# Patient Record
Sex: MALE | Race: BLACK OR AFRICAN AMERICAN | Employment: OTHER | URBAN - METROPOLITAN AREA
[De-identification: names, ages, dates, MRNs, and addresses within clinical notes are randomized per-mention and may not be internally consistent; named-entity substitution may affect disease eponyms.]

---

## 2017-01-28 RX ORDER — ONDANSETRON HYDROCHLORIDE 8 MG/1
TABLET, FILM COATED ORAL
Qty: 30 TABLET | Refills: 0 | Status: SHIPPED | OUTPATIENT
Start: 2017-01-28 | End: 2017-03-02

## 2017-01-31 RX ORDER — AMLODIPINE BESYLATE AND BENAZEPRIL HYDROCHLORIDE 5; 20 MG/1; MG/1
CAPSULE ORAL
Qty: 90 CAPSULE | Refills: 0 | Status: SHIPPED | OUTPATIENT
Start: 2017-01-31 | End: 2017-04-25

## 2017-02-28 ENCOUNTER — HOSPITAL ENCOUNTER (OUTPATIENT)
Dept: GENERAL RADIOLOGY | Age: 67
Discharge: HOME | End: 2017-02-28
Attending: INTERNAL MEDICINE
Payer: MEDICARE

## 2017-02-28 ENCOUNTER — OFFICE VISIT (OUTPATIENT)
Dept: INTERNAL MEDICINE CLINIC | Facility: CLINIC | Age: 67
End: 2017-02-28

## 2017-02-28 VITALS
OXYGEN SATURATION: 93 % | HEIGHT: 71 IN | DIASTOLIC BLOOD PRESSURE: 92 MMHG | RESPIRATION RATE: 16 BRPM | TEMPERATURE: 99 F | HEART RATE: 87 BPM | BODY MASS INDEX: 21.56 KG/M2 | WEIGHT: 154 LBS | SYSTOLIC BLOOD PRESSURE: 130 MMHG

## 2017-02-28 DIAGNOSIS — J44.9 CHRONIC OBSTRUCTIVE PULMONARY DISEASE, UNSPECIFIED COPD TYPE (HCC): Chronic | ICD-10-CM

## 2017-02-28 DIAGNOSIS — Z13.220 SCREENING CHOLESTEROL LEVEL: ICD-10-CM

## 2017-02-28 DIAGNOSIS — E10.65 TYPE 1 DIABETES MELLITUS WITH HYPERGLYCEMIA (HCC): Chronic | ICD-10-CM

## 2017-02-28 DIAGNOSIS — Z12.5 SCREENING FOR PROSTATE CANCER: ICD-10-CM

## 2017-02-28 DIAGNOSIS — E55.9 VITAMIN D DEFICIENCY: ICD-10-CM

## 2017-02-28 DIAGNOSIS — Z00.00 ROUTINE GENERAL MEDICAL EXAMINATION AT A HEALTH CARE FACILITY: Primary | ICD-10-CM

## 2017-02-28 DIAGNOSIS — Z13.29 SCREENING FOR THYROID DISORDER: ICD-10-CM

## 2017-02-28 DIAGNOSIS — I10 ESSENTIAL HYPERTENSION, BENIGN: Chronic | ICD-10-CM

## 2017-02-28 PROCEDURE — 71020 XR CHEST PA + LAT CHEST (CPT=71020): CPT

## 2017-02-28 PROCEDURE — G0439 PPPS, SUBSEQ VISIT: HCPCS | Performed by: INTERNAL MEDICINE

## 2017-02-28 PROCEDURE — 93000 ELECTROCARDIOGRAM COMPLETE: CPT | Performed by: INTERNAL MEDICINE

## 2017-02-28 NOTE — PROGRESS NOTES
Robbin Diop [facility] 1950 is a 78year old male.     Patient presents with:  Physical      HPI:   See below    Current Outpatient Prescriptions:  HYDROmorphone HCl (DILAUDID) 4 MG Oral Tab Take 1 tablet (4 mg total) by mouth every 6 (six) hours as need uncontrolled    • Unspecified vitamin D deficiency    • Spondylolisthesis of lumbar region    • Renal stones    • Right leg DVT ([facility] [address] 6/2007   • Primary localized osteoarthrosis, lower leg, left [715.16] 9/2/2015      Social History:    Smoking Status: For Patient denies blood in urine, burning on urination,   , dysuria, urinary incontinence/no history of kidney disease or genital abnormalities.  Dysuria none.-Noted frequency of urination along with initiation difficulty and nocturia   Musculoskeletal:   Pa absent . Edema: none. Pulses: present. Tremors: no.   Varicose veins: absent . MUSCULOSKELETAL:   Cervical spines: normal.   L-S spines: normal.   Lower extremity joints: mild oa knees.    Upper extremity joints: normal.   NEUROLOGICAL:   Babinski: asked to Return in about 2 weeks (around 3/14/2017) for result discussion.   Heath Olmstead MD

## 2017-03-01 ENCOUNTER — LAB ENCOUNTER (OUTPATIENT)
Dept: LAB | Age: 67
End: 2017-03-01
Attending: INTERNAL MEDICINE
Payer: MEDICARE

## 2017-03-01 DIAGNOSIS — I10 ESSENTIAL HYPERTENSION, BENIGN: Chronic | ICD-10-CM

## 2017-03-01 DIAGNOSIS — Z13.220 SCREENING CHOLESTEROL LEVEL: ICD-10-CM

## 2017-03-01 DIAGNOSIS — Z13.29 SCREENING FOR THYROID DISORDER: ICD-10-CM

## 2017-03-01 DIAGNOSIS — E55.9 VITAMIN D DEFICIENCY: ICD-10-CM

## 2017-03-01 DIAGNOSIS — Z12.5 SCREENING FOR PROSTATE CANCER: ICD-10-CM

## 2017-03-01 DIAGNOSIS — E10.65 TYPE 1 DIABETES MELLITUS WITH HYPERGLYCEMIA (HCC): ICD-10-CM

## 2017-03-01 LAB
25-HYDROXYVITAMIN D (TOTAL): 28 NG/ML (ref 30–100)
BASOPHILS # BLD AUTO: 0.06 X10(3) UL (ref 0–0.1)
BASOPHILS NFR BLD AUTO: 0.5 %
BILIRUB UR QL STRIP.AUTO: NEGATIVE
COLOR UR AUTO: YELLOW
EOSINOPHIL # BLD AUTO: 0.79 X10(3) UL (ref 0–0.3)
EOSINOPHIL NFR BLD AUTO: 6.7 %
ERYTHROCYTE [DISTWIDTH] IN BLOOD BY AUTOMATED COUNT: 14.6 % (ref 11.5–16)
EST. AVERAGE GLUCOSE BLD GHB EST-MCNC: 160 MG/DL (ref 68–126)
GLUCOSE UR STRIP.AUTO-MCNC: NEGATIVE MG/DL
HBA1C MFR BLD HPLC: 7.2 % (ref ?–5.7)
HCT VFR BLD AUTO: 42.5 % (ref 37–53)
HGB BLD-MCNC: 14.5 G/DL (ref 13–17)
IMMATURE GRANULOCYTE COUNT: 0.03 X10(3) UL (ref 0–1)
IMMATURE GRANULOCYTE RATIO %: 0.3 %
KETONES UR STRIP.AUTO-MCNC: NEGATIVE MG/DL
LYMPHOCYTES # BLD AUTO: 3.72 X10(3) UL (ref 0.9–4)
LYMPHOCYTES NFR BLD AUTO: 31.6 %
MCH RBC QN AUTO: 31.3 PG (ref 27–33.2)
MCHC RBC AUTO-ENTMCNC: 34.1 G/DL (ref 31–37)
MCV RBC AUTO: 91.8 FL (ref 80–99)
MONOCYTES # BLD AUTO: 1.08 X10(3) UL (ref 0.1–0.6)
MONOCYTES NFR BLD AUTO: 9.2 %
NEUTROPHIL ABS PRELIM: 6.1 X10 (3) UL (ref 1.3–6.7)
NEUTROPHILS # BLD AUTO: 6.1 X10(3) UL (ref 1.3–6.7)
NEUTROPHILS NFR BLD AUTO: 51.7 %
NITRITE UR QL STRIP.AUTO: NEGATIVE
PH UR STRIP.AUTO: 5 [PH] (ref 4.5–8)
PLATELET # BLD AUTO: 292 10(3)UL (ref 150–450)
PROT UR STRIP.AUTO-MCNC: NEGATIVE MG/DL
RBC # BLD AUTO: 4.63 X10(6)UL (ref 3.8–5.8)
RBC #/AREA URNS AUTO: >10 /HPF
RBC UR QL AUTO: NEGATIVE
RED CELL DISTRIBUTION WIDTH-SD: 48.8 FL (ref 35.1–46.3)
SP GR UR STRIP.AUTO: 1.01 (ref 1–1.03)
UROBILINOGEN UR STRIP.AUTO-MCNC: <2 MG/DL
WBC # BLD AUTO: 11.8 X10(3) UL (ref 4–13)

## 2017-03-01 PROCEDURE — 84153 ASSAY OF PSA TOTAL: CPT

## 2017-03-01 PROCEDURE — 85025 COMPLETE CBC W/AUTO DIFF WBC: CPT

## 2017-03-01 PROCEDURE — 80053 COMPREHEN METABOLIC PANEL: CPT

## 2017-03-01 PROCEDURE — 83036 HEMOGLOBIN GLYCOSYLATED A1C: CPT

## 2017-03-01 PROCEDURE — 82570 ASSAY OF URINE CREATININE: CPT

## 2017-03-01 PROCEDURE — 36415 COLL VENOUS BLD VENIPUNCTURE: CPT

## 2017-03-01 PROCEDURE — 84443 ASSAY THYROID STIM HORMONE: CPT

## 2017-03-01 PROCEDURE — 80061 LIPID PANEL: CPT

## 2017-03-01 PROCEDURE — 82043 UR ALBUMIN QUANTITATIVE: CPT

## 2017-03-01 PROCEDURE — 81001 URINALYSIS AUTO W/SCOPE: CPT

## 2017-03-01 PROCEDURE — 82306 VITAMIN D 25 HYDROXY: CPT

## 2017-03-01 PROCEDURE — 84436 ASSAY OF TOTAL THYROXINE: CPT

## 2017-03-02 LAB
ALBUMIN SERPL-MCNC: 3.8 G/DL (ref 3.5–4.8)
ALP LIVER SERPL-CCNC: 92 U/L (ref 45–117)
ALT SERPL-CCNC: 30 U/L (ref 17–63)
AST SERPL-CCNC: 26 U/L (ref 15–41)
BILIRUB SERPL-MCNC: 0.5 MG/DL (ref 0.1–2)
BUN BLD-MCNC: 7 MG/DL (ref 8–20)
CALCIUM BLD-MCNC: 9.4 MG/DL (ref 8.3–10.3)
CHLORIDE: 104 MMOL/L (ref 101–111)
CHOLEST SMN-MCNC: 133 MG/DL (ref ?–200)
CO2: 27 MMOL/L (ref 22–32)
CREAT BLD-MCNC: 1.05 MG/DL (ref 0.7–1.3)
CREAT UR-SCNC: 143 MG/DL
GLUCOSE BLD-MCNC: 89 MG/DL (ref 70–99)
HDLC SERPL-MCNC: 43 MG/DL (ref 45–?)
HDLC SERPL: 3.09 {RATIO} (ref ?–4.97)
LDLC SERPL CALC-MCNC: 79 MG/DL (ref ?–130)
M PROTEIN MFR SERPL ELPH: 7.7 G/DL (ref 6.1–8.3)
MICROALBUMIN UR-MCNC: 1.86 MG/DL
MICROALBUMIN/CREAT 24H UR-RTO: 13 UG/MG (ref ?–30)
NONHDLC SERPL-MCNC: 90 MG/DL (ref ?–130)
POTASSIUM SERPL-SCNC: 4.6 MMOL/L (ref 3.6–5.1)
PSA SERPL-MCNC: 2.6 NG/ML (ref 0.01–4)
SODIUM SERPL-SCNC: 140 MMOL/L (ref 136–144)
THYROXINE (T4): 7.8 UG/DL (ref 4.5–10.9)
TRIGLYCERIDES: 53 MG/DL (ref ?–150)
TSI SER-ACNC: 1.7 MIU/ML (ref 0.35–5.5)
VLDL: 11 MG/DL (ref 5–40)

## 2017-03-02 RX ORDER — ONDANSETRON HYDROCHLORIDE 8 MG/1
TABLET, FILM COATED ORAL
Qty: 30 TABLET | Refills: 3 | Status: SHIPPED | OUTPATIENT
Start: 2017-03-02 | End: 2017-07-25

## 2017-03-02 RX ORDER — ETODOLAC 400 MG/1
TABLET, FILM COATED ORAL
Qty: 30 TABLET | Refills: 3 | Status: SHIPPED | OUTPATIENT
Start: 2017-03-02 | End: 2017-08-29

## 2017-03-02 NOTE — TELEPHONE ENCOUNTER
Medication doesn't fall under protocol. Approve if needed.       Last OV 2/28/17    Last refill 1/28/17

## 2017-03-02 NOTE — TELEPHONE ENCOUNTER
Medication doesn't fall under protocol. Approve if needed.       Last OV 2/28/17    Last refill 9/26/16 , 30 tablets with 3 refills

## 2017-03-03 ENCOUNTER — OFFICE VISIT (OUTPATIENT)
Dept: INTERNAL MEDICINE CLINIC | Facility: CLINIC | Age: 67
End: 2017-03-03

## 2017-03-03 VITALS
BODY MASS INDEX: 21.52 KG/M2 | OXYGEN SATURATION: 97 % | HEART RATE: 82 BPM | HEIGHT: 71 IN | WEIGHT: 153.75 LBS | DIASTOLIC BLOOD PRESSURE: 72 MMHG | TEMPERATURE: 99 F | RESPIRATION RATE: 16 BRPM | SYSTOLIC BLOOD PRESSURE: 128 MMHG

## 2017-03-03 DIAGNOSIS — R93.89 ABNORMAL CHEST X-RAY: Primary | ICD-10-CM

## 2017-03-03 DIAGNOSIS — E10.65 TYPE 1 DIABETES MELLITUS WITH HYPERGLYCEMIA (HCC): Chronic | ICD-10-CM

## 2017-03-03 DIAGNOSIS — R82.90 ABNORMAL URINE: ICD-10-CM

## 2017-03-03 LAB
BILIRUB UR QL STRIP.AUTO: NEGATIVE
CLARITY UR REFRACT.AUTO: CLEAR
COLOR UR AUTO: YELLOW
GLUCOSE UR STRIP.AUTO-MCNC: NEGATIVE MG/DL
KETONES UR STRIP.AUTO-MCNC: NEGATIVE MG/DL
NITRITE UR QL STRIP.AUTO: NEGATIVE
PH UR STRIP.AUTO: 5 [PH] (ref 4.5–8)
PROT UR STRIP.AUTO-MCNC: NEGATIVE MG/DL
RBC UR QL AUTO: NEGATIVE
SP GR UR STRIP.AUTO: 1.01 (ref 1–1.03)
UROBILINOGEN UR STRIP.AUTO-MCNC: <2 MG/DL

## 2017-03-03 PROCEDURE — 81003 URINALYSIS AUTO W/O SCOPE: CPT | Performed by: INTERNAL MEDICINE

## 2017-03-03 PROCEDURE — 81001 URINALYSIS AUTO W/SCOPE: CPT | Performed by: INTERNAL MEDICINE

## 2017-03-03 PROCEDURE — 99213 OFFICE O/P EST LOW 20 MIN: CPT | Performed by: INTERNAL MEDICINE

## 2017-03-03 PROCEDURE — 87086 URINE CULTURE/COLONY COUNT: CPT | Performed by: INTERNAL MEDICINE

## 2017-03-03 NOTE — PROGRESS NOTES
Hannah Jorge  1950 is a 78year old male. Patient presents with: Follow - Up: labs      HPI:   DIABETES MELLITUS:   The diet that the patient has been following is the American Diabetes Association diet.    The frequency of the monitoring sc pancreatitis [facility])      for pancreatitis flare ups   • Chronic abdominal pain 1/7/2014   • Chronic pain 1/7/2014   • Knee pain 10/2/2013   • Long-term current use of opiate analgesic 6/11/2010   • Primary localized osteoarthrosis, lower leg 10/2/2013   • D DIABETIC FOOT EXAM BILATERAL  INSPECTION normal  CIRCULATION normal  MONOFILAMENT normal           ASSESSMENT AND PLAN:   Ba Taveras was seen today for follow - up.     Diagnoses and all orders for this visit:    Abnormal chest x-ray  -     CT CHEST (CPT=7

## 2017-03-07 RX ORDER — PEN NEEDLE, DIABETIC 29 G X1/2"
NEEDLE, DISPOSABLE MISCELLANEOUS
Qty: 360 EACH | Refills: 1 | Status: SHIPPED | OUTPATIENT
Start: 2017-03-07 | End: 2017-03-23 | Stop reason: CLARIF

## 2017-03-07 RX ORDER — INSULIN DETEMIR 100 [IU]/ML
INJECTION, SOLUTION SUBCUTANEOUS
Qty: 30 ML | Refills: 1 | Status: SHIPPED | OUTPATIENT
Start: 2017-03-07 | End: 2017-08-06

## 2017-03-07 NOTE — TELEPHONE ENCOUNTER
Medication doesn't fall under protocol. Approve if needed. Last OV 3/3/17    Last refill 10/10/15, not able to find other refills on this.

## 2017-03-15 ENCOUNTER — HOSPITAL ENCOUNTER (OUTPATIENT)
Dept: CT IMAGING | Facility: HOSPITAL | Age: 67
Discharge: HOME | End: 2017-03-15
Attending: INTERNAL MEDICINE
Payer: MEDICARE

## 2017-03-15 ENCOUNTER — NURSE ONLY (OUTPATIENT)
Dept: RESPIRATORY THERAPY | Facility: HOSPITAL | Age: 67
End: 2017-03-15
Attending: INTERNAL MEDICINE
Payer: MEDICARE

## 2017-03-15 DIAGNOSIS — J44.9 CHRONIC OBSTRUCTIVE PULMONARY DISEASE, UNSPECIFIED COPD TYPE (HCC): Chronic | ICD-10-CM

## 2017-03-15 DIAGNOSIS — R93.89 ABNORMAL CHEST X-RAY: ICD-10-CM

## 2017-03-15 PROCEDURE — 94060 EVALUATION OF WHEEZING: CPT

## 2017-03-15 PROCEDURE — 71250 CT THORAX DX C-: CPT

## 2017-03-15 PROCEDURE — 94729 DIFFUSING CAPACITY: CPT

## 2017-03-15 PROCEDURE — 94726 PLETHYSMOGRAPHY LUNG VOLUMES: CPT

## 2017-03-20 NOTE — PROCEDURES
Spirometry and  flow volume loop are consistent with  severe  airway obstruction. Good respond to bronchodilators   ( The FEV1 improved by 300 ml after albuterol was given (a 27% improvement).    There is an  increase in the RV and TLC  suggesting  air t

## 2017-03-23 ENCOUNTER — TELEPHONE (OUTPATIENT)
Dept: INTERNAL MEDICINE CLINIC | Facility: CLINIC | Age: 67
End: 2017-03-23

## 2017-03-23 ENCOUNTER — OFFICE VISIT (OUTPATIENT)
Dept: INTERNAL MEDICINE CLINIC | Facility: CLINIC | Age: 67
End: 2017-03-23

## 2017-03-23 VITALS
RESPIRATION RATE: 16 BRPM | BODY MASS INDEX: 21.42 KG/M2 | DIASTOLIC BLOOD PRESSURE: 78 MMHG | HEIGHT: 71 IN | OXYGEN SATURATION: 94 % | WEIGHT: 153 LBS | SYSTOLIC BLOOD PRESSURE: 136 MMHG | HEART RATE: 94 BPM | TEMPERATURE: 98 F

## 2017-03-23 DIAGNOSIS — R91.1 PULMONARY NODULE: ICD-10-CM

## 2017-03-23 DIAGNOSIS — J44.9 CHRONIC OBSTRUCTIVE PULMONARY DISEASE, UNSPECIFIED COPD TYPE (HCC): Primary | Chronic | ICD-10-CM

## 2017-03-23 PROCEDURE — 99213 OFFICE O/P EST LOW 20 MIN: CPT | Performed by: INTERNAL MEDICINE

## 2017-03-23 RX ORDER — BUDESONIDE AND FORMOTEROL FUMARATE DIHYDRATE 160; 4.5 UG/1; UG/1
2 AEROSOL RESPIRATORY (INHALATION) 2 TIMES DAILY
Qty: 10.2 INHALER | Refills: 3 | Status: SHIPPED | OUTPATIENT
Start: 2017-03-23 | End: 2017-03-27

## 2017-03-23 RX ORDER — ALBUTEROL SULFATE 90 UG/1
AEROSOL, METERED RESPIRATORY (INHALATION)
Qty: 54 G | Refills: 1 | Status: SHIPPED | OUTPATIENT
Start: 2017-03-23 | End: 2017-11-06

## 2017-03-23 NOTE — PROGRESS NOTES
Vandana Pierre [facility] 1950 is a 78year old male. Patient presents with:  Breathing Problem  Test Results      HPI:   Respiratory:   Coughing up blood no. Shortness of breath when lying flat no. fever no. Blood-tinged sputum no.  Chest congestion wit MetFORMIN HCl 500 MG Oral Tab Take 1 tablet (500 mg total) by mouth 2 (two) times daily with meals. Disp: 180 tablet Rfl: 1   Multiple Vitamin (MULTI VITAMIN DAILY OR) Take  by mouth daily.  Disp:  Rfl:       Past Medical History   Diagnosis Date   • Chroni Chronic obstructive pulmonary disease, unspecified COPD type (HCC)  -     Budesonide-Formoterol Fumarate (SYMBICORT) 160-4.5 MCG/ACT Inhalation Aerosol; Inhale 2 puffs into the lungs 2 (two) times daily.   -     Tiotropium Bromide Monohydrate ([place] Most people get COPD from smoking. Cigarette smoke damages lungs, which can develop into COPD over many years. How COPD affects you  COPD makes you work harder to breathe.  Air may get trapped in the lungs, which prevents your lungs from filling completely

## 2017-03-23 NOTE — PATIENT INSTRUCTIONS
What is COPD? COPD stands for chronic obstructive pulmonary disease. It means the airways in your lungs are blocked (obstructed). Because of this, it is hard to breathe. You may have trouble with daily activities because of shortness of breath.  Over reinaldo COPD makes you work harder to breathe. Air may get trapped in the lungs, which prevents your lungs from filling completely with fresh oxygen-filled air when you inhale (breathe in).  It's harder to take deep breaths especially when you are active and start

## 2017-03-23 NOTE — TELEPHONE ENCOUNTER
Per VM, pt needs to be provided with information of how to schedule pulmonary rehab. Pt contacted and provided with pulmonary rehab's phone number. Pt verbalized understanding.

## 2017-03-27 ENCOUNTER — OFFICE VISIT (OUTPATIENT)
Dept: INTERNAL MEDICINE CLINIC | Facility: CLINIC | Age: 67
End: 2017-03-27

## 2017-03-27 ENCOUNTER — TELEPHONE (OUTPATIENT)
Dept: INTERNAL MEDICINE CLINIC | Facility: CLINIC | Age: 67
End: 2017-03-27

## 2017-03-27 VITALS
WEIGHT: 153 LBS | DIASTOLIC BLOOD PRESSURE: 68 MMHG | BODY MASS INDEX: 21.42 KG/M2 | HEIGHT: 71 IN | SYSTOLIC BLOOD PRESSURE: 120 MMHG | HEART RATE: 91 BPM | OXYGEN SATURATION: 94 % | RESPIRATION RATE: 16 BRPM

## 2017-03-27 DIAGNOSIS — J44.9 CHRONIC OBSTRUCTIVE PULMONARY DISEASE, UNSPECIFIED COPD TYPE (HCC): Primary | Chronic | ICD-10-CM

## 2017-03-27 DIAGNOSIS — E10.65 TYPE 1 DIABETES MELLITUS WITH HYPERGLYCEMIA (HCC): Primary | Chronic | ICD-10-CM

## 2017-03-27 PROCEDURE — 99213 OFFICE O/P EST LOW 20 MIN: CPT | Performed by: INTERNAL MEDICINE

## 2017-03-27 RX ORDER — BUDESONIDE AND FORMOTEROL FUMARATE DIHYDRATE 160; 4.5 UG/1; UG/1
2 AEROSOL RESPIRATORY (INHALATION) 2 TIMES DAILY
Qty: 10.2 INHALER | Refills: 3 | Status: SHIPPED | OUTPATIENT
Start: 2017-03-27 | End: 2018-03-27

## 2017-03-27 NOTE — PROGRESS NOTES
Robbin Diop  1950 is a 78year old male. Patient presents with: Follow - Up      HPI:   DIABETES MELLITUS:   The diet that the patient has been following is the American Diabetes Association diet.    The frequency of the monitoring schedule HCL 5-20 MG Oral Cap TAKE 1 CAPSULE DAILY Disp: 90 capsule Rfl: 0   Insulin Lispro, Human, (HUMALOG KWIKPEN) 100 UNIT/ML Subcutaneous Solution Pen-injector PER SLIDING SCALE:120-180 inject 4 units,181-240 inject 8 units,241-300 inject 10 units,301-350 inje 71\"  Wt 153 lb  BMI 21.35 kg/m2  SpO2 94%  General Examination:   GENERAL APPEARANCE: alert and oriented. HEENT: unremarkable. NECK/THYROID: no carotid bruit, no elevation in jugular venous distention (JVD.    RESPIRATORY: clear to auscultation bilater

## 2017-03-28 ENCOUNTER — TELEPHONE (OUTPATIENT)
Dept: INTERNAL MEDICINE CLINIC | Facility: CLINIC | Age: 67
End: 2017-03-28

## 2017-04-25 RX ORDER — AMLODIPINE BESYLATE AND BENAZEPRIL HYDROCHLORIDE 5; 20 MG/1; MG/1
CAPSULE ORAL
Qty: 90 CAPSULE | Refills: 0 | Status: SHIPPED | OUTPATIENT
Start: 2017-04-25 | End: 2017-07-20

## 2017-07-21 RX ORDER — AMLODIPINE BESYLATE AND BENAZEPRIL HYDROCHLORIDE 5; 20 MG/1; MG/1
CAPSULE ORAL
Qty: 90 CAPSULE | Refills: 0 | Status: SHIPPED | OUTPATIENT
Start: 2017-07-21 | End: 2017-11-02

## 2017-07-25 RX ORDER — ONDANSETRON HYDROCHLORIDE 8 MG/1
TABLET, FILM COATED ORAL
Qty: 90 TABLET | Refills: 0 | Status: SHIPPED | OUTPATIENT
Start: 2017-07-25 | End: 2017-11-27

## 2017-07-25 NOTE — TELEPHONE ENCOUNTER
Medication doesn't fall under protocol. Approve if needed.       Last OV 3/27/17    Last refill 3/2/17, 30 tablets 2 refills

## 2017-08-07 RX ORDER — INSULIN DETEMIR 100 [IU]/ML
INJECTION, SOLUTION SUBCUTANEOUS
Qty: 30 ML | Refills: 0 | Status: SHIPPED | OUTPATIENT
Start: 2017-08-07 | End: 2017-12-04

## 2017-08-07 NOTE — TELEPHONE ENCOUNTER
Pt does not meet protocol  due to    Asthma Action Score greater than or equal to 20    AAP/ACT given in last 12 months       Medication pending, ok to refill?

## 2017-08-29 RX ORDER — ETODOLAC 400 MG/1
TABLET, FILM COATED ORAL
Qty: 30 TABLET | Refills: 2 | Status: SHIPPED | OUTPATIENT
Start: 2017-08-29 | End: 2018-01-19

## 2017-08-29 NOTE — TELEPHONE ENCOUNTER
Medication doesn't fall under protocol. Approve if needed.       Last OV 3/27/17    Last refill 30 tablets, 3 refills 3/2/17

## 2017-10-02 NOTE — TELEPHONE ENCOUNTER
Patient was called and his wife answered Okay to speak to per hippa. Wife notified an appointment is needed prior to refill.  She says she will have him call us

## 2017-10-05 ENCOUNTER — OFFICE VISIT (OUTPATIENT)
Dept: INTERNAL MEDICINE CLINIC | Facility: CLINIC | Age: 67
End: 2017-10-05

## 2017-10-05 VITALS
SYSTOLIC BLOOD PRESSURE: 118 MMHG | BODY MASS INDEX: 21.24 KG/M2 | TEMPERATURE: 98 F | HEIGHT: 71 IN | WEIGHT: 151.75 LBS | DIASTOLIC BLOOD PRESSURE: 60 MMHG | HEART RATE: 91 BPM | OXYGEN SATURATION: 95 % | RESPIRATION RATE: 16 BRPM

## 2017-10-05 DIAGNOSIS — E10.65 TYPE 1 DIABETES MELLITUS WITH HYPERGLYCEMIA (HCC): Primary | Chronic | ICD-10-CM

## 2017-10-05 DIAGNOSIS — I10 ESSENTIAL HYPERTENSION, BENIGN: Chronic | ICD-10-CM

## 2017-10-05 PROCEDURE — 90670 PCV13 VACCINE IM: CPT | Performed by: INTERNAL MEDICINE

## 2017-10-05 PROCEDURE — G0009 ADMIN PNEUMOCOCCAL VACCINE: HCPCS | Performed by: INTERNAL MEDICINE

## 2017-10-05 PROCEDURE — 99213 OFFICE O/P EST LOW 20 MIN: CPT | Performed by: INTERNAL MEDICINE

## 2017-10-05 NOTE — PROGRESS NOTES
Kike Ace  1950 is a 75year old male. Patient presents with:  Medication Follow-Up    Here  for follow-up  HPI:   DIABETES MELLITUS:   The diet that the patient has been following is the American Diabetes Association diet.    The frequenc Disp: 180 tablet Rfl: 1   Tiotropium Bromide Monohydrate (SPIRIVA HANDIHALER) 18 MCG Inhalation Cap Inhale 1 capsule (18 mcg total) into the lungs daily.  Disp: 90 capsule Rfl: 3   Albuterol Sulfate HFA (VENTOLIN HFA) 108 (90 Base) MCG/ACT Inhalation Aero S none.           EXAM:   /60 (BP Location: Right arm, Patient Position: Sitting, Cuff Size: adult)   Pulse 91   Temp 98.4 °F (36.9 °C) (Oral)   Resp 16   Ht 71\"   Wt 151 lb 12 oz   SpO2 95%   BMI 21.16 kg/m²   General Examination:   GENERAL APPEARANC

## 2017-10-18 ENCOUNTER — LAB ENCOUNTER (OUTPATIENT)
Dept: LAB | Age: 67
End: 2017-10-18
Attending: INTERNAL MEDICINE
Payer: MEDICARE

## 2017-10-18 DIAGNOSIS — E10.65 TYPE 1 DIABETES MELLITUS WITH HYPERGLYCEMIA (HCC): ICD-10-CM

## 2017-10-18 DIAGNOSIS — I10 ESSENTIAL HYPERTENSION, BENIGN: Chronic | ICD-10-CM

## 2017-10-18 PROCEDURE — 80061 LIPID PANEL: CPT

## 2017-10-18 PROCEDURE — 83036 HEMOGLOBIN GLYCOSYLATED A1C: CPT

## 2017-10-18 PROCEDURE — 80053 COMPREHEN METABOLIC PANEL: CPT

## 2017-10-18 PROCEDURE — 36415 COLL VENOUS BLD VENIPUNCTURE: CPT

## 2017-11-03 RX ORDER — AMLODIPINE BESYLATE AND BENAZEPRIL HYDROCHLORIDE 5; 20 MG/1; MG/1
CAPSULE ORAL
Qty: 90 CAPSULE | Refills: 0 | Status: SHIPPED | OUTPATIENT
Start: 2017-11-03 | End: 2018-02-06

## 2017-11-09 NOTE — TELEPHONE ENCOUNTER
Requesting ventolin  LOV: 10/5/17  RTC: feb for cpx (see result notes)  Last Relevant Labs: n/a  Filled: 3/23/17 # 54g with 1 refills    No future appointments. Pt has COPD not asthma, therefore does not need an ACT or AAP. Refill authorized.

## 2017-11-16 ENCOUNTER — TELEPHONE (OUTPATIENT)
Dept: INTERNAL MEDICINE CLINIC | Facility: CLINIC | Age: 67
End: 2017-11-16

## 2017-11-16 RX ORDER — AMOXICILLIN AND CLAVULANATE POTASSIUM 500; 125 MG/1; MG/1
1 TABLET, FILM COATED ORAL 2 TIMES DAILY
Qty: 20 TABLET | Refills: 0 | Status: SHIPPED | OUTPATIENT
Start: 2017-11-16 | End: 2017-11-26

## 2017-11-16 NOTE — TELEPHONE ENCOUNTER
[facility] called in stating that pt is c/o ST, nasal congestion with green drainage, productive cough with green sputum since this AM.     Denies SOB/wheezing/fever/N/V.     KNDA. Using Alysha Sommer.

## 2017-11-28 RX ORDER — ONDANSETRON HYDROCHLORIDE 8 MG/1
TABLET, FILM COATED ORAL
Qty: 90 TABLET | Refills: 0 | Status: SHIPPED | OUTPATIENT
Start: 2017-11-28 | End: 2018-02-14

## 2017-11-28 NOTE — TELEPHONE ENCOUNTER
Medication(s) to Refill:   Pending Prescriptions Disp Refills    Ondansetron HCl (ZOFRAN) 8 MG tablet [Pharmacy Med Name: Ondansetron HCl Oral Tablet 8 MG] 90 tablet 0     Sig: TAKE ONE HALF TABLET BY MOUTH four times a day as needed           Last Time Me

## 2017-12-05 RX ORDER — INSULIN DETEMIR 100 [IU]/ML
INJECTION, SOLUTION SUBCUTANEOUS
Qty: 35 ML | Refills: 0 | Status: SHIPPED | OUTPATIENT
Start: 2017-12-05 | End: 2018-03-08

## 2018-01-08 ENCOUNTER — TELEPHONE (OUTPATIENT)
Dept: INTERNAL MEDICINE CLINIC | Facility: CLINIC | Age: 68
End: 2018-01-08

## 2018-01-08 NOTE — TELEPHONE ENCOUNTER
Paperwork for [facility] health care faxed of detailed written order by Dr. Patricia Devine. Copy in blue folder original went to scan.

## 2018-01-20 RX ORDER — ETODOLAC 400 MG/1
TABLET, FILM COATED ORAL
Qty: 30 TABLET | Refills: 1 | Status: SHIPPED | OUTPATIENT
Start: 2018-01-20 | End: 2018-03-26

## 2018-02-05 ENCOUNTER — TELEPHONE (OUTPATIENT)
Dept: INTERNAL MEDICINE CLINIC | Facility: CLINIC | Age: 68
End: 2018-02-05

## 2018-02-05 DIAGNOSIS — Z00.00 BLOOD TESTS FOR ROUTINE GENERAL PHYSICAL EXAMINATION: Primary | ICD-10-CM

## 2018-02-05 DIAGNOSIS — E10.65 TYPE 1 DIABETES MELLITUS WITH HYPERGLYCEMIA (HCC): Chronic | ICD-10-CM

## 2018-02-05 DIAGNOSIS — E55.9 VITAMIN D DEFICIENCY: ICD-10-CM

## 2018-02-05 DIAGNOSIS — Z12.5 ENCOUNTER FOR SCREENING FOR MALIGNANT NEOPLASM OF PROSTATE: ICD-10-CM

## 2018-02-05 NOTE — TELEPHONE ENCOUNTER
Patients wife called stating her  came to lab today and there was no order for blood work.   He is having a physical on Thursday and was told an order would be entered so he could have results before physical.  Can you put in lab order so he could ha

## 2018-02-07 RX ORDER — AMLODIPINE BESYLATE AND BENAZEPRIL HYDROCHLORIDE 5; 20 MG/1; MG/1
CAPSULE ORAL
Qty: 90 CAPSULE | Refills: 0 | Status: SHIPPED | OUTPATIENT
Start: 2018-02-07 | End: 2018-05-07

## 2018-02-14 RX ORDER — ONDANSETRON HYDROCHLORIDE 8 MG/1
TABLET, FILM COATED ORAL
Qty: 90 TABLET | Refills: 0 | Status: SHIPPED | OUTPATIENT
Start: 2018-02-14 | End: 2018-06-21

## 2018-02-25 DIAGNOSIS — J44.9 CHRONIC OBSTRUCTIVE PULMONARY DISEASE, UNSPECIFIED COPD TYPE (HCC): Chronic | ICD-10-CM

## 2018-02-26 RX ORDER — TIOTROPIUM BROMIDE 18 UG/1
CAPSULE ORAL; RESPIRATORY (INHALATION)
Qty: 30 CAPSULE | Refills: 2 | Status: SHIPPED | OUTPATIENT
Start: 2018-02-26 | End: 2018-06-21

## 2018-03-05 ENCOUNTER — LAB ENCOUNTER (OUTPATIENT)
Dept: LAB | Age: 68
End: 2018-03-05
Attending: INTERNAL MEDICINE
Payer: MEDICARE

## 2018-03-05 DIAGNOSIS — E55.9 VITAMIN D DEFICIENCY: ICD-10-CM

## 2018-03-05 DIAGNOSIS — E10.65 TYPE 1 DIABETES MELLITUS WITH HYPERGLYCEMIA (HCC): Chronic | ICD-10-CM

## 2018-03-05 DIAGNOSIS — Z12.5 ENCOUNTER FOR SCREENING FOR MALIGNANT NEOPLASM OF PROSTATE: ICD-10-CM

## 2018-03-05 DIAGNOSIS — Z00.00 BLOOD TESTS FOR ROUTINE GENERAL PHYSICAL EXAMINATION: ICD-10-CM

## 2018-03-05 LAB
25-HYDROXYVITAMIN D (TOTAL): 26.7 NG/ML (ref 30–100)
ALBUMIN SERPL-MCNC: 3.3 G/DL (ref 3.5–4.8)
ALP LIVER SERPL-CCNC: 95 U/L (ref 45–117)
ALT SERPL-CCNC: 42 U/L (ref 17–63)
AST SERPL-CCNC: 32 U/L (ref 15–41)
BASOPHILS # BLD AUTO: 0.05 X10(3) UL (ref 0–0.1)
BASOPHILS NFR BLD AUTO: 0.4 %
BILIRUB SERPL-MCNC: 0.4 MG/DL (ref 0.1–2)
BILIRUB UR QL STRIP.AUTO: NEGATIVE
BUN BLD-MCNC: 13 MG/DL (ref 8–20)
CALCIUM BLD-MCNC: 9.2 MG/DL (ref 8.3–10.3)
CHLORIDE: 104 MMOL/L (ref 101–111)
CHOLEST SMN-MCNC: 110 MG/DL (ref ?–200)
CO2: 27 MMOL/L (ref 22–32)
COLOR UR AUTO: YELLOW
COMPLEXED PSA SERPL-MCNC: 4.71 NG/ML (ref 0.01–4)
CREAT BLD-MCNC: 1.1 MG/DL (ref 0.7–1.3)
CREAT UR-SCNC: 95 MG/DL
EOSINOPHIL # BLD AUTO: 0.7 X10(3) UL (ref 0–0.3)
EOSINOPHIL NFR BLD AUTO: 6.2 %
ERYTHROCYTE [DISTWIDTH] IN BLOOD BY AUTOMATED COUNT: 14.8 % (ref 11.5–16)
EST. AVERAGE GLUCOSE BLD GHB EST-MCNC: 143 MG/DL (ref 68–126)
GLUCOSE BLD-MCNC: 197 MG/DL (ref 70–99)
GLUCOSE UR STRIP.AUTO-MCNC: NEGATIVE MG/DL
HBA1C MFR BLD HPLC: 6.6 % (ref ?–5.7)
HCT VFR BLD AUTO: 38.9 % (ref 37–53)
HDLC SERPL-MCNC: 29 MG/DL (ref 45–?)
HDLC SERPL: 3.79 {RATIO} (ref ?–4.97)
HGB BLD-MCNC: 13.1 G/DL (ref 13–17)
IMMATURE GRANULOCYTE COUNT: 0.04 X10(3) UL (ref 0–1)
IMMATURE GRANULOCYTE RATIO %: 0.4 %
KETONES UR STRIP.AUTO-MCNC: NEGATIVE MG/DL
LDLC SERPL CALC-MCNC: 63 MG/DL (ref ?–130)
LYMPHOCYTES # BLD AUTO: 2.21 X10(3) UL (ref 0.9–4)
LYMPHOCYTES NFR BLD AUTO: 19.6 %
M PROTEIN MFR SERPL ELPH: 7 G/DL (ref 6.1–8.3)
MCH RBC QN AUTO: 30.5 PG (ref 27–33.2)
MCHC RBC AUTO-ENTMCNC: 33.7 G/DL (ref 31–37)
MCV RBC AUTO: 90.7 FL (ref 80–99)
MICROALBUMIN UR-MCNC: 3.17 MG/DL
MICROALBUMIN/CREAT 24H UR-RTO: 33.4 UG/MG (ref ?–30)
MONOCYTES # BLD AUTO: 1.33 X10(3) UL (ref 0.1–1)
MONOCYTES NFR BLD AUTO: 11.8 %
NEUTROPHIL ABS PRELIM: 6.96 X10 (3) UL (ref 1.3–6.7)
NEUTROPHILS # BLD AUTO: 6.96 X10(3) UL (ref 1.3–6.7)
NEUTROPHILS NFR BLD AUTO: 61.6 %
NITRITE UR QL STRIP.AUTO: NEGATIVE
NONHDLC SERPL-MCNC: 81 MG/DL (ref ?–130)
PH UR STRIP.AUTO: 5 [PH] (ref 4.5–8)
PLATELET # BLD AUTO: 327 10(3)UL (ref 150–450)
POTASSIUM SERPL-SCNC: 4.9 MMOL/L (ref 3.6–5.1)
PROT UR STRIP.AUTO-MCNC: NEGATIVE MG/DL
RBC # BLD AUTO: 4.29 X10(6)UL (ref 3.8–5.8)
RBC #/AREA URNS AUTO: >10 /HPF
RBC UR QL AUTO: NEGATIVE
RED CELL DISTRIBUTION WIDTH-SD: 49.1 FL (ref 35.1–46.3)
SODIUM SERPL-SCNC: 138 MMOL/L (ref 136–144)
SP GR UR STRIP.AUTO: 1.01 (ref 1–1.03)
THYROXINE (T4): 7.7 UG/DL (ref 4.5–10.9)
TRIGL SERPL-MCNC: 88 MG/DL (ref ?–150)
UROBILINOGEN UR STRIP.AUTO-MCNC: 2 MG/DL
VLDLC SERPL CALC-MCNC: 18 MG/DL (ref 5–40)
WBC # BLD AUTO: 11.3 X10(3) UL (ref 4–13)

## 2018-03-05 PROCEDURE — 83036 HEMOGLOBIN GLYCOSYLATED A1C: CPT | Performed by: INTERNAL MEDICINE

## 2018-03-05 PROCEDURE — 85025 COMPLETE CBC W/AUTO DIFF WBC: CPT | Performed by: INTERNAL MEDICINE

## 2018-03-05 PROCEDURE — G0103 PSA SCREENING: HCPCS | Performed by: INTERNAL MEDICINE

## 2018-03-05 PROCEDURE — 87086 URINE CULTURE/COLONY COUNT: CPT | Performed by: INTERNAL MEDICINE

## 2018-03-05 PROCEDURE — 81001 URINALYSIS AUTO W/SCOPE: CPT | Performed by: INTERNAL MEDICINE

## 2018-03-05 PROCEDURE — 82306 VITAMIN D 25 HYDROXY: CPT | Performed by: INTERNAL MEDICINE

## 2018-03-05 PROCEDURE — 82570 ASSAY OF URINE CREATININE: CPT | Performed by: INTERNAL MEDICINE

## 2018-03-05 PROCEDURE — 82043 UR ALBUMIN QUANTITATIVE: CPT | Performed by: INTERNAL MEDICINE

## 2018-03-05 PROCEDURE — 80053 COMPREHEN METABOLIC PANEL: CPT | Performed by: INTERNAL MEDICINE

## 2018-03-05 PROCEDURE — 36415 COLL VENOUS BLD VENIPUNCTURE: CPT | Performed by: INTERNAL MEDICINE

## 2018-03-05 PROCEDURE — 84436 ASSAY OF TOTAL THYROXINE: CPT | Performed by: INTERNAL MEDICINE

## 2018-03-05 PROCEDURE — 80061 LIPID PANEL: CPT | Performed by: INTERNAL MEDICINE

## 2018-03-08 ENCOUNTER — OFFICE VISIT (OUTPATIENT)
Dept: INTERNAL MEDICINE CLINIC | Facility: CLINIC | Age: 68
End: 2018-03-08

## 2018-03-08 VITALS
TEMPERATURE: 99 F | OXYGEN SATURATION: 94 % | SYSTOLIC BLOOD PRESSURE: 130 MMHG | RESPIRATION RATE: 22 BRPM | HEIGHT: 71 IN | BODY MASS INDEX: 20.65 KG/M2 | DIASTOLIC BLOOD PRESSURE: 70 MMHG | WEIGHT: 147.5 LBS | HEART RATE: 106 BPM

## 2018-03-08 DIAGNOSIS — Z00.00 ROUTINE GENERAL MEDICAL EXAMINATION AT A HEALTH CARE FACILITY: Primary | ICD-10-CM

## 2018-03-08 DIAGNOSIS — R63.4 WEIGHT LOSS: ICD-10-CM

## 2018-03-08 DIAGNOSIS — R31.9 HEMATURIA, UNSPECIFIED TYPE: ICD-10-CM

## 2018-03-08 DIAGNOSIS — E55.9 VITAMIN D DEFICIENCY: ICD-10-CM

## 2018-03-08 DIAGNOSIS — R97.20 ELEVATED PSA, LESS THAN 10 NG/ML: ICD-10-CM

## 2018-03-08 DIAGNOSIS — R91.1 PULMONARY NODULE: ICD-10-CM

## 2018-03-08 DIAGNOSIS — E10.65 TYPE 1 DIABETES MELLITUS WITH HYPERGLYCEMIA (HCC): Chronic | ICD-10-CM

## 2018-03-08 PROCEDURE — G0439 PPPS, SUBSEQ VISIT: HCPCS | Performed by: INTERNAL MEDICINE

## 2018-03-08 PROCEDURE — 96160 PT-FOCUSED HLTH RISK ASSMT: CPT | Performed by: INTERNAL MEDICINE

## 2018-03-08 PROCEDURE — 93000 ELECTROCARDIOGRAM COMPLETE: CPT | Performed by: INTERNAL MEDICINE

## 2018-03-08 RX ORDER — CHOLECALCIFEROL (VITAMIN D3) 50 MCG
1 TABLET ORAL DAILY
Qty: 90 TABLET | Refills: 3 | Status: SHIPPED | OUTPATIENT
Start: 2018-03-08 | End: 2018-06-06

## 2018-03-08 NOTE — PROGRESS NOTES
Saurabh Briscoe  1950 is a 75year old male.     Patient presents with:  Physical: Est Pt. medicare wellness visit      HPI:   See below    Current Outpatient Prescriptions:  Cholecalciferol (VITAMIN D) 2000 units Oral Tab Take 1 tablet by mouth da pancreatitis flare ups   • COPD (chronic obstructive pulmonary disease) ([facility])    • Diabetes ([facility] [address]    • Essential hypertension, benign    • Knee pain 10/2/2013   • Long-term current use of opiate analgesic 6/11/2010   • Primary localized osteoarthrosis, low pain, acid reflux, blood in stool, vomiting,     +worsening nausea,  No heartburnno change in appetite noted no change in bowel movement noted. Dysphagia none.    Wt loss 7 pounds in the last few months  Hematology:   Patient denies abnormal bleeding, easy Shape: copd changes   Percussion: normal.   Rales: no .   Respiratory effort: normal .   Rhonchi: no.   Wheezes: no. ABDOMEN:   Bowel sounds: normal.   General: normal.   Hernia: absent. Liver, Spleen: no hepatosplenomegaly (HSM).    Tenderness: absent Instructions   See me after consults     The patient indicates understanding of these issues and agrees to the plan. The patient is asked to Return in about 4 weeks (around 4/5/2018) for result discussion.   Kamilla Stone MD

## 2018-03-08 NOTE — TELEPHONE ENCOUNTER
Was seen today and forgot to ask for refill of levemir. Last refill 12/5/17 #35ml with no refills. 3/5/18  8:35 AM     HgbA1C <5.7 % 6.6      Medication pending.

## 2018-03-15 ENCOUNTER — HOSPITAL ENCOUNTER (OUTPATIENT)
Dept: CT IMAGING | Age: 68
Discharge: HOME | End: 2018-03-15
Attending: INTERNAL MEDICINE
Payer: MEDICARE

## 2018-03-15 DIAGNOSIS — R91.1 PULMONARY NODULE: ICD-10-CM

## 2018-03-15 DIAGNOSIS — R63.4 WEIGHT LOSS: ICD-10-CM

## 2018-03-15 PROCEDURE — 71250 CT THORAX DX C-: CPT | Performed by: INTERNAL MEDICINE

## 2018-03-19 RX ORDER — INSULIN DETEMIR 100 [IU]/ML
INJECTION, SOLUTION SUBCUTANEOUS
Qty: 30 ML | Refills: 0 | Status: SHIPPED | OUTPATIENT
Start: 2018-03-19 | End: 2018-03-20

## 2018-03-19 RX ORDER — PEN NEEDLE, DIABETIC 29 G X1/2"
NEEDLE, DISPOSABLE MISCELLANEOUS
Qty: 360 EACH | Refills: 1 | Status: SHIPPED | OUTPATIENT
Start: 2018-03-19 | End: 2018-09-18

## 2018-03-19 RX ORDER — INSULIN LISPRO 100 [IU]/ML
INJECTION, SOLUTION INTRAVENOUS; SUBCUTANEOUS
Qty: 30 ML | Refills: 0 | Status: SHIPPED | OUTPATIENT
Start: 2018-03-19 | End: 2018-06-22

## 2018-03-20 ENCOUNTER — OFFICE VISIT (OUTPATIENT)
Dept: INTERNAL MEDICINE CLINIC | Facility: CLINIC | Age: 68
End: 2018-03-20

## 2018-03-20 VITALS
TEMPERATURE: 98 F | OXYGEN SATURATION: 97 % | WEIGHT: 148 LBS | RESPIRATION RATE: 16 BRPM | DIASTOLIC BLOOD PRESSURE: 80 MMHG | SYSTOLIC BLOOD PRESSURE: 132 MMHG | BODY MASS INDEX: 21 KG/M2 | HEART RATE: 94 BPM

## 2018-03-20 DIAGNOSIS — I10 ESSENTIAL HYPERTENSION, BENIGN: Chronic | ICD-10-CM

## 2018-03-20 DIAGNOSIS — J44.9 CHRONIC OBSTRUCTIVE PULMONARY DISEASE, UNSPECIFIED COPD TYPE (HCC): Primary | Chronic | ICD-10-CM

## 2018-03-20 DIAGNOSIS — R63.4 WEIGHT LOSS: ICD-10-CM

## 2018-03-20 PROBLEM — R91.1 PULMONARY NODULE: Chronic | Status: ACTIVE | Noted: 2017-03-23

## 2018-03-20 PROBLEM — R31.9 HEMATURIA: Chronic | Status: ACTIVE | Noted: 2018-03-08

## 2018-03-20 PROCEDURE — 99213 OFFICE O/P EST LOW 20 MIN: CPT | Performed by: INTERNAL MEDICINE

## 2018-03-20 NOTE — PROGRESS NOTES
Trung Falk  1950 is a 75year old male. No chief complaint on file. HPI:   CT chest results.   Also for follow-up of inability to thrive    Current Outpatient Prescriptions:  HUMALOG KWIKPEN 100 UNIT/ML Subcutaneous Solution Pen-inject abdominal pain 1/7/2014   • Chronic pain 1/7/2014   • Chronic pancreatitis (HCC)     for pancreatitis flare ups   • COPD (chronic obstructive pulmonary disease) ([facility] [address]    • [facility]    • Essential hypertension, benign    • Knee pain 10/2/2013   • Long

## 2018-03-27 ENCOUNTER — HOSPITAL ENCOUNTER (OUTPATIENT)
Dept: CT IMAGING | Age: 68
Discharge: HOME | End: 2018-03-27
Attending: INTERNAL MEDICINE
Payer: MEDICARE

## 2018-03-27 DIAGNOSIS — R63.4 WEIGHT LOSS: ICD-10-CM

## 2018-03-27 PROCEDURE — 74177 CT ABD & PELVIS W/CONTRAST: CPT | Performed by: INTERNAL MEDICINE

## 2018-03-27 RX ORDER — ETODOLAC 400 MG/1
TABLET, FILM COATED ORAL
Qty: 60 TABLET | Refills: 0 | Status: SHIPPED | OUTPATIENT
Start: 2018-03-27 | End: 2018-05-31

## 2018-03-27 NOTE — TELEPHONE ENCOUNTER
Refill request: Etodolac 400 mg    Last appt 3/20/18 RTC in 4 weeks  Next appt Not scheduled  Last refill 1/20/18 #30 1R    No protocol - please advise if ok to refill ?

## 2018-03-29 ENCOUNTER — OFFICE VISIT (OUTPATIENT)
Dept: INTERNAL MEDICINE CLINIC | Facility: CLINIC | Age: 68
End: 2018-03-29

## 2018-03-29 VITALS
HEART RATE: 106 BPM | OXYGEN SATURATION: 99 % | SYSTOLIC BLOOD PRESSURE: 118 MMHG | BODY MASS INDEX: 21 KG/M2 | RESPIRATION RATE: 16 BRPM | DIASTOLIC BLOOD PRESSURE: 60 MMHG | WEIGHT: 147 LBS

## 2018-03-29 DIAGNOSIS — J44.9 CHRONIC OBSTRUCTIVE PULMONARY DISEASE, UNSPECIFIED COPD TYPE (HCC): Chronic | ICD-10-CM

## 2018-03-29 DIAGNOSIS — K86.1 CHRONIC PANCREATITIS, UNSPECIFIED PANCREATITIS TYPE (HCC): Primary | Chronic | ICD-10-CM

## 2018-03-29 PROCEDURE — 99213 OFFICE O/P EST LOW 20 MIN: CPT | Performed by: INTERNAL MEDICINE

## 2018-03-29 NOTE — PROGRESS NOTES
Joann Gilford  1950 is a 75year old male. Patient presents with:   Follow - Up: Results      HPI:   CT abdomen results    Current Outpatient Prescriptions:  ETODOLAC 400 MG Oral Tab TAKE 1 TABLET BY MOUTH TWICE DAILY  Disp: 60 tablet Rfl: 0 Essential hypertension, benign    • Knee pain 10/2/2013   • Long-term current use of opiate analgesic 6/11/2010   • Primary localized osteoarthrosis, lower leg 10/2/2013   • Primary localized osteoarthrosis, lower leg, left [715.16] 9/2/2015   • Renal ston

## 2018-04-06 RX ORDER — BUDESONIDE AND FORMOTEROL FUMARATE DIHYDRATE 160; 4.5 UG/1; UG/1
AEROSOL RESPIRATORY (INHALATION)
Qty: 1 INHALER | Refills: 3 | Status: SHIPPED | OUTPATIENT
Start: 2018-04-06 | End: 2018-10-05

## 2018-04-18 ENCOUNTER — APPOINTMENT (OUTPATIENT)
Dept: LAB | Age: 68
End: 2018-04-18
Attending: INTERNAL MEDICINE
Payer: MEDICARE

## 2018-04-18 DIAGNOSIS — R31.9 HEMATURIA, UNSPECIFIED TYPE: ICD-10-CM

## 2018-04-18 PROCEDURE — 81001 URINALYSIS AUTO W/SCOPE: CPT | Performed by: INTERNAL MEDICINE

## 2018-04-18 PROCEDURE — 87086 URINE CULTURE/COLONY COUNT: CPT | Performed by: INTERNAL MEDICINE

## 2018-04-30 NOTE — TELEPHONE ENCOUNTER
VENTOLIN  (90 Base) MCG/ACT Inhalation Aero Soln 54 g 0 2/8/2018    Sig :  USE 2 INHALATIONS EVERY 6 HOURS AS NEEDED FOR WHEEZING

## 2018-05-01 ENCOUNTER — OFFICE VISIT (OUTPATIENT)
Dept: INTERNAL MEDICINE CLINIC | Facility: CLINIC | Age: 68
End: 2018-05-01

## 2018-05-01 ENCOUNTER — LAB ENCOUNTER (OUTPATIENT)
Dept: LAB | Age: 68
End: 2018-05-01
Attending: UROLOGY
Payer: MEDICARE

## 2018-05-01 VITALS
BODY MASS INDEX: 20.48 KG/M2 | HEART RATE: 97 BPM | DIASTOLIC BLOOD PRESSURE: 70 MMHG | SYSTOLIC BLOOD PRESSURE: 110 MMHG | RESPIRATION RATE: 18 BRPM | TEMPERATURE: 98 F | HEIGHT: 71 IN | WEIGHT: 146.25 LBS

## 2018-05-01 DIAGNOSIS — Z01.818 PREOP EXAM FOR INTERNAL MEDICINE: Primary | ICD-10-CM

## 2018-05-01 DIAGNOSIS — I10 ESSENTIAL HYPERTENSION, BENIGN: Chronic | ICD-10-CM

## 2018-05-01 DIAGNOSIS — J44.9 CHRONIC OBSTRUCTIVE PULMONARY DISEASE, UNSPECIFIED COPD TYPE (HCC): Chronic | ICD-10-CM

## 2018-05-01 DIAGNOSIS — N20.0 RENAL CALCULI: ICD-10-CM

## 2018-05-01 DIAGNOSIS — R97.20 ELEVATED PSA, LESS THAN 10 NG/ML: ICD-10-CM

## 2018-05-01 DIAGNOSIS — E10.65 TYPE 1 DIABETES MELLITUS WITH HYPERGLYCEMIA (HCC): Chronic | ICD-10-CM

## 2018-05-01 DIAGNOSIS — N20.0 KIDNEY STONE: ICD-10-CM

## 2018-05-01 PROCEDURE — 99214 OFFICE O/P EST MOD 30 MIN: CPT | Performed by: INTERNAL MEDICINE

## 2018-05-01 PROCEDURE — 87086 URINE CULTURE/COLONY COUNT: CPT

## 2018-05-01 PROCEDURE — 85025 COMPLETE CBC W/AUTO DIFF WBC: CPT

## 2018-05-01 PROCEDURE — 36415 COLL VENOUS BLD VENIPUNCTURE: CPT

## 2018-05-01 PROCEDURE — 80053 COMPREHEN METABOLIC PANEL: CPT

## 2018-05-01 NOTE — PROGRESS NOTES
Rock BAKER 1950 is a 75year old male.     Patient presents with:  Pre-Op Exam      HPI:   He  has had previous anesthesia:  yes  Previous complications:  none    Current Outpatient Prescriptions:  VENTOLIN  (90 Base) MCG/ACT Inhalatio • Chronic pain 1/7/2014   • Chronic pancreatitis (HCC)     for pancreatitis flare ups   • COPD (chronic obstructive pulmonary disease) (HCC)    • [facility]    • Essential hypertension, benign    • Knee pain 10/2/2013   • Long-term current use of opi weight changes noted   Hematology:   Patient denies abnormal bleeding, easy bleeding, easy bruising. Enlarged lymph nodes none. Neurologic:   Patient denies dizziness, fainting, loss of consciousness, weakness. Psychiatric:   Anxiety no.            EXAM clearance     The patient indicates understanding of these issues and agrees to the plan. The patient is asked to Return if symptoms worsen or fail to improve.   Elisa Buerger, MD

## 2018-05-07 ENCOUNTER — TELEPHONE (OUTPATIENT)
Dept: INTERNAL MEDICINE CLINIC | Facility: CLINIC | Age: 68
End: 2018-05-07

## 2018-05-08 RX ORDER — AMLODIPINE BESYLATE AND BENAZEPRIL HYDROCHLORIDE 5; 20 MG/1; MG/1
CAPSULE ORAL
Qty: 90 CAPSULE | Refills: 0 | Status: SHIPPED | OUTPATIENT
Start: 2018-05-08 | End: 2018-08-09

## 2018-05-22 ENCOUNTER — HOSPITAL ENCOUNTER (OUTPATIENT)
Dept: GENERAL RADIOLOGY | Age: 68
Discharge: HOME | End: 2018-05-22
Attending: UROLOGY
Payer: MEDICARE

## 2018-05-22 DIAGNOSIS — N20.0 RENAL CALCULUS, RIGHT: ICD-10-CM

## 2018-05-22 PROCEDURE — 74018 RADEX ABDOMEN 1 VIEW: CPT | Performed by: UROLOGY

## 2018-05-22 RX ORDER — ALBUTEROL SULFATE 90 UG/1
2 AEROSOL, METERED RESPIRATORY (INHALATION) EVERY 6 HOURS PRN
Qty: 54 G | Refills: 0 | Status: SHIPPED | OUTPATIENT
Start: 2018-05-22 | End: 2018-08-09

## 2018-05-22 NOTE — TELEPHONE ENCOUNTER
Refill requested: Ventolin Inhaler    Failed protocol - Due for AAP      Last refill: 4/30/18 - [place]   Relevant Labs: NA   Last AAP: None   Last OV / RTC advised: 5/1/18 - No RTC   Appt Scheduled: No  Your appointments     Date & Time Appointment Joann Norton

## 2018-05-31 RX ORDER — ETODOLAC 400 MG/1
TABLET, FILM COATED ORAL
Qty: 60 TABLET | Refills: 0 | Status: SHIPPED | OUTPATIENT
Start: 2018-05-31 | End: 2018-08-01

## 2018-06-21 DIAGNOSIS — J44.9 CHRONIC OBSTRUCTIVE PULMONARY DISEASE, UNSPECIFIED COPD TYPE (HCC): Chronic | ICD-10-CM

## 2018-06-21 RX ORDER — ONDANSETRON HYDROCHLORIDE 8 MG/1
TABLET, FILM COATED ORAL
Qty: 90 TABLET | Refills: 0 | Status: SHIPPED | OUTPATIENT
Start: 2018-06-21 | End: 2018-09-20

## 2018-06-21 RX ORDER — TIOTROPIUM BROMIDE 18 UG/1
CAPSULE ORAL; RESPIRATORY (INHALATION)
Qty: 30 CAPSULE | Refills: 1 | Status: SHIPPED | OUTPATIENT
Start: 2018-06-21 | End: 2018-09-06

## 2018-06-21 NOTE — TELEPHONE ENCOUNTER
Spirva filled 2-26-18 30 cap with 2 refills     Zofran 8 mg 1/2 tab q4 prn filled 2-14-18 90 with 0 refills     LOV 5-1-18     No hx of asthma pt has COPD     Labs 5-1-18

## 2018-06-25 RX ORDER — INSULIN DETEMIR 100 [IU]/ML
INJECTION, SOLUTION SUBCUTANEOUS
Qty: 30 ML | Refills: 0 | Status: SHIPPED | OUTPATIENT
Start: 2018-06-25 | End: 2018-09-18

## 2018-06-25 RX ORDER — INSULIN LISPRO 100 [IU]/ML
INJECTION, SOLUTION INTRAVENOUS; SUBCUTANEOUS
Qty: 30 ML | Refills: 0 | Status: SHIPPED | OUTPATIENT
Start: 2018-06-25 | End: 2019-05-25 | Stop reason: CLARIF

## 2018-06-25 NOTE — TELEPHONE ENCOUNTER
HUMALOG KWIKPEN 100 UNIT/ML Subcutaneous Solution Pen-injector 30 mL 0 3/19/2018    Sig :  TAKE AS INSTRUCTED BY YOUR PRESCRIBER       insulin detemir (LEVEMIR FLEXTOUCH) 100 UNIT/ML Subcutaneous Solution Pen-injector 35 mL 0 3/8/2018    Sig :  INJECT 35 U

## 2018-07-02 ENCOUNTER — TELEPHONE (OUTPATIENT)
Dept: INTERNAL MEDICINE CLINIC | Facility: CLINIC | Age: 68
End: 2018-07-02

## 2018-07-02 DIAGNOSIS — I10 ESSENTIAL HYPERTENSION, BENIGN: Primary | Chronic | ICD-10-CM

## 2018-07-02 DIAGNOSIS — E55.9 VITAMIN D DEFICIENCY: Chronic | ICD-10-CM

## 2018-07-02 DIAGNOSIS — E11.9 DIABETES MELLITUS WITHOUT COMPLICATION (HCC): ICD-10-CM

## 2018-07-02 DIAGNOSIS — Z13.220 SCREENING CHOLESTEROL LEVEL: ICD-10-CM

## 2018-07-02 NOTE — TELEPHONE ENCOUNTER
Patient's wife informed of lab orders and fasting restrictions, patient's wife verbalized understanding

## 2018-07-02 NOTE — TELEPHONE ENCOUNTER
Patient scheduled for 6 month F/U. Requesting to have any necessary blood work to be entered prior to appointment.      Future Appointments  Date Time Provider Basilio Rashmi   9/7/2018 12:30 PM Kellie Mendoza MD [facility] [place]

## 2018-07-09 RX ORDER — ALBUTEROL SULFATE 90 UG/1
2 AEROSOL, METERED RESPIRATORY (INHALATION) EVERY 6 HOURS PRN
Qty: 18 G | Refills: 1 | Status: SHIPPED | OUTPATIENT
Start: 2018-07-09 | End: 2019-03-11

## 2018-07-09 NOTE — TELEPHONE ENCOUNTER
Refill requested: Ventolin Inh    Failed protocol - Due to AAP         Last refill: 5/22/2018 54g NR     Relevant Labs: Last AAP - No  Last OV / RTC advised: 05/01/2018 Dr Mary Chaparro RTC  Appt Scheduled: yes    Your appointments     Date & Time Appointment Delvin Leiva

## 2018-08-02 RX ORDER — ETODOLAC 400 MG/1
TABLET, FILM COATED ORAL
Qty: 60 TABLET | Refills: 0 | Status: SHIPPED | OUTPATIENT
Start: 2018-08-02 | End: 2018-10-05

## 2018-08-02 NOTE — TELEPHONE ENCOUNTER
Requesting ETODOLAC 400 MG Oral Tab  LOV: 05/01/2018  Filled: 05/31/2018 #60 with 0 refills    Future Appointments  Date Time Provider Basilio Caraballo   9/7/2018 12:30 PM Shaheen Walsh MD [facility] [place]   4/1/2019 9:00 AM MD TERRELL Diaz [facility] DM

## 2018-08-10 RX ORDER — ALBUTEROL SULFATE 90 UG/1
2 AEROSOL, METERED RESPIRATORY (INHALATION) EVERY 6 HOURS PRN
Qty: 54 G | Refills: 1 | Status: SHIPPED | OUTPATIENT
Start: 2018-08-10 | End: 2019-02-02

## 2018-08-10 RX ORDER — AMLODIPINE BESYLATE AND BENAZEPRIL HYDROCHLORIDE 5; 20 MG/1; MG/1
1 CAPSULE ORAL DAILY
Qty: 90 CAPSULE | Refills: 1 | Status: SHIPPED | OUTPATIENT
Start: 2018-08-10 | End: 2019-02-12

## 2018-08-10 NOTE — TELEPHONE ENCOUNTER
Refill requested: Amlodipine Benazepril + Metformin + Ventolin       Failed protocol - Ventolin       Last refill: 5/8/18 Amlodipine Benazepril #90 NR + 7/9/18 Ventolin 18G 1R + 10/7/17 Metformin #180 1R      Relevant Labs: [facility] 5/1/18   BP Readings from [facility]

## 2018-08-17 ENCOUNTER — TELEPHONE (OUTPATIENT)
Dept: INTERNAL MEDICINE CLINIC | Facility: CLINIC | Age: 68
End: 2018-08-17

## 2018-08-17 NOTE — TELEPHONE ENCOUNTER
Received fax from [email or web address] for colon cancer screening kits to be sent straight to the patient.   signed off on the form and it was faxed back to [address] @ [phone]

## 2018-08-31 ENCOUNTER — APPOINTMENT (OUTPATIENT)
Dept: LAB | Age: 68
End: 2018-08-31
Attending: INTERNAL MEDICINE
Payer: MEDICARE

## 2018-08-31 DIAGNOSIS — R97.20 ELEVATED PSA, LESS THAN 10 NG/ML: ICD-10-CM

## 2018-08-31 DIAGNOSIS — Z13.220 SCREENING CHOLESTEROL LEVEL: ICD-10-CM

## 2018-08-31 DIAGNOSIS — I10 ESSENTIAL HYPERTENSION, BENIGN: Chronic | ICD-10-CM

## 2018-08-31 DIAGNOSIS — E55.9 VITAMIN D DEFICIENCY: Chronic | ICD-10-CM

## 2018-08-31 DIAGNOSIS — E11.9 DIABETES MELLITUS WITHOUT COMPLICATION (HCC): ICD-10-CM

## 2018-08-31 LAB
ALBUMIN SERPL-MCNC: 3.2 G/DL (ref 3.5–4.8)
ALBUMIN/GLOB SERPL: 0.8 {RATIO} (ref 1–2)
ALP LIVER SERPL-CCNC: 95 U/L (ref 45–117)
ALT SERPL-CCNC: 35 U/L (ref 17–63)
ANION GAP SERPL CALC-SCNC: 7 MMOL/L (ref 0–18)
AST SERPL-CCNC: 28 U/L (ref 15–41)
BILIRUB SERPL-MCNC: 0.2 MG/DL (ref 0.1–2)
BUN BLD-MCNC: 8 MG/DL (ref 8–20)
BUN/CREAT SERPL: 8.2 (ref 10–20)
CALCIUM BLD-MCNC: 8.9 MG/DL (ref 8.3–10.3)
CHLORIDE SERPL-SCNC: 106 MMOL/L (ref 101–111)
CHOLEST SMN-MCNC: 128 MG/DL (ref ?–200)
CO2 SERPL-SCNC: 29 MMOL/L (ref 22–32)
CREAT BLD-MCNC: 0.98 MG/DL (ref 0.7–1.3)
CREAT UR-SCNC: 93.6 MG/DL
EST. AVERAGE GLUCOSE BLD GHB EST-MCNC: 154 MG/DL (ref 68–126)
GLOBULIN PLAS-MCNC: 4 G/DL (ref 2.5–4)
GLUCOSE BLD-MCNC: 152 MG/DL (ref 70–99)
HBA1C MFR BLD HPLC: 7 % (ref ?–5.7)
HDLC SERPL-MCNC: 34 MG/DL (ref 40–59)
LDLC SERPL CALC-MCNC: 79 MG/DL (ref ?–100)
M PROTEIN MFR SERPL ELPH: 7.2 G/DL (ref 6.1–8.3)
MICROALBUMIN UR-MCNC: 1.99 MG/DL
MICROALBUMIN/CREAT 24H UR-RTO: 21.3 UG/MG (ref ?–30)
NONHDLC SERPL-MCNC: 94 MG/DL (ref ?–130)
OSMOLALITY SERPL CALC.SUM OF ELEC: 295 MOSM/KG (ref 275–295)
POTASSIUM SERPL-SCNC: 4.2 MMOL/L (ref 3.6–5.1)
PSA SERPL-MCNC: 4.54 NG/ML (ref 0.01–4)
SODIUM SERPL-SCNC: 142 MMOL/L (ref 136–144)
TRIGL SERPL-MCNC: 74 MG/DL (ref 30–149)
VIT D+METAB SERPL-MCNC: 23.7 NG/ML (ref 30–100)
VLDLC SERPL CALC-MCNC: 15 MG/DL (ref 0–30)

## 2018-08-31 PROCEDURE — 82043 UR ALBUMIN QUANTITATIVE: CPT

## 2018-08-31 PROCEDURE — 36415 COLL VENOUS BLD VENIPUNCTURE: CPT

## 2018-08-31 PROCEDURE — 82306 VITAMIN D 25 HYDROXY: CPT

## 2018-08-31 PROCEDURE — 82570 ASSAY OF URINE CREATININE: CPT

## 2018-08-31 PROCEDURE — 80061 LIPID PANEL: CPT

## 2018-08-31 PROCEDURE — 84153 ASSAY OF PSA TOTAL: CPT

## 2018-08-31 PROCEDURE — 83036 HEMOGLOBIN GLYCOSYLATED A1C: CPT

## 2018-08-31 PROCEDURE — 80053 COMPREHEN METABOLIC PANEL: CPT

## 2018-09-06 DIAGNOSIS — J44.9 CHRONIC OBSTRUCTIVE PULMONARY DISEASE, UNSPECIFIED COPD TYPE (HCC): Chronic | ICD-10-CM

## 2018-09-06 RX ORDER — TIOTROPIUM BROMIDE 18 UG/1
CAPSULE ORAL; RESPIRATORY (INHALATION)
Qty: 30 CAPSULE | Refills: 0 | Status: SHIPPED | OUTPATIENT
Start: 2018-09-06 | End: 2018-10-11

## 2018-09-06 NOTE — TELEPHONE ENCOUNTER
Medication(s) to Refill:   Pending Prescriptions Disp Refills    SPIRIVA HANDIHALER 18 MCG Inhalation Cap [Pharmacy Med Name: Spiriva HandiHaler Inhalation Capsule 18 MCG] 30 capsule 0     Sig: INHALE 1 CAPSULE INTO THE LUNGS DAILY         Last Time [facility]ica

## 2018-09-07 ENCOUNTER — OFFICE VISIT (OUTPATIENT)
Dept: INTERNAL MEDICINE CLINIC | Facility: CLINIC | Age: 68
End: 2018-09-07
Payer: MEDICARE

## 2018-09-07 VITALS
DIASTOLIC BLOOD PRESSURE: 76 MMHG | BODY MASS INDEX: 21 KG/M2 | HEART RATE: 101 BPM | TEMPERATURE: 98 F | RESPIRATION RATE: 12 BRPM | SYSTOLIC BLOOD PRESSURE: 126 MMHG | WEIGHT: 148.75 LBS | OXYGEN SATURATION: 98 %

## 2018-09-07 DIAGNOSIS — E10.65 TYPE 1 DIABETES MELLITUS WITH HYPERGLYCEMIA (HCC): Primary | Chronic | ICD-10-CM

## 2018-09-07 PROCEDURE — 99213 OFFICE O/P EST LOW 20 MIN: CPT | Performed by: INTERNAL MEDICINE

## 2018-09-07 RX ORDER — CHLORHEXIDINE GLUCONATE 0.12 MG/ML
RINSE ORAL
COMMUNITY
Start: 2018-07-19 | End: 2019-05-21

## 2018-09-07 NOTE — PROGRESS NOTES
Ervin Lindsay  1950 is a 75year old male. Patient presents with: Follow - Up    Here  for follow-up  HPI:   DIABETES MELLITUS:   The diet that the patient has been following is the American Diabetes Association diet.    The frequency of the UNIT/ML Subcutaneous Solution Pen-injector INJECT 35 UNITS UNDER THE SKIN NIGHTLY Disp: 30 mL Rfl: 0   Ondansetron HCl (ZOFRAN) 8 MG tablet TAKE 1/2 TABLET BY MOUTH 4 TIMES DAILY AS NEEDED Disp: 90 tablet Rfl: 0   SYMBICORT 160-4.5 MCG/ACT Inhalation Aeros of breath no. Musculoskeletal:   Foot symptoms no. Neurologic:   Burning pain in feet none. Burning pain in hands none. Loss of sensation in specific body area none.            EXAM:   /76   Pulse 101   Temp 98.2 °F (36.8 °C) (Oral)   Resp 12   Wt

## 2018-09-19 RX ORDER — INSULIN DETEMIR 100 [IU]/ML
INJECTION, SOLUTION SUBCUTANEOUS
Qty: 30 ML | Refills: 0 | Status: SHIPPED | OUTPATIENT
Start: 2018-09-19 | End: 2018-12-25

## 2018-09-19 RX ORDER — PEN NEEDLE, DIABETIC 29 G X1/2"
NEEDLE, DISPOSABLE MISCELLANEOUS
Qty: 360 EACH | Refills: 1 | Status: SHIPPED | OUTPATIENT
Start: 2018-09-19 | End: 2019-05-25 | Stop reason: CLARIF

## 2018-09-19 NOTE — TELEPHONE ENCOUNTER
Medication(s) to Refill:   Requested Prescriptions     Pending Prescriptions Disp Refills   • BD PEN NEEDLE ULTRAFINE 29G X 12.7MM Does not apply Misc [Pharmacy Med Name: BD PEN EFRAIN UF ORIG 90'S 29G 1/2] 360 each 1     Sig: USE AS DIRECTED FOUR TIMES A DAY

## 2018-09-21 RX ORDER — ONDANSETRON HYDROCHLORIDE 8 MG/1
TABLET, FILM COATED ORAL
Qty: 90 TABLET | Refills: 0 | Status: SHIPPED | OUTPATIENT
Start: 2018-09-21 | End: 2019-01-03

## 2018-09-21 NOTE — TELEPHONE ENCOUNTER
Requesting Ondansetron HCl (ZOFRAN) 8 MG tablet  LOV: 9/7/18  RTC: NA  Filled: 6/21/18 #90 with 0 refills

## 2018-09-24 ENCOUNTER — LAB ENCOUNTER (OUTPATIENT)
Dept: LAB | Age: 68
End: 2018-09-24
Attending: INTERNAL MEDICINE
Payer: MEDICARE

## 2018-09-24 DIAGNOSIS — R63.4 LOSS OF WEIGHT: Primary | ICD-10-CM

## 2018-09-24 DIAGNOSIS — E10.9 DIABETES MELLITUS TYPE I (HCC): ICD-10-CM

## 2018-09-24 LAB
GLUCOSE BLD-MCNC: 268 MG/DL (ref 70–99)
INR BLD: 1.14 (ref 0.9–1.1)
PSA SERPL DL<=0.01 NG/ML-MCNC: 15.1 SECONDS (ref 12.4–14.7)
TSI SER-ACNC: 1.38 MIU/ML (ref 0.35–5.5)
VIT D+METAB SERPL-MCNC: 24 NG/ML (ref 30–100)

## 2018-09-24 PROCEDURE — 84681 ASSAY OF C-PEPTIDE: CPT

## 2018-09-24 PROCEDURE — 82947 ASSAY GLUCOSE BLOOD QUANT: CPT

## 2018-09-24 PROCEDURE — 82306 VITAMIN D 25 HYDROXY: CPT

## 2018-09-24 PROCEDURE — 85610 PROTHROMBIN TIME: CPT

## 2018-09-24 PROCEDURE — 84443 ASSAY THYROID STIM HORMONE: CPT

## 2018-09-24 PROCEDURE — 36415 COLL VENOUS BLD VENIPUNCTURE: CPT

## 2018-09-24 PROCEDURE — 84590 ASSAY OF VITAMIN A: CPT

## 2018-09-26 LAB — C-PEPTIDE, SERUM OR PLASMA: 2.2 NG/ML

## 2018-09-27 LAB
INTERPRETATION VIT A, SER/PLA: NORMAL
RETINYL PALMITATE: <0.02 MG/L
VITAMIN A (RETINOL): 0.36 MG/L

## 2018-10-05 RX ORDER — BUDESONIDE AND FORMOTEROL FUMARATE DIHYDRATE 160; 4.5 UG/1; UG/1
2 AEROSOL RESPIRATORY (INHALATION) 2 TIMES DAILY
Qty: 1 INHALER | Refills: 2 | Status: SHIPPED | OUTPATIENT
Start: 2018-10-05 | End: 2019-01-14

## 2018-10-05 NOTE — TELEPHONE ENCOUNTER
Refill requested:   Requested Prescriptions     Pending Prescriptions Disp Refills   • Budesonide-Formoterol Fumarate (SYMBICORT) 160-4.5 MCG/ACT Inhalation Aerosol [place] Med Name: Symbicort Inhalation Aerosol 160-4.5 MCG/ACT] 1 Inhaler 2     Sig: [facility]

## 2018-10-08 RX ORDER — ETODOLAC 400 MG/1
TABLET, FILM COATED ORAL
Qty: 60 TABLET | Refills: 0 | Status: SHIPPED | OUTPATIENT
Start: 2018-10-08 | End: 2018-12-07

## 2018-10-08 NOTE — TELEPHONE ENCOUNTER
Requesting ETODOLAC 400 MG Oral Tab  LOV: 9/7/18  RTC: NA  Filled: 8/2/18 #60 with 0 refills    Future Appointments   Date Time Provider Basilio Caraballo   4/1/2019  9:00 AM Ugo Lundberg MD [facility]

## 2018-10-10 ENCOUNTER — TELEPHONE (OUTPATIENT)
Dept: INTERNAL MEDICINE CLINIC | Facility: CLINIC | Age: 68
End: 2018-10-10

## 2018-10-11 DIAGNOSIS — J44.9 CHRONIC OBSTRUCTIVE PULMONARY DISEASE, UNSPECIFIED COPD TYPE (HCC): Chronic | ICD-10-CM

## 2018-10-15 RX ORDER — TIOTROPIUM BROMIDE 18 UG/1
CAPSULE ORAL; RESPIRATORY (INHALATION)
Qty: 30 CAPSULE | Refills: 0 | Status: SHIPPED | OUTPATIENT
Start: 2018-10-15 | End: 2018-11-19

## 2018-10-15 NOTE — TELEPHONE ENCOUNTER
Protocol failed     Medication(s) to Refill:   Requested Prescriptions     Pending Prescriptions Disp Refills   • Hennessy Guille 18 MCG Inhalation Cap [Pharmacy Med Name: Spiriva HandiHaler Inhalation Capsule 18 MCG] 30 capsule 0     Sig: INHALE 1 CAPS

## 2018-10-18 NOTE — TELEPHONE ENCOUNTER
Received fax from [facility] requesting Dx notes from pateints chart to help with determination.  OV notes, as well as CT notes were faxed to [facility] @ [phone]

## 2018-11-19 DIAGNOSIS — J44.9 CHRONIC OBSTRUCTIVE PULMONARY DISEASE, UNSPECIFIED COPD TYPE (HCC): Chronic | ICD-10-CM

## 2018-11-19 NOTE — TELEPHONE ENCOUNTER
Refill requested:   Requested Prescriptions     Pending Prescriptions Disp Refills   • Colan Joel 18 MCG Inhalation Cap [Pharmacy Med Name: Spiriva HandiHaler Inhalation Capsule 18 MCG] 30 capsule 0     Sig: INHALE 1 CAPSULE INTO THE LUNGS DAILY

## 2018-11-20 RX ORDER — TIOTROPIUM BROMIDE 18 UG/1
CAPSULE ORAL; RESPIRATORY (INHALATION)
Qty: 30 CAPSULE | Refills: 0 | Status: SHIPPED | OUTPATIENT
Start: 2018-11-20 | End: 2018-12-24

## 2018-12-10 RX ORDER — ETODOLAC 400 MG/1
TABLET, FILM COATED ORAL
Qty: 60 TABLET | Refills: 0 | Status: SHIPPED | OUTPATIENT
Start: 2018-12-10 | End: 2019-02-11

## 2018-12-24 DIAGNOSIS — J44.9 CHRONIC OBSTRUCTIVE PULMONARY DISEASE, UNSPECIFIED COPD TYPE (HCC): Chronic | ICD-10-CM

## 2018-12-24 NOTE — TELEPHONE ENCOUNTER
Refill requested:   Requested Prescriptions     Pending Prescriptions Disp Refills   • Tiotropium Bromide Monohydrate (SPIRIVA HANDIHALER) 18 MCG Inhalation Cap 30 capsule 0     Sig: INHALE 1 CAPSULE INTO THE LUNGS DAILY       Failed protocol    Asthma & C

## 2018-12-26 RX ORDER — INSULIN DETEMIR 100 [IU]/ML
INJECTION, SOLUTION SUBCUTANEOUS
Qty: 30 ML | Refills: 0 | Status: SHIPPED | OUTPATIENT
Start: 2018-12-26 | End: 2019-03-13

## 2018-12-26 NOTE — TELEPHONE ENCOUNTER
Last office visit 9/7/18 with Dr. Sotero Mckeon. Last refill 9/19/18 (#30mL, 0 refills). Last labs 9/24/18. Last A1C (7.0%) 8/31/18.

## 2019-01-02 ENCOUNTER — TELEPHONE (OUTPATIENT)
Dept: INTERNAL MEDICINE CLINIC | Facility: CLINIC | Age: 69
End: 2019-01-02

## 2019-01-02 DIAGNOSIS — Z00.00 LABORATORY EXAMINATION ORDERED AS PART OF A ROUTINE GENERAL MEDICAL EXAMINATION: ICD-10-CM

## 2019-01-02 DIAGNOSIS — E55.9 VITAMIN D DEFICIENCY: Chronic | ICD-10-CM

## 2019-01-02 DIAGNOSIS — E10.65 TYPE 1 DIABETES MELLITUS WITH HYPERGLYCEMIA (HCC): Primary | Chronic | ICD-10-CM

## 2019-01-02 NOTE — TELEPHONE ENCOUNTER
Pt spouse notified lab orders placed. - ok per HIPAA Advised her pt would need to fast 10-12 hours and to drink plenty of water. She verbalized understanding.

## 2019-01-03 RX ORDER — ONDANSETRON HYDROCHLORIDE 8 MG/1
TABLET, FILM COATED ORAL
Qty: 90 TABLET | Refills: 0 | Status: SHIPPED | OUTPATIENT
Start: 2019-01-03 | End: 2019-03-19

## 2019-01-03 NOTE — TELEPHONE ENCOUNTER
Last office visit 9/7/18 with Dr. Raz Ren. Last refill 9/21/18 (#90, 0 refills). Appointment 3/9/19 with Dr. Raz Ren.

## 2019-01-10 ENCOUNTER — TELEPHONE (OUTPATIENT)
Dept: INTERNAL MEDICINE CLINIC | Facility: CLINIC | Age: 69
End: 2019-01-10

## 2019-01-10 NOTE — TELEPHONE ENCOUNTER
Signed written order for diabetic supplies was faxed back to [facility] [employer] @ [phone]

## 2019-01-16 RX ORDER — BUDESONIDE AND FORMOTEROL FUMARATE DIHYDRATE 160; 4.5 UG/1; UG/1
AEROSOL RESPIRATORY (INHALATION)
Qty: 1 INHALER | Refills: 1 | Status: SHIPPED | OUTPATIENT
Start: 2019-01-16 | End: 2019-03-19

## 2019-01-16 NOTE — TELEPHONE ENCOUNTER
Protocol failed - Asthma Action Score greater than or equal to 20 - AAP/ACT given in last 12 months    Medication(s) to Refill:   Requested Prescriptions     Pending Prescriptions Disp Refills   • SYMBICORT 160-4.5 MCG/ACT Inhalation Aerosol [place] [facility]

## 2019-01-29 DIAGNOSIS — J44.9 CHRONIC OBSTRUCTIVE PULMONARY DISEASE, UNSPECIFIED COPD TYPE (HCC): Chronic | ICD-10-CM

## 2019-02-02 NOTE — TELEPHONE ENCOUNTER
Refill requested:   Requested Prescriptions     Pending Prescriptions Disp Refills   • VENTOLIN  (90 Base) MCG/ACT Inhalation Aero Soln [Pharmacy Med Name: VENTOLIN HFA INH 18GM W/COUNT 90MCG] 54 g 1     Sig: USE 2 INHALATIONS EVERY 6 HOURS AS NEEDE

## 2019-02-12 RX ORDER — ETODOLAC 400 MG/1
TABLET, FILM COATED ORAL
Qty: 60 TABLET | Refills: 0 | Status: SHIPPED | OUTPATIENT
Start: 2019-02-12 | End: 2019-04-16

## 2019-02-12 NOTE — TELEPHONE ENCOUNTER
Refill requested:   Requested Prescriptions     Pending Prescriptions Disp Refills   • ETODOLAC 400 MG Oral Tab [Pharmacy Med Name: Etodolac Oral Tablet 400 MG] 60 tablet 0     Sig: TAKE 1 TABLET BY MOUTH TWICE DAILY       Non protocol medication      Last

## 2019-02-13 RX ORDER — AMLODIPINE BESYLATE AND BENAZEPRIL HYDROCHLORIDE 5; 20 MG/1; MG/1
CAPSULE ORAL
Qty: 90 CAPSULE | Refills: 1 | Status: ON HOLD | OUTPATIENT
Start: 2019-02-13 | End: 2019-04-21

## 2019-02-13 NOTE — TELEPHONE ENCOUNTER
Refill requested:   Requested Prescriptions     Pending Prescriptions Disp Refills   • AMLODIPINE BESY-BENAZEPRIL HCL 5-20 MG Oral Cap [Pharmacy Med Name: AMLODIPINE/BENAZEPRIL CAPS 5/20MG] 90 capsule 1     Sig: TAKE 1 CAPSULE DAILY     Passed Protocol Med

## 2019-02-25 DIAGNOSIS — J44.9 CHRONIC OBSTRUCTIVE PULMONARY DISEASE, UNSPECIFIED COPD TYPE (HCC): Chronic | ICD-10-CM

## 2019-02-26 NOTE — TELEPHONE ENCOUNTER
Refill requested:   Requested Prescriptions     Pending Prescriptions Disp Refills   • Tiotropium Bromide Monohydrate (Celi Adrienne) 18 MCG Inhalation Cap [Pharmacy Med Name: Spiriva HandiHaler Inhalation Capsule 18 MCG] 30 capsule 0     Sig: INHALE

## 2019-03-06 ENCOUNTER — LAB ENCOUNTER (OUTPATIENT)
Dept: LAB | Age: 69
End: 2019-03-06
Attending: INTERNAL MEDICINE
Payer: MEDICARE

## 2019-03-06 DIAGNOSIS — Z00.00 LABORATORY EXAMINATION ORDERED AS PART OF A ROUTINE GENERAL MEDICAL EXAMINATION: ICD-10-CM

## 2019-03-06 DIAGNOSIS — E10.65 TYPE 1 DIABETES MELLITUS WITH HYPERGLYCEMIA (HCC): ICD-10-CM

## 2019-03-06 DIAGNOSIS — E55.9 VITAMIN D DEFICIENCY: Chronic | ICD-10-CM

## 2019-03-06 DIAGNOSIS — Z12.5 SCREENING FOR MALIGNANT NEOPLASM OF PROSTATE: ICD-10-CM

## 2019-03-06 LAB
ALBUMIN SERPL-MCNC: 3.3 G/DL (ref 3.4–5)
ALBUMIN/GLOB SERPL: 0.8 {RATIO} (ref 1–2)
ALP LIVER SERPL-CCNC: 98 U/L (ref 45–117)
ALT SERPL-CCNC: 32 U/L (ref 16–61)
ANION GAP SERPL CALC-SCNC: 8 MMOL/L (ref 0–18)
AST SERPL-CCNC: 26 U/L (ref 15–37)
BASOPHILS # BLD AUTO: 0.05 X10(3) UL (ref 0–0.2)
BASOPHILS NFR BLD AUTO: 0.6 %
BILIRUB SERPL-MCNC: 0.5 MG/DL (ref 0.1–2)
BILIRUB UR QL STRIP.AUTO: NEGATIVE
BUN BLD-MCNC: 14 MG/DL (ref 7–18)
BUN/CREAT SERPL: 11.6 (ref 10–20)
CALCIUM BLD-MCNC: 9.2 MG/DL (ref 8.5–10.1)
CHLORIDE SERPL-SCNC: 105 MMOL/L (ref 98–107)
CHOLEST SMN-MCNC: 111 MG/DL (ref ?–200)
CLARITY UR REFRACT.AUTO: CLEAR
CO2 SERPL-SCNC: 28 MMOL/L (ref 21–32)
COLOR UR AUTO: YELLOW
CREAT BLD-MCNC: 1.21 MG/DL (ref 0.7–1.3)
DEPRECATED RDW RBC AUTO: 53.3 FL (ref 35.1–46.3)
EOSINOPHIL # BLD AUTO: 0.48 X10(3) UL (ref 0–0.7)
EOSINOPHIL NFR BLD AUTO: 5.6 %
ERYTHROCYTE [DISTWIDTH] IN BLOOD BY AUTOMATED COUNT: 15.7 % (ref 11–15)
EST. AVERAGE GLUCOSE BLD GHB EST-MCNC: 131 MG/DL (ref 68–126)
GLOBULIN PLAS-MCNC: 4.1 G/DL (ref 2.8–4.4)
GLUCOSE BLD-MCNC: 72 MG/DL (ref 70–99)
GLUCOSE UR STRIP.AUTO-MCNC: NEGATIVE MG/DL
HBA1C MFR BLD HPLC: 6.2 % (ref ?–5.7)
HCT VFR BLD AUTO: 39.5 % (ref 39–53)
HDLC SERPL-MCNC: 37 MG/DL (ref 40–59)
HGB BLD-MCNC: 13 G/DL (ref 13–17.5)
IMM GRANULOCYTES # BLD AUTO: 0.02 X10(3) UL (ref 0–1)
IMM GRANULOCYTES NFR BLD: 0.2 %
KETONES UR STRIP.AUTO-MCNC: NEGATIVE MG/DL
LDLC SERPL CALC-MCNC: 65 MG/DL (ref ?–100)
LEUKOCYTE ESTERASE UR QL STRIP.AUTO: NEGATIVE
LYMPHOCYTES # BLD AUTO: 1.68 X10(3) UL (ref 1–4)
LYMPHOCYTES NFR BLD AUTO: 19.4 %
M PROTEIN MFR SERPL ELPH: 7.4 G/DL (ref 6.4–8.2)
MCH RBC QN AUTO: 30.6 PG (ref 26–34)
MCHC RBC AUTO-ENTMCNC: 32.9 G/DL (ref 31–37)
MCV RBC AUTO: 92.9 FL (ref 80–100)
MONOCYTES # BLD AUTO: 1.14 X10(3) UL (ref 0.1–1)
MONOCYTES NFR BLD AUTO: 13.2 %
NEUTROPHILS # BLD AUTO: 5.27 X10 (3) UL (ref 1.5–7.7)
NEUTROPHILS # BLD AUTO: 5.27 X10(3) UL (ref 1.5–7.7)
NEUTROPHILS NFR BLD AUTO: 61 %
NITRITE UR QL STRIP.AUTO: NEGATIVE
NONHDLC SERPL-MCNC: 74 MG/DL (ref ?–130)
OSMOLALITY SERPL CALC.SUM OF ELEC: 291 MOSM/KG (ref 275–295)
PH UR STRIP.AUTO: 6 [PH] (ref 4.5–8)
PLATELET # BLD AUTO: 344 10(3)UL (ref 150–450)
POTASSIUM SERPL-SCNC: 5 MMOL/L (ref 3.5–5.1)
PROT UR STRIP.AUTO-MCNC: NEGATIVE MG/DL
RBC # BLD AUTO: 4.25 X10(6)UL (ref 3.8–5.8)
RBC UR QL AUTO: NEGATIVE
SODIUM SERPL-SCNC: 141 MMOL/L (ref 136–145)
SP GR UR STRIP.AUTO: 1.01 (ref 1–1.03)
T4 SERPL-MCNC: 7.4 UG/DL (ref 4.5–12.1)
TRIGL SERPL-MCNC: 45 MG/DL (ref 30–149)
UROBILINOGEN UR STRIP.AUTO-MCNC: <2 MG/DL
VIT D+METAB SERPL-MCNC: 37.3 NG/ML (ref 30–100)
VLDLC SERPL CALC-MCNC: 9 MG/DL (ref 0–30)
WBC # BLD AUTO: 8.6 X10(3) UL (ref 4–11)

## 2019-03-06 PROCEDURE — 83036 HEMOGLOBIN GLYCOSYLATED A1C: CPT

## 2019-03-06 PROCEDURE — 81003 URINALYSIS AUTO W/O SCOPE: CPT

## 2019-03-06 PROCEDURE — 80061 LIPID PANEL: CPT

## 2019-03-06 PROCEDURE — 36415 COLL VENOUS BLD VENIPUNCTURE: CPT

## 2019-03-06 PROCEDURE — 85025 COMPLETE CBC W/AUTO DIFF WBC: CPT

## 2019-03-06 PROCEDURE — 82306 VITAMIN D 25 HYDROXY: CPT

## 2019-03-06 PROCEDURE — 80053 COMPREHEN METABOLIC PANEL: CPT

## 2019-03-06 PROCEDURE — 84436 ASSAY OF TOTAL THYROXINE: CPT

## 2019-03-07 ENCOUNTER — TELEPHONE (OUTPATIENT)
Dept: INTERNAL MEDICINE CLINIC | Facility: CLINIC | Age: 69
End: 2019-03-07

## 2019-03-07 DIAGNOSIS — Z12.5 SCREENING FOR MALIGNANT NEOPLASM OF PROSTATE: Primary | ICD-10-CM

## 2019-03-07 LAB — COMPLEXED PSA SERPL-MCNC: 6.4 NG/ML (ref ?–4)

## 2019-03-07 NOTE — TELEPHONE ENCOUNTER
Notes recorded by Bernie Cole MD on 3/6/2019 at 9:53 PM CST  Results in see me for cpx   Order PSA    Pt has upcoming Cpx.   Future Appointments  3/11/2019  2:00 PM    Bernie Cole MD         [facility]          [facility] [place]  PSA added on to existing s

## 2019-03-11 ENCOUNTER — OFFICE VISIT (OUTPATIENT)
Dept: INTERNAL MEDICINE CLINIC | Facility: CLINIC | Age: 69
End: 2019-03-11
Payer: MEDICARE

## 2019-03-11 VITALS
HEART RATE: 120 BPM | RESPIRATION RATE: 16 BRPM | DIASTOLIC BLOOD PRESSURE: 80 MMHG | WEIGHT: 137.25 LBS | SYSTOLIC BLOOD PRESSURE: 112 MMHG | TEMPERATURE: 98 F | HEIGHT: 70 IN | BODY MASS INDEX: 19.65 KG/M2 | OXYGEN SATURATION: 95 %

## 2019-03-11 DIAGNOSIS — K86.1 CHRONIC PANCREATITIS, UNSPECIFIED PANCREATITIS TYPE (HCC): ICD-10-CM

## 2019-03-11 DIAGNOSIS — E55.9 VITAMIN D DEFICIENCY: ICD-10-CM

## 2019-03-11 DIAGNOSIS — I10 ESSENTIAL HYPERTENSION, BENIGN: ICD-10-CM

## 2019-03-11 DIAGNOSIS — J44.9 CHRONIC OBSTRUCTIVE PULMONARY DISEASE, UNSPECIFIED COPD TYPE (HCC): ICD-10-CM

## 2019-03-11 DIAGNOSIS — R97.20 ELEVATED PSA, LESS THAN 10 NG/ML: ICD-10-CM

## 2019-03-11 DIAGNOSIS — Z00.00 ROUTINE GENERAL MEDICAL EXAMINATION AT A HEALTH CARE FACILITY: Primary | ICD-10-CM

## 2019-03-11 DIAGNOSIS — E10.65 TYPE 1 DIABETES MELLITUS WITH HYPERGLYCEMIA (HCC): ICD-10-CM

## 2019-03-11 PROBLEM — R31.9 HEMATURIA: Chronic | Status: RESOLVED | Noted: 2018-03-08 | Resolved: 2019-03-11

## 2019-03-11 PROBLEM — R91.1 PULMONARY NODULE: Chronic | Status: RESOLVED | Noted: 2017-03-23 | Resolved: 2019-03-11

## 2019-03-11 PROCEDURE — G0439 PPPS, SUBSEQ VISIT: HCPCS | Performed by: INTERNAL MEDICINE

## 2019-03-11 PROCEDURE — 93000 ELECTROCARDIOGRAM COMPLETE: CPT | Performed by: INTERNAL MEDICINE

## 2019-03-11 PROCEDURE — 96160 PT-FOCUSED HLTH RISK ASSMT: CPT | Performed by: INTERNAL MEDICINE

## 2019-03-11 NOTE — PROGRESS NOTES
REASON FOR VISIT:    Satinder Ingram is a 70year old male who presents for a Medicare Annual Wellness visit.     Male      Patient Care Team: Patient Care Team:  Agustina Michaud MD as PCP - General (Internal Medicine)  MD Robert Willoughby % Mouth/Throat Solution  Disp:  Rfl:    HUMALOG KWIKPEN 100 UNIT/ML Subcutaneous Solution Pen-injector TAKE AS INSTRUCTED BY YOUR PRESCRIBER Disp: 30 mL Rfl: 0   Multiple Vitamin (MULTI VITAMIN DAILY OR) Take  by mouth daily.  Disp:  Rfl:        Glucose and INDICATIONS AND SCHEDULE Internal Lab or Procedure   Diabetes Screening     HbgA1C   Annually HGBA1C (%)   Date Value   02/21/2011 7.0 (H)     HgbA1C (%)   Date Value   03/06/2019 6.2 (H)       Fasting Blood Sugar (FSB)Annually Glucose (mg/dL)   Date [place] Diabetes ([facility] [address]    • Elevated PSA, less than 10 ng/ml 03/08/2018   • Essential hypertension, benign    • Knee pain 10/2/2013   • Long term current use of opiate analgesic 2/17/2015   • Long-term current use of opiate analgesic 6/11/2010   • Primary localize fever, no nosebleeds, no sinus trouble. Mouth and Pharynx no sore throats, no hoarseness. Neck no lumps, no goiter, no neck stiffness or pain. Endocrine:   Diabetes yes. Thyroid disorder none.    Respiratory:   Patient denies chest pain, cough, CRUZ (dyspn Readings from Last 3 Encounters:  03/11/19 : 112/80  03/01/19 : 110/78  02/01/19 : 122/70    GENERAL:   Build: average . HEENT:   Ear canals: normal.   EOM: within normal limits. Pupils BEERTL. Sclera and Conjunctiva normal.     Head: normocephalic.    Nasa Jair Venegas is a 70year old male who presents for a Medicare Assessment.      PLAN SUMMARY:   Diagnoses and all orders for this visit:    Routine general medical examination at a health care facility  -     ELECTROCARDIOGRAM, COMPLETE    Chronic pan

## 2019-03-13 ENCOUNTER — TELEPHONE (OUTPATIENT)
Dept: INTERNAL MEDICINE CLINIC | Facility: CLINIC | Age: 69
End: 2019-03-13

## 2019-03-13 NOTE — TELEPHONE ENCOUNTER
Refill requested:   Requested Prescriptions     Pending Prescriptions Disp Refills   • LEVEMIR FLEXTOUCH 100 UNIT/ML Subcutaneous Solution Pen-injector [Pharmacy Med Name: LEVEMIR FLEXTOUCH PEN 3ML 5'S 100U/ML] 30 mL 0     Sig: INJECT 35 UNITS UNDER THE SK

## 2019-03-14 RX ORDER — INSULIN DETEMIR 100 [IU]/ML
INJECTION, SOLUTION SUBCUTANEOUS
Qty: 30 ML | Refills: 0 | Status: SHIPPED | OUTPATIENT
Start: 2019-03-14 | End: 2019-05-25 | Stop reason: CLARIF

## 2019-03-18 ENCOUNTER — OFFICE VISIT (OUTPATIENT)
Dept: INTERNAL MEDICINE CLINIC | Facility: CLINIC | Age: 69
End: 2019-03-18
Payer: MEDICARE

## 2019-03-18 ENCOUNTER — TELEPHONE (OUTPATIENT)
Dept: INTERNAL MEDICINE CLINIC | Facility: CLINIC | Age: 69
End: 2019-03-18

## 2019-03-18 VITALS
WEIGHT: 137.5 LBS | DIASTOLIC BLOOD PRESSURE: 64 MMHG | TEMPERATURE: 98 F | HEIGHT: 70 IN | OXYGEN SATURATION: 97 % | SYSTOLIC BLOOD PRESSURE: 114 MMHG | HEART RATE: 110 BPM | RESPIRATION RATE: 12 BRPM | BODY MASS INDEX: 19.69 KG/M2

## 2019-03-18 DIAGNOSIS — K86.1 CHRONIC PANCREATITIS, UNSPECIFIED PANCREATITIS TYPE (HCC): Primary | ICD-10-CM

## 2019-03-18 DIAGNOSIS — R06.02 SHORTNESS OF BREATH ON EXERTION: ICD-10-CM

## 2019-03-18 DIAGNOSIS — E10.65 TYPE 1 DIABETES MELLITUS WITH HYPERGLYCEMIA (HCC): ICD-10-CM

## 2019-03-18 DIAGNOSIS — Z91.89 MULTIPLE RISK FACTORS FOR CORONARY ARTERY DISEASE: ICD-10-CM

## 2019-03-18 DIAGNOSIS — I10 ESSENTIAL HYPERTENSION, BENIGN: ICD-10-CM

## 2019-03-18 DIAGNOSIS — J44.9 CHRONIC OBSTRUCTIVE PULMONARY DISEASE, UNSPECIFIED COPD TYPE (HCC): ICD-10-CM

## 2019-03-18 DIAGNOSIS — R63.4 WEIGHT LOSS: ICD-10-CM

## 2019-03-18 DIAGNOSIS — R97.20 ELEVATED PSA, LESS THAN 10 NG/ML: ICD-10-CM

## 2019-03-18 PROCEDURE — 99213 OFFICE O/P EST LOW 20 MIN: CPT | Performed by: INTERNAL MEDICINE

## 2019-03-18 NOTE — PROGRESS NOTES
Shannon BAKER 1950 is a 70year old male. Patient presents with:  Weight Loss      HPI:   Follow-up labs as well as workup of his potential shortness of breath.   Noted on exertion  No new complaints C CPX    Current Outpatient Medications: Multiple Vitamin (MULTI VITAMIN DAILY OR) Take  by mouth daily.  Disp:  Rfl:       Past Medical History:   Diagnosis Date   • Back pain    • Chronic abdominal pain 1/7/2014   • Chronic pain 1/7/2014   • Chronic pancreatitis ([facility]     for pancreatitis compa NUC STRESS TEST LEXISCAN (CPT=93015/15727/); Future    Essential hypertension, benign  -     CARD NUC STRESS TEST LEXISCAN (CPT=93015/26135/);  Future    Chronic obstructive pulmonary disease, unspecified COPD type ([facility]  -     CARD NUC STRESS SCOTT

## 2019-03-18 NOTE — TELEPHONE ENCOUNTER
Spoke with patients wife Lexie Levy per HIPAA - informed her of the medication that was sent into the pharmacy as well as orders for cardio and gastro.     Patients wife expressed understanding and stated that she will be calling to schedule for the patient

## 2019-03-18 NOTE — PATIENT INSTRUCTIONS
Detailed discussion with the patient he has had an  lower endoscopy CT scan of the abdomen has had extensive blood work including CT scan of the chest.  At this time I would recommend monitoring his weight every 4 weeks and if  the weight continues to go d

## 2019-03-19 ENCOUNTER — TELEPHONE (OUTPATIENT)
Dept: INTERNAL MEDICINE CLINIC | Facility: CLINIC | Age: 69
End: 2019-03-19

## 2019-03-20 RX ORDER — ONDANSETRON 4 MG/1
TABLET, FILM COATED ORAL
COMMUNITY
Start: 2019-03-20 | End: 2019-05-25 | Stop reason: CLARIF

## 2019-03-20 RX ORDER — BUDESONIDE AND FORMOTEROL FUMARATE DIHYDRATE 160; 4.5 UG/1; UG/1
AEROSOL RESPIRATORY (INHALATION)
Qty: 3 INHALER | Refills: 0 | Status: SHIPPED | OUTPATIENT
Start: 2019-03-20 | End: 2019-05-16

## 2019-03-20 RX ORDER — ONDANSETRON HYDROCHLORIDE 8 MG/1
TABLET, FILM COATED ORAL
Qty: 90 TABLET | Refills: 0 | Status: SHIPPED | OUTPATIENT
Start: 2019-03-20 | End: 2019-03-20

## 2019-03-20 NOTE — TELEPHONE ENCOUNTER
Nils Vega is calling to get clarification on the dose for the prescription for Ondansetron HCl (ZOFRAN) 8 MG tablet. Please call Nils Vega back at [phone].

## 2019-03-20 NOTE — TELEPHONE ENCOUNTER
Spoke with [facility] and they are asking if ok to dispense a 4 mg tablet instead of 8mg tablet so pt does not have to cut in half. Advised that was ok.  Medication changed on list.

## 2019-03-21 NOTE — TELEPHONE ENCOUNTER
Received refill request from [facility]- refill needs to be printed and faxed can not be sent via [facility]

## 2019-03-22 ENCOUNTER — HOSPITAL ENCOUNTER (OUTPATIENT)
Dept: CV DIAGNOSTICS | Facility: HOSPITAL | Age: 69
Discharge: HOME | End: 2019-03-22
Attending: INTERNAL MEDICINE
Payer: MEDICARE

## 2019-03-22 DIAGNOSIS — J44.9 CHRONIC OBSTRUCTIVE PULMONARY DISEASE, UNSPECIFIED COPD TYPE (HCC): ICD-10-CM

## 2019-03-22 DIAGNOSIS — I10 ESSENTIAL HYPERTENSION, BENIGN: ICD-10-CM

## 2019-03-22 DIAGNOSIS — E10.65 TYPE 1 DIABETES MELLITUS WITH HYPERGLYCEMIA (HCC): ICD-10-CM

## 2019-03-22 PROCEDURE — 93017 CV STRESS TEST TRACING ONLY: CPT | Performed by: INTERNAL MEDICINE

## 2019-03-22 PROCEDURE — 78452 HT MUSCLE IMAGE SPECT MULT: CPT | Performed by: INTERNAL MEDICINE

## 2019-03-22 PROCEDURE — 93018 CV STRESS TEST I&R ONLY: CPT | Performed by: INTERNAL MEDICINE

## 2019-03-22 NOTE — TELEPHONE ENCOUNTER
No Protocol    Requesting Cholecalciferol (VITAMIN D) 2000 units Oral Tab  LOV: 3/18/19  RTC: 4 weeks  Last Relevant Labs: 3/6/19  Filled: 03/08/2018 #90 with 3 refills    Future Appointments   Date Time Provider Basilio Caraballo   4/1/2019  9:00 AM Jaylan

## 2019-03-23 RX ORDER — ACETAMINOPHEN 160 MG
2000 TABLET,DISINTEGRATING ORAL DAILY
Qty: 90 CAPSULE | Refills: 1 | Status: SHIPPED | OUTPATIENT
Start: 2019-03-23 | End: 2019-07-12

## 2019-03-23 NOTE — TELEPHONE ENCOUNTER
Rx for Vitamin d faxed to [address] [place] [address] Drug @ [phone] - Fax confirmation received.

## 2019-03-25 DIAGNOSIS — J44.9 CHRONIC OBSTRUCTIVE PULMONARY DISEASE, UNSPECIFIED COPD TYPE (HCC): Chronic | ICD-10-CM

## 2019-03-25 NOTE — PROGRESS NOTES
Normal-  IMPRESSION:  1. Normal SPECT myocardial perfusion stress/rest imaging study. 2. Normal electrocardiographic response regadenoson infusion. 3. Normal LV cavity size. 4. Mild generalized hypokinesis. The calculated ejection fraction is 45%.

## 2019-03-25 NOTE — TELEPHONE ENCOUNTER
Protocol failed - No AAP/ACT     Medication(s) to Refill:   Requested Prescriptions     Pending Prescriptions Disp Refills   • Tiotropium Bromide Monohydrate (Salazar Loll) 18 MCG Inhalation Cap [Pharmacy Med Name: Spiriva Handihaler 18 Mcg Cap [facility]]

## 2019-03-27 ENCOUNTER — TELEPHONE (OUTPATIENT)
Dept: INTERNAL MEDICINE CLINIC | Facility: CLINIC | Age: 69
End: 2019-03-27

## 2019-03-28 ENCOUNTER — TELEPHONE (OUTPATIENT)
Dept: INTERNAL MEDICINE CLINIC | Facility: CLINIC | Age: 69
End: 2019-03-28

## 2019-03-28 PROBLEM — Z91.89 MULTIPLE RISK FACTORS FOR CORONARY ARTERY DISEASE: Chronic | Status: ACTIVE | Noted: 2019-03-28

## 2019-03-28 PROBLEM — Z91.89 MULTIPLE RISK FACTORS FOR CORONARY ARTERY DISEASE: Status: ACTIVE | Noted: 2019-03-28

## 2019-03-28 NOTE — TELEPHONE ENCOUNTER
Received paper from Dr. Arabella Robbins regarding fax for new order stating NM test failed medical necessity. Per referral order is authorized. Spoke with Isaac Araujo and she stated to fax OV note and referral authorization note to number provided.  Faxed to [phone]

## 2019-04-17 RX ORDER — ETODOLAC 400 MG/1
TABLET, FILM COATED ORAL
Qty: 60 TABLET | Refills: 0 | Status: SHIPPED | OUTPATIENT
Start: 2019-04-17 | End: 2019-05-14

## 2019-04-17 NOTE — TELEPHONE ENCOUNTER
Passed protocol    Medication(s) to Refill:   Requested Prescriptions     Pending Prescriptions Disp Refills   • METFORMIN HCL 1000 MG Oral Tab [Pharmacy Med Name: Metformin Hydrochloride 1,000 Mg Tab Auro] 180 tablet 0     Sig: TAKE 1 TABLET BY MOUTH TWIC

## 2019-04-17 NOTE — TELEPHONE ENCOUNTER
Failed protocol     Last refill:  2/12/2019 #60 NR    LOV   3/18/2019 Dr Dariusz Nation RTC 4 weeks     No future OV scheduled

## 2019-04-21 ENCOUNTER — HOSPITAL ENCOUNTER (OUTPATIENT)
Facility: HOSPITAL | Age: 69
Setting detail: OBSERVATION
Discharge: HOME | End: 2019-04-23
Attending: EMERGENCY MEDICINE | Admitting: HOSPITALIST
Payer: MEDICARE

## 2019-04-21 ENCOUNTER — APPOINTMENT (OUTPATIENT)
Dept: GENERAL RADIOLOGY | Facility: HOSPITAL | Age: 69
End: 2019-04-21
Attending: EMERGENCY MEDICINE
Payer: MEDICARE

## 2019-04-21 DIAGNOSIS — T78.3XXA ANGIOEDEMA, INITIAL ENCOUNTER: Primary | ICD-10-CM

## 2019-04-21 DIAGNOSIS — K59.00 CONSTIPATION, UNSPECIFIED CONSTIPATION TYPE: ICD-10-CM

## 2019-04-21 PROCEDURE — 74018 RADEX ABDOMEN 1 VIEW: CPT | Performed by: EMERGENCY MEDICINE

## 2019-04-21 PROCEDURE — 99220 INITIAL OBSERVATION CARE,LEVL III: CPT | Performed by: HOSPITALIST

## 2019-04-21 RX ORDER — METHYLPREDNISOLONE SODIUM SUCCINATE 125 MG/2ML
125 INJECTION, POWDER, LYOPHILIZED, FOR SOLUTION INTRAMUSCULAR; INTRAVENOUS ONCE
Status: COMPLETED | OUTPATIENT
Start: 2019-04-21 | End: 2019-04-21

## 2019-04-21 RX ORDER — HYDROMORPHONE HYDROCHLORIDE 1 MG/ML
2 INJECTION, SOLUTION INTRAMUSCULAR; INTRAVENOUS; SUBCUTANEOUS
Status: COMPLETED | OUTPATIENT
Start: 2019-04-21 | End: 2019-04-21

## 2019-04-21 RX ORDER — HYDROMORPHONE HYDROCHLORIDE 1 MG/ML
0.5 INJECTION, SOLUTION INTRAMUSCULAR; INTRAVENOUS; SUBCUTANEOUS ONCE
Status: COMPLETED | OUTPATIENT
Start: 2019-04-21 | End: 2019-04-21

## 2019-04-21 RX ORDER — FAMOTIDINE 10 MG/ML
20 INJECTION, SOLUTION INTRAVENOUS ONCE
Status: COMPLETED | OUTPATIENT
Start: 2019-04-21 | End: 2019-04-21

## 2019-04-21 RX ORDER — DIPHENHYDRAMINE HYDROCHLORIDE 50 MG/ML
25 INJECTION INTRAMUSCULAR; INTRAVENOUS ONCE
Status: COMPLETED | OUTPATIENT
Start: 2019-04-21 | End: 2019-04-21

## 2019-04-21 RX ORDER — ONDANSETRON 2 MG/ML
4 INJECTION INTRAMUSCULAR; INTRAVENOUS EVERY 6 HOURS PRN
Status: DISCONTINUED | OUTPATIENT
Start: 2019-04-21 | End: 2019-04-23

## 2019-04-21 RX ORDER — DIPHENHYDRAMINE HCL 25 MG
25 CAPSULE ORAL EVERY 6 HOURS PRN
Status: DISCONTINUED | OUTPATIENT
Start: 2019-04-21 | End: 2019-04-23

## 2019-04-21 RX ORDER — POLYETHYLENE GLYCOL 3350 17 G/17G
17 POWDER, FOR SOLUTION ORAL DAILY
Status: DISCONTINUED | OUTPATIENT
Start: 2019-04-21 | End: 2019-04-22

## 2019-04-21 RX ORDER — SODIUM PHOSPHATE, DIBASIC AND SODIUM PHOSPHATE, MONOBASIC 7; 19 G/133ML; G/133ML
1 ENEMA RECTAL ONCE AS NEEDED
Status: COMPLETED | OUTPATIENT
Start: 2019-04-21 | End: 2019-04-22

## 2019-04-21 RX ORDER — ALBUTEROL SULFATE 90 UG/1
2 AEROSOL, METERED RESPIRATORY (INHALATION) EVERY 4 HOURS PRN
Status: DISCONTINUED | OUTPATIENT
Start: 2019-04-21 | End: 2019-04-23

## 2019-04-21 RX ORDER — DEXTROSE MONOHYDRATE 25 G/50ML
50 INJECTION, SOLUTION INTRAVENOUS
Status: DISCONTINUED | OUTPATIENT
Start: 2019-04-21 | End: 2019-04-23

## 2019-04-21 RX ORDER — BISACODYL 10 MG
10 SUPPOSITORY, RECTAL RECTAL
Status: DISCONTINUED | OUTPATIENT
Start: 2019-04-21 | End: 2019-04-23

## 2019-04-21 RX ORDER — ACETAMINOPHEN 325 MG/1
650 TABLET ORAL EVERY 6 HOURS PRN
Status: DISCONTINUED | OUTPATIENT
Start: 2019-04-21 | End: 2019-04-23

## 2019-04-21 RX ORDER — ENOXAPARIN SODIUM 100 MG/ML
40 INJECTION SUBCUTANEOUS DAILY
Status: DISCONTINUED | OUTPATIENT
Start: 2019-04-21 | End: 2019-04-22

## 2019-04-21 RX ORDER — METHYLPREDNISOLONE SODIUM SUCCINATE 125 MG/2ML
60 INJECTION, POWDER, LYOPHILIZED, FOR SOLUTION INTRAMUSCULAR; INTRAVENOUS EVERY 8 HOURS
Status: DISCONTINUED | OUTPATIENT
Start: 2019-04-21 | End: 2019-04-22

## 2019-04-21 RX ORDER — METOCLOPRAMIDE HYDROCHLORIDE 5 MG/ML
10 INJECTION INTRAMUSCULAR; INTRAVENOUS EVERY 8 HOURS PRN
Status: DISCONTINUED | OUTPATIENT
Start: 2019-04-21 | End: 2019-04-23

## 2019-04-21 RX ORDER — MAGNESIUM CARB/ALUMINUM HYDROX 105-160MG
296 TABLET,CHEWABLE ORAL ONCE
Status: COMPLETED | OUTPATIENT
Start: 2019-04-21 | End: 2019-04-21

## 2019-04-21 RX ORDER — SODIUM CHLORIDE, SODIUM LACTATE, POTASSIUM CHLORIDE, CALCIUM CHLORIDE 600; 310; 30; 20 MG/100ML; MG/100ML; MG/100ML; MG/100ML
INJECTION, SOLUTION INTRAVENOUS CONTINUOUS
Status: DISCONTINUED | OUTPATIENT
Start: 2019-04-21 | End: 2019-04-23

## 2019-04-21 RX ORDER — FENTANYL 100 UG/H
1 PATCH TRANSDERMAL
Status: DISCONTINUED | OUTPATIENT
Start: 2019-04-21 | End: 2019-04-22

## 2019-04-21 RX ORDER — HYDROMORPHONE HYDROCHLORIDE 4 MG/1
4 TABLET ORAL EVERY 6 HOURS PRN
Status: DISCONTINUED | OUTPATIENT
Start: 2019-04-21 | End: 2019-04-23

## 2019-04-21 NOTE — H&P
[facility]  History and Physical     Rock Chaparro Patient Status:  Observation    1950 MRN [id number]   [facility] 5NW-A Attending Zainab Duarte MD   Hosp Day # 0 PCP Mykel Vasquez MD     Chief Complaint: Swelling pancreatic jejunostomy   • OTHER SURGICAL HISTORY      left knee scope 1990       Social History:  reports that he quit smoking about 19 years ago. He has never used smokeless tobacco. He reports that he does not drink alcohol or use drugs.     Family Hi Gluconate 0.12 % Mouth/Throat Solution  Disp:  Rfl:    HUMALOG KWIKPEN 100 UNIT/ML Subcutaneous Solution Pen-injector TAKE AS INSTRUCTED BY YOUR PRESCRIBER Disp: 30 mL Rfl: 0   Multiple Vitamin (MULTI VITAMIN DAILY OR) Take  by mouth daily.  Disp:  Rfl: Imaging data reviewed in Epic. ASSESSMENT / PLAN:     1. Angioedema. Probably related to ACEI. Currently only lip face involvement. No soft palate, throat tongue   1. Type and screen sent   2. Cont steroids, antihistamines   3. Stop benzapril  4.  Hold

## 2019-04-21 NOTE — PROGRESS NOTES
NURSING ADMISSION NOTE      Patient admitted via Cart. Oriented to room. Safety precautions initiated. Bed in low position. Call light in reach. Patient alert and oriented. C/o 8/10 upper abdominal pain- prn iv dilaudid given.  C/o constipation-

## 2019-04-21 NOTE — ED PROVIDER NOTES
Patient Seen in: [facility] Emergency Department    History   Patient presents with: Allergic Rxn Allergies (immune)  Constipation    Stated Complaint: allergic reaction    HPI    69-year-old male who has multiple complaints.     He is primarily con knee scope            Social History    Tobacco Use      Smoking status: Former Smoker        Quit date: 2000        Years since quittin.3      Smokeless tobacco: Never Used      Tobacco comment: smoked 20 years     Alcohol use: No    Drug use All other components within normal limits   CBC W/ DIFFERENTIAL - Abnormal; Notable for the following components:    WBC 13.7 (*)     Neutrophil Absolute Prelim 8.53 (*)     Neutrophil Absolute 8.53 (*)     Monocyte Absolute 1.61 (*)     Eosinophil Absolut disposition: 4/21/2019  9:50 AM                 Disposition and Plan     Clinical Impression:  Angioedema, initial encounter  (primary encounter diagnosis)  Constipation, unspecified constipation type    Disposition:  Admit  4/21/2019  9:50 am    Follow-up

## 2019-04-22 PROCEDURE — 99225 SUBSEQUENT OBSERVATION CARE: CPT | Performed by: HOSPITALIST

## 2019-04-22 RX ORDER — ONDANSETRON 2 MG/ML
4 INJECTION INTRAMUSCULAR; INTRAVENOUS EVERY 6 HOURS PRN
Status: DISCONTINUED | OUTPATIENT
Start: 2019-04-22 | End: 2019-04-23

## 2019-04-22 RX ORDER — NALOXONE HYDROCHLORIDE 0.4 MG/ML
0.08 INJECTION, SOLUTION INTRAMUSCULAR; INTRAVENOUS; SUBCUTANEOUS
Status: DISCONTINUED | OUTPATIENT
Start: 2019-04-22 | End: 2019-04-23

## 2019-04-22 RX ORDER — HYDROMORPHONE HYDROCHLORIDE 1 MG/ML
0.5 INJECTION, SOLUTION INTRAMUSCULAR; INTRAVENOUS; SUBCUTANEOUS EVERY 4 HOURS PRN
Status: DISCONTINUED | OUTPATIENT
Start: 2019-04-22 | End: 2019-04-23

## 2019-04-22 RX ORDER — METHYLPREDNISOLONE SODIUM SUCCINATE 40 MG/ML
40 INJECTION, POWDER, LYOPHILIZED, FOR SOLUTION INTRAMUSCULAR; INTRAVENOUS EVERY 12 HOURS
Status: DISCONTINUED | OUTPATIENT
Start: 2019-04-22 | End: 2019-04-23

## 2019-04-22 RX ORDER — HYDROMORPHONE HYDROCHLORIDE 1 MG/ML
2 INJECTION, SOLUTION INTRAMUSCULAR; INTRAVENOUS; SUBCUTANEOUS
Status: COMPLETED | OUTPATIENT
Start: 2019-04-22 | End: 2019-04-22

## 2019-04-22 RX ORDER — POLYETHYLENE GLYCOL 3350 17 G/17G
17 POWDER, FOR SOLUTION ORAL 2 TIMES DAILY
Status: DISCONTINUED | OUTPATIENT
Start: 2019-04-22 | End: 2019-04-23

## 2019-04-22 RX ORDER — DIPHENHYDRAMINE HYDROCHLORIDE 50 MG/ML
12.5 INJECTION INTRAMUSCULAR; INTRAVENOUS EVERY 4 HOURS PRN
Status: DISCONTINUED | OUTPATIENT
Start: 2019-04-22 | End: 2019-04-23

## 2019-04-22 RX ORDER — NALBUPHINE HCL 10 MG/ML
2.5 AMPUL (ML) INJECTION EVERY 4 HOURS PRN
Status: DISCONTINUED | OUTPATIENT
Start: 2019-04-22 | End: 2019-04-23

## 2019-04-22 RX ORDER — MAGNESIUM CARB/ALUMINUM HYDROX 105-160MG
296 TABLET,CHEWABLE ORAL ONCE
Status: COMPLETED | OUTPATIENT
Start: 2019-04-22 | End: 2019-04-22

## 2019-04-22 RX ORDER — FENTANYL 100 UG/H
1 PATCH TRANSDERMAL
Status: DISCONTINUED | OUTPATIENT
Start: 2019-04-24 | End: 2019-04-23

## 2019-04-22 NOTE — CM/SW NOTE
04/22/19 1400   CM/SW Screening   Referral Source    Information Source Chart review;Nursing rounds   Patient's Mental Status Alert;Oriented   Patient's [address]   Patient lives with Spouse   Patient Status Prior to Admission

## 2019-04-22 NOTE — PLAN OF CARE
Problem: Diabetes/Glucose Control  Goal: Glucose maintained within prescribed range  Description  INTERVENTIONS:  - Monitor Blood Glucose as ordered  - Assess for signs and symptoms of hyperglycemia and hypoglycemia  - Administer ordered medications to m deficiency     Long term current use of opiate analgesic     Elevated PSA, less than 10 ng/ml     Multiple risk factors for coronary artery disease     Angio-edema     Angioedema, initial encounter     Constipation, unspecified constipation type     Chroni

## 2019-04-22 NOTE — CONSULTS
[facility]  Report of Consultation    Cindy Barber Patient Status:  Observation    1950 MRN [id number]   [facility] 5NW-A Attending Lopez Balderas MD   Hosp Day # 0 PCP Elvina Bence, MD     Date of Admission:   Onset   • Cancer Mother         pancreatic? • Hypertension Mother       reports that he quit smoking about 19 years ago. He has never used smokeless tobacco. He reports that he does not drink alcohol or use drugs.     Allergies:    Benazepril Intravenous, Q8H PRN  •  Insulin Aspart Pen (NOVOLOG) 100 UNIT/ML flexpen 1-10 Units, 1-10 Units, Subcutaneous, TID AC and HS  •  Insulin Aspart Pen (NOVOLOG) 100 UNIT/ML flexpen 1-68 Units, 1-68 Units, Subcutaneous, TID CC  •  insulin detemir (LEVEMIR) 10

## 2019-04-22 NOTE — PROGRESS NOTES
[facility]  Progress Note     Nomi Old Patient Status:  Observation    1950 MRN [id number]   [facility] 5NW-A Attending Reyna Marquez MD   Hosp Day # 0 PCP Benito Florian MD     Chief Complaint: lip swelling    S: Lois rPice INR in the last 168 hours. No results for input(s): TROP, CK in the last 168 hours. Imaging: Imaging data reviewed in Epic.     Medications:   • Hydrocortisone Acetate  25 mg Rectal BID   • fentaNYL  1 patch Transdermal Q72H   • Fluticasone Furoa

## 2019-04-22 NOTE — CONSULTS
Gastroenterology Initial Consultation  I have personally seen and examined the patient.     Patient Name: Rock Krysta  Referring physician: Dr. Karli Herman  Reason for consultation: Chronic pancreatitis, constipation  CC: Abdominal pain, constipation  HPI: Spondylolisthesis of lumbar region    • Spondylolisthesis of lumbar region    • Type II or unspecified type diabetes mellitus without mention of complication, uncontrolled    • Unspecified vitamin D deficiency      PSHx:   Past Surgical History:   Procedur mg 650 mg Oral Q6H PRN   PEG 3350 (MIRALAX) powder packet 17 g 17 g Oral Daily   magnesium hydroxide (MILK OF MAGNESIA) 400 MG/5ML suspension 30 mL 30 mL Oral Daily PRN   bisacodyl (DULCOLAX) rectal suppository 10 mg 10 mg Rectal Daily PRN   [COMPLETED] FL history of known chronic anemia            Dermatologic: The patient reports no recent rashes or chronic skin disorders            Rheumatologic: As above            Genitourinary:  The patient reports no history of recurrent urinary tract infections, hema 380.0     XR ABDOMEN (1 VIEW) (CPT=74018) Once [[id number]] Collected: 04/21/19 0838   Order Status: Completed Updated: 04/21/19 0839   Narrative:     PROCEDURE:  XR ABDOMEN (1 VIEW) (CPT=74018)     INDICATIONS:  constipation     COMPARISON: Elizabeth Barney standpoint, we will sign-off. I do not think that he requires ongoing inpatient management, and could be discharged home once the angioedema issues are resolved.

## 2019-04-23 VITALS
HEIGHT: 71 IN | BODY MASS INDEX: 19.32 KG/M2 | DIASTOLIC BLOOD PRESSURE: 61 MMHG | TEMPERATURE: 99 F | OXYGEN SATURATION: 92 % | WEIGHT: 138 LBS | HEART RATE: 77 BPM | SYSTOLIC BLOOD PRESSURE: 130 MMHG | RESPIRATION RATE: 18 BRPM

## 2019-04-23 PROCEDURE — 99217 OBSERVATION CARE DISCHARGE: CPT | Performed by: HOSPITALIST

## 2019-04-23 RX ORDER — DIPHENHYDRAMINE HCL 25 MG
25 CAPSULE ORAL EVERY 6 HOURS PRN
Refills: 0 | Status: SHIPPED | COMMUNITY
Start: 2019-04-23 | End: 2019-05-21

## 2019-04-23 RX ORDER — POLYETHYLENE GLYCOL 3350 17 G/17G
17 POWDER, FOR SOLUTION ORAL DAILY
Refills: 0 | Status: ON HOLD | COMMUNITY
Start: 2019-04-23 | End: 2021-08-16

## 2019-04-23 RX ORDER — PREDNISONE 10 MG/1
TABLET ORAL
Qty: 10 TABLET | Refills: 0 | Status: SHIPPED | OUTPATIENT
Start: 2019-04-23 | End: 2019-05-14 | Stop reason: ALTCHOICE

## 2019-04-23 NOTE — DISCHARGE SUMMARY
[facility]  DISCHARGE SUMMARY     Bere Sheridan Patient Status:  Observation    1950 MRN [id number]   [facility] 5NW-A Attending No att. providers found   Hosp Day # 0 PCP Mel Jimenez MD     Date of Admission: 2019 hospitalization:   • none    Incidental or significant findings and recommendations (brief descriptions):  • none    Lab/Test results pending at Discharge:   · none    Consultants:  • Gastroenterology and pain service    Discharge Medication List:     Disc Tabs  Commonly known as:  GLUCOPHAGE      TAKE 1 TABLET BY MOUTH TWICE DAILY WITH MEALS   Quantity:  180 tablet  Refills:  0     MULTI VITAMIN DAILY OR      Take  by mouth daily.    Refills:  0     Ondansetron HCl 4 mg tablet  Commonly known as:  Shayna Cardenas Susanna [id number]               5/21/2019  1:40 PM  FOLLOW-UP OV [[id number]] 20 min. [facility] Gastroenter[facility],  Charmayne Gordon, MD    Patient Instructions:      Please arrive 15 minutes prior to your scheduled appointment time.

## 2019-04-23 NOTE — PROGRESS NOTES
NURSING DISCHARGE NOTE    Discharged Home via Wheelchair. Accompanied by Family member  Belongings Taken by patient/family. Pt discharged home. PCA discontinued. PIV removed. Discharge instructions reviewed with pt. Prescription given to pt.  All qu

## 2019-04-23 NOTE — PROGRESS NOTES
Patient seen and examined    S- no complaints, feels great    General- NAD  Chest- CTAB  [facility]- RRR  Abdo- soft, Nt, BS+    Plan    Ok to [employer] home    Susanne Kumar M.D.   [facility]

## 2019-04-23 NOTE — PLAN OF CARE
PROBLEM: Angioedema     ASSESSMENT: Pt is A&Ox4. VSS, afebrile. Maintaining O2 sats >94% on RA. . Tele, NSR. Tolerating diet no c/o n/v/d this shift. No c/o n/v/d this shift. PCA Up ad black, pt encouraged to use call light prior to getting up d/t PCA.  Pt influences on pain and pain management  - Manage/alleviate anxiety  - Utilize distraction and/or relaxation techniques  - Monitor for opioid side effects  - Notify MD/LIP if interventions unsuccessful or patient reports new pain  - Anticipate increased oscar

## 2019-04-23 NOTE — PROGRESS NOTES
[facility]  Progress Note    Saurabh Briscoe Patient Status:  Observation    1950 MRN [id number]   [facility] 5NW-A Attending Lula Mendez MD   Hosp Day # 0 PCP Dusty Lovell MD     Subjective:  Saurabh Briscoe is a(n) 70 ye

## 2019-04-24 ENCOUNTER — PATIENT OUTREACH (OUTPATIENT)
Dept: CASE MANAGEMENT | Age: 69
End: 2019-04-24

## 2019-04-24 DIAGNOSIS — Z02.9 ENCOUNTERS FOR UNSPECIFIED ADMINISTRATIVE PURPOSE: ICD-10-CM

## 2019-04-30 ENCOUNTER — HOSPITAL ENCOUNTER (OUTPATIENT)
Dept: GENERAL RADIOLOGY | Age: 69
Discharge: HOME | End: 2019-04-30
Attending: INTERNAL MEDICINE
Payer: MEDICARE

## 2019-04-30 ENCOUNTER — OFFICE VISIT (OUTPATIENT)
Dept: INTERNAL MEDICINE CLINIC | Facility: CLINIC | Age: 69
End: 2019-04-30
Payer: MEDICARE

## 2019-04-30 VITALS
RESPIRATION RATE: 16 BRPM | HEIGHT: 70 IN | SYSTOLIC BLOOD PRESSURE: 120 MMHG | DIASTOLIC BLOOD PRESSURE: 68 MMHG | HEART RATE: 70 BPM | BODY MASS INDEX: 19.26 KG/M2 | WEIGHT: 134.5 LBS | TEMPERATURE: 99 F

## 2019-04-30 DIAGNOSIS — T78.3XXD ANGIOEDEMA, SUBSEQUENT ENCOUNTER: Primary | ICD-10-CM

## 2019-04-30 DIAGNOSIS — R05.9 COUGH: ICD-10-CM

## 2019-04-30 DIAGNOSIS — I10 ESSENTIAL HYPERTENSION, BENIGN: Chronic | ICD-10-CM

## 2019-04-30 PROBLEM — K59.00 CONSTIPATION, UNSPECIFIED CONSTIPATION TYPE: Status: RESOLVED | Noted: 2019-04-21 | Resolved: 2019-04-30

## 2019-04-30 PROBLEM — T78.3XXA ANGIOEDEMA, INITIAL ENCOUNTER: Status: RESOLVED | Noted: 2019-04-21 | Resolved: 2019-04-30

## 2019-04-30 PROCEDURE — 1111F DSCHRG MED/CURRENT MED MERGE: CPT | Performed by: INTERNAL MEDICINE

## 2019-04-30 PROCEDURE — 99495 TRANSJ CARE MGMT MOD F2F 14D: CPT | Performed by: INTERNAL MEDICINE

## 2019-04-30 PROCEDURE — 71046 X-RAY EXAM CHEST 2 VIEWS: CPT | Performed by: INTERNAL MEDICINE

## 2019-04-30 RX ORDER — AMOXICILLIN 875 MG/1
875 TABLET, COATED ORAL 2 TIMES DAILY
COMMUNITY
End: 2019-05-14

## 2019-04-30 NOTE — PATIENT INSTRUCTIONS
Angioedema  Angioedema (HQ-qya-zc-eh-CED-muh) is a sudden appearance of swollen patches (edema) on the skin or mucous membranes. It most often involves the face, lips, mouth, tongue, back of throat, or vocal cords.  It may also occur in other places, such The most common cause of angioedema is a reaction to a class of medicines called ACE inhibitors. These are used to treat high blood pressure. ACE inhibitors include captopril, enalapril, and lisinopril.  Angiodema can happen even after you have been taking · Make sure you do not scratch areas of the body that had a reaction. This will help prevent infection.   · Stay away from air pollution, tobacco, and wood smoke. Also stay away from cold temperatures. These things can make allergy symptoms worse.   · Try t © 5568-4305 [employer] [address]. [address] [facility] [place] [place]. All rights reserved. This information is not intended as a substitute for professional medical care. Always follow your healthcare professional's instructions.

## 2019-04-30 NOTE — PROGRESS NOTES
Zach Monet  1950 is a 70year old male who presents for upper respiratory symptoms    Patient presents with:  TCM (Transition Of Care Management): Zeus-swelling in [address] [place] [address] F/U: Nadege-roxane- -        HPI:   Was admit Ondansetron HCl (ZOFRAN) 4 mg tablet TAKE 1 TABLET (4 mg total) BY MOUTH 4 TIMES DAILY AS NEEDED Disp:  Rfl:    LEVEMIR FLEXTOUCH 100 UNIT/ML Subcutaneous Solution Pen-injector INJECT 35 UNITS UNDER THE SKIN NIGHTLY Disp: 30 mL Rfl: 0   VENTOLIN  (9 REVIEW OF SYSTEMS:   GENERAL: feels well otherwise. Denies fever  SKIN: no rashes  EYES:denies eye pain,discharge   HEENT:NEG  LUNGS: denies shortness of breath,cough,expectoration,chest pain,wheezing  CARDIOVASCULAR: denies chest pain on exertion  GI · Trouble swallowing, or feeling like your throat is closing  · Trouble breathing or wheezing  · Hoarse voice or trouble speaking  · Nausea, vomiting, diarrhea, or stomach cramps  · Feeling faint or lightheaded, rapid heart rate, or low blood pressure  Ang · Oral diphenhydramine is an antihistamine available without a prescription. Unless a prescription antihistamine was given, diphenhydramine may be used to reduce widespread itching.  It may make you sleepy, so be careful using it when going to school, worki Follow up with your healthcare provider, or as advised. You may need to see an allergist. An allergist can help find the cause of an allergic reaction and give recommendations on how to prevent future reactions.   Call 911  Call 911 right away if any of the

## 2019-05-14 RX ORDER — ETODOLAC 400 MG/1
TABLET, FILM COATED ORAL
Qty: 60 TABLET | Refills: 0 | Status: SHIPPED | OUTPATIENT
Start: 2019-05-14 | End: 2019-05-25 | Stop reason: CLARIF

## 2019-05-14 NOTE — TELEPHONE ENCOUNTER
Failed protocol     Last refill:  4/17/2019 #60 NR    LOV:   4/30/2019 Dr Crandall Plants RTC ?     No FOV scheduled

## 2019-05-16 DIAGNOSIS — J44.9 CHRONIC OBSTRUCTIVE PULMONARY DISEASE, UNSPECIFIED COPD TYPE (HCC): Chronic | ICD-10-CM

## 2019-05-17 NOTE — TELEPHONE ENCOUNTER
Failed protocol - No AAP/ACT    Medication(s) to Refill:   Requested Prescriptions     Pending Prescriptions Disp Refills   • SYMBICORT 160-4.5 MCG/ACT Inhalation Aerosol [Pharmacy Med Name: Symbicort 160-4.5 Mcg/Act Aer Astr] 3 Inhaler 0     Sig: INHALE T

## 2019-05-19 RX ORDER — BUDESONIDE AND FORMOTEROL FUMARATE DIHYDRATE 160; 4.5 UG/1; UG/1
AEROSOL RESPIRATORY (INHALATION)
Qty: 3 INHALER | Refills: 0 | Status: SHIPPED | OUTPATIENT
Start: 2019-05-19 | End: 2019-05-25 | Stop reason: CLARIF

## 2019-05-23 PROBLEM — K90.9 STEATORRHEA: Status: ACTIVE | Noted: 2019-05-23

## 2019-05-23 PROBLEM — R63.4 WEIGHT LOSS: Status: ACTIVE | Noted: 2019-05-23

## 2019-05-25 ENCOUNTER — HOSPITAL ENCOUNTER (INPATIENT)
Facility: HOSPITAL | Age: 69
LOS: 1 days | Discharge: HOME | DRG: 191 | End: 2019-05-26
Attending: EMERGENCY MEDICINE | Admitting: INTERNAL MEDICINE
Payer: MEDICARE

## 2019-05-25 ENCOUNTER — APPOINTMENT (OUTPATIENT)
Dept: GENERAL RADIOLOGY | Facility: HOSPITAL | Age: 69
DRG: 191 | End: 2019-05-25
Attending: EMERGENCY MEDICINE
Payer: MEDICARE

## 2019-05-25 DIAGNOSIS — R09.02 HYPOXEMIA: ICD-10-CM

## 2019-05-25 DIAGNOSIS — J44.1 COPD EXACERBATION (HCC): Primary | ICD-10-CM

## 2019-05-25 DIAGNOSIS — R41.82 ALTERED MENTAL STATUS, UNSPECIFIED ALTERED MENTAL STATUS TYPE: ICD-10-CM

## 2019-05-25 PROBLEM — N17.9 AKI (ACUTE KIDNEY INJURY) (HCC): Status: ACTIVE | Noted: 2019-05-25

## 2019-05-25 PROBLEM — E87.5 HYPERKALEMIA: Status: ACTIVE | Noted: 2019-05-25

## 2019-05-25 PROCEDURE — 99223 1ST HOSP IP/OBS HIGH 75: CPT | Performed by: INTERNAL MEDICINE

## 2019-05-25 PROCEDURE — 71046 X-RAY EXAM CHEST 2 VIEWS: CPT | Performed by: EMERGENCY MEDICINE

## 2019-05-25 RX ORDER — ALBUTEROL SULFATE 90 UG/1
2 AEROSOL, METERED RESPIRATORY (INHALATION) EVERY 6 HOURS PRN
COMMUNITY
End: 2019-08-04

## 2019-05-25 RX ORDER — ETODOLAC 400 MG/1
400 TABLET, FILM COATED ORAL 2 TIMES DAILY
COMMUNITY
End: 2019-10-07

## 2019-05-25 RX ORDER — INSULIN LISPRO 100 [IU]/ML
INJECTION, SOLUTION INTRAVENOUS; SUBCUTANEOUS
COMMUNITY
End: 2020-04-20

## 2019-05-25 RX ORDER — IPRATROPIUM BROMIDE AND ALBUTEROL SULFATE 2.5; .5 MG/3ML; MG/3ML
3 SOLUTION RESPIRATORY (INHALATION) EVERY 6 HOURS
Status: DISCONTINUED | OUTPATIENT
Start: 2019-05-25 | End: 2019-05-26

## 2019-05-25 RX ORDER — DEXTROSE MONOHYDRATE 25 G/50ML
50 INJECTION, SOLUTION INTRAVENOUS
Status: DISCONTINUED | OUTPATIENT
Start: 2019-05-25 | End: 2019-05-26

## 2019-05-25 RX ORDER — IPRATROPIUM BROMIDE AND ALBUTEROL SULFATE 2.5; .5 MG/3ML; MG/3ML
3 SOLUTION RESPIRATORY (INHALATION) ONCE
Status: COMPLETED | OUTPATIENT
Start: 2019-05-25 | End: 2019-05-25

## 2019-05-25 RX ORDER — METHYLPREDNISOLONE SODIUM SUCCINATE 125 MG/2ML
60 INJECTION, POWDER, LYOPHILIZED, FOR SOLUTION INTRAMUSCULAR; INTRAVENOUS EVERY 8 HOURS
Status: DISCONTINUED | OUTPATIENT
Start: 2019-05-26 | End: 2019-05-26

## 2019-05-25 RX ORDER — HYDROMORPHONE HYDROCHLORIDE 4 MG/1
4 TABLET ORAL EVERY 6 HOURS PRN
Status: DISCONTINUED | OUTPATIENT
Start: 2019-05-25 | End: 2019-05-26

## 2019-05-25 RX ORDER — ENOXAPARIN SODIUM 100 MG/ML
40 INJECTION SUBCUTANEOUS DAILY
Status: DISCONTINUED | OUTPATIENT
Start: 2019-05-25 | End: 2019-05-26

## 2019-05-25 RX ORDER — BUDESONIDE AND FORMOTEROL FUMARATE DIHYDRATE 160; 4.5 UG/1; UG/1
2 AEROSOL RESPIRATORY (INHALATION) 2 TIMES DAILY
Status: ON HOLD | COMMUNITY
End: 2019-05-26

## 2019-05-25 RX ORDER — ONDANSETRON 4 MG/1
4 TABLET, FILM COATED ORAL EVERY 8 HOURS PRN
COMMUNITY
End: 2019-10-07

## 2019-05-25 RX ORDER — SODIUM CHLORIDE 9 MG/ML
INJECTION, SOLUTION INTRAVENOUS CONTINUOUS
Status: DISCONTINUED | OUTPATIENT
Start: 2019-05-25 | End: 2019-05-26

## 2019-05-25 RX ORDER — ONDANSETRON 2 MG/ML
INJECTION INTRAMUSCULAR; INTRAVENOUS
Status: COMPLETED
Start: 2019-05-25 | End: 2019-05-25

## 2019-05-25 RX ORDER — ALBUTEROL SULFATE 90 UG/1
2 AEROSOL, METERED RESPIRATORY (INHALATION) EVERY 6 HOURS PRN
Status: DISCONTINUED | OUTPATIENT
Start: 2019-05-25 | End: 2019-05-26

## 2019-05-25 RX ORDER — FENTANYL 100 UG/H
1 PATCH TRANSDERMAL
Status: DISCONTINUED | OUTPATIENT
Start: 2019-05-26 | End: 2019-05-26

## 2019-05-25 RX ORDER — ALPRAZOLAM 0.25 MG/1
0.25 TABLET ORAL 2 TIMES DAILY PRN
Status: DISCONTINUED | OUTPATIENT
Start: 2019-05-25 | End: 2019-05-26

## 2019-05-25 RX ORDER — SERTRALINE HYDROCHLORIDE 25 MG/1
25 TABLET, FILM COATED ORAL DAILY
Status: DISCONTINUED | OUTPATIENT
Start: 2019-05-25 | End: 2019-05-26

## 2019-05-25 RX ORDER — METHYLPREDNISOLONE SODIUM SUCCINATE 125 MG/2ML
125 INJECTION, POWDER, LYOPHILIZED, FOR SOLUTION INTRAMUSCULAR; INTRAVENOUS ONCE
Status: COMPLETED | OUTPATIENT
Start: 2019-05-25 | End: 2019-05-25

## 2019-05-25 RX ORDER — IPRATROPIUM BROMIDE AND ALBUTEROL SULFATE 2.5; .5 MG/3ML; MG/3ML
3 SOLUTION RESPIRATORY (INHALATION) EVERY 4 HOURS PRN
Status: DISCONTINUED | OUTPATIENT
Start: 2019-05-25 | End: 2019-05-26

## 2019-05-25 NOTE — ED INITIAL ASSESSMENT (HPI)
59 yo M hx of COPD, DM 2, brought by family for complaints of weakness, SOB and confusion. Pt had pneumonia 1 month ago, admitted in [facility] 144. states never recovered after pneumonia. Has been feeling weak since being discharged.  symptoms got wo

## 2019-05-26 VITALS
RESPIRATION RATE: 18 BRPM | SYSTOLIC BLOOD PRESSURE: 141 MMHG | OXYGEN SATURATION: 90 % | HEART RATE: 98 BPM | HEIGHT: 71 IN | TEMPERATURE: 99 F | WEIGHT: 141.38 LBS | BODY MASS INDEX: 19.79 KG/M2 | DIASTOLIC BLOOD PRESSURE: 78 MMHG

## 2019-05-26 PROCEDURE — 99238 HOSP IP/OBS DSCHRG MGMT 30/<: CPT | Performed by: HOSPITALIST

## 2019-05-26 RX ORDER — PREDNISONE 20 MG/1
TABLET ORAL
Qty: 30 TABLET | Refills: 0 | Status: SHIPPED | OUTPATIENT
Start: 2019-05-26 | End: 2019-08-06

## 2019-05-26 RX ORDER — SERTRALINE HYDROCHLORIDE 25 MG/1
25 TABLET, FILM COATED ORAL DAILY
Qty: 30 TABLET | Refills: 0 | Status: SHIPPED | OUTPATIENT
Start: 2019-05-27 | End: 2019-06-26

## 2019-05-26 RX ORDER — IPRATROPIUM BROMIDE AND ALBUTEROL SULFATE 2.5; .5 MG/3ML; MG/3ML
3 SOLUTION RESPIRATORY (INHALATION) EVERY 4 HOURS PRN
Qty: 120 VIAL | Refills: 11 | Status: SHIPPED | OUTPATIENT
Start: 2019-05-26

## 2019-05-26 RX ORDER — PREDNISONE 20 MG/1
40 TABLET ORAL DAILY
Qty: 4 TABLET | Refills: 0 | Status: SHIPPED | OUTPATIENT
Start: 2019-05-26 | End: 2019-05-26

## 2019-05-26 RX ORDER — BUDESONIDE AND FORMOTEROL FUMARATE DIHYDRATE 160; 4.5 UG/1; UG/1
2 AEROSOL RESPIRATORY (INHALATION) 2 TIMES DAILY
Qty: 1 INHALER | Refills: 11 | Status: SHIPPED | OUTPATIENT
Start: 2019-05-26 | End: 2021-11-30

## 2019-05-26 NOTE — CM/SW NOTE
Spoke with pt's RN who stated plan for DC to home today. Pt will need nebulizer. Discussed need for MD NPI on order. RN to update MD.  Referral sent to [facility] via [address]. Await their response regarding possible delivery to pt's home later today.   Social wor

## 2019-05-26 NOTE — PROGRESS NOTES
05/26/19 1241   Clinical Encounter Type   Visited With Patient and family together  (Patient's wife at bedside)   Routine Visit   (Responded to request for consult - Advance Directives)   Patient said that his wife was [facility].   Patient said that

## 2019-05-26 NOTE — CONSULTS
[facility]  Report of Consultation    Dahlia Prajapati Patient Status:  Inpatient    1950 MRN [id number]   [facility] 5NW-A Attending Carmita Medina MD   Hosp Day # 1 PCP Binta iLndquist MD     Reason for Consultation: Mook Wright liver    Past Surgical History:   Procedure Laterality Date   • APPENDECTOMY     • CHOLECYSTECTOMY     • OTHER SURGICAL HISTORY      splenectomy   • OTHER SURGICAL HISTORY      pancreatic jejunostomy   • OTHER SURGICAL HISTORY      left knee scope 1990   • (six) hours as needed for Pain. Disp: 120 tablet Rfl: 0 5/24/2019 at Unknown time   PEG 3350 Oral Powd Pack Take 17 g by mouth daily. Disp:  Rfl: 0 5/25/2019 at 0900   Vitamin D3 2000 units Oral Cap Take 1 capsule (2,000 Units total) by mouth daily.  Disp WBC 11.2*   .0*     Recent Labs   Lab 05/25/19  1817 05/26/19  0753   * 249*   BUN 16 11   CREATSERUM 1.35* 0.97   GFRAA 61 92   GFRNAA 53* 79   CA 9.6 9.2    139   K 5.2* 5.1    107   CO2 26.0 27.0     Recent Labs   Lab 05/25/1

## 2019-05-26 NOTE — RESPIRATORY THERAPY NOTE
Flu swab done. PPE worn. Sample labeled and sent to lab. Nebs started Q4, with breo and incruse to be started at 0900. Patient states he already took his inhalers prior to arrival. Clear/diminished breath sounds with crackles at the bases.

## 2019-05-26 NOTE — PROGRESS NOTES
NURSING DISCHARGE NOTE    Discharged Home via Wheelchair. Accompanied by Family member and Support staff  Belongings Taken by patient/family. IV discontinued. VSS. Discharge instructions and prescriptions given to patient and his spouse.  All questi

## 2019-05-26 NOTE — PROGRESS NOTES
Lungs clear; patient feeling back to baseline. No complaints. Vitals stable. Ok to D/C.  OP pulm referal.

## 2019-05-26 NOTE — ED PROVIDER NOTES
Patient Seen in: [facility] Emergency Department    History   Patient presents with:  Altered Mental Status (neurologic)    Stated Complaint: altered mental status    HPI    71-year-old male brought in by family for altered mental status.   They repo Date   • APPENDECTOMY     • CHOLECYSTECTOMY     • OTHER SURGICAL HISTORY      splenectomy   • OTHER SURGICAL HISTORY      pancreatic jejunostomy   • OTHER SURGICAL HISTORY      left knee scope 1990   • [address]             [facility] 4.5 (*)     A/G Ratio 0.7 (*)     All other components within normal limits   LIPASE - Abnormal; Notable for the following components:    Lipase 37 (*)     All other components within normal limits   ABG PANEL W ELECT AND LACTATE - Abnormal; Notable for th persistent clinical concern then recommend CT.     Dictated by: Lucina Ennis MD on 5/25/2019 at 18:42     Approved by: Lucina Ennis MD            Xr Chest Pa + Lat Chest (cpt=71046)    Result Date: 4/30/2019  PROCEDURE:  XR CHEST PA + LAT CHEST (CPT=71046)  IND specified.       Medications Prescribed:  Current Discharge Medication List        Present on Admission  Date Reviewed: 5/23/2019          ICD-10-CM Noted POA    COPD exacerbation ([facility] [address] J44.1 5/25/2019 Unknown            Present on Admission  Date Reviewed:

## 2019-05-26 NOTE — H&P
[facility]  History and Physical     Saulo De Oliveira Patient Status:  Emergency    1950 MRN [id number]   Location [address] [facility] Attending Jose Roman MD   Hosp Day # 0 PCP Berry Hall MD     Chief Complaint: • OTHER SURGICAL HISTORY      splenectomy   • OTHER SURGICAL HISTORY      pancreatic jejunostomy   • OTHER SURGICAL HISTORY      left knee scope 1990   • SPINE SURGERY PROCEDURE UNLISTED         Social History:  reports that he quit smoking about 19 year capsule Rfl: 1   Multiple Vitamin (MULTI VITAMIN DAILY OR) Take 1 tablet by mouth daily. Disp:  Rfl:    fentaNYL (DURAGESIC-100) 100 MCG/HR Transdermal Patch 72 Hr Place 1 patch onto the skin every third day.  Disp: 10 patch Rfl: 0       Review of Systems consult   2. Mild JONO  1. IVF  3. Hyperkalemia- BMP in am   4. DM 2 with hyperglycemia   1. Levemir, ISS  2. HgA1c  5. chronic pancreatitis- f.u with GI as outpt   6.  Depression , place on zoloft; NO SI/HI ; psych liaison consult     Quality:  · DVT Prophy

## 2019-05-27 NOTE — DISCHARGE SUMMARY
[facility]  DISCHARGE SUMMARY     Jair Venegas Patient Status:  Inpatient    1950 MRN [id number]   [facility] 5NW-A Attending No att. providers found   [address] Day # 1 PCP Arik Chanel MD     Date of Admission: 2019  Da mL by nebulization every 4 (four) hours as needed.    Quantity:  120 vial  Refills:  11     predniSONE 20 MG Tabs  Commonly known as:  DELTASONE      3 pills a day for 3 days, then 2 pills a day for 7 days, then 1 pill a day for 7 days and stop   Quantity: daily with meals. Refills:  0     MULTI VITAMIN DAILY OR      Take 1 tablet by mouth daily. Refills:  0     Ondansetron HCl 4 mg tablet  Commonly known as:  ZOFRAN      Take 4 mg by mouth every 8 (eight) hours as needed for Nausea.    Refills:  0     PE Instructions:      Please arrive 30 minutes prior to your scheduled procedure time.                        Vital signs:  Temp:  [98.5 °F (36.9 °C)] 98.5 °F (36.9 °C)  Pulse:  [91-98] 98  Resp:  [16-18] 18  BP: (138-141)/(66-78) 141/78    Physical Exam:    G

## 2019-05-28 ENCOUNTER — PATIENT OUTREACH (OUTPATIENT)
Dept: CASE MANAGEMENT | Age: 69
End: 2019-05-28

## 2019-05-28 NOTE — PROCEDURES
This test includes spirometry and flow volume loop done at the bedside during an inpatient hospitalization. Spirometry and  flow volume loop show evidence of severe obstruction. Spirometry  suggests  restriction.  Complete PFT with lung volume dorian

## 2019-05-30 NOTE — PAYOR COMM NOTE
--------------  ADMISSION REVIEW     Payor: Deannamehdi Benignogabriella Butler 673 #:  [id number]  Authorization Number: [id number]    Admit date: 5/25/19  Admit time: [address]       Admitting Physician: Cris Sanchez MD  Attending Physician:  No att. providers found • Long term current use of opiate analgesic 2/17/2015   • Long-term current use of opiate analgesic 6/11/2010   • Pain in joints    • Primary localized osteoarthrosis, lower leg 10/2/2013   • Primary localized osteoarthrosis, lower leg, left [715.16] 9/2/2 Extremities: no edema, normal peripheral pulses   Neuro: Alert oriented and nonfocal   Skin: no rashes or nodules    ED Course     Labs Reviewed   COMP METABOLIC PANEL (14) - Abnormal; Notable for the following components:       Result Value    Glucose 314 CONCLUSION:  Essentially stable chest.  Hyperexpanded lungs suggestive of COPD. No acute process is seen. If there is persistent clinical concern then recommend CT.           Xr Chest Pa + Lat Chest (cpt=71046)    Result Date: 4/30/2019  PROCEDURE:  XR CH [facility] J44.1 5/25/2019 Laurie Lewis MD on 5/25/2019  8:36 PM         H&P signed by Jj Jung MD at 5/26/2019  2:26 AM     Author:  Jj Jung MD Service:  Hospitalist Author Type:  Physician    Filed:  5/26/2019  2:2 Albuterol Sulfate  (90 Base) MCG/ACT Inhalation Aero Soln Inhale 2 puffs into the lungs every 6 (six) hours as needed for Wheezing.  Disp:  Rfl:    Insulin Lispro 100 UNIT/ML Subcutaneous Solution Inject into the skin 3 (three) times daily before del Diagnostic Data:      Labs:  Recent Labs   Lab 05/25/19 1817   WBC 11.2*   HGB 13.4   MCV 89.1   .0*       Recent Labs   Lab 05/25/19 1817   *   BUN 16   CREATSERUM 1.35*   GFRAA 61   GFRNAA 53*   CA 9.6   ALB 3.2*      K 5.2*   CL 10 MEDICATIONS ADMINISTERED IN LAST 1 DAY:   05/25/19 05/26/19   Enoxaparin Sodium (LOVENOX) 40 MG/0.4ML injection 40 mg   Dose: 40 mg  Freq: Daily Route: SC  Start: 05/25/19 2300 End: 05/26/19 1742 2327-Given              0910-Given   1742-D/C'd       1742-D/C'd          ondansetron HCl (ZOFRAN) 4 MG/2ML injection   Start: 05/25/19 2205 End: 05/25/19 2207 2207-Given                 pancrelipase (Lip-Prot-Amyl) (ZENPEP) DR particles cap 40,000 Units   Dose: 40,000 Units  Freq: 3 times daily with meal

## 2019-05-31 NOTE — PAYOR COMM NOTE
--------------  CONTINUED STAY REVIEW    Payor: Karon Fraser  Subscriber #:  [id number]  Authorization Number: [id number]    Admit date: 5/25/19  Admit time: [address]    Admitting Physician: Jorge Aguilar MD  Attending Physician:  No att. providers hira Spoke with pt's RN who stated plan for DC to home today. Pt will need nebulizer. Discussed need for MD NPI on order. RN to update MD.  Referral sent to [facility] via [facility]. Await their response regarding possible delivery to pt's home later today.   Social wor Freq: Every 6 hours Route: Nebulization  Start: 05/25/19 2300 End: 05/26/19 1742    Order specific questions:    Which hospital [facility]  Method of delivery: Intermittent nebulizer     0038-Given   0829-Given   1339-Given   1742-D/C'd        ipratropium-albut Freq: Every 6 hours PRN Route: IN  PRN Reason: Wheezing  Start: 05/25/19 2258 End: 05/26/19 1742     1742-D/C'd              ALPRAZolam (XANAX) tab 0.25 mg   Dose: 0.25 mg  Freq: 2 times daily PRN Route: OR  PRN Reason: Anxiety  Start: 05/25/19 2258 End: 0

## 2019-06-03 ENCOUNTER — LAB ENCOUNTER (OUTPATIENT)
Dept: LAB | Age: 69
End: 2019-06-03
Attending: INTERNAL MEDICINE
Payer: MEDICARE

## 2019-06-03 DIAGNOSIS — K90.9 STEATORRHEA: ICD-10-CM

## 2019-06-03 PROCEDURE — 82656 EL-1 FECAL QUAL/SEMIQ: CPT

## 2019-06-06 NOTE — PROGRESS NOTES
Date: 2019    To: Addis Crowe  : 1950    I hope this letter finds you doing well. I am writing to inform you of the following:        The results of your recent lab tests showed Severe exocrine pancreatic insufficiency          Please mayra

## 2019-06-10 ENCOUNTER — OFFICE VISIT (OUTPATIENT)
Dept: INTERNAL MEDICINE CLINIC | Facility: CLINIC | Age: 69
End: 2019-06-10
Payer: MEDICARE

## 2019-06-10 VITALS
HEART RATE: 109 BPM | RESPIRATION RATE: 16 BRPM | TEMPERATURE: 99 F | OXYGEN SATURATION: 97 % | SYSTOLIC BLOOD PRESSURE: 144 MMHG | WEIGHT: 139 LBS | BODY MASS INDEX: 19.46 KG/M2 | DIASTOLIC BLOOD PRESSURE: 82 MMHG | HEIGHT: 71 IN

## 2019-06-10 DIAGNOSIS — J44.9 CHRONIC OBSTRUCTIVE PULMONARY DISEASE, UNSPECIFIED COPD TYPE (HCC): Primary | ICD-10-CM

## 2019-06-10 DIAGNOSIS — R63.4 WEIGHT LOSS: ICD-10-CM

## 2019-06-10 PROBLEM — R09.02 HYPOXEMIA: Status: RESOLVED | Noted: 2019-05-25 | Resolved: 2019-06-10

## 2019-06-10 PROBLEM — N17.9 AKI (ACUTE KIDNEY INJURY) (HCC): Status: RESOLVED | Noted: 2019-05-25 | Resolved: 2019-06-10

## 2019-06-10 PROBLEM — E87.5 HYPERKALEMIA: Status: RESOLVED | Noted: 2019-05-25 | Resolved: 2019-06-10

## 2019-06-10 PROBLEM — K90.9 STEATORRHEA: Chronic | Status: ACTIVE | Noted: 2019-05-23

## 2019-06-10 PROBLEM — T78.3XXA ANGIO-EDEMA: Status: RESOLVED | Noted: 2019-04-21 | Resolved: 2019-06-10

## 2019-06-10 PROBLEM — J44.1 COPD EXACERBATION (HCC): Status: RESOLVED | Noted: 2019-05-25 | Resolved: 2019-06-10

## 2019-06-10 PROBLEM — R41.82 ALTERED MENTAL STATUS, UNSPECIFIED ALTERED MENTAL STATUS TYPE: Status: RESOLVED | Noted: 2019-05-25 | Resolved: 2019-06-10

## 2019-06-10 PROCEDURE — 99213 OFFICE O/P EST LOW 20 MIN: CPT | Performed by: INTERNAL MEDICINE

## 2019-06-10 NOTE — PROGRESS NOTES
Mukund Wynn  1950 is a 70year old male. Patient presents with:  Urgent Care F/u      HPI:   Respiratory:   Coughing up blood no. Shortness of breath when lying flat no. fever no. Blood-tinged sputum no.  Chest congestion currently none mert Inhale 2 puffs into the lungs every 6 (six) hours as needed for Wheezing. Disp:  Rfl:    Insulin Lispro 100 UNIT/ML Subcutaneous Solution Inject into the skin 3 (three) times daily before meals.  Per SS   Disp:  Rfl:    Ondansetron HCl (ZOFRAN) 4 mg tablet Unspecified vitamin D deficiency    • Visual impairment     GLASSES   • Weight loss       Social History:  Social History    Tobacco Use      Smoking status: Former Smoker        Packs/day: 1.00        Years: 35.00        Pack years: 28        Quit date: 1

## 2019-06-10 NOTE — TELEPHONE ENCOUNTER
Refill requested:   Requested Prescriptions     Pending Prescriptions Disp Refills   • ETODOLAC 400 MG Oral Tab [Pharmacy Med Name: Etodolac 400 Mg Tab Sand] 60 tablet 0     Sig: TAKE 1 TABLET BY MOUTH TWICE DAILY       Last refill:  2/12/2019 # 60 tabs wi

## 2019-06-14 RX ORDER — ETODOLAC 400 MG/1
TABLET, FILM COATED ORAL
Qty: 60 TABLET | Refills: 0 | OUTPATIENT
Start: 2019-06-14

## 2019-06-15 DIAGNOSIS — J44.9 CHRONIC OBSTRUCTIVE PULMONARY DISEASE, UNSPECIFIED COPD TYPE (HCC): Chronic | ICD-10-CM

## 2019-06-17 NOTE — TELEPHONE ENCOUNTER
Protocol failed     Medication(s) to Refill:   Requested Prescriptions     Pending Prescriptions Disp Refills   • Tiotropium Bromide Monohydrate (SPIRIVA HANDIHALER) 18 MCG Inhalation Cap [Pharmacy Med Name: Spiriva Handihaler 18 Mcg Cap [facility]] 30 capsule 0

## 2019-06-18 ENCOUNTER — HOSPITAL ENCOUNTER (OUTPATIENT)
Facility: HOSPITAL | Age: 69
Setting detail: HOSPITAL OUTPATIENT SURGERY
Discharge: HOME | End: 2019-06-18
Attending: INTERNAL MEDICINE | Admitting: INTERNAL MEDICINE
Payer: MEDICARE

## 2019-06-18 ENCOUNTER — ANESTHESIA (OUTPATIENT)
Dept: ENDOSCOPY | Facility: HOSPITAL | Age: 69
End: 2019-06-18
Payer: MEDICARE

## 2019-06-18 ENCOUNTER — ANESTHESIA EVENT (OUTPATIENT)
Dept: ENDOSCOPY | Facility: HOSPITAL | Age: 69
End: 2019-06-18
Payer: MEDICARE

## 2019-06-18 VITALS
DIASTOLIC BLOOD PRESSURE: 78 MMHG | RESPIRATION RATE: 16 BRPM | HEART RATE: 82 BPM | TEMPERATURE: 98 F | OXYGEN SATURATION: 96 % | WEIGHT: 135 LBS | BODY MASS INDEX: 18.9 KG/M2 | SYSTOLIC BLOOD PRESSURE: 109 MMHG | HEIGHT: 71 IN

## 2019-06-18 DIAGNOSIS — R63.4 WEIGHT LOSS: ICD-10-CM

## 2019-06-18 DIAGNOSIS — K90.9 STEATORRHEA: ICD-10-CM

## 2019-06-18 PROCEDURE — 88305 TISSUE EXAM BY PATHOLOGIST: CPT | Performed by: INTERNAL MEDICINE

## 2019-06-18 PROCEDURE — 0DB98ZX EXCISION OF DUODENUM, VIA NATURAL OR ARTIFICIAL OPENING ENDOSCOPIC, DIAGNOSTIC: ICD-10-PCS | Performed by: INTERNAL MEDICINE

## 2019-06-18 PROCEDURE — 82962 GLUCOSE BLOOD TEST: CPT

## 2019-06-18 PROCEDURE — 0DB78ZX EXCISION OF STOMACH, PYLORUS, VIA NATURAL OR ARTIFICIAL OPENING ENDOSCOPIC, DIAGNOSTIC: ICD-10-PCS | Performed by: INTERNAL MEDICINE

## 2019-06-18 PROCEDURE — BD47ZZZ ULTRASONOGRAPHY OF GASTROINTESTINAL TRACT: ICD-10-PCS | Performed by: INTERNAL MEDICINE

## 2019-06-18 PROCEDURE — 0DJ08ZZ INSPECTION OF UPPER INTESTINAL TRACT, VIA NATURAL OR ARTIFICIAL OPENING ENDOSCOPIC: ICD-10-PCS | Performed by: INTERNAL MEDICINE

## 2019-06-18 PROCEDURE — 0DB58ZX EXCISION OF ESOPHAGUS, VIA NATURAL OR ARTIFICIAL OPENING ENDOSCOPIC, DIAGNOSTIC: ICD-10-PCS | Performed by: INTERNAL MEDICINE

## 2019-06-18 RX ORDER — SODIUM CHLORIDE, SODIUM LACTATE, POTASSIUM CHLORIDE, CALCIUM CHLORIDE 600; 310; 30; 20 MG/100ML; MG/100ML; MG/100ML; MG/100ML
INJECTION, SOLUTION INTRAVENOUS CONTINUOUS
Status: DISCONTINUED | OUTPATIENT
Start: 2019-06-18 | End: 2019-06-18

## 2019-06-18 RX ORDER — DEXTROSE MONOHYDRATE 25 G/50ML
50 INJECTION, SOLUTION INTRAVENOUS
Status: DISCONTINUED | OUTPATIENT
Start: 2019-06-18 | End: 2019-06-18

## 2019-06-18 NOTE — OPERATIVE REPORT
Sophia Barron Patient Status:  Hospital Outpatient Surgery    1950 MRN [id number]   [facility] ENDOSCOPY Attending Zoë Chou MD   Date 2019 PCP Binta Lindquist MD     PREOPERATIVE DIAGNOSIS/INDICATION: Chronic calcif celiac axis and the the left adrenal gland appear wnl, the visualized portions of the left hepatic lobe appear wnl, the visualized pancreatic parenchyma showed atrophy, post surgical changes c/w lateral pancreatico-jejunostomy, calcification, intraductal e

## 2019-06-18 NOTE — H&P
89 Rina Madrigal Patient Status:  Hospital Outpatient Surgery    1950 MRN [id number]   [facility] ENDOSCOPY Attending Edgard García MD   Date 2019 PCP Pamela Wilcox MD     CC: Chronic calc uncontrolled    • Unspecified vitamin D deficiency    • Visual impairment     GLASSES   • Weight loss      Past Surgical History:   Procedure Laterality Date   • APPENDECTOMY     • CHOLECYSTECTOMY     • OTHER SURGICAL HISTORY      pancreatic jejunostomy deficiency     Long term current use of opiate analgesic     Elevated PSA, less than 10 ng/ml     Multiple risk factors for coronary artery disease     Steatorrhea     Weight loss    Chronic calcific pancreatitis prior surgical drainage procedure  Chronic

## 2019-06-18 NOTE — ANESTHESIA POSTPROCEDURE EVALUATION
[address] [facility] Patient Status:  Hospital Outpatient Surgery   Age/Gender 70year old male MRN [id number]   Location [facility]. Attending Christine Chacon MD   Hosp Day # 0 PCP Anuel Blevins MD       Anesthesia Post-op

## 2019-06-18 NOTE — ANESTHESIA PREPROCEDURE EVALUATION
PRE-OP EVALUATION    Patient Name: Milagros Winn    Pre-op Diagnosis: Weight loss [R63.4]  Steatorrhea [K90.9]    Procedure(s):  ESOPHAGOGASTRODUODENOSCOPY, Endoscopic Ultrasound      Surgeon(s) and Role:     Naeem Huff MD - Primary    Pre-op Subcutaneous Solution Inject 35 Units into the skin nightly. Disp:  Rfl:    Tiotropium Bromide Monohydrate 18 MCG Inhalation Cap Inhale 18 mcg into the lungs daily.  Disp:  Rfl:    Insulin Lispro 100 UNIT/ML Subcutaneous Solution Inject into the skin 3 (thr 05/25/2019    RDW 14.7 05/25/2019    .0 (H) 05/25/2019     Lab Results   Component Value Date     05/26/2019    K 5.1 05/26/2019     05/26/2019    CO2 27.0 05/26/2019    BUN 11 05/26/2019    CREATSERUM 0.97 05/26/2019     (H) 05/2

## 2019-06-25 NOTE — PROGRESS NOTES
Date: 2019    To: Michael Carcamo  : 1950    I hope this letter finds you doing well. I am writing to inform you of the following:      The biopsies obtained at the time of your recent endoscopic procedure were benign and showed no evidence

## 2019-06-26 NOTE — TELEPHONE ENCOUNTER
No protocol     Last refill:  5/27/2019 #30 NR - Dr Roy Mis     LOV:   6/10/2019 Dr Arabella Robbins RTC 1 month     No FOV scheduled

## 2019-06-27 RX ORDER — INSULIN DETEMIR 100 [IU]/ML
INJECTION, SOLUTION SUBCUTANEOUS
Qty: 30 ML | Refills: 0 | Status: SHIPPED | OUTPATIENT
Start: 2019-06-27 | End: 2019-10-08

## 2019-06-27 RX ORDER — SERTRALINE HYDROCHLORIDE 25 MG/1
25 TABLET, FILM COATED ORAL DAILY
Qty: 90 TABLET | Refills: 0 | Status: SHIPPED | OUTPATIENT
Start: 2019-06-27 | End: 2019-09-25

## 2019-06-27 NOTE — TELEPHONE ENCOUNTER
LEVEMIR FLEXTOUCH 100 UNIT/ML     Last OV relevant to medication: 6-    Last refill date: 3-8-2018 # 35 ml with 0 refills     When pt was asked to return for OV: 1 month for result consult    Upcoming appt/reason: none  Labs:   6/18/19  7:21 AM

## 2019-07-09 DIAGNOSIS — E55.9 VITAMIN D DEFICIENCY: Primary | Chronic | ICD-10-CM

## 2019-07-11 NOTE — TELEPHONE ENCOUNTER
No protocol    Requesting Vitamin D3 2000 units Oral Cap  LOV: 6/10/19  RTC: 1 month  Last Relevant Labs: 3/6/19  Filled: 3/23/19 #90 with 1 refills    Future Appointments   Date Time Provider Basilio Caraballo   7/30/2019  9:45 AM [facility]

## 2019-07-12 DIAGNOSIS — R63.4 WEIGHT LOSS: ICD-10-CM

## 2019-07-12 DIAGNOSIS — K86.1 CHRONIC PANCREATITIS, UNSPECIFIED PANCREATITIS TYPE (HCC): ICD-10-CM

## 2019-07-12 RX ORDER — PANCRELIPASE 36000; 180000; 114000 [USP'U]/1; [USP'U]/1; [USP'U]/1
CAPSULE, DELAYED RELEASE PELLETS ORAL
Qty: 180 CAPSULE | Refills: 2 | Status: SHIPPED | OUTPATIENT
Start: 2019-07-12 | End: 2019-10-16

## 2019-07-12 RX ORDER — ACETAMINOPHEN 160 MG
2000 TABLET,DISINTEGRATING ORAL DAILY
Qty: 90 CAPSULE | Refills: 1 | Status: ON HOLD | OUTPATIENT
Start: 2019-07-12 | End: 2021-08-16

## 2019-07-12 NOTE — TELEPHONE ENCOUNTER
Patient reported medication     Medication(s) to Refill:   Requested Prescriptions     Pending Prescriptions Disp Refills   • CREON 76704 units Oral Cap DR Particles [Pharmacy Med Name: Creon Dr 36,000 Unit Cap Abbv] 180 capsule 2     Sig: TAKE TWO CAPSULE

## 2019-07-13 NOTE — TELEPHONE ENCOUNTER
Last OV: 6/10/19 with Dr. Everett Handley  When pt was asked to return for OV: 1 month  Upcoming appt/reason: no upcoming appt  Last labs May/June 2019    Looks like we never refilled this medication for patient.   Historically documented in patient's medication li

## 2019-07-24 ENCOUNTER — LAB ENCOUNTER (OUTPATIENT)
Dept: LAB | Age: 69
End: 2019-07-24
Attending: UROLOGY
Payer: MEDICARE

## 2019-07-24 DIAGNOSIS — R97.20 ELEVATED PSA: ICD-10-CM

## 2019-07-24 PROCEDURE — 87086 URINE CULTURE/COLONY COUNT: CPT

## 2019-07-24 RX ORDER — ONDANSETRON HYDROCHLORIDE 8 MG/1
TABLET, FILM COATED ORAL
Qty: 90 TABLET | Refills: 0 | Status: SHIPPED | OUTPATIENT
Start: 2019-07-24 | End: 2019-11-23

## 2019-07-24 NOTE — TELEPHONE ENCOUNTER
Ondansetron HCl (ZOFRAN) 8 MG       Last OV relevant to medication:  6-     Last refill date: n/a    When pt was asked to return for OV: 1 month    Upcoming appt/reason: none     Labs: 5-

## 2019-07-29 NOTE — PROGRESS NOTES
Urine culture negative prior to prostate biopsy 7/30/19.       Future Appointments  7/30/2019  9:45 AM    Kathie Sahikh MD           [facility]    Jennifer Rkrt  11/15/2019 2:00 PM    Kathie Shaikh MD           [facility]  4/13/2020  9:15 AM

## 2019-07-30 PROCEDURE — 88305 TISSUE EXAM BY PATHOLOGIST: CPT | Performed by: UROLOGY

## 2019-07-31 DIAGNOSIS — J44.9 CHRONIC OBSTRUCTIVE PULMONARY DISEASE, UNSPECIFIED COPD TYPE (HCC): Chronic | ICD-10-CM

## 2019-08-01 NOTE — TELEPHONE ENCOUNTER
Failed protocol     Last refill:  6/17/2019 Spiriva #30 NR    LOV:   6/10/2019 Dr Chan Lezama RTC 1 month     No FOV scheduled

## 2019-08-06 PROBLEM — G89.29 OTHER CHRONIC PAIN: Status: ACTIVE | Noted: 2019-08-06

## 2019-08-27 RX ORDER — ETODOLAC 400 MG/1
TABLET, FILM COATED ORAL
Qty: 60 TABLET | Refills: 0 | Status: SHIPPED | OUTPATIENT
Start: 2019-08-27 | End: 2019-09-20

## 2019-08-27 NOTE — TELEPHONE ENCOUNTER
No protocol    Requesting ETODOLAC 400 MG Oral Tab  LOV: 6/10/19  RTC: 1 month  Last Relevant Labs: 3/6/19  Filled: 5/14/19 #60 with 0 refills    Future Appointments: None

## 2019-08-28 DIAGNOSIS — J44.9 CHRONIC OBSTRUCTIVE PULMONARY DISEASE, UNSPECIFIED COPD TYPE (HCC): Chronic | ICD-10-CM

## 2019-08-28 NOTE — TELEPHONE ENCOUNTER
Protocol failed - no AAP/ACT    Requesting Tiotropium Bromide Monohydrate (SPIRIVA HANDIHALER) 18 MCG Inhalation Cap  LOV: 6/10/19  RTC: 1 month  Last Relevant Labs: 5/25/19  Filled: 8/1/19 #30 with 0 refills    Future Appointments   Date Time Provider Dep

## 2019-09-19 ENCOUNTER — TELEPHONE (OUTPATIENT)
Dept: INTERNAL MEDICINE CLINIC | Facility: CLINIC | Age: 69
End: 2019-09-19

## 2019-09-19 DIAGNOSIS — I10 ESSENTIAL HYPERTENSION, BENIGN: Primary | Chronic | ICD-10-CM

## 2019-09-19 DIAGNOSIS — E10.65 TYPE 1 DIABETES MELLITUS WITH HYPERGLYCEMIA (HCC): Chronic | ICD-10-CM

## 2019-09-19 NOTE — TELEPHONE ENCOUNTER
Patient's Spouse calling in, requesting blood work orders to be placed prior to his visit, which is on 10/07/19.

## 2019-09-20 RX ORDER — ETODOLAC 400 MG/1
TABLET, FILM COATED ORAL
Qty: 60 TABLET | Refills: 0 | Status: SHIPPED | OUTPATIENT
Start: 2019-09-20 | End: 2019-10-16

## 2019-09-20 NOTE — TELEPHONE ENCOUNTER
No protocol    Requesting ETODOLAC 400 MG Oral Tab  LOV: 6/10/19  RTC: 1 month  Last Relevant Labs: 5/26/19  Filled: 8/27/19 #60 with 0 refills    Future Appointments   Date Time Provider Basilio Caraballo   10/7/2019  1:30 PM Radhika Sanon MD [facility]

## 2019-09-24 NOTE — TELEPHONE ENCOUNTER
Bmp and a1c pending for approval. Please review and advise any additional lab orders you would like pt to complete.  TY

## 2019-09-26 RX ORDER — SERTRALINE HYDROCHLORIDE 25 MG/1
TABLET, FILM COATED ORAL
Qty: 90 TABLET | Refills: 0 | Status: SHIPPED | OUTPATIENT
Start: 2019-09-26 | End: 2019-12-26

## 2019-09-26 NOTE — TELEPHONE ENCOUNTER
No protocol    Requesting Sertraline HCl 25 MG Oral Tab  LOV: 6/10/19  RTC: 1 month  Last Relevant Labs: 5/26/19  Filled: 6/27/19 #90 with 0 refills    Future Appointments   Date Time Provider Basilio Caraballo   10/7/2019  1:30 PM Emily Rush MD [facility] 8

## 2019-09-30 DIAGNOSIS — J44.9 CHRONIC OBSTRUCTIVE PULMONARY DISEASE, UNSPECIFIED COPD TYPE (HCC): Chronic | ICD-10-CM

## 2019-10-01 NOTE — TELEPHONE ENCOUNTER
Protocol failed     Requesting Tiotropium Bromide Monohydrate (Celi Adrienne) 18 MCG Inhalation Cap  LOV: 6/10/19  RTC: 1 month  Last Relevant Labs: 5/26/19  Filled: 8/29/19 #30 with 0 refills    Future Appointments   Date Time Provider Department Karla

## 2019-10-02 ENCOUNTER — LAB ENCOUNTER (OUTPATIENT)
Dept: LAB | Age: 69
End: 2019-10-02
Attending: INTERNAL MEDICINE
Payer: MEDICARE

## 2019-10-02 DIAGNOSIS — E10.65 TYPE 1 DIABETES MELLITUS WITH HYPERGLYCEMIA (HCC): ICD-10-CM

## 2019-10-02 DIAGNOSIS — I10 ESSENTIAL HYPERTENSION, BENIGN: Chronic | ICD-10-CM

## 2019-10-02 PROCEDURE — 80048 BASIC METABOLIC PNL TOTAL CA: CPT

## 2019-10-02 PROCEDURE — 36415 COLL VENOUS BLD VENIPUNCTURE: CPT

## 2019-10-02 PROCEDURE — 83036 HEMOGLOBIN GLYCOSYLATED A1C: CPT

## 2019-10-07 ENCOUNTER — OFFICE VISIT (OUTPATIENT)
Dept: INTERNAL MEDICINE CLINIC | Facility: CLINIC | Age: 69
End: 2019-10-07
Payer: MEDICARE

## 2019-10-07 VITALS
DIASTOLIC BLOOD PRESSURE: 62 MMHG | HEART RATE: 95 BPM | WEIGHT: 137.5 LBS | SYSTOLIC BLOOD PRESSURE: 102 MMHG | TEMPERATURE: 98 F | BODY MASS INDEX: 19 KG/M2 | OXYGEN SATURATION: 97 %

## 2019-10-07 DIAGNOSIS — E10.65 TYPE 1 DIABETES MELLITUS WITH HYPERGLYCEMIA (HCC): Primary | Chronic | ICD-10-CM

## 2019-10-07 PROBLEM — C61 PROSTATE CANCER (HCC): Status: ACTIVE | Noted: 2019-10-07

## 2019-10-07 PROCEDURE — 99213 OFFICE O/P EST LOW 20 MIN: CPT | Performed by: INTERNAL MEDICINE

## 2019-10-07 PROCEDURE — 90662 IIV NO PRSV INCREASED AG IM: CPT | Performed by: INTERNAL MEDICINE

## 2019-10-07 PROCEDURE — G0008 ADMIN INFLUENZA VIRUS VAC: HCPCS | Performed by: INTERNAL MEDICINE

## 2019-10-07 PROCEDURE — 90732 PPSV23 VACC 2 YRS+ SUBQ/IM: CPT | Performed by: INTERNAL MEDICINE

## 2019-10-07 PROCEDURE — G0009 ADMIN PNEUMOCOCCAL VACCINE: HCPCS | Performed by: INTERNAL MEDICINE

## 2019-10-07 RX ORDER — CHLORHEXIDINE GLUCONATE 0.12 MG/ML
15 RINSE ORAL 2 TIMES DAILY
Status: ON HOLD | COMMUNITY
Start: 2019-08-26 | End: 2020-05-09

## 2019-10-07 NOTE — PROGRESS NOTES
Addis Crowe OLIVIA 1950 is a 78year old male. Patient presents with: Follow - Up    Here  for follow-up  HPI:   DIABETES MELLITUS:   The diet that the patient has been following is the American Diabetes Association diet.    The frequency of the Subcutaneous Solution Pen-injector INJECT 35 UNITS UNDER THE SKIN NIGHTLY Disp: 30 mL Rfl: 0   Budesonide-Formoterol Fumarate 160-4.5 MCG/ACT Inhalation Aerosol Inhale 2 puffs into the lungs 2 (two) times daily.  Rinse and spit after using Disp: 1 Inhaler R Spondylolisthesis of lumbar region    • Spondylolisthesis of lumbar region    • Type II or unspecified type diabetes mellitus without mention of complication, uncontrolled    • Unspecified vitamin D deficiency    • Visual impairment     GLASSES   • Weight patient    Patient Instructions   Ck sugar # pre and post meals ,compliance with diet/exercise enforce.  Weight counseling discussed   Spacing of meals -varying exercises discussed with patient   Reasoning of checking sugars pre and 2 hours  PP discussed wi

## 2019-10-09 NOTE — TELEPHONE ENCOUNTER
No protocol - LVM for pt to contact our office regarding his Humalog (Insulin Lispro) Medication is pt reported and has no directions for administration before meals. Need to know how many units pt is taking before medication can be sent to pharmacy.

## 2019-10-09 NOTE — TELEPHONE ENCOUNTER
Protocol failed - Microalbumin procedure in past 12 months or taking ACE/ARB    Requesting METFORMIN HCL 1000 MG Oral Tab  LOV: 10/7/19  RTC: 3 months  Last Relevant Labs: 10/2/19  Filled: 7/13/19 #180 with 0 refills    Future Appointments   Date Time Prov

## 2019-10-15 RX ORDER — INSULIN DETEMIR 100 [IU]/ML
INJECTION, SOLUTION SUBCUTANEOUS
Qty: 30 ML | Refills: 4 | Status: SHIPPED | OUTPATIENT
Start: 2019-10-15 | End: 2020-05-09 | Stop reason: CLARIF

## 2019-10-15 NOTE — TELEPHONE ENCOUNTER
Patient called back for an update on Levemir insulin RX. Please call back patient to advise [phone].

## 2019-10-16 DIAGNOSIS — K86.1 CHRONIC PANCREATITIS, UNSPECIFIED PANCREATITIS TYPE (HCC): ICD-10-CM

## 2019-10-16 DIAGNOSIS — R63.4 WEIGHT LOSS: ICD-10-CM

## 2019-10-16 RX ORDER — ETODOLAC 400 MG/1
TABLET, FILM COATED ORAL
Qty: 60 TABLET | Refills: 0 | Status: SHIPPED | OUTPATIENT
Start: 2019-10-16 | End: 2019-11-11

## 2019-10-16 RX ORDER — PANCRELIPASE 36000; 180000; 114000 [USP'U]/1; [USP'U]/1; [USP'U]/1
CAPSULE, DELAYED RELEASE PELLETS ORAL
Qty: 180 CAPSULE | Refills: 1 | Status: SHIPPED | OUTPATIENT
Start: 2019-10-16 | End: 2020-05-09 | Stop reason: CLARIF

## 2019-10-16 NOTE — TELEPHONE ENCOUNTER
Failed protocol     Last refill:  9/20/2019 Etodolac 400 mg #60 Nr    LOV:   10/7/2019 Dr Chandana Roblero RTC 3 months    No FOV scheduled

## 2019-10-31 DIAGNOSIS — J44.9 CHRONIC OBSTRUCTIVE PULMONARY DISEASE, UNSPECIFIED COPD TYPE (HCC): Chronic | ICD-10-CM

## 2019-10-31 NOTE — TELEPHONE ENCOUNTER
Last OV: 10/7/19 with Dr. Noe Coffman  Last refill date: 10/1/19     #/refills: #30, 0 refills  When pt was asked to return for OV: 3 months  Upcoming appt/reason: no upcoming appt  Last labs 10/2/19

## 2019-11-12 RX ORDER — ETODOLAC 400 MG/1
TABLET, FILM COATED ORAL
Qty: 60 TABLET | Refills: 0 | Status: SHIPPED | OUTPATIENT
Start: 2019-11-12 | End: 2019-12-14

## 2019-11-12 NOTE — TELEPHONE ENCOUNTER
No protocol    Requesting ETODOLAC 400 MG Oral Tab  LOV: 10/7/19  RTC: 3 months  Last Relevant Labs: 10/2/19  Filled: 10/16/19 #60 with 0 refills    Future Appointments   Date Time Provider Basilio Caraballo   11/15/2019  1:15 PM Radha Zamora MD [facility]

## 2019-11-25 NOTE — TELEPHONE ENCOUNTER
Ondansetron HCl (ZOFRAN) 8 MG tablet    Last OV relevant to medication: 10/07/2019  Last refill date: 7/24/2019     #/refills: #90 w/ 0 refills   When pt was asked to return for OV: 3 months  Upcoming appt/reason: No future appointments

## 2019-11-28 RX ORDER — ONDANSETRON HYDROCHLORIDE 8 MG/1
TABLET, FILM COATED ORAL
Qty: 90 TABLET | Refills: 0 | Status: SHIPPED | OUTPATIENT
Start: 2019-11-28 | End: 2020-03-18

## 2019-12-14 RX ORDER — ETODOLAC 400 MG/1
TABLET, FILM COATED ORAL
Qty: 60 TABLET | Refills: 0 | Status: SHIPPED | OUTPATIENT
Start: 2019-12-14 | End: 2020-01-15

## 2019-12-14 NOTE — TELEPHONE ENCOUNTER
No protocol    Requesting ETODOLAC 400 MG Oral Tab  LOV: 10/7/19  RTC: 3 months  Last Relevant Labs: 10/2/19  Filled: 11/12/19 #60 with 0 refills    Future Appointments   Date Time Provider [facility]   1/6/2020  1:00 PM MD CHRIS Lee DM

## 2019-12-26 RX ORDER — SERTRALINE HYDROCHLORIDE 25 MG/1
TABLET, FILM COATED ORAL
Qty: 90 TABLET | Refills: 0 | Status: SHIPPED | OUTPATIENT
Start: 2019-12-26 | End: 2020-03-23

## 2019-12-26 NOTE — TELEPHONE ENCOUNTER
Sertraline HCL 25 Mg  Last OV relevant to medication: 10-7-19  Last refill date: 9-26-19 #/refills: 0  When pt was asked to return for OV: 3 mo.   Upcoming appt/reason: none  Recent labs: none

## 2020-01-03 ENCOUNTER — TELEPHONE (OUTPATIENT)
Dept: INTERNAL MEDICINE CLINIC | Facility: CLINIC | Age: 70
End: 2020-01-03

## 2020-01-03 DIAGNOSIS — E10.65 TYPE 1 DIABETES MELLITUS WITH HYPERGLYCEMIA (HCC): Chronic | ICD-10-CM

## 2020-01-03 DIAGNOSIS — I10 ESSENTIAL HYPERTENSION, BENIGN: Chronic | ICD-10-CM

## 2020-01-03 DIAGNOSIS — Z00.00 LABORATORY EXAMINATION ORDERED AS PART OF A ROUTINE GENERAL MEDICAL EXAMINATION: Primary | ICD-10-CM

## 2020-01-07 NOTE — TELEPHONE ENCOUNTER
[employer] message sent to pt notifying lab orders placed. Advised for pt to fast 10-12 hours and drink plenty of water as well as to contact the office with questions/concerns.

## 2020-01-10 NOTE — TELEPHONE ENCOUNTER
Failed protocol - Microalbumin procedure in past 12 months or taking ACE/ARB    Requesting METFORMIN HCL 1000 MG Oral Tab  LOV: 10/7/19  RTC: 3 months  Last Relevant Labs: 8/31/18  Filled: 10/9/19 #180 with 0 refills    Future Appointments   Date Time Prov

## 2020-01-14 NOTE — TELEPHONE ENCOUNTER
No protocol    Requesting ETODOLAC 400 MG Oral Tab  LOV: 10/7/19  RTC: 3 months  Last Relevant Labs: 10/2/19  Filled: 12/14/19 #60 with 0 refills    Future Appointments   Date Time Provider Basilio Madseni   2/11/2020  1:30 PM Jana Aguilar MD [facility]

## 2020-01-15 RX ORDER — ETODOLAC 400 MG/1
TABLET, FILM COATED ORAL
Qty: 60 TABLET | Refills: 0 | Status: SHIPPED | OUTPATIENT
Start: 2020-01-15 | End: 2020-05-09 | Stop reason: CLARIF

## 2020-01-28 DIAGNOSIS — J44.9 CHRONIC OBSTRUCTIVE PULMONARY DISEASE, UNSPECIFIED COPD TYPE (HCC): Chronic | ICD-10-CM

## 2020-01-29 NOTE — TELEPHONE ENCOUNTER
Failed protocol - COPD pt    Requesting Tiotropium Bromide Monohydrate (SPIRIVA HANDIHALER) 18 MCG Inhalation Cap  LOV: 10/7/19  RTC: 3 months  Last Relevant Labs: 10/2/19  Filled: 10/31/19 #30 with 2 refills    Future Appointments   Date Time Provider Dep

## 2020-02-26 ENCOUNTER — HOSPITAL ENCOUNTER (OUTPATIENT)
Age: 70
Discharge: ANOTHER HOSPITAL | End: 2020-02-26
Attending: FAMILY MEDICINE
Payer: MEDICARE

## 2020-02-26 ENCOUNTER — APPOINTMENT (OUTPATIENT)
Dept: GENERAL RADIOLOGY | Facility: HOSPITAL | Age: 70
End: 2020-02-26
Payer: MEDICARE

## 2020-02-26 ENCOUNTER — HOSPITAL ENCOUNTER (OUTPATIENT)
Facility: HOSPITAL | Age: 70
Setting detail: OBSERVATION
Discharge: HOME | End: 2020-02-28
Attending: EMERGENCY MEDICINE | Admitting: HOSPITALIST
Payer: MEDICARE

## 2020-02-26 ENCOUNTER — APPOINTMENT (OUTPATIENT)
Dept: CT IMAGING | Facility: HOSPITAL | Age: 70
End: 2020-02-26
Attending: EMERGENCY MEDICINE
Payer: MEDICARE

## 2020-02-26 VITALS
OXYGEN SATURATION: 94 % | SYSTOLIC BLOOD PRESSURE: 136 MMHG | HEART RATE: 87 BPM | DIASTOLIC BLOOD PRESSURE: 74 MMHG | RESPIRATION RATE: 20 BRPM

## 2020-02-26 DIAGNOSIS — R63.4 WEIGHT LOSS: Chronic | ICD-10-CM

## 2020-02-26 DIAGNOSIS — R41.82 ALTERED MENTAL STATUS, UNSPECIFIED ALTERED MENTAL STATUS TYPE: Primary | ICD-10-CM

## 2020-02-26 LAB
ALBUMIN SERPL-MCNC: 3.2 G/DL (ref 3.4–5)
ALBUMIN/GLOB SERPL: 0.7 {RATIO} (ref 1–2)
ALP LIVER SERPL-CCNC: 83 U/L (ref 45–117)
ALT SERPL-CCNC: 33 U/L (ref 16–61)
ANION GAP SERPL CALC-SCNC: 3 MMOL/L (ref 0–18)
AST SERPL-CCNC: 29 U/L (ref 15–37)
BASOPHILS # BLD AUTO: 0.05 X10(3) UL (ref 0–0.2)
BASOPHILS NFR BLD AUTO: 0.5 %
BILIRUB SERPL-MCNC: 0.5 MG/DL (ref 0.1–2)
BILIRUB UR QL STRIP.AUTO: NEGATIVE
BUN BLD-MCNC: 25 MG/DL (ref 7–18)
BUN/CREAT SERPL: 16 (ref 10–20)
CALCIUM BLD-MCNC: 9.6 MG/DL (ref 8.5–10.1)
CHLORIDE SERPL-SCNC: 109 MMOL/L (ref 98–112)
CO2 SERPL-SCNC: 29 MMOL/L (ref 21–32)
COLOR UR AUTO: YELLOW
CREAT BLD-MCNC: 1.56 MG/DL (ref 0.7–1.3)
DEPRECATED RDW RBC AUTO: 46.3 FL (ref 35.1–46.3)
EOSINOPHIL # BLD AUTO: 0.74 X10(3) UL (ref 0–0.7)
EOSINOPHIL NFR BLD AUTO: 7.4 %
ERYTHROCYTE [DISTWIDTH] IN BLOOD BY AUTOMATED COUNT: 13.8 % (ref 11–15)
GLOBULIN PLAS-MCNC: 4.5 G/DL (ref 2.8–4.4)
GLUCOSE BLD-MCNC: 106 MG/DL (ref 70–99)
GLUCOSE BLD-MCNC: 94 MG/DL (ref 70–99)
GLUCOSE UR STRIP.AUTO-MCNC: NEGATIVE MG/DL
HCT VFR BLD AUTO: 38.1 % (ref 39–53)
HGB BLD-MCNC: 13 G/DL (ref 13–17.5)
HYALINE CASTS #/AREA URNS AUTO: PRESENT /LPF
IMM GRANULOCYTES # BLD AUTO: 0.02 X10(3) UL (ref 0–1)
IMM GRANULOCYTES NFR BLD: 0.2 %
KETONES UR STRIP.AUTO-MCNC: NEGATIVE MG/DL
LIPASE SERPL-CCNC: 23 U/L (ref 73–393)
LYMPHOCYTES # BLD AUTO: 3.23 X10(3) UL (ref 1–4)
LYMPHOCYTES NFR BLD AUTO: 32.4 %
M PROTEIN MFR SERPL ELPH: 7.7 G/DL (ref 6.4–8.2)
MCH RBC QN AUTO: 31.4 PG (ref 26–34)
MCHC RBC AUTO-ENTMCNC: 34.1 G/DL (ref 31–37)
MCV RBC AUTO: 92 FL (ref 80–100)
MONOCYTES # BLD AUTO: 1.18 X10(3) UL (ref 0.1–1)
MONOCYTES NFR BLD AUTO: 11.8 %
NEUTROPHILS # BLD AUTO: 4.75 X10 (3) UL (ref 1.5–7.7)
NEUTROPHILS # BLD AUTO: 4.75 X10(3) UL (ref 1.5–7.7)
NEUTROPHILS NFR BLD AUTO: 47.7 %
NITRITE UR QL STRIP.AUTO: NEGATIVE
OSMOLALITY SERPL CALC.SUM OF ELEC: 296 MOSM/KG (ref 275–295)
PH UR STRIP.AUTO: 5 [PH] (ref 4.5–8)
PLATELET # BLD AUTO: 361 10(3)UL (ref 150–450)
POTASSIUM SERPL-SCNC: 4.7 MMOL/L (ref 3.5–5.1)
PROT UR STRIP.AUTO-MCNC: NEGATIVE MG/DL
RBC # BLD AUTO: 4.14 X10(6)UL (ref 3.8–5.8)
RBC UR QL AUTO: NEGATIVE
SODIUM SERPL-SCNC: 141 MMOL/L (ref 136–145)
SP GR UR STRIP.AUTO: 1.01 (ref 1–1.03)
UROBILINOGEN UR STRIP.AUTO-MCNC: 2 MG/DL
WBC # BLD AUTO: 10 X10(3) UL (ref 4–11)

## 2020-02-26 PROCEDURE — 82962 GLUCOSE BLOOD TEST: CPT

## 2020-02-26 PROCEDURE — 99220 INITIAL OBSERVATION CARE,LEVL III: CPT | Performed by: HOSPITALIST

## 2020-02-26 PROCEDURE — 93010 ELECTROCARDIOGRAM REPORT: CPT

## 2020-02-26 PROCEDURE — 99204 OFFICE O/P NEW MOD 45 MIN: CPT

## 2020-02-26 PROCEDURE — 70450 CT HEAD/BRAIN W/O DYE: CPT | Performed by: EMERGENCY MEDICINE

## 2020-02-26 PROCEDURE — 93005 ELECTROCARDIOGRAM TRACING: CPT

## 2020-02-26 PROCEDURE — 99214 OFFICE O/P EST MOD 30 MIN: CPT

## 2020-02-26 PROCEDURE — 71045 X-RAY EXAM CHEST 1 VIEW: CPT | Performed by: EMERGENCY MEDICINE

## 2020-02-27 PROBLEM — R41.82 ALTERED MENTAL STATUS, UNSPECIFIED ALTERED MENTAL STATUS TYPE: Status: ACTIVE | Noted: 2020-02-27

## 2020-02-27 LAB
AMMONIA PLAS-MCNC: 33 UMOL/L (ref 11–32)
ANION GAP SERPL CALC-SCNC: 5 MMOL/L (ref 0–18)
ATRIAL RATE: 82 BPM
BUN BLD-MCNC: 24 MG/DL (ref 7–18)
BUN/CREAT SERPL: 18.2 (ref 10–20)
CALCIUM BLD-MCNC: 9.3 MG/DL (ref 8.5–10.1)
CHLORIDE SERPL-SCNC: 110 MMOL/L (ref 98–112)
CO2 SERPL-SCNC: 27 MMOL/L (ref 21–32)
CREAT BLD-MCNC: 1.32 MG/DL (ref 0.7–1.3)
EST. AVERAGE GLUCOSE BLD GHB EST-MCNC: 171 MG/DL (ref 68–126)
GLUCOSE BLD-MCNC: 112 MG/DL (ref 70–99)
GLUCOSE BLD-MCNC: 156 MG/DL (ref 70–99)
GLUCOSE BLD-MCNC: 165 MG/DL (ref 70–99)
GLUCOSE BLD-MCNC: 170 MG/DL (ref 70–99)
GLUCOSE BLD-MCNC: 337 MG/DL (ref 70–99)
HBA1C MFR BLD HPLC: 7.6 % (ref ?–5.7)
OSMOLALITY SERPL CALC.SUM OF ELEC: 301 MOSM/KG (ref 275–295)
P AXIS: 54 DEGREES
P-R INTERVAL: 150 MS
POTASSIUM SERPL-SCNC: 4.2 MMOL/L (ref 3.5–5.1)
Q-T INTERVAL: 356 MS
QRS DURATION: 84 MS
QTC CALCULATION (BEZET): 415 MS
R AXIS: 57 DEGREES
SODIUM SERPL-SCNC: 142 MMOL/L (ref 136–145)
T AXIS: 58 DEGREES
T4 FREE SERPL-MCNC: 0.9 NG/DL (ref 0.8–1.7)
TSI SER-ACNC: 4.24 MIU/ML (ref 0.36–3.74)
VENTRICULAR RATE: 82 BPM

## 2020-02-27 PROCEDURE — 99225 SUBSEQUENT OBSERVATION CARE: CPT | Performed by: INTERNAL MEDICINE

## 2020-02-27 PROCEDURE — 99214 OFFICE O/P EST MOD 30 MIN: CPT | Performed by: OTHER

## 2020-02-27 RX ORDER — DEXTROSE MONOHYDRATE 25 G/50ML
50 INJECTION, SOLUTION INTRAVENOUS
Status: DISCONTINUED | OUTPATIENT
Start: 2020-02-27 | End: 2020-02-28

## 2020-02-27 RX ORDER — SODIUM CHLORIDE 9 MG/ML
INJECTION, SOLUTION INTRAVENOUS CONTINUOUS
Status: DISCONTINUED | OUTPATIENT
Start: 2020-02-27 | End: 2020-02-27

## 2020-02-27 RX ORDER — POLYETHYLENE GLYCOL 3350 17 G/17G
17 POWDER, FOR SOLUTION ORAL DAILY
Status: DISCONTINUED | OUTPATIENT
Start: 2020-02-27 | End: 2020-02-28

## 2020-02-27 RX ORDER — FENTANYL 100 UG/H
1 PATCH TRANSDERMAL
Status: DISCONTINUED | OUTPATIENT
Start: 2020-02-27 | End: 2020-02-28

## 2020-02-27 RX ORDER — ONDANSETRON 2 MG/ML
4 INJECTION INTRAMUSCULAR; INTRAVENOUS EVERY 6 HOURS PRN
Status: DISCONTINUED | OUTPATIENT
Start: 2020-02-27 | End: 2020-02-28

## 2020-02-27 RX ORDER — TAMSULOSIN HYDROCHLORIDE 0.4 MG/1
0.4 CAPSULE ORAL DAILY
Status: DISCONTINUED | OUTPATIENT
Start: 2020-02-27 | End: 2020-02-28

## 2020-02-27 RX ORDER — HYDROMORPHONE HYDROCHLORIDE 2 MG/1
4 TABLET ORAL EVERY 6 HOURS PRN
Status: DISCONTINUED | OUTPATIENT
Start: 2020-02-27 | End: 2020-02-28

## 2020-02-27 RX ORDER — ACETAMINOPHEN 325 MG/1
650 TABLET ORAL EVERY 6 HOURS PRN
Status: DISCONTINUED | OUTPATIENT
Start: 2020-02-27 | End: 2020-02-28

## 2020-02-27 RX ORDER — HEPARIN SODIUM 5000 [USP'U]/ML
5000 INJECTION, SOLUTION INTRAVENOUS; SUBCUTANEOUS EVERY 12 HOURS SCHEDULED
Status: DISCONTINUED | OUTPATIENT
Start: 2020-02-27 | End: 2020-02-28

## 2020-02-27 RX ORDER — SERTRALINE HYDROCHLORIDE 25 MG/1
25 TABLET, FILM COATED ORAL
Status: DISCONTINUED | OUTPATIENT
Start: 2020-02-27 | End: 2020-02-28

## 2020-02-27 NOTE — ED INITIAL ASSESSMENT (HPI)
Per wife, patient has been having unsteady gait, slurred speech, slow to respond and confused for the last four days.

## 2020-02-27 NOTE — PLAN OF CARE
Patient A&O x4, lungs clear, c/o chronic pain. 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 Patient uses fentanyl patch and dilaudid chronically to control pain.   Patient states medication issues may have

## 2020-02-27 NOTE — ED PROVIDER NOTES
Patient Seen in: [facility] Emergency Department      History   Patient presents with:  Altered Mental Status    Stated Complaint: ams    HPI    Patient is a 39-year-old male who presents from a local immediate care for further evaluation of altered stones    • Right leg DVT ([facility] [address] 6/2007   • Shortness of breath    • Spondylolisthesis of lumbar region    • Spondylolisthesis of lumbar region    • Type II or unspecified type diabetes mellitus without mention of complication, uncontrolled    • Unspecified Normal range of motion and neck supple. Cardiovascular:      Rate and Rhythm: Normal rate and regular rhythm. Heart sounds: Normal heart sounds. Pulmonary:      Effort: Pulmonary effort is normal.      Breath sounds: Normal breath sounds.    Abdomi Status                     ---------                               -----------         ------                     CBC W/ DIFFERENTIAL[[id number]]          Abnormal            Final result                 Please view results for these tests on the individual 2/26/2020 10:47 PM           Update at 10:40 PM.  Work-up is unremarkable. Creatinine is just a slight elevation. Some white cells in the urine but overall not suspicious for infection.   With his intermittent altered mental status will admit for observat

## 2020-02-27 NOTE — ED NOTES
Wife states similar s/s last year and was found to be pneumonia.  [facility]

## 2020-02-27 NOTE — ED INITIAL ASSESSMENT (HPI)
Pt reports being sluggish and weak starting Sunday. Family states he has been acting \"off\" as well. Hx of pancreatitis. Denies fevers.  Mild productive cough

## 2020-02-27 NOTE — H&P
[facility] HOSPITALIST  History and Physical     Daisy Able Patient Status:  Emergency    1950 MRN [id number]   Location [address] [address] Attending Dereck Caro MD   Hosp Day # 0 PCP Alhaji Chang MD     Chief Complaint nodule 3/23/2017   • Renal stones    • Right leg DVT ([facility]) 6/2007   • Shortness of breath    • Spondylolisthesis of lumbar region    • Spondylolisthesis of lumbar region    • Type II or unspecified type diabetes mellitus without mention of complication, un Tab, TAKE 1 TABLET BY MOUTH TWICE DAILY , Disp: 60 tablet, Rfl: 0  METFORMIN HCL 1000 MG Oral Tab, TAKE ONE TABLET BY MOUTH TWICE A DAY WITH MEALS, Disp: 180 tablet, Rfl: 0  SERTRALINE HCL 25 MG Oral Tab, TAKE ONE TABLET BY MOUTH DAILY, Disp: 90 tablet, Rf Systems:   A comprehensive 14 point review of systems was completed. Pertinent positives and negatives noted in the HPI. Physical Exam:    /70   Pulse 78   Temp 98.9 °F (37.2 °C) (Temporal)   Resp 13   SpO2 94%   General: No acute distress.  Maria Victoria Heparin  · CODE status: Full  · Kellogg: No    Plan of care discussed with patient and family.     Momo Abebe DO  2/26/2020

## 2020-02-27 NOTE — CONSULTS
[address] [facility] with [address] [facility]  2/27/2020    1:51 PM    REASON FOR CONSULTATION:  Unsteady gait/Slurred speech/Confusion    HISTORY OF PRESENT ILLNESS:  Anette Pompa is a(n) 78year old male, with a P Osteoarthritis    • Pain in joints    • Pneumonia due to organism    • Primary localized osteoarthrosis, lower leg 10/2/2013   • Primary localized osteoarthrosis, lower leg, left [715.16] 9/2/2015   • Pulmonary nodule 3/23/2017   • Renal stones    • Right Unknown time  Tiotropium Bromide Monohydrate (SPIRIVA HANDIHALER) 18 MCG Inhalation Cap, INHALE CONTENTS OF 1 CAPSULE BY MOUTH DAILY, Disp: 30 capsule, Rfl: 2, 2/26/2020 at Unknown time  ETODOLAC 400 MG Oral Tab, TAKE 1 TABLET BY MOUTH TWICE DAILY , Disp: time  Insulin Lispro 100 UNIT/ML Subcutaneous Solution, Inject into the skin 3 (three) times daily before meals. Per SS  , Disp: , Rfl: , 2/26/2020 at Unknown time  PEG 3350 Oral Powd Pack, Take 17 g by mouth daily.   , Disp: , Rfl: 0, 2/26/2020 at Unknown (ZENPEP) DR particles cap 40,000 Units, 40,000 Units, Oral, TID CC        REVIEW OF SYSTEMS:  A 10-point system was reviewed. Pertinent positives and negatives are noted in HPI.       PHYSICAL EXAMINATION:  VITAL SIGNS: /66 (BP Location: Right arm) Kirby Ramos MD  Vascular & General Neurology  Director, Multiple Sclerosis Program  [facility]  2/27/2020, Time completed 1:55 PM

## 2020-02-27 NOTE — PROGRESS NOTES
[facility]PITAL[facility]  Progress Note     Michael Carcamo Patient Status:  Observation    1950 MRN [id number]   [facility] 3NE-A Attending Ethel Sheridan, DO   Hosp Day # 0 PCP Joy Perez MD     Chief Complaint: confusion    S: Glorious Silva Epic.    Medications:   • Fluticasone Furoate-Vilanterol  1 puff Inhalation Daily   • fentaNYL  1 patch Transdermal Q72H   • insulin detemir  35 Units Subcutaneous Nightly   • PEG 3350  17 g Oral Daily   • Sertraline HCl  25 mg Oral Daily   • Umeclidinium

## 2020-02-28 VITALS
HEART RATE: 75 BPM | SYSTOLIC BLOOD PRESSURE: 158 MMHG | WEIGHT: 139.31 LBS | DIASTOLIC BLOOD PRESSURE: 78 MMHG | TEMPERATURE: 98 F | BODY MASS INDEX: 19 KG/M2 | RESPIRATION RATE: 17 BRPM | OXYGEN SATURATION: 95 %

## 2020-02-28 LAB
GLUCOSE BLD-MCNC: 225 MG/DL (ref 70–99)
GLUCOSE BLD-MCNC: 63 MG/DL (ref 70–99)

## 2020-02-28 PROCEDURE — 99217 OBSERVATION CARE DISCHARGE: CPT | Performed by: INTERNAL MEDICINE

## 2020-02-28 PROCEDURE — 99214 OFFICE O/P EST MOD 30 MIN: CPT | Performed by: OTHER

## 2020-02-28 PROCEDURE — 95816 EEG AWAKE AND DROWSY: CPT | Performed by: OTHER

## 2020-02-28 RX ORDER — PROCHLORPERAZINE EDISYLATE 5 MG/ML
10 INJECTION INTRAMUSCULAR; INTRAVENOUS EVERY 6 HOURS PRN
Status: DISCONTINUED | OUTPATIENT
Start: 2020-02-28 | End: 2020-02-28

## 2020-02-28 NOTE — PROGRESS NOTES
[address] [facility] Neurology Progress Note    Skye Molina Patient Status:  Observation    1950 MRN [id number]   [facility] 3NE-A Attending Brendanangel Mendoza DO   Hosp Day # 0 PCP Vignesh Rodriguez MD         Subjective:  Martina Marcos Insulin Aspart Pen  1-10 Units Subcutaneous TID AC and HS   • Heparin Sodium (Porcine)  5,000 Units Subcutaneous 2 times per day   • pancrelipase (Lip-Prot-Amyl)  40,000 Units Oral TID CC       Patient Active Problem List:     Chronic pancreatitis ([facility] [address] use narcotics  Ok to dc  outpt follow up in 1 month  I discussed with patient/family at length regarding all studies' results, assessment, treatment options and care plan.     Sharad Dallas MD   Neurology  [facility]  2/28/202

## 2020-02-28 NOTE — PROCEDURES
Date of Procedure: 2/28/2020    Procedure: EEG (ELECTROENCEPHALOGRAM)     DX:AMS  HX: A 68YO MALE WHO PRESENTED TO ED AFTER HAVING INTERMITTENT EPSODES FOR THE PAST 4 DAYS OF CONFUSION, STARING OF AND SLURRED SPEECH.   PT HAS A HX OF NARCOTIC USE SUCH AS FE

## 2020-02-28 NOTE — DISCHARGE SUMMARY
[facility] HOSPITALIST  DISCHARGE SUMMARY     Keyanna Hernandez Patient Status:  Observation    1950 MRN [id number]   [facility] 3NE-A Attending Reta Roberts, DO   Hosp Day # 0 PCP Eliseo Huntley MD     Date of Admission: 2020  Date hypothyroidism, repeat TSH in 4 to 6 weeks, order placed    Lab/Test results pending at Discharge:   · n/a    Consultants:  • Neurology    Discharge Medication List:     Discharge Medications      CHANGE how you take these medications      Instructions Pre nebulization every 4 (four) hours as needed.    Quantity:  120 vial  Refills:  11     metFORMIN HCl 1000 MG Tabs  Commonly known as:  GLUCOPHAGE      TAKE ONE TABLET BY MOUTH TWICE A DAY WITH MEALS   Quantity:  180 tablet  Refills:  0     MULTI VITAMIN GIDEON S2. Regular rate and rhythm. No murmurs, rubs or gallops. Abdomen: Soft, nontender, nondistended. Positive bowel sounds. No rebound or guarding. Neurologic: No focal neurological deficits. Musculoskeletal: Moves all extremities.   Extremities: No sonja

## 2020-02-28 NOTE — ED PROVIDER NOTES
Patient Seen in: [facility]      History   Patient presents with:  Altered Mental Status    Stated Complaint: unsteady gait, doesn't feel right    HPI    16-year-old male with history of asthma, congestive heart failure, COPD, C GLASSES   • Weight loss               Past Surgical History:   Procedure Laterality Date   • APPENDECTOMY     • APPENDECTOMY     • CHOLECYSTECTOMY     • ENDOSCOPIC ULTRASOUND (EUS) N/A 6/18/2019    Performed by Alcira Beavers MD at [facility] ENDOSCOPY   • ESO Neck:      Musculoskeletal: Neck supple. Trachea: Trachea and phonation normal.   Cardiovascular:      Rate and Rhythm: Normal rate and regular rhythm. Pulses: Normal pulses.       Heart sounds: S1 normal and S2 normal.   Pulmonary:      Effort:

## 2020-02-28 NOTE — PLAN OF CARE
Sudhir Platt  9/25/1950  Temp: (P) 98.2 °F (36.8 °C)  Pulse: 75  Resp: 17  BP: 158/78  Pt alert and oriented x 3. Vss. sr on tele. Denies pain currently. Nauseous. Per pt this is consistent with his chronic pancreatitis.  OK for dc per neuro and hospit

## 2020-02-28 NOTE — PLAN OF CARE
Pt is aox 4, appropriate behavior. Up ad black to bathroom. IV saline locked. Medicate for pain with oral dilaudid. Medicated for nausea with zofran. Medicate per orders. Insulin coverage per dr protocol.      Pt states that he is having a pancreatitis fl assistance with activity based on assessment  - Modify environment to reduce risk of injury  - Provide assistive devices as appropriate  - Consider OT/PT consult to assist with strengthening/mobility  - Encourage toileting schedule  Outcome: Progressing

## 2020-02-28 NOTE — PLAN OF CARE
Assumed care at 0730. Pt a/ox4, VSS, on RA. No SOB, dizziness/lightheadedness. Pain to abdomen, PRN medication per MAR. Nausea in the evening, zofran given with relief noted. IVF d/c'd. Accuchecks QID. Neuro following.  Possible d/c in AM? Pt updated on POC

## 2020-02-29 LAB — GLUCOSE BLD-MCNC: 67 MG/DL (ref 70–99)

## 2020-03-02 ENCOUNTER — LAB ENCOUNTER (OUTPATIENT)
Dept: LAB | Age: 70
End: 2020-03-02
Attending: UROLOGY
Payer: MEDICARE

## 2020-03-02 ENCOUNTER — PATIENT OUTREACH (OUTPATIENT)
Dept: CASE MANAGEMENT | Age: 70
End: 2020-03-02

## 2020-03-02 ENCOUNTER — TELEPHONE (OUTPATIENT)
Dept: INTERNAL MEDICINE CLINIC | Facility: CLINIC | Age: 70
End: 2020-03-02

## 2020-03-02 DIAGNOSIS — C61 PROSTATE CANCER (HCC): ICD-10-CM

## 2020-03-02 DIAGNOSIS — R35.1 NOCTURIA: ICD-10-CM

## 2020-03-02 DIAGNOSIS — I10 ESSENTIAL HYPERTENSION, BENIGN: Chronic | ICD-10-CM

## 2020-03-02 DIAGNOSIS — Z02.9 ENCOUNTERS FOR UNSPECIFIED ADMINISTRATIVE PURPOSE: ICD-10-CM

## 2020-03-02 DIAGNOSIS — R41.82 ALTERED MENTAL STATUS, UNSPECIFIED ALTERED MENTAL STATUS TYPE: ICD-10-CM

## 2020-03-02 DIAGNOSIS — Z00.00 LABORATORY EXAMINATION ORDERED AS PART OF A ROUTINE GENERAL MEDICAL EXAMINATION: ICD-10-CM

## 2020-03-02 DIAGNOSIS — R97.20 ELEVATED PSA: ICD-10-CM

## 2020-03-02 DIAGNOSIS — E10.65 TYPE 1 DIABETES MELLITUS WITH HYPERGLYCEMIA (HCC): Chronic | ICD-10-CM

## 2020-03-02 DIAGNOSIS — R97.20 ELEVATED PSA, LESS THAN 10 NG/ML: ICD-10-CM

## 2020-03-02 LAB
ALBUMIN SERPL-MCNC: 3.2 G/DL (ref 3.4–5)
ALBUMIN/GLOB SERPL: 0.8 {RATIO} (ref 1–2)
ALP LIVER SERPL-CCNC: 80 U/L (ref 45–117)
ALT SERPL-CCNC: 40 U/L (ref 16–61)
ANION GAP SERPL CALC-SCNC: 5 MMOL/L (ref 0–18)
AST SERPL-CCNC: 32 U/L (ref 15–37)
BASOPHILS # BLD AUTO: 0.06 X10(3) UL (ref 0–0.2)
BASOPHILS NFR BLD AUTO: 0.6 %
BILIRUB SERPL-MCNC: 0.5 MG/DL (ref 0.1–2)
BILIRUB UR QL STRIP.AUTO: NEGATIVE
BUN BLD-MCNC: 24 MG/DL (ref 7–18)
BUN/CREAT SERPL: 16.2 (ref 10–20)
CALCIUM BLD-MCNC: 9.4 MG/DL (ref 8.5–10.1)
CHLORIDE SERPL-SCNC: 105 MMOL/L (ref 98–112)
CHOLEST SMN-MCNC: 113 MG/DL (ref ?–200)
CO2 SERPL-SCNC: 28 MMOL/L (ref 21–32)
COLOR UR AUTO: YELLOW
CREAT BLD-MCNC: 1.48 MG/DL (ref 0.7–1.3)
CREAT UR-SCNC: 102 MG/DL
DEPRECATED RDW RBC AUTO: 50.2 FL (ref 35.1–46.3)
EOSINOPHIL # BLD AUTO: 0.72 X10(3) UL (ref 0–0.7)
EOSINOPHIL NFR BLD AUTO: 7 %
ERYTHROCYTE [DISTWIDTH] IN BLOOD BY AUTOMATED COUNT: 14.5 % (ref 11–15)
EST. AVERAGE GLUCOSE BLD GHB EST-MCNC: 180 MG/DL (ref 68–126)
GLOBULIN PLAS-MCNC: 4 G/DL (ref 2.8–4.4)
GLUCOSE BLD-MCNC: 112 MG/DL (ref 70–99)
GLUCOSE UR STRIP.AUTO-MCNC: NEGATIVE MG/DL
HBA1C MFR BLD HPLC: 7.9 % (ref ?–5.7)
HCT VFR BLD AUTO: 37.4 % (ref 39–53)
HDLC SERPL-MCNC: 37 MG/DL (ref 40–59)
HGB BLD-MCNC: 12.4 G/DL (ref 13–17.5)
HYALINE CASTS #/AREA URNS AUTO: PRESENT /LPF
IMM GRANULOCYTES # BLD AUTO: 0.04 X10(3) UL (ref 0–1)
IMM GRANULOCYTES NFR BLD: 0.4 %
KETONES UR STRIP.AUTO-MCNC: NEGATIVE MG/DL
LDLC SERPL CALC-MCNC: 67 MG/DL (ref ?–100)
LYMPHOCYTES # BLD AUTO: 3.2 X10(3) UL (ref 1–4)
LYMPHOCYTES NFR BLD AUTO: 31.3 %
M PROTEIN MFR SERPL ELPH: 7.2 G/DL (ref 6.4–8.2)
MCH RBC QN AUTO: 31.2 PG (ref 26–34)
MCHC RBC AUTO-ENTMCNC: 33.2 G/DL (ref 31–37)
MCV RBC AUTO: 94.2 FL (ref 80–100)
MICROALBUMIN UR-MCNC: 1.09 MG/DL
MICROALBUMIN/CREAT 24H UR-RTO: 10.7 UG/MG (ref ?–30)
MONOCYTES # BLD AUTO: 1.42 X10(3) UL (ref 0.1–1)
MONOCYTES NFR BLD AUTO: 13.9 %
NEUTROPHILS # BLD AUTO: 4.8 X10 (3) UL (ref 1.5–7.7)
NEUTROPHILS # BLD AUTO: 4.8 X10(3) UL (ref 1.5–7.7)
NEUTROPHILS NFR BLD AUTO: 46.8 %
NITRITE UR QL STRIP.AUTO: NEGATIVE
NONHDLC SERPL-MCNC: 76 MG/DL (ref ?–130)
OSMOLALITY SERPL CALC.SUM OF ELEC: 291 MOSM/KG (ref 275–295)
PATIENT FASTING Y/N/NP: YES
PATIENT FASTING Y/N/NP: YES
PH UR STRIP.AUTO: 5 [PH] (ref 4.5–8)
PLATELET # BLD AUTO: 345 10(3)UL (ref 150–450)
POTASSIUM SERPL-SCNC: 4.7 MMOL/L (ref 3.5–5.1)
PROT UR STRIP.AUTO-MCNC: NEGATIVE MG/DL
PSA SERPL-MCNC: 8.73 NG/ML (ref ?–4)
RBC # BLD AUTO: 3.97 X10(6)UL (ref 3.8–5.8)
RBC UR QL AUTO: NEGATIVE
SODIUM SERPL-SCNC: 138 MMOL/L (ref 136–145)
SP GR UR STRIP.AUTO: 1.01 (ref 1–1.03)
T4 SERPL-MCNC: 8.5 UG/DL (ref 4.5–12.1)
TRIGL SERPL-MCNC: 45 MG/DL (ref 30–149)
TSI SER-ACNC: 2.15 MIU/ML (ref 0.36–3.74)
UROBILINOGEN UR STRIP.AUTO-MCNC: 2 MG/DL
VLDLC SERPL CALC-MCNC: 9 MG/DL (ref 0–30)
WBC # BLD AUTO: 10.2 X10(3) UL (ref 4–11)

## 2020-03-02 PROCEDURE — 87086 URINE CULTURE/COLONY COUNT: CPT

## 2020-03-02 PROCEDURE — 80053 COMPREHEN METABOLIC PANEL: CPT

## 2020-03-02 PROCEDURE — 1111F DSCHRG MED/CURRENT MED MERGE: CPT

## 2020-03-02 PROCEDURE — 85025 COMPLETE CBC W/AUTO DIFF WBC: CPT

## 2020-03-02 PROCEDURE — 82043 UR ALBUMIN QUANTITATIVE: CPT

## 2020-03-02 PROCEDURE — 36415 COLL VENOUS BLD VENIPUNCTURE: CPT

## 2020-03-02 PROCEDURE — 84153 ASSAY OF PSA TOTAL: CPT

## 2020-03-02 PROCEDURE — 83036 HEMOGLOBIN GLYCOSYLATED A1C: CPT

## 2020-03-02 PROCEDURE — 80061 LIPID PANEL: CPT

## 2020-03-02 PROCEDURE — 81001 URINALYSIS AUTO W/SCOPE: CPT

## 2020-03-02 PROCEDURE — 84436 ASSAY OF TOTAL THYROXINE: CPT

## 2020-03-02 PROCEDURE — 82570 ASSAY OF URINE CREATININE: CPT

## 2020-03-02 PROCEDURE — 84443 ASSAY THYROID STIM HORMONE: CPT

## 2020-03-02 NOTE — PROGRESS NOTES
Initial Post Discharge Follow Up   Discharge Date: 2/28/20  Contact Date: 3/2/2020    Consent Verification:  Assessment Completed With: Patient  HIPAA Verified?   Yes    Discharge Dx:     Transient confusion episode  JONO on CKD  Chronic narcotic dependen etc)? yes; the patient is currently out shopping with spouse.    • (NCM) Was patient given a different diet per AVS? no      Medications:   Current Outpatient Medications   Medication Sig Dispense Refill   • fentaNYL (DURAGESIC-100) 100 MCG/HR Transdermal P Insulin Lispro 100 UNIT/ML Subcutaneous Solution Inject into the skin 3 (three) times daily before meals. Per SS       • PEG 3350 Oral Powd Pack Take 17 g by mouth daily. 0   • Multiple Vitamin (MULTI VITAMIN DAILY OR) Take 1 tablet by mouth daily. [facility])    You will need to fast for 4 hours prior to your test. You may drink small sips of water and take medications as usual.     No smoking, gum or candy are permitted.        Apr 14, 2020  3:00 PM CDT ULTRASOUND PV SCREEN registration. Payment may be made by cash, personal check, or major credit card. This health screening test does not require a physician's order.           [facility] [place] [facility]  [facility] [address] were reviewed and educated on the importance of taking the medications as directed. Patient symptoms were assessed, education was completed for signs/symptoms to monitor, and reviewed when to contact providers vs go to the ED/call 911.  Appointments were r

## 2020-03-02 NOTE — PROGRESS NOTES
Attempted to reach the patient to complete [facility]-Hospital FU call. Left a message for the pt to call NCM back at, [phone]. A TE was sent to the PCP's office for appointment/VT change request.

## 2020-03-02 NOTE — TELEPHONE ENCOUNTER
NCM did attempt to reach the patient to complete a TCM-HFU call today. Pt does not have HFU appt scheduled at this time but is scheduled to be seen by Dr. Marbella Castellanos tomorrow 3/3/2020 at 11:00 for a MA supervisit.   TCM/HFU appt recommended by 3/6/2020 as pt

## 2020-03-03 ENCOUNTER — OFFICE VISIT (OUTPATIENT)
Dept: INTERNAL MEDICINE CLINIC | Facility: CLINIC | Age: 70
End: 2020-03-03
Payer: MEDICARE

## 2020-03-03 VITALS
OXYGEN SATURATION: 96 % | HEART RATE: 94 BPM | TEMPERATURE: 98 F | BODY MASS INDEX: 20 KG/M2 | DIASTOLIC BLOOD PRESSURE: 64 MMHG | RESPIRATION RATE: 12 BRPM | WEIGHT: 141.25 LBS | SYSTOLIC BLOOD PRESSURE: 90 MMHG

## 2020-03-03 DIAGNOSIS — R41.82 ALTERED MENTAL STATUS, UNSPECIFIED ALTERED MENTAL STATUS TYPE: Primary | ICD-10-CM

## 2020-03-03 DIAGNOSIS — Z79.891 LONG TERM CURRENT USE OF OPIATE ANALGESIC: ICD-10-CM

## 2020-03-03 PROBLEM — C61 PROSTATE CANCER (HCC): Chronic | Status: ACTIVE | Noted: 2019-10-07

## 2020-03-03 PROCEDURE — 99495 TRANSJ CARE MGMT MOD F2F 14D: CPT | Performed by: INTERNAL MEDICINE

## 2020-03-03 PROCEDURE — 1111F DSCHRG MED/CURRENT MED MERGE: CPT | Performed by: INTERNAL MEDICINE

## 2020-03-03 NOTE — PROGRESS NOTES
Merle Ashton  1950 is a 78year old male. Patient presents with:  TCM (Transition Of Care Management)      HPI:     Hospital follow-up. Patient was admitted for altered mental status. Seen by consults.   Feels lot better now though a littl Solution Take 3 mL by nebulization every 4 (four) hours as needed.  (Patient taking differently: Take 3 mL by nebulization every 4 (four) hours as needed.  ) 120 vial 11   • Insulin Lispro 100 UNIT/ML Subcutaneous Solution Inject into the skin 3 (three) reinaldo 20.1      Smokeless tobacco: Never Used    Alcohol use: No    Drug use: No       REVIEW OF SYSTEMS:     General/Constitutional:   Fatigue no. no Fever, no. Loss of appetite no. Night sweats no. Weakness no. Weight change no. Weight gain no. Weight loss no. was seen today for tcm (transition of care management).     Diagnoses and all orders for this visit:    Altered mental status, unspecified altered mental status type    Long term current use of opiate analgesic        Patient Instructions   Patient and wife

## 2020-03-03 NOTE — PATIENT INSTRUCTIONS
Patient and wife instructed that he should get in touch with his pain service physician and possibly consider decreasing his Duragesic patch  Patient to see me on March 10 for complete physical

## 2020-03-08 NOTE — ED PROVIDER NOTES
Patient Seen in: [facility] Emergency Department      History   Patient presents with:  Poisoning/Overdose    Stated Complaint: overdosing himself on pain medications, denies self harm intent, states pain me*    HPI    23-year-old with a history of obstructive pulmonary disease) (HCC)    • Coronary atherosclerosis    • Diabetes ([facility]    • [facility] mellitus [facility]    • Elevated PSA, less than 10 ng/ml 03/08/2018   • Essential hypertension, benign    • Frequent use of laxatives    • Hemorrhoids    • Kne noted in HPI. Constitutional and vital signs reviewed. All other systems reviewed and negative except as noted above.     Physical Exam     ED Triage Vitals [03/08/20 1013]   /79   Pulse 79   Resp 16   Temp 98.5 °F (36.9 °C)   Temp src Temporal the following components:    Salicylate <5.3 (*)     All other components within normal limits   CBC W/ DIFFERENTIAL - Abnormal; Notable for the following components:    HGB 12.6 (*)     HCT 37.3 (*)     RDW-SD 47.2 (*)     Monocyte Absolute 1.25 (*)     E   [address]  Hussain Barthel [phone]    In 1 day          Medications Prescribed:  Current Discharge Medication List

## 2020-03-08 NOTE — ED NOTES
Met with patient and his wife and sister-in-law and they are all requesting detox for opiate use disorder. Patient and his family were given referrals for in [facility] hospitals for detox.

## 2020-03-08 NOTE — ED INITIAL ASSESSMENT (HPI)
Pt to #ED with spouse. #Spouse sts she beileves he is taking too much of his pain meds. Pt has been lethargic.  Spouse took pt to [facility] on #Friday for assessment, ELLA recommended outpatient program. Pt admits to being addicted to opiates but does not want hel

## 2020-03-10 ENCOUNTER — OFFICE VISIT (OUTPATIENT)
Dept: INTERNAL MEDICINE CLINIC | Facility: CLINIC | Age: 70
End: 2020-03-10
Payer: MEDICARE

## 2020-03-10 VITALS
DIASTOLIC BLOOD PRESSURE: 52 MMHG | SYSTOLIC BLOOD PRESSURE: 90 MMHG | BODY MASS INDEX: 20.1 KG/M2 | OXYGEN SATURATION: 96 % | WEIGHT: 142 LBS | HEIGHT: 70.5 IN | TEMPERATURE: 98 F | HEART RATE: 99 BPM | RESPIRATION RATE: 12 BRPM

## 2020-03-10 DIAGNOSIS — G89.29 OTHER CHRONIC PAIN: Chronic | ICD-10-CM

## 2020-03-10 DIAGNOSIS — I10 ESSENTIAL HYPERTENSION, BENIGN: Chronic | ICD-10-CM

## 2020-03-10 DIAGNOSIS — E55.9 VITAMIN D DEFICIENCY: Chronic | ICD-10-CM

## 2020-03-10 DIAGNOSIS — R63.4 WEIGHT LOSS: Chronic | ICD-10-CM

## 2020-03-10 DIAGNOSIS — K90.9 STEATORRHEA: Chronic | ICD-10-CM

## 2020-03-10 DIAGNOSIS — J44.9 CHRONIC OBSTRUCTIVE PULMONARY DISEASE, UNSPECIFIED COPD TYPE (HCC): Chronic | ICD-10-CM

## 2020-03-10 DIAGNOSIS — E10.65 TYPE 1 DIABETES MELLITUS WITH HYPERGLYCEMIA (HCC): Chronic | ICD-10-CM

## 2020-03-10 DIAGNOSIS — Z79.891 LONG TERM CURRENT USE OF OPIATE ANALGESIC: Chronic | ICD-10-CM

## 2020-03-10 DIAGNOSIS — C61 PROSTATE CANCER (HCC): Chronic | ICD-10-CM

## 2020-03-10 DIAGNOSIS — Z00.00 ROUTINE GENERAL MEDICAL EXAMINATION AT A HEALTH CARE FACILITY: Primary | ICD-10-CM

## 2020-03-10 DIAGNOSIS — K86.1 CHRONIC PANCREATITIS, UNSPECIFIED PANCREATITIS TYPE (HCC): Chronic | ICD-10-CM

## 2020-03-10 PROBLEM — R41.82 ALTERED MENTAL STATUS: Status: RESOLVED | Noted: 2020-02-26 | Resolved: 2020-03-10

## 2020-03-10 PROBLEM — N17.9 AKI (ACUTE KIDNEY INJURY) (HCC): Status: RESOLVED | Noted: 2019-05-25 | Resolved: 2020-03-10

## 2020-03-10 PROBLEM — Z91.89 MULTIPLE RISK FACTORS FOR CORONARY ARTERY DISEASE: Chronic | Status: RESOLVED | Noted: 2019-03-28 | Resolved: 2020-03-10

## 2020-03-10 PROCEDURE — 96160 PT-FOCUSED HLTH RISK ASSMT: CPT | Performed by: INTERNAL MEDICINE

## 2020-03-10 PROCEDURE — 99397 PER PM REEVAL EST PAT 65+ YR: CPT | Performed by: INTERNAL MEDICINE

## 2020-03-10 PROCEDURE — G0439 PPPS, SUBSEQ VISIT: HCPCS | Performed by: INTERNAL MEDICINE

## 2020-03-10 NOTE — PROGRESS NOTES
Stable and managed by Mee Fraga FOR VISIT:    Jocelyn Bearden is a 78year old male who presents for a Medicare Annual Wellness visit.     Male      Patient Care Team: Patient Care Team:  Melyssa Walker MD as PCP - General (Internal Medicine)  Mo 30 mL 4   • Chlorhexidine Gluconate 0.12 % Mouth/Throat Solution      • VENTOLIN  (90 Base) MCG/ACT Inhalation Aero Soln USE 2 INHALATIONS EVERY 6 HOURS AS NEEDED FOR WHEEZING 54 g 1   • Vitamin D3 2000 units Oral Cap Take 1 capsule (2,000 Units tot might be hidden     TSH and Free T4 Latest Ref Rng & Units 3/2/2020 2/26/2020 9/24/2018 3/1/2017 2/21/2011   TSH 0.358 - 3.740 mIU/mL 2.150 4.240(H) 1.380 1.700 2.430   Free T4 0.8 - 1.7 ng/dL - 0.9 - - -     [facility] LAB REVIEW FLOWSHEET PSA Latest Re have a healthcare power of ?: Yes  Do you have a living will?: Yes     Cognitive Assessment     What day of the week is this?: Correct  What month is it?: Correct  What year is it?: Correct  Recall \"Ball\": Correct  Recall \"Flag\": Correct  Recal ALLERGIES:   No Known Allergies  MEDICAL INFORMATION:   Past Medical History:   Diagnosis Date   • Abdominal pain    • Angio-edema 4/21/2019   • Arthritis    • Asthma     OUTGREW AT 12Y/O   • Back pain    • Calculus of kidney    • Chronic abdomina SPLENECTOMY  1992      Family History   Problem Relation Age of Onset   • Cancer Mother         pancreatic?    • Hypertension Mother      Immunization History      Immunization History  Administered            Date(s) Administered    FLU VACC High Dose 65 Y noted. Dysphagia none.   Wt loss  in the last few months stable Dr Nico Mathis  Hematology:   Patient denies abnormal bleeding, easy bruising. Enlarged lymph nodes none.    Men Only:   Patient denies difficulty with erection, penile discharge, testicular pain normal.   Murmurs: none. Rhythm: regular. LUNGS:   Auscultation: clear . Chest Shape: copd changes   Percussion: normal.   Rales: no .   Respiratory effort: normal .   Rhonchi: no.   Wheezes: no.    ABDOMEN:   Bowel sounds: normal.   General: normal. analgesic-managed by pain service    Steatorrhea stable and managed by GI    Weight loss same as above    Prostate cancer [facility]) managed by urology       The patient indicates understanding of these issues and agrees to the plan.   The patient is asked to re

## 2020-03-11 ENCOUNTER — TELEPHONE (OUTPATIENT)
Dept: INTERNAL MEDICINE CLINIC | Facility: CLINIC | Age: 70
End: 2020-03-11

## 2020-03-11 NOTE — TELEPHONE ENCOUNTER
Future Appointments   Date Time Provider Basilio Rashmi          3/17/2020 11:45 AM Radhika Sanon MD [facility] [place]

## 2020-03-18 RX ORDER — ONDANSETRON HYDROCHLORIDE 8 MG/1
TABLET, FILM COATED ORAL
Qty: 90 TABLET | Refills: 0 | Status: SHIPPED | OUTPATIENT
Start: 2020-03-18 | End: 2020-05-09 | Stop reason: CLARIF

## 2020-03-18 NOTE — TELEPHONE ENCOUNTER
Zofran 8 mg  Last OV relevant to medication: 3-10-20  Last refill date: 11-28-19 #/refills: 0  When pt was asked to return for OV: 1 wk for f/u  Upcoming appt/reason: none  Recent labs: none

## 2020-03-23 RX ORDER — SERTRALINE HYDROCHLORIDE 25 MG/1
TABLET, FILM COATED ORAL
Qty: 90 TABLET | Refills: 0 | Status: SHIPPED | OUTPATIENT
Start: 2020-03-23 | End: 2020-05-09 | Stop reason: CLARIF

## 2020-03-23 NOTE — TELEPHONE ENCOUNTER
Last OV: 3/10/2020 with Dr. Tacho Davis  Last refill date: 12/26/19     #/refills: #90, 0 refills  When pt was asked to return for OV: no follow-up on file  Upcoming appt: no upcoming appt  Last labs 3/8/2020

## 2020-03-24 NOTE — TELEPHONE ENCOUNTER
Last OV: 3/10/2020 with Dr. Ted Franz  Last refill date: 8/6/19     #/refills: #54 g, 1 refill  When pt was asked to return for OV: 1 week  Upcoming appt: no upcoming appt  Last labs March 2020

## 2020-04-06 ENCOUNTER — LAB ENCOUNTER (OUTPATIENT)
Dept: LAB | Age: 70
End: 2020-04-06
Attending: INTERNAL MEDICINE
Payer: MEDICARE

## 2020-04-06 ENCOUNTER — TELEPHONE (OUTPATIENT)
Dept: INTERNAL MEDICINE CLINIC | Facility: CLINIC | Age: 70
End: 2020-04-06

## 2020-04-06 DIAGNOSIS — R63.4 WEIGHT LOSS: Chronic | ICD-10-CM

## 2020-04-06 DIAGNOSIS — R10.2 PELVIC PAIN IN MALE: Primary | ICD-10-CM

## 2020-04-06 DIAGNOSIS — R10.2 PELVIC PAIN IN MALE: ICD-10-CM

## 2020-04-06 PROCEDURE — 80053 COMPREHEN METABOLIC PANEL: CPT

## 2020-04-06 PROCEDURE — 87086 URINE CULTURE/COLONY COUNT: CPT

## 2020-04-06 PROCEDURE — 84443 ASSAY THYROID STIM HORMONE: CPT

## 2020-04-06 PROCEDURE — 83690 ASSAY OF LIPASE: CPT

## 2020-04-06 PROCEDURE — 85025 COMPLETE CBC W/AUTO DIFF WBC: CPT

## 2020-04-06 PROCEDURE — 36415 COLL VENOUS BLD VENIPUNCTURE: CPT

## 2020-04-06 PROCEDURE — 99441 PHONE E/M BY PHYS 5-10 MIN: CPT | Performed by: INTERNAL MEDICINE

## 2020-04-06 PROCEDURE — 81001 URINALYSIS AUTO W/SCOPE: CPT

## 2020-04-06 NOTE — TELEPHONE ENCOUNTER
Patient's spouse called stating that patient has been experiencing lower back pan extending down to grown. Patient then started feeling burning sensation upon urinating and noticed granules coming out of urine. Also experiencing nausea that comes and goes.

## 2020-04-06 NOTE — TELEPHONE ENCOUNTER
Virtual/Telephone Check-In    Peterson Armendariz verbally consents to a Virtual/Telephone Check-In service on 04/06/20. Patient understands and accepts financial responsibility for any deductible, co-insurance and/or co-pays associated with this service.

## 2020-04-07 ENCOUNTER — TELEPHONE (OUTPATIENT)
Dept: INTERNAL MEDICINE CLINIC | Facility: CLINIC | Age: 70
End: 2020-04-07

## 2020-04-07 DIAGNOSIS — N30.00 ACUTE CYSTITIS WITHOUT HEMATURIA: Primary | ICD-10-CM

## 2020-04-07 PROCEDURE — 99441 PHONE E/M BY PHYS 5-10 MIN: CPT | Performed by: INTERNAL MEDICINE

## 2020-04-07 RX ORDER — CIPROFLOXACIN 500 MG/1
500 TABLET, FILM COATED ORAL 2 TIMES DAILY
Qty: 20 TABLET | Refills: 0 | Status: SHIPPED | OUTPATIENT
Start: 2020-04-07 | End: 2020-04-17

## 2020-04-07 NOTE — TELEPHONE ENCOUNTER
Virtual/Telephone Check-In    Emanuel Martin verbally consents to a Virtual/Telephone Check-In service on 04/07/20. Patient understands and accepts financial responsibility for any deductible, co-insurance and/or co-pays associated with this service.

## 2020-04-20 NOTE — TELEPHONE ENCOUNTER
Wife called and stated that her  needs a refill on his prescription for Humalog insulin. Patient would like the prescription to go to his local pharmacy. [facility] [address], [address] [phone], [phone].  Please advis

## 2020-04-21 RX ORDER — INSULIN LISPRO 100 [IU]/ML
INJECTION, SOLUTION INTRAVENOUS; SUBCUTANEOUS
Qty: 30 ML | Refills: 7 | Status: SHIPPED | OUTPATIENT
Start: 2020-04-21 | End: 2020-05-09 | Stop reason: CLARIF

## 2020-04-24 RX ORDER — INSULIN LISPRO 100 [IU]/ML
INJECTION, SOLUTION INTRAVENOUS; SUBCUTANEOUS
Qty: 30 ML | Refills: 7 | Status: ON HOLD | OUTPATIENT
Start: 2020-04-24 | End: 2021-08-16

## 2020-04-24 NOTE — TELEPHONE ENCOUNTER
Spouse is calling to make sure the that medication has gets approved for today and corrected, pt is asking to have a call back from nurse

## 2020-05-01 ENCOUNTER — APPOINTMENT (OUTPATIENT)
Dept: GENERAL RADIOLOGY | Facility: HOSPITAL | Age: 70
End: 2020-05-01
Attending: UROLOGY
Payer: MEDICARE

## 2020-05-01 ENCOUNTER — HOSPITAL ENCOUNTER (OUTPATIENT)
Age: 70
Discharge: ANOTHER HOSPITAL | End: 2020-05-01
Attending: FAMILY MEDICINE
Payer: MEDICARE

## 2020-05-01 ENCOUNTER — ANESTHESIA (OUTPATIENT)
Dept: SURGERY | Facility: HOSPITAL | Age: 70
End: 2020-05-01
Payer: MEDICARE

## 2020-05-01 ENCOUNTER — ANESTHESIA EVENT (OUTPATIENT)
Dept: SURGERY | Facility: HOSPITAL | Age: 70
End: 2020-05-01
Payer: MEDICARE

## 2020-05-01 ENCOUNTER — HOSPITAL ENCOUNTER (EMERGENCY)
Facility: HOSPITAL | Age: 70
Discharge: HOME | End: 2020-05-01
Attending: UROLOGY
Payer: MEDICARE

## 2020-05-01 ENCOUNTER — APPOINTMENT (OUTPATIENT)
Dept: CT IMAGING | Age: 70
End: 2020-05-01
Attending: FAMILY MEDICINE
Payer: MEDICARE

## 2020-05-01 ENCOUNTER — VIRTUAL PHONE E/M (OUTPATIENT)
Dept: INTERNAL MEDICINE CLINIC | Facility: CLINIC | Age: 70
End: 2020-05-01
Payer: MEDICARE

## 2020-05-01 VITALS
OXYGEN SATURATION: 92 % | HEART RATE: 98 BPM | SYSTOLIC BLOOD PRESSURE: 143 MMHG | TEMPERATURE: 99 F | RESPIRATION RATE: 18 BRPM | BODY MASS INDEX: 21 KG/M2 | WEIGHT: 148 LBS | DIASTOLIC BLOOD PRESSURE: 72 MMHG

## 2020-05-01 VITALS
HEART RATE: 81 BPM | SYSTOLIC BLOOD PRESSURE: 150 MMHG | OXYGEN SATURATION: 93 % | RESPIRATION RATE: 18 BRPM | TEMPERATURE: 98 F | DIASTOLIC BLOOD PRESSURE: 80 MMHG

## 2020-05-01 DIAGNOSIS — N23 RENAL COLIC: ICD-10-CM

## 2020-05-01 DIAGNOSIS — N20.0 RENAL CALCULI: ICD-10-CM

## 2020-05-01 DIAGNOSIS — N20.1 URETEROLITHIASIS: Primary | ICD-10-CM

## 2020-05-01 DIAGNOSIS — E87.5 HYPERKALEMIA: ICD-10-CM

## 2020-05-01 DIAGNOSIS — N28.9 RENAL INSUFFICIENCY: ICD-10-CM

## 2020-05-01 DIAGNOSIS — N23 RENAL COLIC: Primary | ICD-10-CM

## 2020-05-01 DIAGNOSIS — R31.9 HEMATURIA, UNSPECIFIED TYPE: ICD-10-CM

## 2020-05-01 DIAGNOSIS — K86.1 CHRONIC PANCREATITIS, UNSPECIFIED PANCREATITIS TYPE (HCC): Primary | ICD-10-CM

## 2020-05-01 PROCEDURE — 99223 1ST HOSP IP/OBS HIGH 75: CPT | Performed by: HOSPITALIST

## 2020-05-01 PROCEDURE — 87086 URINE CULTURE/COLONY COUNT: CPT | Performed by: FAMILY MEDICINE

## 2020-05-01 PROCEDURE — 99214 OFFICE O/P EST MOD 30 MIN: CPT

## 2020-05-01 PROCEDURE — 74176 CT ABD & PELVIS W/O CONTRAST: CPT | Performed by: FAMILY MEDICINE

## 2020-05-01 PROCEDURE — 85025 COMPLETE CBC W/AUTO DIFF WBC: CPT | Performed by: FAMILY MEDICINE

## 2020-05-01 PROCEDURE — 0T768DZ DILATION OF RIGHT URETER WITH INTRALUMINAL DEVICE, VIA NATURAL OR ARTIFICIAL OPENING ENDOSCOPIC: ICD-10-PCS | Performed by: UROLOGY

## 2020-05-01 PROCEDURE — 99442 PHONE E/M BY PHYS 11-20 MIN: CPT | Performed by: INTERNAL MEDICINE

## 2020-05-01 PROCEDURE — 36415 COLL VENOUS BLD VENIPUNCTURE: CPT

## 2020-05-01 PROCEDURE — 81002 URINALYSIS NONAUTO W/O SCOPE: CPT | Performed by: FAMILY MEDICINE

## 2020-05-01 PROCEDURE — 80047 BASIC METABLC PNL IONIZED CA: CPT

## 2020-05-01 PROCEDURE — 0TF78ZZ FRAGMENTATION IN LEFT URETER, VIA NATURAL OR ARTIFICIAL OPENING ENDOSCOPIC: ICD-10-PCS | Performed by: UROLOGY

## 2020-05-01 DEVICE — URETERAL STENT
Type: IMPLANTABLE DEVICE | Site: URETER | Status: FUNCTIONAL
Brand: ASCERTA™

## 2020-05-01 RX ORDER — SODIUM CHLORIDE, SODIUM LACTATE, POTASSIUM CHLORIDE, CALCIUM CHLORIDE 600; 310; 30; 20 MG/100ML; MG/100ML; MG/100ML; MG/100ML
INJECTION, SOLUTION INTRAVENOUS CONTINUOUS
Status: DISCONTINUED | OUTPATIENT
Start: 2020-05-01 | End: 2020-05-02

## 2020-05-01 RX ORDER — MEPERIDINE HYDROCHLORIDE 25 MG/ML
12.5 INJECTION INTRAMUSCULAR; INTRAVENOUS; SUBCUTANEOUS AS NEEDED
Status: DISCONTINUED | OUTPATIENT
Start: 2020-05-01 | End: 2020-05-02

## 2020-05-01 RX ORDER — NALOXONE HYDROCHLORIDE 0.4 MG/ML
80 INJECTION, SOLUTION INTRAMUSCULAR; INTRAVENOUS; SUBCUTANEOUS AS NEEDED
Status: DISCONTINUED | OUTPATIENT
Start: 2020-05-01 | End: 2020-05-02

## 2020-05-01 RX ORDER — INSULIN ASPART 100 [IU]/ML
INJECTION, SOLUTION INTRAVENOUS; SUBCUTANEOUS ONCE
Status: DISCONTINUED | OUTPATIENT
Start: 2020-05-01 | End: 2020-05-02

## 2020-05-01 RX ORDER — LABETALOL HYDROCHLORIDE 5 MG/ML
5 INJECTION, SOLUTION INTRAVENOUS EVERY 5 MIN PRN
Status: DISCONTINUED | OUTPATIENT
Start: 2020-05-01 | End: 2020-05-02

## 2020-05-01 RX ORDER — ONDANSETRON 2 MG/ML
4 INJECTION INTRAMUSCULAR; INTRAVENOUS EVERY 6 HOURS PRN
Status: CANCELLED | OUTPATIENT
Start: 2020-05-01

## 2020-05-01 RX ORDER — METHENAMINE, SODIUM PHOSPHATE, MONOBASIC, MONOHYDRATE, PHENYL SALICYLATE, METHYLENE BLUE, AND HYOSCYAMINE SULFATE 120; 40.8; 36; 10; .12 MG/1; MG/1; MG/1; MG/1; MG/1
1 CAPSULE ORAL 3 TIMES DAILY
Qty: 15 CAPSULE | Refills: 3 | Status: ON HOLD | OUTPATIENT
Start: 2020-05-01 | End: 2020-05-09

## 2020-05-01 RX ORDER — DEXTROSE MONOHYDRATE 25 G/50ML
50 INJECTION, SOLUTION INTRAVENOUS
Status: CANCELLED | OUTPATIENT
Start: 2020-05-01

## 2020-05-01 RX ORDER — METOCLOPRAMIDE HYDROCHLORIDE 5 MG/ML
10 INJECTION INTRAMUSCULAR; INTRAVENOUS EVERY 8 HOURS PRN
Status: CANCELLED | OUTPATIENT
Start: 2020-05-01

## 2020-05-01 RX ORDER — HYDROMORPHONE HYDROCHLORIDE 2 MG/1
4 TABLET ORAL
Status: CANCELLED | OUTPATIENT
Start: 2020-05-01

## 2020-05-01 RX ORDER — ACETAMINOPHEN 500 MG
1000 TABLET ORAL ONCE AS NEEDED
Status: DISCONTINUED | OUTPATIENT
Start: 2020-05-01 | End: 2020-05-01

## 2020-05-01 RX ORDER — MIDAZOLAM HYDROCHLORIDE 1 MG/ML
1 INJECTION INTRAMUSCULAR; INTRAVENOUS EVERY 5 MIN PRN
Status: DISCONTINUED | OUTPATIENT
Start: 2020-05-01 | End: 2020-05-02

## 2020-05-01 RX ORDER — FENTANYL 100 UG/H
1 PATCH TRANSDERMAL
Status: CANCELLED | OUTPATIENT
Start: 2020-05-01

## 2020-05-01 RX ORDER — LIDOCAINE HYDROCHLORIDE 10 MG/ML
INJECTION, SOLUTION EPIDURAL; INFILTRATION; INTRACAUDAL; PERINEURAL AS NEEDED
Status: DISCONTINUED | OUTPATIENT
Start: 2020-05-01 | End: 2020-05-01 | Stop reason: SURG

## 2020-05-01 RX ORDER — ONDANSETRON 2 MG/ML
4 INJECTION INTRAMUSCULAR; INTRAVENOUS AS NEEDED
Status: DISCONTINUED | OUTPATIENT
Start: 2020-05-01 | End: 2020-05-02

## 2020-05-01 RX ORDER — POLYETHYLENE GLYCOL 3350 17 G/17G
17 POWDER, FOR SOLUTION ORAL DAILY PRN
Status: CANCELLED | OUTPATIENT
Start: 2020-05-01

## 2020-05-01 RX ORDER — DEXTROSE MONOHYDRATE 25 G/50ML
50 INJECTION, SOLUTION INTRAVENOUS
Status: DISCONTINUED | OUTPATIENT
Start: 2020-05-01 | End: 2020-05-02

## 2020-05-01 RX ORDER — METOCLOPRAMIDE HYDROCHLORIDE 5 MG/ML
INJECTION INTRAMUSCULAR; INTRAVENOUS AS NEEDED
Status: DISCONTINUED | OUTPATIENT
Start: 2020-05-01 | End: 2020-05-01 | Stop reason: SURG

## 2020-05-01 RX ORDER — MIDAZOLAM HYDROCHLORIDE 1 MG/ML
INJECTION INTRAMUSCULAR; INTRAVENOUS
Status: COMPLETED
Start: 2020-05-01 | End: 2020-05-01

## 2020-05-01 RX ORDER — HYDROMORPHONE HYDROCHLORIDE 1 MG/ML
INJECTION, SOLUTION INTRAMUSCULAR; INTRAVENOUS; SUBCUTANEOUS
Status: COMPLETED
Start: 2020-05-01 | End: 2020-05-01

## 2020-05-01 RX ORDER — DIPHENHYDRAMINE HYDROCHLORIDE 50 MG/ML
12.5 INJECTION INTRAMUSCULAR; INTRAVENOUS AS NEEDED
Status: DISCONTINUED | OUTPATIENT
Start: 2020-05-01 | End: 2020-05-02

## 2020-05-01 RX ORDER — SODIUM CHLORIDE 9 MG/ML
INJECTION, SOLUTION INTRAVENOUS CONTINUOUS
Status: DISCONTINUED | OUTPATIENT
Start: 2020-05-01 | End: 2020-05-02

## 2020-05-01 RX ORDER — TAMSULOSIN HYDROCHLORIDE 0.4 MG/1
0.4 CAPSULE ORAL DAILY
Status: CANCELLED | OUTPATIENT
Start: 2020-05-01

## 2020-05-01 RX ORDER — HYDROMORPHONE HYDROCHLORIDE 1 MG/ML
0.5 INJECTION, SOLUTION INTRAMUSCULAR; INTRAVENOUS; SUBCUTANEOUS ONCE
Status: COMPLETED | OUTPATIENT
Start: 2020-05-01 | End: 2020-05-01

## 2020-05-01 RX ORDER — DIAZEPAM 5 MG/1
5 TABLET ORAL EVERY 8 HOURS PRN
Qty: 20 TABLET | Refills: 0 | Status: SHIPPED | OUTPATIENT
Start: 2020-05-01 | End: 2020-05-06

## 2020-05-01 RX ORDER — DOCUSATE SODIUM 100 MG/1
100 CAPSULE, LIQUID FILLED ORAL 2 TIMES DAILY
Status: CANCELLED | OUTPATIENT
Start: 2020-05-01

## 2020-05-01 RX ORDER — HYDROCODONE BITARTRATE AND ACETAMINOPHEN 5; 325 MG/1; MG/1
1 TABLET ORAL AS NEEDED
Status: DISCONTINUED | OUTPATIENT
Start: 2020-05-01 | End: 2020-05-02

## 2020-05-01 RX ORDER — BISACODYL 10 MG
10 SUPPOSITORY, RECTAL RECTAL
Status: CANCELLED | OUTPATIENT
Start: 2020-05-01

## 2020-05-01 RX ORDER — METOCLOPRAMIDE HYDROCHLORIDE 5 MG/ML
10 INJECTION INTRAMUSCULAR; INTRAVENOUS AS NEEDED
Status: DISCONTINUED | OUTPATIENT
Start: 2020-05-01 | End: 2020-05-02

## 2020-05-01 RX ORDER — CEFDINIR 300 MG/1
CAPSULE ORAL
Qty: 14 CAPSULE | Refills: 0 | Status: SHIPPED | OUTPATIENT
Start: 2020-05-01 | End: 2020-05-09 | Stop reason: CLARIF

## 2020-05-01 RX ORDER — ONDANSETRON 2 MG/ML
INJECTION INTRAMUSCULAR; INTRAVENOUS AS NEEDED
Status: DISCONTINUED | OUTPATIENT
Start: 2020-05-01 | End: 2020-05-01 | Stop reason: SURG

## 2020-05-01 RX ORDER — HYDROMORPHONE HYDROCHLORIDE 1 MG/ML
0.2 INJECTION, SOLUTION INTRAMUSCULAR; INTRAVENOUS; SUBCUTANEOUS EVERY 4 HOURS PRN
Status: CANCELLED | OUTPATIENT
Start: 2020-05-01

## 2020-05-01 RX ORDER — HYDROMORPHONE HYDROCHLORIDE 1 MG/ML
0.4 INJECTION, SOLUTION INTRAMUSCULAR; INTRAVENOUS; SUBCUTANEOUS EVERY 5 MIN PRN
Status: DISCONTINUED | OUTPATIENT
Start: 2020-05-01 | End: 2020-05-02

## 2020-05-01 RX ORDER — HYDROMORPHONE HYDROCHLORIDE 1 MG/ML
1 INJECTION, SOLUTION INTRAMUSCULAR; INTRAVENOUS; SUBCUTANEOUS EVERY 30 MIN PRN
Status: DISCONTINUED | OUTPATIENT
Start: 2020-05-01 | End: 2020-05-02

## 2020-05-01 RX ORDER — HYDROCODONE BITARTRATE AND ACETAMINOPHEN 5; 325 MG/1; MG/1
2 TABLET ORAL AS NEEDED
Status: DISCONTINUED | OUTPATIENT
Start: 2020-05-01 | End: 2020-05-02

## 2020-05-01 RX ORDER — SODIUM CHLORIDE 9 MG/ML
INJECTION, SOLUTION INTRAVENOUS CONTINUOUS
Status: CANCELLED | OUTPATIENT
Start: 2020-05-01

## 2020-05-01 RX ORDER — CEFAZOLIN SODIUM/WATER 2 G/20 ML
SYRINGE (ML) INTRAVENOUS AS NEEDED
Status: DISCONTINUED | OUTPATIENT
Start: 2020-05-01 | End: 2020-05-01 | Stop reason: SURG

## 2020-05-01 RX ORDER — ACETAMINOPHEN 500 MG
1000 TABLET ORAL EVERY 6 HOURS PRN
Status: CANCELLED | OUTPATIENT
Start: 2020-05-01

## 2020-05-01 RX ORDER — HYDROMORPHONE HYDROCHLORIDE 1 MG/ML
0.8 INJECTION, SOLUTION INTRAMUSCULAR; INTRAVENOUS; SUBCUTANEOUS EVERY 4 HOURS PRN
Status: CANCELLED | OUTPATIENT
Start: 2020-05-01

## 2020-05-01 RX ORDER — HYDROMORPHONE HYDROCHLORIDE 1 MG/ML
0.4 INJECTION, SOLUTION INTRAMUSCULAR; INTRAVENOUS; SUBCUTANEOUS EVERY 4 HOURS PRN
Status: CANCELLED | OUTPATIENT
Start: 2020-05-01

## 2020-05-01 RX ORDER — SERTRALINE HYDROCHLORIDE 25 MG/1
25 TABLET, FILM COATED ORAL DAILY
Status: CANCELLED | OUTPATIENT
Start: 2020-05-01

## 2020-05-01 RX ORDER — SODIUM CHLORIDE 9 MG/ML
INJECTION, SOLUTION INTRAVENOUS CONTINUOUS PRN
Status: DISCONTINUED | OUTPATIENT
Start: 2020-05-01 | End: 2020-05-01 | Stop reason: SURG

## 2020-05-01 RX ADMIN — LIDOCAINE HYDROCHLORIDE 50 MG: 10 INJECTION, SOLUTION EPIDURAL; INFILTRATION; INTRACAUDAL; PERINEURAL at 19:00:00

## 2020-05-01 RX ADMIN — SODIUM CHLORIDE: 9 INJECTION, SOLUTION INTRAVENOUS at 18:57:00

## 2020-05-01 RX ADMIN — ONDANSETRON 4 MG: 2 INJECTION INTRAMUSCULAR; INTRAVENOUS at 19:59:00

## 2020-05-01 RX ADMIN — METOCLOPRAMIDE HYDROCHLORIDE 5 MG: 5 INJECTION INTRAMUSCULAR; INTRAVENOUS at 19:59:00

## 2020-05-01 RX ADMIN — CEFAZOLIN SODIUM/WATER 2 G: 2 G/20 ML SYRINGE (ML) INTRAVENOUS at 19:05:00

## 2020-05-01 NOTE — ED INITIAL ASSESSMENT (HPI)
Left sided flank pain x2.5 days. Deny fever. Seen at [facility], diagnosed with kidney stone, hyperkalemia. Pt had similar pain 2 weeks ago on right side. PMD started him on abx and pain resolved. History of kidney stones.

## 2020-05-01 NOTE — ED PROVIDER NOTES
Patient Seen in: [facility] Emergency Department      History   Patient presents with:  Abnormal Result  Urinary Symptoms    Stated Complaint: hyperkalemia, kidney stone, from [facility]    HPI    CHIEF COMPLAINT: Left flank pain    HISTORY OF PRESENT ILL • Coronary atherosclerosis    • Diabetes ([facility]    • Diabetes mellitus ([facility]    • Elevated PSA, less than 10 ng/ml 03/08/2018   • Essential hypertension, benign    • Frequent use of laxatives    • Hemorrhoids    • Knee pain 10/2/2013   • Long term current None (Room air)       Current:/76   Pulse 80   Resp 16   Wt 67.1 kg   SpO2 93%   BMI 20.94 kg/m²         Physical Exam    Nursing notes and vital signs reviewed     General Appearance: alert and oriented x 4,  patient appears uncomfortable  Eyes:  pu having left sided flank pain for the past 2 days. About 3 weeks ago he had right sided flank pain which he took antibiotics for. FINDINGS:  KIDNEYS:  Normal anatomic positions. 0.8 cm mid left ureteral calculus with mild to moderate hydronephrosis.   Nu informed patient's history, physical, CT and laboratory results. He states he would like to take the patient to surgery today as the patient has been n.p.o. throughout the course of the day today. Patient is comfortable with this plan.   Patient is still

## 2020-05-01 NOTE — H&P
[facility]  History and Physical     Sudhir Platt Patient Status:  Emergency    1950 MRN [id number]   Location [address] [facility] Attending No att. providers found   Hosp Day # 0 PCP Anuel Blevins MD     Chief Compl Primary localized osteoarthrosis, lower leg 10/2/2013   • Primary localized osteoarthrosis, lower leg, left [715.16] 9/2/2015   • Pulmonary nodule 3/23/2017   • Renal stones    • Right leg DVT ([facility] 6/2007   • Shortness of breath    • Spondylolisthesis of PER SLIDING SCALE:120-180 inject 4 units,181-240 inject 8 units,241-300 inject 10 units,301-350 inject 12 units,over 351 inject 15 units. , Disp: 30 mL, Rfl: 7  fentaNYL (DURAGESIC-100) 100 MCG/HR Transdermal Patch 72 Hr, Place 1 patch onto the skin every t 17 g by mouth daily. , Disp: , Rfl: 0  Multiple Vitamin (MULTI VITAMIN DAILY OR), Take 1 tablet by mouth daily.   , Disp: , Rfl:       Physical Exam:    /76   Pulse 80   Resp 16   Wt 148 lb (67.1 kg)   SpO2 93%   BMI 20.94 kg/m²   General: No acute d SCDs and ambulate  · CODE status: Full  · Kellogg: No  Plan of care discussed with patient    Delia Santos MD

## 2020-05-01 NOTE — ED NOTES
Patient is resting comfortably in the room and was provided another glass of water. He is aware Dr. Anju Grimm will be in shortly with results. He verbalizes understanding and denies any questions.

## 2020-05-01 NOTE — ED INITIAL ASSESSMENT (HPI)
Pt states that he has left flank/back pain for the last 2 days. Pt states that he has a hx of kidney stones. Pt states that about 3 wks ago he had right flank pain and he was in contact with his PCP and was antibiotics for 10 days.  Pt denies visible blood

## 2020-05-01 NOTE — ANESTHESIA PREPROCEDURE EVALUATION
PRE-OP EVALUATION    Patient Name: Rajendra Velázquez    Pre-op Diagnosis: Kidney stone on left side [N20.0]    Procedure(s):  CYSTOSCOPY , URETEROSCOPY, RETROGRADE, PYELOGRAM, STONE MANIPULATION WITH HOLIUM LASER AND INSERTION LEFT URETERAL Kaiden Aiken • APPENDECTOMY     • APPENDECTOMY     • CHOLECYSTECTOMY     • ENDOSCOPIC ULTRASOUND (EUS) N/A 6/18/2019    Performed by Daisy Mckenna MD at [facility] ENDOSCOPY   • ESOPHAGOGASTRODUODENOSCOPY (EGD) N/A 6/18/2019    Performed by Daisy Mckenna MD at [facility] E

## 2020-05-01 NOTE — PROGRESS NOTES
Virtual Telephone Check-In    Joe Mccormack verbally consents to a Virtual/Telephone Check-In visit on 05/01/20. Patient understands and accepts financial responsibility for any deductible, co-insurance and/or co-pays associated with this service.

## 2020-05-01 NOTE — CONSULTS
[facility]  Report of Consultation    Amandeepmessi Murcia Patient Status:  Emergency    1950 MRN [id number]   Location [address] [facility] Attending Juhi Hunag MD   Hosp Day # 0 PCP Mel Jimenez MD     Reason for Consultation: Weight loss      Past Surgical History:   Procedure Laterality Date   • APPENDECTOMY     • APPENDECTOMY     • CHOLECYSTECTOMY     • ENDOSCOPIC ULTRASOUND (EUS) N/A 6/18/2019    Performed by Brina Castellano MD at [facility] ENDOSCOPY   • [address] 137 05/01/2020    K 4.7 05/01/2020     05/01/2020    CO2 25.0 05/01/2020    GLU 96 05/01/2020    CA 9.0 05/01/2020    ALB 3.3 05/01/2020    ALKPHO 88 05/01/2020    BILT 0.7 05/01/2020    TP 7.1 05/01/2020    AST 23 05/01/2020    ALT 28 05/01/2020 measured at 0.6 cm. CONCLUSION:    1. 0.8 cm mid left ureteral calculus with mild to moderate obstruction. 2. Numerous additional bilateral nonobstructing renal calculi. 3. Details as above.   Continued clinical correlation recommend          Dictate

## 2020-05-01 NOTE — ED PROVIDER NOTES
Patient Seen in: [facility]      History   Patient presents with:  Back Pain    Stated Complaint: L side flank pain    HPI  This is a 54-year-old male coming in with complaints of left flank and back pain that he has had for the Normal          ED Course     Labs Reviewed   POCT URINALYSIS DIPSTICK - Abnormal; Notable for the following components:       Result Value    Bilirubin, Urine Moderate (*)     Blood, Urine Trace-Intact (*)     All other components within normal limits   P pain  TECHNIQUE:  Unenhanced multislice CT scanning from above the kidneys to below the urinary bladder. 2D rendering are generated on the CT scanner workstation to localize potential stones in the cranio-caudal plane.   Dose reduction techniques were used measured at 0.6 cm. CONCLUSION:  1. 0.8 cm mid left ureteral calculus with mild to moderate obstruction. 2. Numerous additional bilateral nonobstructing renal calculi. 3. Details as above.   Continued clinical correlation recommend    Dictated by: Alisha Reyes

## 2020-05-02 NOTE — OPERATIVE REPORT
[facility]    PATIENT'S NAME: Pilo Gatesmatthewangella   ATTENDING PHYSICIAN: Marko Sahu M.D. OPERATING PHYSICIAN: Marko Sahu M.D.    PATIENT ACCOUNT#:   [de-identified]    LOCATION:  PREOPASCC  PRE ASCC 1 EDWP 10  MEDICAL RECORD #:   [id number]       DATE intrarenal collecting system seen under fluoroscopic guidance. The cystoscope and open-ended catheter were withdrawn. The ureter was dilated using a [place] dilator successfully up to the mid ureter.   The dilator was then withdrawn and the digital fle

## 2020-05-02 NOTE — ANESTHESIA POSTPROCEDURE EVALUATION
[address] [facility] Patient Status:  Emergency   Age/Gender 78year old male MRN [id number]   [facility] SURGERY Attending Jordan Edwards MD   Hosp Day # 0 PCP Laron Salgado MD       Anesthesia Post-op Note    Procedure(s):

## 2020-05-02 NOTE — ANESTHESIA PROCEDURE NOTES
Airway  Date/Time: 5/1/2020 7:01 PM  Urgency: elective      General Information and Staff    Patient location during procedure: OR  Anesthesiologist: Marielena Mace MD  Performed: anesthesiologist     Indications and Patient Condition  Indications for airwa

## 2020-05-02 NOTE — BRIEF OP NOTE
Pre-Operative Diagnosis: Kidney stone on left side [N20.0], left proximal ureteral stone with obstruction, left flank pain, [place]     Post-Operative Diagnosis:Kidney stone on left side [N20.0], left proximal ureteral stone with obstruction, left flank pain,

## 2020-05-07 PROBLEM — N20.0 RECURRENT KIDNEY STONES: Status: ACTIVE | Noted: 2020-05-07

## 2020-05-07 PROBLEM — N20.0 BILATERAL KIDNEY STONES: Status: ACTIVE | Noted: 2020-05-07

## 2020-05-08 ENCOUNTER — HOSPITAL ENCOUNTER (OUTPATIENT)
Dept: CT IMAGING | Facility: HOSPITAL | Age: 70
Discharge: HOME | DRG: 917 | End: 2020-05-08
Attending: INTERNAL MEDICINE
Payer: MEDICARE

## 2020-05-08 DIAGNOSIS — R91.8 LUNG NODULES: ICD-10-CM

## 2020-05-08 PROCEDURE — 71250 CT THORAX DX C-: CPT | Performed by: INTERNAL MEDICINE

## 2020-05-09 ENCOUNTER — HOSPITAL ENCOUNTER (INPATIENT)
Facility: HOSPITAL | Age: 70
LOS: 2 days | Discharge: HOME | DRG: 917 | End: 2020-05-11
Attending: EMERGENCY MEDICINE | Admitting: HOSPITALIST
Payer: MEDICARE

## 2020-05-09 ENCOUNTER — APPOINTMENT (OUTPATIENT)
Dept: GENERAL RADIOLOGY | Facility: HOSPITAL | Age: 70
DRG: 917 | End: 2020-05-09
Attending: PHYSICIAN ASSISTANT
Payer: MEDICARE

## 2020-05-09 ENCOUNTER — APPOINTMENT (OUTPATIENT)
Dept: ULTRASOUND IMAGING | Facility: HOSPITAL | Age: 70
DRG: 917 | End: 2020-05-09
Attending: INTERNAL MEDICINE
Payer: MEDICARE

## 2020-05-09 DIAGNOSIS — N17.9 ACUTE RENAL FAILURE, UNSPECIFIED ACUTE RENAL FAILURE TYPE (HCC): ICD-10-CM

## 2020-05-09 DIAGNOSIS — E87.5 HYPERKALEMIA: Primary | ICD-10-CM

## 2020-05-09 DIAGNOSIS — T40.601A OPIATE OVERDOSE, ACCIDENTAL OR UNINTENTIONAL, INITIAL ENCOUNTER (HCC): ICD-10-CM

## 2020-05-09 PROBLEM — D72.829 LEUKOCYTOSIS: Status: ACTIVE | Noted: 2020-05-09

## 2020-05-09 PROCEDURE — 71045 X-RAY EXAM CHEST 1 VIEW: CPT | Performed by: PHYSICIAN ASSISTANT

## 2020-05-09 PROCEDURE — 99223 1ST HOSP IP/OBS HIGH 75: CPT | Performed by: INTERNAL MEDICINE

## 2020-05-09 PROCEDURE — 99223 1ST HOSP IP/OBS HIGH 75: CPT | Performed by: HOSPITALIST

## 2020-05-09 PROCEDURE — 76770 US EXAM ABDO BACK WALL COMP: CPT | Performed by: INTERNAL MEDICINE

## 2020-05-09 RX ORDER — DOCUSATE SODIUM 100 MG/1
100 CAPSULE, LIQUID FILLED ORAL 2 TIMES DAILY
Status: DISCONTINUED | OUTPATIENT
Start: 2020-05-09 | End: 2020-05-11

## 2020-05-09 RX ORDER — SODIUM CHLORIDE 9 MG/ML
INJECTION, SOLUTION INTRAVENOUS CONTINUOUS
Status: DISCONTINUED | OUTPATIENT
Start: 2020-05-09 | End: 2020-05-10

## 2020-05-09 RX ORDER — SERTRALINE HYDROCHLORIDE 25 MG/1
25 TABLET, FILM COATED ORAL DAILY
COMMUNITY
End: 2020-09-09

## 2020-05-09 RX ORDER — HEPARIN SODIUM 5000 [USP'U]/ML
5000 INJECTION, SOLUTION INTRAVENOUS; SUBCUTANEOUS EVERY 8 HOURS SCHEDULED
Status: DISCONTINUED | OUTPATIENT
Start: 2020-05-09 | End: 2020-05-11

## 2020-05-09 RX ORDER — BISACODYL 10 MG
10 SUPPOSITORY, RECTAL RECTAL
Status: DISCONTINUED | OUTPATIENT
Start: 2020-05-09 | End: 2020-05-11

## 2020-05-09 RX ORDER — ALBUTEROL SULFATE 2.5 MG/3ML
2.5 SOLUTION RESPIRATORY (INHALATION) EVERY 6 HOURS PRN
Status: DISCONTINUED | OUTPATIENT
Start: 2020-05-09 | End: 2020-05-11

## 2020-05-09 RX ORDER — SODIUM CHLORIDE 9 MG/ML
INJECTION, SOLUTION INTRAVENOUS CONTINUOUS
Status: DISCONTINUED | OUTPATIENT
Start: 2020-05-09 | End: 2020-05-11

## 2020-05-09 RX ORDER — ONDANSETRON HYDROCHLORIDE 8 MG/1
8 TABLET, FILM COATED ORAL EVERY 8 HOURS PRN
COMMUNITY
End: 2020-05-26

## 2020-05-09 RX ORDER — ALBUTEROL SULFATE 90 UG/1
2 AEROSOL, METERED RESPIRATORY (INHALATION) EVERY 6 HOURS PRN
COMMUNITY
End: 2021-01-08

## 2020-05-09 RX ORDER — ETODOLAC 400 MG/1
400 TABLET, FILM COATED ORAL 2 TIMES DAILY
Status: ON HOLD | COMMUNITY
End: 2020-05-11

## 2020-05-09 RX ORDER — POLYETHYLENE GLYCOL 3350 17 G/17G
17 POWDER, FOR SOLUTION ORAL DAILY PRN
Status: DISCONTINUED | OUTPATIENT
Start: 2020-05-09 | End: 2020-05-11

## 2020-05-09 RX ORDER — DEXTROSE MONOHYDRATE 25 G/50ML
50 INJECTION, SOLUTION INTRAVENOUS ONCE
Status: COMPLETED | OUTPATIENT
Start: 2020-05-09 | End: 2020-05-09

## 2020-05-09 RX ORDER — DIAZEPAM 5 MG/1
5 TABLET ORAL EVERY 8 HOURS PRN
COMMUNITY
End: 2020-05-15

## 2020-05-09 RX ORDER — CEFDINIR 300 MG/1
300 CAPSULE ORAL 2 TIMES DAILY
Status: ON HOLD | COMMUNITY
Start: 2020-05-01 | End: 2020-05-09

## 2020-05-09 RX ORDER — DEXTROSE MONOHYDRATE 25 G/50ML
50 INJECTION, SOLUTION INTRAVENOUS
Status: DISCONTINUED | OUTPATIENT
Start: 2020-05-09 | End: 2020-05-11

## 2020-05-09 RX ORDER — SODIUM CHLORIDE 9 MG/ML
INJECTION, SOLUTION INTRAVENOUS CONTINUOUS
Status: ACTIVE | OUTPATIENT
Start: 2020-05-09 | End: 2020-05-09

## 2020-05-09 RX ORDER — ONDANSETRON 2 MG/ML
4 INJECTION INTRAMUSCULAR; INTRAVENOUS EVERY 6 HOURS PRN
Status: DISCONTINUED | OUTPATIENT
Start: 2020-05-09 | End: 2020-05-11

## 2020-05-09 RX ORDER — ACETAMINOPHEN 325 MG/1
650 TABLET ORAL EVERY 6 HOURS PRN
Status: DISCONTINUED | OUTPATIENT
Start: 2020-05-09 | End: 2020-05-11

## 2020-05-09 RX ORDER — METOCLOPRAMIDE HYDROCHLORIDE 5 MG/ML
5 INJECTION INTRAMUSCULAR; INTRAVENOUS EVERY 8 HOURS PRN
Status: DISCONTINUED | OUTPATIENT
Start: 2020-05-09 | End: 2020-05-11

## 2020-05-09 RX ORDER — HYDROMORPHONE HYDROCHLORIDE 2 MG/1
4 TABLET ORAL EVERY 4 HOURS PRN
Status: DISCONTINUED | OUTPATIENT
Start: 2020-05-09 | End: 2020-05-11

## 2020-05-09 NOTE — ED PROVIDER NOTES
Patient Seen in: [facility] Emergency Department      History   Patient presents with:  Altered Mental Status    Stated Complaint: slow to respond, increased fentaly patch    HPI    65-year-old male here accompanied by his wife with complaints of al 6/2007   • Shortness of breath    • Spondylolisthesis of lumbar region    • Spondylolisthesis of lumbar region    • Type II or unspecified type diabetes mellitus without mention of complication, uncontrolled    • Unspecified vitamin D deficiency    • Visua BP 05/09/20 1403 116/67   Pulse 05/09/20 1403 96   Resp 05/09/20 1403 12   Temp 05/09/20 1407 98.4 °F (36.9 °C)   Temp src 05/09/20 1407 Temporal   SpO2 05/09/20 1403 (!) 87 %   O2 Device 05/09/20 1403 None (Room air)       Current:/84   Pulse 82 7.29 (*)     ABG pCO2 53 (*)     ABG Base Excess -2.2 (*)     Total Hemoglobin 11.4 (*)     Potassium Blood Gas 5.7 (*)     All other components within normal limits   POCT GLUCOSE - Abnormal; Notable for the following components:    POC Glucose 131 (*) and Plan     Clinical Impression:  Hyperkalemia  (primary encounter diagnosis)  Acute renal failure, unspecified acute renal failure type ([facility]  Opiate overdose, accidental or unintentional, initial encounter [facility])    Disposition:  Admit  5/9/2020  3:54 pm

## 2020-05-09 NOTE — CONSULTS
[facility]  Report of Consultation    Michael Carcamo Patient Status:  Emergency    1950 MRN [id number]   Location [address] [facility] Attending Jade Henao MD   Hosp Day # 0 PCP Joy Perez MD     Reason for Consultat left [715.16] 9/2/2015   • Pulmonary nodule 3/23/2017   • Renal stones    • Right leg DVT ([facility] 6/2007   • Shortness of breath    • Spondylolisthesis of lumbar region    • Spondylolisthesis of lumbar region    • Type II or unspecified type diabetes mellitu Subcutaneous Solution, Inject 35 Units into the skin nightly. metFORMIN HCl 1000 MG Oral Tab, Take 1,000 mg by mouth 2 (two) times daily with meals. Tiotropium Bromide Monohydrate 18 MCG Inhalation Cap, Inhale 18 mcg into the lungs daily.   Sertraline HCl as needed.  )  PEG 3350 Oral Powd Pack, Take 17 g by mouth daily. Multiple Vitamin (MULTI VITAMIN DAILY OR), Take 1 tablet by mouth daily. Review of Systems:  See HPI; A total of 12 systems reviewed and otherwise unremarkable.     Physical Exam: related to possible urinary obstruction. He does not look overtly volume depleted. No ongoing use of arb/acei. Noted to be on etodolac. - IV fluids  - check renal US ; will consider urology evaluation  - emperic abx  - no nsaids    2.  Hyperkalemia - relat

## 2020-05-09 NOTE — ED INITIAL ASSESSMENT (HPI)
Pt was hanging ceiling tile with wife. Wife noticed pt having delayed response to questions and more \"sleepy\" than normal.  Wife noted pt had 2nd 100mcg fentanyl patch on arm, pt normally only wears one.

## 2020-05-10 PROCEDURE — 99232 SBSQ HOSP IP/OBS MODERATE 35: CPT | Performed by: INTERNAL MEDICINE

## 2020-05-10 PROCEDURE — 99232 SBSQ HOSP IP/OBS MODERATE 35: CPT | Performed by: HOSPITALIST

## 2020-05-10 RX ORDER — DIAZEPAM 5 MG/1
5 TABLET ORAL EVERY 8 HOURS PRN
Status: DISCONTINUED | OUTPATIENT
Start: 2020-05-10 | End: 2020-05-11

## 2020-05-10 RX ORDER — IPRATROPIUM BROMIDE AND ALBUTEROL SULFATE 2.5; .5 MG/3ML; MG/3ML
3 SOLUTION RESPIRATORY (INHALATION) EVERY 4 HOURS PRN
Status: DISCONTINUED | OUTPATIENT
Start: 2020-05-10 | End: 2020-05-11

## 2020-05-10 RX ORDER — SERTRALINE HYDROCHLORIDE 25 MG/1
25 TABLET, FILM COATED ORAL DAILY
Status: DISCONTINUED | OUTPATIENT
Start: 2020-05-10 | End: 2020-05-11

## 2020-05-10 RX ORDER — TAMSULOSIN HYDROCHLORIDE 0.4 MG/1
0.4 CAPSULE ORAL
Status: DISCONTINUED | OUTPATIENT
Start: 2020-05-10 | End: 2020-05-11

## 2020-05-10 RX ORDER — FENTANYL 100 UG/H
1 PATCH TRANSDERMAL
Status: DISCONTINUED | OUTPATIENT
Start: 2020-05-10 | End: 2020-05-11

## 2020-05-10 NOTE — PROGRESS NOTES
Spoke with Dr. Faraz Hicks in person and informed him that patient's urine was blue. Chart reviewed. No new orders at this time.  Will continue with current care and monitor patient until MDs round in AM.     0400: Went into patient's room as he finished using u

## 2020-05-10 NOTE — PROGRESS NOTES
NURSING ADMISSION NOTE      Patient admitted via Cart  Oriented to room. Safety precautions initiated. Bed in low position. Call light in reach. Assumed patient care at shift change.  1930  Prior to completing admission navigator patient was aaron

## 2020-05-10 NOTE — PROGRESS NOTES
[facility]  Progress Note    Sudhir Platt Patient Status:  Emergency    1950 MRN [id number]   Location [address] [facility] Attending Sherice Fermin MD   Hosp Day # 1 PCP Anuel Blevins MD     No acute events overnight  Mo Or  dextrose 50 % injection 50 mL, 50 mL, Intravenous, Q15 Min PRN    Or  glucose (DEX4) oral liquid 30 g, 30 g, Oral, Q15 Min PRN    Or  Glucose-Vitamin C (DEX-4) chewable tab 8 tablet, 8 tablet, Oral, Q15 Min PRN  Insulin Aspart Pen (NOVOLOG) 100 UNIT/ML Noted to be on etodolac. Improving  - IV fluids  - emperic abx  - no nsaids    2. Hyperkalemia - related to # 1  improved    3. Hx of kidney stones- s/p recent lithotripsy - had uretral stent which was removed 2 days ago  - as above    4.  DM    5 HTN- con

## 2020-05-10 NOTE — PROGRESS NOTES
[facility]  Progress Note     Con Valderrama Patient Status:  Inpatient    1950 MRN [id number]   [facility] 3NE-A Attending Jayson Freeman [address] Day # 1 PCP Disha Gutierrez MD     Chief Complaint: Lethargy    S: P results for input(s): PTP, INR in the last 168 hours. No results for input(s): TROP, CK in the last 168 hours. Imaging: Imaging data reviewed in Epic.     Medications:   • insulin detemir  30 Units Subcutaneous Nightly   • pancrelipase (Lip-Prot- showed an EF of 45%. Patient not on diuretics or beta blockers.          Plan of care: Continue IV fluids today per nephrology. Awaiting urine culture.     Quality:  · DVT Prophylaxis: Heparin  · CODE status: Full  · Kellogg: N/A  · Central line: N/A    Liam

## 2020-05-10 NOTE — H&P
[facility] HOSPITALIST  History and Physical     Cristina Mitchell Patient Status:  Emergency    1950 MRN [id number]   Location [facility] Attending Michelle Cotton MD   Hosp Day # 0 PCP Moisés Gates MD     Chief Complaint: Renal stones    • Right leg DVT ([facility]) 6/2007   • Shortness of breath    • Spondylolisthesis of lumbar region    • Spondylolisthesis of lumbar region    • Type II or unspecified type diabetes mellitus without mention of complication, uncontrolled    • Unspe insulin detemir 100 UNIT/ML Subcutaneous Solution, Inject 35 Units into the skin nightly., Disp: , Rfl:   metFORMIN HCl 1000 MG Oral Tab, Take 1,000 mg by mouth 2 (two) times daily with meals. , Disp: , Rfl:   Tiotropium Bromide Monohydrate 18 MCG Inhalat 1  Budesonide-Formoterol Fumarate 160-4.5 MCG/ACT Inhalation Aerosol, Inhale 2 puffs into the lungs 2 (two) times daily.  Rinse and spit after using, Disp: 1 Inhaler, Rfl: 11  ipratropium-albuterol 0.5-2.5 (3) MG/3ML Inhalation Solution, Take 3 mL by nebuli Estimated Creatinine Clearance: 25.7 mL/min (A) (based on SCr of 2.55 mg/dL (H)). No results for input(s): PTP, INR in the last 168 hours. No results for input(s): TROP, CK in the last 168 hours. Imaging: Imaging data reviewed in Epic.

## 2020-05-10 NOTE — PLAN OF CARE
Blood sugar 64 after D50 & partial breakfast at 2 hr check. Glucose drink given. Will monitor per protocol & treat.

## 2020-05-10 NOTE — CONSULTS
[facility] Dosing Service  Antibiotic Dosing    Donta Sheth is a 78year old male for whom pharmacy is dosing Zosyn for treatment of  UTI. Allergies: has No Known Allergies.     Vitals: /69 (BP Location: Right arm)   Pulse 87   Temp 97.4 °F

## 2020-05-11 ENCOUNTER — APPOINTMENT (OUTPATIENT)
Dept: GENERAL RADIOLOGY | Facility: HOSPITAL | Age: 70
DRG: 917 | End: 2020-05-11
Attending: UROLOGY
Payer: MEDICARE

## 2020-05-11 ENCOUNTER — TELEPHONE (OUTPATIENT)
Dept: SURGERY | Facility: HOSPITAL | Age: 70
End: 2020-05-11

## 2020-05-11 VITALS
WEIGHT: 148.38 LBS | HEART RATE: 68 BPM | BODY MASS INDEX: 20.77 KG/M2 | HEIGHT: 71 IN | TEMPERATURE: 98 F | DIASTOLIC BLOOD PRESSURE: 79 MMHG | RESPIRATION RATE: 20 BRPM | SYSTOLIC BLOOD PRESSURE: 137 MMHG | OXYGEN SATURATION: 93 %

## 2020-05-11 PROCEDURE — 99239 HOSP IP/OBS DSCHRG MGMT >30: CPT | Performed by: HOSPITALIST

## 2020-05-11 PROCEDURE — 99233 SBSQ HOSP IP/OBS HIGH 50: CPT | Performed by: INTERNAL MEDICINE

## 2020-05-11 PROCEDURE — 74018 RADEX ABDOMEN 1 VIEW: CPT | Performed by: UROLOGY

## 2020-05-11 NOTE — PROGRESS NOTES
NURSING DISCHARGE NOTE    Discharged Home via Wheelchair. Accompanied by Support staff  Belongings Taken by patient/family. Patient discharged home. Discharge paperwork given to patient.   Patient understands the importance of follow-up, taking med

## 2020-05-11 NOTE — PLAN OF CARE
Pt c/o pain to bilateral back, Asks for dilaudid when needed. Fentanyl patch applied to pts right upper arm. Potassium improved. WBC elevated today.    Problem: GENITOURINARY - ADULT  Goal: Absence of urinary retention  Description  INTERVENTIONS:  - Assess available)  - Encourage oral intake as appropriate  - Instruct patient on fluid and nutrition restrictions as appropriate  Outcome: Progressing

## 2020-05-11 NOTE — PAYOR COMM NOTE
--------------  ADMISSION REVIEW     Payor: Kleber Butler 673 #:  Angella Lawson Number: [id number]    Admit date: 5/9/20  Admit time: 1931       Admitting Physician: Janell Randall DO  Attending Physician:  Belgica Zeng, • Coronary atherosclerosis    • [facility]    • Elevated PSA, less than 10 ng/ml 03/08/2018   • Essential hypertension, benign    • Frequent use of laxatives    • Hemorrhoids    • Knee pain 10/2/2013   • Long term current use of opiate analges All other systems reviewed and negative except as noted above.     Physical Exam     ED Triage Vitals   BP 05/09/20 1403 116/67   Pulse 05/09/20 1403 96   Resp 05/09/20 1403 12   Temp 05/09/20 1407 98.4 °F (36.9 °C)   Temp src 05/09/20 1407 Temporal   SpO2 ABG PANEL W ELECT AND LACTATE - Abnormal; Notable for the following components:    ABG pH 7.29 (*)     ABG pCO2 53 (*)     ABG Base Excess -2.2 (*)     Total Hemoglobin 11.4 (*)     Potassium Blood Gas 5.7 (*)     All other components within normal limits Agree with above note. Patient seen and examined. 59-year-old male brought in for altered mental status. Patient's wife reports they were hanging ceiling tiles in the basement when he started becoming very sleepy.   She checked his O2 saturation and it w History of Present Illness: Bishnu Bloom is a 78year old male with 3 of COPD, congestive heart failure, kidney stones, asthma with recent hospitalization for kidney stones had a urethral stent removal who uses fentanyl patches for chronic pain and pu diazepam 5 MG Oral Tab, Take 5 mg by mouth every 8 (eight) hours as needed (ureteral spasm). , Disp: , Rfl:   Meth-Hyo-M Bl-Na Phos-Ph Sal (URIBEL) 118 MG Oral Cap, Take 1 capsule by mouth 3 (three) times daily for 15 days. , Disp: 15 capsule, Rfl: 3  bisaco Pertinent positives and negatives noted in the HPI. Physical Exam:    /80   Pulse 81   Temp 98.4 °F (36.9 °C) (Temporal)   Resp 18   Ht 5' 11\" (1.803 m)   Wt 148 lb 6.4 oz (67.3 kg)   SpO2 95%   BMI 20.70 kg/m²    General: No acute distress.  Avtar 4. Acute on chronic renal failure-possibly due to obstructing kidney stone since patient just had 1 lithotripsied on 1 May 2020. Nephrology on consult ultrasound of the kidney ordered if showing obstruction will consult urology.   We will continue IV fluid 1. JONO - suspect related to possible urinary obstruction. He does not look overtly volume depleted. No ongoing use of arb/acei. Noted to be on etodolac. - IV fluids  - check renal US ; will consider urology evaluation  - emperic abx  - no nsaids  2.  Hyper Freq: Once Route: IV  Start: 05/09/20 1620 End: 05/09/20 1641    1641-Given              docusate sodium (COLACE) cap 100 mg   Dose: 100 mg  Freq: 2 times daily Route: OR  Start: 05/09/20 2100 2137-Given          0923-Given   2106-Given        0831-Give Freq: 3 times daily with meals Route: OR  Start: 05/10/20 0930     0930-Given   1400-Given   1656-Given      0831-Given   1200   1700        Piperacillin Sod-Tazobactam So (ZOSYN) 3.375 g in dextrose 5 % 100 mL ADD-vantage   Dose: 3.375 g  Freq: Every 8 ho glucose (DEX4) oral liquid 15 g   Dose: 15 g  Freq: Every 15 min PRN Route: OR  PRN Reason: Low blood glucose  PRN Comment: less than or equal to 70 mg/dL, OR blood glucose  mg/dL with symptoms of hypoglycemia  Start: 05/09/20 2206 9106-Given

## 2020-05-11 NOTE — PROGRESS NOTES
[facility] HOSPITALIST  Progress Note     Sophia Barron Patient Status:  Inpatient    1950 MRN [id number]   [facility] 3NE-A Attending Jemima Alvarado [address] Day # 2 PCP Binta Lindquist MD     Chief Complaint: Lethargy    S: P --    TP 8.3*  --   --   --        Estimated Creatinine Clearance: 38.5 mL/min (A) (based on SCr of 1.7 mg/dL (H)). No results for input(s): PTP, INR in the last 168 hours. No results for input(s): TROP, CK in the last 168 hours.          Imaging: Daisha treatments  9. Depression-we will continue on Zoloft. 10. Chronic systolic heart failure-patient had a nuclear stress test last March that showed an EF of 45%. Patient not on diuretics or beta blockers.          Plan of care:  If no intervention by [facility]

## 2020-05-11 NOTE — PLAN OF CARE
Patient is Aox4  On room air  NSR on tele  Uses urinal  Last BM this morning  Takes pain meds for chronic pancreatitis, has Dilaudid Q4  Up with standby assist  09NS @100ml and IV Zosyn  Heparin SubQ  Plan: Poss.  Surgery for lithotripsy, will determine if serum sodium as indicated or ordered  - Monitor response to interventions for patient's volume status, including labs, urine output, blood pressure (other measures as available)  - Encourage oral intake as appropriate  - Instruct patient on fluid and nutri

## 2020-05-11 NOTE — CONSULTS
[facility]  Report of Consultation    Emanuel Martin Patient Status:  Inpatient    1950 MRN [id number]   [facility] 3NE-A Attending Sol Jung [facility] Day # 2 PCP Fer Fry MD     Impression and Plan:  1) A HCl 25 MG Oral Tab, Take 25 mg by mouth daily. , Disp: , Rfl:   diazepam 5 MG Oral Tab, Take 5 mg by mouth every 8 (eight) hours as needed (ureteral spasm). , Disp: , Rfl:   HYDROmorphone HCl (DILAUDID) 4 MG Oral Tab, Take 1 tablet (4 mg total) by mouth ever [facility] & long-term  acetaminophen (TYLENOL) tab 650 mg, 650 mg, Oral, Q6H PRN  ondansetron HCl (ZOFRAN) injection 4 mg, 4 mg, Intravenous, Q6H PRN  Metoclopramide HCl (REGLAN) injection 5 mg, 5 mg, Intravenous, Q8H PRN  docusate sodium (COLACE) cap 100 mg, 100 mg, Oral, Osteoarthritis    • Pain in joints    • Pneumonia due to organism    • Primary localized osteoarthrosis, lower leg 10/2/2013   • Primary localized osteoarthrosis, lower leg, left [715.16] 9/2/2015   • Pulmonary nodule 3/23/2017   • Renal stones    • Right file        Non-medical: Not on file    Tobacco Use      Smoking status: Former Smoker        Packs/day: 1.00        Years: 35.00        Pack years: 28        Quit date: 2000        Years since quittin.3      Smokeless tobacco: Never Used    ONEOK strength and sensory examination is grossly normal.  No focal neurologic deficit.     Laboratory Data:  Lab Results   Component Value Date    WBC 9.7 05/11/2020    HGB 11.3 05/11/2020    .0 05/11/2020    CREATSERUM 1.70 05/11/2020    K 4.2 05/11/2020

## 2020-05-11 NOTE — PLAN OF CARE
A&Ox4. Room air. NSR on tele. VSS. Complains of pain in back, PRN dilaudid Q4 with relief. IV zosyn. QID accu checks. Blood sugar 93 at 2100. MD notified of hypoglycemia during the day. Levemir dose adjusted. Pt given juice to have at the bedside.  Reports symptoms of volume excess or deficit  - Monitor intake, output and patient weight  - Monitor urine specific gravity, serum osmolarity and serum sodium as indicated or ordered  - Monitor response to interventions for patient's volume status, including labs,

## 2020-05-11 NOTE — PROGRESS NOTES
[facility]  Nephrology Progress Note    Mukund Wynn Attending:  Jhoan Malave*       Assessment and Plan:    1) JONO- due to dehydration / NSAIDs (etodolac)- improving; no other acute insults noted.  PLAN- continue IVF; avoid NSAIDS 05/11/2020    PGLU 151 05/11/2020       Imaging: All imaging studies reviewed.     Meds:   pancrelipase (Lip-Prot-Amyl) (ZENPEP) DR particles cap 40,000 Units, 40,000 Units, Oral, TID CC  diazepam (VALIUM) tab 5 mg, 5 mg, Oral, Q8H PRN  fentaNYL (DURAGESIC 100 UNIT/ML flexpen 1-10 Units, 1-10 Units, Subcutaneous, TID AC and HS  HYDROmorphone HCl (DILAUDID) tab 4 mg, 4 mg, Oral, Q4H PRN          Questions/concerns were discussed with patient and/or family by bedside.           Evangelista Fisher  5/11/2020  9:07 AM

## 2020-05-12 ENCOUNTER — PATIENT OUTREACH (OUTPATIENT)
Dept: CASE MANAGEMENT | Age: 70
End: 2020-05-12

## 2020-05-12 DIAGNOSIS — N20.0 RECURRENT KIDNEY STONES: ICD-10-CM

## 2020-05-12 DIAGNOSIS — Z79.891 LONG TERM CURRENT USE OF OPIATE ANALGESIC: Chronic | ICD-10-CM

## 2020-05-12 DIAGNOSIS — N17.9 AKI (ACUTE KIDNEY INJURY) (HCC): ICD-10-CM

## 2020-05-12 DIAGNOSIS — Z02.9 ENCOUNTERS FOR UNSPECIFIED ADMINISTRATIVE PURPOSE: ICD-10-CM

## 2020-05-12 DIAGNOSIS — N20.0 BILATERAL KIDNEY STONES: ICD-10-CM

## 2020-05-12 DIAGNOSIS — K86.1 OTHER CHRONIC PANCREATITIS (HCC): Chronic | ICD-10-CM

## 2020-05-12 DIAGNOSIS — N17.9 ACUTE RENAL FAILURE, UNSPECIFIED ACUTE RENAL FAILURE TYPE (HCC): ICD-10-CM

## 2020-05-12 DIAGNOSIS — T40.601A OPIATE OVERDOSE, ACCIDENTAL OR UNINTENTIONAL, INITIAL ENCOUNTER (HCC): ICD-10-CM

## 2020-05-12 PROCEDURE — 1111F DSCHRG MED/CURRENT MED MERGE: CPT

## 2020-05-13 ENCOUNTER — TELEPHONE (OUTPATIENT)
Dept: INTERNAL MEDICINE CLINIC | Facility: CLINIC | Age: 70
End: 2020-05-13

## 2020-05-13 NOTE — PROGRESS NOTES
Initial Post Discharge Follow Up   Discharge Date: 5/11/20  Contact Date: 5/12/2020    Consent Verification:  Assessment Completed With: Patient  HIPAA Verified?   Yes    Discharge Dx:    Hyperkalemia, ARF unspecified ARF type, Recurrent kidney stones, N well  • Do you have any questions about your discharge instructions? No  • Before leaving the hospital was your diagnoses explained to you? Yes  • Do you have any questions about your diagnoses?  No  • Are you able to perform normal daily activities of sj Solution Pen-injector PER SLIDING SCALE:120-180 inject 4 units,181-240 inject 8 units,241-300 inject 10 units,301-350 inject 12 units,over 351 inject 15 units. 30 mL 7   • tamsulosin (FLOMAX) cap Take 1 capsule (0.4 mg total) by mouth daily.  1/2 hr after d DEPARTMENT ([address])        Jul 14, [place]  1:30 PM CDT ULTRASOUND PV SCREENING with [facility] CARD PV ROOM 2 [facility] Peripheral Vascular [facility])    You will need to fast for 4 hours prior to yo which can speed up the heart rate. If the heart rate is too fast, the Heart Scan may not be completed. Payment for this screening exam is due upon arrival at registration. Payment may be made by cash, personal check, or major credit card.      This healt understanding of these. NCM instructed patient to call PCP with any questions or needs, he states he will.     CCM referral placed:  Yes    BOOK BY DATE: 5/25/2020    [x]  Discharge Summary, Discharge medications reviewed/discussed/and reconciled against ou

## 2020-05-13 NOTE — TELEPHONE ENCOUNTER
Future Appointments   Date Time Provider [facility] Rashmi   5/15/2020 10:00 AM Rashid Smith MD EMG 8 EMG [place]   6/9/2020  3:45 PM Adria WYNNE MD NPV RCK URO DMG NPV RCK   7/13/2020  2:45 PM Felicia Bernard MD BFOPHTHO DMG [place]   7/14/2020  1:30

## 2020-05-13 NOTE — DISCHARGE SUMMARY
[facility] HOSPITALIST  DISCHARGE SUMMARY     Keyanna Hernandez Patient Status:  Inpatient    1950 MRN [id number]   [facility] 3NE-A Attending No att. providers found   Hosp Day # 2 PCP Eliseo Huntley MD     Date of Admission: 2020  Da status at baseline kidney function improved electrolytes replace per protocol patient was cleared for discharge home. Lace+ Score: 82  59-90 High Risk  29-58 Medium Risk  0-28   Low Risk         TCM Follow-Up Recommendation:  LACE 29-58:  Moderate Risk o ipratropium-albuterol 0.5-2.5 (3) MG/3ML Soln  Commonly known as:  DUONEB      Take 3 mL by nebulization every 4 (four) hours as needed.    Quantity:  120 vial  Refills:  11     metFORMIN HCl 1000 MG Tabs  Commonly known as:  GLUCOPHAGE      Take 1,000 mg Instructions:                     6/9/2020  3:45 PM  FOLLOW UP [16] 15 min.  [address] [facility] Celena Winn MD    Patient Instructions:          Location Instructions:      [place] [id number]

## 2020-05-13 NOTE — TELEPHONE ENCOUNTER
Patient was discharged home from [facility] on 5/11/2020 and is at High risk for readmission and recommended to have a TCM HFU by 5/18/2020 preferred or no later than 5/25/2020 or sooner.  MELINA attempted to schedule a TCM HFU patient states he will call

## 2020-05-15 ENCOUNTER — OFFICE VISIT (OUTPATIENT)
Dept: INTERNAL MEDICINE CLINIC | Facility: CLINIC | Age: 70
End: 2020-05-15
Payer: MEDICARE

## 2020-05-15 VITALS
HEIGHT: 71 IN | DIASTOLIC BLOOD PRESSURE: 76 MMHG | SYSTOLIC BLOOD PRESSURE: 138 MMHG | OXYGEN SATURATION: 97 % | TEMPERATURE: 98 F | BODY MASS INDEX: 20.97 KG/M2 | HEART RATE: 80 BPM | RESPIRATION RATE: 16 BRPM | WEIGHT: 149.75 LBS

## 2020-05-15 DIAGNOSIS — N20.0 BILATERAL KIDNEY STONES: ICD-10-CM

## 2020-05-15 DIAGNOSIS — I10 ESSENTIAL HYPERTENSION, BENIGN: ICD-10-CM

## 2020-05-15 DIAGNOSIS — E10.65 TYPE 1 DIABETES MELLITUS WITH HYPERGLYCEMIA (HCC): ICD-10-CM

## 2020-05-15 DIAGNOSIS — N28.9 RENAL INSUFFICIENCY: Primary | ICD-10-CM

## 2020-05-15 PROBLEM — N23 RENAL COLIC: Status: RESOLVED | Noted: 2020-05-01 | Resolved: 2020-05-15

## 2020-05-15 PROBLEM — E87.5 HYPERKALEMIA: Status: RESOLVED | Noted: 2020-05-01 | Resolved: 2020-05-15

## 2020-05-15 PROBLEM — D72.829 LEUKOCYTOSIS: Status: RESOLVED | Noted: 2020-05-09 | Resolved: 2020-05-15

## 2020-05-15 PROBLEM — T40.601A OPIATE OVERDOSE, ACCIDENTAL OR UNINTENTIONAL, INITIAL ENCOUNTER (HCC): Status: RESOLVED | Noted: 2020-05-09 | Resolved: 2020-05-15

## 2020-05-15 PROCEDURE — 99495 TRANSJ CARE MGMT MOD F2F 14D: CPT | Performed by: INTERNAL MEDICINE

## 2020-05-15 NOTE — PATIENT INSTRUCTIONS
Ck sugar # pre and post meals ,compliance with diet/exercise enforce.  Weight counseling discussed   Spacing of meals -varying exercises discussed with patient   Reasoning of checking sugars pre and 2 hours  PP discussed with pt   Await BMP prior to further

## 2020-05-15 NOTE — PROGRESS NOTES
Sophia Barron  1950 is a 78year old male. Patient presents with:  TCM (Transition Of Care Management): -        HPI:   Post hospital follow-up.   Patient scheduled for extra shortwave lithotripsy in the next few weeks date to be deter every 4 (four) hours as needed.  ) 120 vial 11   • PEG 3350 Oral Powd Pack Take 17 g by mouth daily. 0   • Multiple Vitamin (MULTI VITAMIN DAILY OR) Take 1 tablet by mouth daily.        • Ondansetron HCl (ZOFRAN) 8 MG tablet TAKE one half TABLET BY MOUTH use: No       REVIEW OF SYSTEMS:   Cardiovascular:   Syncope none. Rapid heart beat at rest no. Change in exercise tolerance no. Chest pain no. Chest pain while awake none. Cold extremities no. Dizziness no. Dyspnea on exertion none. Fainting none.  Fatigue MD

## 2020-05-26 RX ORDER — ONDANSETRON HYDROCHLORIDE 8 MG/1
TABLET, FILM COATED ORAL
Qty: 90 TABLET | Refills: 0 | Status: SHIPPED | OUTPATIENT
Start: 2020-05-26 | End: 2020-09-17

## 2020-05-26 NOTE — TELEPHONE ENCOUNTER
Refill requested:   Requested Prescriptions     Pending Prescriptions Disp Refills   • Ondansetron HCl (ZOFRAN) 8 MG tablet [Pharmacy Med Name: Ondansetron Hydrochloride 8 Mg Tab Auro] 90 tablet 0     Sig: TAKE one half TABLET BY MOUTH four times DAILY AS 2:45 PM CDT Established Patient Office Visit with Kathleen Ledbetter MD [facility]  [place] ([address] [place])        Jul 14, [place]  1:30 PM CDT ULTRASOUND PV SCREENING with [facility] CARD PV ROOM 2 [facility] Please do not have any coffee or caffeine for 4 hours before the procedure. Caffeine is a stimulant which can speed up the heart rate. If the heart rate is too fast, the Heart Scan may not be completed.     Payment for this screening exam is due upon ar

## 2020-05-27 ENCOUNTER — APPOINTMENT (OUTPATIENT)
Dept: LAB | Age: 70
End: 2020-05-27
Attending: INTERNAL MEDICINE
Payer: MEDICARE

## 2020-05-27 DIAGNOSIS — N28.9 RENAL INSUFFICIENCY: ICD-10-CM

## 2020-05-27 PROCEDURE — 80048 BASIC METABOLIC PNL TOTAL CA: CPT

## 2020-05-27 PROCEDURE — 36415 COLL VENOUS BLD VENIPUNCTURE: CPT

## 2020-05-28 RX ORDER — PANCRELIPASE 36000; 180000; 114000 [USP'U]/1; [USP'U]/1; [USP'U]/1
CAPSULE, DELAYED RELEASE PELLETS ORAL
Qty: 180 CAPSULE | Refills: 0 | Status: SHIPPED | OUTPATIENT
Start: 2020-05-28 | End: 2020-07-27

## 2020-05-28 NOTE — TELEPHONE ENCOUNTER
Refill requested:   Requested Prescriptions     Pending Prescriptions Disp Refills   • CREON 66970 units Oral Cap DR Particles [Pharmacy Med Name: Creon Dr 36,000 Unit Cap Abbv] 180 capsule 0     Sig: TAKE TWO CAPSULES BY MOUTH THREE TIMES DAILY   Passed p caffeine for 4 hours before the procedure. Caffeine is a stimulant which can speed up the heart rate. If the heart rate is too fast, the Heart Scan may not be completed. Payment for this screening exam is due upon arrival at registration.  Payment may be

## 2020-06-02 ENCOUNTER — TELEPHONE (OUTPATIENT)
Dept: INTERNAL MEDICINE CLINIC | Facility: CLINIC | Age: 70
End: 2020-06-02

## 2020-06-02 DIAGNOSIS — R79.89 ELEVATED SERUM CREATININE: Primary | ICD-10-CM

## 2020-06-02 NOTE — TELEPHONE ENCOUNTER
Notes recorded by Camille Robbins MD on 5/28/2020 at 9:25 AM CDT  Reviewed results   Creatinine better would like to repeat BMP in 2 weeks if creatinine is still high will have to adjust his metformin    Pt notified of results and provider instructions.   P

## 2020-06-03 ENCOUNTER — APPOINTMENT (OUTPATIENT)
Dept: CT IMAGING | Facility: HOSPITAL | Age: 70
DRG: 660 | End: 2020-06-03
Attending: NURSE PRACTITIONER
Payer: MEDICARE

## 2020-06-03 ENCOUNTER — HOSPITAL ENCOUNTER (INPATIENT)
Facility: HOSPITAL | Age: 70
LOS: 1 days | Discharge: HOME | DRG: 660 | End: 2020-06-04
Attending: EMERGENCY MEDICINE | Admitting: INTERNAL MEDICINE
Payer: MEDICARE

## 2020-06-03 DIAGNOSIS — N20.0 KIDNEY STONE: Primary | ICD-10-CM

## 2020-06-03 DIAGNOSIS — N20.1 URETERAL CALCULI: ICD-10-CM

## 2020-06-03 PROBLEM — N23 RENAL COLIC: Status: ACTIVE | Noted: 2020-06-03

## 2020-06-03 PROCEDURE — 74176 CT ABD & PELVIS W/O CONTRAST: CPT | Performed by: NURSE PRACTITIONER

## 2020-06-03 PROCEDURE — 99223 1ST HOSP IP/OBS HIGH 75: CPT | Performed by: INTERNAL MEDICINE

## 2020-06-03 RX ORDER — TAMSULOSIN HYDROCHLORIDE 0.4 MG/1
0.4 CAPSULE ORAL DAILY
Status: DISCONTINUED | OUTPATIENT
Start: 2020-06-04 | End: 2020-06-04

## 2020-06-03 RX ORDER — SODIUM CHLORIDE 9 MG/ML
INJECTION, SOLUTION INTRAVENOUS CONTINUOUS
Status: ACTIVE | OUTPATIENT
Start: 2020-06-03 | End: 2020-06-03

## 2020-06-03 RX ORDER — HYDROMORPHONE HYDROCHLORIDE 1 MG/ML
0.8 INJECTION, SOLUTION INTRAMUSCULAR; INTRAVENOUS; SUBCUTANEOUS EVERY 2 HOUR PRN
Status: DISCONTINUED | OUTPATIENT
Start: 2020-06-03 | End: 2020-06-04

## 2020-06-03 RX ORDER — HYDROMORPHONE HYDROCHLORIDE 1 MG/ML
1 INJECTION, SOLUTION INTRAMUSCULAR; INTRAVENOUS; SUBCUTANEOUS ONCE
Status: COMPLETED | OUTPATIENT
Start: 2020-06-03 | End: 2020-06-03

## 2020-06-03 RX ORDER — HYDROMORPHONE HYDROCHLORIDE 4 MG/1
4 TABLET ORAL
Status: DISCONTINUED | OUTPATIENT
Start: 2020-06-03 | End: 2020-06-04

## 2020-06-03 RX ORDER — HYDROMORPHONE HYDROCHLORIDE 1 MG/ML
0.5 INJECTION, SOLUTION INTRAMUSCULAR; INTRAVENOUS; SUBCUTANEOUS EVERY 2 HOUR PRN
Status: DISCONTINUED | OUTPATIENT
Start: 2020-06-03 | End: 2020-06-04

## 2020-06-03 RX ORDER — DEXTROSE MONOHYDRATE 25 G/50ML
50 INJECTION, SOLUTION INTRAVENOUS
Status: DISCONTINUED | OUTPATIENT
Start: 2020-06-03 | End: 2020-06-04

## 2020-06-03 RX ORDER — SERTRALINE HYDROCHLORIDE 25 MG/1
25 TABLET, FILM COATED ORAL DAILY
Status: DISCONTINUED | OUTPATIENT
Start: 2020-06-04 | End: 2020-06-04

## 2020-06-03 RX ORDER — ENOXAPARIN SODIUM 100 MG/ML
40 INJECTION SUBCUTANEOUS DAILY
Status: DISCONTINUED | OUTPATIENT
Start: 2020-06-03 | End: 2020-06-04

## 2020-06-03 RX ORDER — POLYETHYLENE GLYCOL 3350 17 G/17G
17 POWDER, FOR SOLUTION ORAL DAILY
Status: DISCONTINUED | OUTPATIENT
Start: 2020-06-04 | End: 2020-06-04

## 2020-06-03 RX ORDER — IPRATROPIUM BROMIDE AND ALBUTEROL SULFATE 2.5; .5 MG/3ML; MG/3ML
3 SOLUTION RESPIRATORY (INHALATION) EVERY 4 HOURS PRN
Status: DISCONTINUED | OUTPATIENT
Start: 2020-06-03 | End: 2020-06-04

## 2020-06-03 RX ORDER — HYDROMORPHONE HYDROCHLORIDE 1 MG/ML
0.2 INJECTION, SOLUTION INTRAMUSCULAR; INTRAVENOUS; SUBCUTANEOUS EVERY 2 HOUR PRN
Status: DISCONTINUED | OUTPATIENT
Start: 2020-06-03 | End: 2020-06-04

## 2020-06-03 RX ORDER — HYDROMORPHONE HYDROCHLORIDE 1 MG/ML
0.5 INJECTION, SOLUTION INTRAMUSCULAR; INTRAVENOUS; SUBCUTANEOUS EVERY 30 MIN PRN
Status: ACTIVE | OUTPATIENT
Start: 2020-06-03 | End: 2020-06-03

## 2020-06-03 RX ORDER — HYDROMORPHONE HYDROCHLORIDE 2 MG/1
4 TABLET ORAL
COMMUNITY
End: 2020-09-10

## 2020-06-03 RX ORDER — ACETAMINOPHEN 160 MG
2000 TABLET,DISINTEGRATING ORAL DAILY
Status: DISCONTINUED | OUTPATIENT
Start: 2020-06-04 | End: 2020-06-04

## 2020-06-03 RX ORDER — IPRATROPIUM BROMIDE AND ALBUTEROL SULFATE 2.5; .5 MG/3ML; MG/3ML
3 SOLUTION RESPIRATORY (INHALATION) EVERY 4 HOURS PRN
Qty: 120 VIAL | Refills: 11 | Status: CANCELLED | OUTPATIENT
Start: 2020-06-03

## 2020-06-03 RX ORDER — ALBUTEROL SULFATE 90 UG/1
2 AEROSOL, METERED RESPIRATORY (INHALATION) EVERY 6 HOURS PRN
Status: DISCONTINUED | OUTPATIENT
Start: 2020-06-03 | End: 2020-06-04

## 2020-06-03 RX ORDER — FENTANYL 100 UG/H
1 PATCH TRANSDERMAL
Status: DISCONTINUED | OUTPATIENT
Start: 2020-06-03 | End: 2020-06-04

## 2020-06-03 RX ORDER — ONDANSETRON 2 MG/ML
4 INJECTION INTRAMUSCULAR; INTRAVENOUS ONCE
Status: COMPLETED | OUTPATIENT
Start: 2020-06-03 | End: 2020-06-03

## 2020-06-03 RX ORDER — ONDANSETRON 2 MG/ML
4 INJECTION INTRAMUSCULAR; INTRAVENOUS EVERY 6 HOURS PRN
Status: DISCONTINUED | OUTPATIENT
Start: 2020-06-04 | End: 2020-06-04

## 2020-06-03 RX ORDER — ONDANSETRON 2 MG/ML
4 INJECTION INTRAMUSCULAR; INTRAVENOUS EVERY 4 HOURS PRN
Status: DISCONTINUED | OUTPATIENT
Start: 2020-06-03 | End: 2020-06-03

## 2020-06-03 RX ORDER — SODIUM CHLORIDE 9 MG/ML
INJECTION, SOLUTION INTRAVENOUS CONTINUOUS
Status: DISCONTINUED | OUTPATIENT
Start: 2020-06-03 | End: 2020-06-04

## 2020-06-03 NOTE — ED INITIAL ASSESSMENT (HPI)
Pt has hx of emergency laser for 8mm stone. Pt  States since noon severe left sided flank pain. Pt states no blood in urine noted.

## 2020-06-03 NOTE — ED PROVIDER NOTES
Patient Seen in: [facility] Emergency Department      History   Patient presents with:  Abdomen/Flank Pain    Stated Complaint: hx of kidney stone wtih left flank pain    HPI  77 yo male with history of chronic pancreatitis, COPD, CHF, DM, htn, and loss               Past Surgical History:   Procedure Laterality Date   • APPENDECTOMY     • APPENDECTOMY     • CHOLECYSTECTOMY     • CYSTOSCOPY URETEROSCOPY Left 5/1/2020    Performed by Mary Wilkins MD at [facility] MAIN OR   • ENDOSCOPIC ULTRASOUND (EUS) N/A toxic-appearing. HENT:      Head: Normocephalic and atraumatic.       Right Ear: External ear normal.      Left Ear: External ear normal.      Nose: Nose normal.   Eyes:      Conjunctiva/sclera: Conjunctivae normal.   Neck:      Musculoskeletal: Normal ra Abnormality         Status                     ---------                               -----------         ------                     CBC W/ DIFFERENTIAL[[id number]]          Abnormal            Final result                 Please view results for these kevin kidney ranging in size between 2 and 4 mm. There is mild stranding in the perinephric fat on the left which  may reflect post obstructive change. Pyelonephritis is not excluded. There is no right-sided hydronephrosis or right hydroureter.   There are num again identified. There is some atelectasis in both lower lobes which is slightly worse compared to the previous exam.  Calcified granuloma noted within the left lingula. There is stable cardiomegaly. OTHER:  Negative. CONCLUSION:  1.  There is moderate

## 2020-06-04 ENCOUNTER — ANESTHESIA (OUTPATIENT)
Dept: SURGERY | Facility: HOSPITAL | Age: 70
DRG: 660 | End: 2020-06-04
Payer: MEDICARE

## 2020-06-04 ENCOUNTER — ANESTHESIA EVENT (OUTPATIENT)
Dept: SURGERY | Facility: HOSPITAL | Age: 70
DRG: 660 | End: 2020-06-04
Payer: MEDICARE

## 2020-06-04 VITALS
TEMPERATURE: 98 F | HEART RATE: 74 BPM | OXYGEN SATURATION: 95 % | DIASTOLIC BLOOD PRESSURE: 60 MMHG | HEIGHT: 71 IN | BODY MASS INDEX: 20.86 KG/M2 | RESPIRATION RATE: 17 BRPM | WEIGHT: 149 LBS | SYSTOLIC BLOOD PRESSURE: 124 MMHG

## 2020-06-04 PROCEDURE — BT1FYZZ FLUOROSCOPY OF LEFT KIDNEY, URETER AND BLADDER USING OTHER CONTRAST: ICD-10-PCS | Performed by: UROLOGY

## 2020-06-04 PROCEDURE — 0T778DZ DILATION OF LEFT URETER WITH INTRALUMINAL DEVICE, VIA NATURAL OR ARTIFICIAL OPENING ENDOSCOPIC: ICD-10-PCS | Performed by: UROLOGY

## 2020-06-04 PROCEDURE — 0TC78ZZ EXTIRPATION OF MATTER FROM LEFT URETER, VIA NATURAL OR ARTIFICIAL OPENING ENDOSCOPIC: ICD-10-PCS | Performed by: UROLOGY

## 2020-06-04 DEVICE — URETERAL STENT
Type: IMPLANTABLE DEVICE | Site: URETER | Status: FUNCTIONAL
Brand: ASCERTA™

## 2020-06-04 RX ORDER — HYDROMORPHONE HYDROCHLORIDE 1 MG/ML
0.4 INJECTION, SOLUTION INTRAMUSCULAR; INTRAVENOUS; SUBCUTANEOUS EVERY 5 MIN PRN
Status: DISCONTINUED | OUTPATIENT
Start: 2020-06-04 | End: 2020-06-04 | Stop reason: HOSPADM

## 2020-06-04 RX ORDER — DEXAMETHASONE SODIUM PHOSPHATE 4 MG/ML
VIAL (ML) INJECTION AS NEEDED
Status: DISCONTINUED | OUTPATIENT
Start: 2020-06-04 | End: 2020-06-04 | Stop reason: SURG

## 2020-06-04 RX ORDER — CEFAZOLIN SODIUM/WATER 2 G/20 ML
SYRINGE (ML) INTRAVENOUS AS NEEDED
Status: DISCONTINUED | OUTPATIENT
Start: 2020-06-04 | End: 2020-06-04 | Stop reason: SURG

## 2020-06-04 RX ORDER — NALOXONE HYDROCHLORIDE 0.4 MG/ML
80 INJECTION, SOLUTION INTRAMUSCULAR; INTRAVENOUS; SUBCUTANEOUS AS NEEDED
Status: DISCONTINUED | OUTPATIENT
Start: 2020-06-04 | End: 2020-06-04 | Stop reason: HOSPADM

## 2020-06-04 RX ORDER — HYDROCODONE BITARTRATE AND ACETAMINOPHEN 5; 325 MG/1; MG/1
2 TABLET ORAL AS NEEDED
Status: DISCONTINUED | OUTPATIENT
Start: 2020-06-04 | End: 2020-06-04 | Stop reason: HOSPADM

## 2020-06-04 RX ORDER — HYDROCODONE BITARTRATE AND ACETAMINOPHEN 5; 325 MG/1; MG/1
1 TABLET ORAL AS NEEDED
Status: DISCONTINUED | OUTPATIENT
Start: 2020-06-04 | End: 2020-06-04 | Stop reason: HOSPADM

## 2020-06-04 RX ORDER — ONDANSETRON 2 MG/ML
4 INJECTION INTRAMUSCULAR; INTRAVENOUS AS NEEDED
Status: DISCONTINUED | OUTPATIENT
Start: 2020-06-04 | End: 2020-06-04 | Stop reason: HOSPADM

## 2020-06-04 RX ORDER — LIDOCAINE HYDROCHLORIDE 10 MG/ML
INJECTION, SOLUTION EPIDURAL; INFILTRATION; INTRACAUDAL; PERINEURAL AS NEEDED
Status: DISCONTINUED | OUTPATIENT
Start: 2020-06-04 | End: 2020-06-04 | Stop reason: SURG

## 2020-06-04 RX ORDER — ONDANSETRON 2 MG/ML
INJECTION INTRAMUSCULAR; INTRAVENOUS AS NEEDED
Status: DISCONTINUED | OUTPATIENT
Start: 2020-06-04 | End: 2020-06-04 | Stop reason: SURG

## 2020-06-04 RX ORDER — MEPERIDINE HYDROCHLORIDE 25 MG/ML
12.5 INJECTION INTRAMUSCULAR; INTRAVENOUS; SUBCUTANEOUS AS NEEDED
Status: DISCONTINUED | OUTPATIENT
Start: 2020-06-04 | End: 2020-06-04 | Stop reason: HOSPADM

## 2020-06-04 RX ORDER — SODIUM CHLORIDE 9 MG/ML
INJECTION, SOLUTION INTRAVENOUS CONTINUOUS
Status: DISCONTINUED | OUTPATIENT
Start: 2020-06-04 | End: 2020-06-04 | Stop reason: HOSPADM

## 2020-06-04 RX ORDER — HYDROCODONE BITARTRATE AND ACETAMINOPHEN 5; 325 MG/1; MG/1
1-2 TABLET ORAL EVERY 4 HOURS PRN
Qty: 20 TABLET | Refills: 0 | Status: SHIPPED | OUTPATIENT
Start: 2020-06-04 | End: 2020-06-08 | Stop reason: CLARIF

## 2020-06-04 RX ORDER — DEXTROSE MONOHYDRATE 25 G/50ML
50 INJECTION, SOLUTION INTRAVENOUS
Status: DISCONTINUED | OUTPATIENT
Start: 2020-06-04 | End: 2020-06-04 | Stop reason: HOSPADM

## 2020-06-04 RX ORDER — HYDROMORPHONE HYDROCHLORIDE 1 MG/ML
1 INJECTION, SOLUTION INTRAMUSCULAR; INTRAVENOUS; SUBCUTANEOUS EVERY 2 HOUR PRN
Status: DISCONTINUED | OUTPATIENT
Start: 2020-06-04 | End: 2020-06-04

## 2020-06-04 RX ORDER — MIDAZOLAM HYDROCHLORIDE 1 MG/ML
1 INJECTION INTRAMUSCULAR; INTRAVENOUS EVERY 5 MIN PRN
Status: DISCONTINUED | OUTPATIENT
Start: 2020-06-04 | End: 2020-06-04 | Stop reason: HOSPADM

## 2020-06-04 RX ORDER — METOCLOPRAMIDE HYDROCHLORIDE 5 MG/ML
10 INJECTION INTRAMUSCULAR; INTRAVENOUS AS NEEDED
Status: DISCONTINUED | OUTPATIENT
Start: 2020-06-04 | End: 2020-06-04 | Stop reason: HOSPADM

## 2020-06-04 RX ORDER — DIPHENHYDRAMINE HYDROCHLORIDE 50 MG/ML
12.5 INJECTION INTRAMUSCULAR; INTRAVENOUS AS NEEDED
Status: DISCONTINUED | OUTPATIENT
Start: 2020-06-04 | End: 2020-06-04 | Stop reason: HOSPADM

## 2020-06-04 RX ADMIN — DEXAMETHASONE SODIUM PHOSPHATE 8 MG: 4 MG/ML VIAL (ML) INJECTION at 12:03:00

## 2020-06-04 RX ADMIN — LIDOCAINE HYDROCHLORIDE 50 MG: 10 INJECTION, SOLUTION EPIDURAL; INFILTRATION; INTRACAUDAL; PERINEURAL at 11:54:00

## 2020-06-04 RX ADMIN — SODIUM CHLORIDE: 9 INJECTION, SOLUTION INTRAVENOUS at 12:25:00

## 2020-06-04 RX ADMIN — CEFAZOLIN SODIUM/WATER 2 G: 2 G/20 ML SYRINGE (ML) INTRAVENOUS at 12:03:00

## 2020-06-04 RX ADMIN — ONDANSETRON 4 MG: 2 INJECTION INTRAMUSCULAR; INTRAVENOUS at 12:25:00

## 2020-06-04 NOTE — CONSULTS
[facility]    Report of Consultation    Rajendra Velázquez Patient Status:  Inpatient    1950 MRN [id number]   [facility] 0SW-A Attending [facility] Day # 1 PCP Sophie Velez MD     Date of Admission:  6/3/20 localized osteoarthrosis, lower leg, left [715.16] 9/2/2015   • Pulmonary nodule 3/23/2017   • Renal stones    • Right leg DVT ([facility] 6/2007   • Shortness of breath    • Spondylolisthesis of lumbar region    • Spondylolisthesis of lumbar region    • Type II Daily  ipratropium-albuterol (DUONEB) nebulizer solution 3 mL, 3 mL, Nebulization, Q4H PRN  Vitamin D3 cap 2,000 Units, 2,000 Units, Oral, Daily  tamsulosin HCl (FLOMAX) 0.4 MG cap 0.4 mg, 0.4 mg, Oral, Daily  Umeclidinium Bromide (INCRUSE ELLIPTA) 62.5 MC Solution, Inject 35 Units into the skin nightly. metFORMIN HCl 1000 MG Oral Tab, Take 1,000 mg by mouth 2 (two) times daily with meals. Tiotropium Bromide Monohydrate 18 MCG Inhalation Cap, Inhale 18 mcg into the lungs daily.   Sertraline HCl 25 MG Oral T 06/04/2020    HGB 11.9 (L) 06/04/2020    HCT 34.6 (L) 06/04/2020    .0 06/04/2020    CREATSERUM 1.85 (H) 06/04/2020    BUN 19 (H) 06/04/2020     06/04/2020    K 4.2 06/04/2020     06/04/2020    CO2 26.0 06/04/2020    GLU 98 06/04/2020

## 2020-06-04 NOTE — H&P
[facility] HOSPITALIST                                                               History & Physical         Amandeep Murcia Patient Status:  Inpatient    1950 MRN [id number]   [facility] 0SW-A Attending Ravi Watkins MD   [address] Day # Primary localized osteoarthrosis, lower leg 10/2/2013   • Primary localized osteoarthrosis, lower leg, left [715.16] 9/2/2015   • Pulmonary nodule 3/23/2017   • Renal stones    • [facility]   • Shortness of breath    • Spondylolisthesis of Taking  Ondansetron HCl (ZOFRAN) 8 MG tablet, TAKE one half TABLET BY MOUTH four times DAILY AS NEEDED , Disp: 90 tablet, Rfl: 0, Taking  fentaNYL (DURAGESIC-100) 100 MCG/HR Transdermal Patch 72 Hr, Place 1 patch onto the skin every third day., Disp: 10 pa mouth daily. , Disp: , Rfl: , Taking        Review of Systems:  A comprehensive 14 point review of systems was completed. Pertinent positives and negatives noted in the the HPI.     Physical Exam:     Vital signs: Blood pressure 159/77, pulse 68, temperat flank pain     TECHNIQUE:  Unenhanced multislice CT scanning from above the kidneys to below the urinary bladder. 2D rendering are generated on the CT scanner workstation to localize potential stones in the cranio-caudal plane.   Dose reduction techniques and femoral vessels. No evidence for aortic aneurysm. 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 This has been seen on multiple previous exams dating back to 2011.  Tarun Portillo Finalized by: Juarez Jung MD on 6/03/2020 at 7:53 PM              ASSESSMENT / PLAN:     1.  Acute left renal colic due to 9 mm obstructing stone in the left ureterovesical junction with moderate left hydronephrosis and hydroureter  1. N.p.o. aft

## 2020-06-04 NOTE — PAYOR COMM NOTE
--------------  ADMISSION REVIEW     Payor: Jolie Morgan  Subscriber #:  Dorothey Fillers Number: [id number]    [facility] date: 6/3/20  Admit time: 2148       Admitting Physician: Yanira Canchola MD  Attending Physician:  Martín Melendrez • Long term current use of opiate analgesic 2/17/2015   • Long-term current use of opiate analgesic 6/11/2010   • Multiple risk factors for coronary artery disease 3/28/2019   • Osteoarthritis    • Pain in joints    • Pneumonia due to organism    • [facility] complaint: hx of kidney stone wtih left flank pain  Other systems are as noted in HPI. Constitutional and vital signs reviewed. All other systems reviewed and negative except as noted above.     Physical Exam     ED Triage Vitals   BP 06/03/20 1745 15 GFR, -American 46 (*)     Total Protein 8.6 (*)     Globulin  4.5 (*)     A/G Ratio 0.9 (*)     All other components within normal limits   LIPASE - Abnormal; Notable for the following components:    Lipase 27 (*)     All other components within nor plane.  Dose reduction techniques were used. Dose information is transmitted to the ACR [facility]) NRDR ([address]) which includes the Dose Index Registry.   PATIENT STATED HISTORY: (As transcribed by Technologist 2011. The stability favors a benign lesion such as benign lymph node or chronic scarring from previous pancreatitis. No adenopathy identified. BOWEL/MESENTERY:  There is a large amount of stool seen throughout the colon.   There is no evidence for small elver disposition: 6/3/2020  8:12 PM                   Disposition and Plan     Clinical Impression:  Kidney stone  (primary encounter diagnosis)    Disposition:  Admit  6/3/2020  8:12 pm    Follow-up:  No follow-up provider specified.         Medications Prescri 12:03 AM Date of Service:  6/3/2020 10:29 PM Status:  Signed    :  Zainab Katz MD (Physician)         [facility]                                                               History & Physical         Jeniffer Muro Patient Status:   In coronary artery disease 3/28/2019   • Osteoarthritis    • Pain in joints    • Pneumonia due to organism    • Primary localized osteoarthrosis, lower leg 10/2/2013   • Primary localized osteoarthrosis, lower leg, left [715.16] 9/2/2015   • Pulmonary nodule units Oral Cap DR Particles, TAKE TWO CAPSULES BY MOUTH THREE TIMES DAILY, Disp: 180 capsule, Rfl: 0, Taking  Ondansetron HCl (ZOFRAN) 8 MG tablet, TAKE one half TABLET BY MOUTH four times DAILY AS NEEDED , Disp: 90 tablet, Rfl: 0, Taking  fentaNYL (Roderick Sb mouth daily. , Disp: , Rfl: 0, Taking  Multiple Vitamin (MULTI VITAMIN DAILY OR), Take 1 tablet by mouth daily. , Disp: , Rfl: , Taking        Review of Systems:  A comprehensive 14 point review of systems was completed.   Pertinent positives and negative RNDR(NO IV,NO ORAL)(CPT=74176), 5/01/2020, 1:27 PM.     INDICATIONS:  hx of kidney stone wtih left flank pain     TECHNIQUE:  Unenhanced multislice CT scanning from above the kidneys to below the urinary bladder.   2D rendering are generated on the CT scann Status post splenectomy. AORTA/VASCULAR:  Atheromatous calcified plaque seen within the aorta, iliac and femoral vessels. No evidence for aortic aneurysm.   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 Dictated by: Drake Degroot MD on 6/03/2020 at 7:39 PM       Finalized by: Drake Degroot MD on 6/03/2020 at 7:53 PM              ASSESSMENT / PLAN:     1.  Acute left renal colic due to 9 mm obstruct tomorrow    King Echeverria MD  6/3/2020  10:29 PM      Electronically signed by Rasta Felipe MD on 6/4/2020 12:03 AM         MEDICATIONS ADMINISTERED IN LAST 1 DAY:  Enoxaparin Sodium (LOVENOX) 40 MG/0.4ML injection 40 mg     Date Action Dose Route User Sertraline HCl (ZOLOFT) tab 25 mg     Date Action Dose Route User    6/4/2020 0945 Given 25 mg Oral Earline Madison RN      0.9% NaCl infusion     Date Action Dose Route User    6/3/2020 2300 New Bag (none) Intravenous Romel Winchester, RN      sodium c

## 2020-06-04 NOTE — BRIEF OP NOTE
Pre-Operative Diagnosis: Ureteral calculi [N20.1]     Post-Operative Diagnosis: Ureteral calculi [N20.1]      Procedure Performed:   Procedure(s):  CYSTOSCOPY, LEFT URETEROSCOPY, LEFT STENT PLACEMENT; LASER LITHOTRIPSY    Surgeon(s) and Role:     Benitez Richardson,

## 2020-06-04 NOTE — TELEPHONE ENCOUNTER
Pt does not have asthma, he has COPD    Tiotropium bromide failed protocol due to  Asthma & COPD Medication Protocol Failed6/3 12:48 PM   Asthma Action Score greater than or equal to 20    AAP/ACT given in last 12 months   Last OV relevant to medication: 3

## 2020-06-04 NOTE — PROGRESS NOTES
77 yo patient of Dr. Angeli Watson, post cystoscopy, left retrograde pyelography, ureteral dilation, left URS with laser lithotripsy and stone manipulation, nephroscopy on 1 May 2020, presenting to [facility] ER tonight with non-infected 9 mm left UVJ stone.   Admis ureterocele. 2. There is infiltration of the left perinephric fat which likely reflects post obstructive change. Pyelonephritis is not excluded. 3. 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   4. There are numerous bilate

## 2020-06-04 NOTE — PROGRESS NOTES
[facility] HOSPITALIST  Progress Note     Daja Leon Patient Status:  Inpatient    1950 MRN [id number]   [facility] 0SW-A Attending Hunter Negron [facility] Day # 1 PCP Jacquelin Oliveros MD     Chief Complaint: Left flank pain in Frederic.     Medications:   • PEG 3350  17 g Oral Daily   • Fluticasone Furoate-Vilanterol  1 puff Inhalation Daily   • Vitamin D3  2,000 Units Oral Daily   • tamsulosin HCl  0.4 mg Oral Daily   • Umeclidinium Bromide  1 puff Inhalation Daily   • Sertraline TBD  Discharge is dependent on: clinical improvment  At this point Mr. Judy Hall is expected to be discharge to: Home    Plan of care discussed with pt at bedside    Candie Ndiaye DO

## 2020-06-04 NOTE — PLAN OF CARE
Pt discharged via wheelchair accompanied by spouse. Prescriptiosn and discharge instructions given with pt verbalizing understanding.

## 2020-06-04 NOTE — OPERATIVE REPORT
URETEROSCOPY OPERATIVE NOTE    PATIENT NAME: Anders Diggs  YOB: 1950  DATE OF SERVICE: 6/4/2020    SURGEON:  Martina Groves MD    ASSISTANT:  None    PREOPERATIVE DIAGNOSIS:  Left distal ureteral calculus, left renal calculi    POSTOPERAT After reviewing the indications for the procedure, informed consent was reviewed and signed by the patient. The patient was brought to the operating room and placed in the supine position on the OR table.   SCD's were applied and all pressure points were I then advanced a flexible ureteroscope over the wire and into the renal pelvis under fluoroscopic guidance. I performed a thorough nephroscopy which revealed several small stones and stone fragments throughout the calyces.   I used a 200 micron laser fibe

## 2020-06-04 NOTE — PLAN OF CARE
Received patient due to severe left sided flank pain. CT A/P- non-infected 9 mm left UVJ stone. He is alert and appropriate. Interactive. He denies chest pain and SOB.  He has been following up with a pain clinic due to chronic pain syndrome from chronic panc and pain management  - Manage/alleviate anxiety  - Utilize distraction and/or relaxation techniques  - Monitor for opioid side effects  - Notify MD/LIP if interventions unsuccessful or patient reports new pain  - Anticipate increased pain with activity and results as appropriate  - Fluid restriction as ordered  - Instruct patient on fluid and nutrition restrictions as appropriate  Outcome: Progressing

## 2020-06-04 NOTE — ANESTHESIA POSTPROCEDURE EVALUATION
[facility] Patient Status:  Inpatient   Age/Gender 78year old male MRN [id number]   [facility] SURGERY Attending ODILIA Valdez 21 Day # 1 PCP Elias Ren MD       Anesthesia Post-op Note    Procedu

## 2020-06-04 NOTE — ANESTHESIA PREPROCEDURE EVALUATION
PRE-OP EVALUATION    Patient Name: Mukund Wynn    Pre-op Diagnosis: Ureteral calculi [N20.1]    Procedure(s):  CYSTOSCOPY, LEFT URETEROSCOPY, LEFT STENT PLACEMENT    Surgeon(s) and Role:     Selina Augustin MD - Primary    Pre-op vitals reviewed.   Te CC  HYDROmorphone HCl (DILAUDID) tab 4 mg, 4 mg, Oral, Q3H PRN  glucose (DEX4) oral liquid 15 g, 15 g, Oral, Q15 Min PRN    Or  Glucose-Vitamin C (DEX-4) chewable tab 4 tablet, 4 tablet, Oral, Q15 Min PRN    Or  dextrose 50 % injection 50 mL, 50 mL, Nenita Love Wheezing., Disp: , Rfl:   Insulin Lispro, 1 Unit Dial, (HUMALOG KWIKPEN) 100 UNIT/ML Subcutaneous Solution Pen-injector, PER SLIDING SCALE:120-180 inject 4 units,181-240 inject 8 units,241-300 inject 10 units,301-350 inject 12 units,over 351 inject 15 unit insulin                  (+) arthritis       Pulmonary        (+) COPD and mild  COPD not requiring home oxygen.                 Neuro/Psych      (+) depression                        Chronic pancreatitis  Chronic pain requiring long term opioid therapy full Cardiovascular      Rhythm: regular  Rate: normal     Dental      Dental appliance(s): partials       Pulmonary      Breath sounds clear to auscultation bilaterally.                Other findings            ASA: 3   Plan: general  NPO status verified a

## 2020-06-04 NOTE — ANESTHESIA PROCEDURE NOTES
Airway  Urgency: elective      General Information and Staff    Patient location during procedure: OR  Anesthesiologist: Nj Garcia MD  Performed: anesthesiologist     Indications and Patient Condition  Indications for airway management: anesthesia  Yarelis

## 2020-06-05 ENCOUNTER — PATIENT OUTREACH (OUTPATIENT)
Dept: CASE MANAGEMENT | Age: 70
End: 2020-06-05

## 2020-06-05 DIAGNOSIS — Z02.9 ENCOUNTERS FOR UNSPECIFIED ADMINISTRATIVE PURPOSE: ICD-10-CM

## 2020-06-05 PROCEDURE — 1111F DSCHRG MED/CURRENT MED MERGE: CPT

## 2020-06-05 NOTE — PROGRESS NOTES
Initial Post Discharge Follow Up   Discharge Date: 6/4/20  Contact Date: 6/5/2020    Consent Verification:  Assessment Completed With: Patient  HIPAA Verified?   Yes    Discharge Dx:    Acute left renal colic due to 9 mm obstructing stone in the left ure for Pain. 20 tablet 0   • HYDROmorphone HCl 2 MG Oral Tab Take 4 mg by mouth every 3 (three) hours as needed for Pain.      • CREON 06830 units Oral Cap DR Particles TAKE TWO CAPSULES BY MOUTH THREE TIMES DAILY 180 capsule 0   • Ondansetron HCl (ZOFRAN) 8 M discussed with you prior to leaving the hospital? yes  • (NCM) If a new medication was prescribed:    o Was the new medication’s purpose & side effects reviewed? yes  o Do you have any questions about your new medication?  No  • Did you  your dischar smoking, gum or candy are permitted.        Jul 14, 2020  2:15 PM CDT ULTRASOUND PV SCREENING with Eliseo [facility])    You will need to fast for 4 hours prior to your test. [address] [facility] [place]  [address] [facility] [place]   [address] [facility] [address] [phone], [place]  [address] [place] [address] no, scheduled 6/8/2020. [x]  Advised patient to bring all medications and blood glucose meter/supplies if applicable.

## 2020-06-08 ENCOUNTER — VIRTUAL PHONE E/M (OUTPATIENT)
Dept: INTERNAL MEDICINE CLINIC | Facility: CLINIC | Age: 70
End: 2020-06-08
Payer: MEDICARE

## 2020-06-08 DIAGNOSIS — F11.20 CHRONIC NARCOTIC DEPENDENCE (HCC): ICD-10-CM

## 2020-06-08 DIAGNOSIS — G89.29 OTHER CHRONIC PAIN: Chronic | ICD-10-CM

## 2020-06-08 DIAGNOSIS — E11.22 TYPE 2 DIABETES MELLITUS WITH CHRONIC KIDNEY DISEASE, WITH LONG-TERM CURRENT USE OF INSULIN, UNSPECIFIED CKD STAGE (HCC): ICD-10-CM

## 2020-06-08 DIAGNOSIS — J44.9 CHRONIC OBSTRUCTIVE PULMONARY DISEASE, UNSPECIFIED COPD TYPE (HCC): Chronic | ICD-10-CM

## 2020-06-08 DIAGNOSIS — Z79.4 TYPE 2 DIABETES MELLITUS WITH CHRONIC KIDNEY DISEASE, WITH LONG-TERM CURRENT USE OF INSULIN, UNSPECIFIED CKD STAGE (HCC): ICD-10-CM

## 2020-06-08 DIAGNOSIS — K86.1 CHRONIC PANCREATITIS, UNSPECIFIED PANCREATITIS TYPE (HCC): Chronic | ICD-10-CM

## 2020-06-08 DIAGNOSIS — N20.0 KIDNEY STONE: ICD-10-CM

## 2020-06-08 DIAGNOSIS — N18.30 CKD (CHRONIC KIDNEY DISEASE) STAGE 3, GFR 30-59 ML/MIN (HCC): Chronic | ICD-10-CM

## 2020-06-08 DIAGNOSIS — N23 RENAL COLIC: Primary | ICD-10-CM

## 2020-06-08 PROBLEM — E11.9 TYPE 2 DIABETES MELLITUS, WITH LONG-TERM CURRENT USE OF INSULIN (HCC): Status: ACTIVE | Noted: 2020-06-08

## 2020-06-08 PROCEDURE — 99442 PHONE E/M BY PHYS 11-20 MIN: CPT | Performed by: CLINICAL NURSE SPECIALIST

## 2020-06-08 NOTE — PROGRESS NOTES
TRANSITIONAL CARE CLINIC PHARMACIST MEDICATION RECONCILIATION        Amandeep Murcia MRN [id number]    1950 PCP Mel Jimenez MD       Comments: Medication history completed by the [address] [place] [address] Pharmacist with the patient on the phon daily. Rinse and spit after using   • ipratropium-albuterol 0.5-2.5 (3) MG/3ML Inhalation Solution Take 3 mL by nebulization every 4 (four) hours as needed. • PEG 3350 Oral Powd Pack Take 17 g by mouth daily.      • Multiple Vitamin (MULTI VITAMIN DAILY O

## 2020-06-08 NOTE — PROGRESS NOTES
Virtual/Telephone Check-In  AUDIO ONLY    Miri Coyne verbally consents to a Virtual/Telephone Check-In service on 06/08/20.   Patient understands and accepts financial responsibility for any deductible, co-insurance and/or co-pays associated with [facility] 13, 2020  2:45 PM CDT Established Patient Office Visit with Delores Rojo MD [facility]  [facility] [place] ([address], [facility] [address])        Jul 14, [facility]  1:30 PM CDT ULTRASOUND PV SCREENING with [address] CARD PV ROOM 2 [place] Dials fasting. Please do not have any coffee or caffeine for 4 hours before the procedure. Caffeine is a stimulant which can speed up the heart rate. If the heart rate is too fast, the Heart Scan may not be completed.     Payment for this screening exam is du

## 2020-06-10 NOTE — PROGRESS NOTES
Results released to patient via [employer]. Mr. Yash Ariza -     Your kidney stone was the most common type - calcium oxalate. Please make a follow up appointment with myself or Dr. Domenico Cruz to talk about stone prevention tips.   I hope everything is going well

## 2020-06-17 ENCOUNTER — PATIENT OUTREACH (OUTPATIENT)
Dept: CASE MANAGEMENT | Age: 70
End: 2020-06-17

## 2020-06-17 NOTE — PROGRESS NOTES
Attempted contacting pt to intro CCM services with no answer. Will continue contacting to discuss. Left message for patient to CB at his convenience.  [phone]

## 2020-07-07 RX ORDER — TIOTROPIUM BROMIDE 18 UG/1
CAPSULE ORAL; RESPIRATORY (INHALATION)
Qty: 30 CAPSULE | Refills: 0 | Status: SHIPPED | OUTPATIENT
Start: 2020-07-07 | End: 2020-07-27

## 2020-07-07 NOTE — TELEPHONE ENCOUNTER
Spiriva 18 mcg 1 cap daily filled 10-31-19 30 cap with 2 refills     LOV 3-10-20   return in 1 week for f/u   No upcoming apt on file   Labs 6-3-20

## 2020-07-10 ENCOUNTER — APPOINTMENT (OUTPATIENT)
Dept: LAB | Age: 70
End: 2020-07-10
Attending: INTERNAL MEDICINE
Payer: MEDICARE

## 2020-07-10 ENCOUNTER — OFFICE VISIT (OUTPATIENT)
Dept: INTERNAL MEDICINE CLINIC | Facility: CLINIC | Age: 70
End: 2020-07-10
Payer: MEDICARE

## 2020-07-10 VITALS
OXYGEN SATURATION: 97 % | TEMPERATURE: 98 F | SYSTOLIC BLOOD PRESSURE: 130 MMHG | RESPIRATION RATE: 16 BRPM | HEIGHT: 71 IN | BODY MASS INDEX: 19.88 KG/M2 | DIASTOLIC BLOOD PRESSURE: 64 MMHG | HEART RATE: 73 BPM | WEIGHT: 142 LBS

## 2020-07-10 DIAGNOSIS — R79.89 ELEVATED SERUM CREATININE: ICD-10-CM

## 2020-07-10 DIAGNOSIS — N28.9 RENAL INSUFFICIENCY: ICD-10-CM

## 2020-07-10 DIAGNOSIS — I10 ESSENTIAL HYPERTENSION, BENIGN: Primary | ICD-10-CM

## 2020-07-10 PROBLEM — N17.9 AKI (ACUTE KIDNEY INJURY) (HCC): Status: RESOLVED | Noted: 2019-05-25 | Resolved: 2020-07-10

## 2020-07-10 PROBLEM — N20.0 BILATERAL KIDNEY STONES: Chronic | Status: ACTIVE | Noted: 2020-05-07

## 2020-07-10 LAB
ANION GAP SERPL CALC-SCNC: 4 MMOL/L (ref 0–18)
BUN BLD-MCNC: 22 MG/DL (ref 7–18)
BUN/CREAT SERPL: 16.1 (ref 10–20)
CALCIUM BLD-MCNC: 9.5 MG/DL (ref 8.5–10.1)
CHLORIDE SERPL-SCNC: 110 MMOL/L (ref 98–112)
CO2 SERPL-SCNC: 26 MMOL/L (ref 21–32)
CREAT BLD-MCNC: 1.37 MG/DL (ref 0.7–1.3)
GLUCOSE BLD-MCNC: 126 MG/DL (ref 70–99)
OSMOLALITY SERPL CALC.SUM OF ELEC: 295 MOSM/KG (ref 275–295)
PATIENT FASTING Y/N/NP: NO
POTASSIUM SERPL-SCNC: 4.2 MMOL/L (ref 3.5–5.1)
SODIUM SERPL-SCNC: 140 MMOL/L (ref 136–145)

## 2020-07-10 PROCEDURE — 36415 COLL VENOUS BLD VENIPUNCTURE: CPT

## 2020-07-10 PROCEDURE — 99213 OFFICE O/P EST LOW 20 MIN: CPT | Performed by: INTERNAL MEDICINE

## 2020-07-10 PROCEDURE — 80048 BASIC METABOLIC PNL TOTAL CA: CPT

## 2020-07-10 NOTE — PROGRESS NOTES
Keyanna BAKER 1950 is a 78year old male. Patient presents with:   Follow - Up       HPI:     Current Outpatient Medications   Medication Sig Dispense Refill   • [START ON 2020] HYDROmorphone HCl (DILAUDID) 4 MG Oral Tab Take 1 tablet ( 0.5-2.5 (3) MG/3ML Inhalation Solution Take 3 mL by nebulization every 4 (four) hours as needed. 120 vial 11   • PEG 3350 Oral Powd Pack Take 17 g by mouth daily. 0   • Multiple Vitamin (MULTI VITAMIN DAILY OR) Take 1 tablet by mouth daily.           Pas Syncope none. Rapid heart beat at rest no. Change in exercise tolerance no. Chest pain no. Chest pain while awake none. Cold extremities no. Dizziness no. Dyspnea on exertion none. Fainting none. Fatigue no. High blood pressure on medication(s).  Bonilla Lyon

## 2020-07-14 ENCOUNTER — HOSPITAL ENCOUNTER (OUTPATIENT)
Dept: CT IMAGING | Facility: HOSPITAL | Age: 70
Discharge: HOME | End: 2020-07-14
Attending: INTERNAL MEDICINE

## 2020-07-14 ENCOUNTER — HOSPITAL ENCOUNTER (OUTPATIENT)
Dept: CARDIOLOGY CLINIC | Facility: HOSPITAL | Age: 70
Discharge: HOME | End: 2020-07-14
Attending: INTERNAL MEDICINE

## 2020-07-14 DIAGNOSIS — Z13.9 ENCOUNTER FOR SCREENING: ICD-10-CM

## 2020-07-14 DIAGNOSIS — Z13.6 SCREENING FOR CARDIOVASCULAR CONDITION: ICD-10-CM

## 2020-07-20 ENCOUNTER — OFFICE VISIT (OUTPATIENT)
Dept: INTERNAL MEDICINE CLINIC | Facility: CLINIC | Age: 70
End: 2020-07-20
Payer: MEDICARE

## 2020-07-20 ENCOUNTER — TELEPHONE (OUTPATIENT)
Dept: INTERNAL MEDICINE CLINIC | Facility: CLINIC | Age: 70
End: 2020-07-20

## 2020-07-20 VITALS
WEIGHT: 141.5 LBS | RESPIRATION RATE: 16 BRPM | DIASTOLIC BLOOD PRESSURE: 60 MMHG | BODY MASS INDEX: 19.81 KG/M2 | HEART RATE: 82 BPM | OXYGEN SATURATION: 95 % | HEIGHT: 71 IN | TEMPERATURE: 98 F | SYSTOLIC BLOOD PRESSURE: 110 MMHG

## 2020-07-20 DIAGNOSIS — R79.89 ELEVATED SERUM CREATININE: Primary | ICD-10-CM

## 2020-07-20 DIAGNOSIS — I73.9 PERIPHERAL VASCULAR DISEASE (HCC): Primary | ICD-10-CM

## 2020-07-20 DIAGNOSIS — R68.89 ABNORMAL FINDING ON SCREENING PROCEDURE: ICD-10-CM

## 2020-07-20 PROCEDURE — 3074F SYST BP LT 130 MM HG: CPT | Performed by: INTERNAL MEDICINE

## 2020-07-20 PROCEDURE — 3008F BODY MASS INDEX DOCD: CPT | Performed by: INTERNAL MEDICINE

## 2020-07-20 PROCEDURE — 99213 OFFICE O/P EST LOW 20 MIN: CPT | Performed by: INTERNAL MEDICINE

## 2020-07-20 PROCEDURE — 3078F DIAST BP <80 MM HG: CPT | Performed by: INTERNAL MEDICINE

## 2020-07-20 NOTE — PROGRESS NOTES
Jhony Pod  1950 is a 78year old male.     Patient presents with:  Test Results      HPI:   Patient here after peripheral vascular screen and CT calcium of the heart  Current Outpatient Medications   Medication Sig Dispense Refill   • HYDROm • ipratropium-albuterol 0.5-2.5 (3) MG/3ML Inhalation Solution Take 3 mL by nebulization every 4 (four) hours as needed. 120 vial 11   • PEG 3350 Oral Powd Pack Take 17 g by mouth daily.     0   • Multiple Vitamin (MULTI VITAMIN DAILY OR) Take 1 tablet by SYSTEMS:   na        EXAM:   /60   Pulse 82   Temp 98.2 °F (36.8 °C) (Oral)   Resp 16   Ht 71\"   Wt 141 lb 8 oz (64.2 kg)   SpO2 95%   BMI 19.74 kg/m²     na      ASSESSMENT AND PLAN:   Cornelia Cruz was seen today for test results.     Diagnoses and all o

## 2020-07-20 NOTE — TELEPHONE ENCOUNTER
Leticia Munoz MD  7/11/2020 10:10 AM      Reviewed results   Creatinine is much better.  Repeat BMP in 6 weeks      Verified pt views MyChart. Message sent via [address] [place] [address] [address] with results. Repeat lab ordered.      Leticia Munoz MD  7/16/2020  9:38 AM      Re

## 2020-07-27 NOTE — TELEPHONE ENCOUNTER
Protocol failed for Spiriva    Medication(s) to Refill:   Requested Prescriptions     Pending Prescriptions Disp Refills   • Pancrelipase, Lip-Prot-Amyl, (CREON) 78739 units Oral Cap DR Particles 180 capsule 0     Sig: Take 2 capsules by mouth 3 (three) ti

## 2020-07-31 ENCOUNTER — TELEPHONE (OUTPATIENT)
Dept: OTHER | Facility: HOSPITAL | Age: 70
End: 2020-07-31

## 2020-07-31 ENCOUNTER — TELEPHONE (OUTPATIENT)
Dept: INTERNAL MEDICINE CLINIC | Facility: CLINIC | Age: 70
End: 2020-07-31

## 2020-07-31 DIAGNOSIS — Q25.40 ABNORMALITY OF THORACIC AORTA: Primary | ICD-10-CM

## 2020-07-31 NOTE — PROGRESS NOTES
Telephone call made to Dr. Theresa Gonzalez office. Spoke with AUNDREA Resendez regarding a Cardiac Incidental Finding of a Dilated Thoracic Aorta (4.2cm) with Cardiac recommendation of a Gated CT Angiogram of the thoracic aorta if indicated for further evaluation.   Erinn Stark

## 2020-07-31 NOTE — TELEPHONE ENCOUNTER
Pt had incidental finding dilated thoracic aorta at 4.2 cm. Cardiologist recommends a gated ct angiogram of thoracic aorta for further evaluation if indicated. Kamala w/ Edw calling to verify provider was aware. Please advise.  TY none

## 2020-08-02 ENCOUNTER — HOSPITAL ENCOUNTER (OUTPATIENT)
Dept: ULTRASOUND IMAGING | Age: 70
Discharge: HOME | End: 2020-08-02
Attending: INTERNAL MEDICINE
Payer: MEDICARE

## 2020-08-02 DIAGNOSIS — I73.9 PERIPHERAL VASCULAR DISEASE (HCC): ICD-10-CM

## 2020-08-02 PROCEDURE — 93880 EXTRACRANIAL BILAT STUDY: CPT | Performed by: INTERNAL MEDICINE

## 2020-08-02 PROCEDURE — 76770 US EXAM ABDO BACK WALL COMP: CPT | Performed by: INTERNAL MEDICINE

## 2020-08-04 ENCOUNTER — OFFICE VISIT (OUTPATIENT)
Dept: INTERNAL MEDICINE CLINIC | Facility: CLINIC | Age: 70
End: 2020-08-04
Payer: MEDICARE

## 2020-08-04 VITALS
HEART RATE: 89 BPM | RESPIRATION RATE: 12 BRPM | BODY MASS INDEX: 20.58 KG/M2 | SYSTOLIC BLOOD PRESSURE: 124 MMHG | DIASTOLIC BLOOD PRESSURE: 56 MMHG | OXYGEN SATURATION: 95 % | WEIGHT: 147 LBS | HEIGHT: 71 IN | TEMPERATURE: 98 F

## 2020-08-04 DIAGNOSIS — Q25.40 ABNORMALITY OF THORACIC AORTA: Primary | ICD-10-CM

## 2020-08-04 PROCEDURE — 99213 OFFICE O/P EST LOW 20 MIN: CPT | Performed by: INTERNAL MEDICINE

## 2020-08-04 PROCEDURE — 3008F BODY MASS INDEX DOCD: CPT | Performed by: INTERNAL MEDICINE

## 2020-08-04 PROCEDURE — 3078F DIAST BP <80 MM HG: CPT | Performed by: INTERNAL MEDICINE

## 2020-08-04 PROCEDURE — 3074F SYST BP LT 130 MM HG: CPT | Performed by: INTERNAL MEDICINE

## 2020-08-04 NOTE — PROGRESS NOTES
Jocelyn Bearden  1950 is a 78year old male.     Patient presents with:  Test Results      HPI:   Here for test resultsn  Current Outpatient Medications   Medication Sig Dispense Refill   • HYDROmorphone HCl (DILAUDID) 4 MG Oral Tab Take 1 tablet using 1 Inhaler 11   • ipratropium-albuterol 0.5-2.5 (3) MG/3ML Inhalation Solution Take 3 mL by nebulization every 4 (four) hours as needed. 120 vial 11   • PEG 3350 Oral Powd Pack Take 17 g by mouth daily.     0   • Multiple Vitamin (MULTI VITAMIN DAILY O No       na        EXAM:   /56   Pulse 89   Temp 98.4 °F (36.9 °C) (Oral)   Resp 12   Ht 71\"   Wt 147 lb (66.7 kg)   SpO2 95%   BMI 20.50 kg/m²     na      ASSESSMENT AND PLAN:   Stephanie Brennan was seen today for test results.     Diagnoses and all orders f

## 2020-08-12 ENCOUNTER — TELEPHONE (OUTPATIENT)
Dept: INTERNAL MEDICINE CLINIC | Facility: CLINIC | Age: 70
End: 2020-08-12

## 2020-08-12 RX ORDER — PANCRELIPASE 36000; 180000; 114000 [USP'U]/1; [USP'U]/1; [USP'U]/1
CAPSULE, DELAYED RELEASE PELLETS ORAL
Qty: 180 CAPSULE | Refills: 0 | Status: SHIPPED | OUTPATIENT
Start: 2020-08-12 | End: 2020-09-18

## 2020-08-12 NOTE — TELEPHONE ENCOUNTER
Pancrelipase, Lip-Prot-Amyl, (CREON) 53232 units   [facility]/PHARMACY #[id number] - [place] - [address] [facility], [phone], [phone]

## 2020-08-12 NOTE — TELEPHONE ENCOUNTER
Requesting Pancrelipase, Lip-Prot-Amyl, (CREON) 08891 units Oral Cap DR Particles  LOV: 8/4/20  RTC: 4 weeks  Last Relevant Labs: 7/10/20  Filled: 7/27/20 #180 with 0 refills    Future Appointments   Date Time Provider Basilio Caraballo   8/24/2020  2:

## 2020-08-22 NOTE — TELEPHONE ENCOUNTER
Failed protocol    Requesting Tiotropium Bromide Monohydrate (SPIRIVA HANDIHALER) 18 MCG Inhalation Cap  LOV: 8/4/20  RTC: 4 weeks  Last Relevant Labs: 7/10/20  Filled: 7/27/20 #30 with 0 refills    Future Appointments   Date Time Provider Department Brenda

## 2020-08-24 ENCOUNTER — HOSPITAL ENCOUNTER (OUTPATIENT)
Dept: CT IMAGING | Facility: HOSPITAL | Age: 70
Discharge: HOME | End: 2020-08-24
Attending: INTERNAL MEDICINE
Payer: MEDICARE

## 2020-08-24 VITALS — HEART RATE: 72 BPM | DIASTOLIC BLOOD PRESSURE: 68 MMHG | SYSTOLIC BLOOD PRESSURE: 117 MMHG

## 2020-08-24 DIAGNOSIS — Q25.40 ABNORMALITY OF THORACIC AORTA: ICD-10-CM

## 2020-08-24 LAB — CREAT BLD-MCNC: 1.3 MG/DL (ref 0.7–1.3)

## 2020-08-24 PROCEDURE — 71275 CT ANGIOGRAPHY CHEST: CPT | Performed by: INTERNAL MEDICINE

## 2020-08-24 PROCEDURE — 82565 ASSAY OF CREATININE: CPT

## 2020-09-01 ENCOUNTER — LAB ENCOUNTER (OUTPATIENT)
Dept: LAB | Age: 70
End: 2020-09-01
Attending: INTERNAL MEDICINE
Payer: MEDICARE

## 2020-09-01 DIAGNOSIS — R79.89 ELEVATED SERUM CREATININE: ICD-10-CM

## 2020-09-01 LAB
ANION GAP SERPL CALC-SCNC: 3 MMOL/L (ref 0–18)
BUN BLD-MCNC: 17 MG/DL (ref 7–18)
BUN/CREAT SERPL: 11.7 (ref 10–20)
CALCIUM BLD-MCNC: 9.3 MG/DL (ref 8.5–10.1)
CHLORIDE SERPL-SCNC: 105 MMOL/L (ref 98–112)
CO2 SERPL-SCNC: 29 MMOL/L (ref 21–32)
CREAT BLD-MCNC: 1.45 MG/DL (ref 0.7–1.3)
GLUCOSE BLD-MCNC: 168 MG/DL (ref 70–99)
OSMOLALITY SERPL CALC.SUM OF ELEC: 289 MOSM/KG (ref 275–295)
PATIENT FASTING Y/N/NP: NO
POTASSIUM SERPL-SCNC: 4.2 MMOL/L (ref 3.5–5.1)
SODIUM SERPL-SCNC: 137 MMOL/L (ref 136–145)

## 2020-09-01 PROCEDURE — 36415 COLL VENOUS BLD VENIPUNCTURE: CPT

## 2020-09-01 PROCEDURE — 80048 BASIC METABOLIC PNL TOTAL CA: CPT

## 2020-09-09 RX ORDER — SERTRALINE HYDROCHLORIDE 25 MG/1
TABLET, FILM COATED ORAL
Qty: 90 TABLET | Refills: 0 | Status: SHIPPED | OUTPATIENT
Start: 2020-09-09 | End: 2020-12-07

## 2020-09-09 NOTE — TELEPHONE ENCOUNTER
Failed protocol     Last refill:   3/23/2020 Sertraline 25 mg #90 NR   Entered historically -     LOV:   8.4.2020 Dr Sotero Mckeon RTC 4 weeks  FOV scheduled 9/10/2020

## 2020-09-10 ENCOUNTER — OFFICE VISIT (OUTPATIENT)
Dept: INTERNAL MEDICINE CLINIC | Facility: CLINIC | Age: 70
End: 2020-09-10
Payer: MEDICARE

## 2020-09-10 ENCOUNTER — TELEPHONE (OUTPATIENT)
Dept: ENDOCRINOLOGY CLINIC | Facility: CLINIC | Age: 70
End: 2020-09-10

## 2020-09-10 VITALS
DIASTOLIC BLOOD PRESSURE: 70 MMHG | BODY MASS INDEX: 20.06 KG/M2 | HEART RATE: 68 BPM | HEIGHT: 71 IN | RESPIRATION RATE: 16 BRPM | TEMPERATURE: 98 F | WEIGHT: 143.31 LBS | SYSTOLIC BLOOD PRESSURE: 120 MMHG

## 2020-09-10 DIAGNOSIS — Z79.4 TYPE 2 DIABETES MELLITUS WITHOUT COMPLICATION, WITH LONG-TERM CURRENT USE OF INSULIN (HCC): ICD-10-CM

## 2020-09-10 DIAGNOSIS — Q25.40 ABNORMALITY OF THORACIC AORTA: Primary | ICD-10-CM

## 2020-09-10 DIAGNOSIS — E11.9 TYPE 2 DIABETES MELLITUS WITHOUT COMPLICATION, WITH LONG-TERM CURRENT USE OF INSULIN (HCC): ICD-10-CM

## 2020-09-10 PROCEDURE — 99213 OFFICE O/P EST LOW 20 MIN: CPT | Performed by: INTERNAL MEDICINE

## 2020-09-10 PROCEDURE — 3078F DIAST BP <80 MM HG: CPT | Performed by: INTERNAL MEDICINE

## 2020-09-10 PROCEDURE — 3008F BODY MASS INDEX DOCD: CPT | Performed by: INTERNAL MEDICINE

## 2020-09-10 PROCEDURE — 3074F SYST BP LT 130 MM HG: CPT | Performed by: INTERNAL MEDICINE

## 2020-09-10 NOTE — PATIENT INSTRUCTIONS
Patient requesting continuous glucose monitoring.   CT discussed with patient will get an opinion from vascular surgery

## 2020-09-10 NOTE — TELEPHONE ENCOUNTER
Spoke with pt, Medicare's guidelines are that for CGM coverage, the pt must be injecting insulin at least TID (he says some days he doesn't use Humalog at all) and also testing BG QID (he's almost there--is testing TID).      Informed pt he could pay cash ~

## 2020-09-10 NOTE — PROGRESS NOTES
Billy Sanders  1950 is a 78year old male. Patient presents with:   Follow - Up      HPI:   Here for CT gated results as well as complaints of left ear being clogged  Current Outpatient Medications   Medication Sig Dispense Refill   • SERTRALI Inhalation Solution Take 3 mL by nebulization every 4 (four) hours as needed. 120 vial 11   • PEG 3350 Oral Powd Pack Take 17 g by mouth daily. 0   • Multiple Vitamin (MULTI VITAMIN DAILY OR) Take 1 tablet by mouth daily.           Past Medical History: na        EXAM:   /70   Pulse 68   Temp 98.2 °F (36.8 °C) (Oral)   Resp 16   Ht 71\"   Wt 143 lb 4.8 oz (65 kg)   BMI 19.99 kg/m²     Left ear patient has impacted cerumen      ASSESSMENT AND PLAN:   Kae Diaz was seen today for follow - up.     [place] [id number]

## 2020-09-11 ENCOUNTER — TELEPHONE (OUTPATIENT)
Dept: INTERNAL MEDICINE CLINIC | Facility: CLINIC | Age: 70
End: 2020-09-11

## 2020-09-11 ENCOUNTER — APPOINTMENT (OUTPATIENT)
Dept: LAB | Age: 70
End: 2020-09-11
Attending: INTERNAL MEDICINE
Payer: MEDICARE

## 2020-09-11 DIAGNOSIS — Z01.818 OTHER SPECIFIED PRE-OPERATIVE EXAMINATION: ICD-10-CM

## 2020-09-11 DIAGNOSIS — Z11.59 ENCOUNTER FOR SCREENING FOR OTHER VIRAL DISEASES: ICD-10-CM

## 2020-09-12 LAB — SARS-COV-2 RNA RESP QL NAA+PROBE: NOT DETECTED

## 2020-09-14 ENCOUNTER — HOSPITAL ENCOUNTER (OUTPATIENT)
Dept: CV DIAGNOSTICS | Facility: HOSPITAL | Age: 70
Discharge: HOME | End: 2020-09-14
Attending: INTERNAL MEDICINE
Payer: MEDICARE

## 2020-09-14 ENCOUNTER — RT VISIT (OUTPATIENT)
Dept: RESPIRATORY THERAPY | Facility: HOSPITAL | Age: 70
End: 2020-09-14
Attending: INTERNAL MEDICINE
Payer: MEDICARE

## 2020-09-14 DIAGNOSIS — J44.9 COPD (CHRONIC OBSTRUCTIVE PULMONARY DISEASE) (HCC): ICD-10-CM

## 2020-09-14 PROCEDURE — 94729 DIFFUSING CAPACITY: CPT

## 2020-09-14 PROCEDURE — 94060 EVALUATION OF WHEEZING: CPT

## 2020-09-14 PROCEDURE — 93306 TTE W/DOPPLER COMPLETE: CPT | Performed by: INTERNAL MEDICINE

## 2020-09-14 PROCEDURE — 94726 PLETHYSMOGRAPHY LUNG VOLUMES: CPT

## 2020-09-14 NOTE — TELEPHONE ENCOUNTER
Ondansetron Hydrochloride 8 Mg Tab Auro  No Protocol     LOV: 9/10/2020   RTC: 6 months   Upcoming OV: none scheduled   Filled: 5/26/2020 #90 0 refills

## 2020-09-15 RX ORDER — ONDANSETRON HYDROCHLORIDE 8 MG/1
TABLET, FILM COATED ORAL
Qty: 90 TABLET | Refills: 0 | OUTPATIENT
Start: 2020-09-15

## 2020-09-16 NOTE — PROCEDURES
Findings:  Postbronchodilator FEV1 is 1.37L, 49% predicted. Postbronchodilator FVC is 3.11L, 84% predicted. FEV1/ FVC ratio is 0.44. There is no significant bronchodilator response after   administration of albuterol.    The flow-volume loop demonstrates

## 2020-09-17 RX ORDER — ONDANSETRON HYDROCHLORIDE 8 MG/1
4 TABLET, FILM COATED ORAL 4 TIMES DAILY PRN
Qty: 90 TABLET | Refills: 0 | Status: SHIPPED | OUTPATIENT
Start: 2020-09-17 | End: 2020-12-01

## 2020-09-18 RX ORDER — PANCRELIPASE 36000; 180000; 114000 [USP'U]/1; [USP'U]/1; [USP'U]/1
CAPSULE, DELAYED RELEASE PELLETS ORAL
Qty: 180 CAPSULE | Refills: 0 | Status: SHIPPED | OUTPATIENT
Start: 2020-09-18 | End: 2020-10-20

## 2020-09-18 NOTE — TELEPHONE ENCOUNTER
Requesting CREON 67333 units Oral Cap DR Particles  LOV: 9/10/20  RTC: 6 months  Last Relevant Labs: 9/1/20  Filled: 8/12/20 #180 with 0 refills    Future Appointments   Date Time Provider Basilio Caraballo   10/5/2020  2:00 PM [facility] 08 NURSE EMG 8 [facility] B

## 2020-10-05 ENCOUNTER — IMMUNIZATION (OUTPATIENT)
Dept: INTERNAL MEDICINE CLINIC | Facility: CLINIC | Age: 70
End: 2020-10-05
Payer: MEDICARE

## 2020-10-05 DIAGNOSIS — Z23 NEED FOR VACCINATION: ICD-10-CM

## 2020-10-05 PROCEDURE — 90662 IIV NO PRSV INCREASED AG IM: CPT | Performed by: INTERNAL MEDICINE

## 2020-10-05 PROCEDURE — G0008 ADMIN INFLUENZA VIRUS VAC: HCPCS | Performed by: INTERNAL MEDICINE

## 2020-10-14 ENCOUNTER — CARDPULM VISIT (OUTPATIENT)
Dept: CARDIAC REHAB | Facility: HOSPITAL | Age: 70
End: 2020-10-14
Attending: INTERNAL MEDICINE
Payer: MEDICARE

## 2020-10-14 PROCEDURE — 93798 PHYS/QHP OP CAR RHAB W/ECG: CPT

## 2020-10-20 ENCOUNTER — CARDPULM VISIT (OUTPATIENT)
Dept: CARDIAC REHAB | Facility: HOSPITAL | Age: 70
End: 2020-10-20
Attending: INTERNAL MEDICINE
Payer: MEDICARE

## 2020-10-20 RX ORDER — PANCRELIPASE 36000; 180000; 114000 [USP'U]/1; [USP'U]/1; [USP'U]/1
CAPSULE, DELAYED RELEASE PELLETS ORAL
Qty: 180 CAPSULE | Refills: 0 | Status: SHIPPED | OUTPATIENT
Start: 2020-10-20 | End: 2020-12-22

## 2020-10-20 NOTE — TELEPHONE ENCOUNTER
Medication(s) to Refill:   Requested Prescriptions     Pending Prescriptions Disp Refills   • CREON 65746 units Oral Cap DR Particles [Pharmacy Med Name: CREON DR 36,000 UNITS CAPSULE] 180 capsule 0     Sig: TAKE 2 CAPSULES 3 TIMES A DAY       LOV:  9-10-2 Enter through the revolving doors located on the ground floor. Joyce Bedolla left past the Information Desk and proceed through the lobby past the staircase to check in at the Cardiopulmonary Rehab Registration desk.                10/29/2020  1:30 PM  CARDIAC MAKE through the revolving doors located on the ground floor. Katerina Modi left past the Information Desk and proceed through the lobby past the staircase to check in at the Cardiopulmonary Rehab Registration desk.                11/10/2020  1:30 PM  CARDIAC MAKE UP [43 CARDIAC MAKE UP [2482] 60 min [facility] Cardiopulmonary Rehabilitation CARDIAC MAKE UP RESOURCE    Patient Instructions:         Location Instructions: Your appointment is scheduled at [facility]. The address is&nbsp; [address] [place] [address].  [facility]

## 2020-10-22 ENCOUNTER — TELEPHONE (OUTPATIENT)
Dept: INTERNAL MEDICINE CLINIC | Facility: CLINIC | Age: 70
End: 2020-10-22

## 2020-10-22 ENCOUNTER — CARDPULM VISIT (OUTPATIENT)
Dept: CARDIAC REHAB | Facility: HOSPITAL | Age: 70
End: 2020-10-22
Attending: INTERNAL MEDICINE
Payer: MEDICARE

## 2020-10-22 NOTE — TELEPHONE ENCOUNTER
Attempted to call pt/wife back but the phone was picked up and no one answered. Will need call back.

## 2020-10-22 NOTE — TELEPHONE ENCOUNTER
Patient's wife calling b/c they were told by doctor they should follow with diabetic center/and someone would contact them.   Patient would like to request  Dexcon Glucose Meter and supplies; wife requests callback to discuss further and get the information

## 2020-10-27 ENCOUNTER — CARDPULM VISIT (OUTPATIENT)
Dept: CARDIAC REHAB | Facility: HOSPITAL | Age: 70
End: 2020-10-27
Attending: INTERNAL MEDICINE
Payer: MEDICARE

## 2020-10-27 NOTE — TELEPHONE ENCOUNTER
Passed protocol     Last refill:  Metformin 1000 1 tab bid     LOV:   9/10/2020 Dr Brigida Cazares RTC 6 months  No FOV scheduled   Last A1C - 6/3/2020

## 2020-10-29 ENCOUNTER — CARDPULM VISIT (OUTPATIENT)
Dept: CARDIAC REHAB | Facility: HOSPITAL | Age: 70
End: 2020-10-29
Attending: INTERNAL MEDICINE
Payer: MEDICARE

## 2020-11-03 ENCOUNTER — CARDPULM VISIT (OUTPATIENT)
Dept: CARDIAC REHAB | Facility: HOSPITAL | Age: 70
End: 2020-11-03
Attending: INTERNAL MEDICINE
Payer: MEDICARE

## 2020-11-05 ENCOUNTER — APPOINTMENT (OUTPATIENT)
Dept: CARDIAC REHAB | Facility: HOSPITAL | Age: 70
End: 2020-11-05
Attending: INTERNAL MEDICINE
Payer: MEDICARE

## 2020-11-10 ENCOUNTER — CARDPULM VISIT (OUTPATIENT)
Dept: CARDIAC REHAB | Facility: HOSPITAL | Age: 70
End: 2020-11-10
Attending: INTERNAL MEDICINE
Payer: MEDICARE

## 2020-11-12 ENCOUNTER — CARDPULM VISIT (OUTPATIENT)
Dept: CARDIAC REHAB | Facility: HOSPITAL | Age: 70
End: 2020-11-12
Attending: INTERNAL MEDICINE
Payer: MEDICARE

## 2020-11-17 ENCOUNTER — APPOINTMENT (OUTPATIENT)
Dept: CARDIAC REHAB | Facility: HOSPITAL | Age: 70
End: 2020-11-17
Attending: INTERNAL MEDICINE
Payer: MEDICARE

## 2020-11-19 ENCOUNTER — APPOINTMENT (OUTPATIENT)
Dept: CARDIAC REHAB | Facility: HOSPITAL | Age: 70
End: 2020-11-19
Attending: INTERNAL MEDICINE
Payer: MEDICARE

## 2020-11-24 ENCOUNTER — APPOINTMENT (OUTPATIENT)
Dept: CARDIAC REHAB | Facility: HOSPITAL | Age: 70
End: 2020-11-24
Attending: INTERNAL MEDICINE
Payer: MEDICARE

## 2020-12-01 ENCOUNTER — APPOINTMENT (OUTPATIENT)
Dept: CARDIAC REHAB | Facility: HOSPITAL | Age: 70
End: 2020-12-01
Attending: INTERNAL MEDICINE
Payer: MEDICARE

## 2020-12-01 ENCOUNTER — TELEPHONE (OUTPATIENT)
Dept: INTERNAL MEDICINE CLINIC | Facility: CLINIC | Age: 70
End: 2020-12-01

## 2020-12-01 RX ORDER — ONDANSETRON HYDROCHLORIDE 8 MG/1
TABLET, FILM COATED ORAL
Qty: 90 TABLET | Refills: 0 | Status: SHIPPED | OUTPATIENT
Start: 2020-12-01 | End: 2021-02-19

## 2020-12-01 NOTE — TELEPHONE ENCOUNTER
Pt informed and verbalized understanding. Also recommended pt to proceed to ER if new onset of symptoms &/or worsening. Pt verbalized understanding.

## 2020-12-01 NOTE — TELEPHONE ENCOUNTER
This requires a visit in-house with the patient is feeling okay he can see me on 12 3 but if he is having worsening of symptoms he should go to the immediate care

## 2020-12-01 NOTE — TELEPHONE ENCOUNTER
Pt has c/o w intermittent pounding heartbeat for the last 4-5 days. Feels heartbeat radiating from arm down to wrist, as well as ear. Feels pounding sound more at night time. Wakes him up.   Daytime not as bad as nighttime     bp readings 175/90, la

## 2020-12-01 NOTE — TELEPHONE ENCOUNTER
Requesting Ondansetron HCl (ZOFRAN) 8 MG tablet  LOV: 9/10/20  RTC: 6 months  Last Relevant Labs: 9/1/20  Filled: 9/17/20 #90 with 0 refills    Future Appointments   Date Time Provider Basilio Caraballo   12/1/2020  9:15 AM GORDO Johnson A

## 2020-12-03 ENCOUNTER — APPOINTMENT (OUTPATIENT)
Dept: CARDIAC REHAB | Facility: HOSPITAL | Age: 70
End: 2020-12-03
Attending: INTERNAL MEDICINE
Payer: MEDICARE

## 2020-12-03 ENCOUNTER — OFFICE VISIT (OUTPATIENT)
Dept: INTERNAL MEDICINE CLINIC | Facility: CLINIC | Age: 70
End: 2020-12-03
Payer: MEDICARE

## 2020-12-03 VITALS
OXYGEN SATURATION: 98 % | RESPIRATION RATE: 20 BRPM | WEIGHT: 145 LBS | DIASTOLIC BLOOD PRESSURE: 96 MMHG | TEMPERATURE: 98 F | BODY MASS INDEX: 20 KG/M2 | SYSTOLIC BLOOD PRESSURE: 190 MMHG | HEART RATE: 82 BPM

## 2020-12-03 DIAGNOSIS — N18.30 STAGE 3 CHRONIC KIDNEY DISEASE, UNSPECIFIED WHETHER STAGE 3A OR 3B CKD (HCC): ICD-10-CM

## 2020-12-03 DIAGNOSIS — I10 ESSENTIAL HYPERTENSION, BENIGN: Primary | ICD-10-CM

## 2020-12-03 PROCEDURE — 99213 OFFICE O/P EST LOW 20 MIN: CPT | Performed by: INTERNAL MEDICINE

## 2020-12-03 PROCEDURE — 3077F SYST BP >= 140 MM HG: CPT | Performed by: INTERNAL MEDICINE

## 2020-12-03 PROCEDURE — 3080F DIAST BP >= 90 MM HG: CPT | Performed by: INTERNAL MEDICINE

## 2020-12-03 RX ORDER — VERAPAMIL HYDROCHLORIDE 240 MG/1
240 TABLET, FILM COATED, EXTENDED RELEASE ORAL DAILY
Qty: 30 TABLET | Refills: 3 | Status: SHIPPED | OUTPATIENT
Start: 2020-12-03 | End: 2020-12-10

## 2020-12-03 NOTE — PROGRESS NOTES
Geovanny Rob  1950 is a 69year old male.     Patient presents with:  Blood Pressure       HPI:   Patient states that he feels a blood pressure high occasionally does feel a pounding in his ears  Patient was on blood pressure medicines which was total) by mouth daily. 90 capsule 1   • Budesonide-Formoterol Fumarate 160-4.5 MCG/ACT Inhalation Aerosol Inhale 2 puffs into the lungs 2 (two) times daily.  Rinse and spit after using 1 Inhaler 11   • ipratropium-albuterol 0.5-2.5 (3) MG/3ML Inhalation Sol Packs/day: 1.00        Years: 35.00        Pack years: 28        Types: Cigarettes        Quit date: 2000        Years since quittin.9      Smokeless tobacco: Never Used    Alcohol use: No    Drug use: No       REVIEW OF SYSTEMS:   Cardiovascular:

## 2020-12-07 RX ORDER — SERTRALINE HYDROCHLORIDE 25 MG/1
TABLET, FILM COATED ORAL
Qty: 90 TABLET | Refills: 0 | Status: SHIPPED | OUTPATIENT
Start: 2020-12-07 | End: 2021-03-22 | Stop reason: CLARIF

## 2020-12-07 NOTE — TELEPHONE ENCOUNTER
Requesting SERTRALINE HCL 25 MG Oral Tab  LOV: 12/3/20  RTC: 1 week  Last Relevant Labs: 7/10/20  Filled: 9/9/20 #90 with 0 refills    Future Appointments   Date Time Provider Basilio Caraballo   12/10/2020  4:30 PM Melyssa Walker MD EMG 8 [facility] [place]

## 2020-12-08 ENCOUNTER — APPOINTMENT (OUTPATIENT)
Dept: CARDIAC REHAB | Facility: HOSPITAL | Age: 70
End: 2020-12-08
Attending: INTERNAL MEDICINE
Payer: MEDICARE

## 2020-12-09 ENCOUNTER — LAB ENCOUNTER (OUTPATIENT)
Dept: LAB | Age: 70
End: 2020-12-09
Attending: INTERNAL MEDICINE
Payer: MEDICARE

## 2020-12-09 DIAGNOSIS — I10 ESSENTIAL HYPERTENSION, BENIGN: ICD-10-CM

## 2020-12-09 DIAGNOSIS — N18.30 STAGE 3 CHRONIC KIDNEY DISEASE, UNSPECIFIED WHETHER STAGE 3A OR 3B CKD (HCC): ICD-10-CM

## 2020-12-09 PROCEDURE — 84443 ASSAY THYROID STIM HORMONE: CPT

## 2020-12-09 PROCEDURE — 84436 ASSAY OF TOTAL THYROXINE: CPT

## 2020-12-09 PROCEDURE — 80048 BASIC METABOLIC PNL TOTAL CA: CPT

## 2020-12-09 PROCEDURE — 36415 COLL VENOUS BLD VENIPUNCTURE: CPT

## 2020-12-09 PROCEDURE — 83735 ASSAY OF MAGNESIUM: CPT

## 2020-12-10 ENCOUNTER — OFFICE VISIT (OUTPATIENT)
Dept: INTERNAL MEDICINE CLINIC | Facility: CLINIC | Age: 70
End: 2020-12-10
Payer: MEDICARE

## 2020-12-10 ENCOUNTER — APPOINTMENT (OUTPATIENT)
Dept: CARDIAC REHAB | Facility: HOSPITAL | Age: 70
End: 2020-12-10
Attending: INTERNAL MEDICINE
Payer: MEDICARE

## 2020-12-10 VITALS
BODY MASS INDEX: 20.39 KG/M2 | OXYGEN SATURATION: 95 % | SYSTOLIC BLOOD PRESSURE: 160 MMHG | HEART RATE: 99 BPM | WEIGHT: 145.63 LBS | DIASTOLIC BLOOD PRESSURE: 78 MMHG | RESPIRATION RATE: 16 BRPM | HEIGHT: 71 IN | TEMPERATURE: 98 F

## 2020-12-10 DIAGNOSIS — I10 ESSENTIAL HYPERTENSION, BENIGN: ICD-10-CM

## 2020-12-10 PROCEDURE — 3078F DIAST BP <80 MM HG: CPT | Performed by: INTERNAL MEDICINE

## 2020-12-10 PROCEDURE — 3077F SYST BP >= 140 MM HG: CPT | Performed by: INTERNAL MEDICINE

## 2020-12-10 PROCEDURE — 3008F BODY MASS INDEX DOCD: CPT | Performed by: INTERNAL MEDICINE

## 2020-12-10 PROCEDURE — 99213 OFFICE O/P EST LOW 20 MIN: CPT | Performed by: INTERNAL MEDICINE

## 2020-12-10 RX ORDER — TIOTROPIUM BROMIDE 18 UG/1
CAPSULE ORAL; RESPIRATORY (INHALATION)
Qty: 30 CAPSULE | Refills: 2 | Status: SHIPPED | OUTPATIENT
Start: 2020-12-10 | End: 2021-03-06

## 2020-12-10 NOTE — TELEPHONE ENCOUNTER
Tiotropium Bromide Monohydrate (SPIRIVA HANDIHALER) 18 MCG Inhalation Cap          Sig: INHALE 1 CAPSULE INTO THE LUNGS ONCE DAILY    Disp:  30 capsule    Refills:  2    Start: 12/10/2020    Class: Normal    Non-formulary    Last ordered: 3 months ago by SANDEEP

## 2020-12-10 NOTE — PROGRESS NOTES
Ramos Mims  1950 is a 69year old male. Patient presents with:   Follow - Up       HPI:   Patient states that he feels a blood pressure high occasionally does feel a pounding in his ears  Patient was on blood pressure medicines which was [employer] mouth daily. 90 capsule 1   • Budesonide-Formoterol Fumarate 160-4.5 MCG/ACT Inhalation Aerosol Inhale 2 puffs into the lungs 2 (two) times daily.  Rinse and spit after using 1 Inhaler 11   • ipratropium-albuterol 0.5-2.5 (3) MG/3ML Inhalation Solution Take Years: 35.00        Pack years: 28        Types: Cigarettes        Quit date: 2000        Years since quittin.9      Smokeless tobacco: Never Used    Alcohol use: No    Drug use: No       REVIEW OF SYSTEMS:   Cardiovascular:   Syncope none.  Rap

## 2020-12-11 ENCOUNTER — TELEPHONE (OUTPATIENT)
Dept: INTERNAL MEDICINE CLINIC | Facility: CLINIC | Age: 70
End: 2020-12-11

## 2020-12-11 DIAGNOSIS — I10 ESSENTIAL HYPERTENSION, BENIGN: ICD-10-CM

## 2020-12-11 NOTE — TELEPHONE ENCOUNTER
[facility] calling for clarification on verapamil. RX was sent for verapamil 360 mg BID. I spoke with  for clarification and per VO from , pt is to be rx verapamil 180 mg BID. VORB.      Pharmacist Lorna Cuello with [facility] provided with VO to dispense verapamil 180

## 2020-12-15 ENCOUNTER — APPOINTMENT (OUTPATIENT)
Dept: CARDIAC REHAB | Facility: HOSPITAL | Age: 70
End: 2020-12-15
Attending: INTERNAL MEDICINE
Payer: MEDICARE

## 2020-12-17 ENCOUNTER — OFFICE VISIT (OUTPATIENT)
Dept: INTERNAL MEDICINE CLINIC | Facility: CLINIC | Age: 70
End: 2020-12-17
Payer: MEDICARE

## 2020-12-17 ENCOUNTER — APPOINTMENT (OUTPATIENT)
Dept: CARDIAC REHAB | Facility: HOSPITAL | Age: 70
End: 2020-12-17
Attending: INTERNAL MEDICINE
Payer: MEDICARE

## 2020-12-17 VITALS
RESPIRATION RATE: 16 BRPM | DIASTOLIC BLOOD PRESSURE: 60 MMHG | WEIGHT: 146.38 LBS | OXYGEN SATURATION: 97 % | HEART RATE: 74 BPM | HEIGHT: 71 IN | BODY MASS INDEX: 20.49 KG/M2 | SYSTOLIC BLOOD PRESSURE: 128 MMHG | TEMPERATURE: 97 F

## 2020-12-17 DIAGNOSIS — I10 ESSENTIAL HYPERTENSION, BENIGN: Primary | ICD-10-CM

## 2020-12-17 PROCEDURE — 3078F DIAST BP <80 MM HG: CPT | Performed by: INTERNAL MEDICINE

## 2020-12-17 PROCEDURE — 3074F SYST BP LT 130 MM HG: CPT | Performed by: INTERNAL MEDICINE

## 2020-12-17 PROCEDURE — 3008F BODY MASS INDEX DOCD: CPT | Performed by: INTERNAL MEDICINE

## 2020-12-17 PROCEDURE — 99213 OFFICE O/P EST LOW 20 MIN: CPT | Performed by: INTERNAL MEDICINE

## 2020-12-17 NOTE — PROGRESS NOTES
Gia Osullivan  1950 is a 69year old male. Patient presents with:  Hypertension       HPI:   Feels better.   BP well controlled at home  Current Outpatient Medications   Medication Sig Dispense Refill   • verapamil HCl  MG Oral Tab CR Ta Inhaler 11   • ipratropium-albuterol 0.5-2.5 (3) MG/3ML Inhalation Solution Take 3 mL by nebulization every 4 (four) hours as needed. 120 vial 11   • PEG 3350 Oral Powd Pack Take 17 g by mouth daily.     0   • Multiple Vitamin (MULTI VITAMIN DAILY OR) Take No    Drug use: No       REVIEW OF SYSTEMS:   Cardiovascular:   Syncope none. Rapid heart beat at rest no. Change in exercise tolerance no. Chest pain no. Chest pain while awake none. Cold extremities no. Dizziness no. Dyspnea on exertion none.  Fainting no

## 2020-12-22 ENCOUNTER — APPOINTMENT (OUTPATIENT)
Dept: CARDIAC REHAB | Facility: HOSPITAL | Age: 70
End: 2020-12-22
Attending: INTERNAL MEDICINE
Payer: MEDICARE

## 2020-12-22 DIAGNOSIS — K86.1 CHRONIC PANCREATITIS, UNSPECIFIED PANCREATITIS TYPE (HCC): Primary | Chronic | ICD-10-CM

## 2020-12-22 RX ORDER — PANCRELIPASE 36000; 180000; 114000 [USP'U]/1; [USP'U]/1; [USP'U]/1
CAPSULE, DELAYED RELEASE PELLETS ORAL
Qty: 180 CAPSULE | Refills: 0 | Status: SHIPPED | OUTPATIENT
Start: 2020-12-22 | End: 2021-01-07

## 2020-12-22 NOTE — TELEPHONE ENCOUNTER
Passed Protocol     Requesting CREON 37978 units Oral Cap DR Particles  LOV: 12/17/2020  RTC: 3 months   Last Relevant Labs: 12/9/2020  Filled: 10/20/2020  #180 with 0  refills    No future appointments.

## 2020-12-23 RX ORDER — ETODOLAC 400 MG/1
TABLET, FILM COATED ORAL
Qty: 60 TABLET | Refills: 0 | Status: SHIPPED | OUTPATIENT
Start: 2020-12-23 | End: 2021-01-28

## 2020-12-23 NOTE — TELEPHONE ENCOUNTER
Medication(s) to Refill:   Requested Prescriptions     Pending Prescriptions Disp Refills   • ETODOLAC 400 MG Oral Tab [Pharmacy Med Name: Etodolac 400 Mg Tab Taro] 60 tablet 0     Sig: TAKE 1 TABLET BY MOUTH TWICE DAILY       LOV:  12-    RTC:  3 m

## 2020-12-24 ENCOUNTER — APPOINTMENT (OUTPATIENT)
Dept: CARDIAC REHAB | Facility: HOSPITAL | Age: 70
End: 2020-12-24
Attending: INTERNAL MEDICINE
Payer: MEDICARE

## 2020-12-29 ENCOUNTER — OFFICE VISIT (OUTPATIENT)
Dept: INTERNAL MEDICINE CLINIC | Facility: CLINIC | Age: 70
End: 2020-12-29
Payer: MEDICARE

## 2020-12-29 ENCOUNTER — APPOINTMENT (OUTPATIENT)
Dept: CARDIAC REHAB | Facility: HOSPITAL | Age: 70
End: 2020-12-29
Attending: INTERNAL MEDICINE
Payer: MEDICARE

## 2020-12-29 VITALS
TEMPERATURE: 98 F | SYSTOLIC BLOOD PRESSURE: 140 MMHG | WEIGHT: 145.81 LBS | HEIGHT: 71 IN | OXYGEN SATURATION: 94 % | BODY MASS INDEX: 20.41 KG/M2 | DIASTOLIC BLOOD PRESSURE: 60 MMHG | HEART RATE: 115 BPM | RESPIRATION RATE: 18 BRPM

## 2020-12-29 DIAGNOSIS — I10 ESSENTIAL HYPERTENSION, BENIGN: Primary | ICD-10-CM

## 2020-12-29 PROCEDURE — 3008F BODY MASS INDEX DOCD: CPT | Performed by: INTERNAL MEDICINE

## 2020-12-29 PROCEDURE — 99213 OFFICE O/P EST LOW 20 MIN: CPT | Performed by: INTERNAL MEDICINE

## 2020-12-29 PROCEDURE — 3078F DIAST BP <80 MM HG: CPT | Performed by: INTERNAL MEDICINE

## 2020-12-29 PROCEDURE — 3077F SYST BP >= 140 MM HG: CPT | Performed by: INTERNAL MEDICINE

## 2020-12-29 NOTE — PATIENT INSTRUCTIONS
Patient just took his bronchodilators prior to this office visit which explains his tachycardia generally his pulse is pretty good  Reassured patient

## 2020-12-29 NOTE — PROGRESS NOTES
Zeeshan Peacock  1950 is a 69year old male. Patient presents with:  Fatigue       HPI:   Feels better.   BP well controlled at home  Pounding in the ear is 50% decreased  Current Outpatient Medications   Medication Sig Dispense Refill   • ETODO 160-4.5 MCG/ACT Inhalation Aerosol Inhale 2 puffs into the lungs 2 (two) times daily. Rinse and spit after using 1 Inhaler 11   • ipratropium-albuterol 0.5-2.5 (3) MG/3ML Inhalation Solution Take 3 mL by nebulization every 4 (four) hours as needed.  120 via Cigarettes        Quit date: 2000        Years since quittin.0      Smokeless tobacco: Never Used    Alcohol use: No    Drug use: No       REVIEW OF SYSTEMS:   Cardiovascular:   Syncope none. Rapid heart beat at rest no.  Change in exercise toleran

## 2020-12-31 ENCOUNTER — APPOINTMENT (OUTPATIENT)
Dept: CARDIAC REHAB | Facility: HOSPITAL | Age: 70
End: 2020-12-31
Attending: INTERNAL MEDICINE
Payer: MEDICARE

## 2021-01-02 DIAGNOSIS — I10 ESSENTIAL HYPERTENSION, BENIGN: ICD-10-CM

## 2021-01-04 NOTE — TELEPHONE ENCOUNTER
Passed protocol    Requesting verapamil HCl  MG Oral Tab CR  LOV: 12/29/20  RTC: 3 months  Last Relevant Labs: 12/9/20  Filled: 12/11/20 #60 with 0 refills    No future appointments.

## 2021-01-05 ENCOUNTER — APPOINTMENT (OUTPATIENT)
Dept: CARDIAC REHAB | Facility: HOSPITAL | Age: 71
End: 2021-01-05
Attending: INTERNAL MEDICINE
Payer: MEDICARE

## 2021-01-07 ENCOUNTER — APPOINTMENT (OUTPATIENT)
Dept: CARDIAC REHAB | Facility: HOSPITAL | Age: 71
End: 2021-01-07
Attending: INTERNAL MEDICINE
Payer: MEDICARE

## 2021-01-07 DIAGNOSIS — K86.1 CHRONIC PANCREATITIS, UNSPECIFIED PANCREATITIS TYPE (HCC): Chronic | ICD-10-CM

## 2021-01-07 RX ORDER — PANCRELIPASE 36000; 180000; 114000 [USP'U]/1; [USP'U]/1; [USP'U]/1
CAPSULE, DELAYED RELEASE PELLETS ORAL
Qty: 180 CAPSULE | Refills: 0 | Status: SHIPPED | OUTPATIENT
Start: 2021-01-07 | End: 2021-02-15

## 2021-01-08 NOTE — TELEPHONE ENCOUNTER
Medication(s) to Refill:   Requested Prescriptions     Pending Prescriptions Disp Refills   • VENTOLIN  (90 Base) MCG/ACT Inhalation Aero Soln [Pharmacy Med Name: VENTOLIN HFA INH 18GM W/COUNT 90MCG] 54 g 2     Sig: USE 2 INHALATIONS EVERY 6 HOURS A

## 2021-01-12 ENCOUNTER — APPOINTMENT (OUTPATIENT)
Dept: CARDIAC REHAB | Facility: HOSPITAL | Age: 71
End: 2021-01-12
Attending: INTERNAL MEDICINE
Payer: MEDICARE

## 2021-01-14 ENCOUNTER — APPOINTMENT (OUTPATIENT)
Dept: CARDIAC REHAB | Facility: HOSPITAL | Age: 71
End: 2021-01-14
Attending: INTERNAL MEDICINE
Payer: MEDICARE

## 2021-01-19 ENCOUNTER — APPOINTMENT (OUTPATIENT)
Dept: CARDIAC REHAB | Facility: HOSPITAL | Age: 71
End: 2021-01-19
Attending: INTERNAL MEDICINE
Payer: MEDICARE

## 2021-01-19 ENCOUNTER — TELEPHONE (OUTPATIENT)
Dept: INTERNAL MEDICINE CLINIC | Facility: CLINIC | Age: 71
End: 2021-01-19

## 2021-01-19 NOTE — TELEPHONE ENCOUNTER
Pt has COPD and spouse would like a call back from nurse did not want to leave a lot of information just that she needed for  nurse to return call

## 2021-01-20 NOTE — TELEPHONE ENCOUNTER
Pts wife states that pt has a hx of copd and has always occasionally has coughed with green sputum. The last week pt has been coughing more with green sputum and clearing throat. Denies ST, sinus congestion/draiange, SOB, wheezing, temp.      Future Edgar

## 2021-01-21 ENCOUNTER — APPOINTMENT (OUTPATIENT)
Dept: CARDIAC REHAB | Facility: HOSPITAL | Age: 71
End: 2021-01-21
Attending: INTERNAL MEDICINE
Payer: MEDICARE

## 2021-01-25 NOTE — TELEPHONE ENCOUNTER
Failed protocol - labs needed    Requesting METFORMIN HCL 1000 MG Oral Tab  LOV: 12/29/20  RTC: 3 months  Last Relevant Labs: 6/3/20  Filled: 10/27/20 #180 with 0 refills    No future appointments.

## 2021-01-26 ENCOUNTER — APPOINTMENT (OUTPATIENT)
Dept: CARDIAC REHAB | Facility: HOSPITAL | Age: 71
End: 2021-01-26
Attending: INTERNAL MEDICINE
Payer: MEDICARE

## 2021-01-26 DIAGNOSIS — Z23 NEED FOR VACCINATION: ICD-10-CM

## 2021-01-28 ENCOUNTER — APPOINTMENT (OUTPATIENT)
Dept: CARDIAC REHAB | Facility: HOSPITAL | Age: 71
End: 2021-01-28
Attending: INTERNAL MEDICINE
Payer: MEDICARE

## 2021-01-28 RX ORDER — ETODOLAC 400 MG/1
TABLET, FILM COATED ORAL
Qty: 60 TABLET | Refills: 0 | Status: SHIPPED | OUTPATIENT
Start: 2021-01-28 | End: 2021-03-08

## 2021-01-28 NOTE — TELEPHONE ENCOUNTER
Patient is calling to check on the status of his refill request for Etodolac 400 mg. Patient is out of his medication and would like it called into the pharmacy today.

## 2021-01-28 NOTE — TELEPHONE ENCOUNTER
Requesting ETODOLAC 400 MG Oral Tab  LOV: 12/29/20  RTC: 3 months  Last Relevant Labs: 12/9/20  Filled: 12/23/20 #60 with 0 refills

## 2021-01-29 ENCOUNTER — IMMUNIZATION (OUTPATIENT)
Dept: LAB | Age: 71
End: 2021-01-29
Attending: HOSPITALIST
Payer: MEDICARE

## 2021-01-29 DIAGNOSIS — Z23 NEED FOR VACCINATION: Primary | ICD-10-CM

## 2021-01-29 PROCEDURE — 0001A SARSCOV2 VAC 30MCG/0.3ML IM: CPT

## 2021-02-02 ENCOUNTER — CARDPULM VISIT (OUTPATIENT)
Dept: CARDIAC REHAB | Facility: HOSPITAL | Age: 71
End: 2021-02-02
Attending: INTERNAL MEDICINE
Payer: MEDICARE

## 2021-02-04 ENCOUNTER — CARDPULM VISIT (OUTPATIENT)
Dept: CARDIAC REHAB | Facility: HOSPITAL | Age: 71
End: 2021-02-04
Attending: INTERNAL MEDICINE
Payer: MEDICARE

## 2021-02-08 NOTE — TELEPHONE ENCOUNTER
LVM for pt to contact office - please verify if pt is taking Levemir insulin at 35 units nightly.      Requesting insulin detemir (LEVEMIR) 100 UNIT/ML flextouch   LOV: 12/29/20  RTC: 3 months  Last Relevant Labs: 6/3/20    Future Appointments None

## 2021-02-09 ENCOUNTER — CARDPULM VISIT (OUTPATIENT)
Dept: CARDIAC REHAB | Facility: HOSPITAL | Age: 71
End: 2021-02-09
Attending: INTERNAL MEDICINE
Payer: MEDICARE

## 2021-02-09 RX ORDER — INSULIN DETEMIR 100 [IU]/ML
INJECTION, SOLUTION SUBCUTANEOUS
Qty: 30 ML | Refills: 3 | Status: SHIPPED | OUTPATIENT
Start: 2021-02-09 | End: 2021-03-22 | Stop reason: CLARIF

## 2021-02-11 ENCOUNTER — APPOINTMENT (OUTPATIENT)
Dept: CARDIAC REHAB | Facility: HOSPITAL | Age: 71
End: 2021-02-11
Attending: INTERNAL MEDICINE
Payer: MEDICARE

## 2021-02-14 DIAGNOSIS — K86.1 CHRONIC PANCREATITIS, UNSPECIFIED PANCREATITIS TYPE (HCC): Chronic | ICD-10-CM

## 2021-02-15 RX ORDER — PANCRELIPASE 36000; 180000; 114000 [USP'U]/1; [USP'U]/1; [USP'U]/1
CAPSULE, DELAYED RELEASE PELLETS ORAL
Qty: 180 CAPSULE | Refills: 0 | Status: SHIPPED | OUTPATIENT
Start: 2021-02-15 | End: 2021-03-16

## 2021-02-15 NOTE — TELEPHONE ENCOUNTER
Passed protocol    Requesting CREON 88989 units Oral Cap DR Particles  LOV: 12/29/20  RTC: 3 months  Last Relevant Labs: 12/9/20  Filled: 1/7/21 #180 with 0 refills    Future Appointments None

## 2021-02-16 ENCOUNTER — APPOINTMENT (OUTPATIENT)
Dept: CARDIAC REHAB | Facility: HOSPITAL | Age: 71
End: 2021-02-16
Payer: MEDICARE

## 2021-02-16 ENCOUNTER — APPOINTMENT (OUTPATIENT)
Dept: CARDIAC REHAB | Facility: HOSPITAL | Age: 71
End: 2021-02-16
Attending: INTERNAL MEDICINE
Payer: MEDICARE

## 2021-02-18 ENCOUNTER — APPOINTMENT (OUTPATIENT)
Dept: CARDIAC REHAB | Facility: HOSPITAL | Age: 71
End: 2021-02-18
Payer: MEDICARE

## 2021-02-18 ENCOUNTER — APPOINTMENT (OUTPATIENT)
Dept: CARDIAC REHAB | Facility: HOSPITAL | Age: 71
End: 2021-02-18
Attending: INTERNAL MEDICINE
Payer: MEDICARE

## 2021-02-19 ENCOUNTER — IMMUNIZATION (OUTPATIENT)
Dept: LAB | Age: 71
End: 2021-02-19
Attending: HOSPITALIST
Payer: MEDICARE

## 2021-02-19 DIAGNOSIS — Z23 NEED FOR VACCINATION: Primary | ICD-10-CM

## 2021-02-19 PROCEDURE — 0002A SARSCOV2 VAC 30MCG/0.3ML IM: CPT

## 2021-02-19 RX ORDER — ONDANSETRON HYDROCHLORIDE 8 MG/1
TABLET, FILM COATED ORAL
Qty: 90 TABLET | Refills: 0 | Status: SHIPPED | OUTPATIENT
Start: 2021-02-19 | End: 2021-03-22 | Stop reason: CLARIF

## 2021-02-19 NOTE — TELEPHONE ENCOUNTER
Medication(s) to Refill:   Requested Prescriptions     Pending Prescriptions Disp Refills   • Ondansetron HCl (ZOFRAN) 8 MG tablet [Pharmacy Med Name: Ondansetron Hydrochloride 8 Mg Tab Auro] 90 tablet 0     Sig: TAKE HALF TABLET BY MOUTH FOUR TIMES DAILY Form  [email or web address] pdf     COVID-19 Vaccine Consent Form – Turkish version  [email or web address] on the ground floor. Gil Woody left past the Information Desk and proceed through the lobby past the staircase to check in at the Cardiopulmonary Rehab Registration desk.                3/2/2021 11:30 AM  CARDIAC MAKE UP [2482] 60 min [facility] AT THE [facility] Cardiopulmonary Rehabilitation CARDIAC MAKE UP RESOURCE    Patient Instructions:         Location Instructions: Your appointment is scheduled at [facility]. The address is&nbsp; [address]. [address].  To reach Registration,

## 2021-02-22 ENCOUNTER — TELEPHONE (OUTPATIENT)
Dept: INTERNAL MEDICINE CLINIC | Facility: CLINIC | Age: 71
End: 2021-02-22

## 2021-02-22 DIAGNOSIS — Z00.00 ROUTINE GENERAL MEDICAL EXAMINATION AT A HEALTH CARE FACILITY: Primary | ICD-10-CM

## 2021-02-22 NOTE — TELEPHONE ENCOUNTER
Dr. Megha Casiano,  Patient has Supervisit scheduled with you on 3/19. He is asking for lab orders. Last labs done 12/9/20 - Magnesium,BMP,T4,TSH. Please advise on labs you would like ordered.

## 2021-02-22 NOTE — TELEPHONE ENCOUNTER
Pt is requesting his lab orders prior to his super visit appt coming up. Call pt as soonest orders are ready.

## 2021-02-23 ENCOUNTER — APPOINTMENT (OUTPATIENT)
Dept: CARDIAC REHAB | Facility: HOSPITAL | Age: 71
End: 2021-02-23
Attending: INTERNAL MEDICINE
Payer: MEDICARE

## 2021-02-24 ENCOUNTER — TELEPHONE (OUTPATIENT)
Dept: INTERNAL MEDICINE CLINIC | Facility: CLINIC | Age: 71
End: 2021-02-24

## 2021-02-24 NOTE — TELEPHONE ENCOUNTER
Wife  is calling to get a referral for  - Psychiatrist that specializes in chronic pain therapy. Please call to discuss. Can call wife on cell phone- [phone]

## 2021-02-25 ENCOUNTER — CARDPULM VISIT (OUTPATIENT)
Dept: CARDIAC REHAB | Facility: HOSPITAL | Age: 71
End: 2021-02-25
Attending: INTERNAL MEDICINE
Payer: MEDICARE

## 2021-02-25 NOTE — TELEPHONE ENCOUNTER
Wife states that pt would like to see psychiatrist and psychologist for anxiety/depression.      Jones Pratt, can you assist?

## 2021-03-01 ENCOUNTER — VIRTUAL PHONE E/M (OUTPATIENT)
Dept: INTERNAL MEDICINE CLINIC | Facility: CLINIC | Age: 71
End: 2021-03-01
Payer: MEDICARE

## 2021-03-01 DIAGNOSIS — F32.A DEPRESSION, UNSPECIFIED DEPRESSION TYPE: Primary | ICD-10-CM

## 2021-03-01 PROCEDURE — 99442 PHONE E/M BY PHYS 11-20 MIN: CPT | Performed by: INTERNAL MEDICINE

## 2021-03-01 NOTE — PROGRESS NOTES
Virtual Telephone Check-In    Tanika Smith verbally consents to a Virtual/Telephone Check-In visit on 03/01/21. Patient has been referred to the [facility] website at [email or web address] to review the yearly Consent to Treat document.     Patient unders

## 2021-03-02 ENCOUNTER — CARDPULM VISIT (OUTPATIENT)
Dept: CARDIAC REHAB | Facility: HOSPITAL | Age: 71
End: 2021-03-02
Attending: INTERNAL MEDICINE
Payer: MEDICARE

## 2021-03-03 DIAGNOSIS — I10 ESSENTIAL HYPERTENSION, BENIGN: ICD-10-CM

## 2021-03-03 RX ORDER — VERAPAMIL HYDROCHLORIDE 180 MG/1
CAPSULE, EXTENDED RELEASE ORAL
Qty: 120 CAPSULE | Refills: 0 | Status: SHIPPED | OUTPATIENT
Start: 2021-03-03 | End: 2021-03-22 | Stop reason: CLARIF

## 2021-03-04 ENCOUNTER — CARDPULM VISIT (OUTPATIENT)
Dept: CARDIAC REHAB | Facility: HOSPITAL | Age: 71
End: 2021-03-04
Attending: INTERNAL MEDICINE
Payer: MEDICARE

## 2021-03-06 RX ORDER — TIOTROPIUM BROMIDE 18 UG/1
CAPSULE ORAL; RESPIRATORY (INHALATION)
Qty: 90 CAPSULE | Refills: 2 | Status: SHIPPED | OUTPATIENT
Start: 2021-03-06 | End: 2021-03-22 | Stop reason: CLARIF

## 2021-03-08 RX ORDER — ETODOLAC 400 MG/1
TABLET, FILM COATED ORAL
Qty: 60 TABLET | Refills: 0 | Status: SHIPPED | OUTPATIENT
Start: 2021-03-08 | End: 2021-03-22 | Stop reason: CLARIF

## 2021-03-08 NOTE — TELEPHONE ENCOUNTER
Medication(s) to Refill:   Requested Prescriptions     Pending Prescriptions Disp Refills   • ETODOLAC 400 MG Oral Tab [Pharmacy Med Name: Etodolac 400 Mg Tab Amne] 60 tablet 0     Sig: TAKE 1 TABLET BY MOUTH TWICE DAILY       LOV: 3-1-2021    RTC:  None n doors located on the ground floor. Kin Imus left past the Information Desk and proceed through the lobby past the staircase to check in at the Cardiopulmonary Rehab Registration desk.                3/18/2021 11:30 AM  CARDIAC MAKE UP [9182] 60 min [facility] through the lobby past the staircase to check in at the Cardiopulmonary Rehab Registration desk.                3/30/2021 11:30 AM  CARDIAC MAKE UP [6882] 60 min [facility] Cardiopulmonary Rehabilitation CARDIAC MAKE UP RESOURCE    Patient Instructions [employer]. Enter through the revolving doors located on the ground floor. Azael Juanis left past the Information Desk and proceed through the lobby past the staircase to check in at the Cardiopulmonary Rehab Registration desk.

## 2021-03-09 ENCOUNTER — CARDPULM VISIT (OUTPATIENT)
Dept: CARDIAC REHAB | Facility: HOSPITAL | Age: 71
End: 2021-03-09
Attending: INTERNAL MEDICINE
Payer: MEDICARE

## 2021-03-11 ENCOUNTER — CARDPULM VISIT (OUTPATIENT)
Dept: CARDIAC REHAB | Facility: HOSPITAL | Age: 71
End: 2021-03-11
Attending: INTERNAL MEDICINE
Payer: MEDICARE

## 2021-03-12 ENCOUNTER — LAB ENCOUNTER (OUTPATIENT)
Dept: LAB | Age: 71
End: 2021-03-12
Attending: INTERNAL MEDICINE
Payer: MEDICARE

## 2021-03-12 DIAGNOSIS — Z00.00 ROUTINE GENERAL MEDICAL EXAMINATION AT A HEALTH CARE FACILITY: ICD-10-CM

## 2021-03-12 LAB
ALBUMIN SERPL-MCNC: 3.2 G/DL (ref 3.4–5)
ALBUMIN/GLOB SERPL: 0.8 {RATIO} (ref 1–2)
ALP LIVER SERPL-CCNC: 82 U/L
ALT SERPL-CCNC: 30 U/L
ANION GAP SERPL CALC-SCNC: 5 MMOL/L (ref 0–18)
AST SERPL-CCNC: 26 U/L (ref 15–37)
BASOPHILS # BLD AUTO: 0.03 X10(3) UL (ref 0–0.2)
BASOPHILS NFR BLD AUTO: 0.4 %
BILIRUB SERPL-MCNC: 0.3 MG/DL (ref 0.1–2)
BILIRUB UR QL STRIP.AUTO: NEGATIVE
BUN BLD-MCNC: 23 MG/DL (ref 7–18)
BUN/CREAT SERPL: 17.7 (ref 10–20)
CALCIUM BLD-MCNC: 9.7 MG/DL (ref 8.5–10.1)
CHLORIDE SERPL-SCNC: 109 MMOL/L (ref 98–112)
CHOLEST SMN-MCNC: 119 MG/DL (ref ?–200)
CO2 SERPL-SCNC: 26 MMOL/L (ref 21–32)
COLOR UR AUTO: YELLOW
COMPLEXED PSA SERPL-MCNC: 12.8 NG/ML (ref ?–4)
CREAT BLD-MCNC: 1.3 MG/DL
CREAT UR-SCNC: 77.8 MG/DL
DEPRECATED RDW RBC AUTO: 49.2 FL (ref 35.1–46.3)
EOSINOPHIL # BLD AUTO: 0.65 X10(3) UL (ref 0–0.7)
EOSINOPHIL NFR BLD AUTO: 7.9 %
ERYTHROCYTE [DISTWIDTH] IN BLOOD BY AUTOMATED COUNT: 14.5 % (ref 11–15)
EST. AVERAGE GLUCOSE BLD GHB EST-MCNC: 143 MG/DL (ref 68–126)
GLOBULIN PLAS-MCNC: 3.8 G/DL (ref 2.8–4.4)
GLUCOSE BLD-MCNC: 96 MG/DL (ref 70–99)
GLUCOSE UR STRIP.AUTO-MCNC: NEGATIVE MG/DL
HBA1C MFR BLD HPLC: 6.6 % (ref ?–5.7)
HCT VFR BLD AUTO: 36.1 %
HDLC SERPL-MCNC: 46 MG/DL (ref 40–59)
HGB BLD-MCNC: 12.3 G/DL
IMM GRANULOCYTES # BLD AUTO: 0.02 X10(3) UL (ref 0–1)
IMM GRANULOCYTES NFR BLD: 0.2 %
KETONES UR STRIP.AUTO-MCNC: NEGATIVE MG/DL
LDLC SERPL CALC-MCNC: 64 MG/DL (ref ?–100)
LYMPHOCYTES # BLD AUTO: 1.42 X10(3) UL (ref 1–4)
LYMPHOCYTES NFR BLD AUTO: 17.2 %
M PROTEIN MFR SERPL ELPH: 7 G/DL (ref 6.4–8.2)
MCH RBC QN AUTO: 31.9 PG (ref 26–34)
MCHC RBC AUTO-ENTMCNC: 34.1 G/DL (ref 31–37)
MCV RBC AUTO: 93.5 FL
MICROALBUMIN UR-MCNC: 0.7 MG/DL
MICROALBUMIN/CREAT 24H UR-RTO: 9 UG/MG (ref ?–30)
MONOCYTES # BLD AUTO: 0.88 X10(3) UL (ref 0.1–1)
MONOCYTES NFR BLD AUTO: 10.7 %
NEUTROPHILS # BLD AUTO: 5.26 X10 (3) UL (ref 1.5–7.7)
NEUTROPHILS # BLD AUTO: 5.26 X10(3) UL (ref 1.5–7.7)
NEUTROPHILS NFR BLD AUTO: 63.6 %
NITRITE UR QL STRIP.AUTO: NEGATIVE
NONHDLC SERPL-MCNC: 73 MG/DL (ref ?–130)
OSMOLALITY SERPL CALC.SUM OF ELEC: 294 MOSM/KG (ref 275–295)
PATIENT FASTING Y/N/NP: YES
PATIENT FASTING Y/N/NP: YES
PH UR STRIP.AUTO: 5 [PH] (ref 5–8)
PLATELET # BLD AUTO: 371 10(3)UL (ref 150–450)
POTASSIUM SERPL-SCNC: 4.7 MMOL/L (ref 3.5–5.1)
PROT UR STRIP.AUTO-MCNC: NEGATIVE MG/DL
RBC # BLD AUTO: 3.86 X10(6)UL
RBC UR QL AUTO: NEGATIVE
SODIUM SERPL-SCNC: 140 MMOL/L (ref 136–145)
SP GR UR STRIP.AUTO: 1.01 (ref 1–1.03)
T4 SERPL-MCNC: 6.8 UG/DL
TRIGL SERPL-MCNC: 46 MG/DL (ref 30–149)
TSI SER-ACNC: 1.86 MIU/ML (ref 0.36–3.74)
UROBILINOGEN UR STRIP.AUTO-MCNC: 2 MG/DL
VLDLC SERPL CALC-MCNC: 9 MG/DL (ref 0–30)
WBC # BLD AUTO: 8.3 X10(3) UL (ref 4–11)

## 2021-03-12 PROCEDURE — 83036 HEMOGLOBIN GLYCOSYLATED A1C: CPT

## 2021-03-12 PROCEDURE — 3061F NEG MICROALBUMINURIA REV: CPT | Performed by: INTERNAL MEDICINE

## 2021-03-12 PROCEDURE — 80053 COMPREHEN METABOLIC PANEL: CPT

## 2021-03-12 PROCEDURE — 84436 ASSAY OF TOTAL THYROXINE: CPT

## 2021-03-12 PROCEDURE — 85025 COMPLETE CBC W/AUTO DIFF WBC: CPT

## 2021-03-12 PROCEDURE — 80061 LIPID PANEL: CPT

## 2021-03-12 PROCEDURE — 84443 ASSAY THYROID STIM HORMONE: CPT

## 2021-03-12 PROCEDURE — 36415 COLL VENOUS BLD VENIPUNCTURE: CPT

## 2021-03-12 PROCEDURE — 81001 URINALYSIS AUTO W/SCOPE: CPT

## 2021-03-12 PROCEDURE — 82043 UR ALBUMIN QUANTITATIVE: CPT

## 2021-03-12 PROCEDURE — 82570 ASSAY OF URINE CREATININE: CPT

## 2021-03-15 ENCOUNTER — TELEPHONE (OUTPATIENT)
Dept: INTERNAL MEDICINE CLINIC | Facility: CLINIC | Age: 71
End: 2021-03-15

## 2021-03-15 DIAGNOSIS — K86.1 CHRONIC PANCREATITIS, UNSPECIFIED PANCREATITIS TYPE (HCC): Chronic | ICD-10-CM

## 2021-03-15 DIAGNOSIS — R82.90 ABNORMAL URINE: Primary | ICD-10-CM

## 2021-03-15 NOTE — TELEPHONE ENCOUNTER
----- Message from Sammy Rhoades MD sent at 3/15/2021  2:03 PM CDT -----  Reviewed results   Please order a urine culture. Patient to see me for complete physical and abnormal labs.

## 2021-03-16 ENCOUNTER — MA CHART PREP (OUTPATIENT)
Dept: FAMILY MEDICINE CLINIC | Facility: CLINIC | Age: 71
End: 2021-03-16

## 2021-03-16 ENCOUNTER — CARDPULM VISIT (OUTPATIENT)
Dept: CARDIAC REHAB | Facility: HOSPITAL | Age: 71
End: 2021-03-16
Attending: INTERNAL MEDICINE
Payer: MEDICARE

## 2021-03-16 ENCOUNTER — LAB ENCOUNTER (OUTPATIENT)
Dept: LAB | Age: 71
End: 2021-03-16
Attending: INTERNAL MEDICINE
Payer: MEDICARE

## 2021-03-16 DIAGNOSIS — R82.90 ABNORMAL URINE: ICD-10-CM

## 2021-03-16 PROBLEM — M47.816 ARTHRITIS OF FACET JOINT OF LUMBAR SPINE: Status: ACTIVE | Noted: 2021-03-16

## 2021-03-16 PROBLEM — I70.0 AORTIC ATHEROSCLEROSIS (HCC): Status: ACTIVE | Noted: 2021-03-16

## 2021-03-16 PROCEDURE — 87086 URINE CULTURE/COLONY COUNT: CPT

## 2021-03-16 RX ORDER — PANCRELIPASE 36000; 180000; 114000 [USP'U]/1; [USP'U]/1; [USP'U]/1
CAPSULE, DELAYED RELEASE PELLETS ORAL
Qty: 180 CAPSULE | Refills: 0 | Status: SHIPPED | OUTPATIENT
Start: 2021-03-16 | End: 2021-03-22 | Stop reason: CLARIF

## 2021-03-18 ENCOUNTER — CARDPULM VISIT (OUTPATIENT)
Dept: CARDIAC REHAB | Facility: HOSPITAL | Age: 71
End: 2021-03-18
Attending: INTERNAL MEDICINE
Payer: MEDICARE

## 2021-03-19 ENCOUNTER — OFFICE VISIT (OUTPATIENT)
Dept: INTERNAL MEDICINE CLINIC | Facility: CLINIC | Age: 71
End: 2021-03-19
Payer: MEDICARE

## 2021-03-19 VITALS
WEIGHT: 142.19 LBS | HEART RATE: 94 BPM | DIASTOLIC BLOOD PRESSURE: 50 MMHG | RESPIRATION RATE: 16 BRPM | TEMPERATURE: 98 F | BODY MASS INDEX: 20.36 KG/M2 | OXYGEN SATURATION: 98 % | HEIGHT: 70 IN | SYSTOLIC BLOOD PRESSURE: 160 MMHG

## 2021-03-19 DIAGNOSIS — Z79.4 TYPE 2 DIABETES MELLITUS WITHOUT COMPLICATION, WITH LONG-TERM CURRENT USE OF INSULIN (HCC): Chronic | ICD-10-CM

## 2021-03-19 DIAGNOSIS — G89.29 OTHER CHRONIC PAIN: Chronic | ICD-10-CM

## 2021-03-19 DIAGNOSIS — J44.9 CHRONIC OBSTRUCTIVE PULMONARY DISEASE, UNSPECIFIED COPD TYPE (HCC): Chronic | ICD-10-CM

## 2021-03-19 DIAGNOSIS — Q25.40 ABNORMALITY OF THORACIC AORTA: ICD-10-CM

## 2021-03-19 DIAGNOSIS — Z00.00 ROUTINE GENERAL MEDICAL EXAMINATION AT A HEALTH CARE FACILITY: Primary | ICD-10-CM

## 2021-03-19 DIAGNOSIS — K90.9 STEATORRHEA: Chronic | ICD-10-CM

## 2021-03-19 DIAGNOSIS — C61 PROSTATE CANCER (HCC): Chronic | ICD-10-CM

## 2021-03-19 DIAGNOSIS — F11.20 CHRONIC NARCOTIC DEPENDENCE (HCC): Chronic | ICD-10-CM

## 2021-03-19 DIAGNOSIS — E55.9 VITAMIN D DEFICIENCY: Chronic | ICD-10-CM

## 2021-03-19 DIAGNOSIS — I10 ESSENTIAL HYPERTENSION, BENIGN: Chronic | ICD-10-CM

## 2021-03-19 DIAGNOSIS — N20.0 BILATERAL KIDNEY STONES: Chronic | ICD-10-CM

## 2021-03-19 DIAGNOSIS — E11.9 TYPE 2 DIABETES MELLITUS WITHOUT COMPLICATION, WITH LONG-TERM CURRENT USE OF INSULIN (HCC): Chronic | ICD-10-CM

## 2021-03-19 DIAGNOSIS — K86.1 CHRONIC PANCREATITIS, UNSPECIFIED PANCREATITIS TYPE (HCC): Chronic | ICD-10-CM

## 2021-03-19 DIAGNOSIS — N18.30 STAGE 3 CHRONIC KIDNEY DISEASE, UNSPECIFIED WHETHER STAGE 3A OR 3B CKD (HCC): Chronic | ICD-10-CM

## 2021-03-19 PROBLEM — R63.4 WEIGHT LOSS: Chronic | Status: RESOLVED | Noted: 2019-05-23 | Resolved: 2021-03-19

## 2021-03-19 PROCEDURE — 99397 PER PM REEVAL EST PAT 65+ YR: CPT | Performed by: INTERNAL MEDICINE

## 2021-03-19 PROCEDURE — 96160 PT-FOCUSED HLTH RISK ASSMT: CPT | Performed by: INTERNAL MEDICINE

## 2021-03-19 PROCEDURE — G0439 PPPS, SUBSEQ VISIT: HCPCS | Performed by: INTERNAL MEDICINE

## 2021-03-19 PROCEDURE — 3078F DIAST BP <80 MM HG: CPT | Performed by: INTERNAL MEDICINE

## 2021-03-19 PROCEDURE — 3077F SYST BP >= 140 MM HG: CPT | Performed by: INTERNAL MEDICINE

## 2021-03-19 PROCEDURE — 3008F BODY MASS INDEX DOCD: CPT | Performed by: INTERNAL MEDICINE

## 2021-03-19 RX ORDER — HYDRALAZINE HYDROCHLORIDE 25 MG/1
25 TABLET, FILM COATED ORAL 3 TIMES DAILY
Qty: 90 TABLET | Refills: 2 | Status: SHIPPED | OUTPATIENT
Start: 2021-03-19 | End: 2021-06-17

## 2021-03-19 NOTE — PROGRESS NOTES
REASON FOR VISIT:    Debra Miranda is a 69year old male who presents for a MA Supervisit.     Male      Patient Care Team: Patient Care Team:  Kellie Mendoza MD as PCP - General (Internal Medicine)  Kellie Mendoza MD (Internal Medicine)  Rayray Melissa, TAKE 1 TABLET BY MOUTH TWICE DAILY WITH MEALS 180 tablet 0   • VENTOLIN  (90 Base) MCG/ACT Inhalation Aero Soln USE 2 INHALATIONS EVERY 6 HOURS AS NEEDED FOR WHEEZING 54 g 2   • SERTRALINE HCL 25 MG Oral Tab TAKE ONE TABLET BY MOUTH DAILY 90 tablet AST and ALT Latest Ref Rng & Units 3/12/2021 6/3/2020 5/9/2020 5/1/2020 4/6/2020 3/8/2020 3/2/2020   AST 15 - 37 U/L 26 28 31 23 28 21 32   ALT 16 - 61 U/L 30 30 23 28 32 31 40   Some recent data might be hidden     TSH and Free T4 Latest Ref Rng & Uni 0-No  Do you have difficulty seeing?: 0-No  Do you have any difficulty walking or getting up?: 0-No  Do you have any tripping hazards?: 0-No  Are you on multiple medications?: 1-Yes  Does pain affect your day to day activities?: 0-No  Have you had any brooke SPECIFIC DISEASE MONITORING Internal Lab or Procedure     Diabetes     HgbA1C  Annually HGBA1C (%)   Date Value   02/21/2011 7.0 (H)     HgbA1C (%)   Date Value   03/12/2021 6.6 (H)       Creat/alb ratio  Annually CREATININE (mg/dL)   Date Value   03/2 Spondylolisthesis of lumbar region    • Spondylolisthesis of lumbar region    • Type II or unspecified type diabetes mellitus without mention of complication, uncontrolled    • Unspecified vitamin D deficiency    • Visual impairment     GLASSES   • Weight Smokeless tobacco: Never Used    Vaping Use      Vaping Use: Never used    Alcohol use: No    Drug use: No       REVIEW OF SYSTEMS:     General/Constitutional:   General able to do usual activities,  good general state of health, no fatigue, no fever, no w back pain, muscle weakness . Joint pain none. Joint stiffness none. Peripheral Vascular:   General no varicosities, no claudication. Dermatologic:   Rash none.    Neurologic:   Patient denies dizziness, fainting, headache, loss of consciousness, memory Edema: none. Pulses: present. Tremors: no.   Varicose veins: absent . MUSCULOSKELETAL:   Cervical spines: normal.   L-S spines: normal.   Lower extremity joints: mild oa knees. Upper extremity joints: normal.   NEUROLOGICAL:   Babinski: negative. for medication check  Diet counseling perfomed    SUGGESTED VACCINATIONS - Influenza, Pneumococcal, Zoster, Tetanus   Influenza: There are no preventive care reminders to display for this patient.   Pneumonia: There are no preventive care reminders to displ

## 2021-03-22 ENCOUNTER — HOSPITAL ENCOUNTER (INPATIENT)
Facility: HOSPITAL | Age: 71
LOS: 2 days | Discharge: HOME | DRG: 871 | End: 2021-03-24
Attending: EMERGENCY MEDICINE | Admitting: INTERNAL MEDICINE
Payer: MEDICARE

## 2021-03-22 ENCOUNTER — APPOINTMENT (OUTPATIENT)
Dept: GENERAL RADIOLOGY | Facility: HOSPITAL | Age: 71
DRG: 871 | End: 2021-03-22
Attending: PHYSICIAN ASSISTANT
Payer: MEDICARE

## 2021-03-22 ENCOUNTER — APPOINTMENT (OUTPATIENT)
Dept: CT IMAGING | Facility: HOSPITAL | Age: 71
DRG: 871 | End: 2021-03-22
Attending: EMERGENCY MEDICINE
Payer: MEDICARE

## 2021-03-22 DIAGNOSIS — J18.9 COMMUNITY ACQUIRED PNEUMONIA OF RIGHT MIDDLE LOBE OF LUNG: Primary | ICD-10-CM

## 2021-03-22 LAB
ALBUMIN SERPL-MCNC: 3.6 G/DL (ref 3.4–5)
ALBUMIN/GLOB SERPL: 0.8 {RATIO} (ref 1–2)
ALP LIVER SERPL-CCNC: 110 U/L
ALT SERPL-CCNC: 32 U/L
ANION GAP SERPL CALC-SCNC: 7 MMOL/L (ref 0–18)
AST SERPL-CCNC: 29 U/L (ref 15–37)
BASOPHILS # BLD AUTO: 0.05 X10(3) UL (ref 0–0.2)
BASOPHILS NFR BLD AUTO: 0.3 %
BILIRUB SERPL-MCNC: 0.6 MG/DL (ref 0.1–2)
BILIRUB UR QL STRIP.AUTO: NEGATIVE
BUN BLD-MCNC: 21 MG/DL (ref 7–18)
BUN/CREAT SERPL: 13.5 (ref 10–20)
CALCIUM BLD-MCNC: 9.5 MG/DL (ref 8.5–10.1)
CHLORIDE SERPL-SCNC: 102 MMOL/L (ref 98–112)
CLARITY UR REFRACT.AUTO: CLEAR
CO2 SERPL-SCNC: 26 MMOL/L (ref 21–32)
COLOR UR AUTO: YELLOW
CREAT BLD-MCNC: 1.56 MG/DL
DEPRECATED RDW RBC AUTO: 46.6 FL (ref 35.1–46.3)
EOSINOPHIL # BLD AUTO: 0.23 X10(3) UL (ref 0–0.7)
EOSINOPHIL NFR BLD AUTO: 1.4 %
ERYTHROCYTE [DISTWIDTH] IN BLOOD BY AUTOMATED COUNT: 13.9 % (ref 11–15)
GLOBULIN PLAS-MCNC: 4.6 G/DL (ref 2.8–4.4)
GLUCOSE BLD-MCNC: 124 MG/DL (ref 70–99)
GLUCOSE BLD-MCNC: 172 MG/DL (ref 70–99)
GLUCOSE UR STRIP.AUTO-MCNC: NEGATIVE MG/DL
HCT VFR BLD AUTO: 38.6 %
HGB BLD-MCNC: 13.4 G/DL
IMM GRANULOCYTES # BLD AUTO: 0.07 X10(3) UL (ref 0–1)
IMM GRANULOCYTES NFR BLD: 0.4 %
KETONES UR STRIP.AUTO-MCNC: NEGATIVE MG/DL
LACTATE SERPL-SCNC: 0.8 MMOL/L (ref 0.4–2)
LEUKOCYTE ESTERASE UR QL STRIP.AUTO: NEGATIVE
LIPASE SERPL-CCNC: 23 U/L (ref 73–393)
LYMPHOCYTES # BLD AUTO: 2.13 X10(3) UL (ref 1–4)
LYMPHOCYTES NFR BLD AUTO: 13.1 %
M PROTEIN MFR SERPL ELPH: 8.2 G/DL (ref 6.4–8.2)
MCH RBC QN AUTO: 31.6 PG (ref 26–34)
MCHC RBC AUTO-ENTMCNC: 34.7 G/DL (ref 31–37)
MCV RBC AUTO: 91 FL
MONOCYTES # BLD AUTO: 2.05 X10(3) UL (ref 0.1–1)
MONOCYTES NFR BLD AUTO: 12.6 %
NEUTROPHILS # BLD AUTO: 11.68 X10 (3) UL (ref 1.5–7.7)
NEUTROPHILS # BLD AUTO: 11.68 X10(3) UL (ref 1.5–7.7)
NEUTROPHILS NFR BLD AUTO: 72.2 %
NITRITE UR QL STRIP.AUTO: NEGATIVE
OSMOLALITY SERPL CALC.SUM OF ELEC: 287 MOSM/KG (ref 275–295)
PH UR STRIP.AUTO: 7 [PH] (ref 5–8)
PLATELET # BLD AUTO: 453 10(3)UL (ref 150–450)
POTASSIUM SERPL-SCNC: 4.7 MMOL/L (ref 3.5–5.1)
PROT UR STRIP.AUTO-MCNC: NEGATIVE MG/DL
RBC # BLD AUTO: 4.24 X10(6)UL
RBC UR QL AUTO: NEGATIVE
SARS-COV-2 RNA RESP QL NAA+PROBE: NOT DETECTED
SODIUM SERPL-SCNC: 135 MMOL/L (ref 136–145)
SP GR UR STRIP.AUTO: 1.01 (ref 1–1.03)
TROPONIN I SERPL-MCNC: <0.045 NG/ML (ref ?–0.04)
UROBILINOGEN UR STRIP.AUTO-MCNC: 4 MG/DL
WBC # BLD AUTO: 16.2 X10(3) UL (ref 4–11)

## 2021-03-22 PROCEDURE — 99223 1ST HOSP IP/OBS HIGH 75: CPT | Performed by: INTERNAL MEDICINE

## 2021-03-22 PROCEDURE — 71101 X-RAY EXAM UNILAT RIBS/CHEST: CPT | Performed by: PHYSICIAN ASSISTANT

## 2021-03-22 PROCEDURE — 74177 CT ABD & PELVIS W/CONTRAST: CPT | Performed by: EMERGENCY MEDICINE

## 2021-03-22 PROCEDURE — 71275 CT ANGIOGRAPHY CHEST: CPT | Performed by: EMERGENCY MEDICINE

## 2021-03-22 RX ORDER — ONDANSETRON 2 MG/ML
4 INJECTION INTRAMUSCULAR; INTRAVENOUS EVERY 6 HOURS PRN
Status: DISCONTINUED | OUTPATIENT
Start: 2021-03-22 | End: 2021-03-23

## 2021-03-22 RX ORDER — HYDRALAZINE HYDROCHLORIDE 25 MG/1
25 TABLET, FILM COATED ORAL 3 TIMES DAILY
Status: DISCONTINUED | OUTPATIENT
Start: 2021-03-22 | End: 2021-03-24

## 2021-03-22 RX ORDER — HYDROMORPHONE HYDROCHLORIDE 1 MG/ML
0.5 INJECTION, SOLUTION INTRAMUSCULAR; INTRAVENOUS; SUBCUTANEOUS EVERY 2 HOUR PRN
Status: ACTIVE | OUTPATIENT
Start: 2021-03-22 | End: 2021-03-22

## 2021-03-22 RX ORDER — SODIUM CHLORIDE 9 MG/ML
INJECTION, SOLUTION INTRAVENOUS CONTINUOUS
Status: DISCONTINUED | OUTPATIENT
Start: 2021-03-22 | End: 2021-03-23

## 2021-03-22 RX ORDER — ALBUTEROL SULFATE 90 UG/1
2 AEROSOL, METERED RESPIRATORY (INHALATION) EVERY 6 HOURS PRN
Status: DISCONTINUED | OUTPATIENT
Start: 2021-03-22 | End: 2021-03-24

## 2021-03-22 RX ORDER — MELATONIN
3 NIGHTLY PRN
Status: DISCONTINUED | OUTPATIENT
Start: 2021-03-22 | End: 2021-03-24

## 2021-03-22 RX ORDER — METOCLOPRAMIDE HYDROCHLORIDE 5 MG/ML
5 INJECTION INTRAMUSCULAR; INTRAVENOUS EVERY 8 HOURS PRN
Status: DISCONTINUED | OUTPATIENT
Start: 2021-03-22 | End: 2021-03-24

## 2021-03-22 RX ORDER — SERTRALINE HYDROCHLORIDE 25 MG/1
25 TABLET, FILM COATED ORAL NIGHTLY
COMMUNITY
End: 2021-06-16

## 2021-03-22 RX ORDER — HYDROMORPHONE HYDROCHLORIDE 1 MG/ML
1 INJECTION, SOLUTION INTRAMUSCULAR; INTRAVENOUS; SUBCUTANEOUS EVERY 30 MIN PRN
Status: DISCONTINUED | OUTPATIENT
Start: 2021-03-22 | End: 2021-03-23

## 2021-03-22 RX ORDER — ACETAMINOPHEN 160 MG
2000 TABLET,DISINTEGRATING ORAL DAILY
Status: DISCONTINUED | OUTPATIENT
Start: 2021-03-23 | End: 2021-03-24

## 2021-03-22 RX ORDER — INSULIN DETEMIR 100 [IU]/ML
35 INJECTION, SOLUTION SUBCUTANEOUS NIGHTLY
Status: ON HOLD | COMMUNITY
End: 2021-04-03

## 2021-03-22 RX ORDER — HYDROMORPHONE HYDROCHLORIDE 1 MG/ML
1 INJECTION, SOLUTION INTRAMUSCULAR; INTRAVENOUS; SUBCUTANEOUS EVERY 30 MIN PRN
Status: COMPLETED | OUTPATIENT
Start: 2021-03-22 | End: 2021-03-23

## 2021-03-22 RX ORDER — HYDROMORPHONE HYDROCHLORIDE 2 MG/1
4 TABLET ORAL EVERY 6 HOURS PRN
Status: DISCONTINUED | OUTPATIENT
Start: 2021-03-22 | End: 2021-03-23

## 2021-03-22 RX ORDER — DEXTROSE MONOHYDRATE 25 G/50ML
50 INJECTION, SOLUTION INTRAVENOUS
Status: DISCONTINUED | OUTPATIENT
Start: 2021-03-22 | End: 2021-03-24

## 2021-03-22 RX ORDER — ALBUTEROL SULFATE 90 UG/1
2 AEROSOL, METERED RESPIRATORY (INHALATION) EVERY 6 HOURS PRN
COMMUNITY
End: 2021-10-11

## 2021-03-22 RX ORDER — VERAPAMIL HYDROCHLORIDE 180 MG/1
180 CAPSULE, EXTENDED RELEASE ORAL 2 TIMES DAILY
COMMUNITY
End: 2021-04-27

## 2021-03-22 RX ORDER — HYDROMORPHONE HYDROCHLORIDE 1 MG/ML
1 INJECTION, SOLUTION INTRAMUSCULAR; INTRAVENOUS; SUBCUTANEOUS EVERY 2 HOUR PRN
Status: DISPENSED | OUTPATIENT
Start: 2021-03-22 | End: 2021-03-22

## 2021-03-22 RX ORDER — HYDROMORPHONE HYDROCHLORIDE 1 MG/ML
1 INJECTION, SOLUTION INTRAMUSCULAR; INTRAVENOUS; SUBCUTANEOUS ONCE
Status: COMPLETED | OUTPATIENT
Start: 2021-03-22 | End: 2021-03-22

## 2021-03-22 RX ORDER — PANCRELIPASE 36000; 180000; 114000 [USP'U]/1; [USP'U]/1; [USP'U]/1
2 CAPSULE, DELAYED RELEASE PELLETS ORAL 3 TIMES DAILY
COMMUNITY
End: 2021-04-14

## 2021-03-22 RX ORDER — ETODOLAC 400 MG/1
400 TABLET, FILM COATED ORAL 2 TIMES DAILY
Status: ON HOLD | COMMUNITY
End: 2021-04-03

## 2021-03-22 RX ORDER — ENOXAPARIN SODIUM 100 MG/ML
40 INJECTION SUBCUTANEOUS DAILY
Status: DISCONTINUED | OUTPATIENT
Start: 2021-03-23 | End: 2021-03-24

## 2021-03-22 RX ORDER — ACETAMINOPHEN 500 MG
1000 TABLET ORAL ONCE
Status: COMPLETED | OUTPATIENT
Start: 2021-03-22 | End: 2021-03-22

## 2021-03-22 RX ORDER — ONDANSETRON 2 MG/ML
4 INJECTION INTRAMUSCULAR; INTRAVENOUS ONCE
Status: COMPLETED | OUTPATIENT
Start: 2021-03-22 | End: 2021-03-22

## 2021-03-22 RX ORDER — ONDANSETRON HYDROCHLORIDE 8 MG/1
4 TABLET, FILM COATED ORAL EVERY 4 HOURS PRN
COMMUNITY
End: 2021-06-03

## 2021-03-22 NOTE — ED PROVIDER NOTES
Patient Seen in: [facility] Emergency Department      History   Patient presents with:  Abdomen/Flank Pain    Stated Complaint: cp    HPI/Subjective:   HPI    77-year-old male with a complicated medical history including chronic pancreatitis, essent ng/ml 03/08/2018   • Essential hypertension, benign    • Frequent use of laxatives    • Hemorrhoids    • Knee pain 10/2/2013   • Long term current use of opiate analgesic 2/17/2015   • Long-term current use of opiate analgesic 6/11/2010   • Multiple risk f 35        Types: Cigarettes        Quit date: 2000        Years since quittin.2      Smokeless tobacco: Never Used    Vaping Use      Vaping Use: Never used    Alcohol use: No    Drug use:  No             Review of Systems    Positive for stated co Abdomen is soft. Tenderness: There is no abdominal tenderness. There is no right CVA tenderness, left CVA tenderness, guarding or rebound. Musculoskeletal:         General: No tenderness. Normal range of motion.       Cervical back: Normal range of m Abnormality         Status                     ---------                               -----------         ------                     CBC W/ DIFFERENTIAL[[id number]]          Abnormal            Final result                 Please view results for these kevin ABD (W) + CT PEL (W) (CPT=71275/46843)    Result Date: 3/22/2021  PROCEDURE:  CTA CHEST + CT ABD (W) + CT PEL (W) (CPT=71275/45801)  COMPARISON:  [facility] , CT, CT CALCIUM SCORING, 7/14/2020, 2:22 PM.  [facility] , CT, CT CALCIUM SCORING OVER READ, 7/14/2020 Left renal cyst measuring 2.5 x 2.7 cm. BOWEL/MESENTERY:  No evidence of obstruction. Moderate amount of fecal material noted within the colon. Postsurgical changes from probable pancreaticojejunostomy.   Stable hypodense lesion adjacent to the celiac axi abdomen and pelvis was ordered which again shows masslike possible pneumonia consolidation in the right lung no evidence of PE or acute pancreatitis.   Blood cultures lactic acid taken along with procalcitonin patient will be admitted for further evaluation

## 2021-03-22 NOTE — ED INITIAL ASSESSMENT (HPI)
Pt c/o right rib pain that radiates to the back since lastnight. Pt states he has been passing little kidney stones over the past 3 weeks. Pt denies any blood in urine.

## 2021-03-23 ENCOUNTER — APPOINTMENT (OUTPATIENT)
Dept: CARDIAC REHAB | Facility: HOSPITAL | Age: 71
End: 2021-03-23
Payer: MEDICARE

## 2021-03-23 LAB
ANION GAP SERPL CALC-SCNC: 9 MMOL/L (ref 0–18)
APTT PPP: 36.8 SECONDS (ref 25.4–36.1)
ATRIAL RATE: 102 BPM
BASOPHILS # BLD AUTO: 0.04 X10(3) UL (ref 0–0.2)
BASOPHILS NFR BLD AUTO: 0.2 %
BUN BLD-MCNC: 19 MG/DL (ref 7–18)
BUN/CREAT SERPL: 13.6 (ref 10–20)
CALCIUM BLD-MCNC: 9.3 MG/DL (ref 8.5–10.1)
CHLORIDE SERPL-SCNC: 106 MMOL/L (ref 98–112)
CO2 SERPL-SCNC: 21 MMOL/L (ref 21–32)
CREAT BLD-MCNC: 1.4 MG/DL
DEPRECATED RDW RBC AUTO: 47.6 FL (ref 35.1–46.3)
EOSINOPHIL # BLD AUTO: 0.19 X10(3) UL (ref 0–0.7)
EOSINOPHIL NFR BLD AUTO: 1 %
ERYTHROCYTE [DISTWIDTH] IN BLOOD BY AUTOMATED COUNT: 14.1 % (ref 11–15)
GLUCOSE BLD-MCNC: 125 MG/DL (ref 70–99)
GLUCOSE BLD-MCNC: 145 MG/DL (ref 70–99)
GLUCOSE BLD-MCNC: 145 MG/DL (ref 70–99)
GLUCOSE BLD-MCNC: 164 MG/DL (ref 70–99)
GLUCOSE BLD-MCNC: 205 MG/DL (ref 70–99)
HCT VFR BLD AUTO: 38.8 %
HGB BLD-MCNC: 13.3 G/DL
IMM GRANULOCYTES # BLD AUTO: 0.09 X10(3) UL (ref 0–1)
IMM GRANULOCYTES NFR BLD: 0.5 %
INR BLD: 1.18 (ref 0.89–1.11)
L PNEUMO AG UR QL: NEGATIVE
LACTATE SERPL-SCNC: 1 MMOL/L (ref 0.4–2)
LYMPHOCYTES # BLD AUTO: 2.38 X10(3) UL (ref 1–4)
LYMPHOCYTES NFR BLD AUTO: 13.1 %
MCH RBC QN AUTO: 31.5 PG (ref 26–34)
MCHC RBC AUTO-ENTMCNC: 34.3 G/DL (ref 31–37)
MCV RBC AUTO: 91.9 FL
MONOCYTES # BLD AUTO: 2.6 X10(3) UL (ref 0.1–1)
MONOCYTES NFR BLD AUTO: 14.3 %
NEUTROPHILS # BLD AUTO: 12.83 X10 (3) UL (ref 1.5–7.7)
NEUTROPHILS # BLD AUTO: 12.83 X10(3) UL (ref 1.5–7.7)
NEUTROPHILS NFR BLD AUTO: 70.9 %
OSMOLALITY SERPL CALC.SUM OF ELEC: 286 MOSM/KG (ref 275–295)
P AXIS: 67 DEGREES
P-R INTERVAL: 144 MS
PLATELET # BLD AUTO: 366 10(3)UL (ref 150–450)
POTASSIUM SERPL-SCNC: 4.7 MMOL/L (ref 3.5–5.1)
PROCALCITONIN SERPL-MCNC: 0.22 NG/ML (ref ?–0.16)
PSA SERPL DL<=0.01 NG/ML-MCNC: 15.4 SECONDS (ref 12.4–14.6)
Q-T INTERVAL: 324 MS
QRS DURATION: 80 MS
QTC CALCULATION (BEZET): 422 MS
R AXIS: 73 DEGREES
RBC # BLD AUTO: 4.22 X10(6)UL
SODIUM SERPL-SCNC: 136 MMOL/L (ref 136–145)
STREP PNEUMO ANTIGEN, URINE: NEGATIVE
T AXIS: 68 DEGREES
VENTRICULAR RATE: 102 BPM
WBC # BLD AUTO: 18.1 X10(3) UL (ref 4–11)

## 2021-03-23 PROCEDURE — 99233 SBSQ HOSP IP/OBS HIGH 50: CPT | Performed by: HOSPITALIST

## 2021-03-23 RX ORDER — SERTRALINE HYDROCHLORIDE 25 MG/1
25 TABLET, FILM COATED ORAL DAILY
Status: DISCONTINUED | OUTPATIENT
Start: 2021-03-23 | End: 2021-03-24

## 2021-03-23 RX ORDER — BACLOFEN 5 MG/1
5 TABLET ORAL ONCE
Status: DISCONTINUED | OUTPATIENT
Start: 2021-03-23 | End: 2021-03-23

## 2021-03-23 RX ORDER — DIPHENHYDRAMINE HYDROCHLORIDE 50 MG/ML
12.5 INJECTION INTRAMUSCULAR; INTRAVENOUS EVERY 4 HOURS PRN
Status: DISCONTINUED | OUTPATIENT
Start: 2021-03-23 | End: 2021-03-24

## 2021-03-23 RX ORDER — NALOXONE HYDROCHLORIDE 0.4 MG/ML
0.08 INJECTION, SOLUTION INTRAMUSCULAR; INTRAVENOUS; SUBCUTANEOUS
Status: DISCONTINUED | OUTPATIENT
Start: 2021-03-23 | End: 2021-03-24

## 2021-03-23 RX ORDER — HYDROMORPHONE HYDROCHLORIDE 1 MG/ML
0.5 INJECTION, SOLUTION INTRAMUSCULAR; INTRAVENOUS; SUBCUTANEOUS ONCE
Status: COMPLETED | OUTPATIENT
Start: 2021-03-23 | End: 2021-03-23

## 2021-03-23 RX ORDER — ONDANSETRON 2 MG/ML
4 INJECTION INTRAMUSCULAR; INTRAVENOUS EVERY 6 HOURS PRN
Status: DISCONTINUED | OUTPATIENT
Start: 2021-03-23 | End: 2021-03-24

## 2021-03-23 RX ORDER — HYDROMORPHONE HYDROCHLORIDE 1 MG/ML
INJECTION, SOLUTION INTRAMUSCULAR; INTRAVENOUS; SUBCUTANEOUS EVERY 4 HOURS PRN
Status: DISCONTINUED | OUTPATIENT
Start: 2021-03-23 | End: 2021-03-23

## 2021-03-23 RX ORDER — FENTANYL 100 UG/H
1 PATCH TRANSDERMAL
Status: DISCONTINUED | OUTPATIENT
Start: 2021-03-23 | End: 2021-03-24

## 2021-03-23 RX ORDER — ACETAMINOPHEN 500 MG
1000 TABLET ORAL EVERY 6 HOURS
Status: DISCONTINUED | OUTPATIENT
Start: 2021-03-23 | End: 2021-03-24

## 2021-03-23 RX ORDER — TAMSULOSIN HYDROCHLORIDE 0.4 MG/1
0.4 CAPSULE ORAL DAILY
Status: DISCONTINUED | OUTPATIENT
Start: 2021-03-23 | End: 2021-03-24

## 2021-03-23 RX ORDER — POLYETHYLENE GLYCOL 3350 17 G/17G
17 POWDER, FOR SOLUTION ORAL DAILY
Status: DISCONTINUED | OUTPATIENT
Start: 2021-03-23 | End: 2021-03-24

## 2021-03-23 RX ORDER — SODIUM CHLORIDE, SODIUM LACTATE, POTASSIUM CHLORIDE, CALCIUM CHLORIDE 600; 310; 30; 20 MG/100ML; MG/100ML; MG/100ML; MG/100ML
INJECTION, SOLUTION INTRAVENOUS CONTINUOUS
Status: DISCONTINUED | OUTPATIENT
Start: 2021-03-23 | End: 2021-03-24

## 2021-03-23 NOTE — CONSULTS
[facility] Note  [facility]  Report of Consultation    Reva Lavonne Patient Status:  Inpatient    1950 MRN [id number]   [facility] 5NW-A Attending Rashawn Steiner MD   [facility] Day # hypertension, benign    • Frequent use of laxatives    • Hemorrhoids    • Knee pain 10/2/2013   • Long term current use of opiate analgesic 2/17/2015   • Long-term current use of opiate analgesic 6/11/2010   • Multiple risk factors for coronary artery dise use included cigarettes. He has a 35.00 pack-year smoking history. He has never used smokeless tobacco. He reports that he does not drink alcohol and does not use drugs. Allergies:     Ace Inhibitors          ANGIOEDEMA    Medications:  • azithromycin  5 abdominal pain. Integument/breast: Negative for rash, skin lesions, and pruritus. Hematologic/lymphatic: Negative for easy bruising, bleeding, and lymphadenopathy. Musculoskeletal: Negative for myalgias, arthralgias, muscle weakness.   Neurological: Nega lymphadenopathy in neck, axillae, groin  MSK: Normal strength and sensation in all extremities  EXT: No overt deformities, No C/C/E, 2+ DP/PT pulses b/l   SKIN: No rashes, ulcers, nodules    Lab Data Review:  Recent Labs   Lab 03/22/21  4526 03/23/21  6861 JOSSELYN (W) SH(CPT=71275/13885)    Result Date: 3/22/2021  CONCLUSION:  1.  Masslike consolidation within the right middle lobe likely representing pneumonia with given clinical history but follow-up imaging after appropriate treatment is recommended to documen

## 2021-03-23 NOTE — H&P
[facility]  History and Physical     Deneen Aldrich Patient Status:  Emergency    1950 MRN [id number]   Location [address] [facility] Attending Will, Paola Thakur MD   Hosp Day # 0 PCP Moisés Gates MD     Chief Complaint: DVT ([facility].) 6/2007   • Shortness of breath    • Spondylolisthesis of lumbar region    • Spondylolisthesis of lumbar region    • Type II or unspecified type diabetes mellitus without mention of complication, uncontrolled    • Unspecified vitamin D deficiency Tab, Take 400 mg by mouth 2 (two) times daily. , Disp: , Rfl:   Verapamil HCl  MG Oral Capsule SR 24 Hr, Take 180 mg by mouth 2 (two) times a day., Disp: , Rfl:   insulin detemir (LEVEMIR FLEXTOUCH) 100 UNIT/ML Subcutaneous Solution Pen-injector, Inje daily. Rinse and spit after using, Disp: 1 Inhaler, Rfl: 11  ipratropium-albuterol 0.5-2.5 (3) MG/3ML Inhalation Solution, Take 3 mL by nebulization every 4 (four) hours as needed. , Disp: 120 vial, Rfl: 11  PEG 3350 Oral Powd Pack, Take 17 g by mouth daily 1. Right middle lobe PNA  1. Cont abx   2. Sputum cx, strep/legionella ag   3. Blood cx   4. Pulmonary consulted in ER  2. Acute kidney injury   1. IVF, BMP in am   3. Leukocytosis   1. Due to #1, monitor   4. Chronic pancreatitis   1.  Pain control, co

## 2021-03-23 NOTE — BH RN ADMISSION NOTE
NURSING ADMISSION NOTE      Patient admitted via Cart  Oriented to room 501. Safety precautions initiated. Bed in low position. Call light in reach. Transferred from ER. Patient admitted for Community acquired PNA. Rapid covid test negative.  Ros

## 2021-03-23 NOTE — ED NOTES
This PCT called the floor to verify status of inpatient room. Per [facility] charge, states that housekeeping is still in progress. Room is being wiped down and then will need Xenex. No official ETA at this time. RN notified.

## 2021-03-23 NOTE — PROGRESS NOTES
Pharmacy Note: Renal dose adjustment for Metoclopramide (Reglan)  Reva Banerjee has been prescribed Metoclopramide (Reglan) 10 mg every 8 hours as needed. Estimated Creatinine Clearance: 38.5 mL/min (A) (based on SCr of 1.56 mg/dL (H)).     His calcul

## 2021-03-23 NOTE — PLAN OF CARE
Problem: community acquired PNA. Data: Ax04. Telemetry sinus rhythm.  on room air. Dry cough, nonproductive. Diminshed breathe sounds. Afebrile. Verbalized consistent 9/10 pain on his right side, right rib cage. IV dilaudid 1mg given, no relief.  Refus limits  Description: INTERVENTIONS:  - Monitor labs and rhythm and assess patient for signs and symptoms of electrolyte imbalances  - Administer electrolyte replacement as ordered  - Monitor response to electrolyte replacements, including rhythm and repeat

## 2021-03-23 NOTE — PROGRESS NOTES
[facility]  Progress Note     Ludivina Crest Patient Status:  Inpatient    1950 MRN [id number]   [facility] 5NW-A Attending Yessica Alatorre MD   Hosp Day # 1 PCP Micheline Staples MD     Chief Complaint: right flank pain    S: P Imaging data reviewed in Epic.     Medications:   • azithromycin  500 mg Intravenous Q24H   • lidocaine-menthol  1 patch Transdermal Daily   • baclofen  5 mg Oral Once   • Umeclidinium Bromide  1 puff Inhalation Daily   • piperacillin-tazobactam  3.375 g In home    Plan of care discussed with patient or family at bedside.     Linell Mcburney, MD          **Certification      PHYSICIAN Certification of Need for Inpatient Hospitalization - Initial Certification    Patient will require inpatient services that will

## 2021-03-23 NOTE — PROGRESS NOTES
[facility]  Progress Note     [facility] Patient Status:  Inpatient    1950 MRN [id number]   [facility] 5NW-A Attending Esteban Lamb MD   Hosp Day # 2 PCP Binta Lindquist MD     Chief Complaint: Pneumonia     S: Patie 03/22/21  1639   TROP <0.045     COVID-19 Lab Results    COVID-19  Lab Results   Component Value Date    COVID19 Not Detected 03/22/2021    COVID19 Not Detected 09/11/2020    COVID19 Not Detected 06/03/2020       Pro-Calcitonin  Recent Labs   Lab 03/22/21 use  1. Fentanyl patch   2. DC PCA  12. Depression  1. Sertraline   13. BPH  1. Flomax   14. Prostate cancer  15. VTE    Quality:  · DVT Prophylaxis: Lovenox  · CODE status:  Ful  · [place]: No  · Central line: No  · If COVID testing is negative, may disconti

## 2021-03-23 NOTE — PROGRESS NOTES
Pt is Aox4. VSS, on RA. Tele, NSR. Right sided Pain 9/10 this AM, given IV dilauded with no relief. Notified MD, pt now on dilauded PCA. Pt states that his pain feels much better now. Fentanyl patch in place. Scheduled tylenol. IV abx. IVF.  Poor appetite d

## 2021-03-24 VITALS
HEART RATE: 86 BPM | SYSTOLIC BLOOD PRESSURE: 136 MMHG | WEIGHT: 132.19 LBS | RESPIRATION RATE: 18 BRPM | OXYGEN SATURATION: 93 % | TEMPERATURE: 99 F | HEIGHT: 71 IN | BODY MASS INDEX: 18.51 KG/M2 | DIASTOLIC BLOOD PRESSURE: 66 MMHG

## 2021-03-24 LAB
ALBUMIN SERPL-MCNC: 2.6 G/DL (ref 3.4–5)
ALP LIVER SERPL-CCNC: 87 U/L
ALT SERPL-CCNC: 20 U/L
ANION GAP SERPL CALC-SCNC: 7 MMOL/L (ref 0–18)
AST SERPL-CCNC: 13 U/L (ref 15–37)
BASOPHILS # BLD AUTO: 0.04 X10(3) UL (ref 0–0.2)
BASOPHILS NFR BLD AUTO: 0.2 %
BILIRUB DIRECT SERPL-MCNC: 0.2 MG/DL (ref 0–0.2)
BILIRUB SERPL-MCNC: 0.5 MG/DL (ref 0.1–2)
BUN BLD-MCNC: 17 MG/DL (ref 7–18)
BUN/CREAT SERPL: 12.5 (ref 10–20)
CALCIUM BLD-MCNC: 9 MG/DL (ref 8.5–10.1)
CHLORIDE SERPL-SCNC: 109 MMOL/L (ref 98–112)
CO2 SERPL-SCNC: 23 MMOL/L (ref 21–32)
CREAT BLD-MCNC: 1.36 MG/DL
DEPRECATED RDW RBC AUTO: 45 FL (ref 35.1–46.3)
EOSINOPHIL # BLD AUTO: 0.34 X10(3) UL (ref 0–0.7)
EOSINOPHIL NFR BLD AUTO: 2 %
ERYTHROCYTE [DISTWIDTH] IN BLOOD BY AUTOMATED COUNT: 13.8 % (ref 11–15)
GLUCOSE BLD-MCNC: 130 MG/DL (ref 70–99)
GLUCOSE BLD-MCNC: 139 MG/DL (ref 70–99)
GLUCOSE BLD-MCNC: 150 MG/DL (ref 70–99)
HCT VFR BLD AUTO: 33.3 %
HGB BLD-MCNC: 11.9 G/DL
IMM GRANULOCYTES # BLD AUTO: 0.09 X10(3) UL (ref 0–1)
IMM GRANULOCYTES NFR BLD: 0.5 %
L PNEUMO AG UR QL: NEGATIVE
LYMPHOCYTES # BLD AUTO: 1.68 X10(3) UL (ref 1–4)
LYMPHOCYTES NFR BLD AUTO: 10 %
M PROTEIN MFR SERPL ELPH: 6.8 G/DL (ref 6.4–8.2)
MCH RBC QN AUTO: 31.6 PG (ref 26–34)
MCHC RBC AUTO-ENTMCNC: 35.7 G/DL (ref 31–37)
MCV RBC AUTO: 88.6 FL
MONOCYTES # BLD AUTO: 2.26 X10(3) UL (ref 0.1–1)
MONOCYTES NFR BLD AUTO: 13.5 %
NEUTROPHILS # BLD AUTO: 12.32 X10 (3) UL (ref 1.5–7.7)
NEUTROPHILS # BLD AUTO: 12.32 X10(3) UL (ref 1.5–7.7)
NEUTROPHILS NFR BLD AUTO: 73.8 %
OSMOLALITY SERPL CALC.SUM OF ELEC: 292 MOSM/KG (ref 275–295)
PLATELET # BLD AUTO: 375 10(3)UL (ref 150–450)
POTASSIUM SERPL-SCNC: 3.8 MMOL/L (ref 3.5–5.1)
RBC # BLD AUTO: 3.76 X10(6)UL
SODIUM SERPL-SCNC: 139 MMOL/L (ref 136–145)
STREP PNEUMO ANTIGEN, URINE: NEGATIVE
WBC # BLD AUTO: 16.7 X10(3) UL (ref 4–11)

## 2021-03-24 PROCEDURE — 99239 HOSP IP/OBS DSCHRG MGMT >30: CPT | Performed by: HOSPITALIST

## 2021-03-24 RX ORDER — LEVOFLOXACIN 750 MG/1
750 TABLET ORAL DAILY
Qty: 5 TABLET | Refills: 0 | Status: SHIPPED | OUTPATIENT
Start: 2021-03-24 | End: 2021-03-24

## 2021-03-24 RX ORDER — LEVOFLOXACIN 750 MG/1
750 TABLET ORAL
Qty: 3 TABLET | Refills: 0 | Status: SHIPPED | OUTPATIENT
Start: 2021-03-24 | End: 2021-03-24

## 2021-03-24 RX ORDER — LEVOFLOXACIN 750 MG/1
750 TABLET ORAL
Qty: 3 TABLET | Refills: 0 | Status: ON HOLD | OUTPATIENT
Start: 2021-03-24 | End: 2021-04-03

## 2021-03-24 NOTE — PAYOR COMM NOTE
--------------  ADMISSION REVIEW     Payor: Kleber Butler 673 #:  Everton Kodakia  Authorization Number: [id number]    Admit date: 3/22/21  Admit time: 11:27 PM     Admitting Physician: Aury Hastings MD  Attending Physician:  Luciana Epley, MD Leatrice Conine prior to arrival and states that the pain is not improving at all. He does report he has lost approximately 15 pounds over the past year.     Objective:   Past Medical History:   Diagnosis Date   • Abdominal pain    • Angio-edema 4/21/2019   • Arthritis pancreatic jejunostomy   • OTHER SURGICAL HISTORY      left knee scope 1990   • OTHER SURGICAL HISTORY  07/30/2019    PNBx 07/30/19   • OTHER SURGICAL HISTORY  05/01/2020    cysto, Lt URS, Lt RPG, stone manipulation with laser litho, insertion of Lt stent present. No mass, deformity or crepitus. Abdominal:      General: Abdomen is flat. Bowel sounds are normal. There is no distension. Palpations: Abdomen is soft. Tenderness: There is no abdominal tenderness.  There is no right CVA tenderness, lef SCAN SLIDE   PROCALCITONIN   BLOOD CULTURE   BLOOD CULTURE     EKG    Rate, intervals and axes as noted on EKG Report.   Rate: 102  Rhythm: Sinus Rhythm  Reading: Sinus tachycardia    XR RIBS WITH CHEST (3 VIEWS), RIGHT  (CPT=71101)  Result Date: 3/22/2021 antibiotics started with Rocephin and azithromycin here    Dr. Gloria Wing spoke to [facility] hospitalist, Dr. Lonny Robbins from pulmonology they are made aware of the patient and will follow the patient on admission    Disposition and Plan     Clinical Impression:  Comm (Physician)     [facility]  History and Physical     Deneen Aldrich Patient Status:  Emergency    1950 MRN [id number]   Location [facility] Attending Will, Paola Thakur MD   Hosp Day # 0 PCP Moisés Gates MD     Ch 1639   WBC 16.2*   HGB 13.4   MCV 91.0   .0*     *   BUN 21*   CREATSERUM 1.56*   GFRAA 51*   GFRNAA 44*   CA 9.5   ALB 3.6   *   K 4.7      CO2 26.0   ALKPHO 110   AST 29   ALT 32   BILT 0.6   TP 8.2   Estimated Creatinine Cleara levemir 35 at home but has not been taking it; will hold  2. Cont. ICF and IC   7. COPD  1. Inhalers  2. PRN alb  8. HTN  1. Cont. verapmil and hydralazine  9. Chronic pancreatitis  10. Nephrolithiasis  1. Has been passing stones recently  2.  CT w/o obstru likely strep. Consider deescaltion to ceftriaxone and azithromycin. Check urine strep and legionella. Appears staph pna per CT.  If legionella neg (likely negative given appearance) then can stop azithromycin  · Will need CT chest in 6 weeks to confirm reso administered simultaneously with calcium containing IV solutions. Includes Y-site as well. In patients other than neonates ceftriaxone and calcium containing products may administered sequentially, provided the line is flushed in between administrations. (0800)-Not Given   1200   1700     2100            Insulin Aspart Pen (NOVOLOG) 100 UNIT/ML flexpen 1-68 Units   Dose: 1-68 Units  Freq: 3 times daily with meals Route: SC  Start: 03/23/21 0800    Admin Instructions:   1 unit of Novolog (aspart) insulin Bag        0147-New Bag   0923-New Bag   1830        tamsulosin HCl (FLOMAX) cap 0.4 mg   Dose: 0.4 mg  Freq: Daily Route: OR  Start: 03/23/21 0945     1052-Given          0930            Umeclidinium Bromide (INCRUSE ELLIPTA) 62.5 MCG/INH inhaler 1 puff puff   Dose: 2 puff  Freq: Every 6 hours PRN Route: IN  PRN Reason: Wheezing  Start: 03/22/21 0900    Admin Instructions:   RT To Administer First Dose.          diphenhydrAMINE HCl (BENADRYL) IV PUSH injection 12.5 mg   Dose: 12.5 mg  Freq: Every 4 hours P

## 2021-03-24 NOTE — DISCHARGE PLANNING
NURSING DISCHARGE NOTE    Discharged Home via Wheelchair. Accompanied by Support staff  Belongings Taken by patient/family. Discharge navigator complete. Discharge instructions reviewed with patient at bedside.    All questions & comments addressed at

## 2021-03-24 NOTE — PLAN OF CARE
Pt is A&Ox4, wears glasses. VSS, afebrile. Maintaining O2 sats >92% on RA. Dry cough. Encouraged IS. Tele, NSR. Lovenox. SHABANA. EP. Last BM 3/22. Tolerating carb controlled diet, QID accu, poor ashkan. Voids. Up at black. C/o rt sided chest pain, on PCA dilaudid. patient on fluid and nutrition restrictions as appropriate  Outcome: Progressing     Problem: PAIN - ADULT  Goal: Verbalizes/displays adequate comfort level or patient's stated pain goal  Description: INTERVENTIONS:  - Encourage pt to monitor pain and requ

## 2021-03-24 NOTE — PROGRESS NOTES
[facility] Pulmonary/Critical Care Progress Note     SUBJECTIVE/Interval history: All events, procedures, notes reviewed. Pt feels well. Right chest pain has resolved. No cough. No sob.      Review of Systems:   PJ loaiza 50* 52*   CA 9.5 9.3 9.0   * 136 139   K 4.7 4.7 3.8    106 109   CO2 26.0 21.0 23.0     Recent Labs   Lab 03/22/21  1639 03/23/21  0540 03/24/21  0425   RBC 4.24 4.22 3.76*   HGB 13.4 13.3 11.9*   HCT 38.6* 38.8* 33.3*   MCV 91.0 91.9 88.6   M 1. Masslike consolidation within the right middle lobe likely representing pneumonia with given clinical history but follow-up imaging after appropriate treatment is recommended to document resolution. 2. Nonobstructing bilateral renal calculi.  3. Emphysem Dilaudid 4 mg p.o. 3 times daily  8. Essential hypertension  9. Distant history of DVT  10. Chronic laxative use  11. History of splenectomy  12. Bilateral renal stones  13.  Fatty liver  14. ckd    PLAN    · Urine strep neg  · May send home on 6 more days

## 2021-03-24 NOTE — CM/SW NOTE
Discussed during rounds. Pt is up as black, eager to discharge home today. No needs identified at this time. RN to contact SW/CM if needs discharge needs arise.     LADONNA Bonner RN, CTL

## 2021-03-24 NOTE — DISCHARGE SUMMARY
[facility] HOSPITALIST  DISCHARGE SUMMARY     Yana Boucher Patient Status:  Inpatient    1950 MRN [id number]   [facility] 5NW-A Attending Lizbeth Kirk MD   Hosp Day # 2 PCP Travis Ty MD     Date of Admission: 3/22/2021  Date of imaging as outpatient in 6 weeks to r/o underlying mass. Lace+ Score: 76  59-90 High Risk  29-58 Medium Risk  0-28   Low Risk  Patient was referred to the [facility]. TCM Follow-Up Recommendation:  LACE > 58:  High Risk of read (1 Unit Dial) 100 UNIT/ML Sopn  Commonly known as: HumaLOG KwikPen      PER SLIDING SCALE:120-180 inject 4 units,181-240 inject 8 units,241-300 inject 10 units,301-350 inject 12 units,over 351 inject 15 units.    Quantity: 30 mL  Refills: 7     ipratropium- Where to Get Your Medications      These medications were sent to [address], [place] - [address] [phone], [phone]  [address], Kleber Butler [address]    Phone: [phone]   · lidocaine-menthol 4-1 % Ptc     [facility] CARDIAC MAKE UP RESOURCE    Patient Instructions:         Location Instructions: Your appointment is scheduled at [facility]. The address is  [address], [address]. To reach Registration, park in the [address] [place] [address] [place].  Enter thro desk.               4/13/2021 11:30 AM  CARDIAC MAKE UP [2482] 61 min [facility] Cardiopulmonary Rehabilitation CARDIAC MAKE UP RESOURCE    Patient Instructions:         Location Instructions:      Your appointment is scheduled at [facility]. The Cardiopulmonary Rehabilitation CARDIAC MAKE UP RESOURCE    Patient Instructions:         Location Instructions: Your appointment is scheduled at [facility]. The address is  [address], [place].  To reach Registration, carmelo in the [facility]

## 2021-03-24 NOTE — DIETARY NOTE
[facility]    NUTRITION ASSESSMENT    Pt does not meet malnutrition criteria. NUTRITION INTERVENTION:    1. Meal and Snacks - monitor patient po intake. Encourage adequate po of appropriate diet.     PATIENT STATUS: 69year old male admitted with R Addresses: Yes    GI SYSTEM REVIEW: WNL    NUTRITION RELATED PHYSICAL FINDINGS:     1. Body Fat/Muscle Mass: mild depletion body fat and mild depletion muscle mass      2.  Fluid Accumulation: none per RN documentation     NUTRITION PRESCRIPTION:   Calories

## 2021-03-25 ENCOUNTER — APPOINTMENT (OUTPATIENT)
Dept: CARDIAC REHAB | Facility: HOSPITAL | Age: 71
End: 2021-03-25
Payer: MEDICARE

## 2021-03-25 ENCOUNTER — PATIENT OUTREACH (OUTPATIENT)
Dept: CASE MANAGEMENT | Age: 71
End: 2021-03-25

## 2021-03-25 NOTE — PAYOR COMM NOTE
--------------  DISCHARGE REVIEW    Payor: Jody Estrada #:  Myrtie Pin  Authorization Number: [id number]    Admit date: 3/22/21  Admit time:  11:27 PM  Discharge Date: 3/24/2021  2:16 PM     Admitting Physician: Keith Good MD  Attending P N/V/D/CP/or increase in SOB/cough prior to yesterday. He has daily BM, but at times gets distention in his abdomen which resolves on its own.+ flatus. Of note  He has had unintentional weight loss over the last few months.  In ER his CT showed masslike cons Take 400 mg by mouth 2 (two) times daily. Refills: 0     fentaNYL 100 MCG/HR Pt72  Commonly known as: Duragesic-100      Place 1 patch onto the skin every third day.    Stop taking on: April 11, 2021  Quantity: 10 patch  Refills: 0     hydrALAzine HCl Quantity: 30 capsule  Refills: 11     Tiotropium Bromide Monohydrate 18 MCG CAPS, albuterol 90 MCG/ACT AERS      1 capsule See Admin Instructions.  Via HandiHaler once daily at the same time everyday   Refills: 0     Ventolin  (90 Base) MCG/ACT Aers 3/30/2021 11:30 AM  CARDIAC MAKE UP [2482] 60 min [facility] Cardiopulmonary Rehabilitation CARDIAC MAKE UP RESOURCE    Patient Instructions:         Location Instructions: Your appointment is scheduled at [facility]. The address is  [address] [place] [address]. proceed through the lobby past the staircase to check in at the Cardiopulmonary Rehab Registration desk.                4/8/2021 11:30 AM  CARDIAC MAKE UP [2482] 60 min [facility] Cardiopulmonary Rehabilitation CARDIAC MAKE UP RESOURCE    Patient Instr Instructions:                    4/20/2021 11:30 AM  CARDIAC MAKE UP [2482] 60 min [facility] Cardiopulmonary Rehabilitation CARDIAC MAKE UP RESOURCE    Patient Instructions:         Location Instructions:      Your appointment is scheduled at [facility]

## 2021-03-28 ENCOUNTER — APPOINTMENT (OUTPATIENT)
Dept: CT IMAGING | Facility: HOSPITAL | Age: 71
DRG: 871 | End: 2021-03-28
Attending: EMERGENCY MEDICINE
Payer: MEDICARE

## 2021-03-28 ENCOUNTER — HOSPITAL ENCOUNTER (INPATIENT)
Facility: HOSPITAL | Age: 71
LOS: 6 days | Discharge: HOME | DRG: 871 | End: 2021-04-03
Attending: EMERGENCY MEDICINE | Admitting: INTERNAL MEDICINE
Payer: MEDICARE

## 2021-03-28 ENCOUNTER — APPOINTMENT (OUTPATIENT)
Dept: GENERAL RADIOLOGY | Facility: HOSPITAL | Age: 71
DRG: 871 | End: 2021-03-28
Attending: EMERGENCY MEDICINE
Payer: MEDICARE

## 2021-03-28 DIAGNOSIS — J18.9 NOSOCOMIAL PNEUMONIA: Primary | ICD-10-CM
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
## 2021-03-28 DIAGNOSIS — I31.3 PERICARDIAL EFFUSION: ICD-10-CM

## 2021-03-28 DIAGNOSIS — J96.91 RESPIRATORY FAILURE WITH HYPOXIA, UNSPECIFIED CHRONICITY (HCC): ICD-10-CM

## 2021-03-28 PROBLEM — J91.8 PARAPNEUMONIC EFFUSION: Status: ACTIVE | Noted: 2021-03-28

## 2021-03-28 PROBLEM — A41.9 SEPSIS (HCC): Status: ACTIVE | Noted: 2021-03-28

## 2021-03-28 PROBLEM — D72.829 LEUKOCYTOSIS: Status: ACTIVE | Noted: 2021-03-28

## 2021-03-28 PROBLEM — R09.02 HYPOXIA: Status: ACTIVE | Noted: 2019-05-25

## 2021-03-28 PROBLEM — I31.39 PERICARDIAL EFFUSION: Status: ACTIVE | Noted: 2021-03-28

## 2021-03-28 PROCEDURE — 74177 CT ABD & PELVIS W/CONTRAST: CPT | Performed by: EMERGENCY MEDICINE

## 2021-03-28 PROCEDURE — 99223 1ST HOSP IP/OBS HIGH 75: CPT | Performed by: INTERNAL MEDICINE

## 2021-03-28 PROCEDURE — 71045 X-RAY EXAM CHEST 1 VIEW: CPT | Performed by: EMERGENCY MEDICINE

## 2021-03-28 PROCEDURE — 71275 CT ANGIOGRAPHY CHEST: CPT | Performed by: EMERGENCY MEDICINE

## 2021-03-28 RX ORDER — VANCOMYCIN HYDROCHLORIDE
25 ONCE
Status: COMPLETED | OUTPATIENT
Start: 2021-03-28 | End: 2021-03-28

## 2021-03-28 RX ORDER — NALOXONE HYDROCHLORIDE 0.4 MG/ML
0.08 INJECTION, SOLUTION INTRAMUSCULAR; INTRAVENOUS; SUBCUTANEOUS
Status: DISCONTINUED | OUTPATIENT
Start: 2021-03-28 | End: 2021-04-03

## 2021-03-28 RX ORDER — DIPHENHYDRAMINE HYDROCHLORIDE 50 MG/ML
12.5 INJECTION INTRAMUSCULAR; INTRAVENOUS EVERY 4 HOURS PRN
Status: DISCONTINUED | OUTPATIENT
Start: 2021-03-28 | End: 2021-04-03

## 2021-03-28 RX ORDER — ACETAMINOPHEN 325 MG/1
650 TABLET ORAL EVERY 6 HOURS PRN
Status: DISCONTINUED | OUTPATIENT
Start: 2021-03-28 | End: 2021-04-03

## 2021-03-28 RX ORDER — HYDRALAZINE HYDROCHLORIDE 25 MG/1
25 TABLET, FILM COATED ORAL 3 TIMES DAILY
Status: DISCONTINUED | OUTPATIENT
Start: 2021-03-28 | End: 2021-04-03

## 2021-03-28 RX ORDER — HYDROMORPHONE HYDROCHLORIDE 4 MG/1
4 TABLET ORAL EVERY 6 HOURS PRN
Status: DISCONTINUED | OUTPATIENT
Start: 2021-03-28 | End: 2021-03-28

## 2021-03-28 RX ORDER — DEXTROSE MONOHYDRATE 25 G/50ML
50 INJECTION, SOLUTION INTRAVENOUS
Status: DISCONTINUED | OUTPATIENT
Start: 2021-03-28 | End: 2021-04-03

## 2021-03-28 RX ORDER — POLYETHYLENE GLYCOL 3350 17 G/17G
17 POWDER, FOR SOLUTION ORAL DAILY
Status: DISCONTINUED | OUTPATIENT
Start: 2021-03-28 | End: 2021-04-03

## 2021-03-28 RX ORDER — FENTANYL 100 UG/H
1 PATCH TRANSDERMAL
Status: DISCONTINUED | OUTPATIENT
Start: 2021-03-28 | End: 2021-03-28

## 2021-03-28 RX ORDER — ALBUTEROL SULFATE 90 UG/1
2 AEROSOL, METERED RESPIRATORY (INHALATION) EVERY 6 HOURS PRN
Status: DISCONTINUED | OUTPATIENT
Start: 2021-03-28 | End: 2021-04-03

## 2021-03-28 RX ORDER — TAMSULOSIN HYDROCHLORIDE 0.4 MG/1
0.4 CAPSULE ORAL DAILY
Status: DISCONTINUED | OUTPATIENT
Start: 2021-03-28 | End: 2021-04-03

## 2021-03-28 RX ORDER — HEPARIN SODIUM 5000 [USP'U]/ML
5000 INJECTION, SOLUTION INTRAVENOUS; SUBCUTANEOUS EVERY 12 HOURS SCHEDULED
Status: DISCONTINUED | OUTPATIENT
Start: 2021-03-28 | End: 2021-04-03

## 2021-03-28 RX ORDER — SERTRALINE HYDROCHLORIDE 25 MG/1
25 TABLET, FILM COATED ORAL NIGHTLY
Status: DISCONTINUED | OUTPATIENT
Start: 2021-03-28 | End: 2021-04-03

## 2021-03-28 RX ORDER — HYDROMORPHONE HYDROCHLORIDE 1 MG/ML
0.5 INJECTION, SOLUTION INTRAMUSCULAR; INTRAVENOUS; SUBCUTANEOUS ONCE
Status: COMPLETED | OUTPATIENT
Start: 2021-03-28 | End: 2021-03-28

## 2021-03-28 RX ORDER — ONDANSETRON 2 MG/ML
4 INJECTION INTRAMUSCULAR; INTRAVENOUS EVERY 6 HOURS PRN
Status: DISCONTINUED | OUTPATIENT
Start: 2021-03-28 | End: 2021-03-28

## 2021-03-28 RX ORDER — ONDANSETRON 2 MG/ML
4 INJECTION INTRAMUSCULAR; INTRAVENOUS EVERY 6 HOURS PRN
Status: DISCONTINUED | OUTPATIENT
Start: 2021-03-28 | End: 2021-04-03

## 2021-03-28 RX ORDER — ONDANSETRON 4 MG/1
4 TABLET, FILM COATED ORAL EVERY 4 HOURS PRN
Status: DISCONTINUED | OUTPATIENT
Start: 2021-03-28 | End: 2021-04-03

## 2021-03-28 RX ORDER — IPRATROPIUM BROMIDE AND ALBUTEROL SULFATE 2.5; .5 MG/3ML; MG/3ML
3 SOLUTION RESPIRATORY (INHALATION) EVERY 4 HOURS PRN
Status: DISCONTINUED | OUTPATIENT
Start: 2021-03-28 | End: 2021-04-03

## 2021-03-28 NOTE — ED NOTES
Received pt awake and alert with obvious resp distress pt noted to be tachypneic with use of accessory muscles pulse ox 94% on 4l/nc

## 2021-03-28 NOTE — ED PROVIDER NOTES
Patient Seen in: [facility] Emergency Department      History   Patient presents with:  Difficulty Breathing    Stated Complaint: SOB, being tx for pneumonia, discharged on wednesday    HPI/Subjective:   HPI    This is a 76-year-old man history of C breath    • Spondylolisthesis of lumbar region    • Spondylolisthesis of lumbar region    • Type II or unspecified type diabetes mellitus without mention of complication, uncontrolled    • Unspecified vitamin D deficiency    • Visual impairment     GLASSES SpO2 91 %   O2 Device None (Room air)       Current:/61 (BP Location: Left arm)   Pulse 101   Temp 98.9 °F (37.2 °C) (Oral)   Resp 15   Ht 180.3 cm (5' 11\")   Wt 63.5 kg   SpO2 95%   BMI 19.53 kg/m²         Physical Exam        Physical Exam  Kathie other components within normal limits   POCT GLUCOSE - Abnormal; Notable for the following components:    POC Glucose 129 (*)     All other components within normal limits   POCT GLUCOSE - Abnormal; Notable for the following components:    POC Glucose 171 infection with Streptococcus pneumoniae. The result of this test as well as culture results should be used in conjunction with clinical findings to make an accurate diagnosis.    MAGNESIUM - Normal   PHOSPHORUS - Normal   RAPID SARS-COV-2 BY PCR - Normal (As transcribed by Technologist)     FINDINGS:  There is interval development of hazy confluent consolidation in the right lower lobe which is consistent with pneumonia. There is blunting right costophrenic angle consistent with a small right effusion.   L (CST): Margareth Lowery MD on 3/22/2021 at 5:28 PM     Finalized by (CST): Margareth Lowery MD on 3/22/2021 at 5:32 PM       CTA CHEST + CT ABD (W) + CT PEL (W) (CPT=71275/05962)    Result Date: 3/28/2021  PROCEDURE:  CTA CHEST + CT ABD (W) + CT PEL (W) (CPT=71275 visible pulmonary arterial thrombus or attenuation. ABDOMEN/PELVIS: LIVER:  Homogeneous enhancement. BILIARY:  Cholecystectomy No biliary ductal dilatation. PANCREAS:  Stable. Pancreatic calcification.   Stable 2.4 x 1.4 cm hypodense area just superior a (CST): Colbert Libman, MD on 3/28/2021 at 5:01 PM     Finalized by (CST): Colbert Libman, MD on 3/28/2021 at 5:16 PM       CTA CHEST + CT ABD (W) + CT PEL (W) (CPT=71275/84183)    Result Date: 3/22/2021  PROCEDURE:  CTA CHEST + CT ABD (W) + CT PEL (W) RALPH(C enlargement. KIDNEYS:  Kidneys are symmetrical in size without evidence of hydronephrosis. Nonobstructing bilateral renal calculi. Left renal cyst measuring 2.5 x 2.7 cm. BOWEL/MESENTERY:  No evidence of obstruction.   Moderate amount of fecal material n patient's response to treatment; frequent reassessment; and, discussions with other providers.   This critical care time was performed to assess and manage the high probability of imminent, life-threatening deterioration that could result in multi-organ melchor Yes    Leukocytosis D72.829 3/28/2021 Yes    Other chronic pain (Chronic) G89.29 1/7/2014 Yes    Parapneumonic effusion J18.9, J91.8 3/28/2021 Yes    Pericardial effusion I31.3 3/28/2021 Yes    Respiratory failure with hypoxia, unspecified chronicity ([facility] [address]

## 2021-03-28 NOTE — SEPSIS REASSESSMENT
[facility]    Sepsis Reassessment Note    BP (!) 166/86   Pulse 112   Temp 99 °F (37.2 °C) (Temporal)   Resp (!) 32   Ht 180.3 cm (5' 11\")   Wt 63.5 kg   SpO2 92%   BMI 19.53 kg/m²      4:20 PM    Cardiac:  Regularity: Regular  Rate: Normal  Heart

## 2021-03-28 NOTE — H&P
[facility] HOSPITALIST                                                               History & Physical         Billy Sanders Patient Status:  Emergency    1950 MRN [id number]   Location [address] [facility] [address] Attending Rohini Ramos pain 1/7/2014   • Chronic pancreatitis (HCC)     for pancreatitis flare ups   • COPD (chronic obstructive pulmonary disease) (HCC)    • Coronary atherosclerosis    • Diabetes ([facility] [address]    • Diabetes mellitus (HCC)    • Elevated PSA, less than 10 ng/ml 03/08/20 HISTORY  05/07/2020    string stent removal   • REMOVAL GALLBLADDER     • SPLENECTOMY  1992       Family history:  Family History   Problem Relation Age of Onset   • Cancer Mother         pancreatic?    • Hypertension Mother       Reviewed    Social history 03/28/2021    BILT 0.5 03/28/2021    TP 8.2 03/28/2021    AST 22 03/28/2021    ALT 29 03/28/2021    PTT 34.6 03/28/2021    INR 1.05 03/28/2021    PTP 14.0 03/28/2021    TROP <0.045 03/28/2021       Recent Labs   Lab 03/23/21  1047 03/28/21  1445   PTP 15. 4 Radiology Data Registry) which includes the Dose Index Registry.       PATIENT STATED HISTORY:(As transcribed by Technologist)  Patient with shortness of breath.  Recently diagnosed with pneumonia.        CONTRAST USED:  90cc of Omnipaque 350       FINDINGS pancreaticojejunostomy with prominent small bowel loops in the left upper quadrant unchanged.     ABDOMINAL WALL:  Minimal subcutaneous edema in the lower abdominal wall.     PELVIC ORGANS:  The bladder is well distended.  There is prostatic calcification. middle lobe with increased surrounding prominent interstitial markings and patchy airspace disease concerning for pneumonia and worsening airspace disease in the right lower lobe with development of right pleural effusion.   Discoid atelectasis scarring in Prophylaxis: SCD, subcutaneous heparin  · CODE status: full  · Kellogg: no    Plan of care discussed with patient and patient's wife at bedside      Discussed with ER Physician.         Jimy Roberto MD  3/28/2021  6:14 PM

## 2021-03-28 NOTE — ED INITIAL ASSESSMENT (HPI)
A/o x3, pt here for PARRIS  When exertion, also with right upper back pain that comes and goes, recently discharged from hospital for pneumonia, states tomorrow he is to complete his ATB, just wants to make sure pneumonia isn't \"flaring up\"  Airway intact,r

## 2021-03-29 ENCOUNTER — APPOINTMENT (OUTPATIENT)
Dept: CV DIAGNOSTICS | Facility: HOSPITAL | Age: 71
DRG: 871 | End: 2021-03-29
Attending: INTERNAL MEDICINE
Payer: MEDICARE

## 2021-03-29 PROCEDURE — 93306 TTE W/DOPPLER COMPLETE: CPT | Performed by: INTERNAL MEDICINE

## 2021-03-29 PROCEDURE — 99232 SBSQ HOSP IP/OBS MODERATE 35: CPT | Performed by: INTERNAL MEDICINE

## 2021-03-29 RX ORDER — PROCHLORPERAZINE EDISYLATE 5 MG/ML
10 INJECTION INTRAMUSCULAR; INTRAVENOUS EVERY 6 HOURS PRN
Status: DISCONTINUED | OUTPATIENT
Start: 2021-03-29 | End: 2021-04-03

## 2021-03-29 RX ORDER — MAGNESIUM OXIDE 400 MG (241.3 MG MAGNESIUM) TABLET
400 TABLET ONCE
Status: COMPLETED | OUTPATIENT
Start: 2021-03-29 | End: 2021-03-29

## 2021-03-29 NOTE — PROGRESS NOTES
NURSING ADMISSION NOTE      Admission Navigator completed with patient and wife. Patient A/O x 4. Spo2 maintained on 4 L NC. Report given to receiving RN. Patient admitted via [address] to room and floor  Safety precautions initiated.   Bed

## 2021-03-29 NOTE — CONSULTS
[facility] Pharmacy Note:  Pain Consult    Deneen Aldrich is a 69year old patient started on Dilaudid PCA by Dr. Carmencita Pathak. Pharmacy was consulted to review medication profile and to discontinue previously ordered narcotics and sedatives.     Medication profile

## 2021-03-29 NOTE — PLAN OF CARE
Assumed care of patient at [facility]. Patient with pain. PCA initiated. Bolus given multiple time. Pain located on right upper back and abdomin. Urine sent to lab. Tmax 100.1 MD notified. No new orders.    Patient declining some home medications at this t

## 2021-03-29 NOTE — CONSULTS
[facility] Note  [facility]  Report of Consultation    Hortencia Latter day Patient Status:  Inpatient    1950 MRN [id number]   [facility] 7NE-A Attending Lida Watson MD   [address] [facility] Day # 1 osteoarthrosis, lower leg, left [715.16] 9/2/2015   • Pulmonary nodule 3/23/2017   • Renal stones    • Right leg DVT ([facility] [address] 6/2007   • Shortness of breath    • Spondylolisthesis of lumbar region    • Spondylolisthesis of lumbar region    • Type II or unspec (Lip-Prot-Amyl)  40,000 Units Oral TID CC   • verapamil HCl ER  180 mg Oral Nightly   • insulin detemir  15 Units Subcutaneous Nightly   • Sertraline HCl  25 mg Oral Nightly   • lidocaine-menthol  1 patch Transdermal Daily   • piperacillin-tazobactam  3.37 retention. Behavioral/Psych: Normal affect, mood, speech. No AVH, No SI/HI    All other review of systems are negative.     Vital signs in last 24 hours:  Patient Vitals for the past 24 hrs:   BP Temp Temp src Pulse Resp SpO2   03/29/21 1603 134/65 98.8 °F MCHC 35.7 34.8 34.7   RDW 13.8 14.0 13.6   NEPRELIM 12.32* 14.80* 18.54*   WBC 16.7* 20.0* 23.7*   .0 417.0 390.0     No results for input(s): BNP in the last 168 hours.   Recent Labs   Lab 03/28/21  1445   TROP <0.045     Recent Labs   Lab 03/23/2 abdomen and pelvis appears stable when compared to 3/22/2021.   With stable postsurgical changes of pancreatic 0 jejunostomy with localized low-attenuation fluid collection versus lymph node just posterior and superior to the body of the pancreas measuring

## 2021-03-29 NOTE — PROGRESS NOTES
[facility]  Progress Note    Jignesh Fulton Patient Status:  Inpatient    1950 MRN [id number]   [facility] 7NE-A Attending Dionne Suarez MD   Hosp Day # 1 PCP Lauren Fraser MD     CC: Shortness of breath, right-sided chest oscar CREATSERUM 1.33 03/29/2021    BUN 20 03/29/2021     03/29/2021    K 4.9 03/29/2021     03/29/2021    CO2 27.0 03/29/2021     03/29/2021    CA 9.3 03/29/2021    MG 1.8 03/29/2021    PHOS 3.8 03/29/2021    PGLU 241 03/29/2021       Imaging Units, 1-10 Units, Subcutaneous, TID AC and HS  Insulin Aspart Pen (NOVOLOG) 100 UNIT/ML flexpen 1-68 Units, 1-68 Units, Subcutaneous, TID CC  HYDROmorphone (DILAUDID) 0.2 mg/ml PCA infusion, , Intravenous, Continuous  HYDROmorphone (DILAUDID) PCA bolus fr hypoxic respiratory failure due to right lower lobe pneumonia with right parapneumonic effusion  1. Oxygen as needed  2. Pulmonary consult  3.  Transiently on BiPAP while in the hospital as reported by ER physician, currently on 4 L of oxygen via nasal renzo discharge, patient will require TBD.         Saida Barroso MD  3/29/2021

## 2021-03-29 NOTE — PLAN OF CARE
Assumed care at 0700  A&O x 4. Drowsy at times. Having trouble managing pain levels. Needed multiple clinician boluses from dilaudid pca. Nausea noted every time patient is bolused.  Encouraged PO intake to help with nausea  Zofran and compazine for sahra

## 2021-03-30 ENCOUNTER — APPOINTMENT (OUTPATIENT)
Dept: CARDIAC REHAB | Facility: HOSPITAL | Age: 71
End: 2021-03-30
Payer: MEDICARE

## 2021-03-30 PROCEDURE — 99232 SBSQ HOSP IP/OBS MODERATE 35: CPT | Performed by: INTERNAL MEDICINE

## 2021-03-30 RX ORDER — KETOROLAC TROMETHAMINE 15 MG/ML
15 INJECTION, SOLUTION INTRAMUSCULAR; INTRAVENOUS EVERY 6 HOURS
Status: COMPLETED | OUTPATIENT
Start: 2021-03-30 | End: 2021-04-01

## 2021-03-30 NOTE — PROGRESS NOTES
[facility]  Progress Note    Ramos Mims Patient Status:  Inpatient    1950 MRN [id number]   [facility] 7NE-A Attending Jose G Miller MD   Hosp Day # 2 PCP Berry Hall MD     CC: Shortness of breath, right-sided chest oscar Imaging:  Imaging reviewed in Epic.     Meds:   Prochlorperazine Edisylate (COMPAZINE) injection 10 mg, 10 mg, Intravenous, Q6H PRN  Fluticasone Furoate-Vilanterol (BREO ELLIPTA) 200-25 MCG/INH inhaler 1 puff, 1 puff, Inhalation, Daily  PEG 3350 (MICHELLE bolus from pump, 0.4 mg, Intravenous, Q30 Min PRN  Naloxone HCl (NARCAN) 0.4 MG/ML injection 0.08 mg, 0.08 mg, Intravenous, Q5 Min PRN  diphenhydrAMINE HCl (BENADRYL) IV PUSH injection 12.5 mg, 12.5 mg, Intravenous, Q4H PRN  ondansetron HCl (ZOFRAN) inject tachycardia, tachypnea and mild hypotension and leukocytosis due to pneumonia with right parapneumonic effusion  1. Lactic acid normal at 1  2. White count 20 on admission  3. IV antibiotics  4. Follow blood culture  5.  Sputum culture could not be done as

## 2021-03-30 NOTE — PLAN OF CARE
Assumed care of patient at 1. Patient is alert and oriented. Reporting right sided abdominal pain. Receiving bolus doses of Dilaudid as needed. Zofran administered once for complaints of nausea. No episodes of emesis.  Oxygen saturation remains above 92% suctioning and perform as needed  - Assess and instruct to report SOB or any respiratory difficulty  - Respiratory Therapy support as indicated  - Manage/alleviate anxiety  - Monitor for signs/symptoms of CO2 retention  Outcome: Progressing

## 2021-03-30 NOTE — PROGRESS NOTES
[facility]  Progress Note    Daniella Palencia Patient Status:  Inpatient    1950 MRN [id number]   [facility] 7NE-A Attending Dimple Al MD   Hosp Day # 2 PCP Jennifer Torrez MD     Subjective:  Daniella Palnecia is a(n) 69 year input(s): AUC02WUX, DAWN    Invalid input(s): 1974 [facility], [address], [address] [place] [address], [address] [place] [address] Lab Results    COVID-19  Lab Results   Component Value Date    COVID19 Not Detected 03/28/2021    COVID19 Not Detected 03/22/2021    COVID19 Not Detected 03/ tablet, Oral, Q15 Min PRN  Piperacillin Sod-Tazobactam So (ZOSYN) 3.375 g in dextrose 5% 100 mL IVPB-ADDV, 3.375 g, Intravenous, Q8H  Heparin Sodium (Porcine) 5000 UNIT/ML injection 5,000 Units, 5,000 Units, Subcutaneous, 2 times per day  acetaminophen (TY monitor. CXR in am  · Urine legionella and strep antigens negative  · Echo shows pericardial effusion with no hemodynamic compromise  · Continue LABA/ICS/LAMA. No evidence of copd exacerbation.   · Cr at baseline  · Wean fio2 to keep sat >90%  · Check ABG

## 2021-03-30 NOTE — CM/SW NOTE
Pt is a 68 yo male admitted for pneumonia. Pt on the pneumonia protocol. Pt lives with his wife in [facility]. PT to see. SW to f/u with any dc needs pending PT's recommendation.        03/30/21 1300   CM/SW Referral Data   Referral Source Physician

## 2021-03-30 NOTE — PHYSICAL THERAPY NOTE
PHYSICAL THERAPY EVALUATION - INPATIENT     Room Number: 0738/6525-J  Evaluation Date: 3/30/2021  Type of Evaluation: Initial  Physician Order: PT Eval and Treat    Presenting Problem: PNA  Reason for Therapy: Mobility Dysfunction and Discharge Plann normal at 1  2. White count 20 on admission  3. IV antibiotics  4. Follow blood culture  5. Sputum culture could not be done as patient not bringing up any phlegm  6. Pulmonary consult  4.  Abdominal distention and pain, chronic pancreatitis, chronic pain s Multiple risk factors for coronary artery disease 3/28/2019   • Osteoarthritis    • Pain in joints    • Pneumonia due to organism    • Primary localized osteoarthrosis, lower leg 10/2/2013   • Primary localized osteoarthrosis, lower leg, left [715.16] 9/2/ to treatment, Pain is much improved from yesterday.      Patient self-stated goal is to go home     OBJECTIVE  Precautions: Bed/chair alarm  Fall Risk: Standard fall risk    WEIGHT BEARING RESTRICTION  Weight Bearing Restriction: None                PAIN AS Skilled Therapy Provided: Pt in supine on approach for therapy    Patient instructed in bed mobility training requiring supervision A for supine to sit. Able to demonstrate safe sequencing without cues.      Patient instructed in transfer training, perf 3x/week  Number of Visits to Meet Established Goals: 2      CURRENT GOALS    Goal #1 Patient is able to demonstrate supine - sit EOB @ level: independent     Goal #2 Patient is able to demonstrate transfers Sit to/from Stand at assistance level: independen

## 2021-03-31 ENCOUNTER — APPOINTMENT (OUTPATIENT)
Dept: GENERAL RADIOLOGY | Facility: HOSPITAL | Age: 71
DRG: 871 | End: 2021-03-31
Attending: INTERNAL MEDICINE
Payer: MEDICARE

## 2021-03-31 PROCEDURE — 99232 SBSQ HOSP IP/OBS MODERATE 35: CPT | Performed by: HOSPITALIST

## 2021-03-31 PROCEDURE — 71045 X-RAY EXAM CHEST 1 VIEW: CPT | Performed by: INTERNAL MEDICINE

## 2021-03-31 NOTE — PROGRESS NOTES
[facility]  Progress Note    Thereasa Sicard Patient Status:  Inpatient    1950 MRN [id number]   [facility] 7NE-A Attending Ravi Watkins MD   Hosp Day # 3 PCP Mel Jimenez MD     CC: Shortness of breath, right-sided chest oscar Intravenous, Q6H PRN  Fluticasone Furoate-Vilanterol (BREO ELLIPTA) 200-25 MCG/INH inhaler 1 puff, 1 puff, Inhalation, Daily  PEG 3350 (MIRALAX) powder packet 17 g, 17 g, Oral, Daily  ipratropium-albuterol (DUONEB) nebulizer solution 3 mL, 3 mL, Nebulizati Intravenous, Q5 Min PRN  diphenhydrAMINE HCl (BENADRYL) IV PUSH injection 12.5 mg, 12.5 mg, Intravenous, Q4H PRN  ondansetron HCl (ZOFRAN) injection 4 mg, 4 mg, Intravenous, Q6H PRN  Umeclidinium Bromide (INCRUSE ELLIPTA) 62.5 MCG/INH inhaler 1 puff, 1 puf diastolic dysfunction.        Quality:  · DVT Prophylaxis: SCD, subcutaneous heparin  · CODE status: full  · Kellogg: no    Will the patient be referred to TCC on discharge?: yes  Estimated date of discharge: TBD  Discharge is dependent on: clinical progress

## 2021-03-31 NOTE — PROGRESS NOTES
[facility] Pulmonary/Critical Care Progress Note     SUBJECTIVE/Interval history: All events, procedures, notes reviewed.  Pt reports pain is more controlled right chest. He is tolerating periods off O2    Review of Syst 136 135*   K 5.0 4.9    102   CO2 27.0 27.0     Recent Labs   Lab 03/28/21  1445 03/29/21  0615   RBC 4.13 3.86   HGB 12.9* 11.9*   HCT 37.1* 34.3*   MCV 89.8 88.9   MCH 31.2 30.8   MCHC 34.8 34.7   RDW 14.0 13.6   NEPRELIM 14.80* 18.54*   WBC 20.0* surrounding prominent interstitial markings and patchy airspace disease. This is concerning for pneumonia now measures 3.9 x 3.7 cm previously 4.3 x 2.8 cm.   There is worsening airspace disease in the right lower lobe with development of a right pleural e like lesion causing right pleuritic pain  2. Right hilar lymphadenopathy  3. Parapneumonic effusion right  4. Pericardial effusion  5. Pulmonary Hypertension  6. Severe COPD with a significant smoking history. In midst of evaluation for BLVR surgery  7.  C

## 2021-03-31 NOTE — PHYSICAL THERAPY NOTE
PHYSICAL THERAPY TREATMENT NOTE - INPATIENT    Room Number: 5790/0530-Z     Session: 1   Number of Visits to Meet Established Goals: 2    Presenting Problem: PNA    Problem List  Principal Problem:    Pneumonia of right lower lobe due to infectious organi History  Past Surgical History:   Procedure Laterality Date   • APPENDECTOMY     • APPENDECTOMY     • CHOLECYSTECTOMY     • CYSTOSCOPY URETEROSCOPY Left 6/4/2020    Performed by Erasto Montoya MD at [facility] MAIN OR   • CYSTOSCOPY URETEROSCOPY Left 5/1/2020    Pe lying on back to sitting on the side of the bed?: None   How much help from another person does the patient currently need. ..   -   Moving to and from a bed to a chair (including a wheelchair)?: None   -   Need to walk in hospital room?: A Little   -   Cli Good  Frequency (Obs): 3x/week    CURRENT GOALS   Goal #1 Patient is able to demonstrate supine - sit EOB @ level: independent      Goal #2 Patient is able to demonstrate transfers Sit to/from Stand at assistance level: independent      Goal #3 Patient is

## 2021-03-31 NOTE — PAYOR COMM NOTE
--------------  CONTINUED STAY REVIEW    Payor: Maki Sun #:  Booker Yg  Authorization Number: [id number]    Admit date: 3/28/21  Admit time:  6:31 PM    Admitting Physician: Moisés Hatfield MD  Attending Physician:  Sasha Ulloa MD  [facility] now moderate right pleural effusion with increasing consolidation right lung base that could represent atelectasis or pneumonia.  Linear density left lung base is unchanged and most likely chronic postinflammatory scar.      Shortness of breath, right-sided antibiotics  4. Follow blood culture  5. Abdominal distention and pain, chronic pancreatitis, chronic pain syndrome  1. Continue home pancreatic enzyme supplements  2. On PCA pump for pain control  6. Diabetes mellitus type 2  1. Hyperglycemia protocol  2. Action Dose Route User    3/31/2021 1253 Given 4 Units Subcutaneous (Left Lower Arm) Chloe Nice RN    3/30/2021 2104 Given 1 Units Subcutaneous (Right Lower Abdomen) Keisha Calero RN      Insulin Aspart Pen (NOVOLOG) 100 UNIT/ML flexpen 1-68 Units cap 0.4 mg     Date Action Dose Route User    3/30/2021 1758 Given 0.4 mg Oral Ami AUNDREA Huber      Umeclidinium Bromide (INCRUSE ELLIPTA) 62.5 MCG/INH inhaler 1 puff     Date Action Dose Route User    3/31/2021 6973 Given 1 puff Inhalation JOEL Pitts

## 2021-03-31 NOTE — PAYOR COMM NOTE
--------------  ADMISSION REVIEW     Payor: Kleber Benignobrayan Butler 673 #:  Nicholes Du  Authorization Number: [id number]    Admit date: 3/28/21  Admit time:  6:31 PM       Admitting Physician: Ryan Herrera MD  Attending Physician:  Meghan Paul MD  Prim than 10 ng/ml 03/08/2018   • Essential hypertension, benign    • Frequent use of laxatives    • Hemorrhoids    • Knee pain 10/2/2013   • Long term current use of opiate analgesic 2/17/2015   • Long-term current use of opiate analgesic 6/11/2010   • Multipl 1.44 (*)     GFR, Non- 49 (*)     GFR, -American 56 (*)     Albumin 3.0 (*)     Globulin  5.2 (*)     A/G Ratio 0.6 (*)     All other components within normal limits   PRO BETA NATRIURETIC PEPTIDE - Abnormal; Notable for the followin components within normal limits   CBC W/ DIFFERENTIAL - Abnormal; Notable for the following components:    WBC 23.7 (*)     HGB 11.9 (*)     HCT 34.3 (*)     Neutrophil Absolute Prelim 18.54 (*)     Neutrophil Absolute 18.54 (*)     Monocyte Absolute 2.67 costophrenic angle consistent with a small right effusion. Linear density left lung base is unchanged and most likely scar or atelectasis. Heart size is within normal limits. Aorta is atherosclerotic. Chest wall structures are unremarkable. quite tachypneic. Attempted BiPAP which she did not tolerate, however he has stabilized with 4 L O2 via nasal cannula. A CTA of the chest shows no acute pulmonary embolism does show right middle lobe consolidation and right lower lobe infiltrate.   Gabrielle right pleural effusion. Discoid atelectasis scarring in lingula and left lower lobe. Diffuse infiltrative symmetric changes. Development of mild to moderate pericardial effusion.   CTA appearance of abdomen and pelvis appear stable when compared to 3/22/ MD  3/28/2021  6:14 PM      Electronically signed by Juarez Hawkins MD on 3/28/2021  8:40 PM           03/28/21 2100 100.1 °F (37.8 °C) 101 16 124/58 98 % — Nasal cannula 4 L/min CB   03/28/21 1852 — 117 22 159/83 98 % — Nasal cannula 4 L/min SL   03/28/21

## 2021-03-31 NOTE — PLAN OF CARE
Assumed care at 1930  Pt A&Ox4   2L nc   NSR on tele , VSS   Pain rating 6-8/10, dilaudid bolus given as needed   IV abx infusing per order   Call light in reach, pt resting comfortably, will continue to monitor

## 2021-03-31 NOTE — PAYOR COMM NOTE
--------------  CONTINUED STAY REVIEW    Payor: Tammy Damon #:  Chandni Cheng  Authorization Number: [id number]    Admit date: 3/28/21  Admit time:  6:31 PM    Admitting Physician: Ron Hicks MD  Attending Physician:  Christina Fung MD  General acute hospital injection/discharge, nares normal  MOUTH: MMM, good dentition  NECK: Trachea midline, symmetric, no visible masses or scars, no crepitus, normal flexion/extension  CHEST: Normal chest excursion, no visible deformity or scars, no tenderness to palpation  PU LMWH    03/29/21 1603 98.8 °F (37.1 °C) 93 21 134/65 98 % — High flow nasal cannula 5 L/min CM   03/29/21 1234 98.2 °F (36.8 °C) 95 24 129/66 95 % — High flow nasal cannula 5 L/min CM   03/29/21 0848 98.6 °F (37 °C) 92 18 94/52 97 % — High flow nasal cannu

## 2021-04-01 ENCOUNTER — APPOINTMENT (OUTPATIENT)
Dept: ULTRASOUND IMAGING | Facility: HOSPITAL | Age: 71
DRG: 871 | End: 2021-04-01
Attending: HOSPITALIST
Payer: MEDICARE

## 2021-04-01 ENCOUNTER — APPOINTMENT (OUTPATIENT)
Dept: CARDIAC REHAB | Facility: HOSPITAL | Age: 71
End: 2021-04-01
Payer: MEDICARE

## 2021-04-01 PROCEDURE — 99232 SBSQ HOSP IP/OBS MODERATE 35: CPT | Performed by: HOSPITALIST

## 2021-04-01 PROCEDURE — 76604 US EXAM CHEST: CPT | Performed by: HOSPITALIST

## 2021-04-01 RX ORDER — FENTANYL 100 UG/H
1 PATCH TRANSDERMAL
Status: DISCONTINUED | OUTPATIENT
Start: 2021-04-01 | End: 2021-04-03

## 2021-04-01 NOTE — PROGRESS NOTES
[facility] Pulmonary/Critical Care Progress Note     SUBJECTIVE/Interval history: All events, procedures, notes reviewed. Pt is doing well. Right chest pain improved with no cough or sob.        Review of Systems:   A com 03/29/21  0615   RBC 4.13 3.86   HGB 12.9* 11.9*   HCT 37.1* 34.3*   MCV 89.8 88.9   MCH 31.2 30.8   MCHC 34.8 34.7   RDW 14.0 13.6   NEPRELIM 14.80* 18.54*   WBC 20.0* 23.7*   .0 390.0     No results for input(s): BNP in the last 168 hours.   Recent puff Inhalation Daily   • PEG 3350  17 g Oral Daily   • tamsulosin HCl  0.4 mg Oral Daily   • hydrALAzine HCl  25 mg Oral TID   • pancrelipase (Lip-Prot-Amyl)  40,000 Units Oral TID CC   • verapamil HCl ER  180 mg Oral Nightly   • insulin detemir  15 Units sent  · Day 5 zosyn. Sputum culture with candida. ? Colonized.  If candida present in pleural space then will start diflucan  · Urine legionella and strep antigens negative  · Echo shows pericardial effusion with no hemodynamic compromise  · Continue LABA/I

## 2021-04-01 NOTE — PLAN OF CARE
Assumed care at 0730. A&OX4. Pain controlled with PCA, Fentanyl Patch & Toradol. Tele - SR. O2 RA during the day with adequate saturations. US Chest today. Plan for US guided Thoracentesis tomorrow. NPO after MN. Up in room, ambulating to BR.  Wife at b Monitor for signs/symptoms of CO2 retention  Outcome: Progressing

## 2021-04-01 NOTE — PLAN OF CARE
Assumed care at 1930  Pt A&Ox4   RA, 1L NC for comfort   NSR on tele , VSS   Dilaudid bolus x2 given   IV abx infusing per order   Call light in reach, pt resting comfortably, will continue to monitor

## 2021-04-01 NOTE — PROGRESS NOTES
[facility]  Progress Note    Dileepceline Dec Patient Status:  Inpatient    1950 MRN [id number]   [facility] 7NE-A Attending Brian Cooper MD   Hosp Day # 4 PCP Umesh Field MD     CC: Shortness of breath, right-sided chest oscar Furoate-Vilanterol (BREO ELLIPTA) 200-25 MCG/INH inhaler 1 puff, 1 puff, Inhalation, Daily  PEG 3350 (MIRALAX) powder packet 17 g, 17 g, Oral, Daily  ipratropium-albuterol (DUONEB) nebulizer solution 3 mL, 3 mL, Nebulization, Q4H PRN  tamsulosin HCl (FLOMA HCl (BENADRYL) IV PUSH injection 12.5 mg, 12.5 mg, Intravenous, Q4H PRN  ondansetron HCl (ZOFRAN) injection 4 mg, 4 mg, Intravenous, Q6H PRN  Umeclidinium Bromide (INCRUSE ELLIPTA) 62.5 MCG/INH inhaler 1 puff, 1 puff, Inhalation, Daily        ASSESSMENT /

## 2021-04-01 NOTE — PAYOR COMM NOTE
--------------  CONTINUED STAY REVIEW    Payor: Kleber Butler 673 #:  Malachi Pali  Authorization Number: [id number]    Admit date: 3/28/21  Admit time:  6:31 PM    Admitting Physician: Jahaira Morgan MD  Attending Physician:  Marisabel Mahmood MD  [facility] status: full  · Kellogg: no   Will the patient be referred to TCC on discharge?: yes  Estimated date of discharge: TBD  Discharge is dependent on: clinical progress  At this point Mr. Sepideh Brasher  is expected to be discharge to: TBD  Chaya Gallegos MD [facility]) injection 4 mg     Date Action Dose Route User    4/1/2021 0531 Given 4 mg Intravenous Han Wolfe RN    3/31/2021 1548 Given 4 mg Intravenous Modesto Pitts, RN      pancrelipase (Lip-Prot-Amyl) (ZENPEP) DR particles cap 40,000 Units

## 2021-04-02 ENCOUNTER — APPOINTMENT (OUTPATIENT)
Dept: ULTRASOUND IMAGING | Facility: HOSPITAL | Age: 71
DRG: 871 | End: 2021-04-02
Attending: HOSPITALIST
Payer: MEDICARE

## 2021-04-02 ENCOUNTER — APPOINTMENT (OUTPATIENT)
Dept: GENERAL RADIOLOGY | Facility: HOSPITAL | Age: 71
DRG: 871 | End: 2021-04-02
Attending: RADIOLOGY
Payer: MEDICARE

## 2021-04-02 PROCEDURE — 71045 X-RAY EXAM CHEST 1 VIEW: CPT | Performed by: RADIOLOGY

## 2021-04-02 PROCEDURE — 99232 SBSQ HOSP IP/OBS MODERATE 35: CPT | Performed by: HOSPITALIST

## 2021-04-02 PROCEDURE — 0W993ZZ DRAINAGE OF RIGHT PLEURAL CAVITY, PERCUTANEOUS APPROACH: ICD-10-PCS | Performed by: RADIOLOGY

## 2021-04-02 PROCEDURE — 32555 ASPIRATE PLEURA W/ IMAGING: CPT | Performed by: HOSPITALIST

## 2021-04-02 NOTE — PLAN OF CARE
A/OX4, RA, VSS, NSR per Tele  Chronic pain managed w/ Dilaudid PCA  Thoracentesis- 431XS out, no complications   Pt ambulating halls  No sob reported  Needs attended to, Will cont to monitor.

## 2021-04-02 NOTE — IMAGING NOTE
Pt post thora with 360ml  removed from right side. right lower back with tegaderm CDI. Denies pain. Report called to Doroteo Maldonado on 7th floor.

## 2021-04-02 NOTE — PROGRESS NOTES
[facility]  Progress Note    Ephriam Loud Patient Status:  Inpatient    1950 MRN [id number]   [facility] 7NE-A Attending Jorge Galeana MD   Hosp Day # 5 PCP Eliazar Patrick MD     CC: Shortness of breath, right-sided chest oscar Epic.    Meds:   fentaNYL (DURAGESIC) 100 MCG/HR 1 patch, 1 patch, Transdermal, Q72H  Prochlorperazine Edisylate (COMPAZINE) injection 10 mg, 10 mg, Intravenous, Q6H PRN  Fluticasone Furoate-Vilanterol (BREO ELLIPTA) 200-25 MCG/INH inhaler 1 puff, 1 puff, Continuous  HYDROmorphone (DILAUDID) PCA bolus from pump, 0.4 mg, Intravenous, Q30 Min PRN  Naloxone HCl (NARCAN) 0.4 MG/ML injection 0.08 mg, 0.08 mg, Intravenous, Q5 Min PRN  diphenhydrAMINE HCl (BENADRYL) IV PUSH injection 12.5 mg, 12.5 mg, Intravenous, this point Mr. Aruna Mendoza  is expected to be discharge to: TBJANN Rivero MD  [facility]

## 2021-04-02 NOTE — PROGRESS NOTES
[facility] Pulmonary/Critical Care Progress Note     SUBJECTIVE/Interval history: All events, procedures, notes reviewed. Pt is having some tenderness at right chest. No sob or chest pain.        Review of Systems:   PJ kim 03/28/21  1445 03/29/21  0615 04/01/21  1343   RBC 4.13 3.86 3.98   HGB 12.9* 11.9* 12.4*   HCT 37.1* 34.3* 34.5*   MCV 89.8 88.9 86.7   MCH 31.2 30.8 31.2   MCHC 34.8 34.7 35.9   RDW 14.0 13.6 13.3   NEPRELIM 14.80* 18.54* 11.43*   WBC 20.0* 23.7* 15.4* right pleural effusion with increasing consolidation right lung base that could represent atelectasis or pneumonia. Linear density left lung base is unchanged and most likely chronic postinflammatory scar.     Dictated by (CST): Joey Chang MD on 3/31/20 pain  2. Right hilar lymphadenopathy  3. Parapneumonic effusion right  4. Pericardial effusion  5. Pulmonary Hypertension  6. Severe COPD with a significant smoking history.  In midst of evaluation for BLVR surgery  7.  Chronic pancreatitis with a history o

## 2021-04-02 NOTE — DIETARY NOTE
Gretel 16     Admitting diagnosis:  Pericardial effusion [I31.3]  Nosocomial pneumonia [J18.9, Y95]  Respiratory failure with hypoxia, unspecified chronicity [facility] [J96.91]    Ht: 180.3 cm (5' 11\")  Wt: 63.5

## 2021-04-02 NOTE — PAYOR COMM NOTE
--------------  CONTINUED STAY REVIEW    Payor: Kleber Butler 673 #:  Johnny Ramirez  Authorization Number: [id number]    Admit date: 3/28/21  Admit time:  6:31 PM    Admitting Physician: Jorge Galeana MD  Attending Physician:  Selma Estes MD  [facility] grade 1 diastolic dysfunction.      Quality:  · DVT Prophylaxis: SCD, subcutaneous heparin  · CODE status: full  · Kellogg: no  Will the patient be referred to TCC on discharge?: yes  Estimated date of discharge: TBD  Discharge is dependent on: clinical prog 4/1/2021 1735 New Bag 3.375 g Intravenous Romulo hTomas RN    4/1/2021 1016 New Bag 3.375 g Intravenous Romulo Thomas RN      tamsulosin HCl [facility]) cap 0.4 mg     Date Action Dose Route User    4/1/2021 1735 Given 0.4 mg Oral Romulo Thomas RN      verapamil

## 2021-04-02 NOTE — PLAN OF CARE
Assumed care 1900  Patient alert and oriented x4  Complaints of abdominal pain radiating to L side  Dilaudid bolus x1  Per hospitalist will hold LARS clifton at midnight   Report given to [facility]

## 2021-04-02 NOTE — PLAN OF CARE
Assumed care of patient at 2130. Patient resting in bed. Using the PCA for pain control. 1 bolus given for right chest pain. NPO for procedure today. Needs met. Will continue to monitor.

## 2021-04-02 NOTE — PROCEDURES
[facility]  Procedure Note    Jose Silver Patient Status:  Inpatient    1950 MRN [id number]   [facility] [place] 7NE-A Attending Bernardo Parker MD   Hosp Day # 5 PCP Marry Bradley MD     Procedure: US guided right thoracentesis    P

## 2021-04-03 VITALS
HEIGHT: 71 IN | TEMPERATURE: 99 F | HEART RATE: 77 BPM | BODY MASS INDEX: 19.6 KG/M2 | DIASTOLIC BLOOD PRESSURE: 71 MMHG | OXYGEN SATURATION: 93 % | WEIGHT: 140 LBS | SYSTOLIC BLOOD PRESSURE: 131 MMHG | RESPIRATION RATE: 15 BRPM

## 2021-04-03 PROCEDURE — 99239 HOSP IP/OBS DSCHRG MGMT >30: CPT | Performed by: HOSPITALIST

## 2021-04-03 RX ORDER — HYDROMORPHONE HYDROCHLORIDE 4 MG/1
4 TABLET ORAL EVERY 6 HOURS PRN
Status: DISCONTINUED | OUTPATIENT
Start: 2021-04-03 | End: 2021-04-03

## 2021-04-03 RX ORDER — INSULIN DETEMIR 100 [IU]/ML
15 INJECTION, SOLUTION SUBCUTANEOUS NIGHTLY
Qty: 6 ML | Refills: 1 | Status: SHIPPED | OUTPATIENT
Start: 2021-04-03 | End: 2021-05-03

## 2021-04-03 NOTE — PROGRESS NOTES
[facility]  Progress Note    Yana Boucher Patient Status:  Inpatient    1950 MRN [id number]   [facility] 7NE-A Attending Liliana Myers MD   Hosp Day # 6 PCP Travis Ty MD     CC: Shortness of breath, right-sided chest oscar injection 10 mg, 10 mg, Intravenous, Q6H PRN  Fluticasone Furoate-Vilanterol (BREO ELLIPTA) 200-25 MCG/INH inhaler 1 puff, 1 puff, Inhalation, Daily  PEG 3350 (MIRALAX) powder packet 17 g, 17 g, Oral, Daily  ipratropium-albuterol (DUONEB) nebulizer solutio constant right-sided lower pleuritic chest wall pain  1. Continue IV antibiotics  2. S/P thoracentesis, pH within normal limits  2. Parapneumonic effusion:  S/P thoracentesis  3.  Acute hypoxic respiratory failure due to right lower lobe pneumonia with righ

## 2021-04-03 NOTE — PROGRESS NOTES
[facility] Pulmonary/Critical Care Progress Note     SUBJECTIVE/Interval history: All events, procedures, notes reviewed. Pt feels well. No cp or sob. He ambulated in hallway and did well.        Review of Systems:   A co 03/29/21  0615 04/01/21  1343   RBC 4.13 3.86 3.98   HGB 12.9* 11.9* 12.4*   HCT 37.1* 34.3* 34.5*   MCV 89.8 88.9 86.7   MCH 31.2 30.8 31.2   MCHC 34.8 34.7 35.9   RDW 14.0 13.6 13.3   NEPRELIM 14.80* 18.54* 11.43*   WBC 20.0* 23.7* 15.4*   .0 390. 1:13 PM       US CHEST (WPZ=[id number])    Result Date: 4/1/2021  CONCLUSION:  Mildly complex moderate right pleural effusion.    Dictated by (CST): Clarke Clark MD on 4/01/2021 at 12:58 PM     Finalized by (CST): Clarke Clark MD on 4/01/2021 at 12:59 PM history.  In midst of evaluation for BLVR surgery  7. Chronic pancreatitis with a history of a congenital pancreatic issue (pancreatitis divisum?)  8. Diabetes mellitus-type II  9. Elevated PSA for which she follows with urology    10.  Chronic narcotic use

## 2021-04-03 NOTE — PLAN OF CARE
Received pt at 1930  Pt AOx4, NSR, RA, VSS  Dilaudid PCA for pain. PRN Zofran/Compazine for nausea  Hypoglycemic episode NOC. Dex4 given. MD clark w/ scheduled Levemir. Given as ordered  D/c Planning: Pt declining HH. Possible d/c 4/3?   Call light within reac retention  Outcome: Progressing

## 2021-04-03 NOTE — PLAN OF CARE
Discharge instructions, prescriptions, medications & follow-up appointments reviewed with pt and wife. Verbalizes understanding. IV SL DC'd. ABG drawn prior to DC for Dr. Charli Corona follow-up appointment. Wife to drive pt home.   To car via ODILIA Juarez [employer] [place] accompanied

## 2021-04-04 NOTE — DISCHARGE SUMMARY
[facility] [place] HOSPITALIST  DISCHARGE SUMMARY     Zeeshan Peacock Patient Status:  Inpatient    1950 MRN [id number]   [facility] 7NE-A Attending No att. providers found   Hosp Day # 6 PCP Jessica Peralta MD     Date of Admission: 3/28/2021  Da effusion. Patient was started on IV antibiotics. Patient was additionally diagnosed with acute hypoxic respiratory failure and evaluated with a pulmonary consultation. Initially started on BiPAP however weaned off.   Patient was placed on PCA for acute o units Cpep  Generic drug: Pancrelipase (Lip-Prot-Amyl)      Take 2 capsules by mouth 3 (three) times daily. Refills: 0     fentaNYL 100 MCG/HR Pt72  Commonly known as: Duragesic-100      Place 1 patch onto the skin every third day.    Stop taking on: Apri time everyday   Refills: 0     Ventolin  (90 Base) MCG/ACT Aers  Generic drug: Albuterol Sulfate HFA      Inhale 2 puffs into the lungs every 6 (six) hours as needed for Wheezing.    Refills: 0     Verapamil HCl  MG Cp24  Commonly known as: PATRICIA Location Instructions: Your appointment is scheduled at [facility]. The address is&nbsp; [address] [facility]. [address], [place]. To reach Registration, park in the [address] [place] [address] [place].  Enter through the revolving doors located on the ground floo

## 2021-04-04 NOTE — PAYOR COMM NOTE
--------------  DISCHARGE REVIEW    Payor: Agustín Alvares #:  Rush Jayden  Authorization Number: [id number]    Admit date: 3/28/21  Admit time:   6:31 PM  Discharge Date: 4/3/2021  5:01 PM     Admitting Physician: Yanira Canchola MD  Attending Ph pancreatitis and also has abdominal distention and pain. Denies any headache. Denies any nausea vomiting. Patient at baseline does not use any oxygen at home according to patient.   On arrival to the emergency room patient was noticed to be hypoxic and t changed: how much to take      Inject 15 Units into the skin nightly. Stop taking on:  May 3, 2021  Quantity: 6 mL  Refills: 1     tamsulosin HCl 0.4 MG Caps  Commonly known as: FLOMAX  What changed:   · when to take this  · additional instructions      T known as: GLUCOPHAGE      Take 1,000 mg by mouth 2 (two) times daily with meals. Refills: 0     MULTI VITAMIN DAILY OR      Take 1 tablet by mouth daily.    Refills: 0     Ondansetron HCl 8 MG tablet  Commonly known as: ZOFRAN      Take 4 mg by mouth ever ODILIA Toure 70  [phone]    In 1 week  followup hospitalization    Appointments for Next 30 Days 4/4/2021 - 5/4/2021 Comment      Date Arrival Time Visit Type Length Department Provider     4/8/2021  1:30 PM  NON-TCM Edgar Campos

## 2021-04-05 ENCOUNTER — PATIENT OUTREACH (OUTPATIENT)
Dept: CASE MANAGEMENT | Age: 71
End: 2021-04-05

## 2021-04-05 DIAGNOSIS — Z02.9 ENCOUNTERS FOR UNSPECIFIED ADMINISTRATIVE PURPOSE: ICD-10-CM

## 2021-04-05 NOTE — PROGRESS NOTES
LM for pt to call NC for condition update since discharge. NCM phone number was provided for pt to call back. TCC contact information was left as well for pt to call back.

## 2021-04-06 ENCOUNTER — APPOINTMENT (OUTPATIENT)
Dept: CARDIAC REHAB | Facility: HOSPITAL | Age: 71
End: 2021-04-06
Payer: MEDICARE

## 2021-04-07 ENCOUNTER — TELEPHONE (OUTPATIENT)
Dept: INTERNAL MEDICINE CLINIC | Facility: CLINIC | Age: 71
End: 2021-04-07

## 2021-04-07 NOTE — TELEPHONE ENCOUNTER
Spoke with pt to cancel appt with dr Angela Agrawal on 4/16. Pt advd that they will reschedule with us when he comes in on 4/8.

## 2021-04-08 ENCOUNTER — HOSPITAL ENCOUNTER (OUTPATIENT)
Dept: GENERAL RADIOLOGY | Age: 71
Discharge: HOME | End: 2021-04-08
Attending: HOSPITALIST
Payer: MEDICARE

## 2021-04-08 ENCOUNTER — APPOINTMENT (OUTPATIENT)
Dept: CARDIAC REHAB | Facility: HOSPITAL | Age: 71
End: 2021-04-08
Payer: MEDICARE

## 2021-04-08 ENCOUNTER — LAB ENCOUNTER (OUTPATIENT)
Dept: LAB | Age: 71
End: 2021-04-08
Attending: INTERNAL MEDICINE
Payer: MEDICARE

## 2021-04-08 ENCOUNTER — OFFICE VISIT (OUTPATIENT)
Dept: INTERNAL MEDICINE CLINIC | Facility: CLINIC | Age: 71
End: 2021-04-08
Payer: MEDICARE

## 2021-04-08 VITALS
OXYGEN SATURATION: 96 % | HEART RATE: 92 BPM | SYSTOLIC BLOOD PRESSURE: 134 MMHG | TEMPERATURE: 99 F | BODY MASS INDEX: 18.87 KG/M2 | WEIGHT: 134.81 LBS | RESPIRATION RATE: 16 BRPM | DIASTOLIC BLOOD PRESSURE: 68 MMHG | HEIGHT: 71 IN

## 2021-04-08 DIAGNOSIS — J18.9 PNEUMONIA OF RIGHT LOWER LOBE DUE TO INFECTIOUS ORGANISM: Primary | ICD-10-CM

## 2021-04-08 DIAGNOSIS — J18.9 PNEUMONIA OF RIGHT LOWER LOBE DUE TO INFECTIOUS ORGANISM: ICD-10-CM

## 2021-04-08 DIAGNOSIS — J18.9 NOSOCOMIAL PNEUMONIA: ICD-10-CM
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
## 2021-04-08 PROBLEM — A41.9 SEPSIS (HCC): Status: RESOLVED | Noted: 2021-03-28 | Resolved: 2021-04-08

## 2021-04-08 PROBLEM — J96.91 RESPIRATORY FAILURE WITH HYPOXIA, UNSPECIFIED CHRONICITY (HCC): Status: RESOLVED | Noted: 2021-03-28 | Resolved: 2021-04-08

## 2021-04-08 PROBLEM — R09.02 HYPOXIA: Status: RESOLVED | Noted: 2019-05-25 | Resolved: 2021-04-08

## 2021-04-08 PROBLEM — D72.829 LEUKOCYTOSIS: Status: RESOLVED | Noted: 2021-03-28 | Resolved: 2021-04-08

## 2021-04-08 PROCEDURE — 71046 X-RAY EXAM CHEST 2 VIEWS: CPT | Performed by: HOSPITALIST

## 2021-04-08 PROCEDURE — 85025 COMPLETE CBC W/AUTO DIFF WBC: CPT

## 2021-04-08 PROCEDURE — 3008F BODY MASS INDEX DOCD: CPT | Performed by: INTERNAL MEDICINE

## 2021-04-08 PROCEDURE — 99495 TRANSJ CARE MGMT MOD F2F 14D: CPT | Performed by: INTERNAL MEDICINE

## 2021-04-08 PROCEDURE — 80048 BASIC METABOLIC PNL TOTAL CA: CPT

## 2021-04-08 PROCEDURE — 3078F DIAST BP <80 MM HG: CPT | Performed by: INTERNAL MEDICINE

## 2021-04-08 PROCEDURE — 1111F DSCHRG MED/CURRENT MED MERGE: CPT | Performed by: INTERNAL MEDICINE

## 2021-04-08 PROCEDURE — 3075F SYST BP GE 130 - 139MM HG: CPT | Performed by: INTERNAL MEDICINE

## 2021-04-08 PROCEDURE — 36415 COLL VENOUS BLD VENIPUNCTURE: CPT

## 2021-04-08 NOTE — PROGRESS NOTES
Renae Bertrand  1950 is a 69year old male who presents for upper respiratory symptoms    Patient presents with:  TCM (Transition Of Care Management): 3-28-4-3-2021---Schulz-pneumonia        HPI:   Is a lot better.   Does have follow-up with the VITAMIN DAILY OR) Take 1 tablet by mouth daily. • fentaNYL (DURAGESIC-100) 100 MCG/HR Transdermal Patch 72 Hr Place 1 patch onto the skin every third day.  10 patch 0   • HYDROmorphone HCl (DILAUDID) 4 MG Oral Tab Take 1 tablet (4 mg total) by mouth e Smoker        Packs/day: 1.00        Years: 35.00        Pack years: 28        Types: Cigarettes        Quit date: 2000        Years since quittin.3      Smokeless tobacco: Never Used    Vaping Use      Vaping Use: Never used    Alcohol use:  No

## 2021-04-08 NOTE — PROGRESS NOTES
Multiple attempts made to reach the patient for hospital follow up, with no response or return call. Patient completed HFU on 4-8-21 with PCP. NCM closing encounter. 37

## 2021-04-13 ENCOUNTER — APPOINTMENT (OUTPATIENT)
Dept: CARDIAC REHAB | Facility: HOSPITAL | Age: 71
End: 2021-04-13
Payer: MEDICARE

## 2021-04-13 DIAGNOSIS — K86.1 OTHER CHRONIC PANCREATITIS (HCC): ICD-10-CM

## 2021-04-14 ENCOUNTER — TELEPHONE (OUTPATIENT)
Dept: INTERNAL MEDICINE CLINIC | Facility: CLINIC | Age: 71
End: 2021-04-14

## 2021-04-14 DIAGNOSIS — J18.9 PNEUMONIA OF RIGHT LOWER LOBE DUE TO INFECTIOUS ORGANISM: Primary | ICD-10-CM

## 2021-04-14 RX ORDER — PANCRELIPASE 36000; 180000; 114000 [USP'U]/1; [USP'U]/1; [USP'U]/1
CAPSULE, DELAYED RELEASE PELLETS ORAL
Qty: 180 CAPSULE | Refills: 0 | Status: SHIPPED | OUTPATIENT
Start: 2021-04-14 | End: 2021-05-13

## 2021-04-14 NOTE — TELEPHONE ENCOUNTER
----- Message from Jennifer Torrez MD sent at 4/8/2021  4:26 PM CDT -----  Reviewed results   WBC count is coming down which is better BMP stable. Patient to follow-up with pulmonary.   Recheck with me in 4 weeks

## 2021-04-14 NOTE — TELEPHONE ENCOUNTER
Protocol passed   Medication(s) to Refill:   Requested Prescriptions     Pending Prescriptions Disp Refills   • CREON 96917 units Oral Cap DR Particles [Pharmacy Med Name: CREON DR 36,000 UNITS CAPSULE] 180 capsule 0     Sig: TAKE 2 CAPSULES BY MOUTH 3 FRANSISCA

## 2021-04-15 ENCOUNTER — APPOINTMENT (OUTPATIENT)
Dept: CARDIAC REHAB | Facility: HOSPITAL | Age: 71
End: 2021-04-15
Payer: MEDICARE

## 2021-04-20 ENCOUNTER — APPOINTMENT (OUTPATIENT)
Dept: CARDIAC REHAB | Facility: HOSPITAL | Age: 71
End: 2021-04-20
Payer: MEDICARE

## 2021-04-22 ENCOUNTER — APPOINTMENT (OUTPATIENT)
Dept: CARDIAC REHAB | Facility: HOSPITAL | Age: 71
End: 2021-04-22
Payer: MEDICARE

## 2021-04-23 ENCOUNTER — HOSPITAL ENCOUNTER (OUTPATIENT)
Dept: CT IMAGING | Age: 71
Discharge: HOME | End: 2021-04-23
Attending: INTERNAL MEDICINE
Payer: MEDICARE

## 2021-04-23 DIAGNOSIS — J15.9 BACTERIAL PNEUMONIA: ICD-10-CM

## 2021-04-23 PROCEDURE — 71250 CT THORAX DX C-: CPT | Performed by: INTERNAL MEDICINE

## 2021-04-27 ENCOUNTER — APPOINTMENT (OUTPATIENT)
Dept: CARDIAC REHAB | Facility: HOSPITAL | Age: 71
End: 2021-04-27
Payer: MEDICARE

## 2021-04-27 DIAGNOSIS — I10 ESSENTIAL (PRIMARY) HYPERTENSION: ICD-10-CM

## 2021-04-27 RX ORDER — VERAPAMIL HYDROCHLORIDE 180 MG/1
CAPSULE, EXTENDED RELEASE ORAL
Qty: 120 CAPSULE | Refills: 0 | Status: SHIPPED | OUTPATIENT
Start: 2021-04-27 | End: 2021-06-23

## 2021-04-27 NOTE — TELEPHONE ENCOUNTER
Passed protocol    Requesting VERAPAMIL HCL  MG Oral Capsule SR 24 Hr  LOV: 4/8/21  RTC: 2 weeks  Last Relevant Labs: 4/8/21  Filled: 3/3/21 #120 with 0 refills    Future Appointments   Date Time Provider Basilio Caraballo   5/4/2021 11:30 AM CARDIAC

## 2021-04-29 ENCOUNTER — APPOINTMENT (OUTPATIENT)
Dept: CARDIAC REHAB | Facility: HOSPITAL | Age: 71
End: 2021-04-29
Payer: MEDICARE

## 2021-04-30 ENCOUNTER — PATIENT OUTREACH (OUTPATIENT)
Dept: CASE MANAGEMENT | Age: 71
End: 2021-04-30

## 2021-04-30 NOTE — PROGRESS NOTES
Attempted contacting pt to intro CCM services with no answer. LM to [phone]. Will continue contacting to discuss.

## 2021-05-04 ENCOUNTER — CARDPULM VISIT (OUTPATIENT)
Dept: CARDIAC REHAB | Facility: HOSPITAL | Age: 71
End: 2021-05-04
Attending: INTERNAL MEDICINE
Payer: MEDICARE

## 2021-05-05 ENCOUNTER — TELEPHONE (OUTPATIENT)
Dept: INTERNAL MEDICINE CLINIC | Facility: CLINIC | Age: 71
End: 2021-05-05

## 2021-05-05 NOTE — TELEPHONE ENCOUNTER
Patient spouse is calling in regards to a tooth extraction for the patient. [address] [facility] wants to perform a tooth extraction and informed the patient he needs to ask his primary care if it is ok. Please call spouse back at number entered.      6

## 2021-05-06 ENCOUNTER — CARDPULM VISIT (OUTPATIENT)
Dept: CARDIAC REHAB | Facility: HOSPITAL | Age: 71
End: 2021-05-06
Attending: INTERNAL MEDICINE
Payer: MEDICARE

## 2021-05-06 NOTE — TELEPHONE ENCOUNTER
Attempted to call pt's spouse. Called patient and left message informing of letter issued to patient from Dr. Derrick Sims, available within pt's [employer] account.

## 2021-05-11 ENCOUNTER — LAB ENCOUNTER (OUTPATIENT)
Dept: LAB | Age: 71
End: 2021-05-11
Attending: INTERNAL MEDICINE
Payer: MEDICARE

## 2021-05-11 ENCOUNTER — CARDPULM VISIT (OUTPATIENT)
Dept: CARDIAC REHAB | Facility: HOSPITAL | Age: 71
End: 2021-05-11
Attending: INTERNAL MEDICINE
Payer: MEDICARE

## 2021-05-11 DIAGNOSIS — J18.9 PNEUMONIA OF RIGHT LOWER LOBE DUE TO INFECTIOUS ORGANISM: ICD-10-CM

## 2021-05-11 PROCEDURE — 85025 COMPLETE CBC W/AUTO DIFF WBC: CPT

## 2021-05-11 PROCEDURE — 36415 COLL VENOUS BLD VENIPUNCTURE: CPT

## 2021-05-11 PROCEDURE — 80048 BASIC METABOLIC PNL TOTAL CA: CPT

## 2021-05-12 DIAGNOSIS — K86.1 OTHER CHRONIC PANCREATITIS (HCC): ICD-10-CM

## 2021-05-13 RX ORDER — PANCRELIPASE 36000; 180000; 114000 [USP'U]/1; [USP'U]/1; [USP'U]/1
CAPSULE, DELAYED RELEASE PELLETS ORAL
Qty: 180 CAPSULE | Refills: 0 | Status: SHIPPED | OUTPATIENT
Start: 2021-05-13 | End: 2021-06-14

## 2021-05-13 NOTE — TELEPHONE ENCOUNTER
Protocol passed   Medication(s) to Refill:   Requested Prescriptions     Pending Prescriptions Disp Refills   • CREON 74731 units Oral Cap DR Particles [Pharmacy Med Name: CREON DR 36,000 UNITS CAPSULE] 180 capsule 0     Sig: TAKE 2 CAPSULES BY MOUTH 3 FRANSISCA

## 2021-05-15 RX ORDER — HYDROCHLOROTHIAZIDE 12.5 MG/1
12.5 TABLET ORAL DAILY
Qty: 90 TABLET | Refills: 3 | Status: ON HOLD | OUTPATIENT
Start: 2021-05-15 | End: 2021-08-16

## 2021-05-18 ENCOUNTER — CARDPULM VISIT (OUTPATIENT)
Dept: CARDIAC REHAB | Facility: HOSPITAL | Age: 71
End: 2021-05-18
Attending: INTERNAL MEDICINE
Payer: MEDICARE

## 2021-05-20 ENCOUNTER — CARDPULM VISIT (OUTPATIENT)
Dept: CARDIAC REHAB | Facility: HOSPITAL | Age: 71
End: 2021-05-20
Attending: INTERNAL MEDICINE
Payer: MEDICARE

## 2021-05-25 ENCOUNTER — CARDPULM VISIT (OUTPATIENT)
Dept: CARDIAC REHAB | Facility: HOSPITAL | Age: 71
End: 2021-05-25
Attending: INTERNAL MEDICINE
Payer: MEDICARE

## 2021-05-26 ENCOUNTER — TELEPHONE (OUTPATIENT)
Dept: INTERNAL MEDICINE CLINIC | Facility: CLINIC | Age: 71
End: 2021-05-26

## 2021-05-26 DIAGNOSIS — Z00.00 ROUTINE GENERAL MEDICAL EXAMINATION AT A HEALTH CARE FACILITY: Primary | ICD-10-CM

## 2021-05-26 NOTE — TELEPHONE ENCOUNTER
Patient walked into the office and stated he was supposed to repeat lab work but he went over to the lab and there were no orders in the system. Please place orders if appropriate.

## 2021-05-27 ENCOUNTER — CARDPULM VISIT (OUTPATIENT)
Dept: CARDIAC REHAB | Facility: HOSPITAL | Age: 71
End: 2021-05-27
Attending: INTERNAL MEDICINE
Payer: MEDICARE

## 2021-05-27 ENCOUNTER — LAB ENCOUNTER (OUTPATIENT)
Dept: LAB | Age: 71
End: 2021-05-27
Attending: INTERNAL MEDICINE
Payer: MEDICARE

## 2021-05-27 DIAGNOSIS — Z00.00 ROUTINE GENERAL MEDICAL EXAMINATION AT A HEALTH CARE FACILITY: ICD-10-CM

## 2021-05-27 PROCEDURE — 80048 BASIC METABOLIC PNL TOTAL CA: CPT

## 2021-05-27 PROCEDURE — 36415 COLL VENOUS BLD VENIPUNCTURE: CPT

## 2021-05-28 ENCOUNTER — TELEPHONE (OUTPATIENT)
Dept: INTERNAL MEDICINE CLINIC | Facility: CLINIC | Age: 71
End: 2021-05-28

## 2021-05-28 DIAGNOSIS — Z00.00 ROUTINE GENERAL MEDICAL EXAMINATION AT A HEALTH CARE FACILITY: Primary | ICD-10-CM

## 2021-05-28 DIAGNOSIS — E11.9 TYPE 2 DIABETES MELLITUS WITHOUT COMPLICATION, WITH LONG-TERM CURRENT USE OF INSULIN (HCC): ICD-10-CM

## 2021-05-28 DIAGNOSIS — Z79.4 TYPE 2 DIABETES MELLITUS WITHOUT COMPLICATION, WITH LONG-TERM CURRENT USE OF INSULIN (HCC): ICD-10-CM

## 2021-05-28 NOTE — TELEPHONE ENCOUNTER
----- Message from Micheline Staples MD sent at 5/27/2021  3:46 PM CDT -----  Reviewed results   Creatinine is marginally elevated 1.52 potassium is down to 4.5  Continue present management.   Repeat BMP and hemoglobin A1c in about 4 weeks

## 2021-06-01 ENCOUNTER — CARDPULM VISIT (OUTPATIENT)
Dept: CARDIAC REHAB | Facility: HOSPITAL | Age: 71
End: 2021-06-01
Attending: INTERNAL MEDICINE
Payer: MEDICARE

## 2021-06-02 ENCOUNTER — PATIENT OUTREACH (OUTPATIENT)
Dept: CASE MANAGEMENT | Age: 71
End: 2021-06-02

## 2021-06-02 NOTE — PROGRESS NOTES
Attempted contacting pt to intro CCM services with no answer. Will continue contacting to discuss. LM to CB at his convenience.  [phone]

## 2021-06-03 ENCOUNTER — CARDPULM VISIT (OUTPATIENT)
Dept: CARDIAC REHAB | Facility: HOSPITAL | Age: 71
End: 2021-06-03
Attending: INTERNAL MEDICINE
Payer: MEDICARE

## 2021-06-04 RX ORDER — ONDANSETRON HYDROCHLORIDE 8 MG/1
4 TABLET, FILM COATED ORAL EVERY 4 HOURS PRN
Qty: 90 TABLET | Refills: 0 | Status: ON HOLD | OUTPATIENT
Start: 2021-06-04 | End: 2021-08-16

## 2021-06-04 NOTE — TELEPHONE ENCOUNTER
Requesting Ondansetron HCl (ZOFRAN) 8 MG tablet   LOV: 4/8/21  RTC: 2 weeks  Last Relevant Labs: 5/27/21  Filled: 2/19/21 #90 with 0 refills    Future Appointments   Date Time Provider Basilio Caraballo   6/8/2021 11:30 AM CARDIAC MAKE UP RESOURCE [id number]

## 2021-06-08 ENCOUNTER — CARDPULM VISIT (OUTPATIENT)
Dept: CARDIAC REHAB | Facility: HOSPITAL | Age: 71
End: 2021-06-08
Attending: INTERNAL MEDICINE
Payer: MEDICARE

## 2021-06-10 ENCOUNTER — CARDPULM VISIT (OUTPATIENT)
Dept: CARDIAC REHAB | Facility: HOSPITAL | Age: 71
End: 2021-06-10
Attending: INTERNAL MEDICINE
Payer: MEDICARE

## 2021-06-11 DIAGNOSIS — K86.1 OTHER CHRONIC PANCREATITIS (HCC): ICD-10-CM

## 2021-06-14 RX ORDER — PANCRELIPASE 36000; 180000; 114000 [USP'U]/1; [USP'U]/1; [USP'U]/1
CAPSULE, DELAYED RELEASE PELLETS ORAL
Qty: 180 CAPSULE | Refills: 0 | Status: ON HOLD | OUTPATIENT
Start: 2021-06-14 | End: 2021-07-19

## 2021-06-14 NOTE — TELEPHONE ENCOUNTER
Passed protocol    Requesting CREON 16671 units Oral Cap DR Particles  LOV: 4/8/21  RTC: 2 weeks  Last Relevant Labs: 5/27/21  Filled: 5/13/21 #180 with 0 refills    Future Appointments   Date Time Provider Basilio Caraballo   6/15/2021 11:30 AM CARDIAC MA

## 2021-06-15 ENCOUNTER — CARDPULM VISIT (OUTPATIENT)
Dept: CARDIAC REHAB | Facility: HOSPITAL | Age: 71
End: 2021-06-15
Attending: INTERNAL MEDICINE
Payer: MEDICARE

## 2021-06-15 ENCOUNTER — TELEPHONE (OUTPATIENT)
Dept: INTERNAL MEDICINE CLINIC | Facility: CLINIC | Age: 71
End: 2021-06-15

## 2021-06-16 RX ORDER — SERTRALINE HYDROCHLORIDE 25 MG/1
TABLET, FILM COATED ORAL
Qty: 90 TABLET | Refills: 0 | Status: ON HOLD | OUTPATIENT
Start: 2021-06-16 | End: 2021-08-16

## 2021-06-16 NOTE — TELEPHONE ENCOUNTER
Patient's wife is requesting a follow up call to clarify when the patient needs to be seen. Patient states the last conversation held with Jorge A Huntley was in May and they were advised to follow up in 4 weeks.  She is confused by the message advising a follow

## 2021-06-16 NOTE — TELEPHONE ENCOUNTER
Called pt back and informed that Dr. Manpreet Queen would like to see him after his blood test is completed   Understanding was verbalized

## 2021-06-16 NOTE — TELEPHONE ENCOUNTER
No protocol   Medication(s) to Refill:   Requested Prescriptions     Pending Prescriptions Disp Refills   • SERTRALINE HCL 25 MG Oral Tab [Pharmacy Med Name: Sertraline Hcl 25 Mg Tab Nort] 90 tablet 0     Sig: TAKE ONE TABLET BY MOUTH DAILY       LOV: 3/19 that patients do not bring anyone with them to their appointment unless clinically required.         Location Instructions:     [address] [facility] [address]               6/24/2021 11:30 AM  CARDIAC MAKE UP [2482] 60 min [facility]

## 2021-06-17 ENCOUNTER — CARDPULM VISIT (OUTPATIENT)
Dept: CARDIAC REHAB | Facility: HOSPITAL | Age: 71
End: 2021-06-17
Attending: INTERNAL MEDICINE
Payer: MEDICARE

## 2021-06-22 ENCOUNTER — CARDPULM VISIT (OUTPATIENT)
Dept: CARDIAC REHAB | Facility: HOSPITAL | Age: 71
End: 2021-06-22
Attending: INTERNAL MEDICINE
Payer: MEDICARE

## 2021-06-22 DIAGNOSIS — I10 ESSENTIAL (PRIMARY) HYPERTENSION: ICD-10-CM

## 2021-06-23 RX ORDER — VERAPAMIL HYDROCHLORIDE 180 MG/1
CAPSULE, EXTENDED RELEASE ORAL
Qty: 120 CAPSULE | Refills: 0 | Status: ON HOLD | OUTPATIENT
Start: 2021-06-23 | End: 2021-08-16

## 2021-06-24 ENCOUNTER — APPOINTMENT (OUTPATIENT)
Dept: CARDIAC REHAB | Facility: HOSPITAL | Age: 71
End: 2021-06-24
Attending: INTERNAL MEDICINE
Payer: MEDICARE

## 2021-06-24 NOTE — TELEPHONE ENCOUNTER
Medina Trejo MD VISIT  DR Randi Montalvo IN TO SEE PATIENT  ORDERS RECEIVED  RV 3-4 MONTHS WITH CBC CMP SEROTONIN CHROMOGRANIN A BEFORE RV  LABS CDP CMP SEROTONIN CHROMOGRANIN A TO BE DONE 9/30/21, ORDERS GIVEN TO PATIENT  MD VISIT 10/7/21 @10AM  AVS PRINTED AND GIVEN TO PATIENT WITH INSTRUCTIONS  PATIENT DISCHARGED AMBULATORY Patient Spouse calling in on behalf of Peggy Olivares.   Requesting a refill for Zoloft, which was initially prescribed through [facility].    Pharmacy:  [place] [id number] [address]  [address] [employer] [facility] [phone], [phone]

## 2021-06-28 ENCOUNTER — LAB ENCOUNTER (OUTPATIENT)
Dept: LAB | Age: 71
End: 2021-06-28
Attending: INTERNAL MEDICINE
Payer: MEDICARE

## 2021-06-28 DIAGNOSIS — Z00.00 ROUTINE GENERAL MEDICAL EXAMINATION AT A HEALTH CARE FACILITY: ICD-10-CM

## 2021-06-28 DIAGNOSIS — N20.0 RENAL CALCULI: ICD-10-CM

## 2021-06-28 DIAGNOSIS — E11.9 TYPE 2 DIABETES MELLITUS WITHOUT COMPLICATION, WITH LONG-TERM CURRENT USE OF INSULIN (HCC): ICD-10-CM

## 2021-06-28 DIAGNOSIS — Z79.4 TYPE 2 DIABETES MELLITUS WITHOUT COMPLICATION, WITH LONG-TERM CURRENT USE OF INSULIN (HCC): ICD-10-CM

## 2021-06-28 PROCEDURE — 80048 BASIC METABOLIC PNL TOTAL CA: CPT

## 2021-06-28 PROCEDURE — 83036 HEMOGLOBIN GLYCOSYLATED A1C: CPT

## 2021-06-28 PROCEDURE — 84153 ASSAY OF PSA TOTAL: CPT

## 2021-06-28 PROCEDURE — 3044F HG A1C LEVEL LT 7.0%: CPT | Performed by: INTERNAL MEDICINE

## 2021-06-28 PROCEDURE — 36415 COLL VENOUS BLD VENIPUNCTURE: CPT

## 2021-07-01 ENCOUNTER — APPOINTMENT (OUTPATIENT)
Dept: CT IMAGING | Facility: HOSPITAL | Age: 71
DRG: 336 | End: 2021-07-01
Attending: EMERGENCY MEDICINE
Payer: MEDICARE

## 2021-07-01 ENCOUNTER — HOSPITAL ENCOUNTER (INPATIENT)
Facility: HOSPITAL | Age: 71
LOS: 45 days | Discharge: HOME HEALTH | DRG: 336 | End: 2021-08-16
Attending: EMERGENCY MEDICINE | Admitting: HOSPITALIST
Payer: MEDICARE

## 2021-07-01 DIAGNOSIS — K56.609 SMALL BOWEL OBSTRUCTION (HCC): ICD-10-CM

## 2021-07-01 DIAGNOSIS — K86.1 ACUTE ON CHRONIC PANCREATITIS (HCC): Primary | ICD-10-CM

## 2021-07-01 DIAGNOSIS — Z79.891 LONG TERM CURRENT USE OF OPIATE ANALGESIC: ICD-10-CM

## 2021-07-01 DIAGNOSIS — G89.29 CHRONIC ABDOMINAL PAIN: ICD-10-CM

## 2021-07-01 DIAGNOSIS — K86.1 CHRONIC PANCREATITIS, UNSPECIFIED PANCREATITIS TYPE (HCC): Chronic | ICD-10-CM

## 2021-07-01 DIAGNOSIS — K85.90 ACUTE ON CHRONIC PANCREATITIS (HCC): Primary | ICD-10-CM

## 2021-07-01 DIAGNOSIS — R10.9 CHRONIC ABDOMINAL PAIN: ICD-10-CM

## 2021-07-01 DIAGNOSIS — N17.9 ACUTE RENAL INJURY (HCC): ICD-10-CM

## 2021-07-01 LAB
ALBUMIN SERPL-MCNC: 4.1 G/DL (ref 3.4–5)
ALBUMIN/GLOB SERPL: 1.1 {RATIO} (ref 1–2)
ALP LIVER SERPL-CCNC: 110 U/L
ALT SERPL-CCNC: 32 U/L
ANION GAP SERPL CALC-SCNC: 7 MMOL/L (ref 0–18)
AST SERPL-CCNC: 36 U/L (ref 15–37)
BASOPHILS # BLD AUTO: 0.04 X10(3) UL (ref 0–0.2)
BASOPHILS NFR BLD AUTO: 0.3 %
BILIRUB SERPL-MCNC: 0.7 MG/DL (ref 0.1–2)
BUN BLD-MCNC: 29 MG/DL (ref 7–18)
BUN/CREAT SERPL: 15.9 (ref 10–20)
CALCIUM BLD-MCNC: 9.6 MG/DL (ref 8.5–10.1)
CHLORIDE SERPL-SCNC: 99 MMOL/L (ref 98–112)
CO2 SERPL-SCNC: 27 MMOL/L (ref 21–32)
CREAT BLD-MCNC: 1.82 MG/DL
DEPRECATED RDW RBC AUTO: 53.2 FL (ref 35.1–46.3)
EOSINOPHIL # BLD AUTO: 0.51 X10(3) UL (ref 0–0.7)
EOSINOPHIL NFR BLD AUTO: 3.2 %
ERYTHROCYTE [DISTWIDTH] IN BLOOD BY AUTOMATED COUNT: 16.3 % (ref 11–15)
GLOBULIN PLAS-MCNC: 3.9 G/DL (ref 2.8–4.4)
GLUCOSE BLD-MCNC: 149 MG/DL (ref 70–99)
HCT VFR BLD AUTO: 40.1 %
HGB BLD-MCNC: 13.6 G/DL
IMM GRANULOCYTES # BLD AUTO: 0.07 X10(3) UL (ref 0–1)
IMM GRANULOCYTES NFR BLD: 0.4 %
LIPASE SERPL-CCNC: 16 U/L (ref 73–393)
LYMPHOCYTES # BLD AUTO: 2.02 X10(3) UL (ref 1–4)
LYMPHOCYTES NFR BLD AUTO: 12.6 %
M PROTEIN MFR SERPL ELPH: 8 G/DL (ref 6.4–8.2)
MCH RBC QN AUTO: 30 PG (ref 26–34)
MCHC RBC AUTO-ENTMCNC: 33.9 G/DL (ref 31–37)
MCV RBC AUTO: 88.3 FL
MONOCYTES # BLD AUTO: 1.1 X10(3) UL (ref 0.1–1)
MONOCYTES NFR BLD AUTO: 6.9 %
NEUTROPHILS # BLD AUTO: 12.25 X10 (3) UL (ref 1.5–7.7)
NEUTROPHILS # BLD AUTO: 12.25 X10(3) UL (ref 1.5–7.7)
NEUTROPHILS NFR BLD AUTO: 76.6 %
OSMOLALITY SERPL CALC.SUM OF ELEC: 285 MOSM/KG (ref 275–295)
PLATELET # BLD AUTO: 336 10(3)UL (ref 150–450)
POTASSIUM SERPL-SCNC: 4.9 MMOL/L (ref 3.5–5.1)
RBC # BLD AUTO: 4.54 X10(6)UL
SODIUM SERPL-SCNC: 133 MMOL/L (ref 136–145)
WBC # BLD AUTO: 16 X10(3) UL (ref 4–11)

## 2021-07-01 PROCEDURE — 74176 CT ABD & PELVIS W/O CONTRAST: CPT | Performed by: EMERGENCY MEDICINE

## 2021-07-01 RX ORDER — ONDANSETRON 2 MG/ML
4 INJECTION INTRAMUSCULAR; INTRAVENOUS ONCE
Status: COMPLETED | OUTPATIENT
Start: 2021-07-01 | End: 2021-07-01

## 2021-07-01 RX ORDER — MORPHINE SULFATE 4 MG/ML
4 INJECTION, SOLUTION INTRAMUSCULAR; INTRAVENOUS EVERY 30 MIN PRN
Status: DISCONTINUED | OUTPATIENT
Start: 2021-07-01 | End: 2021-07-02

## 2021-07-01 RX ORDER — KETOROLAC TROMETHAMINE 30 MG/ML
15 INJECTION, SOLUTION INTRAMUSCULAR; INTRAVENOUS ONCE
Status: COMPLETED | OUTPATIENT
Start: 2021-07-01 | End: 2021-07-01

## 2021-07-02 ENCOUNTER — APPOINTMENT (OUTPATIENT)
Dept: GENERAL RADIOLOGY | Facility: HOSPITAL | Age: 71
DRG: 336 | End: 2021-07-02
Attending: PHYSICIAN ASSISTANT
Payer: MEDICARE

## 2021-07-02 PROBLEM — K85.90 ACUTE ON CHRONIC PANCREATITIS (HCC): Status: ACTIVE | Noted: 2021-07-02

## 2021-07-02 PROBLEM — N17.9 ACUTE RENAL INJURY (HCC): Status: ACTIVE | Noted: 2021-07-02

## 2021-07-02 PROBLEM — K86.1 ACUTE ON CHRONIC PANCREATITIS (HCC): Status: ACTIVE | Noted: 2021-07-02

## 2021-07-02 PROBLEM — K56.609 SMALL BOWEL OBSTRUCTION (HCC): Status: ACTIVE | Noted: 2021-07-02

## 2021-07-02 LAB
ALBUMIN SERPL-MCNC: 3.7 G/DL (ref 3.4–5)
ALBUMIN/GLOB SERPL: 0.9 {RATIO} (ref 1–2)
ALP LIVER SERPL-CCNC: 109 U/L
ALT SERPL-CCNC: 28 U/L
ANION GAP SERPL CALC-SCNC: 7 MMOL/L (ref 0–18)
AST SERPL-CCNC: 26 U/L (ref 15–37)
BASOPHILS # BLD AUTO: 0.04 X10(3) UL (ref 0–0.2)
BASOPHILS NFR BLD AUTO: 0.3 %
BILIRUB SERPL-MCNC: 0.7 MG/DL (ref 0.1–2)
BILIRUB UR QL STRIP.AUTO: NEGATIVE
BUN BLD-MCNC: 31 MG/DL (ref 7–18)
BUN/CREAT SERPL: 18 (ref 10–20)
CALCIUM BLD-MCNC: 9.2 MG/DL (ref 8.5–10.1)
CHLORIDE SERPL-SCNC: 102 MMOL/L (ref 98–112)
CO2 SERPL-SCNC: 25 MMOL/L (ref 21–32)
COLOR UR AUTO: YELLOW
CREAT BLD-MCNC: 1.72 MG/DL
DEPRECATED RDW RBC AUTO: 50.4 FL (ref 35.1–46.3)
EOSINOPHIL # BLD AUTO: 0.39 X10(3) UL (ref 0–0.7)
EOSINOPHIL NFR BLD AUTO: 2.5 %
ERYTHROCYTE [DISTWIDTH] IN BLOOD BY AUTOMATED COUNT: 16.1 % (ref 11–15)
GLOBULIN PLAS-MCNC: 3.9 G/DL (ref 2.8–4.4)
GLUCOSE BLD-MCNC: 101 MG/DL (ref 70–99)
GLUCOSE BLD-MCNC: 119 MG/DL (ref 70–99)
GLUCOSE BLD-MCNC: 137 MG/DL (ref 70–99)
GLUCOSE BLD-MCNC: 144 MG/DL (ref 70–99)
GLUCOSE BLD-MCNC: 148 MG/DL (ref 70–99)
GLUCOSE BLD-MCNC: 57 MG/DL (ref 70–99)
GLUCOSE BLD-MCNC: 88 MG/DL (ref 70–99)
GLUCOSE BLD-MCNC: 92 MG/DL (ref 70–99)
GLUCOSE UR STRIP.AUTO-MCNC: NEGATIVE MG/DL
HCT VFR BLD AUTO: 40.1 %
HGB BLD-MCNC: 14 G/DL
IMM GRANULOCYTES # BLD AUTO: 0.06 X10(3) UL (ref 0–1)
IMM GRANULOCYTES NFR BLD: 0.4 %
KETONES UR STRIP.AUTO-MCNC: NEGATIVE MG/DL
LYMPHOCYTES # BLD AUTO: 1.9 X10(3) UL (ref 1–4)
LYMPHOCYTES NFR BLD AUTO: 12.3 %
M PROTEIN MFR SERPL ELPH: 7.6 G/DL (ref 6.4–8.2)
MCH RBC QN AUTO: 30.4 PG (ref 26–34)
MCHC RBC AUTO-ENTMCNC: 34.9 G/DL (ref 31–37)
MCV RBC AUTO: 87 FL
MONOCYTES # BLD AUTO: 1.32 X10(3) UL (ref 0.1–1)
MONOCYTES NFR BLD AUTO: 8.6 %
NEUTROPHILS # BLD AUTO: 11.72 X10 (3) UL (ref 1.5–7.7)
NEUTROPHILS # BLD AUTO: 11.72 X10(3) UL (ref 1.5–7.7)
NEUTROPHILS NFR BLD AUTO: 75.9 %
NITRITE UR QL STRIP.AUTO: NEGATIVE
OSMOLALITY SERPL CALC.SUM OF ELEC: 287 MOSM/KG (ref 275–295)
PH UR STRIP.AUTO: 5 [PH] (ref 5–8)
PLATELET # BLD AUTO: 340 10(3)UL (ref 150–450)
POTASSIUM SERPL-SCNC: 4.5 MMOL/L (ref 3.5–5.1)
PROT UR STRIP.AUTO-MCNC: NEGATIVE MG/DL
RBC # BLD AUTO: 4.61 X10(6)UL
RBC UR QL AUTO: NEGATIVE
SODIUM SERPL-SCNC: 134 MMOL/L (ref 136–145)
SP GR UR STRIP.AUTO: 1.01 (ref 1–1.03)
UROBILINOGEN UR STRIP.AUTO-MCNC: 2 MG/DL
WBC # BLD AUTO: 15.4 X10(3) UL (ref 4–11)

## 2021-07-02 PROCEDURE — 99223 1ST HOSP IP/OBS HIGH 75: CPT | Performed by: HOSPITALIST

## 2021-07-02 PROCEDURE — 71045 X-RAY EXAM CHEST 1 VIEW: CPT | Performed by: PHYSICIAN ASSISTANT

## 2021-07-02 PROCEDURE — 99223 1ST HOSP IP/OBS HIGH 75: CPT | Performed by: SURGERY

## 2021-07-02 RX ORDER — HYDROMORPHONE HYDROCHLORIDE 1 MG/ML
1 INJECTION, SOLUTION INTRAMUSCULAR; INTRAVENOUS; SUBCUTANEOUS ONCE
Status: COMPLETED | OUTPATIENT
Start: 2021-07-02 | End: 2021-07-02

## 2021-07-02 RX ORDER — DIAZEPAM 5 MG/ML
5 INJECTION, SOLUTION INTRAMUSCULAR; INTRAVENOUS ONCE
Status: COMPLETED | OUTPATIENT
Start: 2021-07-02 | End: 2021-07-02

## 2021-07-02 RX ORDER — MORPHINE SULFATE 2 MG/ML
1 INJECTION, SOLUTION INTRAMUSCULAR; INTRAVENOUS EVERY 2 HOUR PRN
Status: DISCONTINUED | OUTPATIENT
Start: 2021-07-02 | End: 2021-07-02

## 2021-07-02 RX ORDER — KETOROLAC TROMETHAMINE 15 MG/ML
15 INJECTION, SOLUTION INTRAMUSCULAR; INTRAVENOUS EVERY 6 HOURS PRN
Status: DISPENSED | OUTPATIENT
Start: 2021-07-02 | End: 2021-07-04

## 2021-07-02 RX ORDER — SODIUM CHLORIDE 9 MG/ML
INJECTION, SOLUTION INTRAVENOUS CONTINUOUS
Status: DISCONTINUED | OUTPATIENT
Start: 2021-07-02 | End: 2021-07-04

## 2021-07-02 RX ORDER — HYDROMORPHONE HYDROCHLORIDE 1 MG/ML
1 INJECTION, SOLUTION INTRAMUSCULAR; INTRAVENOUS; SUBCUTANEOUS EVERY 2 HOUR PRN
Status: DISCONTINUED | OUTPATIENT
Start: 2021-07-02 | End: 2021-07-06

## 2021-07-02 RX ORDER — HYDROMORPHONE HYDROCHLORIDE 1 MG/ML
0.5 INJECTION, SOLUTION INTRAMUSCULAR; INTRAVENOUS; SUBCUTANEOUS EVERY 2 HOUR PRN
Status: DISCONTINUED | OUTPATIENT
Start: 2021-07-02 | End: 2021-07-02

## 2021-07-02 RX ORDER — FENTANYL 100 UG/H
1 PATCH TRANSDERMAL
Status: DISCONTINUED | OUTPATIENT
Start: 2021-07-02 | End: 2021-07-26

## 2021-07-02 RX ORDER — IPRATROPIUM BROMIDE AND ALBUTEROL SULFATE 2.5; .5 MG/3ML; MG/3ML
3 SOLUTION RESPIRATORY (INHALATION) EVERY 4 HOURS PRN
Status: DISCONTINUED | OUTPATIENT
Start: 2021-07-02 | End: 2021-08-16

## 2021-07-02 RX ORDER — MORPHINE SULFATE 4 MG/ML
4 INJECTION, SOLUTION INTRAMUSCULAR; INTRAVENOUS EVERY 2 HOUR PRN
Status: DISCONTINUED | OUTPATIENT
Start: 2021-07-02 | End: 2021-07-02

## 2021-07-02 RX ORDER — MORPHINE SULFATE 2 MG/ML
2 INJECTION, SOLUTION INTRAMUSCULAR; INTRAVENOUS EVERY 2 HOUR PRN
Status: DISCONTINUED | OUTPATIENT
Start: 2021-07-02 | End: 2021-07-02

## 2021-07-02 RX ORDER — ACETAMINOPHEN 10 MG/ML
1000 INJECTION, SOLUTION INTRAVENOUS EVERY 6 HOURS
Status: DISCONTINUED | OUTPATIENT
Start: 2021-07-02 | End: 2021-08-11

## 2021-07-02 RX ORDER — DEXTROSE AND SODIUM CHLORIDE 5; .9 G/100ML; G/100ML
INJECTION, SOLUTION INTRAVENOUS CONTINUOUS
Status: DISCONTINUED | OUTPATIENT
Start: 2021-07-02 | End: 2021-07-06

## 2021-07-02 RX ORDER — ONDANSETRON 2 MG/ML
4 INJECTION INTRAMUSCULAR; INTRAVENOUS EVERY 6 HOURS PRN
Status: DISCONTINUED | OUTPATIENT
Start: 2021-07-02 | End: 2021-07-17

## 2021-07-02 RX ORDER — ALBUTEROL SULFATE 90 UG/1
2 AEROSOL, METERED RESPIRATORY (INHALATION) EVERY 6 HOURS PRN
Status: DISCONTINUED | OUTPATIENT
Start: 2021-07-02 | End: 2021-08-16

## 2021-07-02 RX ORDER — ENOXAPARIN SODIUM 100 MG/ML
40 INJECTION SUBCUTANEOUS DAILY
Status: DISCONTINUED | OUTPATIENT
Start: 2021-07-02 | End: 2021-07-05

## 2021-07-02 RX ORDER — METOPROLOL TARTRATE 5 MG/5ML
5 INJECTION INTRAVENOUS EVERY 6 HOURS
Status: DISCONTINUED | OUTPATIENT
Start: 2021-07-02 | End: 2021-07-12

## 2021-07-02 RX ORDER — KETOROLAC TROMETHAMINE 30 MG/ML
30 INJECTION, SOLUTION INTRAMUSCULAR; INTRAVENOUS EVERY 6 HOURS PRN
Status: DISCONTINUED | OUTPATIENT
Start: 2021-07-02 | End: 2021-07-02

## 2021-07-02 RX ORDER — DEXTROSE MONOHYDRATE 25 G/50ML
50 INJECTION, SOLUTION INTRAVENOUS
Status: DISCONTINUED | OUTPATIENT
Start: 2021-07-02 | End: 2021-08-02

## 2021-07-02 RX ORDER — PROCHLORPERAZINE EDISYLATE 5 MG/ML
10 INJECTION INTRAMUSCULAR; INTRAVENOUS EVERY 6 HOURS PRN
Status: DISCONTINUED | OUTPATIENT
Start: 2021-07-02 | End: 2021-07-17

## 2021-07-02 NOTE — PROGRESS NOTES
NG placement verified by XR. Attached to LIS wall suction. Returned thick brown drainage from NG. Will monitor.

## 2021-07-02 NOTE — PLAN OF CARE
Pt a/ox4, pain rated 8-9/10 this am, improved to 7/10 this afternoon. IV tylenol, fentanyl patch, IVP dilaudid and toradol ordered. NG placed to L nare this am, connected to LIS. NG output thick and brown, irrigated NG x1 due to blockage of tube.  Jennifer Promise

## 2021-07-02 NOTE — PLAN OF CARE
Patient alert and oriented ,rates pain 9/10 ,seen by Dr. Charmayne Manis ,changed morphine to dilaudid  ,has on and off nausea ,no emesis noted ,abdomen firm and distended ,denies flatus ,bowel sounds present ,iv infusing ,iv protonix given ,Placed on 2l of o2 desa

## 2021-07-02 NOTE — CONSULTS
[facility] Pharmacy Note:  Age Based Dose Adjustment    Tanika Smith has been prescribed ketorolac (TORADOL) 30 mg IV every 6 hours prn. Patient is >71 years old therefore the dose has been adjusted to 15 mg IV every 6 hours prn.       Thank you,  Rose Connelly

## 2021-07-02 NOTE — CONSULTS
[facility]  Report of Consultation    Paola Blunt Patient Status:  Inpatient    1950 MRN [id number]   [facility] 3SW-A Attending Felecia Jacobs MD   Hosp Day # 0 PCP Gabrielle Huitron MD     Reason for Consultation:  Small kameron osteoarthrosis, lower leg 10/2/2013   • Primary localized osteoarthrosis, lower leg, left [715.16] 9/2/2015   • Pulmonary nodule 3/23/2017   • Renal stones    • Right leg DVT ([facility] 6/2007   • Shortness of breath    • Spondylolisthesis of lumbar region    • MCG/ACT inhaler 2 puff, 2 puff, Inhalation, Q6H PRN  •  Fluticasone Furoate-Vilanterol (BREO ELLIPTA) 200-25 MCG/INH inhaler 1 puff, 1 puff, Inhalation, Daily  •  insulin detemir (LEVEMIR) 100 UNIT/ML flextouch 10 Units, 10 Units, Subcutaneous, Daily  •  f palpitation. No JVD. Supple. Lungs: Clear to auscultation bilaterally. Cardiac: Regular rate and rhythm. Abdomen:  Soft, distended with tympany to percussion, non-tender, with no rebound or guarding. No peritoneal signs. No ascites.   Liver is with as the patient has markedly dilated stomach and proximal bowel. · If the patient fails to improve or if he clinically worsens surgical intervention may be required. ·   · GI and DVT prophylaxis. · Increase activity and ambulate.   · Sips of clear liquids

## 2021-07-02 NOTE — PROGRESS NOTES
Blood sugar 57. Gave 1 amp D50% per protocol. Patient already has orders for D5/0.9NS infusion.      1630-Blood sugar on recheck 148

## 2021-07-02 NOTE — ED INITIAL ASSESSMENT (HPI)
Pt noted abdominal distention yesterday, abdominal pain since this afternoon, c/o nausea, denies vomiting. States has a h/o pancreatitis with similar pain.

## 2021-07-02 NOTE — ED PROVIDER NOTES
Patient Seen in: [facility] Emergency Department      History   Patient presents with:  Abdomen/Flank Pain    Stated Complaint: pt reports he has chronic pancreatitis and reports severe abd pain that started*    HPI/Subjective:   LEONEL Gautam is a leg DVT ([facility] 6/2007   • Shortness of breath    • Spondylolisthesis of lumbar region    • Spondylolisthesis of lumbar region    • Type II or unspecified type diabetes mellitus without mention of complication, uncontrolled    • Unspecified vitamin D deficie clear cardiovascular exam regular rhythm abdomen distended tender extremities no calf cyanosis or edema no rash back exam is normal skin is nondiaphoretic    ED Course     Labs Reviewed   COMP METABOLIC PANEL (14) - Abnormal; Notable for the following comp patient's renal injury patient was given IV fluids as well. Patient is abdomen is distended but it has no peritoneal signs.   Does not show any signs of kidney stones that are passing shows no signs of aortic abdominal aneurysm as well which is in the diff

## 2021-07-02 NOTE — H&P
[facility]  History and Physical     Conny Seip Patient Status:  Emergency    1950 MRN [id number]   Location [address] [facility] Attending Lita Small MD   Hosp Day # 0 PCP Jacquelin Oliveros MD     Chief Complaint diabetes mellitus without mention of complication, uncontrolled    • Unspecified vitamin D deficiency    • Visual impairment     GLASSES   • Weight loss    • Weight loss 5/23/2019        Past Surgical History:   Past Surgical History:   Procedure Lateralit hours as needed for Nausea., Disp: 90 tablet, Rfl: 0  tamsulosin (FLOMAX) cap, Take 1 capsule (0.4 mg total) by mouth daily 1/2 HOUR after dinner, Disp: 30 capsule, Rfl: 0  hydrochlorothiazide 12.5 MG Oral Tab, Take 1 tablet (12.5 mg total) by mouth daily. Normocephalic atraumatic. Respiratory: Clear to auscultation bilaterally. Cardiovascular: S1, S2. Regular   Chest and Back: No tenderness or deformity. Abdomen: Distended, hypoactive  Musculoskeletal: Moves all extremities.   Extremities: No edema or c 10 (third of PTA dose)  2. Correctional scale  3. Accuchecks  8.  COPD  1. BD    Quality:  · DVT Prophylaxis: Lovenox  · CODE status: Full  · Kellogg: No  · If COVID testing is negative, may discontinue isolation: yes     Plan of care discussed with patient a

## 2021-07-03 LAB
GLUCOSE BLD-MCNC: 138 MG/DL (ref 70–99)
GLUCOSE BLD-MCNC: 75 MG/DL (ref 70–99)
GLUCOSE BLD-MCNC: 85 MG/DL (ref 70–99)
GLUCOSE BLD-MCNC: 92 MG/DL (ref 70–99)

## 2021-07-03 PROCEDURE — 99232 SBSQ HOSP IP/OBS MODERATE 35: CPT | Performed by: SURGERY

## 2021-07-03 PROCEDURE — 99233 SBSQ HOSP IP/OBS HIGH 50: CPT | Performed by: HOSPITALIST

## 2021-07-03 NOTE — PROGRESS NOTES
[facility]  Progress Note     Saravanan Martines Patient Status:  Inpatient    1950 MRN [id number]   [facility] 3SW-A Attending Karma Finn MD   Hosp Day # 1 PCP Mykel Vasquez MD     Chief Complaint: abdominal pain    S: Bibi Villalobos Date    COVID19 Not Detected 03/28/2021    COVID19 Not Detected 03/22/2021    COVID19 Not Detected 03/22/2021       Pro-Calcitonin  No results for input(s): PCT in the last 168 hours. Cardiac  No results for input(s): TROP, PBNP in the last 168 hours.

## 2021-07-03 NOTE — PLAN OF CARE
7/2/21 ED admit w/ acute pancreatitis & SBO, Pt is AAOX4, VSS, room air, NG to LIS to Lt nares, applied new nasal adhesion today, TELE in place, IVF, glucose monitored and treated per orders, voiding freely to restroom, not passing gas or belching, demetri

## 2021-07-03 NOTE — PLAN OF CARE
Received patient awake and oriented. On room air, breath sounds diminished. Pm tele, SR. NPO status maintained. Up to the bathroom with standby assist. KE to DOMENIC GUARDADO, with brownish  drainage. Refused SCDs.  Using Dilaudid and Toradol for pain control, Fe

## 2021-07-03 NOTE — PROGRESS NOTES
[facility]  Progress Note    Kelvin Greer Patient Status:  Inpatient    1950 MRN [id number]   [facility] 3SW-A Attending Kaci Morrison MD   Hosp Day # 1 PCP Eliseo Huntley MD     Subjective:   The patient reports he is feelin and examined. His abdomen is less distended than yesterday he states that he has some discomfort although much improved from yesterday he denies flatus or BM.   He relates a history of pancreatitis with prior abdominal surgeries with pancreatic pseudocyst.

## 2021-07-04 ENCOUNTER — APPOINTMENT (OUTPATIENT)
Dept: GENERAL RADIOLOGY | Facility: HOSPITAL | Age: 71
DRG: 336 | End: 2021-07-04
Attending: STUDENT IN AN ORGANIZED HEALTH CARE EDUCATION/TRAINING PROGRAM
Payer: MEDICARE

## 2021-07-04 LAB
ALBUMIN SERPL-MCNC: 2.8 G/DL (ref 3.4–5)
ALBUMIN/GLOB SERPL: 0.8 {RATIO} (ref 1–2)
ALP LIVER SERPL-CCNC: 83 U/L
ALT SERPL-CCNC: 21 U/L
ANION GAP SERPL CALC-SCNC: 4 MMOL/L (ref 0–18)
AST SERPL-CCNC: 16 U/L (ref 15–37)
BASOPHILS # BLD AUTO: 0.03 X10(3) UL (ref 0–0.2)
BASOPHILS NFR BLD AUTO: 0.3 %
BILIRUB SERPL-MCNC: 0.3 MG/DL (ref 0.1–2)
BUN BLD-MCNC: 18 MG/DL (ref 7–18)
BUN/CREAT SERPL: 14.1 (ref 10–20)
CALCIUM BLD-MCNC: 8.5 MG/DL (ref 8.5–10.1)
CHLORIDE SERPL-SCNC: 114 MMOL/L (ref 98–112)
CO2 SERPL-SCNC: 23 MMOL/L (ref 21–32)
CREAT BLD-MCNC: 1.28 MG/DL
DEPRECATED RDW RBC AUTO: 50.6 FL (ref 35.1–46.3)
EOSINOPHIL # BLD AUTO: 0.23 X10(3) UL (ref 0–0.7)
EOSINOPHIL NFR BLD AUTO: 2.4 %
ERYTHROCYTE [DISTWIDTH] IN BLOOD BY AUTOMATED COUNT: 15.9 % (ref 11–15)
GLOBULIN PLAS-MCNC: 3.5 G/DL (ref 2.8–4.4)
GLUCOSE BLD-MCNC: 101 MG/DL (ref 70–99)
GLUCOSE BLD-MCNC: 119 MG/DL (ref 70–99)
GLUCOSE BLD-MCNC: 134 MG/DL (ref 70–99)
GLUCOSE BLD-MCNC: 163 MG/DL (ref 70–99)
GLUCOSE BLD-MCNC: 165 MG/DL (ref 70–99)
GLUCOSE BLD-MCNC: 172 MG/DL (ref 70–99)
GLUCOSE BLD-MCNC: 175 MG/DL (ref 70–99)
GLUCOSE BLD-MCNC: 62 MG/DL (ref 70–99)
GLUCOSE BLD-MCNC: 76 MG/DL (ref 70–99)
GLUCOSE BLD-MCNC: 87 MG/DL (ref 70–99)
HCT VFR BLD AUTO: 37.5 %
HGB BLD-MCNC: 13.1 G/DL
IMM GRANULOCYTES # BLD AUTO: 0.02 X10(3) UL (ref 0–1)
IMM GRANULOCYTES NFR BLD: 0.2 %
LYMPHOCYTES # BLD AUTO: 1.54 X10(3) UL (ref 1–4)
LYMPHOCYTES NFR BLD AUTO: 16.4 %
M PROTEIN MFR SERPL ELPH: 6.3 G/DL (ref 6.4–8.2)
MCH RBC QN AUTO: 30.6 PG (ref 26–34)
MCHC RBC AUTO-ENTMCNC: 34.9 G/DL (ref 31–37)
MCV RBC AUTO: 87.6 FL
MONOCYTES # BLD AUTO: 1.04 X10(3) UL (ref 0.1–1)
MONOCYTES NFR BLD AUTO: 11.1 %
NEUTROPHILS # BLD AUTO: 6.55 X10 (3) UL (ref 1.5–7.7)
NEUTROPHILS # BLD AUTO: 6.55 X10(3) UL (ref 1.5–7.7)
NEUTROPHILS NFR BLD AUTO: 69.6 %
OSMOLALITY SERPL CALC.SUM OF ELEC: 298 MOSM/KG (ref 275–295)
PLATELET # BLD AUTO: 316 10(3)UL (ref 150–450)
POTASSIUM SERPL-SCNC: 4.1 MMOL/L (ref 3.5–5.1)
RBC # BLD AUTO: 4.28 X10(6)UL
SODIUM SERPL-SCNC: 141 MMOL/L (ref 136–145)
WBC # BLD AUTO: 9.4 X10(3) UL (ref 4–11)

## 2021-07-04 PROCEDURE — 74019 RADEX ABDOMEN 2 VIEWS: CPT | Performed by: STUDENT IN AN ORGANIZED HEALTH CARE EDUCATION/TRAINING PROGRAM

## 2021-07-04 PROCEDURE — 99233 SBSQ HOSP IP/OBS HIGH 50: CPT | Performed by: COLON & RECTAL SURGERY

## 2021-07-04 PROCEDURE — 99233 SBSQ HOSP IP/OBS HIGH 50: CPT | Performed by: HOSPITALIST

## 2021-07-04 RX ORDER — HYDRALAZINE HYDROCHLORIDE 20 MG/ML
5 INJECTION INTRAMUSCULAR; INTRAVENOUS ONCE
Status: COMPLETED | OUTPATIENT
Start: 2021-07-04 | End: 2021-07-04

## 2021-07-04 RX ORDER — KETOROLAC TROMETHAMINE 15 MG/ML
15 INJECTION, SOLUTION INTRAMUSCULAR; INTRAVENOUS EVERY 6 HOURS PRN
Status: DISPENSED | OUTPATIENT
Start: 2021-07-04 | End: 2021-07-06

## 2021-07-04 RX ORDER — HYDROMORPHONE HYDROCHLORIDE 1 MG/ML
1 INJECTION, SOLUTION INTRAMUSCULAR; INTRAVENOUS; SUBCUTANEOUS ONCE
Status: COMPLETED | OUTPATIENT
Start: 2021-07-04 | End: 2021-07-04

## 2021-07-04 RX ORDER — BISACODYL 10 MG
10 SUPPOSITORY, RECTAL RECTAL
Status: DISCONTINUED | OUTPATIENT
Start: 2021-07-04 | End: 2021-07-12

## 2021-07-04 NOTE — PLAN OF CARE
Pt a/ox4. Continues to rate pain 7/10 to abdomen when assessed. dilaudid, IV tylenol and toradol for pain control. Bowel sounds present on auscultation. Denies passing gas.  NG to LIS wall suction, continues to drain brown fluid, thinner in appearance in

## 2021-07-04 NOTE — PROGRESS NOTES
[facility]  Progress Note    Ramos Mims Patient Status:  Inpatient    1950 MRN [id number]   [facility] 3SW-A Attending Dalila Ahumada, MD   Hosp Day # 2 PCP Berry Hall MD     Subjective:   The patient is resting in bed up the above assessment and plan and again have modified the report to reflect my opinion. Patient with ongoing bloating and distention.   Over 1500 cc out of the NG tube in the last 24 hours  Obstructive series this morning shows minimal change between now

## 2021-07-04 NOTE — PROGRESS NOTES
Blood sugar dropped to 76 this evening. Hospitalist notified. Orders received to decrease levemir to 8 units daily and to increase IVF rate to 120cc/hr. 1821- blood sugar on recheck 62. 1 amp D50 administered. Dr Karil Herman notified.  Orders received to [facility]

## 2021-07-04 NOTE — PLAN OF CARE
Pt AOX4, RA, VSS, Tele. NGT to Everardo Hicks, LIS, intact. Dilaudid 1 mg for pain management with little relief. C/O pain to lower abdomen radiating to back. Up with SBA. Voiding freely. IVF maintained as ordered. Surgery following, discharge plan TBD.  BG moran

## 2021-07-04 NOTE — PROGRESS NOTES
[facility]  Progress Note     Bing Moss Patient Status:  Inpatient    1950 MRN [id number]   [facility] 3SW-A Attending Alexia Curling, MD   Hosp Day # 2 PCP Alhaji Chang MD     Chief Complaint: abdominal pain    S: con Not Detected 03/28/2021    COVID19 Not Detected 03/22/2021    COVID19 Not Detected 03/22/2021       Pro-Calcitonin  No results for input(s): PCT in the last 168 hours. Cardiac  No results for input(s): TROP, PBNP in the last 168 hours.     Creatinine Kin

## 2021-07-05 ENCOUNTER — APPOINTMENT (OUTPATIENT)
Dept: CT IMAGING | Facility: HOSPITAL | Age: 71
DRG: 336 | End: 2021-07-05
Attending: COLON & RECTAL SURGERY
Payer: MEDICARE

## 2021-07-05 ENCOUNTER — APPOINTMENT (OUTPATIENT)
Dept: GENERAL RADIOLOGY | Facility: HOSPITAL | Age: 71
DRG: 336 | End: 2021-07-05
Attending: COLON & RECTAL SURGERY
Payer: MEDICARE

## 2021-07-05 ENCOUNTER — ANESTHESIA EVENT (OUTPATIENT)
Dept: SURGERY | Facility: HOSPITAL | Age: 71
DRG: 336 | End: 2021-07-05
Payer: MEDICARE

## 2021-07-05 ENCOUNTER — ANESTHESIA (OUTPATIENT)
Dept: SURGERY | Facility: HOSPITAL | Age: 71
DRG: 336 | End: 2021-07-05
Payer: MEDICARE

## 2021-07-05 LAB
ANION GAP SERPL CALC-SCNC: 6 MMOL/L (ref 0–18)
ANTIBODY SCREEN: NEGATIVE
BASOPHILS # BLD AUTO: 0.03 X10(3) UL (ref 0–0.2)
BASOPHILS NFR BLD AUTO: 0.3 %
BUN BLD-MCNC: 15 MG/DL (ref 7–18)
BUN/CREAT SERPL: 12.5 (ref 10–20)
CALCIUM BLD-MCNC: 8.6 MG/DL (ref 8.5–10.1)
CHLORIDE SERPL-SCNC: 112 MMOL/L (ref 98–112)
CO2 SERPL-SCNC: 22 MMOL/L (ref 21–32)
CREAT BLD-MCNC: 1.2 MG/DL
DEPRECATED RDW RBC AUTO: 51.8 FL (ref 35.1–46.3)
EOSINOPHIL # BLD AUTO: 0.22 X10(3) UL (ref 0–0.7)
EOSINOPHIL NFR BLD AUTO: 2.4 %
ERYTHROCYTE [DISTWIDTH] IN BLOOD BY AUTOMATED COUNT: 16.1 % (ref 11–15)
GLUCOSE BLD-MCNC: 112 MG/DL (ref 70–99)
GLUCOSE BLD-MCNC: 134 MG/DL (ref 70–99)
GLUCOSE BLD-MCNC: 151 MG/DL (ref 70–99)
GLUCOSE BLD-MCNC: 163 MG/DL (ref 70–99)
GLUCOSE BLD-MCNC: 169 MG/DL (ref 70–99)
HCT VFR BLD AUTO: 38.8 %
HGB BLD-MCNC: 13.4 G/DL
IMM GRANULOCYTES # BLD AUTO: 0.02 X10(3) UL (ref 0–1)
IMM GRANULOCYTES NFR BLD: 0.2 %
INR BLD: 1.04 (ref 0.89–1.11)
LYMPHOCYTES # BLD AUTO: 1.64 X10(3) UL (ref 1–4)
LYMPHOCYTES NFR BLD AUTO: 17.7 %
MCH RBC QN AUTO: 30.5 PG (ref 26–34)
MCHC RBC AUTO-ENTMCNC: 34.5 G/DL (ref 31–37)
MCV RBC AUTO: 88.2 FL
MONOCYTES # BLD AUTO: 1.05 X10(3) UL (ref 0.1–1)
MONOCYTES NFR BLD AUTO: 11.3 %
NEUTROPHILS # BLD AUTO: 6.31 X10 (3) UL (ref 1.5–7.7)
NEUTROPHILS # BLD AUTO: 6.31 X10(3) UL (ref 1.5–7.7)
NEUTROPHILS NFR BLD AUTO: 68.1 %
OSMOLALITY SERPL CALC.SUM OF ELEC: 294 MOSM/KG (ref 275–295)
PLATELET # BLD AUTO: 329 10(3)UL (ref 150–450)
POTASSIUM SERPL-SCNC: 3.9 MMOL/L (ref 3.5–5.1)
PSA SERPL DL<=0.01 NG/ML-MCNC: 13.9 SECONDS (ref 12.4–14.6)
RBC # BLD AUTO: 4.4 X10(6)UL
RH BLOOD TYPE: POSITIVE
SARS-COV-2 RNA RESP QL NAA+PROBE: NOT DETECTED
SODIUM SERPL-SCNC: 140 MMOL/L (ref 136–145)
WBC # BLD AUTO: 9.3 X10(3) UL (ref 4–11)

## 2021-07-05 PROCEDURE — 76942 ECHO GUIDE FOR BIOPSY: CPT | Performed by: ANESTHESIOLOGY

## 2021-07-05 PROCEDURE — 74177 CT ABD & PELVIS W/CONTRAST: CPT | Performed by: COLON & RECTAL SURGERY

## 2021-07-05 PROCEDURE — 44005 FREEING OF BOWEL ADHESION: CPT | Performed by: COLON & RECTAL SURGERY

## 2021-07-05 PROCEDURE — 0DNA0ZZ RELEASE JEJUNUM, OPEN APPROACH: ICD-10-PCS | Performed by: COLON & RECTAL SURGERY

## 2021-07-05 PROCEDURE — 99232 SBSQ HOSP IP/OBS MODERATE 35: CPT | Performed by: HOSPITALIST

## 2021-07-05 PROCEDURE — 71045 X-RAY EXAM CHEST 1 VIEW: CPT | Performed by: COLON & RECTAL SURGERY

## 2021-07-05 PROCEDURE — 99233 SBSQ HOSP IP/OBS HIGH 50: CPT | Performed by: COLON & RECTAL SURGERY

## 2021-07-05 PROCEDURE — 44005 FREEING OF BOWEL ADHESION: CPT | Performed by: STUDENT IN AN ORGANIZED HEALTH CARE EDUCATION/TRAINING PROGRAM

## 2021-07-05 PROCEDURE — 0DNB0ZZ RELEASE ILEUM, OPEN APPROACH: ICD-10-PCS | Performed by: COLON & RECTAL SURGERY

## 2021-07-05 DEVICE — BARRIER, ABSORBABLE, ADHESION
Type: IMPLANTABLE DEVICE | Site: ABDOMEN | Status: FUNCTIONAL
Brand: SEPRAFILM®

## 2021-07-05 RX ORDER — SODIUM CHLORIDE, SODIUM LACTATE, POTASSIUM CHLORIDE, CALCIUM CHLORIDE 600; 310; 30; 20 MG/100ML; MG/100ML; MG/100ML; MG/100ML
INJECTION, SOLUTION INTRAVENOUS CONTINUOUS PRN
Status: DISCONTINUED | OUTPATIENT
Start: 2021-07-05 | End: 2021-07-05 | Stop reason: SURG

## 2021-07-05 RX ORDER — HEPARIN SODIUM 5000 [USP'U]/ML
5000 INJECTION, SOLUTION INTRAVENOUS; SUBCUTANEOUS ONCE
Status: COMPLETED | OUTPATIENT
Start: 2021-07-05 | End: 2021-07-05

## 2021-07-05 RX ORDER — HYDROCODONE BITARTRATE AND ACETAMINOPHEN 5; 325 MG/1; MG/1
2 TABLET ORAL AS NEEDED
Status: DISCONTINUED | OUTPATIENT
Start: 2021-07-05 | End: 2021-07-05 | Stop reason: HOSPADM

## 2021-07-05 RX ORDER — HYDROMORPHONE HYDROCHLORIDE 1 MG/ML
0.4 INJECTION, SOLUTION INTRAMUSCULAR; INTRAVENOUS; SUBCUTANEOUS EVERY 5 MIN PRN
Status: DISCONTINUED | OUTPATIENT
Start: 2021-07-05 | End: 2021-07-05 | Stop reason: HOSPADM

## 2021-07-05 RX ORDER — LIDOCAINE HYDROCHLORIDE 10 MG/ML
INJECTION, SOLUTION EPIDURAL; INFILTRATION; INTRACAUDAL; PERINEURAL AS NEEDED
Status: DISCONTINUED | OUTPATIENT
Start: 2021-07-05 | End: 2021-07-05 | Stop reason: SURG

## 2021-07-05 RX ORDER — NALOXONE HYDROCHLORIDE 0.4 MG/ML
80 INJECTION, SOLUTION INTRAMUSCULAR; INTRAVENOUS; SUBCUTANEOUS AS NEEDED
Status: DISCONTINUED | OUTPATIENT
Start: 2021-07-05 | End: 2021-07-05 | Stop reason: HOSPADM

## 2021-07-05 RX ORDER — DEXAMETHASONE SODIUM PHOSPHATE 4 MG/ML
VIAL (ML) INJECTION AS NEEDED
Status: DISCONTINUED | OUTPATIENT
Start: 2021-07-05 | End: 2021-07-05 | Stop reason: SURG

## 2021-07-05 RX ORDER — INSULIN ASPART 100 [IU]/ML
INJECTION, SOLUTION INTRAVENOUS; SUBCUTANEOUS ONCE
Status: DISCONTINUED | OUTPATIENT
Start: 2021-07-05 | End: 2021-07-05 | Stop reason: HOSPADM

## 2021-07-05 RX ORDER — HYDROCODONE BITARTRATE AND ACETAMINOPHEN 5; 325 MG/1; MG/1
1 TABLET ORAL AS NEEDED
Status: DISCONTINUED | OUTPATIENT
Start: 2021-07-05 | End: 2021-07-05 | Stop reason: HOSPADM

## 2021-07-05 RX ORDER — SODIUM CHLORIDE, SODIUM LACTATE, POTASSIUM CHLORIDE, CALCIUM CHLORIDE 600; 310; 30; 20 MG/100ML; MG/100ML; MG/100ML; MG/100ML
INJECTION, SOLUTION INTRAVENOUS CONTINUOUS
Status: DISCONTINUED | OUTPATIENT
Start: 2021-07-05 | End: 2021-07-05 | Stop reason: HOSPADM

## 2021-07-05 RX ORDER — LABETALOL HYDROCHLORIDE 5 MG/ML
5 INJECTION, SOLUTION INTRAVENOUS EVERY 5 MIN PRN
Status: DISCONTINUED | OUTPATIENT
Start: 2021-07-05 | End: 2021-07-05 | Stop reason: HOSPADM

## 2021-07-05 RX ORDER — PHENYLEPHRINE HCL 10 MG/ML
VIAL (ML) INJECTION AS NEEDED
Status: DISCONTINUED | OUTPATIENT
Start: 2021-07-05 | End: 2021-07-05 | Stop reason: SURG

## 2021-07-05 RX ORDER — MEPERIDINE HYDROCHLORIDE 25 MG/ML
12.5 INJECTION INTRAMUSCULAR; INTRAVENOUS; SUBCUTANEOUS AS NEEDED
Status: DISCONTINUED | OUTPATIENT
Start: 2021-07-05 | End: 2021-07-05 | Stop reason: HOSPADM

## 2021-07-05 RX ORDER — CEFOXITIN 2 G/1
INJECTION, POWDER, FOR SOLUTION INTRAVENOUS AS NEEDED
Status: DISCONTINUED | OUTPATIENT
Start: 2021-07-05 | End: 2021-07-05 | Stop reason: SURG

## 2021-07-05 RX ORDER — ALBUTEROL SULFATE 2.5 MG/3ML
2.5 SOLUTION RESPIRATORY (INHALATION) AS NEEDED
Status: DISCONTINUED | OUTPATIENT
Start: 2021-07-05 | End: 2021-07-05 | Stop reason: HOSPADM

## 2021-07-05 RX ORDER — ONDANSETRON 2 MG/ML
4 INJECTION INTRAMUSCULAR; INTRAVENOUS AS NEEDED
Status: DISCONTINUED | OUTPATIENT
Start: 2021-07-05 | End: 2021-07-05 | Stop reason: HOSPADM

## 2021-07-05 RX ORDER — HYDROMORPHONE HYDROCHLORIDE 1 MG/ML
INJECTION, SOLUTION INTRAMUSCULAR; INTRAVENOUS; SUBCUTANEOUS
Status: COMPLETED
Start: 2021-07-05 | End: 2021-07-05

## 2021-07-05 RX ORDER — HEPARIN SODIUM 5000 [USP'U]/ML
5000 INJECTION, SOLUTION INTRAVENOUS; SUBCUTANEOUS EVERY 8 HOURS SCHEDULED
Status: DISCONTINUED | OUTPATIENT
Start: 2021-07-06 | End: 2021-08-16

## 2021-07-05 RX ORDER — EPHEDRINE SULFATE 50 MG/ML
INJECTION INTRAVENOUS AS NEEDED
Status: DISCONTINUED | OUTPATIENT
Start: 2021-07-05 | End: 2021-07-05 | Stop reason: SURG

## 2021-07-05 RX ORDER — HYDRALAZINE HYDROCHLORIDE 20 MG/ML
INJECTION INTRAMUSCULAR; INTRAVENOUS AS NEEDED
Status: DISCONTINUED | OUTPATIENT
Start: 2021-07-05 | End: 2021-07-05 | Stop reason: SURG

## 2021-07-05 RX ORDER — LABETALOL HYDROCHLORIDE 5 MG/ML
INJECTION, SOLUTION INTRAVENOUS AS NEEDED
Status: DISCONTINUED | OUTPATIENT
Start: 2021-07-05 | End: 2021-07-05 | Stop reason: SURG

## 2021-07-05 RX ORDER — SODIUM CHLORIDE 9 MG/ML
INJECTION, SOLUTION INTRAVENOUS CONTINUOUS PRN
Status: DISCONTINUED | OUTPATIENT
Start: 2021-07-05 | End: 2021-07-05 | Stop reason: SURG

## 2021-07-05 RX ORDER — ONDANSETRON 2 MG/ML
INJECTION INTRAMUSCULAR; INTRAVENOUS AS NEEDED
Status: DISCONTINUED | OUTPATIENT
Start: 2021-07-05 | End: 2021-07-05 | Stop reason: SURG

## 2021-07-05 RX ORDER — ROCURONIUM BROMIDE 10 MG/ML
INJECTION, SOLUTION INTRAVENOUS AS NEEDED
Status: DISCONTINUED | OUTPATIENT
Start: 2021-07-05 | End: 2021-07-05 | Stop reason: SURG

## 2021-07-05 RX ORDER — KETOROLAC TROMETHAMINE 30 MG/ML
INJECTION, SOLUTION INTRAMUSCULAR; INTRAVENOUS AS NEEDED
Status: DISCONTINUED | OUTPATIENT
Start: 2021-07-05 | End: 2021-07-05 | Stop reason: SURG

## 2021-07-05 RX ORDER — DEXTROSE MONOHYDRATE 25 G/50ML
50 INJECTION, SOLUTION INTRAVENOUS
Status: DISCONTINUED | OUTPATIENT
Start: 2021-07-05 | End: 2021-07-05 | Stop reason: HOSPADM

## 2021-07-05 RX ADMIN — SODIUM CHLORIDE, SODIUM LACTATE, POTASSIUM CHLORIDE, CALCIUM CHLORIDE: 600; 310; 30; 20 INJECTION, SOLUTION INTRAVENOUS at 20:54:00

## 2021-07-05 RX ADMIN — SODIUM CHLORIDE, SODIUM LACTATE, POTASSIUM CHLORIDE, CALCIUM CHLORIDE: 600; 310; 30; 20 INJECTION, SOLUTION INTRAVENOUS at 21:17:00

## 2021-07-05 RX ADMIN — ROCURONIUM BROMIDE 30 MG: 10 INJECTION, SOLUTION INTRAVENOUS at 18:10:00

## 2021-07-05 RX ADMIN — SODIUM CHLORIDE: 9 INJECTION, SOLUTION INTRAVENOUS at 18:07:00

## 2021-07-05 RX ADMIN — PHENYLEPHRINE HCL 100 MCG: 10 MG/ML VIAL (ML) INJECTION at 18:15:00

## 2021-07-05 RX ADMIN — CEFOXITIN 2 G: 2 INJECTION, POWDER, FOR SOLUTION INTRAVENOUS at 20:36:00

## 2021-07-05 RX ADMIN — HYDRALAZINE HYDROCHLORIDE 10 MG: 20 INJECTION INTRAMUSCULAR; INTRAVENOUS at 19:46:00

## 2021-07-05 RX ADMIN — PHENYLEPHRINE HCL 50 MCG: 10 MG/ML VIAL (ML) INJECTION at 18:00:00

## 2021-07-05 RX ADMIN — LIDOCAINE HYDROCHLORIDE 50 MG: 10 INJECTION, SOLUTION EPIDURAL; INFILTRATION; INTRACAUDAL; PERINEURAL at 18:00:00

## 2021-07-05 RX ADMIN — ONDANSETRON 4 MG: 2 INJECTION INTRAMUSCULAR; INTRAVENOUS at 20:56:00

## 2021-07-05 RX ADMIN — LABETALOL HYDROCHLORIDE 10 MG: 5 INJECTION, SOLUTION INTRAVENOUS at 19:01:00

## 2021-07-05 RX ADMIN — ROCURONIUM BROMIDE 10 MG: 10 INJECTION, SOLUTION INTRAVENOUS at 18:30:00

## 2021-07-05 RX ADMIN — SODIUM CHLORIDE, SODIUM LACTATE, POTASSIUM CHLORIDE, CALCIUM CHLORIDE: 600; 310; 30; 20 INJECTION, SOLUTION INTRAVENOUS at 17:51:00

## 2021-07-05 RX ADMIN — KETOROLAC TROMETHAMINE 15 MG: 30 INJECTION, SOLUTION INTRAMUSCULAR; INTRAVENOUS at 20:56:00

## 2021-07-05 RX ADMIN — PHENYLEPHRINE HCL 50 MCG: 10 MG/ML VIAL (ML) INJECTION at 18:05:00

## 2021-07-05 RX ADMIN — CEFOXITIN 2 G: 2 INJECTION, POWDER, FOR SOLUTION INTRAVENOUS at 18:30:00

## 2021-07-05 RX ADMIN — ROCURONIUM BROMIDE 10 MG: 10 INJECTION, SOLUTION INTRAVENOUS at 19:45:00

## 2021-07-05 RX ADMIN — EPHEDRINE SULFATE 5 MG: 50 INJECTION INTRAVENOUS at 18:20:00

## 2021-07-05 RX ADMIN — ROCURONIUM BROMIDE 10 MG: 10 INJECTION, SOLUTION INTRAVENOUS at 18:20:00

## 2021-07-05 RX ADMIN — PHENYLEPHRINE HCL 100 MCG: 10 MG/ML VIAL (ML) INJECTION at 18:10:00

## 2021-07-05 RX ADMIN — ROCURONIUM BROMIDE 10 MG: 10 INJECTION, SOLUTION INTRAVENOUS at 18:00:00

## 2021-07-05 RX ADMIN — ROCURONIUM BROMIDE 20 MG: 10 INJECTION, SOLUTION INTRAVENOUS at 18:50:00

## 2021-07-05 RX ADMIN — DEXAMETHASONE SODIUM PHOSPHATE 4 MG: 4 MG/ML VIAL (ML) INJECTION at 18:00:00

## 2021-07-05 RX ADMIN — ROCURONIUM BROMIDE 10 MG: 10 INJECTION, SOLUTION INTRAVENOUS at 20:35:00

## 2021-07-05 NOTE — PROGRESS NOTES
[facility]  Progress Note    [facility] Patient Status:  Inpatient    1950 MRN [id number]   [facility] 3SW-A Attending Ceasar Duarte MD   Hosp Day # 3 PCP Jeremy Campos MD     Subjective:   The patient is alert and speakin abdomen and pelvis with oral and IV contrast performed this morning - further recommendations to follow  2. Continue NG tube decompression -1000 cc output in past 24 hours  3. Awaiting return of bowel function  4. Continue n.p.o. status  5.  DVT prophylaxis

## 2021-07-05 NOTE — ANESTHESIA PROCEDURE NOTES
Airway  Date/Time: 7/5/2021 6:02 PM  Urgency: elective    Airway not difficult    General Information and Staff    Patient location during procedure: OR  Anesthesiologist: Cindy Huitron MD  Performed: anesthesiologist     Indications and Patient Condition

## 2021-07-05 NOTE — PROGRESS NOTES
Patient states he was coughing and gagging and his NG tube came out. General surgery updated. He will be added on for surgery this afternoon and NG will be replaced at that time.

## 2021-07-05 NOTE — PLAN OF CARE
Rates abdominal and back pain at 7/10. Dilaudid given. Discussed importance of ambulation and limiting narcotics. Patient ambulating in hallway this morning. CT scan done. NG to low intermittent suction with moderate amount of brown/tan output.  Denies naus

## 2021-07-05 NOTE — PLAN OF CARE
Patient continue to rates his pain 7-8/10 ,not much relief with dilaudid ,also on scheduled iv tylenol and toradol PRN ,denies any nausea ,abdomen softly distended ,has bowel sounds ,denies flatus ,strict NPO NG to LIS with large amount of brownish output

## 2021-07-05 NOTE — ANESTHESIA PROCEDURE NOTES
Peripheral IV  Date/Time: 7/5/2021 6:07 PM  Inserted by:  Ngozi Roberto MD    Placement  Needle size: 18 G  Laterality: left  Location: wrist  Local anesthetic: none  Site prep: alcohol  Technique: anatomical landmarks  Attempts: 1

## 2021-07-05 NOTE — ANESTHESIA PREPROCEDURE EVALUATION
PRE-OP EVALUATION    Patient Name: Debra Miranda    Admit Diagnosis: Small bowel obstruction ([facility] [C07.820]  Acute renal injury ([address] [N17.9]  Acute on chronic pancreatitis ([facility] [K85.90, K86.1]    Pre-op Diagnosis: Bowel obstruction ([address] [R97.366] nebulizer solution 3 mL, 3 mL, Nebulization, Q4H PRN  [MAR Hold] metoprolol Tartrate (LOPRESSOR) injection 5 mg, 5 mg, Intravenous, Q6H  [MAR Hold] glucose (DEX4) oral liquid 15 g, 15 g, Oral, Q15 Min PRN   Or  [MAR Hold] Glucose-Vitamin C (DEX-4) chewable Rfl: 0, 7/1/2021 at Unknown time  SERTRALINE HCL 25 MG Oral Tab, TAKE ONE TABLET BY MOUTH DAILY, Disp: 90 tablet, Rfl: 0, 7/1/2021 at 1800  fentaNYL (DURAGESIC-100) 100 MCG/HR Transdermal Patch 72 Hr, Place 1 patch onto the skin every third day., Disp: 10 Oral Cap DR Particles, TAKE 2 CAPSULES BY MOUTH 3 TIMES A DAY, Disp: 180 capsule, Rfl: 0  Insulin Lispro, 1 Unit Dial, (HUMALOG KWIKPEN) 100 UNIT/ML Subcutaneous Solution Pen-injector, PER SLIDING SCALE:120-180 inject 4 units,181-240 inject 8 units,241-300 No      Drug use: No     Available pre-op labs reviewed.   Lab Results   Component Value Date    WBC 9.3 07/05/2021    RBC 4.40 07/05/2021    HGB 13.4 07/05/2021    HCT 38.8 (L) 07/05/2021    MCV 88.2 07/05/2021    MCH 30.5 07/05/2021    MCHC 34.5 07/05/202

## 2021-07-05 NOTE — ANESTHESIA PROCEDURE NOTES
[facility]  Performed by: Francois Perera MD  Authorized by: Francois Perera MD       General Information and Staff    Start Time:  7/5/2021 6:02 PM  End Time:  7/5/2021 6:05 PM  Anesthesiologist:  Francois Perera MD  Performed by:   Anesthesiologist  Patient Lo

## 2021-07-06 LAB
ALBUMIN SERPL-MCNC: 2.4 G/DL (ref 3.4–5)
ALBUMIN/GLOB SERPL: 0.8 {RATIO} (ref 1–2)
ALP LIVER SERPL-CCNC: 66 U/L
ALT SERPL-CCNC: 27 U/L
ANION GAP SERPL CALC-SCNC: 7 MMOL/L (ref 0–18)
AST SERPL-CCNC: 30 U/L (ref 15–37)
BASOPHILS # BLD: 0 X10(3) UL (ref 0–0.2)
BASOPHILS NFR BLD: 0 %
BILIRUB SERPL-MCNC: 0.6 MG/DL (ref 0.1–2)
BUN BLD-MCNC: 16 MG/DL (ref 7–18)
BUN/CREAT SERPL: 12.5 (ref 10–20)
CALCIUM BLD-MCNC: 8.1 MG/DL (ref 8.5–10.1)
CHLORIDE SERPL-SCNC: 116 MMOL/L (ref 98–112)
CO2 SERPL-SCNC: 20 MMOL/L (ref 21–32)
CREAT BLD-MCNC: 1.28 MG/DL
DEPRECATED RDW RBC AUTO: 52.1 FL (ref 35.1–46.3)
EOSINOPHIL # BLD: 0 X10(3) UL (ref 0–0.7)
EOSINOPHIL NFR BLD: 0 %
ERYTHROCYTE [DISTWIDTH] IN BLOOD BY AUTOMATED COUNT: 15.9 % (ref 11–15)
GLOBULIN PLAS-MCNC: 2.9 G/DL (ref 2.8–4.4)
GLUCOSE BLD-MCNC: 211 MG/DL (ref 70–99)
GLUCOSE BLD-MCNC: 235 MG/DL (ref 70–99)
GLUCOSE BLD-MCNC: 263 MG/DL (ref 70–99)
GLUCOSE BLD-MCNC: 293 MG/DL (ref 70–99)
HAV IGM SER QL: 1.4 MG/DL (ref 1.6–2.6)
HCT VFR BLD AUTO: 35.6 %
HGB BLD-MCNC: 12.1 G/DL
LYMPHOCYTES NFR BLD: 0.53 X10(3) UL (ref 1–4)
LYMPHOCYTES NFR BLD: 7 %
M PROTEIN MFR SERPL ELPH: 5.3 G/DL (ref 6.4–8.2)
MCH RBC QN AUTO: 30.3 PG (ref 26–34)
MCHC RBC AUTO-ENTMCNC: 34 G/DL (ref 31–37)
MCV RBC AUTO: 89.2 FL
MONOCYTES # BLD: 0.45 X10(3) UL (ref 0.1–1)
MONOCYTES NFR BLD: 6 %
MORPHOLOGY: NORMAL
NEUTROPHILS # BLD AUTO: 6.41 X10 (3) UL (ref 1.5–7.7)
NEUTROPHILS NFR BLD: 50 %
NEUTS BAND NFR BLD: 37 %
NEUTS HYPERSEG # BLD: 6.53 X10(3) UL (ref 1.5–7.7)
OSMOLALITY SERPL CALC.SUM OF ELEC: 303 MOSM/KG (ref 275–295)
PHOSPHATE SERPL-MCNC: 3.8 MG/DL (ref 2.5–4.9)
PLATELET # BLD AUTO: 279 10(3)UL (ref 150–450)
PLATELET MORPHOLOGY: NORMAL
POTASSIUM SERPL-SCNC: 4.4 MMOL/L (ref 3.5–5.1)
RBC # BLD AUTO: 3.99 X10(6)UL
SODIUM SERPL-SCNC: 143 MMOL/L (ref 136–145)
TOTAL CELLS COUNTED: 100
WBC # BLD AUTO: 7.5 X10(3) UL (ref 4–11)

## 2021-07-06 PROCEDURE — 99232 SBSQ HOSP IP/OBS MODERATE 35: CPT | Performed by: HOSPITALIST

## 2021-07-06 RX ORDER — HYDROMORPHONE HYDROCHLORIDE 1 MG/ML
INJECTION, SOLUTION INTRAMUSCULAR; INTRAVENOUS; SUBCUTANEOUS
Status: COMPLETED
Start: 2021-07-06 | End: 2021-07-06

## 2021-07-06 RX ORDER — SODIUM CHLORIDE, SODIUM LACTATE, POTASSIUM CHLORIDE, CALCIUM CHLORIDE 600; 310; 30; 20 MG/100ML; MG/100ML; MG/100ML; MG/100ML
INJECTION, SOLUTION INTRAVENOUS CONTINUOUS
Status: DISCONTINUED | OUTPATIENT
Start: 2021-07-06 | End: 2021-07-07

## 2021-07-06 RX ORDER — HYDROMORPHONE HYDROCHLORIDE 1 MG/ML
1 INJECTION, SOLUTION INTRAMUSCULAR; INTRAVENOUS; SUBCUTANEOUS ONCE
Status: COMPLETED | OUTPATIENT
Start: 2021-07-06 | End: 2021-07-06

## 2021-07-06 NOTE — BRIEF OP NOTE
Pre-Operative Diagnosis: Small bowel obstruction ([address] [K56.609]     Post-Operative Diagnosis: Small bowel obstruction ([address] [G68.856]      Procedure Performed:   EXPLORATORY LAPAROTOMY, LYSIS OF ADHESIONS GREATER THAN 2 HOURS     Surgeon(s) and Role:

## 2021-07-06 NOTE — PROGRESS NOTES
[facility]  Progress Note     Mechele Dec Patient Status:  Inpatient    1950 MRN [id number]   [facility] [place] 3SW-A Attending Farzana Lowery MD   Hosp Day # 4 PCP Umesh Field MD     Chief Complaint: abdominal pain    S: ove 143   K 4.5   < > 4.1 3.9 4.4      < > 114* 112 116*   CO2 25.0   < > 23.0 22.0 20.0*   ALKPHO 109  --  83  --  66   AST 26  --  16  --  30   ALT 28  --  21  --  27   BILT 0.7  --  0.3  --  0.6   TP 7.6  --  6.3*  --  5.3*    < > = values in this int point Mr. Elena Ryan is expected to be discharge to: home      Judith Aguirre MD

## 2021-07-06 NOTE — PROGRESS NOTES
Acute Pain Service    PCA Follow-up Note    Indication: POD#1 s/p EXPLORATORY LAPAROTOMY, LYSIS OF ADHESIONS    Patient rates pain 8/10 at present - reports pain severe during the night - bilateral tap blocks may have worn off.  Pain better after Dilaudi

## 2021-07-06 NOTE — DIETARY NOTE
[facility]    NUTRITION ASSESSMENT    Pt does not meet malnutrition criteria. NUTRITION INTERVENTION:    1. Meal and Snacks - advance as tolerated to least restrictive diet monitor patient po intake. Encourage adequate po of appropriate diet.   2. PRESCRIPTION:   Calories: 9680-2804 calories/day (30-35 calories per kg)  Protein: 75-93 grams protein/day (1.2-1.5 grams protein per kg)  Fluid: ~1 ml/kcal or per MD discretion    NUTRITION DIAGNOSIS/PROBLEM:  Inadequate oral intake related to physiologic

## 2021-07-06 NOTE — PROGRESS NOTES
[facility]  Progress Note     Tanika Smith Patient Status:  Inpatient    1950 MRN [id number]   [facility] 3SW-A Attending Sofiya Vides MD   Hosp Day # 4 PCP Chante Diggs MD     Chief Complaint: abdominal pain    S: con 4.1 3.9 4.4      < > 114* 112 116*   CO2 25.0   < > 23.0 22.0 20.0*   ALKPHO 109  --  83  --  66   AST 26  --  16  --  30   ALT 28  --  21  --  27   BILT 0.7  --  0.3  --  0.6   TP 7.6  --  6.3*  --  5.3*    < > = values in this interval not displaye this point Mr. Sonny Harris is expected to be discharge to: home      Vashti Ganser, MD

## 2021-07-06 NOTE — PROGRESS NOTES
0977: Paged pain service to inform them of patient stating the the Dilaudid PCA is not working and he needs PRN boluses. Awaiting response. 1021: [address] to inform her of patient requesting PRN Dilaudid boluses.  Luann Fails to see patient soon and ga

## 2021-07-06 NOTE — PROGRESS NOTES
Patient has IV fluids infusing, on room air, Dilaudid PCA in use, rocha in place with clear yellow urine, NG to LIS, Fentanyl patch in place, incision is C/D/I, ambulates with assist, NPO except ice chips.  Blood sugars elevated and no sliding scale insulin

## 2021-07-06 NOTE — PLAN OF CARE
Problem: Patient/Family Goals  Goal: Patient/Family Long Term Goal  Description: Patient's Long Term Goal: able to eat and feel better     Interventions:advance diet as tolerated ,monitor signs of bowel obstruction    - See additional Care Plan goals for as ordered and tolerated  - Evaluate effectiveness of GI medications  - Encourage mobilization and activity  - Obtain nutritional consult as needed  - Establish a toileting routine/schedule  - Consider collaborating with pharmacy to review patient's medica

## 2021-07-06 NOTE — PROGRESS NOTES
[facility]  Progress Note    Pan Wheat Patient Status:  Inpatient    1950 MRN [id number]   [facility] 3NW-A Attending Ernestina Mclean MD   Hosp Day # 4 PCP Vignesh Rodriguez MD     Subjective: The patient is resting in bed.  H GFR 30-59 ml/min (HCC)     Abnormality of thoracic aorta     Community acquired pneumonia of right middle lobe of lung     Pneumonia of right lower lobe due to infectious organism     Pericardial effusion     Parapneumonic effusion     Acute on chronic pan

## 2021-07-06 NOTE — PROGRESS NOTES
Pt arrived on unit via bed from ortho floor. Pt drowsy easy to arouse. Iv fluids infusing without difficulty. Kellogg draining clear yellow urine. Midline incision with dressing in place. C/d/i. Ng to lis. cpox in place. Pt npo. Plan of care discussed.  All q

## 2021-07-06 NOTE — ANESTHESIA POSTPROCEDURE EVALUATION
[address]  Patient Status:  Inpatient   Age/Gender 69year old male MRN [id number]   Location [address] [facility] Attending Anh Ferguson MD   [facility] Day # 3 PCP Travis Ty MD       Anesthesia Post-op Note

## 2021-07-06 NOTE — PLAN OF CARE
Received the patient back from PACU ,alert but drowsy and sleep ,dressing to abdomen clean and dry ,abdomen distended no bowel sounds ,NG to LIS ,denies nausea ,vital sign stable ,will continue to monitor   2330  Dr. Yumiko Deleon called he wants the patient in [facility]

## 2021-07-06 NOTE — OPERATIVE REPORT
[facility]  Operative Note    Buzz Donaldo Location: OR   CSN [id number] MRN [id number]    1950 Age 69year old   Admission Date 2021 Operation Date 2021   Attending Physician Justin Ferris MD Operating Physician [address] [place] [address] intestine. After the transition point in the small intestine was completely decompressed. This was a long tedious dissection. There were no enterotomies or serosal injuries created. The small intestine and colon were healthy and viable.   The transverse transition was noted in the lower abdomen. All scar tissue was lysed. Great care was taken not to injure any small intestine during this dissection. No small bowel resection was required. The abdomen was irrigated with warm saline.   Some of the small

## 2021-07-07 ENCOUNTER — APPOINTMENT (OUTPATIENT)
Dept: CT IMAGING | Facility: HOSPITAL | Age: 71
DRG: 336 | End: 2021-07-07
Attending: PHYSICIAN ASSISTANT
Payer: MEDICARE

## 2021-07-07 LAB
ALBUMIN SERPL-MCNC: 2.3 G/DL (ref 3.4–5)
ALBUMIN/GLOB SERPL: 0.7 {RATIO} (ref 1–2)
ALP LIVER SERPL-CCNC: 63 U/L
ALT SERPL-CCNC: 23 U/L
ANION GAP SERPL CALC-SCNC: 3 MMOL/L (ref 0–18)
AST SERPL-CCNC: 18 U/L (ref 15–37)
BASOPHILS # BLD: 0 X10(3) UL (ref 0–0.2)
BASOPHILS NFR BLD: 0 %
BILIRUB SERPL-MCNC: 0.4 MG/DL (ref 0.1–2)
BUN BLD-MCNC: 29 MG/DL (ref 7–18)
BUN/CREAT SERPL: 18 (ref 10–20)
CALCIUM BLD-MCNC: 8.2 MG/DL (ref 8.5–10.1)
CHLORIDE SERPL-SCNC: 115 MMOL/L (ref 98–112)
CO2 SERPL-SCNC: 24 MMOL/L (ref 21–32)
CREAT BLD-MCNC: 1.61 MG/DL
CREAT BLD-MCNC: 1.79 MG/DL
DEPRECATED RDW RBC AUTO: 51 FL (ref 35.1–46.3)
EOSINOPHIL # BLD: 0.26 X10(3) UL (ref 0–0.7)
EOSINOPHIL NFR BLD: 2 %
ERYTHROCYTE [DISTWIDTH] IN BLOOD BY AUTOMATED COUNT: 16.2 % (ref 11–15)
GLOBULIN PLAS-MCNC: 3.5 G/DL (ref 2.8–4.4)
GLUCOSE BLD-MCNC: 162 MG/DL (ref 70–99)
GLUCOSE BLD-MCNC: 173 MG/DL (ref 70–99)
GLUCOSE BLD-MCNC: 176 MG/DL (ref 70–99)
GLUCOSE BLD-MCNC: 189 MG/DL (ref 70–99)
GLUCOSE BLD-MCNC: 221 MG/DL (ref 70–99)
HAV IGM SER QL: 2.5 MG/DL (ref 1.6–2.6)
HCT VFR BLD AUTO: 22.5 %
HGB BLD-MCNC: 7.5 G/DL
HGB BLD-MCNC: 8 G/DL
LYMPHOCYTES NFR BLD: 1.54 X10(3) UL (ref 1–4)
LYMPHOCYTES NFR BLD: 12 %
M PROTEIN MFR SERPL ELPH: 5.8 G/DL (ref 6.4–8.2)
MCH RBC QN AUTO: 30.5 PG (ref 26–34)
MCHC RBC AUTO-ENTMCNC: 35.6 G/DL (ref 31–37)
MCV RBC AUTO: 85.9 FL
MONOCYTES # BLD: 1.15 X10(3) UL (ref 0.1–1)
MONOCYTES NFR BLD: 9 %
MORPHOLOGY: NORMAL
NEUTROPHILS # BLD AUTO: 9.76 X10 (3) UL (ref 1.5–7.7)
NEUTROPHILS NFR BLD: 73 %
NEUTS BAND NFR BLD: 4 %
NEUTS HYPERSEG # BLD: 9.86 X10(3) UL (ref 1.5–7.7)
OSMOLALITY SERPL CALC.SUM OF ELEC: 303 MOSM/KG (ref 275–295)
PLATELET # BLD AUTO: 239 10(3)UL (ref 150–450)
PLATELET MORPHOLOGY: NORMAL
POTASSIUM SERPL-SCNC: 4.4 MMOL/L (ref 3.5–5.1)
RBC # BLD AUTO: 2.62 X10(6)UL
SODIUM SERPL-SCNC: 142 MMOL/L (ref 136–145)
TOTAL CELLS COUNTED: 100
WBC # BLD AUTO: 12.8 X10(3) UL (ref 4–11)

## 2021-07-07 PROCEDURE — 74177 CT ABD & PELVIS W/CONTRAST: CPT | Performed by: PHYSICIAN ASSISTANT

## 2021-07-07 PROCEDURE — 99233 SBSQ HOSP IP/OBS HIGH 50: CPT | Performed by: HOSPITALIST

## 2021-07-07 RX ORDER — SODIUM CHLORIDE 9 MG/ML
INJECTION, SOLUTION INTRAVENOUS CONTINUOUS
Status: DISCONTINUED | OUTPATIENT
Start: 2021-07-07 | End: 2021-07-09

## 2021-07-07 NOTE — PROGRESS NOTES
[facility]  Progress Note     Geovanny Rob Patient Status:  Inpatient    1950 MRN [id number]   [facility] 3SW-A Attending Radha Pelletier MD   Hosp Day # 5 PCP Dusty Lovell MD     Chief Complaint: abdominal pain    S: inc 112 116*   CO2 25.0   < > 23.0 22.0 20.0*   ALKPHO 109  --  83  --  66   AST 26  --  16  --  30   ALT 28  --  21  --  27   BILT 0.7  --  0.3  --  0.6   TP 7.6  --  6.3*  --  5.3*    < > = values in this interval not displayed.        Estimated Creatinine Cl no  Estimated date of discharge: TBD  Discharge is dependent on: progress  At this point Mr. Ashley Salinas is expected to be discharge to: home      Maya Murray MD

## 2021-07-07 NOTE — CM/SW NOTE
Care Progression Note:  Active Acute Medical Issue:   Acute on chronic pancreatitis (HCC)   JONO  SBO  POD #2  Exploratory laparotomy, lysis of adhesions greater than 2 hours    Pt remains NPO with NGT, IVF, PCA      Other Contributing Medical Factors/Dx. Arabella Duncan

## 2021-07-07 NOTE — PAYOR COMM NOTE
PT HAS HAD NG TUBE ENTIRE STAY, INCLUDING SURGERY    CONTINUED STAY REVIEW    Payor: Kleber Butler 673 [facility]  [id number] [facility] Number: [id number]    Admit date: 7/2/21  Admit time:  1:26 AM    REVIEW DOCUMENTATION:    SURGICAL CONSULT 7/2  S (Porcine) 5000 UNIT/ML injection 5,000 Units     Date Action Dose Route User    7/7/2021 1323 Given 5,000 Units Subcutaneous (Right Lower Abdomen) Flo Lindo RN    7/7/2021 0506 Given 5,000 Units Subcutaneous (Left Upper Arm) Kimi Murphy RN injection 5 mg     Date Action Dose Route User    7/7/2021 0923 Given 5 mg Intravenous Vna Decker RN    7/7/2021 7706 Given 5 mg Intravenous Sentruong Butler RN    7/6/2021 2230 Given 5 mg Intravenous Senora Carrie, RN    7/6/2021 1653 Given 5 declines

## 2021-07-07 NOTE — IMAGING NOTE
Called to CT room 3, when I arrived, pt rodriguezutluis m is abd, and stated, \"I'm in so much pain, and have been since Thursday! \" pt placed on O2 AT 2LPM, spoke with Froylan White RN on floor, pt is asking for his inhalers, and is feeling better now post Jolene Garcia

## 2021-07-07 NOTE — PROGRESS NOTES
Acute Pain Service     PCA Follow-up Note     Indication: POD#2 s/p EXPLORATORY LAPAROTOMY, LYSIS OF ADHESIONS     Patient reports worse pain during the night and pain persists this am - states he was unable to sleep.  His abdomen is more distended this

## 2021-07-07 NOTE — PAYOR COMM NOTE
--------------  ADMISSION REVIEW     Payor: Kleber Butler 673 #:  Sarah Mackenzie  Authorization Number: [id number]    Admit date: 7/2/21  Admit time:  1:26 AM       REVIEW DOCUMENTATION:     ED Provider Notes          History   Patient presents with: components within normal limits   LIPASE - Abnormal; Notable for the following components:    Lipase 16 (*)     All other components within normal limits   CBC W/ DIFFERENTIAL - Abnormal; Notable for the following components:    WBC 16.0 (*)     RDW 16.3 ( differential diagnosis  Admission disposition: 7/2/2021 12:38 AM        Disposition and Plan     Clinical Impression:  Acute on chronic pancreatitis ([facility]  (primary encounter diagnosis)  Acute renal injury ([facility] [address]  Small bowel obstruction ([facility] [address]     Lillette Flaming mood and affect.     Diagnostic Data:      Labs:  Recent Labs   Lab 07/01/21  2213   WBC 16.0*   HGB 13.6   MCV 88.3   .0       Recent Labs   Lab 06/28/21  0854 07/01/21  2213   GLU 94 149*   BUN 24* 29*   CREATSERUM 1.31* 1.82*   GFRAA 63 42*   GFRN Subcutaneous (Left Upper Arm) Andre Daniels RN    7/6/2021 2235 Given 5,000 Units Subcutaneous (Left Upper Arm) Andre Daniels RN    7/6/2021 1503 Given 5,000 Units Subcutaneous (Right Lower Abdomen) Katharina Moe RN      HYDROmorphone (Kenzie Cheng Intravenous Shamika Graham RN    7/6/2021 1653 Given 5 mg Intravenous Marbella Anand RN      ondansetron HCl [facility]) injection 4 mg     Date Action Dose Route User    7/6/2021 1652 Given 4 mg Intravenous Marbella Anand RN      Pantoprazole So

## 2021-07-07 NOTE — PROGRESS NOTES
Patient has IV fluids infusing, Ketamine drip infusing, Dilaudid PCA in use, on room air, on tele running ST, rocha discontinued and patient is voiding well, NG to LIS with minimal output, ambulates with assist, incision is C/D/I, Heparin on hold, NPO with

## 2021-07-07 NOTE — PROGRESS NOTES
[facility]  Progress Note    Jose Silver Patient Status:  Inpatient    1950 MRN [id number]   [facility] 3NW-A Attending Odessa Esposito MD   Hosp Day # 5 PCP Marry Bradley MD     Subjective:   The patient states that he is ha ([address]     Acute renal injury ([address]     Small bowel obstruction ([address]    POD 2 exploratory laparotomy with lysis of adhesions with increased abdominal bloating and distention today      Plan:  1. CT scan of the abdomen pelvis today to evaluate increased abd are to return to the operating room for evacuation of hematoma and evaluation of any ongoing bleeding, versus observation. We will recheck hemoglobin at 8 PM tonight. If hemoglobin stable we can continue observation.   If this drops lower, or if at any

## 2021-07-07 NOTE — PLAN OF CARE
Pt is A&Ox4, drowsy. Wears glasses. VSS, afebrile. Maintaining O2 sats WDL on RA. Nebs PRN. Heparin. NPO w/ ice chips, QID accu. [employer] draining WDL, will attempt to take out this AM. Up SBA. C/o stomach and throat pain, dilaudid PCA in place.  Extended PIV nonpharmacologic comfort measures as appropriate  - Advance diet as tolerated, if ordered  - Obtain nutritional consult as needed  - Evaluate fluid balance  Outcome: Progressing  Goal: Maintains or returns to baseline bowel function  Description: INTERVENT

## 2021-07-08 PROBLEM — D62 ACUTE BLOOD LOSS ANEMIA: Status: ACTIVE | Noted: 2021-07-08

## 2021-07-08 PROBLEM — S30.1XXA ABDOMINAL HEMATOMA: Status: ACTIVE | Noted: 2021-07-08

## 2021-07-08 LAB
ALBUMIN SERPL-MCNC: 2.2 G/DL (ref 3.4–5)
ALBUMIN/GLOB SERPL: 0.7 {RATIO} (ref 1–2)
ALP LIVER SERPL-CCNC: 60 U/L
ALT SERPL-CCNC: 19 U/L
ANION GAP SERPL CALC-SCNC: 4 MMOL/L (ref 0–18)
AST SERPL-CCNC: 16 U/L (ref 15–37)
BASOPHILS # BLD AUTO: 0.02 X10(3) UL (ref 0–0.2)
BASOPHILS NFR BLD AUTO: 0.2 %
BILIRUB SERPL-MCNC: 0.5 MG/DL (ref 0.1–2)
BUN BLD-MCNC: 25 MG/DL (ref 7–18)
BUN/CREAT SERPL: 18.9 (ref 10–20)
CALCIUM BLD-MCNC: 8.3 MG/DL (ref 8.5–10.1)
CHLORIDE SERPL-SCNC: 116 MMOL/L (ref 98–112)
CO2 SERPL-SCNC: 23 MMOL/L (ref 21–32)
CREAT BLD-MCNC: 1.32 MG/DL
DEPRECATED RDW RBC AUTO: 50.7 FL (ref 35.1–46.3)
EOSINOPHIL # BLD AUTO: 0.29 X10(3) UL (ref 0–0.7)
EOSINOPHIL NFR BLD AUTO: 2.4 %
ERYTHROCYTE [DISTWIDTH] IN BLOOD BY AUTOMATED COUNT: 16.1 % (ref 11–15)
GLOBULIN PLAS-MCNC: 3.3 G/DL (ref 2.8–4.4)
GLUCOSE BLD-MCNC: 106 MG/DL (ref 70–99)
GLUCOSE BLD-MCNC: 114 MG/DL (ref 70–99)
GLUCOSE BLD-MCNC: 126 MG/DL (ref 70–99)
GLUCOSE BLD-MCNC: 127 MG/DL (ref 70–99)
GLUCOSE BLD-MCNC: 147 MG/DL (ref 70–99)
GLUCOSE BLD-MCNC: 83 MG/DL (ref 70–99)
HAV IGM SER QL: 2 MG/DL (ref 1.6–2.6)
HCT VFR BLD AUTO: 18.9 %
HCT VFR BLD AUTO: 21.2 %
HGB BLD-MCNC: 6.8 G/DL
HGB BLD-MCNC: 7.2 G/DL
HGB BLD-MCNC: 7.6 G/DL
IMM GRANULOCYTES # BLD AUTO: 0.05 X10(3) UL (ref 0–1)
IMM GRANULOCYTES NFR BLD: 0.4 %
LYMPHOCYTES # BLD AUTO: 1.46 X10(3) UL (ref 1–4)
LYMPHOCYTES NFR BLD AUTO: 12 %
M PROTEIN MFR SERPL ELPH: 5.5 G/DL (ref 6.4–8.2)
MCH RBC QN AUTO: 31.5 PG (ref 26–34)
MCHC RBC AUTO-ENTMCNC: 36 G/DL (ref 31–37)
MCV RBC AUTO: 87.5 FL
MONOCYTES # BLD AUTO: 1.18 X10(3) UL (ref 0.1–1)
MONOCYTES NFR BLD AUTO: 9.7 %
NEUTROPHILS # BLD AUTO: 9.21 X10 (3) UL (ref 1.5–7.7)
NEUTROPHILS # BLD AUTO: 9.21 X10(3) UL (ref 1.5–7.7)
NEUTROPHILS NFR BLD AUTO: 75.3 %
OSMOLALITY SERPL CALC.SUM OF ELEC: 301 MOSM/KG (ref 275–295)
PHOSPHATE SERPL-MCNC: 2 MG/DL (ref 2.5–4.9)
PLATELET # BLD AUTO: 225 10(3)UL (ref 150–450)
POTASSIUM SERPL-SCNC: 4.2 MMOL/L (ref 3.5–5.1)
RBC # BLD AUTO: 2.16 X10(6)UL
SODIUM SERPL-SCNC: 143 MMOL/L (ref 136–145)
WBC # BLD AUTO: 12.2 X10(3) UL (ref 4–11)

## 2021-07-08 PROCEDURE — 30233N1 TRANSFUSION OF NONAUTOLOGOUS RED BLOOD CELLS INTO PERIPHERAL VEIN, PERCUTANEOUS APPROACH: ICD-10-PCS | Performed by: HOSPITALIST

## 2021-07-08 PROCEDURE — 99232 SBSQ HOSP IP/OBS MODERATE 35: CPT | Performed by: HOSPITALIST

## 2021-07-08 RX ORDER — SODIUM CHLORIDE 9 MG/ML
INJECTION, SOLUTION INTRAVENOUS ONCE
Status: COMPLETED | OUTPATIENT
Start: 2021-07-08 | End: 2021-07-12

## 2021-07-08 RX ORDER — SODIUM CHLORIDE 9 MG/ML
INJECTION, SOLUTION INTRAVENOUS ONCE
Status: COMPLETED | OUTPATIENT
Start: 2021-07-08 | End: 2021-07-08

## 2021-07-08 NOTE — PLAN OF CARE
Patient A/O X 4, VSS, IVF infusing per orders. Patient rating pain 6/10 today. Receiving dilaudid PCA with clinician boluses, ketamine drip, and IV tylenol. Fentanyl patch to left upper arm. Denies passing gas. NG to LIS draining brown drainage.  Midline ab ordered antiemetic medications  - Provide nonpharmacologic comfort measures as appropriate  - Advance diet as tolerated, if ordered  - Obtain nutritional consult as needed  - Evaluate fluid balance  Outcome: Progressing  Goal: Maintains or returns to basel Progressing

## 2021-07-08 NOTE — PROGRESS NOTES
Acute Pain Service     PCA Follow-up Note     Indication: POD#3 s/p EXPLORATORY LAPAROTOMY, LYSIS OF ADHESIONS        CT results and Surgery evaluation noted - hgb 6.8 prior to transfusion - post transfusion hgb 7.2.    Patient appears more comfortable and

## 2021-07-08 NOTE — DIETARY NOTE
[facility]    NUTRITION ASSESSMENT    Pt does not meet malnutrition criteria. NUTRITION INTERVENTION:    1. Meal and Snacks - advance as tolerated to least restrictive diet monitor patient po intake. Encourage adequate po of appropriate diet.   2. Appetite: Poor  Intake: 0%  Intake Meeting Needs: No  Food Allergies: No  Cultural/Ethnic/Protestant Preferences Addresses: Yes    GI SYSTEM REVIEW: abdominal pain    NUTRITION RELATED PHYSICAL FINDINGS:     1. Body Fat/Muscle Mass: not assessed at this t

## 2021-07-08 NOTE — PLAN OF CARE
3255: HGB returned 6.8; MD Surgeon paged and aware. 1 U PRBC ordered. Consent signed and on paper chart. 0250: PT VSS, Aox4. Pt ketamine drip infusing per MD orders, RN dual checked. PCA pump continued, per MD orders, RN dual checked.  Pt continues to Problem: PAIN - ADULT  Goal: Verbalizes/displays adequate comfort level or patient's stated pain goal  Description: INTERVENTIONS:  - Encourage pt to monitor pain and request assistance  - Assess pain using appropriate pain scale  - Administer analgesics and lab values  - Obtain nutritional consult as needed  - Optimize oral hygiene and moisture  - Encourage food from home; allow for food preferences  - Enhance eating environment  Outcome: Progressing  Goal: Achieves appropriate nutritional intake (bariatr

## 2021-07-08 NOTE — PAYOR COMM NOTE
--------------  CONTINUED STAY REVIEW    Payor: Kleber Hernandez #:  Ivette Bev  Authorization Number: [id number]    Admit date: 7/2/21  Admit time:  1:26 AM      REVIEW DOCUMENTATION:    SURGICAL PROGRESS NOTE 7/8  POD 3 exploratory laparotomy w patch Transdermal (Left Upper Arm) Stromberg, Ashly, RN      Fluticasone Furoate-Vilanterol (BREO ELLIPTA) 200-25 MCG/INH inhaler 1 puff     Date Action Dose Route User    7/8/2021 0925 Given 1 puff Inhalation Silverio Low RN    7/7/2021 1531 Given 1 puff Route User    7/7/2021 1136 New Bag 0.15 mg/kg/hr × 61.7 kg (Dosing Weight) Intravenous Remona Schlatter, RN      lactated ringers infusion     Date Action Dose Route User    7/7/2021 1125 New Bag (none) Intravenous Remona Schlatter, RN      metoprolol

## 2021-07-08 NOTE — PROGRESS NOTES
[facility]  Progress Note     Debra Ruben Patient Status:  Inpatient    1950 MRN [id number]   [facility] 3NW-A Attending Sol Jung [facility] Day # 6 PCP Fer Fry MD     Chief Complaint: Abdominal distent 07/07/21  0630 07/07/21  1257 07/08/21  0535   *  --   --  162* 114*   BUN 16  --   --  29* 25*   CREATSERUM 1.28   < > 1.79* 1.61* 1.32*   GFRAA 65   < > 43* 49* 62   GFRNAA 56*   < > 37* 42* 54*   CA 8.1*  --   --  8.2* 8.3*   ALB 2.4*  --   --  2 PCA  6. Repeat CT today due to increased pain and distension. Likely hematoma. Pt getting a transfusion. Surgery to decide with post transfusion hgb whether they need t go back to the OR. 2. Anemia- Secondary to acute blood loss from hematoma. . Pt getting

## 2021-07-08 NOTE — PROGRESS NOTES
[facility]  Progress Note    Ephriam Loud Patient Status:  Inpatient    1950 MRN [id number]   [facility] 3NW-A Attending Judith Brown MD   Hosp Day # 6 PCP Eliazar Patrick MD     Subjective: The patient is resting in bed.  Olivia Ness pulmonary disease) ([facility] [address]     Vitamin D deficiency     Chronic narcotic dependence ([facility]     Steatorrhea     Prostate cancer ([facility]     Bilateral kidney stones     CKD (chronic kidney disease) stage 3, GFR 30-59 ml/min (HCC)     Abnormality of thoracic aorta throughout. Hemoglobin this morning was 6.8. He was transfused 1 unit.   Recheck was 7.2.    -Will check again this evening.  -Transfuse for lower than 7  -Continue NG tube to low intermittent suction  -Await return of bowel function    [facility],

## 2021-07-09 LAB
BASOPHILS # BLD AUTO: 0.02 X10(3) UL (ref 0–0.2)
BASOPHILS NFR BLD AUTO: 0.2 %
BLOOD TYPE BARCODE: 5100
DEPRECATED RDW RBC AUTO: 51.7 FL (ref 35.1–46.3)
EOSINOPHIL # BLD AUTO: 0.49 X10(3) UL (ref 0–0.7)
EOSINOPHIL NFR BLD AUTO: 3.8 %
ERYTHROCYTE [DISTWIDTH] IN BLOOD BY AUTOMATED COUNT: 16.6 % (ref 11–15)
GLUCOSE BLD-MCNC: 73 MG/DL (ref 70–99)
GLUCOSE BLD-MCNC: 74 MG/DL (ref 70–99)
GLUCOSE BLD-MCNC: 77 MG/DL (ref 70–99)
HCT VFR BLD AUTO: 20.3 %
HGB BLD-MCNC: 7.2 G/DL
IMM GRANULOCYTES # BLD AUTO: 0.07 X10(3) UL (ref 0–1)
IMM GRANULOCYTES NFR BLD: 0.5 %
LYMPHOCYTES # BLD AUTO: 1.8 X10(3) UL (ref 1–4)
LYMPHOCYTES NFR BLD AUTO: 13.8 %
MCH RBC QN AUTO: 30.5 PG (ref 26–34)
MCHC RBC AUTO-ENTMCNC: 35.5 G/DL (ref 31–37)
MCV RBC AUTO: 86 FL
MONOCYTES # BLD AUTO: 1.01 X10(3) UL (ref 0.1–1)
MONOCYTES NFR BLD AUTO: 7.7 %
NEUTROPHILS # BLD AUTO: 9.65 X10 (3) UL (ref 1.5–7.7)
NEUTROPHILS # BLD AUTO: 9.65 X10(3) UL (ref 1.5–7.7)
NEUTROPHILS NFR BLD AUTO: 74 %
PHOSPHATE SERPL-MCNC: 2.6 MG/DL (ref 2.5–4.9)
PLATELET # BLD AUTO: 264 10(3)UL (ref 150–450)
RBC # BLD AUTO: 2.36 X10(6)UL
WBC # BLD AUTO: 13 X10(3) UL (ref 4–11)

## 2021-07-09 PROCEDURE — 99232 SBSQ HOSP IP/OBS MODERATE 35: CPT | Performed by: HOSPITALIST

## 2021-07-09 RX ORDER — DEXTROSE AND SODIUM CHLORIDE 5; .45 G/100ML; G/100ML
INJECTION, SOLUTION INTRAVENOUS CONTINUOUS
Status: DISCONTINUED | OUTPATIENT
Start: 2021-07-09 | End: 2021-07-19

## 2021-07-09 NOTE — PROGRESS NOTES
[facility]  Progress Note    Geovanny Liane Patient Status:  Inpatient    1950 MRN [id number]   [facility] 3NW-A Attending Lula Mendez MD   Hosp Day # 7 PCP Dusty Lovell MD     Subjective:   The patient is sitting at bedsid Abnormality of thoracic aorta     Community acquired pneumonia of right middle lobe of lung     Pneumonia of right lower lobe due to infectious organism     Pericardial effusion     Parapneumonic effusion     Acute on chronic pancreatitis ([address]     Acute r

## 2021-07-09 NOTE — PLAN OF CARE
Problem: Patient/Family Goals  Goal: Patient/Family Long Term Goal  Description: Patient's Long Term Goal: able to eat and feel better     Interventions:advance diet as tolerated ,monitor signs of bowel obstruction    - See additional Care Plan goals for as ordered and tolerated  - Evaluate effectiveness of GI medications  - Encourage mobilization and activity  - Obtain nutritional consult as needed  - Establish a toileting routine/schedule  - Consider collaborating with pharmacy to review patient's medica to take ice only in moderation, no nausea, no gas. NG to LIS intact, draining brown drainage. Pt. Voiding per urinal. Midline incision covered with gauze and tegaderm clean dry and intact. Will monitor.

## 2021-07-09 NOTE — PROGRESS NOTES
Vss. Pt resting in bed this am. Pt rates his pain his pain at a 6 on a 1-10 scale but states he does feel it is starting to improve. Pt using dilaudid pca along with scheduled iv tylenol and ketamine pca.  Pt using iv bolus of dilaudid as well for breakthro

## 2021-07-09 NOTE — PROGRESS NOTES
[facility]  Progress Note     Geraldine Vizcarraangella Patient Status:  Inpatient    1950 MRN [id number]   [facility] 3NW-A Attending Kishor [facility] Day # 7 PCP Pamela Wilcox MD     Chief Complaint: Abdominal distent --   --   --   --   --   --   --   --   --   --     < > = values in this interval not displayed.        Recent Labs   Lab 07/06/21  0515 07/06/21  0515 07/07/21 0630 07/07/21  1257 07/08/21  0535   *  --   --  162* 114*   BUN 16  --   --  29* 25* Intravenous Q6H       ASSESSMENT / PLAN:     1. Abdominal pain due to Bowel obstruction  1. S/p OR for IVETT on 7/5  2. NG   3. NPO  4. IVF  5. Pain control with PCA  6. Repeat CT today due to increased pain and distension. Likely hematoma.   2. Anemia- [place]

## 2021-07-09 NOTE — DIETARY NOTE
[facility]    NUTRITION ASSESSMENT    Pt does not meet malnutrition criteria. NUTRITION INTERVENTION:    1. Meal and Snacks - advance as tolerated to least restrictive diet monitor patient po intake. Encourage adequate po of appropriate diet.   2. %    07/07/21 1656  0 %    07/08/21 2323  0 %    07/09/21 0937  0 %    Percent Meals Eaten (%): NPO at 07/09/21 0937                FOOD/NUTRITION RELATED HISTORY:   Appetite: Poor  Intake: 0%  Intake Meeting Needs: No  Food Allergies: No  Cultural/Ethnic/

## 2021-07-10 LAB
DEPRECATED RDW RBC AUTO: 52.1 FL (ref 35.1–46.3)
ERYTHROCYTE [DISTWIDTH] IN BLOOD BY AUTOMATED COUNT: 16.3 % (ref 11–15)
GLUCOSE BLD-MCNC: 116 MG/DL (ref 70–99)
GLUCOSE BLD-MCNC: 131 MG/DL (ref 70–99)
GLUCOSE BLD-MCNC: 146 MG/DL (ref 70–99)
GLUCOSE BLD-MCNC: 84 MG/DL (ref 70–99)
HCT VFR BLD AUTO: 22 %
HGB BLD-MCNC: 7.6 G/DL
MCH RBC QN AUTO: 30.2 PG (ref 26–34)
MCHC RBC AUTO-ENTMCNC: 34.5 G/DL (ref 31–37)
MCV RBC AUTO: 87.3 FL
PLATELET # BLD AUTO: 334 10(3)UL (ref 150–450)
RBC # BLD AUTO: 2.52 X10(6)UL
WBC # BLD AUTO: 10.9 X10(3) UL (ref 4–11)

## 2021-07-10 PROCEDURE — 99232 SBSQ HOSP IP/OBS MODERATE 35: CPT | Performed by: HOSPITALIST

## 2021-07-10 NOTE — PROGRESS NOTES
[facility]  Progress Note     Tasha Crowe Patient Status:  Inpatient    1950 MRN [id number]   [facility] 3NW-A Attending Maria Guadalupe [facility] Day # 8 PCP Joy Perez MD     Chief Complaint: Abdominal distent 225.0  --   --  264.0 334.0   BAND  --   --  37  --  4  --   --   --   --   --   --    INR 1.04  --   --   --   --   --   --   --   --   --   --     < > = values in this interval not displayed.        Recent Labs   Lab 07/06/21 0515 07/06/21 0515 07/07/21 Intravenous Q6H   • Umeclidinium Bromide  1 puff Inhalation Daily   • acetaminophen  1,000 mg Intravenous Q6H       ASSESSMENT / PLAN:     1. Abdominal pain due to Bowel obstruction  1. S/p OR for IVETT on 7/5  2. NG   3. NPO  4. IVF  5.  Pain control with PC

## 2021-07-10 NOTE — PLAN OF CARE
Patient is alert and oriented x3  Up with encouragement in hallway. Has only required a bolus dose of PCA when ambulating in hallway  Voiding  NPO + ice + water. No N/V. Passed clamp trial. Will start CLD with NGT in place. Midline incision with staples. as ordered and tolerated  - Nasogastric tube to low intermittent suction as ordered  - Evaluate effectiveness of ordered antiemetic medications  - Provide nonpharmacologic comfort measures as appropriate  - Advance diet as tolerated, if ordered  - Obtain n intake and initiate nutrition consult as needed  - Instruct patient on self management of diabetes  Outcome: Progressing

## 2021-07-10 NOTE — PROGRESS NOTES
Acute Pain Service     PCA Follow-up Note     Indication: POD#3 s/p EXPLORATORY LAPAROTOMY, LYSIS OF ADHESIONS        Patient reports pain improved - still severe with activity but tolerating ambulation.  Remains NPO with NGT      Side Effects: None

## 2021-07-10 NOTE — PROGRESS NOTES
[facility]  Progress Note    Tasha Crowe Patient Status:  Inpatient    1950 MRN [id number]   [facility] 3NW-A Attending Norm Walker MD   Hosp Day # 8 PCP Joy Perez MD     Subjective: The patient is resting in bed.  Fawn Corral Small bowel obstruction (HCC)     Abdominal hematoma     Acute blood loss anemia    POD 5 exploratory laparotomy with lysis of adhesions with increased abdominal bloating and distention today  CT 7/7- free fluid in all 4 quadrants with hyperdense material

## 2021-07-10 NOTE — PLAN OF CARE
Problem: Patient/Family Goals  Goal: Patient/Family Long Term Goal  Description: Patient's Long Term Goal: able to eat and feel better     Interventions:advance diet as tolerated ,monitor signs of bowel obstruction    - See additional Care Plan goals for as ordered and tolerated  - Evaluate effectiveness of GI medications  - Encourage mobilization and activity  - Obtain nutritional consult as needed  - Establish a toileting routine/schedule  - Consider collaborating with pharmacy to review patient's medica Bruno, per MD clamp NG tube, check residual in one hour, [place]. [id number] Nisreen Alicia, may have ice and water, and to keep NG tube in for now. Residual was about 10 mL, patient given water, tolerating so far. Pt. Voiding without difficulty.  Instructed on fall precautions

## 2021-07-10 NOTE — PROGRESS NOTES
's on cardiac monitor (at RN station). RN checked on pt. At this time, Pt. Getting out of bed, trying to go to bathroom, \"felt like I had to pass gas. \" Pt. C/o SOB on exertion, \"my COPD,\" asking for rescue inhaler. CPOX on room air, 95%.  Resp mayra

## 2021-07-10 NOTE — PROGRESS NOTES
Acute Pain Service     PCA Follow-up Note     Indication: POD#5 s/p EXPLORATORY LAPAROTOMY, LYSIS OF ADHESIONS        Patient seen sitting up in bed this afternoon, and he reports pain well controlled overnight - increases with activity but tolerating ambu

## 2021-07-11 LAB
ANION GAP SERPL CALC-SCNC: 5 MMOL/L (ref 0–18)
BUN BLD-MCNC: 10 MG/DL (ref 7–18)
BUN/CREAT SERPL: 10.3 (ref 10–20)
CALCIUM BLD-MCNC: 8.4 MG/DL (ref 8.5–10.1)
CHLORIDE SERPL-SCNC: 110 MMOL/L (ref 98–112)
CO2 SERPL-SCNC: 25 MMOL/L (ref 21–32)
CREAT BLD-MCNC: 0.97 MG/DL
DEPRECATED RDW RBC AUTO: 49.8 FL (ref 35.1–46.3)
ERYTHROCYTE [DISTWIDTH] IN BLOOD BY AUTOMATED COUNT: 15.9 % (ref 11–15)
GLUCOSE BLD-MCNC: 149 MG/DL (ref 70–99)
GLUCOSE BLD-MCNC: 156 MG/DL (ref 70–99)
GLUCOSE BLD-MCNC: 161 MG/DL (ref 70–99)
GLUCOSE BLD-MCNC: 168 MG/DL (ref 70–99)
GLUCOSE BLD-MCNC: 174 MG/DL (ref 70–99)
GLUCOSE BLD-MCNC: 189 MG/DL (ref 70–99)
HCT VFR BLD AUTO: 24.6 %
HGB BLD-MCNC: 8.4 G/DL
MCH RBC QN AUTO: 29.5 PG (ref 26–34)
MCHC RBC AUTO-ENTMCNC: 34.1 G/DL (ref 31–37)
MCV RBC AUTO: 86.3 FL
OSMOLALITY SERPL CALC.SUM OF ELEC: 292 MOSM/KG (ref 275–295)
PLATELET # BLD AUTO: 384 10(3)UL (ref 150–450)
POTASSIUM SERPL-SCNC: 3.6 MMOL/L (ref 3.5–5.1)
RBC # BLD AUTO: 2.85 X10(6)UL
SODIUM SERPL-SCNC: 140 MMOL/L (ref 136–145)
WBC # BLD AUTO: 11.6 X10(3) UL (ref 4–11)

## 2021-07-11 PROCEDURE — 99232 SBSQ HOSP IP/OBS MODERATE 35: CPT | Performed by: HOSPITALIST

## 2021-07-11 NOTE — PROGRESS NOTES
Pt alert and orient x4. Pt has easy non labored breathing on ra. cpox and telemetry in place. Pt voids adequate amount. Pt refusing to get up to bathroom to urinate. Using urinal bedside.  Pt walked about 20 feet outside of room and back, refusing to go fur

## 2021-07-11 NOTE — PROGRESS NOTES
[facility]  Progress Note    Martina Zuniga Patient Status:  Inpatient    1950 MRN [id number]   [facility] 3NW-A Attending Everardo Guzmán MD   Hosp Day # 9 PCP Nikole Santamaria MD     Subjective: The patient is resting in bed. Acute on chronic pancreatitis (HCC)     Acute renal injury ([facility] [address]     Small bowel obstruction (HCC)     Abdominal hematoma     Acute blood loss anemia    POD 6 exploratory laparotomy with lysis of adhesions with increased abdominal bloating and distention t

## 2021-07-11 NOTE — PHYSICAL THERAPY NOTE
PHYSICAL THERAPY EVALUATION - INPATIENT     Room Number: 032/305-C  Evaluation Date: 7/11/2021  Type of Evaluation: Initial  Physician Order: PT Eval and Treat    Presenting Problem: abdominal distention with pain  Reason for Therapy: Mobility Dysfun lumbar region    • Type II or unspecified type diabetes mellitus without mention of complication, uncontrolled    • Unspecified vitamin D deficiency    • Visual impairment     GLASSES   • Weight loss    • Weight loss 5/23/2019       Past Surgical History BALANCE  Static Sitting: Good  Dynamic Sitting: Fair +  Static Standing: Fair  Dynamic Standin Adonay Forde 2 '6-Clicks' INPATIENT SHORT FORM - BASIC MOBILITY  How much difficulty does the patient currently have. ..  -   Turning over in bed (includi continuing with ambulating with staff and to increase distance each time. Pt verbalized understanding and agreement. Discussed roles of PT, goals of care and dc planning and both pt and spouse verbalized understanding and agreement.  RN made aware of gage GOALS    Goal #1 Patient is able to demonstrate supine - sit EOB @ level: modified independent     Goal #2 Patient is able to demonstrate transfers Sit to/from Stand at assistance level: modified independent     Goal #3 Patient is able to ambulate 150 feet

## 2021-07-11 NOTE — PROGRESS NOTES
[facility]  Progress Note     Elfrieda Cost Patient Status:  Inpatient    1950 MRN [id number]   [facility] 3NW-A Attending [facility] Day # 9 PCP Dolly Manzanares MD     Chief Complaint: Abdominal distent --    INR 1.04  --   --   --   --   --   --   --   --   --   --     < > = values in this interval not displayed.        Recent Labs   Lab 07/06/21  0515 07/06/21  0515 07/07/21  0630 07/07/21  1257 07/08/21  0535   *  --   --  162* 114*   BUN 16  -- 1,000 mg Intravenous Q6H       ASSESSMENT / PLAN:     1. Abdominal pain due to Bowel obstruction  1. S/p OR for IVETT on 7/5  2. NG   3. NPO  4. IVF  5. Pain control with PCA  6. Repeat CT today showed increased hematoma on 7/7  2.  Anemia- Secondary to acute

## 2021-07-11 NOTE — PROGRESS NOTES
Acute Pain Service    PCA Follow-up Note    Indication: Post-Surgical.    POD#6 s/p EXPLORATORY LAPAROTOMY, LYSIS OF ADHESIONS    Bedside evaluation, patient sitting up in bed this afternoon, and he reports pain well controlled - worse with activity but

## 2021-07-12 LAB
ALBUMIN SERPL-MCNC: 2.7 G/DL (ref 3.4–5)
ALBUMIN/GLOB SERPL: 0.6 {RATIO} (ref 1–2)
ALP LIVER SERPL-CCNC: 70 U/L
ALT SERPL-CCNC: 20 U/L
ANION GAP SERPL CALC-SCNC: 7 MMOL/L (ref 0–18)
AST SERPL-CCNC: 22 U/L (ref 15–37)
BILIRUB SERPL-MCNC: 0.7 MG/DL (ref 0.1–2)
BUN BLD-MCNC: 9 MG/DL (ref 7–18)
BUN/CREAT SERPL: 9 (ref 10–20)
CALCIUM BLD-MCNC: 9 MG/DL (ref 8.5–10.1)
CHLORIDE SERPL-SCNC: 109 MMOL/L (ref 98–112)
CO2 SERPL-SCNC: 22 MMOL/L (ref 21–32)
CREAT BLD-MCNC: 1 MG/DL
GLOBULIN PLAS-MCNC: 4.2 G/DL (ref 2.8–4.4)
GLUCOSE BLD-MCNC: 147 MG/DL (ref 70–99)
GLUCOSE BLD-MCNC: 155 MG/DL (ref 70–99)
GLUCOSE BLD-MCNC: 164 MG/DL (ref 70–99)
GLUCOSE BLD-MCNC: 169 MG/DL (ref 70–99)
GLUCOSE BLD-MCNC: 194 MG/DL (ref 70–99)
HAV IGM SER QL: 1.7 MG/DL (ref 1.6–2.6)
M PROTEIN MFR SERPL ELPH: 6.9 G/DL (ref 6.4–8.2)
OSMOLALITY SERPL CALC.SUM OF ELEC: 287 MOSM/KG (ref 275–295)
PHOSPHATE SERPL-MCNC: 2.2 MG/DL (ref 2.5–4.9)
POTASSIUM SERPL-SCNC: 3.6 MMOL/L (ref 3.5–5.1)
SODIUM SERPL-SCNC: 138 MMOL/L (ref 136–145)
TRIGL SERPL-MCNC: 180 MG/DL (ref 30–149)

## 2021-07-12 PROCEDURE — 02HV33Z INSERTION OF INFUSION DEVICE INTO SUPERIOR VENA CAVA, PERCUTANEOUS APPROACH: ICD-10-PCS | Performed by: HOSPITALIST

## 2021-07-12 PROCEDURE — 3E0436Z INTRODUCTION OF NUTRITIONAL SUBSTANCE INTO CENTRAL VEIN, PERCUTANEOUS APPROACH: ICD-10-PCS | Performed by: HOSPITALIST

## 2021-07-12 PROCEDURE — 99232 SBSQ HOSP IP/OBS MODERATE 35: CPT | Performed by: HOSPITALIST

## 2021-07-12 RX ORDER — BISACODYL 10 MG
10 SUPPOSITORY, RECTAL RECTAL DAILY
Status: DISCONTINUED | OUTPATIENT
Start: 2021-07-12 | End: 2021-08-16

## 2021-07-12 RX ORDER — METOPROLOL TARTRATE 5 MG/5ML
5 INJECTION INTRAVENOUS EVERY 4 HOURS PRN
Status: DISCONTINUED | OUTPATIENT
Start: 2021-07-12 | End: 2021-08-16

## 2021-07-12 RX ORDER — HYDRALAZINE HYDROCHLORIDE 20 MG/ML
10 INJECTION INTRAMUSCULAR; INTRAVENOUS EVERY 6 HOURS PRN
Status: DISCONTINUED | OUTPATIENT
Start: 2021-07-12 | End: 2021-08-16

## 2021-07-12 RX ORDER — MAGNESIUM SULFATE HEPTAHYDRATE 40 MG/ML
2 INJECTION, SOLUTION INTRAVENOUS ONCE
Status: COMPLETED | OUTPATIENT
Start: 2021-07-12 | End: 2021-07-12

## 2021-07-12 RX ORDER — LIDOCAINE HYDROCHLORIDE 10 MG/ML
5 INJECTION, SOLUTION EPIDURAL; INFILTRATION; INTRACAUDAL; PERINEURAL ONCE
Status: COMPLETED | OUTPATIENT
Start: 2021-07-12 | End: 2021-07-12

## 2021-07-12 RX ORDER — LORAZEPAM 2 MG/ML
0.25 INJECTION INTRAMUSCULAR EVERY 4 HOURS PRN
Status: DISCONTINUED | OUTPATIENT
Start: 2021-07-12 | End: 2021-08-05

## 2021-07-12 NOTE — PROGRESS NOTES
[facility]  Progress Note    Thereasa Sicard Patient Status:  Inpatient    1950 MRN [id number]   [facility] 3NW-A Attending Nell Guzman [facility] Day # 10 PCP Mel Jimenez MD     Subjective:  Thereasa Sicard is a(n)

## 2021-07-12 NOTE — PLAN OF CARE
Problem: PAIN - ADULT  Goal: Verbalizes/displays adequate comfort level or patient's stated pain goal  Description: INTERVENTIONS:  - Encourage pt to monitor pain and request assistance  - Assess pain using appropriate pain scale  - Administer analgesics and lab values  - Obtain nutritional consult as needed  - Optimize oral hygiene and moisture  - Encourage food from home; allow for food preferences  - Enhance eating environment  Outcome: Progressing  Goal: Achieves appropriate nutritional intake (bariatr

## 2021-07-12 NOTE — PROGRESS NOTES
[facility]  Progress Note     Conny Seip Patient Status:  Inpatient    1950 MRN [id number]   [facility] 3NW-A Attending Marilia Castellanos [address] Day # 10 PCP Jacquelin Oliveros MD     Chief Complaint: Abdominal disten --   --     < > = values in this interval not displayed.        Recent Labs   Lab 07/06/21  0515 07/07/21  0630 07/07/21  1257 07/08/21  0535 07/11/21  1134   *  --  162* 114* 149*   BUN 16  --  29* 25* 10   CREATSERUM 1.28   < > 1.61* 1.32* 0.97   G to Bowel obstruction  1. S/p OR for IVETT on 7/5  2. NG  To LIS  3. NPO  4. IVF  5. Pain control with PCA  6. Repeat CT showed increased hematoma on 7/7  2. Anemia- Secondary to acute blood loss from hematoma. .Pt getting 1 unit PRBC's 7/8/2020.  Hgb up to 8.4

## 2021-07-12 NOTE — PLAN OF CARE
Patient is alert and oriented x3  Up with SBA in hallway x1 and up to chair this AM. Ambulation strongly encouraged   IS encouraged  NGT put back to LIWS overnight due to distension and patient c/o bloating ~900ml out.    Voiding  Incision is CDI +CHG wipe ordered antiemetic medications  - Provide nonpharmacologic comfort measures as appropriate  - Advance diet as tolerated, if ordered  - Obtain nutritional consult as needed  - Evaluate fluid balance  Outcome: Progressing  Goal: Maintains or returns to basel

## 2021-07-12 NOTE — PROGRESS NOTES
Pt. failed NG tube clamping, pt. NG back to LIS, 900mL brown output. Instructed NPO. Pt. Getting Metoprolol IV Q6 hours, BP's still elevated. 169/76 this am, Dr. Lorena Cohen text paged.

## 2021-07-12 NOTE — PROGRESS NOTES
Pt. Called, feels more distended, \"I don't need nausea medicine, I need a bolus (from Dilaudid PCA). \" Abdomen not as soft. NG hooked back up to LIS @ 0100, 200 mL green output initially. Will monitor.

## 2021-07-12 NOTE — PROGRESS NOTES
[facility]  Progress Note    [facility] Patient Status:  Inpatient    1950 MRN [id number]   [facility] 3NW-A [facility] Day [facility] 10 PCP Jeremy Campos MD     Subjective:   The patient is sitting u effusion     Acute on chronic pancreatitis (HCC)     Acute renal injury ([facility]     Small bowel obstruction (HCC)     Abdominal hematoma     Acute blood loss anemia      POD 7 exploratory laparotomy with lysis of adhesions with increased abdominal bloating a

## 2021-07-12 NOTE — CM/SW NOTE
Care Progression Note:  Active Acute Medical Issue:   Acute on chronic pancreatitis [facility])   POD 7 exploratory laparotomy with lysis of adhesions with increased abdominal bloating and distention today    Other Contributing Medical Factors/Dx. Meng Aburto  anemia, COPD,

## 2021-07-12 NOTE — DIETARY NOTE
[facility]  NUTRITION ASSESSMENT    Pt does not meet malnutrition criteria. NUTRITION INTERVENTION:  1. Meal and Snacks - Advance as tolerated to least restrictive diet monitor patient po intake. Encourage adequate po of appropriate diet.   2. Medic (140 lb)  03/24/21 : 60 kg (132 lb 3.2 oz)  03/19/21 : 64.5 kg (142 lb 3.2 oz)  12/29/20 : 66.1 kg (145 lb 12.8 oz)  12/17/20 : 66.4 kg (146 lb 6.4 oz)  12/10/20 : 66 kg (145 lb 9.6 oz)  12/03/20 : 65.8 kg (145 lb)    NUTRITION:  Diet: Orders Placed This E

## 2021-07-12 NOTE — PLAN OF CARE
Problem: Patient/Family Goals  Goal: Patient/Family Long Term Goal  Description: Patient's Long Term Goal: able to eat and feel better     Interventions:advance diet as tolerated ,monitor signs of bowel obstruction    - See additional Care Plan goals for as ordered and tolerated  - Evaluate effectiveness of GI medications  - Encourage mobilization and activity  - Obtain nutritional consult as needed  - Establish a toileting routine/schedule  - Consider collaborating with pharmacy to review patient's medica rounded and DISTENDED, soft, no nausea, no gas. \"I don't want to be hooked up (suction). \" NG tube remains in, clamped since this am. Pt. Voiding without difficulty. Midline incision staples clean dry and intact.  Instructed on fall precautions and up with

## 2021-07-13 LAB
ALBUMIN SERPL-MCNC: 2.3 G/DL (ref 3.4–5)
ALBUMIN/GLOB SERPL: 0.6 {RATIO} (ref 1–2)
ALP LIVER SERPL-CCNC: 61 U/L
ALT SERPL-CCNC: 20 U/L
ANION GAP SERPL CALC-SCNC: 5 MMOL/L (ref 0–18)
AST SERPL-CCNC: 19 U/L (ref 15–37)
BILIRUB SERPL-MCNC: 0.5 MG/DL (ref 0.1–2)
BUN BLD-MCNC: 9 MG/DL (ref 7–18)
BUN/CREAT SERPL: 9.3 (ref 10–20)
CALCIUM BLD-MCNC: 8.4 MG/DL (ref 8.5–10.1)
CHLORIDE SERPL-SCNC: 111 MMOL/L (ref 98–112)
CO2 SERPL-SCNC: 25 MMOL/L (ref 21–32)
CREAT BLD-MCNC: 0.97 MG/DL
GLOBULIN PLAS-MCNC: 3.9 G/DL (ref 2.8–4.4)
GLUCOSE BLD-MCNC: 177 MG/DL (ref 70–99)
GLUCOSE BLD-MCNC: 205 MG/DL (ref 70–99)
GLUCOSE BLD-MCNC: 209 MG/DL (ref 70–99)
GLUCOSE BLD-MCNC: 219 MG/DL (ref 70–99)
HAV IGM SER QL: 1.9 MG/DL (ref 1.6–2.6)
M PROTEIN MFR SERPL ELPH: 6.2 G/DL (ref 6.4–8.2)
OSMOLALITY SERPL CALC.SUM OF ELEC: 297 MOSM/KG (ref 275–295)
PHOSPHATE SERPL-MCNC: 2.6 MG/DL (ref 2.5–4.9)
POTASSIUM SERPL-SCNC: 3.1 MMOL/L (ref 3.5–5.1)
SODIUM SERPL-SCNC: 141 MMOL/L (ref 136–145)

## 2021-07-13 PROCEDURE — 99232 SBSQ HOSP IP/OBS MODERATE 35: CPT | Performed by: HOSPITALIST

## 2021-07-13 NOTE — PHYSICAL THERAPY NOTE
PHYSICAL THERAPY TREATMENT NOTE - INPATIENT    Room Number: 129/810-Q     Session: 1  Number of Visits to Meet Established Goals: 3    Presenting Problem: abdominal distention with pain     History related to current admission: Patient is a 69year old ma mellitus without mention of complication, uncontrolled    • Unspecified vitamin D deficiency    • Visual impairment     GLASSES   • Weight loss    • Weight loss 5/23/2019       Past Surgical History  Past Surgical History:   Procedure Laterality Date   • A -   Need to walk in hospital room?: None   -   Climbing 3-5 steps with a railing?: A Little       AM-PAC Score:  Raw Score: 23   Approx Degree of Impairment: 11.2%   Standardized Score (AM-PAC Scale): 56.93   CMS Modifier (G-Code): CI    FUNCTIONAL ABILI of decrease mobility. Will d.c . From skilled PT at this time. However if nursing/staff will notice a decline of his functional skills prior to home d/c, please re-order PT eval and tx.       DISCHARGE RECOMMENDATIONS  PT Discharge Recommendations: Home wi

## 2021-07-13 NOTE — DIETARY NOTE
[facility] - TPN FOLLOW UP    Charlee Latham     Parenteral Nutrition - Tonight's TPN (Bag #2) to provide: 810 dextrose calories, 450 lipid calories, 30% lipids, 60 grams protein in 1800 ml fluid.  This will provide 1500 calories

## 2021-07-13 NOTE — PLAN OF CARE
Problem: Patient/Family Goals  Goal: Patient/Family Long Term Goal  Description: Patient's Long Term Goal: able to eat and feel better     Interventions:advance diet as tolerated ,monitor signs of bowel obstruction    - See additional Care Plan goals for as ordered and tolerated  - Evaluate effectiveness of GI medications  - Encourage mobilization and activity  - Obtain nutritional consult as needed  - Establish a toileting routine/schedule  - Consider collaborating with pharmacy to review patient's medica ketamine drip , taking bolus dilaudid . Encouraged him to use IS and ambulates more . Plan of care discussed , answered all questions . Verbalized understanding .  Will continue to monitor

## 2021-07-13 NOTE — PROGRESS NOTES
[facility]  Progress Note     Conny Seip Patient Status:  Inpatient    1950 MRN [id number]   [facility] 3NW-A Attending Hunter [facility] Day # 6 PCP Jacquelin Oliveros MD     Chief Complaint: Abdominal disten 07/12/21  1228 07/13/21  0520   *   < > 149* 147* 205*   BUN 25*   < > 10 9 9   CREATSERUM 1.32*   < > 0.97 1.00 0.97   GFRAA 62   < > 90 87 90   GFRNAA 54*   < > 78 75 78   CA 8.3*   < > 8.4* 9.0 8.4*   ALB 2.2*  --   --  2.7* 2.3*      < > 1 7/7  7. Patient started on ice chips and sips of water today. 2. Anemia- Secondary to acute blood loss from hematoma. .Pt getting 1 unit PRBC's 7/8/2020. Hgb stable  3. Chronic pancreatitis  4. JONO- resolved  1. IVF  2. Monitor  5.  Hypertension-we will inc

## 2021-07-13 NOTE — PLAN OF CARE
A&Ox4. VSS. RA. . Telemetry: NSR  GI: Abdomen soft, nondistended. Passing gas. Reports having a large B< earlier in day. Denies nausea at this time. : Voids. Pain controlled with PRN pain medications. Ketamine gtt, and PCA pump.   Up with standby a INTERVENTIONS:  - Maintain adequate hydration with IV or PO as ordered and tolerated  - Nasogastric tube to low intermittent suction as ordered  - Evaluate effectiveness of ordered antiemetic medications  - Provide nonpharmacologic comfort measures as appr within target range  - Assess barriers to adequate nutritional intake and initiate nutrition consult as needed  - Instruct patient on self management of diabetes  Outcome: Progressing

## 2021-07-13 NOTE — PROGRESS NOTES
[facility]  Progress Note    Ludivina Crest Patient Status:  Inpatient    1950 MRN [id number]   [facility] 3NW-A [facility] Day # 6 PCP Micheline Staples MD     Subjective:   The patient is resting i stones     CKD (chronic kidney disease) stage 3, GFR 30-59 ml/min (HCC)     Abnormality of thoracic aorta     Community acquired pneumonia of right middle lobe of lung     Pneumonia of right lower lobe due to infectious organism     Pericardial effusion

## 2021-07-13 NOTE — PROGRESS NOTES
Acute Pain Service     PCA Follow-up Note     Indication: POD#8 s/p EXPLORATORY LAPAROTOMY, LYSIS OF ADHESIONS     Patient reports pain better \"still up and down. \" Per RN, patient requests prn clinician bolus doses on occasion.  Ambulating in hallways and

## 2021-07-14 LAB
ALBUMIN SERPL-MCNC: 2.5 G/DL (ref 3.4–5)
ALBUMIN/GLOB SERPL: 0.7 {RATIO} (ref 1–2)
ALP LIVER SERPL-CCNC: 60 U/L
ALT SERPL-CCNC: 21 U/L
ANION GAP SERPL CALC-SCNC: 5 MMOL/L (ref 0–18)
AST SERPL-CCNC: 23 U/L (ref 15–37)
BILIRUB SERPL-MCNC: 0.5 MG/DL (ref 0.1–2)
BUN BLD-MCNC: 9 MG/DL (ref 7–18)
BUN/CREAT SERPL: 9.5 (ref 10–20)
CALCIUM BLD-MCNC: 8.5 MG/DL (ref 8.5–10.1)
CHLORIDE SERPL-SCNC: 114 MMOL/L (ref 98–112)
CO2 SERPL-SCNC: 25 MMOL/L (ref 21–32)
CREAT BLD-MCNC: 0.95 MG/DL
GLOBULIN PLAS-MCNC: 3.7 G/DL (ref 2.8–4.4)
GLUCOSE BLD-MCNC: 176 MG/DL (ref 70–99)
GLUCOSE BLD-MCNC: 204 MG/DL (ref 70–99)
GLUCOSE BLD-MCNC: 222 MG/DL (ref 70–99)
GLUCOSE BLD-MCNC: 227 MG/DL (ref 70–99)
GLUCOSE BLD-MCNC: 263 MG/DL (ref 70–99)
HAV IGM SER QL: 1.8 MG/DL (ref 1.6–2.6)
M PROTEIN MFR SERPL ELPH: 6.2 G/DL (ref 6.4–8.2)
OSMOLALITY SERPL CALC.SUM OF ELEC: 301 MOSM/KG (ref 275–295)
PHOSPHATE SERPL-MCNC: 2.1 MG/DL (ref 2.5–4.9)
POTASSIUM SERPL-SCNC: 3.3 MMOL/L (ref 3.5–5.1)
SODIUM SERPL-SCNC: 144 MMOL/L (ref 136–145)

## 2021-07-14 PROCEDURE — 99232 SBSQ HOSP IP/OBS MODERATE 35: CPT | Performed by: HOSPITALIST

## 2021-07-14 RX ORDER — MAGNESIUM SULFATE HEPTAHYDRATE 40 MG/ML
2 INJECTION, SOLUTION INTRAVENOUS ONCE
Status: DISCONTINUED | OUTPATIENT
Start: 2021-07-14 | End: 2021-07-14

## 2021-07-14 RX ORDER — KETOROLAC TROMETHAMINE 15 MG/ML
15 INJECTION, SOLUTION INTRAMUSCULAR; INTRAVENOUS EVERY 6 HOURS
Status: DISPENSED | OUTPATIENT
Start: 2021-07-14 | End: 2021-07-18

## 2021-07-14 RX ORDER — POTASSIUM CHLORIDE 14.9 MG/ML
20 INJECTION INTRAVENOUS ONCE
Status: COMPLETED | OUTPATIENT
Start: 2021-07-14 | End: 2021-07-14

## 2021-07-14 RX ORDER — POTASSIUM CHLORIDE 14.9 MG/ML
20 INJECTION INTRAVENOUS ONCE
Status: DISCONTINUED | OUTPATIENT
Start: 2021-07-14 | End: 2021-07-14

## 2021-07-14 RX ORDER — MAGNESIUM SULFATE HEPTAHYDRATE 40 MG/ML
2 INJECTION, SOLUTION INTRAVENOUS ONCE
Status: COMPLETED | OUTPATIENT
Start: 2021-07-14 | End: 2021-07-14

## 2021-07-14 RX ORDER — KETOROLAC TROMETHAMINE 30 MG/ML
30 INJECTION, SOLUTION INTRAMUSCULAR; INTRAVENOUS ONCE
Status: COMPLETED | OUTPATIENT
Start: 2021-07-14 | End: 2021-07-14

## 2021-07-14 RX ORDER — HYDROMORPHONE HYDROCHLORIDE 1 MG/ML
0.5 INJECTION, SOLUTION INTRAMUSCULAR; INTRAVENOUS; SUBCUTANEOUS ONCE
Status: COMPLETED | OUTPATIENT
Start: 2021-07-14 | End: 2021-07-14

## 2021-07-14 NOTE — DIETARY NOTE
[facility] - TPN FOLLOW UP    Renae Bertrand     Parenteral Nutrition - Tonight's TPN (Bag #3) to provide: 810 dextrose calories, 450 lipid calories, 30% lipids, 60 grams protein in 2000 ml fluid.  This will provide 1500 calories

## 2021-07-14 NOTE — PLAN OF CARE
Problem: Patient/Family Goals  Goal: Patient/Family Long Term Goal  Description: Patient's Long Term Goal: able to eat and feel better     Interventions:advance diet as tolerated ,monitor signs of bowel obstruction    - See additional Care Plan goals for as ordered and tolerated  - Evaluate effectiveness of GI medications  - Encourage mobilization and activity  - Obtain nutritional consult as needed  - Establish a toileting routine/schedule  - Consider collaborating with pharmacy to review patient's medica iv saline locked. Right PICC infusing. Right wrist infusing. Dilaudid PCA and continuous Ketamine drip. Pt is resting, call light in reach, will continue to monitor.

## 2021-07-14 NOTE — PROGRESS NOTES
[facility]  Progress Note     Yana Boucher Patient Status:  Inpatient    1950 MRN [id number]   [facility] 3NW-A Attending [facility] Day # 15 PCP Travis Ty MD     Chief Complaint: Abdominal disten 07/13/21  0520 07/14/21  0511   * 205* 176*   BUN 9 9 9   CREATSERUM 1.00 0.97 0.95   GFRAA 87 90 93   GFRNAA 75 78 80   CA 9.0 8.4* 8.5   ALB 2.7* 2.3* 2.5*    141 144   K 3.6 3.1* 3.3*    111 114*   CO2 22.0 25.0 25.0   ALKPHO 70 61 60 to acute blood loss from hematoma. .Pt getting 1 unit PRBC's 7/8/2020. Hgb stable  3. Chronic pancreatitis  4. JONO- resolved  1. IVF  2. Monitor  5. Hypertension-we will increase beta-blocker frequency to every 4 hours.   Will add as needed hydralazine IV  6

## 2021-07-14 NOTE — PROGRESS NOTES
[facility]  Progress Note    Elfrieda Cost Patient Status:  Inpatient    1950 MRN [id number]   [facility] 3NW-A Attending [facility] Day # 12 PCP Dolly Manzanares MD     Subjective:   The patient states he is have seen and examined the patient; I obtained and performed the above history, physical examination, assessment and plan. I have I have edited the above to reflect my evaluation, opinion, and physical exam findings. Exam and plan as above.     Patient

## 2021-07-14 NOTE — PLAN OF CARE
A&O x4. Denies any CP, PARRIS, or calf pain at present. Lungs clear and diminished bilaterally. NSR on tele. Abdomen soft, rounded, tender. Bowel sounds hypoactive, pt reports belching and passing gas. NGT in place to LIS - green output noted in cannister.  Mi appropriate  Outcome: Progressing     Problem: GASTROINTESTINAL - ADULT  Goal: Minimal or absence of nausea and vomiting  Description: INTERVENTIONS:  - Maintain adequate hydration with IV or PO as ordered and tolerated  - Nasogastric tube to low intermitt as ordered  - Assess for signs and symptoms of hyperglycemia and hypoglycemia  - Administer ordered medications to maintain glucose within target range  - Assess barriers to adequate nutritional intake and initiate nutrition consult as needed  - Instruct p

## 2021-07-15 LAB
ALBUMIN SERPL-MCNC: 2.4 G/DL (ref 3.4–5)
ALBUMIN/GLOB SERPL: 0.6 {RATIO} (ref 1–2)
ALP LIVER SERPL-CCNC: 62 U/L
ALT SERPL-CCNC: 22 U/L
ANION GAP SERPL CALC-SCNC: 4 MMOL/L (ref 0–18)
AST SERPL-CCNC: 25 U/L (ref 15–37)
BILIRUB SERPL-MCNC: 0.6 MG/DL (ref 0.1–2)
BUN BLD-MCNC: 12 MG/DL (ref 7–18)
BUN/CREAT SERPL: 10.7 (ref 10–20)
CALCIUM BLD-MCNC: 8.8 MG/DL (ref 8.5–10.1)
CHLORIDE SERPL-SCNC: 112 MMOL/L (ref 98–112)
CO2 SERPL-SCNC: 25 MMOL/L (ref 21–32)
CREAT BLD-MCNC: 1.12 MG/DL
DEPRECATED RDW RBC AUTO: 52 FL (ref 35.1–46.3)
ERYTHROCYTE [DISTWIDTH] IN BLOOD BY AUTOMATED COUNT: 16.7 % (ref 11–15)
GLOBULIN PLAS-MCNC: 4 G/DL (ref 2.8–4.4)
GLUCOSE BLD-MCNC: 197 MG/DL (ref 70–99)
GLUCOSE BLD-MCNC: 210 MG/DL (ref 70–99)
GLUCOSE BLD-MCNC: 216 MG/DL (ref 70–99)
GLUCOSE BLD-MCNC: 225 MG/DL (ref 70–99)
GLUCOSE BLD-MCNC: 243 MG/DL (ref 70–99)
HAV IGM SER QL: 2.3 MG/DL (ref 1.6–2.6)
HCT VFR BLD AUTO: 24 %
HGB BLD-MCNC: 8.3 G/DL
M PROTEIN MFR SERPL ELPH: 6.4 G/DL (ref 6.4–8.2)
MCH RBC QN AUTO: 30.2 PG (ref 26–34)
MCHC RBC AUTO-ENTMCNC: 34.6 G/DL (ref 31–37)
MCV RBC AUTO: 87.3 FL
OSMOLALITY SERPL CALC.SUM OF ELEC: 298 MOSM/KG (ref 275–295)
PHOSPHATE SERPL-MCNC: 3.2 MG/DL (ref 2.5–4.9)
PLATELET # BLD AUTO: 622 10(3)UL (ref 150–450)
POTASSIUM SERPL-SCNC: 4.1 MMOL/L (ref 3.5–5.1)
RBC # BLD AUTO: 2.75 X10(6)UL
SODIUM SERPL-SCNC: 141 MMOL/L (ref 136–145)
WBC # BLD AUTO: 12.9 X10(3) UL (ref 4–11)

## 2021-07-15 PROCEDURE — 99232 SBSQ HOSP IP/OBS MODERATE 35: CPT | Performed by: HOSPITALIST

## 2021-07-15 RX ORDER — LORAZEPAM 2 MG/ML
0.5 INJECTION INTRAMUSCULAR NIGHTLY
Status: DISCONTINUED | OUTPATIENT
Start: 2021-07-15 | End: 2021-08-05

## 2021-07-15 NOTE — PLAN OF CARE
Patient is alert and oriented x3  Up ad black  Voiding  NPO status maintained; had 1/4 cup of ice chips. Clamped NGT for 2 hours; patient c/o bloating, nausea and distension. Reconnected to LIWS; no residual output at time of reconnect.    Up with SBA in terrazas appropriate  - Advance diet as tolerated, if ordered  - Obtain nutritional consult as needed  - Evaluate fluid balance  Outcome: Progressing  Goal: Maintains or returns to baseline bowel function  Description: INTERVENTIONS:  - Assess bowel function  - Carmencita Ricks

## 2021-07-15 NOTE — PROGRESS NOTES
Acute Pain Service     PCA Follow-up Note    POD#10 s/p exp lap with IVETT    Patient reports pain 4/10 when he presses on his incision.  Reports increased pain during the night when he was awakened for vital signs that required Dilaudid bolus x 2 by RN to be

## 2021-07-15 NOTE — PROGRESS NOTES
[facility] Pharmacy Note:  Age Based Dose Adjustment    Deneen Aldrich has been prescribed ketorolac (TORADOL) 30 mg IV every 6 hours. Patient is >71 years old therefore the dose has been adjusted to 15 mg IV every 6 hours.       Thank you,  Marilee Espino, [place] [facility]

## 2021-07-15 NOTE — PROGRESS NOTES
[facility]  Progress Note     Conny Seip Patient Status:  Inpatient    1950 MRN [id number]   [facility] 3NW-A Attending [facility] Day # 15 PCP Jacquelin Oliveros MD     Chief Complaint: Abdominal disten 21 22   BILT 0.5 0.5 0.6   TP 6.2* 6.2* 6.4       Estimated Creatinine Clearance: 53.1 mL/min (based on SCr of 1.12 mg/dL). No results for input(s): PTP, INR in the last 168 hours.          COVID-19 Lab Results    COVID-19  Lab Results   Component Value Quality:  · DVT Prophylaxis: Lovenox  · CODE status: Full  · Kellogg: N/A  · Central line: N/A  · If COVID testing is negative, may discontinue isolation: yes     Will the patient be referred to TCC on discharge?: no  Estimated date of discharge: olga Lal

## 2021-07-15 NOTE — PLAN OF CARE
Pt Aox4, VSS. Pain managed with PCA Diluadid & ketamine drip infusing per MD orders. Fentanyl patch per md orders as well, noted on left arm. Pt voids in urinal; adequate amounts.  No BM noted on this shift, pt reports passing flatus, NG tube to LIS continu pre-medicate as appropriate  Outcome: Progressing     Problem: GASTROINTESTINAL - ADULT  Goal: Minimal or absence of nausea and vomiting  Description: INTERVENTIONS:  - Maintain adequate hydration with IV or PO as ordered and tolerated  - Nasogastric tube Monitor Blood Glucose as ordered  - Assess for signs and symptoms of hyperglycemia and hypoglycemia  - Administer ordered medications to maintain glucose within target range  - Assess barriers to adequate nutritional intake and initiate nutrition consult a

## 2021-07-15 NOTE — DIETARY NOTE
L[address] FOLLOW UP    Paola Blunt     ?  Parenteral Nutrition - Tonight's TPN (Bag #4) to provide: 1680NPC (1080 dextrose calories, 600 lipid calories), 30% lipids, 80 grams protein in 2000mL fluid and total 2000 kcal.

## 2021-07-15 NOTE — PROGRESS NOTES
[facility]  Progress Note    Mechele Dec Patient Status:  Inpatient    1950 MRN [id number]   [facility] 3NW-A Attending Whitney Burkett[address] Day # 15 PCP Umesh Field MD     Subjective:   The patient is resting c pancreatitis ([facility] [address]     Other chronic pain     Benign essential HTN     Diabetes mellitus type 2 in nonobese [facility])     COPD (chronic obstructive pulmonary disease) (HCC)     Vitamin D deficiency     Chronic narcotic dependence ([facility] [address]     Steatorrhea     [facility]

## 2021-07-15 NOTE — PROGRESS NOTES
Acute Pain Service     PCA Follow-up Note     Indication: POD#9 s/p EXPLORATORY LAPAROTOMY, LYSIS OF ADHESIONS     Patient complained of severe pain this am - LUQ abdominal pain that was sharp and persistent - better after Toradol and Dilaudid bolus.  He is

## 2021-07-16 LAB
ALBUMIN SERPL-MCNC: 2.5 G/DL (ref 3.4–5)
ALBUMIN/GLOB SERPL: 0.6 {RATIO} (ref 1–2)
ALP LIVER SERPL-CCNC: 85 U/L
ALT SERPL-CCNC: 24 U/L
ANION GAP SERPL CALC-SCNC: 5 MMOL/L (ref 0–18)
AST SERPL-CCNC: 29 U/L (ref 15–37)
BILIRUB SERPL-MCNC: 0.8 MG/DL (ref 0.1–2)
BUN BLD-MCNC: 15 MG/DL (ref 7–18)
BUN/CREAT SERPL: 13.4 (ref 10–20)
CALCIUM BLD-MCNC: 8.8 MG/DL (ref 8.5–10.1)
CHLORIDE SERPL-SCNC: 110 MMOL/L (ref 98–112)
CO2 SERPL-SCNC: 25 MMOL/L (ref 21–32)
CREAT BLD-MCNC: 1.12 MG/DL
GLOBULIN PLAS-MCNC: 4 G/DL (ref 2.8–4.4)
GLUCOSE BLD-MCNC: 232 MG/DL (ref 70–99)
GLUCOSE BLD-MCNC: 260 MG/DL (ref 70–99)
GLUCOSE BLD-MCNC: 271 MG/DL (ref 70–99)
GLUCOSE BLD-MCNC: 282 MG/DL (ref 70–99)
GLUCOSE BLD-MCNC: 316 MG/DL (ref 70–99)
GLUCOSE BLD-MCNC: 352 MG/DL (ref 70–99)
HAV IGM SER QL: 1.9 MG/DL (ref 1.6–2.6)
M PROTEIN MFR SERPL ELPH: 6.5 G/DL (ref 6.4–8.2)
OSMOLALITY SERPL CALC.SUM OF ELEC: 300 MOSM/KG (ref 275–295)
PHOSPHATE SERPL-MCNC: 3.2 MG/DL (ref 2.5–4.9)
POTASSIUM SERPL-SCNC: 4.4 MMOL/L (ref 3.5–5.1)
SODIUM SERPL-SCNC: 140 MMOL/L (ref 136–145)

## 2021-07-16 PROCEDURE — 99232 SBSQ HOSP IP/OBS MODERATE 35: CPT | Performed by: HOSPITALIST

## 2021-07-16 RX ORDER — HYDROMORPHONE HYDROCHLORIDE 4 MG/1
4 TABLET ORAL EVERY 4 HOURS PRN
Status: DISCONTINUED | OUTPATIENT
Start: 2021-07-17 | End: 2021-07-23

## 2021-07-16 RX ORDER — HYDROMORPHONE HYDROCHLORIDE 1 MG/ML
0.4 INJECTION, SOLUTION INTRAMUSCULAR; INTRAVENOUS; SUBCUTANEOUS
Status: DISCONTINUED | OUTPATIENT
Start: 2021-07-16 | End: 2021-07-23

## 2021-07-16 NOTE — PROGRESS NOTES
[facility]  Progress Note    Elfrieda Cost Patient Status:  Inpatient    1950 MRN [id number]   [facility] 3NW-A Attending [facility] Day [facility] 15 PCP Dolly Manzanares MD     Subjective:   The patient is resting i cancer ([facility]     Bilateral kidney stones     CKD (chronic kidney disease) stage 3, GFR 30-59 ml/min (HCC)     Abnormality of thoracic aorta     Community acquired pneumonia of right middle lobe of lung     Pneumonia of right lower lobe due to infectious or Abdirahman Brody, personally performed the services described in this documentation  by Ms Raul Mackenzie, and they are both accurate and complete. David Leblanc.  MD Bruno FACS  [facility]

## 2021-07-16 NOTE — PLAN OF CARE
A & O x4, RA. Denies nausea. NG PRIETO Boyer output. Informed pt of clamping trial; asked this RN to come back in half an hour, dose of ativan given per pt request. Up ad black. BM this am, voiding freely. PICC intact to rt arm; rt arm precautions.      PCA Dil patient's stated pain goal  Description: INTERVENTIONS:  - Encourage pt to monitor pain and request assistance  - Assess pain using appropriate pain scale  - Administer analgesics based on type and severity of pain and evaluate response  - Implement non-ph hygiene and moisture  - Encourage food from home; allow for food preferences  - Enhance eating environment  Outcome: Progressing  Goal: Achieves appropriate nutritional intake (bariatric)  Description: INTERVENTIONS:  - Monitor for over-consumption  - Iden

## 2021-07-16 NOTE — DIETARY NOTE
[facility]  NUTRITION ASSESSMENT    Pt does not meet malnutrition criteria. NUTRITION INTERVENTION:  1. Meal and Snacks - Advance as tolerated to least restrictive diet monitor patient po intake. Encourage adequate po of appropriate diet.   2. Medic lb)  03/24/21 : 60 kg (132 lb 3.2 oz)  03/19/21 : 64.5 kg (142 lb 3.2 oz)  12/29/20 : 66.1 kg (145 lb 12.8 oz)  12/17/20 : 66.4 kg (146 lb 6.4 oz)  12/10/20 : 66 kg (145 lb 9.6 oz)  12/03/20 : 65.8 kg (145 lb)    NUTRITION:  Diet: Orders Placed This Encoun

## 2021-07-16 NOTE — PROGRESS NOTES
Acute Pain Service     PCA Follow-up Note     POD#11 s/p exp lap with IVETT     Patient reports pain during the night when his IV was not working He is comfortable now - feels bloated and tired from not sleeping.  NGT clamped - had 2 BMs this am and will star

## 2021-07-16 NOTE — PLAN OF CARE
Pt Aox4, VSS. Pain managed with PCA Diluadid & ketamine drip infusing per MD orders. Fentanyl patch per md orders as well, noted on left arm. Pt voids in urinal; adequate amounts.  No BM noted on this shift, NG tube to LIS continued with moderate amounts of pre-medicate as appropriate  Outcome: Progressing     Problem: GASTROINTESTINAL - ADULT  Goal: Minimal or absence of nausea and vomiting  Description: INTERVENTIONS:  - Maintain adequate hydration with IV or PO as ordered and tolerated  - Nasogastric tube Monitor Blood Glucose as ordered  - Assess for signs and symptoms of hyperglycemia and hypoglycemia  - Administer ordered medications to maintain glucose within target range  - Assess barriers to adequate nutritional intake and initiate nutrition consult a

## 2021-07-16 NOTE — PROGRESS NOTES
[facility]  Progress Note     Gia Osullivan Patient Status:  Inpatient    1950 MRN [id number]   [facility] 3NW-A Attending [facility] Day # 15 PCP Rina Fuchs MD     Chief Complaint: Abdominal disten in the last 168 hours.          COVID-19 Lab Results    COVID-19  Lab Results   Component Value Date    COVID19 Not Detected 07/05/2021    COVID19 Not Detected 03/28/2021    COVID19 Not Detected 03/22/2021       Pro-Calcitonin  No results for input(s): PCT patient be referred to TCC on discharge?: no  Estimated date of discharge: tbd  Discharge is dependent on: clinical improvement  At this point Mr. Farhana Solis is expected to be discharge to: Home    Plan of care discussed with pt at bedside.     Yumiko Simpson

## 2021-07-17 ENCOUNTER — APPOINTMENT (OUTPATIENT)
Dept: GENERAL RADIOLOGY | Facility: HOSPITAL | Age: 71
DRG: 336 | End: 2021-07-17
Attending: SURGERY
Payer: MEDICARE

## 2021-07-17 DIAGNOSIS — K86.1 OTHER CHRONIC PANCREATITIS (HCC): ICD-10-CM

## 2021-07-17 LAB
ALBUMIN SERPL-MCNC: 2.4 G/DL (ref 3.4–5)
ALBUMIN/GLOB SERPL: 0.5 {RATIO} (ref 1–2)
ALP LIVER SERPL-CCNC: 98 U/L
ALT SERPL-CCNC: 28 U/L
ANION GAP SERPL CALC-SCNC: 5 MMOL/L (ref 0–18)
AST SERPL-CCNC: 31 U/L (ref 15–37)
BASOPHILS # BLD AUTO: 0.03 X10(3) UL (ref 0–0.2)
BASOPHILS NFR BLD AUTO: 0.2 %
BILIRUB SERPL-MCNC: 1 MG/DL (ref 0.1–2)
BUN BLD-MCNC: 16 MG/DL (ref 7–18)
BUN/CREAT SERPL: 13.3 (ref 10–20)
CALCIUM BLD-MCNC: 9.1 MG/DL (ref 8.5–10.1)
CHLORIDE SERPL-SCNC: 107 MMOL/L (ref 98–112)
CO2 SERPL-SCNC: 25 MMOL/L (ref 21–32)
CREAT BLD-MCNC: 1.2 MG/DL
DEPRECATED RDW RBC AUTO: 50.7 FL (ref 35.1–46.3)
EOSINOPHIL # BLD AUTO: 0.19 X10(3) UL (ref 0–0.7)
EOSINOPHIL NFR BLD AUTO: 1.2 %
ERYTHROCYTE [DISTWIDTH] IN BLOOD BY AUTOMATED COUNT: 16.4 % (ref 11–15)
GLOBULIN PLAS-MCNC: 4.9 G/DL (ref 2.8–4.4)
GLUCOSE BLD-MCNC: 341 MG/DL (ref 70–99)
GLUCOSE BLD-MCNC: 357 MG/DL (ref 70–99)
GLUCOSE BLD-MCNC: 421 MG/DL (ref 70–99)
GLUCOSE BLD-MCNC: 478 MG/DL (ref 70–99)
HAV IGM SER QL: 2 MG/DL (ref 1.6–2.6)
HCT VFR BLD AUTO: 25 %
HGB BLD-MCNC: 8.8 G/DL
IMM GRANULOCYTES # BLD AUTO: 0.1 X10(3) UL (ref 0–1)
IMM GRANULOCYTES NFR BLD: 0.6 %
LYMPHOCYTES # BLD AUTO: 1.78 X10(3) UL (ref 1–4)
LYMPHOCYTES NFR BLD AUTO: 10.9 %
M PROTEIN MFR SERPL ELPH: 7.3 G/DL (ref 6.4–8.2)
MCH RBC QN AUTO: 30.4 PG (ref 26–34)
MCHC RBC AUTO-ENTMCNC: 35.2 G/DL (ref 31–37)
MCV RBC AUTO: 86.5 FL
MONOCYTES # BLD AUTO: 1.9 X10(3) UL (ref 0.1–1)
MONOCYTES NFR BLD AUTO: 11.7 %
NEUTROPHILS # BLD AUTO: 12.27 X10 (3) UL (ref 1.5–7.7)
NEUTROPHILS # BLD AUTO: 12.27 X10(3) UL (ref 1.5–7.7)
NEUTROPHILS NFR BLD AUTO: 75.4 %
OSMOLALITY SERPL CALC.SUM OF ELEC: 299 MOSM/KG (ref 275–295)
PLATELET # BLD AUTO: 827 10(3)UL (ref 150–450)
POTASSIUM SERPL-SCNC: 4.7 MMOL/L (ref 3.5–5.1)
RBC # BLD AUTO: 2.89 X10(6)UL
SODIUM SERPL-SCNC: 137 MMOL/L (ref 136–145)
WBC # BLD AUTO: 16.3 X10(3) UL (ref 4–11)

## 2021-07-17 PROCEDURE — 74250 X-RAY XM SM INT 1CNTRST STD: CPT | Performed by: SURGERY

## 2021-07-17 PROCEDURE — 99232 SBSQ HOSP IP/OBS MODERATE 35: CPT | Performed by: HOSPITALIST

## 2021-07-17 RX ORDER — SODIUM CHLORIDE 9 MG/ML
INJECTION, SOLUTION INTRAVENOUS CONTINUOUS
Status: DISCONTINUED | OUTPATIENT
Start: 2021-07-17 | End: 2021-07-23

## 2021-07-17 RX ORDER — ONDANSETRON 2 MG/ML
4 INJECTION INTRAMUSCULAR; INTRAVENOUS EVERY 6 HOURS PRN
Status: DISCONTINUED | OUTPATIENT
Start: 2021-07-17 | End: 2021-07-23

## 2021-07-17 RX ORDER — METOCLOPRAMIDE HYDROCHLORIDE 5 MG/ML
10 INJECTION INTRAMUSCULAR; INTRAVENOUS
Status: DISCONTINUED | OUTPATIENT
Start: 2021-07-17 | End: 2021-07-18

## 2021-07-17 RX ORDER — DIPHENHYDRAMINE HYDROCHLORIDE 50 MG/ML
12.5 INJECTION INTRAMUSCULAR; INTRAVENOUS EVERY 4 HOURS PRN
Status: DISCONTINUED | OUTPATIENT
Start: 2021-07-17 | End: 2021-07-23

## 2021-07-17 RX ORDER — NALOXONE HYDROCHLORIDE 0.4 MG/ML
0.08 INJECTION, SOLUTION INTRAMUSCULAR; INTRAVENOUS; SUBCUTANEOUS
Status: DISCONTINUED | OUTPATIENT
Start: 2021-07-17 | End: 2021-07-23

## 2021-07-17 NOTE — PROGRESS NOTES
@2130: NG tube residual checked - 300 mL thick green/bile. NGT returned to LIS per orders. Pt a&ox4, VSS. Temp tonight of 100.6, PA notified. Voiding freely. NG tube to LIS with thick green output. Incision with staples c/d/i.  Pt reports feeling bloate

## 2021-07-17 NOTE — PROGRESS NOTES
[facility]  Progress Note     Ephriam Loud Patient Status:  Inpatient    1950 MRN [id number]   [facility] 3NW-A Attending Glad[facility] Day # 13 PCP Eliazar Patrick MD     Chief Complaint: Abdominal disten 1.2 mg/dL). No results for input(s): PTP, INR in the last 168 hours.          COVID-19 Lab Results    COVID-19  Lab Results   Component Value Date    COVID19 Not Detected 07/05/2021    COVID19 Not Detected 03/28/2021    COVID19 Not Detected 03/22/2021 may discontinue isolation: yes     Will the patient be referred to TCC on discharge?: no  Estimated date of discharge: tbd  Discharge is dependent on: clinical improvement  At this point Mr. Alena Louise is expected to be discharge to: Home    Plan of care dis

## 2021-07-17 NOTE — DIETARY NOTE
[address] FOLLOW UP    Ephriam Loud     ?  Parenteral Nutrition - Tonight's TPN (Bag #6) to provide: 1680NPC (1080 dextrose calories, 600 lipid calories), 30% lipids, 80 grams protein in 2000mL fluid and total 2000 kcal.

## 2021-07-17 NOTE — PLAN OF CARE
Informed surgery of NG output overnight; stat gastrograffin study NG ordered. Informed pain management of failed clamping trial, pt will be NPO. A & O x 4, anxious, depressed. NPO, NG to LIS. Up with SBA. Voiding freely.  Denies passing any gas or b Minimal or absence of nausea and vomiting  Description: INTERVENTIONS:  - Maintain adequate hydration with IV or PO as ordered and tolerated  - Nasogastric tube to low intermittent suction as ordered  - Evaluate effectiveness of ordered antiemetic medicati hypoglycemia  - Administer ordered medications to maintain glucose within target range  - Assess barriers to adequate nutritional intake and initiate nutrition consult as needed  - Instruct patient on self management of diabetes  Outcome: Not Progressing

## 2021-07-17 NOTE — PROGRESS NOTES
[facility]  Progress Note    Dileepceline Dec Patient Status:  Inpatient    1950 MRN [id number]   [facility] 3NW-A Attending [facility] Day # 15 PCP Umesh Field MD     Subjective:  Venkatesh Acuña is a(n)

## 2021-07-17 NOTE — PLAN OF CARE
Problem: Patient/Family Goals  Goal: Patient/Family Long Term Goal  Description: Patient's Long Term Goal: able to eat and feel better     Interventions:advance diet as tolerated ,monitor signs of bowel obstruction    - See additional Care Plan goals for s as ordered and tolerated  - Evaluate effectiveness of GI medications  - Encourage mobilization and activity  - Obtain nutritional consult as needed  - Establish a toileting routine/schedule  - Consider collaborating with pharmacy to review patient's medica

## 2021-07-17 NOTE — PROGRESS NOTES
[facility]  Progress Note    Staci Heck Patient Status:  Inpatient    1950 MRN [id number]   [facility] 3NW-A Attending Miah Hood [facility] Day # 13 PCP Dayron Giron MD     Subjective:   The patient reports impr chronic pain     Benign essential HTN     Diabetes mellitus type 2 in nonobese (HCC)     COPD (chronic obstructive pulmonary disease) (HCC)     Vitamin D deficiency     Chronic narcotic dependence ([address]     Steatorrhea     Prostate cancer ([address]     [place]

## 2021-07-18 ENCOUNTER — APPOINTMENT (OUTPATIENT)
Dept: GENERAL RADIOLOGY | Facility: HOSPITAL | Age: 71
DRG: 336 | End: 2021-07-18
Attending: PHYSICIAN ASSISTANT
Payer: MEDICARE

## 2021-07-18 LAB
ALBUMIN SERPL-MCNC: 2.6 G/DL (ref 3.4–5)
ALBUMIN/GLOB SERPL: 0.5 {RATIO} (ref 1–2)
ALP LIVER SERPL-CCNC: 113 U/L
ALT SERPL-CCNC: 28 U/L
ANION GAP SERPL CALC-SCNC: 5 MMOL/L (ref 0–18)
AST SERPL-CCNC: 21 U/L (ref 15–37)
BILIRUB SERPL-MCNC: 1.2 MG/DL (ref 0.1–2)
BUN BLD-MCNC: 34 MG/DL (ref 7–18)
BUN/CREAT SERPL: 16 (ref 10–20)
CALCIUM BLD-MCNC: 9.7 MG/DL (ref 8.5–10.1)
CHLORIDE SERPL-SCNC: 105 MMOL/L (ref 98–112)
CO2 SERPL-SCNC: 25 MMOL/L (ref 21–32)
CREAT BLD-MCNC: 2.13 MG/DL
DEPRECATED RDW RBC AUTO: 49.5 FL (ref 35.1–46.3)
ERYTHROCYTE [DISTWIDTH] IN BLOOD BY AUTOMATED COUNT: 16.2 % (ref 11–15)
GLOBULIN PLAS-MCNC: 5.1 G/DL (ref 2.8–4.4)
GLUCOSE BLD-MCNC: 377 MG/DL (ref 70–99)
GLUCOSE BLD-MCNC: 389 MG/DL (ref 70–99)
GLUCOSE BLD-MCNC: 409 MG/DL (ref 70–99)
GLUCOSE BLD-MCNC: 467 MG/DL (ref 70–99)
GLUCOSE BLD-MCNC: 471 MG/DL (ref 70–99)
HAV IGM SER QL: 2.7 MG/DL (ref 1.6–2.6)
HCT VFR BLD AUTO: 26 %
HGB BLD-MCNC: 8.9 G/DL
M PROTEIN MFR SERPL ELPH: 7.7 G/DL (ref 6.4–8.2)
MCH RBC QN AUTO: 29.5 PG (ref 26–34)
MCHC RBC AUTO-ENTMCNC: 34.2 G/DL (ref 31–37)
MCV RBC AUTO: 86.1 FL
OSMOLALITY SERPL CALC.SUM OF ELEC: 305 MOSM/KG (ref 275–295)
PHOSPHATE SERPL-MCNC: 5.8 MG/DL (ref 2.5–4.9)
PLATELET # BLD AUTO: 977 10(3)UL (ref 150–450)
POTASSIUM SERPL-SCNC: 5.5 MMOL/L (ref 3.5–5.1)
RBC # BLD AUTO: 3.02 X10(6)UL
SODIUM SERPL-SCNC: 135 MMOL/L (ref 136–145)
WBC # BLD AUTO: 15 X10(3) UL (ref 4–11)

## 2021-07-18 PROCEDURE — 74018 RADEX ABDOMEN 1 VIEW: CPT | Performed by: PHYSICIAN ASSISTANT

## 2021-07-18 PROCEDURE — 99232 SBSQ HOSP IP/OBS MODERATE 35: CPT | Performed by: HOSPITALIST

## 2021-07-18 RX ORDER — METOCLOPRAMIDE HYDROCHLORIDE 5 MG/ML
5 INJECTION INTRAMUSCULAR; INTRAVENOUS
Status: DISCONTINUED | OUTPATIENT
Start: 2021-07-18 | End: 2021-07-22

## 2021-07-18 NOTE — PROGRESS NOTES
[facility]  Progress Note     Staci Heck Patient Status:  Inpatient    1950 MRN [id number]   [facility] 3NW-A Attending Miah [facility]Ele[facility] Day # 12 PCP Dayron Giron MD     Chief Complaint: Abdominal disten mg/dL). No results for input(s): PTP, INR in the last 168 hours.          COVID-19 Lab Results    COVID-19  Lab Results   Component Value Date    COVID19 Not Detected 07/05/2021    COVID19 Not Detected 03/28/2021    COVID19 Not Detected 03/22/2021 above.  KUB today to see if there is a bowel obstruction. Awaiting Dr. Kory Stephens who is back tomorrow.     Quality:  · DVT Prophylaxis: Lovenox  · CODE status: Full  · Kellogg: N/A  · Central line: N/A  · If COVID testing is negative, may discontinue isolation: y

## 2021-07-18 NOTE — DIETARY NOTE
[address] 14 FOLLOW UP    Conny Seip     ?  Parenteral Nutrition - Tonight's TPN (Bag #7) to provide: 1680NPC (1080 dextrose calories, 600 lipid calories), 30% lipids, 80 grams protein in 2000mL fluid and total 2000 kcal.

## 2021-07-18 NOTE — PROGRESS NOTES
[facility] Pharmacy Note:  Renal Dose Adjustment for Metoclopramide (REGLAN)    Pan Wheat has been prescribed Metoclopramide (REGLAN) 10 mg 3 times daily before meals and nightly.     Estimated Creatinine Clearance: 24.6 mL/min (A) (based on SCr of 2.13 mg/

## 2021-07-18 NOTE — CONSULTS
[facility]  Progress Note    Zeeshan Peacock Patient Status:  Inpatient    1950 MRN [id number]   [facility] 3NW-A Attending Samuel Parikh[address] [place] [address] Day # 12 PCP Jessica Peralta MD     Subjective:  Zeeshan Peacock is a(n)

## 2021-07-18 NOTE — PLAN OF CARE
A & O x4, anxious. RA. NG LT nare LIS. Large output; brown, bile in color. Denies nausea. Jaciel Reglan. Voiding, demetria. Denies passing gas/belching. Agreed to supp. TPN orders adjusted. Rt PICC. Blood glucose elevated, Levemir increased. TELE//SCD. ordered antiemetic medications  - Provide nonpharmacologic comfort measures as appropriate  - Advance diet as tolerated, if ordered  - Obtain nutritional consult as needed  - Evaluate fluid balance  Outcome: Progressing  Goal: Maintains or returns to basel Progressing

## 2021-07-18 NOTE — PROGRESS NOTES
[facility]  Progress Note    Kelvin Greer Patient Status:  Inpatient    1950 MRN [id number]   [facility]NW-A [facility] 12 PCP Eliseo Huntley MD     Subjective:   The patient is sitting u essential HTN     Diabetes mellitus type 2 in nonobese [facility])     COPD (chronic obstructive pulmonary disease) (HCC)     Vitamin D deficiency     Chronic narcotic dependence ([address]     Steatorrhea     Prostate cancer ([facility] [address]     Bilateral kidney stones     CKD as detailed above    Norma Crespo MD FACS

## 2021-07-18 NOTE — PLAN OF CARE
Pt a&ox4, VSS. Pt NGT clamped for gastrografin study. Pt reported feeling very bloated and uncomfortable. Pt had multiple episodes of emesis as well. Surgery PA notified and NG returned to LIS.  Approximately 2 liter of green fluid immediately drained into GASTROINTESTINAL - ADULT  Goal: Minimal or absence of nausea and vomiting  Description: INTERVENTIONS:  - Maintain adequate hydration with IV or PO as ordered and tolerated  - Nasogastric tube to low intermittent suction as ordered  - Evaluate effectivenes hyperglycemia and hypoglycemia  - Administer ordered medications to maintain glucose within target range  - Assess barriers to adequate nutritional intake and initiate nutrition consult as needed  - Instruct patient on self management of diabetes  Outcome:

## 2021-07-19 ENCOUNTER — APPOINTMENT (OUTPATIENT)
Dept: GENERAL RADIOLOGY | Facility: HOSPITAL | Age: 71
DRG: 336 | End: 2021-07-19
Attending: COLON & RECTAL SURGERY
Payer: MEDICARE

## 2021-07-19 ENCOUNTER — APPOINTMENT (OUTPATIENT)
Dept: CT IMAGING | Facility: HOSPITAL | Age: 71
DRG: 336 | End: 2021-07-19
Attending: COLON & RECTAL SURGERY
Payer: MEDICARE

## 2021-07-19 ENCOUNTER — APPOINTMENT (OUTPATIENT)
Dept: GENERAL RADIOLOGY | Facility: HOSPITAL | Age: 71
DRG: 336 | End: 2021-07-19
Attending: SURGERY
Payer: MEDICARE

## 2021-07-19 LAB
ALBUMIN SERPL-MCNC: 2.7 G/DL (ref 3.4–5)
ALBUMIN/GLOB SERPL: 0.5 {RATIO} (ref 1–2)
ALP LIVER SERPL-CCNC: 134 U/L
ALT SERPL-CCNC: 31 U/L
ANION GAP SERPL CALC-SCNC: 3 MMOL/L (ref 0–18)
AST SERPL-CCNC: 20 U/L (ref 15–37)
BASOPHILS # BLD AUTO: 0.04 X10(3) UL (ref 0–0.2)
BASOPHILS NFR BLD AUTO: 0.3 %
BILIRUB SERPL-MCNC: 1.2 MG/DL (ref 0.1–2)
BUN BLD-MCNC: 49 MG/DL (ref 7–18)
BUN/CREAT SERPL: 18.1 (ref 10–20)
CALCIUM BLD-MCNC: 9.8 MG/DL (ref 8.5–10.1)
CHLORIDE SERPL-SCNC: 106 MMOL/L (ref 98–112)
CO2 SERPL-SCNC: 28 MMOL/L (ref 21–32)
CREAT BLD-MCNC: 2.71 MG/DL
DEPRECATED RDW RBC AUTO: 48.6 FL (ref 35.1–46.3)
EOSINOPHIL # BLD AUTO: 0.26 X10(3) UL (ref 0–0.7)
EOSINOPHIL NFR BLD AUTO: 1.9 %
ERYTHROCYTE [DISTWIDTH] IN BLOOD BY AUTOMATED COUNT: 16.2 % (ref 11–15)
GLOBULIN PLAS-MCNC: 5.4 G/DL (ref 2.8–4.4)
GLUCOSE BLD-MCNC: 107 MG/DL (ref 70–99)
GLUCOSE BLD-MCNC: 109 MG/DL (ref 70–99)
GLUCOSE BLD-MCNC: 144 MG/DL (ref 70–99)
GLUCOSE BLD-MCNC: 228 MG/DL (ref 70–99)
GLUCOSE BLD-MCNC: 286 MG/DL (ref 70–99)
GLUCOSE BLD-MCNC: 410 MG/DL (ref 70–99)
GLUCOSE BLD-MCNC: 466 MG/DL (ref 70–99)
HAV IGM SER QL: 2.7 MG/DL (ref 1.6–2.6)
HCT VFR BLD AUTO: 26.1 %
HGB BLD-MCNC: 9.3 G/DL
IMM GRANULOCYTES # BLD AUTO: 0.09 X10(3) UL (ref 0–1)
IMM GRANULOCYTES NFR BLD: 0.7 %
LYMPHOCYTES # BLD AUTO: 1.55 X10(3) UL (ref 1–4)
LYMPHOCYTES NFR BLD AUTO: 11.5 %
M PROTEIN MFR SERPL ELPH: 8.1 G/DL (ref 6.4–8.2)
MCH RBC QN AUTO: 30.2 PG (ref 26–34)
MCHC RBC AUTO-ENTMCNC: 35.6 G/DL (ref 31–37)
MCV RBC AUTO: 84.7 FL
MONOCYTES # BLD AUTO: 1.96 X10(3) UL (ref 0.1–1)
MONOCYTES NFR BLD AUTO: 14.5 %
NEUTROPHILS # BLD AUTO: 9.62 X10 (3) UL (ref 1.5–7.7)
NEUTROPHILS # BLD AUTO: 9.62 X10(3) UL (ref 1.5–7.7)
NEUTROPHILS NFR BLD AUTO: 71.1 %
OSMOLALITY SERPL CALC.SUM OF ELEC: 298 MOSM/KG (ref 275–295)
PHOSPHATE SERPL-MCNC: 5.4 MG/DL (ref 2.5–4.9)
PLATELET # BLD AUTO: 1031 10(3)UL (ref 150–450)
POTASSIUM SERPL-SCNC: 4.9 MMOL/L (ref 3.5–5.1)
RBC # BLD AUTO: 3.08 X10(6)UL
SODIUM SERPL-SCNC: 137 MMOL/L (ref 136–145)
TRIGL SERPL-MCNC: 161 MG/DL (ref 30–149)
WBC # BLD AUTO: 13.5 X10(3) UL (ref 4–11)

## 2021-07-19 PROCEDURE — 74018 RADEX ABDOMEN 1 VIEW: CPT | Performed by: SURGERY

## 2021-07-19 PROCEDURE — 74018 RADEX ABDOMEN 1 VIEW: CPT | Performed by: COLON & RECTAL SURGERY

## 2021-07-19 PROCEDURE — 74176 CT ABD & PELVIS W/O CONTRAST: CPT | Performed by: COLON & RECTAL SURGERY

## 2021-07-19 PROCEDURE — 99233 SBSQ HOSP IP/OBS HIGH 50: CPT | Performed by: HOSPITALIST

## 2021-07-19 RX ORDER — PANCRELIPASE 36000; 180000; 114000 [USP'U]/1; [USP'U]/1; [USP'U]/1
CAPSULE, DELAYED RELEASE PELLETS ORAL
Qty: 180 CAPSULE | Refills: 0 | Status: SHIPPED | OUTPATIENT
Start: 2021-07-19 | End: 2021-08-16

## 2021-07-19 RX ORDER — SODIUM CHLORIDE 9 MG/ML
INJECTION, SOLUTION INTRAVENOUS CONTINUOUS
Status: DISCONTINUED | OUTPATIENT
Start: 2021-07-19 | End: 2021-07-29

## 2021-07-19 NOTE — PROGRESS NOTES
Pt is Ax4, vss, afebrile, RUE precautions, NPO, A/C Q6H, /Tele, refuses SCDs, voids freely, up w/ sb, NGT LIS. Pt failed clamping trial again and hooked back to suction this afternoon. Abdomen is soft/rounded/distended w/ no bowel sounds.  Pt was c/o oscar

## 2021-07-19 NOTE — PLAN OF CARE
Problem: GASTROINTESTINAL - ADULT  Goal: Minimal or absence of nausea and vomiting  Description: INTERVENTIONS:  - Maintain adequate hydration with IV or PO as ordered and tolerated  - Nasogastric tube to low intermittent suction as ordered  - Evaluate e diet as tolerated ,monitor signs of bowel obstruction    - See additional Care Plan goals for specific interventions  Outcome: Not Progressing  Goal: Patient/Family Short Term Goal  Description: Patient's Short Term Goal: pain under control    Intervention

## 2021-07-19 NOTE — TELEPHONE ENCOUNTER
Protocol passed  Medication(s) to Refill:   Requested Prescriptions     Pending Prescriptions Disp Refills   • CREON [id number] units Oral Cap DR Particles [Pharmacy Med Name: CREON DR 36,000 UNITS CAPSULE] 180 capsule 0     Sig: TAKE 2 CAPSULES BY MOUTH

## 2021-07-19 NOTE — PAYOR COMM NOTE
--------------  CONTINUED STAY REVIEW    Payor: Kleber Butler 673 #:  Yvette Mccullough  Authorization Number: [id number]    Admit date: 7/2/21  Admit time:  1:26 AM    Admitting Physician: Concha Valladares MD  Attending Physician:  Gregory Brito MD  Pr Pen (NOVOLOG) 100 UNIT/ML flexpen 12 Units     Date Action Dose Route User    7/19/2021 0031 Given 12 Units Subcutaneous (Right Upper Arm) Jenni Lorenzana, RN      insulin detemir (LEVEMIR) 100 UNIT/ML flextouch 30 Units     Date Action Dose Route User SpO2 96 %. Lungs: No respiratory distress. Cardiac: Regular rate and rhythm. Abdomen:  Soft, non distended, non tender, with no rebound or guarding. No peritoneal signs.      Incision: Clean, dry, intact, no erythema        Labs:        Lab Results   C the TPN today.     Plan of care: As above. 7/18 HOSPITALIST NOTE  Chief Complaint: Abdominal distention     S: Patient seen at bedside. Patient continued to have output from the NG tube.   Patient did not tolerate the small bowel follow-through.     Vi Results   Component Value Date     WBC 15.0 07/18/2021     HGB 8.9 07/18/2021     HCT 26.0 07/18/2021     .0 07/18/2021            Lab Results   Component Value Date      07/18/2021     K 5.5 07/18/2021      07/18/2021     CO2 25.0 07/18

## 2021-07-19 NOTE — DIETARY NOTE
Loi 14 FOLLOW UP    Geraldine Muñoz     ?  Parenteral Nutrition - Tonight's TPN (Bag #8) to provide: 1680NPC (1080 dextrose calories, 600 lipid calories), 30% lipids, 80 grams protein in 2000mL fluid and total 2000 kcal.

## 2021-07-19 NOTE — PROGRESS NOTES
Acute Pain Service     PCA Follow-up Note     POD#14 s/p exp lap with IVETT     Events from weekend noted. Patient complains of abdominal pain that persists. He is distended and uncomfortable - ileus persists - NPO w/ NGT to LIS.    CT and KUB results from to

## 2021-07-19 NOTE — PROGRESS NOTES
[facility]  Progress Note    Karenann Kawasaki Patient Status:  Inpatient    1950 MRN [id number]   [facility] 3NW-A Attending Benito Bell MD   Hosp Day # 16 PCP Sophie Velez MD     Subjective:   The patient states that he has Vitamin D deficiency     Chronic narcotic dependence ([facility] [address]     Steatorrhea     Prostate cancer ([facility] [address]     Bilateral kidney stones     CKD (chronic kidney disease) stage 3, GFR 30-59 ml/min (HCC)     Abnormality of thoracic aorta     Community acquired pneum

## 2021-07-19 NOTE — PROGRESS NOTES
1014: Radiologist called to say he believes there is increased free air in abdomen and wants an upright abdomen xray ordered. Orders placed. 1015: Paged Miguel Ramona to notify of radiologist thinking there is increased free air in abdomen.  Orders placed by headache

## 2021-07-19 NOTE — PROGRESS NOTES
[facility]  Progress Note     Charlee Kinangel Patient Status:  Inpatient    1950 MRN [id number]   [facility]NW[facility]A [facility] Elias Ren MD     Chief Complaint: Abdominal disten for input(s): PTP, INR in the last 168 hours.          COVID-19 Lab Results    COVID-19  Lab Results   Component Value Date    COVID19 Not Detected 07/05/2021    COVID19 Not Detected 03/28/2021    COVID19 Not Detected 03/22/2021       Pro-Calcitonin  No res N/A  · If COVID testing is negative, may discontinue isolation: yes     Will the patient be referred to [facility] on discharge?: no  Estimated date of discharge: tbd  Discharge is dependent on: clinical improvement  At this point Mr. Refugio Brambila is expected to be d

## 2021-07-19 NOTE — PROGRESS NOTES
Patient has IVF and TPN infusing, on room air, Dilaudid PCA in use, on tele running NSR, not passing flatus, voiding well, Ofirmev scheduled, Ativan given for anxiety, NPO except 1 cup of ice chips every 8 hours. Wife present at bedside.  Will continue to m

## 2021-07-20 LAB
ANION GAP SERPL CALC-SCNC: 4 MMOL/L (ref 0–18)
BASOPHILS # BLD AUTO: 0.04 X10(3) UL (ref 0–0.2)
BASOPHILS NFR BLD AUTO: 0.3 %
BUN BLD-MCNC: 43 MG/DL (ref 7–18)
BUN/CREAT SERPL: 26.1 (ref 10–20)
CALCIUM BLD-MCNC: 9.7 MG/DL (ref 8.5–10.1)
CHLORIDE SERPL-SCNC: 108 MMOL/L (ref 98–112)
CO2 SERPL-SCNC: 27 MMOL/L (ref 21–32)
CREAT BLD-MCNC: 1.65 MG/DL
DEPRECATED RDW RBC AUTO: 48.9 FL (ref 35.1–46.3)
EOSINOPHIL # BLD AUTO: 0.27 X10(3) UL (ref 0–0.7)
EOSINOPHIL NFR BLD AUTO: 2.2 %
ERYTHROCYTE [DISTWIDTH] IN BLOOD BY AUTOMATED COUNT: 16.1 % (ref 11–15)
GLUCOSE BLD-MCNC: 160 MG/DL (ref 70–99)
GLUCOSE BLD-MCNC: 161 MG/DL (ref 70–99)
GLUCOSE BLD-MCNC: 215 MG/DL (ref 70–99)
GLUCOSE BLD-MCNC: 230 MG/DL (ref 70–99)
GLUCOSE BLD-MCNC: 249 MG/DL (ref 70–99)
HAV IGM SER QL: 2.2 MG/DL (ref 1.6–2.6)
HCT VFR BLD AUTO: 26.2 %
HGB BLD-MCNC: 9 G/DL
IMM GRANULOCYTES # BLD AUTO: 0.1 X10(3) UL (ref 0–1)
IMM GRANULOCYTES NFR BLD: 0.8 %
LYMPHOCYTES # BLD AUTO: 2.12 X10(3) UL (ref 1–4)
LYMPHOCYTES NFR BLD AUTO: 17.1 %
MCH RBC QN AUTO: 29.3 PG (ref 26–34)
MCHC RBC AUTO-ENTMCNC: 34.4 G/DL (ref 31–37)
MCV RBC AUTO: 85.3 FL
MONOCYTES # BLD AUTO: 2.02 X10(3) UL (ref 0.1–1)
MONOCYTES NFR BLD AUTO: 16.3 %
NEUTROPHILS # BLD AUTO: 7.86 X10 (3) UL (ref 1.5–7.7)
NEUTROPHILS # BLD AUTO: 7.86 X10(3) UL (ref 1.5–7.7)
NEUTROPHILS NFR BLD AUTO: 63.3 %
OSMOLALITY SERPL CALC.SUM OF ELEC: 302 MOSM/KG (ref 275–295)
PHOSPHATE SERPL-MCNC: 3.6 MG/DL (ref 2.5–4.9)
PLATELET # BLD AUTO: 1032 10(3)UL (ref 150–450)
PLATELET MORPHOLOGY: NORMAL
POTASSIUM SERPL-SCNC: 4.5 MMOL/L (ref 3.5–5.1)
RBC # BLD AUTO: 3.07 X10(6)UL
SODIUM SERPL-SCNC: 139 MMOL/L (ref 136–145)
WBC # BLD AUTO: 12.4 X10(3) UL (ref 4–11)

## 2021-07-20 PROCEDURE — 99233 SBSQ HOSP IP/OBS HIGH 50: CPT | Performed by: HOSPITALIST

## 2021-07-20 NOTE — PROGRESS NOTES
[facility]  Progress Note     Reva Lavonne Patient Status:  Inpatient    1950 MRN [id number]   [facility] 3NW-A Attending [facility] Day # 25 PCP Sammy Rhoades MD     Chief Complaint: Abdominal disten 5. 5* 4.9 4.5      < > 105 106 108   CO2 25.0   < > 25.0 28.0 27.0   ALKPHO 98  --  113 134*  --    AST 31  --  21 20  --    ALT 28  --  28 31  --    BILT 1.0  --  1.2 1.2  --    TP 7.3  --  7.7 8.1  --     < > = values in this interval not displayed. stable today  4. Chronic pancreatitis  5. Acute on chronic pain  1. Fentanyl patch, PCA - pain service following  6. JONO, improved w/ IVF  7. Hypertension, stable  1. IV prn  8. COPD, stable  9.  DM 2 6.6%  1. hyperglycemia protocol  2. levemir, correctiona

## 2021-07-20 NOTE — CM/SW NOTE
Care Progression Note:  Active Acute Medical Issue:   Acute on chronic pancreatitis [facility])     POD 15 exploratory laparotomy with lysis of adhesions  Thrombocytosis    Other Contributing Medical Factors/Dx. : abdominal distention, ileus, anemia, thrombocytos

## 2021-07-20 NOTE — PROGRESS NOTES
[facility]  Progress Note    Paola Blunt Patient Status:  Inpatient    1950 MRN [id number]   [facility] 3NW-A Attending Duke Soni MD   Hosp Day # 25 PCP Gabrielle Huitron MD     Subjective:   The patient states that he is f cancer [facility]     Bilateral kidney stones     CKD (chronic kidney disease) stage 3, GFR 30-59 ml/min (HCC)     Abnormality of thoracic aorta     Community acquired pneumonia of right middle lobe of lung     Pneumonia of right lower lobe due to infectious or

## 2021-07-20 NOTE — DIETARY NOTE
[facility]  NUTRITION ASSESSMENT    Pt does not meet malnutrition criteria. NUTRITION INTERVENTION:  1. Meal and Snacks - Advance as tolerated to least restrictive diet monitor patient po intake. Encourage adequate po of appropriate diet.   2. Medic 07/16/21 0344 58.9 kg (129 lb 12.8 oz)     Wt Readings from Last 10 Encounters:  07/19/21 : 54.4 kg (120 lb)  06/22/21 : 61.7 kg (136 lb)  04/08/21 : 61.1 kg (134 lb 12.8 oz)  03/28/21 : 63.5 kg (140 lb)  03/24/21 : 60 kg (132 lb 3.2 oz)  03/19/21 : 64. 5

## 2021-07-21 LAB
ANION GAP SERPL CALC-SCNC: 2 MMOL/L (ref 0–18)
BASOPHILS # BLD AUTO: 0.06 X10(3) UL (ref 0–0.2)
BASOPHILS NFR BLD AUTO: 0.4 %
BUN BLD-MCNC: 31 MG/DL (ref 7–18)
BUN/CREAT SERPL: 22.8 (ref 10–20)
CALCIUM BLD-MCNC: 9.7 MG/DL (ref 8.5–10.1)
CHLORIDE SERPL-SCNC: 110 MMOL/L (ref 98–112)
CO2 SERPL-SCNC: 26 MMOL/L (ref 21–32)
CREAT BLD-MCNC: 1.36 MG/DL
DEPRECATED RDW RBC AUTO: 49.6 FL (ref 35.1–46.3)
EOSINOPHIL # BLD AUTO: 0.31 X10(3) UL (ref 0–0.7)
EOSINOPHIL NFR BLD AUTO: 2.2 %
ERYTHROCYTE [DISTWIDTH] IN BLOOD BY AUTOMATED COUNT: 16 % (ref 11–15)
GLUCOSE BLD-MCNC: 120 MG/DL (ref 70–99)
GLUCOSE BLD-MCNC: 144 MG/DL (ref 70–99)
GLUCOSE BLD-MCNC: 209 MG/DL (ref 70–99)
GLUCOSE BLD-MCNC: 222 MG/DL (ref 70–99)
GLUCOSE BLD-MCNC: 224 MG/DL (ref 70–99)
GLUCOSE BLD-MCNC: 253 MG/DL (ref 70–99)
HAV IGM SER QL: 1.8 MG/DL (ref 1.6–2.6)
HCT VFR BLD AUTO: 25.7 %
HGB BLD-MCNC: 8.8 G/DL
IMM GRANULOCYTES # BLD AUTO: 0.15 X10(3) UL (ref 0–1)
IMM GRANULOCYTES NFR BLD: 1.1 %
LYMPHOCYTES # BLD AUTO: 2.29 X10(3) UL (ref 1–4)
LYMPHOCYTES NFR BLD AUTO: 16.4 %
MCH RBC QN AUTO: 29.5 PG (ref 26–34)
MCHC RBC AUTO-ENTMCNC: 34.2 G/DL (ref 31–37)
MCV RBC AUTO: 86.2 FL
MONOCYTES # BLD AUTO: 2.22 X10(3) UL (ref 0.1–1)
MONOCYTES NFR BLD AUTO: 15.9 %
NEUTROPHILS # BLD AUTO: 8.9 X10 (3) UL (ref 1.5–7.7)
NEUTROPHILS # BLD AUTO: 8.9 X10(3) UL (ref 1.5–7.7)
NEUTROPHILS NFR BLD AUTO: 64 %
OSMOLALITY SERPL CALC.SUM OF ELEC: 294 MOSM/KG (ref 275–295)
PHOSPHATE SERPL-MCNC: 2.8 MG/DL (ref 2.5–4.9)
PLATELET # BLD AUTO: 1037 10(3)UL (ref 150–450)
POTASSIUM SERPL-SCNC: 4.2 MMOL/L (ref 3.5–5.1)
RBC # BLD AUTO: 2.98 X10(6)UL
SODIUM SERPL-SCNC: 138 MMOL/L (ref 136–145)
WBC # BLD AUTO: 13.9 X10(3) UL (ref 4–11)

## 2021-07-21 PROCEDURE — 99233 SBSQ HOSP IP/OBS HIGH 50: CPT | Performed by: HOSPITALIST

## 2021-07-21 NOTE — DIETARY NOTE
Loi 14 FOLLOW UP    [place]     ?  Parenteral Nutrition - Tonight's TPN (Bag #10) to provide: 1680NPC (1080 dextrose calories, 600 lipid calories), 30% lipids, 80 grams protein in 2000mL fluid and total 2000 kcal

## 2021-07-21 NOTE — PROGRESS NOTES
[facility]  Progress Note     Ludivina Apple Patient Status:  Inpatient    1950 MRN [id number]   [facility] 3NW-A Attending Dr. Anisha Lopez Day # 23 PCP Micheline Staples MD     Chief Complaint: Abdominal distention    S: Pt 9. 7   ALB 2.4*  --  2.6*  --  2.7*  --   --       < > 135*   < > 137 139 138   K 4.7   < > 5.5*   < > 4.9 4.5 4.2      < > 105   < > 106 108 110   CO2 25.0   < > 25.0   < > 28.0 27.0 26.0   ALKPHO 98  --  113  --  134*  --   --    AST 31  --  2 imaging reviewed  2. Anemia- Secondary to acute blood loss from hematoma. 1. S/p PRBCs. Hgb stable  3. Thrombocytosis Likely reactive from intraabdominal process  1. Trend, stable today  4. Chronic pancreatitis  5. Acute on chronic pain  1.  Fentanyl patch

## 2021-07-21 NOTE — PROGRESS NOTES
Acute Pain Service     PCA Follow-up Note     POD#16 s/p exp lap with IVETT     Patient trialed Hurricane Spray today for NGT discomfort and is disappointed that it is not helping.  He states he has not abdominal pain but the NGT is intolerable.        PERTIN

## 2021-07-21 NOTE — PROGRESS NOTES
Pt is Ax4, vss, afebrile, NPO w/ ice Q8H, /Tele-ST, voids freely, abdomen soft/non-distended/rounded, hypoactive bowel sounds, tender to the left of midline incision.  NGT to LIS draining green bile, pt c/o increased pain to throat and has requested PRN

## 2021-07-21 NOTE — PROGRESS NOTES
Acute Pain Service     PCA Follow-up Note     POD#15 s/p exp lap with IVETT     Patient states his abdominal pain is 5/10 at rest 7/10 with ambulation but states the NGT bothers him the most - NPO with NGT LIS. Small BM w/ suppository - still on TPN.

## 2021-07-21 NOTE — PLAN OF CARE
Patient A/O X 4, VSS, TPN infusing per orders. Patient using dilaudid PCA for pain. Patient C/O nose and throat pain not relieved by throat spray, surgery notified, will see patient.  Patient reports that he had BM yesterday and this am. Ambulating short di nonpharmacologic comfort measures as appropriate  - Advance diet as tolerated, if ordered  - Obtain nutritional consult as needed  - Evaluate fluid balance  Outcome: Progressing  Goal: Maintains or returns to baseline bowel function  Description: INTERVENT Control  Goal: Glucose maintained within prescribed range  Description: INTERVENTIONS:  - Monitor Blood Glucose as ordered  - Assess for signs and symptoms of hyperglycemia and hypoglycemia  - Administer ordered medications to maintain glucose within targe

## 2021-07-21 NOTE — PROGRESS NOTES
[facility]  Progress Note    Charlee Latham Patient Status:  Inpatient    1950 MRN [id number]   [facility] 3NW-A Attending Vani Almaraz MD   Hosp Day # 23 PCP Elias Ren MD     Subjective:   The patient is very frustrated COPD (chronic obstructive pulmonary disease) (HCC)     Vitamin D deficiency     Chronic narcotic dependence (HCC)     Steatorrhea     Prostate cancer ([facility] [address]     Bilateral kidney stones     CKD (chronic kidney disease) stage 3, GFR 30-59 ml/min ([facility] [address]     Abn percussion. Mild distention. NG tube in place with bilious drainage.    -We will clamp NG tube now and keep clamped until tomorrow.   We will place back to wall suction for any increasing abdominal pain, increasing distention, nausea, vomiting.  -We will

## 2021-07-22 ENCOUNTER — APPOINTMENT (OUTPATIENT)
Dept: GENERAL RADIOLOGY | Facility: HOSPITAL | Age: 71
DRG: 336 | End: 2021-07-22
Attending: COLON & RECTAL SURGERY
Payer: MEDICARE

## 2021-07-22 LAB
ALBUMIN SERPL-MCNC: 2.2 G/DL (ref 3.4–5)
ALBUMIN/GLOB SERPL: 0.4 {RATIO} (ref 1–2)
ALP LIVER SERPL-CCNC: 212 U/L
ALT SERPL-CCNC: 36 U/L
ANION GAP SERPL CALC-SCNC: 2 MMOL/L (ref 0–18)
AST SERPL-CCNC: 29 U/L (ref 15–37)
BILIRUB SERPL-MCNC: 0.9 MG/DL (ref 0.1–2)
BUN BLD-MCNC: 24 MG/DL (ref 7–18)
BUN/CREAT SERPL: 20.5 (ref 10–20)
CALCIUM BLD-MCNC: 9.3 MG/DL (ref 8.5–10.1)
CHLORIDE SERPL-SCNC: 110 MMOL/L (ref 98–112)
CO2 SERPL-SCNC: 27 MMOL/L (ref 21–32)
CREAT BLD-MCNC: 1.17 MG/DL
GLOBULIN PLAS-MCNC: 5.4 G/DL (ref 2.8–4.4)
GLUCOSE BLD-MCNC: 162 MG/DL (ref 70–99)
GLUCOSE BLD-MCNC: 167 MG/DL (ref 70–99)
GLUCOSE BLD-MCNC: 252 MG/DL (ref 70–99)
GLUCOSE BLD-MCNC: 263 MG/DL (ref 70–99)
HAV IGM SER QL: 1.7 MG/DL (ref 1.6–2.6)
M PROTEIN MFR SERPL ELPH: 7.6 G/DL (ref 6.4–8.2)
OSMOLALITY SERPL CALC.SUM OF ELEC: 296 MOSM/KG (ref 275–295)
PHOSPHATE SERPL-MCNC: 2.9 MG/DL (ref 2.5–4.9)
POTASSIUM SERPL-SCNC: 4.1 MMOL/L (ref 3.5–5.1)
SODIUM SERPL-SCNC: 139 MMOL/L (ref 136–145)

## 2021-07-22 PROCEDURE — 99233 SBSQ HOSP IP/OBS HIGH 50: CPT | Performed by: HOSPITALIST

## 2021-07-22 PROCEDURE — 74019 RADEX ABDOMEN 2 VIEWS: CPT | Performed by: COLON & RECTAL SURGERY

## 2021-07-22 RX ORDER — LIDOCAINE HYDROCHLORIDE 20 MG/ML
1 JELLY TOPICAL AS NEEDED
Status: DISCONTINUED | OUTPATIENT
Start: 2021-07-22 | End: 2021-08-04

## 2021-07-22 RX ORDER — METOCLOPRAMIDE HYDROCHLORIDE 5 MG/ML
10 INJECTION INTRAMUSCULAR; INTRAVENOUS
Status: DISCONTINUED | OUTPATIENT
Start: 2021-07-22 | End: 2021-07-26

## 2021-07-22 RX ORDER — MAGNESIUM SULFATE HEPTAHYDRATE 40 MG/ML
2 INJECTION, SOLUTION INTRAVENOUS ONCE
Status: COMPLETED | OUTPATIENT
Start: 2021-07-22 | End: 2021-07-22

## 2021-07-22 NOTE — PROGRESS NOTES
[facility]  Progress Note    Luis Jones Patient Status:  Inpatient    1950 MRN [id number]   [facility] 3NW-A Attending Reyna Marquez MD   Hosp Day # 21 PCP Benito Florian MD     Subjective:   The patient is doing well this deficiency     Chronic narcotic dependence ([facility] [address]     Steatorrhea     Prostate cancer ([facility] [address]     Bilateral kidney stones     CKD (chronic kidney disease) stage 3, GFR 30-59 ml/min (HCC)     Abnormality of thoracic aorta     Community acquired pneumonia of ri mild distention. Tympany across upper abdomen. Incision intact with staples. No erythema or drainage.    -Pt doing ok with NGT clamped.  However obstructive series still concerning for at least partial obstruction  discussed with patient and wife that if w

## 2021-07-22 NOTE — PLAN OF CARE
Pt alert and oriented x 4. Flat affect, fatigued and weak. Up with assist. Voiding with no difficulties. Lung sounds clear to diminished on room air. Telemetry running SR to ST. Abdomen soft, but distended. Bowel sounds hypoactive.  Pt denies flatus or naus as ordered and tolerated  - Evaluate effectiveness of GI medications  - Encourage mobilization and activity  - Obtain nutritional consult as needed  - Establish a toileting routine/schedule  - Consider collaborating with pharmacy to review patient's medica consult as needed  - Instruct patient on self management of diabetes  Outcome: Progressing

## 2021-07-22 NOTE — PROGRESS NOTES
[facility]  Progress Note     Ernesto Pugh Patient Status:  Inpatient    1950 MRN [id number]   [facility] 3NW-A Attending Dr. Ninoska Bruno # 21 PCP Jeremy Campos MD     Chief Complaint: Abdominal distention    S: Pt ALB 2.6*  --  2.7*  --   --   --  2.2*   *   < > 137   < > 139 138 139   K 5.5*   < > 4.9   < > 4.5 4.2 4.1      < > 106   < > 108 110 110   CO2 25.0   < > 28.0   < > 27.0 26.0 27.0   ALKPHO 113  --  134*  --   --   --  212*   AST 21  --  20 from hematoma. 1. S/p PRBCs. Hgb stable  3. Thrombocytosis Likely reactive from intraabdominal process  1. Trend, stable today  4. Chronic pancreatitis  5. Acute on chronic pain  1. Fentanyl patch, PCA - pain service following  6. JONO, improved w/ IVF  1.

## 2021-07-22 NOTE — CM/SW NOTE
Discussed pt during daily rounds. Currently on clamping trial. Will have obstructive series done today. PT recs were for [facility] but pt had declined previously per SW notes. RN concerned, pt is deconditioned. Will need PT to see again.    Sent referrals

## 2021-07-22 NOTE — PROGRESS NOTES
Acute Pain Service    PCA Follow-up Note    POD #17 s/p exp lap with IVETT. Pt resting in bed, wife at bedside. Pt states he has constant discomfort from the NGT. He states they can't remove it yet.  It is clamped now and it gets hooked to suction intermit

## 2021-07-22 NOTE — PLAN OF CARE
Pt upset over current status, not having NG removed. NG clamped since 18:30 last night. NF intact, remains clamped. Denies nausea. Abd soft. Reports bm yesterday. Supp given per mar order. Rt arm precautions; PICC intact. TPN & IVF.  Using PCA pump, ask INTERVENTIONS:  - Maintain adequate hydration with IV or PO as ordered and tolerated  - Nasogastric tube to low intermittent suction as ordered  - Evaluate effectiveness of ordered antiemetic medications  - Provide nonpharmacologic comfort measures as appr within target range  - Assess barriers to adequate nutritional intake and initiate nutrition consult as needed  - Instruct patient on self management of diabetes  Outcome: Progressing     Problem: Diabetes/Glucose Control  Goal: Glucose maintained within p

## 2021-07-22 NOTE — DIETARY NOTE
Loi 14 FOLLOW UP    [place] Bun     ?  Parenteral Nutrition - Tonight's TPN (Bag #11) to provide: 1680NPC (1080 dextrose calories, 600 lipid calories), 30% lipids, 80 grams protein in 2000mL fluid and total 2000 kcal

## 2021-07-22 NOTE — PROGRESS NOTES
[facility] Pharmacy Note:  Renal Dose Adjustment for Metoclopramide (REGLAN)    Yana Boucher has been renally dosed on Metoclopramide (REGLAN) to 5 mg TID and nightly. Estimated Creatinine Clearance: 44.6 mL/min (based on SCr of 1.17 mg/dL).     Calculated

## 2021-07-23 LAB
ALBUMIN SERPL-MCNC: 2.2 G/DL (ref 3.4–5)
ALBUMIN/GLOB SERPL: 0.4 {RATIO} (ref 1–2)
ALP LIVER SERPL-CCNC: 237 U/L
ALT SERPL-CCNC: 41 U/L
ANION GAP SERPL CALC-SCNC: 3 MMOL/L (ref 0–18)
AST SERPL-CCNC: 24 U/L (ref 15–37)
BASOPHILS # BLD AUTO: 0.06 X10(3) UL (ref 0–0.2)
BASOPHILS NFR BLD AUTO: 0.3 %
BILIRUB SERPL-MCNC: 0.8 MG/DL (ref 0.1–2)
BUN BLD-MCNC: 21 MG/DL (ref 7–18)
BUN/CREAT SERPL: 16.4 (ref 10–20)
CALCIUM BLD-MCNC: 9.8 MG/DL (ref 8.5–10.1)
CHLORIDE SERPL-SCNC: 107 MMOL/L (ref 98–112)
CO2 SERPL-SCNC: 28 MMOL/L (ref 21–32)
CREAT BLD-MCNC: 1.28 MG/DL
DEPRECATED RDW RBC AUTO: 48.2 FL (ref 35.1–46.3)
EOSINOPHIL # BLD AUTO: 0.37 X10(3) UL (ref 0–0.7)
EOSINOPHIL NFR BLD AUTO: 2.1 %
ERYTHROCYTE [DISTWIDTH] IN BLOOD BY AUTOMATED COUNT: 15.9 % (ref 11–15)
GLOBULIN PLAS-MCNC: 5.7 G/DL (ref 2.8–4.4)
GLUCOSE BLD-MCNC: 166 MG/DL (ref 70–99)
GLUCOSE BLD-MCNC: 175 MG/DL (ref 70–99)
GLUCOSE BLD-MCNC: 198 MG/DL (ref 70–99)
GLUCOSE BLD-MCNC: 231 MG/DL (ref 70–99)
GLUCOSE BLD-MCNC: 233 MG/DL (ref 70–99)
HAV IGM SER QL: 1.9 MG/DL (ref 1.6–2.6)
HCT VFR BLD AUTO: 25.7 %
HGB BLD-MCNC: 8.7 G/DL
IMM GRANULOCYTES # BLD AUTO: 0.29 X10(3) UL (ref 0–1)
IMM GRANULOCYTES NFR BLD: 1.7 %
LYMPHOCYTES # BLD AUTO: 2.42 X10(3) UL (ref 1–4)
LYMPHOCYTES NFR BLD AUTO: 13.9 %
M PROTEIN MFR SERPL ELPH: 7.9 G/DL (ref 6.4–8.2)
MCH RBC QN AUTO: 29 PG (ref 26–34)
MCHC RBC AUTO-ENTMCNC: 33.9 G/DL (ref 31–37)
MCV RBC AUTO: 85.7 FL
MONOCYTES # BLD AUTO: 2.5 X10(3) UL (ref 0.1–1)
MONOCYTES NFR BLD AUTO: 14.4 %
NEUTROPHILS # BLD AUTO: 11.73 X10 (3) UL (ref 1.5–7.7)
NEUTROPHILS # BLD AUTO: 11.73 X10(3) UL (ref 1.5–7.7)
NEUTROPHILS NFR BLD AUTO: 67.6 %
OSMOLALITY SERPL CALC.SUM OF ELEC: 293 MOSM/KG (ref 275–295)
PHOSPHATE SERPL-MCNC: 3.2 MG/DL (ref 2.5–4.9)
PLATELET # BLD AUTO: 953 10(3)UL (ref 150–450)
POTASSIUM SERPL-SCNC: 4.1 MMOL/L (ref 3.5–5.1)
RBC # BLD AUTO: 3 X10(6)UL
SODIUM SERPL-SCNC: 138 MMOL/L (ref 136–145)
WBC # BLD AUTO: 17.4 X10(3) UL (ref 4–11)

## 2021-07-23 PROCEDURE — 99233 SBSQ HOSP IP/OBS HIGH 50: CPT | Performed by: HOSPITALIST

## 2021-07-23 RX ORDER — HYDROMORPHONE HYDROCHLORIDE 1 MG/ML
0.5 INJECTION, SOLUTION INTRAMUSCULAR; INTRAVENOUS; SUBCUTANEOUS
Status: DISCONTINUED | OUTPATIENT
Start: 2021-07-23 | End: 2021-07-26

## 2021-07-23 NOTE — PROGRESS NOTES
[facility]  Progress Note     Hortencia Mandaen Patient Status:  Inpatient    1950 MRN [id number]   [facility] 3NW-A Attending Dr. Dennie Martens # 21 PCP Disha Gutierrez MD     Chief Complaint: Abdominal distention    S: Pt 4.1 4.1      < > 110 110 107   CO2 28.0   < > 26.0 27.0 28.0   ALKPHO 134*  --   --  212* 237*   AST 20  --   --  29 24   ALT 31  --   --  36 41   BILT 1.2  --   --  0.9 0.8   TP 8.1  --   --  7.6 7.9    < > = values in this interval not displayed. CBC  4. Leukocytosis, increased, monitor  5. Chronic pancreatitis  6. Acute on chronic pain  1. Fentanyl patch, pain service following  7. JONO, improved w/ IVF  1. IVF reduced to 50cc/hr on 7/22. BMP reviewed  8. Hypertension, stable  9. COPD, stable  10.

## 2021-07-23 NOTE — DIETARY NOTE
[facility]  NUTRITION ASSESSMENT    Pt does not meet malnutrition criteria. NUTRITION INTERVENTION:  1. Meal and Snacks - Advance as tolerated to least restrictive diet monitor patient po intake. Encourage adequate po of appropriate diet.   2. Medic kg (120 lb)   07/18/21 0526 54.6 kg (120 lb 6.4 oz)   07/17/21 0415 57.2 kg (126 lb 3.2 oz)   07/16/21 0344 58.9 kg (129 lb 12.8 oz)     Wt Readings from Last 10 Encounters:  07/19/21 : 54.4 kg (120 lb)  06/22/21 : 61.7 kg (136 lb)  04/08/21 : 61.1 kg (134

## 2021-07-23 NOTE — PROGRESS NOTES
[facility]  Progress Note    Darleensukh Pugh Patient Status:  Inpatient    1950 MRN [id number]   [facility] 3NW-A Attending Ceasar Duarte MD   Hosp Day # 21 PCP Jeremy Campos MD     Subjective:  Mickey Newman is resting in bed upon arri prophylaxis - Heparin  5. GI prophylaxis -Protonix    April Kemp, [place]  7/23/2021  10:22 AM    Addendum:  Dr. Onur Long    I have reviewed the above note and evaluation by the physician assistant.  I agree with her physical exam and the da

## 2021-07-23 NOTE — PAYOR COMM NOTE
--------------  CONTINUED STAY REVIEW    Payor: Kleber Butler 673 #:  Alexandra Junior  Authorization Number: [id number]    Admit date: 7/2/21  Admit time:  1:26 AM    Admitting Physician: Tamika Caruso MD  Attending Physician:  Shy Cruz MD  Pr 7/23/2021 0700 Bolus from Bag 0.4 mg Intravenous Sanam Peng RN    7/22/2021 2100 Bolus from Bag 0.4 mg Intravenous Sanam Peng RN    7/22/2021 1644 Bolus from Bag 0.4 mg Intravenous Vero Carrillo, RN      Insulin Aspart Pen (NOVOLOG) 100 UNIT/ML NOTE  Hosp Day # 20 PCP Sammy Rhoades MD      Subjective: The patient is doing well this morning. He states that he is feeling better. NG tube has been clamped since 2 PM yesterday and he is doing well. There has been no nausea or vomiting.   There has editing her note. I have personally examined the patient and reviewed all relevant labs and reports. I agree with the above assessment and plan and again have modified the report to reflect my opinion.     NGT clamped.  Pt denies abdominal pain or nausea wi PRBCs. Hgb stable  3. Thrombocytosis Likely reactive from intraabdominal process  1. Trend, stable today  4. Chronic pancreatitis  5. Acute on chronic pain  1. Fentanyl patch, PCA - pain service following  6. JONO, improved w/ IVF  1.  IVF reduced to 50cc/hr ALB 2.2 07/23/2021     TP 7.9 07/23/2021         Assessment:  POD 18 exploratory laparotomy with lysis of adhesions     Plan:  1. Encourage ambulation and up to chair - Per RN, patient has only walked 40 feet in the last 24 hours  2.  Continue NG clamped fo

## 2021-07-23 NOTE — PROGRESS NOTES
Acute Pain Service     POD #18 s/p exp lap with IVETT.     Patient denies abdominal pain - complains of NGT and states he is miserable. Wife at bedside.  Patient ambulated in hallway but now sitting in bed - head down - points to neck/throat and states pain i

## 2021-07-23 NOTE — CM/SW NOTE
9:50am  MSW met with Pt at bedside. Pt continues to decline HHC or PAT. Both [facility] and PAT referrals are pending in case change in plan.

## 2021-07-23 NOTE — CONSULTS
[facility]                       Gastroenterology Consultation-[facility]    Karenann Kawasaki Patient Status:  Inpatient    1950 MRN [id number]   [facility] 3NW-A Attending Benito Bell MD   [facility] 6/11/2010   • Multiple risk factors for coronary artery disease 3/28/2019   • Osteoarthritis    • Pain in joints    • Pneumonia due to organism    • Primary localized osteoarthrosis, lower leg 10/2/2013   • Primary localized osteoarthrosis, lower leg, left Continuous  phenol (CHLORASEPTIC) 1.4 % oral liquid spray, , Oral, Q2H PRN  [] adult 3 in 1 tpn, , Intravenous, Continuous TPN  0.9% NaCl infusion, , Intravenous, Continuous  HYDROmorphone (DILAUDID) 0.2 mg/ml PCA infusion, , Intravenous, Continuous mg, Rectal, Daily  [COMPLETED] sodium phosphate 15 mmol in sodium chloride 0.9% 100 mL IVPB, 15 mmol, Intravenous, Once  [COMPLETED] Magnesium Sulfate IVPB premix SOLN 2 g, 2 g, Intravenous, Once  [COMPLETED] lidocaine PF (XYLOCAINE) 1% injection, 5 mL, In mg, Intravenous, Once  Pantoprazole Sodium (PROTONIX) 40 mg in Sodium Chloride (PF) 0.9 % 10 mL IV push, 40 mg, Intravenous, Q24H  Albuterol Sulfate  (90 Base) MCG/ACT inhaler 2 puff, 2 puff, Inhalation, Q6H PRN  Fluticasone Furoate-Vilanterol (BREO Alcohol use: No    Drug use: No        FamHx:    Family History   Problem Relation Age of Onset   • Cancer Mother         pancreatic? • Hypertension Mother          ROS:  In addition to the pertinent positives described above:             Infectious Disea non-distended with the presence of bowel sounds; No hepatosplenomegaly; no rebound or guarding; No ascites is clinically apparent; no tympany to percussion. Large surgical staples in midline.     Ext: No peripheral edema or cyanosis    Skin: Warm and dry and his wife become amenable in the future, we would rediscuss PEG tube placement; I explained that PEG tube placement could be complex and and may not be endoscopically feasible due to the degree of upper abdominal adhesions, multiple surgeries in the pas

## 2021-07-23 NOTE — PLAN OF CARE
A & O x4, depressed. NPO with ice chips. Occasional nausea; reports relief after Reglan administration. Cont b/b. Had a small formed bm at noon. Discussed with wife removing urinal from room to encourage ambulation.  Several attempts were made to get pt out increased pain with activity and pre-medicate as appropriate  Outcome: Progressing     Problem: GASTROINTESTINAL - ADULT  Goal: Minimal or absence of nausea and vomiting  Description: INTERVENTIONS:  - Maintain adequate hydration with IV or PO as ordered a range  Description: INTERVENTIONS:  - Monitor Blood Glucose as ordered  - Assess for signs and symptoms of hyperglycemia and hypoglycemia  - Administer ordered medications to maintain glucose within target range  - Assess barriers to adequate nutritional i

## 2021-07-23 NOTE — PROGRESS NOTES
Pt alert and orient. x4 Pt resting in bed, easy non labored breathing on ra. Vs wnl. Voids adequate amount. pt encouraged to use urinal in bathroom. Pt using bedside. cpox and telemetry in place. Ng clamped. reglan atc iv. Midline staples simona c/d/i.  PCA d

## 2021-07-24 LAB
ALBUMIN SERPL-MCNC: 2.2 G/DL (ref 3.4–5)
ALBUMIN/GLOB SERPL: 0.4 {RATIO} (ref 1–2)
ALP LIVER SERPL-CCNC: 242 U/L
ALT SERPL-CCNC: 37 U/L
ANION GAP SERPL CALC-SCNC: 3 MMOL/L (ref 0–18)
AST SERPL-CCNC: 19 U/L (ref 15–37)
BILIRUB SERPL-MCNC: 0.6 MG/DL (ref 0.1–2)
BUN BLD-MCNC: 20 MG/DL (ref 7–18)
BUN/CREAT SERPL: 16.8 (ref 10–20)
CALCIUM BLD-MCNC: 9.2 MG/DL (ref 8.5–10.1)
CHLORIDE SERPL-SCNC: 109 MMOL/L (ref 98–112)
CO2 SERPL-SCNC: 27 MMOL/L (ref 21–32)
CREAT BLD-MCNC: 1.19 MG/DL
GLOBULIN PLAS-MCNC: 5.4 G/DL (ref 2.8–4.4)
GLUCOSE BLD-MCNC: 137 MG/DL (ref 70–99)
GLUCOSE BLD-MCNC: 199 MG/DL (ref 70–99)
GLUCOSE BLD-MCNC: 206 MG/DL (ref 70–99)
GLUCOSE BLD-MCNC: 216 MG/DL (ref 70–99)
GLUCOSE BLD-MCNC: 218 MG/DL (ref 70–99)
HAV IGM SER QL: 1.8 MG/DL (ref 1.6–2.6)
M PROTEIN MFR SERPL ELPH: 7.6 G/DL (ref 6.4–8.2)
OSMOLALITY SERPL CALC.SUM OF ELEC: 297 MOSM/KG (ref 275–295)
PHOSPHATE SERPL-MCNC: 3.2 MG/DL (ref 2.5–4.9)
POTASSIUM SERPL-SCNC: 3.9 MMOL/L (ref 3.5–5.1)
SODIUM SERPL-SCNC: 139 MMOL/L (ref 136–145)

## 2021-07-24 PROCEDURE — 99232 SBSQ HOSP IP/OBS MODERATE 35: CPT | Performed by: HOSPITALIST

## 2021-07-24 NOTE — VASCULAR ACCESS
VASCULAR NOTE: consult to vascular access for new PICC due to existing PICC inadvertently  Falling out. Discussed options with Lance Lora RN.   Placed 22 gauge EXT PIV in LFA as documented and per Perry Ascencio, who spoke with Dr Leslie Wiggins,  [facility] to place new

## 2021-07-24 NOTE — DIETARY NOTE
Loi 14 FOLLOW UP    Ernesto Pugh     Parenteral Nutrition- via PICC line    *Next TPN to provide: 1080 dextrose calories, 600 lipid calories, 30% lipids, 80 gm protein, and Total Calories: 2000 kcal (100% goal TPN) i

## 2021-07-24 NOTE — PLAN OF CARE
Problem: Patient/Family Goals  Goal: Patient/Family Long Term Goal  Description: Patient's Long Term Goal: able to eat and feel better     Interventions:advance diet as tolerated ,monitor signs of bowel obstruction    - See additional Care Plan goals for as ordered and tolerated  - Evaluate effectiveness of GI medications  - Encourage mobilization and activity  - Obtain nutritional consult as needed  - Establish a toileting routine/schedule  - Consider collaborating with pharmacy to review patient's medica consult as needed  - Instruct patient on self management of diabetes  Outcome: Progressing    Pt is A&O, VSS, with severe c/o pain to throat- medication offered to help with sore throat- pt declined stating \"it does not work\".  Pt is on RA with bilateral

## 2021-07-24 NOTE — PROGRESS NOTES
[facility]  Progress Note     Hortencia Christian Patient Status:  Inpatient    1950 MRN [id number]   [facility] 3NW-A Attending Dr. Valentina Huynh Day # 25 PCP Disha Gutierrez MD     Chief Complaint: Abdominal distention    S: fee Creatinine Clearance: 43.8 mL/min (based on SCr of 1.19 mg/dL). No results for input(s): PTP, INR in the last 168 hours.          COVID-19 Lab Results    COVID-19  Lab Results   Component Value Date    COVID19 Not Detected 07/05/2021    COVID19 Not Detec Increase TPN insulin to 52 units today    Quality:  · DVT Prophylaxis: heparin  · CODE status: Full  · If COVID testing is negative, may discontinue isolation: yes     Will the patient be referred to TCC on discharge?: no  Estimated date of discharge: tbd

## 2021-07-24 NOTE — PROGRESS NOTES
Patient called writting RN and said PICC line fell out when he was getting back from bedside chair. When writting RN walked into patient's room, picc line in his hand and out of arm. New occlusive dressing placed to old PICC line site and c/d/i.  Tip of PIC

## 2021-07-24 NOTE — CM/SW NOTE
At this time the NG tube can not be removed. Pt currently on TPN. Not sure if this will be needed at [place].  to remain available.

## 2021-07-24 NOTE — PROGRESS NOTES
[facility]     Luis Jones Patient Status:  Inpatient    1950 MRN [id number]   [facility] 3NW-A Attending Reyna Marquez MD   [facility] Day # 25 PCP Benito Florian MD 07/21/21  0521 07/23/21  0656   WBC 15.0* 13.5* 12.4* 13.9* 17.4*   HGB 8.9* 9.3* 9.0* 8.8* 8.7*   MCV 86.1 84.7 85.3 86.2 85.7   .0* 1,031.0* 1,032.0* 1,037.0* 953.0*       Recent Labs   Lab 07/22/21  0530 07/23/21  0656 07/24/21  1101   * 1 this time  -If patient and his wife become amenable in the future, we would rediscuss PEG tube placement; I re-iterated that PEG tube placement is complex and higher risk given adhesions,  and may not be endoscopically feasible due to the degree of upper a

## 2021-07-25 ENCOUNTER — APPOINTMENT (OUTPATIENT)
Dept: CT IMAGING | Facility: HOSPITAL | Age: 71
DRG: 336 | End: 2021-07-25
Attending: COLON & RECTAL SURGERY
Payer: MEDICARE

## 2021-07-25 LAB
ALBUMIN SERPL-MCNC: 2.2 G/DL (ref 3.4–5)
ALBUMIN/GLOB SERPL: 0.4 {RATIO} (ref 1–2)
ALP LIVER SERPL-CCNC: 257 U/L
ALT SERPL-CCNC: 37 U/L
ANION GAP SERPL CALC-SCNC: 2 MMOL/L (ref 0–18)
AST SERPL-CCNC: 22 U/L (ref 15–37)
BASOPHILS # BLD AUTO: 0.05 X10(3) UL (ref 0–0.2)
BASOPHILS NFR BLD AUTO: 0.3 %
BILIRUB SERPL-MCNC: 1 MG/DL (ref 0.1–2)
BUN BLD-MCNC: 22 MG/DL (ref 7–18)
BUN/CREAT SERPL: 16.1 (ref 10–20)
CALCIUM BLD-MCNC: 9.7 MG/DL (ref 8.5–10.1)
CHLORIDE SERPL-SCNC: 110 MMOL/L (ref 98–112)
CO2 SERPL-SCNC: 28 MMOL/L (ref 21–32)
CREAT BLD-MCNC: 1.37 MG/DL
DEPRECATED RDW RBC AUTO: 51.2 FL (ref 35.1–46.3)
EOSINOPHIL # BLD AUTO: 0.47 X10(3) UL (ref 0–0.7)
EOSINOPHIL NFR BLD AUTO: 2.6 %
ERYTHROCYTE [DISTWIDTH] IN BLOOD BY AUTOMATED COUNT: 16.2 % (ref 11–15)
GLOBULIN PLAS-MCNC: 5.9 G/DL (ref 2.8–4.4)
GLUCOSE BLD-MCNC: 127 MG/DL (ref 70–99)
GLUCOSE BLD-MCNC: 137 MG/DL (ref 70–99)
GLUCOSE BLD-MCNC: 141 MG/DL (ref 70–99)
GLUCOSE BLD-MCNC: 143 MG/DL (ref 70–99)
GLUCOSE BLD-MCNC: 154 MG/DL (ref 70–99)
HAV IGM SER QL: 1.8 MG/DL (ref 1.6–2.6)
HCT VFR BLD AUTO: 27 %
HGB BLD-MCNC: 8.9 G/DL
IMM GRANULOCYTES # BLD AUTO: 0.31 X10(3) UL (ref 0–1)
IMM GRANULOCYTES NFR BLD: 1.7 %
LYMPHOCYTES # BLD AUTO: 2.84 X10(3) UL (ref 1–4)
LYMPHOCYTES NFR BLD AUTO: 16 %
M PROTEIN MFR SERPL ELPH: 8.1 G/DL (ref 6.4–8.2)
MCH RBC QN AUTO: 28.8 PG (ref 26–34)
MCHC RBC AUTO-ENTMCNC: 33 G/DL (ref 31–37)
MCV RBC AUTO: 87.4 FL
MONOCYTES # BLD AUTO: 2.41 X10(3) UL (ref 0.1–1)
MONOCYTES NFR BLD AUTO: 13.6 %
NEUTROPHILS # BLD AUTO: 11.68 X10 (3) UL (ref 1.5–7.7)
NEUTROPHILS # BLD AUTO: 11.68 X10(3) UL (ref 1.5–7.7)
NEUTROPHILS NFR BLD AUTO: 65.8 %
OSMOLALITY SERPL CALC.SUM OF ELEC: 295 MOSM/KG (ref 275–295)
PHOSPHATE SERPL-MCNC: 3.7 MG/DL (ref 2.5–4.9)
PLATELET # BLD AUTO: 656 10(3)UL (ref 150–450)
POTASSIUM SERPL-SCNC: 4.4 MMOL/L (ref 3.5–5.1)
RBC # BLD AUTO: 3.09 X10(6)UL
SODIUM SERPL-SCNC: 140 MMOL/L (ref 136–145)
WBC # BLD AUTO: 17.8 X10(3) UL (ref 4–11)

## 2021-07-25 PROCEDURE — 99232 SBSQ HOSP IP/OBS MODERATE 35: CPT | Performed by: HOSPITALIST

## 2021-07-25 PROCEDURE — 74177 CT ABD & PELVIS W/CONTRAST: CPT | Performed by: COLON & RECTAL SURGERY

## 2021-07-25 RX ORDER — MAGNESIUM SULFATE HEPTAHYDRATE 40 MG/ML
2 INJECTION, SOLUTION INTRAVENOUS ONCE
Status: COMPLETED | OUTPATIENT
Start: 2021-07-25 | End: 2021-07-25

## 2021-07-25 NOTE — PLAN OF CARE
Problem: Patient/Family Goals  Goal: Patient/Family Long Term Goal  Description: Patient's Long Term Goal: able to eat and feel better     Interventions:advance diet as tolerated ,monitor signs of bowel obstruction    - See additional Care Plan goals for as ordered and tolerated  - Evaluate effectiveness of GI medications  - Encourage mobilization and activity  - Obtain nutritional consult as needed  - Establish a toileting routine/schedule  - Consider collaborating with pharmacy to review patient's medica consult as needed  - Instruct patient on self management of diabetes  Outcome: Progressing    Pt is A&O, irritable, VSS, with severe c/o pain to throat and abdomen. IS at bedside encouraged. Accu check Q6. NSR/ ST on telemetry.  Abdomen is soft and nontend

## 2021-07-25 NOTE — PROGRESS NOTES
[facility]  Progress Note     Debra Miranda Patient Status:  Inpatient    1950 MRN [id number]   [facility] 3NW-A Attending Dr. Misty Christensen Day # 21 PCP Fer Fry MD     Chief Complaint: Abdominal distention    S: fee 28.0   ALKPHO 237* 242* 257*   AST 24 19 22   ALT 41 37 37   BILT 0.8 0.6 1.0   TP 7.9 7.6 8.1       Estimated Creatinine Clearance: 38.1 mL/min (A) (based on SCr of 1.37 mg/dL (H)). No results for input(s): PTP, INR in the last 168 hours.          COVID 6. 6%  1. hyperglycemia protocol  2. correctional scale       Quality:  · DVT Prophylaxis: heparin  · CODE status: Full  · If COVID testing is negative, may discontinue isolation: yes     Will the patient be referred to TCC on discharge?: no  Estimated date

## 2021-07-25 NOTE — PROGRESS NOTES
8289: Call received from FELICITAS HAMMONDS - per MD, no emergent PICC placement will be done today, MD reported that he spoke with Hospitalist yesterday and both were in agreement that PICC will not be placed until Monday 7/26 with Vascular Access staff.  This RN notif

## 2021-07-25 NOTE — DIETARY NOTE
[address] 14 FOLLOW UP    Daniella Palencia     Parenteral Nutrition- PICC line fell out. Unable to replace PICC line until Monday. No central access for TPN to infuse on Sat (7/24) and Sun (7/25).  Per conversation with RN, ext

## 2021-07-25 NOTE — PLAN OF CARE
IVF paused; sent in wheelchair to CT. Returned to unit. Ambulated to bed. Denies nausea. NG returned to LIS. Extended to PIV to LIS. Msg sent to IR page for possible PICC placement today.      Problem: Patient/Family Goals  Goal: Patient/Family Long Evaluate fluid balance  Outcome: Not Progressing  Goal: Maintains or returns to baseline bowel function  Description: INTERVENTIONS:  - Assess bowel function  - Maintain adequate hydration with IV or PO as ordered and tolerated  - Evaluate effectiveness of Assess for signs and symptoms of hyperglycemia and hypoglycemia  - Administer ordered medications to maintain glucose within target range  - Assess barriers to adequate nutritional intake and initiate nutrition consult as needed  - Instruct patient on self

## 2021-07-25 NOTE — PROGRESS NOTES
[address] [facility]     Rea Sen Patient Status:  Inpatient    1950 MRN [id number]   [facility] 3NW-A Attending Natan Bragg MD   [facility] Day # 21 PCP Oren Thomson MD 07/21/21  0521 07/23/21  0656 07/25/21  0515   WBC 13.5* 12.4* 13.9* 17.4* 17.8*   HGB 9.3* 9.0* 8.8* 8.7* 8.9*   MCV 84.7 85.3 86.2 85.7 87.4   PLT 1,031.0* 1,032.0* 1,037.0* 953.0* 656.0*       Recent Labs   Lab 07/23/21  0656 07/24/21  1101 07/25/21  05 would like to avoid this at this time    -If patient and his wife become amenable in the future, we would rediscuss PEG tube placement; I re-iterated that PEG tube placement is complex and higher risk given adhesions,  and may not be endoscopically feasibl

## 2021-07-25 NOTE — PROGRESS NOTES
[facility]  Progress Note    Ramos Mims Patient Status:  Inpatient    1950 MRN [id number]   [facility] 3NW-A Attending Ally Woods MD   Hosp Day # 23 PCP Berry Hall MD     Subjective: The patient is resting in bed.  Cholecystectomy. PANCREAS:  The pancreas looks atrophic.  The pancreatic head is difficult to visualize due to surrounding bowel loops.  There are probably pancreatic calcifications consistent with chronic pancreatitis.    SPLEEN:  No enlargement or foca  The colon is nondistended. .       Central mesenteric hyperdense hematoma is now consistent with a seroma and smaller measuring 3.3 cm AP x 7.5 cm wide previously 5.1 cm AP x 8.1 cm wide.       Small pericardial effusion.  Smaller.  Resolution of free flui coordination of care. The diagnosis, prognosis, and general treatment was explained to the patient and the family. Ronan Brody PA-C  7/25/2021  8:48 AM  Patient seen and examined. States he has had multiple bowel movements since yesterday.   He

## 2021-07-26 LAB
ALBUMIN SERPL-MCNC: 2.1 G/DL (ref 3.4–5)
ALBUMIN/GLOB SERPL: 0.4 {RATIO} (ref 1–2)
ALP LIVER SERPL-CCNC: 246 U/L
ALT SERPL-CCNC: 33 U/L
ANION GAP SERPL CALC-SCNC: 5 MMOL/L (ref 0–18)
AST SERPL-CCNC: 24 U/L (ref 15–37)
BILIRUB SERPL-MCNC: 0.9 MG/DL (ref 0.1–2)
BUN BLD-MCNC: 24 MG/DL (ref 7–18)
BUN/CREAT SERPL: 17.9 (ref 10–20)
CALCIUM BLD-MCNC: 9.6 MG/DL (ref 8.5–10.1)
CHLORIDE SERPL-SCNC: 113 MMOL/L (ref 98–112)
CO2 SERPL-SCNC: 23 MMOL/L (ref 21–32)
CREAT BLD-MCNC: 1.34 MG/DL
GLOBULIN PLAS-MCNC: 5.7 G/DL (ref 2.8–4.4)
GLUCOSE BLD-MCNC: 128 MG/DL (ref 70–99)
GLUCOSE BLD-MCNC: 132 MG/DL (ref 70–99)
GLUCOSE BLD-MCNC: 135 MG/DL (ref 70–99)
GLUCOSE BLD-MCNC: 221 MG/DL (ref 70–99)
HAV IGM SER QL: 2.1 MG/DL (ref 1.6–2.6)
M PROTEIN MFR SERPL ELPH: 7.8 G/DL (ref 6.4–8.2)
OSMOLALITY SERPL CALC.SUM OF ELEC: 298 MOSM/KG (ref 275–295)
PHOSPHATE SERPL-MCNC: 3.8 MG/DL (ref 2.5–4.9)
POTASSIUM SERPL-SCNC: 4.4 MMOL/L (ref 3.5–5.1)
SODIUM SERPL-SCNC: 141 MMOL/L (ref 136–145)
TRIGL SERPL-MCNC: 122 MG/DL (ref 30–149)

## 2021-07-26 PROCEDURE — 99232 SBSQ HOSP IP/OBS MODERATE 35: CPT | Performed by: HOSPITALIST

## 2021-07-26 PROCEDURE — 90792 PSYCH DIAG EVAL W/MED SRVCS: CPT | Performed by: OTHER

## 2021-07-26 PROCEDURE — 02HV33Z INSERTION OF INFUSION DEVICE INTO SUPERIOR VENA CAVA, PERCUTANEOUS APPROACH: ICD-10-PCS | Performed by: HOSPITALIST

## 2021-07-26 RX ORDER — FENTANYL 75 UG/H
1 PATCH TRANSDERMAL
Status: DISCONTINUED | OUTPATIENT
Start: 2021-07-26 | End: 2021-08-04

## 2021-07-26 RX ORDER — SODIUM CHLORIDE 9 MG/ML
INJECTION, SOLUTION INTRAVENOUS CONTINUOUS
Status: DISCONTINUED | OUTPATIENT
Start: 2021-07-26 | End: 2021-08-04

## 2021-07-26 RX ORDER — LIDOCAINE HYDROCHLORIDE 10 MG/ML
5 INJECTION, SOLUTION EPIDURAL; INFILTRATION; INTRACAUDAL; PERINEURAL ONCE
Status: COMPLETED | OUTPATIENT
Start: 2021-07-26 | End: 2021-07-26

## 2021-07-26 RX ORDER — HYDROMORPHONE HYDROCHLORIDE 1 MG/ML
0.5 INJECTION, SOLUTION INTRAMUSCULAR; INTRAVENOUS; SUBCUTANEOUS EVERY 4 HOURS PRN
Status: DISCONTINUED | OUTPATIENT
Start: 2021-07-26 | End: 2021-08-03

## 2021-07-26 NOTE — PROGRESS NOTES
Acute Pain Service    POD#21 s/p exp lap / IVETT  Ileus persists - NGT to LIS   CT shows improvement in hematoma but bowel obstruction persists.      Patient reports minimal abdominal pain 4/10 at times but his biggest complaint is pain from NGT - \"left neck

## 2021-07-26 NOTE — PLAN OF CARE
Problem: Patient/Family Goals  Goal: Patient/Family Long Term Goal  Description: Patient's Long Term Goal: able to eat and feel better     Interventions:advance diet as tolerated ,monitor signs of bowel obstruction    - See additional Care Plan goals for as ordered and tolerated  - Evaluate effectiveness of GI medications  - Encourage mobilization and activity  - Obtain nutritional consult as needed  - Establish a toileting routine/schedule  - Consider collaborating with pharmacy to review patient's medica consult as needed  - Instruct patient on self management of diabetes  Outcome: Progressing     Pt is A&O, VSS, with severe c/o pain to abdomen and throat- medication given. Pt is on RA with bilateral diminished lung sounds, . IS at bedside encouraged.  Lisa Alegre

## 2021-07-26 NOTE — CM/SW NOTE
DEBI spoke w/MD regarding pt in regards to TPN plan. He stated the pt is not able to get surgery for a possible PEG or PEJ for about 3 months. MD stating the pt will need TPN for at least 3 months/until the pt is able to get surgery.  Pt's PICC line fell out

## 2021-07-26 NOTE — PROGRESS NOTES
[facility]  Progress Note    Deneen Ing Patient Status:  Inpatient    1950 MRN [id number]   [facility] 3NW-A Attending Burgess Kareem MD   Hosp Day # 25 PCP Moisés Gates MD     Subjective:  Patient denies abdominal pain. loss anemia    POD 21 exploratory laparotomy with lysis of adhesions  -CT scan yesterday shows ongoing dilation of the proximal small intestine with decompressed distal small intestine. Improvement in intra-abdominal fluid and hematoma.   Findings still garza

## 2021-07-26 NOTE — DIETARY NOTE
Loi 14 FOLLOW UP    Ernesto Pugh     Parenteral Nutrition- PICC replaced  *Goal TPN to provide: 1080 dextrose calories, 600 lipid calories, 30% lipids, 80 gm protein, and Total Calories: 2000 kcal (100% goal TPN) in

## 2021-07-26 NOTE — CONSULTS
[facility]  Report of Psychiatric Consultation    Daniella Palencia Patient Status:  Inpatient    1950 MRN [id number]   [facility] 3NW-A Attending Vanessa Corona MD   Hosp Day # 25 PCP Jennifer Torrez MD     Date of Admission:  was admitted on 7/1/21 for eval for his severe abd pain. He has a hx of of chronic pancreatitis (per patient, due to congential reasons and his alcohol use in his 18's) and is c/p lateral pancreatico-jejunostomy. He has had chronic abd pain for 30 yrs. Arthritis    • Asthma     OUTGREW AT 14Y/O   • Back pain    • Calculus of kidney    • Chronic abdominal pain 1/7/2014   • Chronic pain 1/7/2014   • [facility]     for pancreatitis flare ups   • COPD (chronic obstructive pulmonary disease) ( ago. His smoking use included cigarettes. He has a 35.00 pack-year smoking history. He has never used smokeless tobacco. He reports that he does not drink alcohol and does not use drugs. Allergies:     Ace Inhibitors          ANGIOEDEMA    Medications: Glucose-Vitamin C (DEX-4) chewable tab 4 tablet, 4 tablet, Oral, Q15 Min PRN **OR** dextrose 50 % injection 50 mL, 50 mL, Intravenous, Q15 Min PRN **OR** glucose (DEX4) oral liquid 30 g, 30 g, Oral, Q15 Min PRN **OR** Glucose-Vitamin C (DEX-4) chewable tab ONLY) (CPT=74177)       COMPARISON:  [facility] , CT, CT ABDOMEN+PELVIS(CPT=74176), 7/19/2021, 1:44 PM.  [facility] , XR, XR ABDOMEN OBSTRUCTIVE SERIES ROUTINE(2 VW)(CPT=74019), 7/22/2021, 10:03 AM.       INDICATIONS:  small bowel obstruction vs ileus       TECHNI ileus   effect or obstruction.  Overall appearance of the small bowel is similar.  There is no pneumatosis. Bethena [place] is no free air.  NG tube is within the stomach.  The distal stomach is not visualized.  Appendectomy.    ABDOMINAL WALL:  No mass or hernia.

## 2021-07-26 NOTE — PROGRESS NOTES
[facility]  Progress Note     Jignesh Donaldo Patient Status:  Inpatient    1950 MRN [id number]   [facility] 3NW-A Attending Dr. Hines  Day # 25 PCP Lauren Fraser MD     Chief Complaint: Abdominal distention    S: pt 0.9   TP 7.6 8.1 7.8       Estimated Creatinine Clearance: 38.9 mL/min (A) (based on SCr of 1.34 mg/dL (H)). No results for input(s): PTP, INR in the last 168 hours.          COVID-19 Lab Results    COVID-19  Lab Results   Component Value Date    COVID19 stable  12.  DM 2 6.6%  1. hyperglycemia protocol  2. correctional scale + 52 units of insulin in TPN    Quality:  · DVT Prophylaxis: heparin  · CODE status: Full  · If COVID testing is negative, may discontinue isolation: yes     Will the patient be referr

## 2021-07-27 ENCOUNTER — APPOINTMENT (OUTPATIENT)
Dept: GENERAL RADIOLOGY | Facility: HOSPITAL | Age: 71
DRG: 336 | End: 2021-07-27
Attending: STUDENT IN AN ORGANIZED HEALTH CARE EDUCATION/TRAINING PROGRAM
Payer: MEDICARE

## 2021-07-27 LAB
ALBUMIN SERPL-MCNC: 2.2 G/DL (ref 3.4–5)
ALBUMIN/GLOB SERPL: 0.4 {RATIO} (ref 1–2)
ALP LIVER SERPL-CCNC: 242 U/L
ALT SERPL-CCNC: 30 U/L
ANION GAP SERPL CALC-SCNC: 4 MMOL/L (ref 0–18)
AST SERPL-CCNC: 17 U/L (ref 15–37)
BASOPHILS # BLD AUTO: 0.05 X10(3) UL (ref 0–0.2)
BASOPHILS NFR BLD AUTO: 0.3 %
BILIRUB SERPL-MCNC: 0.5 MG/DL (ref 0.1–2)
BUN BLD-MCNC: 24 MG/DL (ref 7–18)
BUN/CREAT SERPL: 18.6 (ref 10–20)
CALCIUM BLD-MCNC: 9.4 MG/DL (ref 8.5–10.1)
CHLORIDE SERPL-SCNC: 112 MMOL/L (ref 98–112)
CO2 SERPL-SCNC: 25 MMOL/L (ref 21–32)
CREAT BLD-MCNC: 1.29 MG/DL
DEPRECATED RDW RBC AUTO: 49.9 FL (ref 35.1–46.3)
EOSINOPHIL # BLD AUTO: 0.3 X10(3) UL (ref 0–0.7)
EOSINOPHIL NFR BLD AUTO: 1.7 %
ERYTHROCYTE [DISTWIDTH] IN BLOOD BY AUTOMATED COUNT: 15.6 % (ref 11–15)
GLOBULIN PLAS-MCNC: 5.9 G/DL (ref 2.8–4.4)
GLUCOSE BLD-MCNC: 174 MG/DL (ref 70–99)
GLUCOSE BLD-MCNC: 217 MG/DL (ref 70–99)
GLUCOSE BLD-MCNC: 243 MG/DL (ref 70–99)
GLUCOSE BLD-MCNC: 248 MG/DL (ref 70–99)
GLUCOSE BLD-MCNC: 259 MG/DL (ref 70–99)
HAV IGM SER QL: 1.9 MG/DL (ref 1.6–2.6)
HCT VFR BLD AUTO: 27.2 %
HGB BLD-MCNC: 9 G/DL
IMM GRANULOCYTES # BLD AUTO: 0.22 X10(3) UL (ref 0–1)
IMM GRANULOCYTES NFR BLD: 1.2 %
LYMPHOCYTES # BLD AUTO: 1.25 X10(3) UL (ref 1–4)
LYMPHOCYTES NFR BLD AUTO: 7 %
M PROTEIN MFR SERPL ELPH: 8.1 G/DL (ref 6.4–8.2)
MCH RBC QN AUTO: 29.7 PG (ref 26–34)
MCHC RBC AUTO-ENTMCNC: 33.1 G/DL (ref 31–37)
MCV RBC AUTO: 89.8 FL
MONOCYTES # BLD AUTO: 1.26 X10(3) UL (ref 0.1–1)
MONOCYTES NFR BLD AUTO: 7.1 %
NEUTROPHILS # BLD AUTO: 14.7 X10 (3) UL (ref 1.5–7.7)
NEUTROPHILS # BLD AUTO: 14.7 X10(3) UL (ref 1.5–7.7)
NEUTROPHILS NFR BLD AUTO: 82.7 %
OSMOLALITY SERPL CALC.SUM OF ELEC: 304 MOSM/KG (ref 275–295)
PHOSPHATE SERPL-MCNC: 2.7 MG/DL (ref 2.5–4.9)
PLATELET # BLD AUTO: 645 10(3)UL (ref 150–450)
POTASSIUM SERPL-SCNC: 4.1 MMOL/L (ref 3.5–5.1)
RBC # BLD AUTO: 3.03 X10(6)UL
SODIUM SERPL-SCNC: 141 MMOL/L (ref 136–145)
WBC # BLD AUTO: 17.8 X10(3) UL (ref 4–11)

## 2021-07-27 PROCEDURE — 99232 SBSQ HOSP IP/OBS MODERATE 35: CPT | Performed by: OTHER

## 2021-07-27 PROCEDURE — 71045 X-RAY EXAM CHEST 1 VIEW: CPT | Performed by: STUDENT IN AN ORGANIZED HEALTH CARE EDUCATION/TRAINING PROGRAM

## 2021-07-27 PROCEDURE — 99232 SBSQ HOSP IP/OBS MODERATE 35: CPT | Performed by: HOSPITALIST

## 2021-07-27 RX ORDER — PROCHLORPERAZINE EDISYLATE 5 MG/ML
10 INJECTION INTRAMUSCULAR; INTRAVENOUS EVERY 6 HOURS PRN
Status: DISCONTINUED | OUTPATIENT
Start: 2021-07-27 | End: 2021-08-16

## 2021-07-27 RX ORDER — ONDANSETRON 2 MG/ML
4 INJECTION INTRAMUSCULAR; INTRAVENOUS EVERY 6 HOURS PRN
Status: DISCONTINUED | OUTPATIENT
Start: 2021-07-27 | End: 2021-08-06

## 2021-07-27 NOTE — PROGRESS NOTES
[facility]  Progress Note    Martina Znuiga Patient Status:  Inpatient    1950 MRN [id number]   [facility] 3NW-A Attending Everardo Guzmán MD   Hosp Day # 25 PCP Nikole Santamaria MD     Subjective: The patient is resting in bed. [facility]     Bilateral kidney stones     CKD (chronic kidney disease) stage 3, GFR 30-59 ml/min (HCC)     Abnormality of thoracic aorta     Community acquired pneumonia of right middle lobe of lung     Pneumonia of right lower lobe due to infectious or

## 2021-07-27 NOTE — PAYOR COMM NOTE
--------------  CONTINUED STAY REVIEW    Payor: Kleber Butler 673 #:  Dora Florence  Authorization Number: [id number]    Admit date: 7/2/21  Admit time:  1:26 AM    Admitting Physician: Khalida Conde MD  Attending Physician:  Divya Mak MD  Pr 2. 1* 2.2*    141 141   K 4.4 4.4 4.1    113* 112   CO2 28.0 23.0 25.0   ALKPHO 257* 246* 242*   AST 22 24 17   ALT 37 33 30   BILT 1.0 0.9 0.5   TP 8.1 7.8 8.1     Plan  Continue supportive care  Monitor for opiod withdrawls as dose reduced  Ta Rashmi Guallpa, RN    7/26/2021 2238 Given 0.5 mg Intravenous Jeffy Thompson, AUNDREA    7/26/2021 1713 Given 0.5 mg Intravenous Gaurav Flores, RN      Insulin Aspart Pen (NOVOLOG) 100 UNIT/ML flexpen 1-10 Units     Date Action Dose Route User    7/27/2021 9721

## 2021-07-27 NOTE — PLAN OF CARE
A&Ox4. VSS. RA. Telemetry: NSR  GI: Abdomen soft, distended, tender with hypoactive bowel sounds. Denies passing gas. Denies nausea. : Voids.   Pain controlled with PRN and scheduled pain medications  Up with standby assist.  Drains: NGT intact drainin hydration with IV or PO as ordered and tolerated  - Nasogastric tube to low intermittent suction as ordered  - Evaluate effectiveness of ordered antiemetic medications  - Provide nonpharmacologic comfort measures as appropriate  - Advance diet as tolerated to adequate nutritional intake and initiate nutrition consult as needed  - Instruct patient on self management of diabetes  Outcome: Progressing     Problem: Diabetes/Glucose Control  Goal: Glucose maintained within prescribed range  Description: INTERVENT

## 2021-07-27 NOTE — PROGRESS NOTES
Acute Pain Service     POD#22 s/p exp lap / IVETT  Ileus persists - NGT to LIS     Patient denies abdominal pain - reports pain in left neck from NGT. Patient states he feels that Ketamine is helping.  RN witnessed episode of tremors/jerking this am that re

## 2021-07-27 NOTE — PROGRESS NOTES
[facility]  Progress Note     Ramosrajan Masseygeorgie Patient Status:  Inpatient    1950 MRN [id number]   [facility] 3NW-A Attending Dr. Shilpa Huertas Day # 22 PCP Berry Hall MD     Chief Complaint: Abdominal distention    S: pt last 168 hours.          COVID-19 Lab Results    COVID-19  Lab Results   Component Value Date    COVID19 Not Detected 07/05/2021    COVID19 Not Detected 03/28/2021    COVID19 Not Detected 03/22/2021       Pro-Calcitonin  No results for input(s): PCT in the Prophylaxis: heparin  · CODE status: Full  · If COVID testing is negative, may discontinue isolation: yes     Will the patient be referred to TCC on discharge?: no  Estimated date of discharge: tbd - likely weeks due to prolonged SBO    Case d/w RN, pt.   I

## 2021-07-27 NOTE — PLAN OF CARE
Patient is alert and oriented x3  Up with SBA in hallway.    Voiding  NPO + 1 cup ice chips q 4 maintained    Problem: Patient/Family Goals  Goal: Patient/Family Long Term Goal  Description: Patient's Long Term Goal: able to eat and feel better     Interven function  Description: INTERVENTIONS:  - Assess bowel function  - Maintain adequate hydration with IV or PO as ordered and tolerated  - Evaluate effectiveness of GI medications  - Encourage mobilization and activity  - Obtain nutritional consult as needed

## 2021-07-28 LAB
ALBUMIN SERPL-MCNC: 2.2 G/DL (ref 3.4–5)
ALBUMIN/GLOB SERPL: 0.4 {RATIO} (ref 1–2)
ALP LIVER SERPL-CCNC: 235 U/L
ALT SERPL-CCNC: 28 U/L
ANION GAP SERPL CALC-SCNC: 4 MMOL/L (ref 0–18)
AST SERPL-CCNC: 25 U/L (ref 15–37)
BASOPHILS # BLD AUTO: 0.04 X10(3) UL (ref 0–0.2)
BASOPHILS NFR BLD AUTO: 0.3 %
BILIRUB SERPL-MCNC: 0.5 MG/DL (ref 0.1–2)
BILIRUB UR QL STRIP.AUTO: NEGATIVE
BUN BLD-MCNC: 23 MG/DL (ref 7–18)
BUN/CREAT SERPL: 18.1 (ref 10–20)
CALCIUM BLD-MCNC: 8.8 MG/DL (ref 8.5–10.1)
CHLORIDE SERPL-SCNC: 111 MMOL/L (ref 98–112)
CLARITY UR REFRACT.AUTO: CLEAR
CO2 SERPL-SCNC: 26 MMOL/L (ref 21–32)
COLOR UR AUTO: YELLOW
CREAT BLD-MCNC: 1.27 MG/DL
DEPRECATED RDW RBC AUTO: 51 FL (ref 35.1–46.3)
EOSINOPHIL # BLD AUTO: 0.13 X10(3) UL (ref 0–0.7)
EOSINOPHIL NFR BLD AUTO: 0.8 %
ERYTHROCYTE [DISTWIDTH] IN BLOOD BY AUTOMATED COUNT: 15.9 % (ref 11–15)
GLOBULIN PLAS-MCNC: 5.8 G/DL (ref 2.8–4.4)
GLUCOSE BLD-MCNC: 152 MG/DL (ref 70–99)
GLUCOSE BLD-MCNC: 158 MG/DL (ref 70–99)
GLUCOSE BLD-MCNC: 194 MG/DL (ref 70–99)
GLUCOSE BLD-MCNC: 217 MG/DL (ref 70–99)
GLUCOSE BLD-MCNC: 233 MG/DL (ref 70–99)
GLUCOSE UR STRIP.AUTO-MCNC: NEGATIVE MG/DL
HAV IGM SER QL: 1.8 MG/DL (ref 1.6–2.6)
HCT VFR BLD AUTO: 26.8 %
HGB BLD-MCNC: 8.9 G/DL
IMM GRANULOCYTES # BLD AUTO: 0.25 X10(3) UL (ref 0–1)
IMM GRANULOCYTES NFR BLD: 1.6 %
KETONES UR STRIP.AUTO-MCNC: NEGATIVE MG/DL
LEUKOCYTE ESTERASE UR QL STRIP.AUTO: NEGATIVE
LYMPHOCYTES # BLD AUTO: 1.55 X10(3) UL (ref 1–4)
LYMPHOCYTES NFR BLD AUTO: 9.7 %
M PROTEIN MFR SERPL ELPH: 8 G/DL (ref 6.4–8.2)
MCH RBC QN AUTO: 29.6 PG (ref 26–34)
MCHC RBC AUTO-ENTMCNC: 33.2 G/DL (ref 31–37)
MCV RBC AUTO: 89 FL
MONOCYTES # BLD AUTO: 1.74 X10(3) UL (ref 0.1–1)
MONOCYTES NFR BLD AUTO: 10.9 %
NEUTROPHILS # BLD AUTO: 12.19 X10 (3) UL (ref 1.5–7.7)
NEUTROPHILS # BLD AUTO: 12.19 X10(3) UL (ref 1.5–7.7)
NEUTROPHILS NFR BLD AUTO: 76.7 %
NITRITE UR QL STRIP.AUTO: NEGATIVE
OSMOLALITY SERPL CALC.SUM OF ELEC: 303 MOSM/KG (ref 275–295)
PH UR STRIP.AUTO: 5 [PH] (ref 5–8)
PHOSPHATE SERPL-MCNC: 2.7 MG/DL (ref 2.5–4.9)
PLATELET # BLD AUTO: 575 10(3)UL (ref 150–450)
POTASSIUM SERPL-SCNC: 3.7 MMOL/L (ref 3.5–5.1)
PROT UR STRIP.AUTO-MCNC: NEGATIVE MG/DL
RBC # BLD AUTO: 3.01 X10(6)UL
RBC UR QL AUTO: NEGATIVE
SODIUM SERPL-SCNC: 141 MMOL/L (ref 136–145)
SP GR UR STRIP.AUTO: 1.02 (ref 1–1.03)
UROBILINOGEN UR STRIP.AUTO-MCNC: <2 MG/DL
WBC # BLD AUTO: 15.9 X10(3) UL (ref 4–11)

## 2021-07-28 PROCEDURE — 99232 SBSQ HOSP IP/OBS MODERATE 35: CPT | Performed by: OTHER

## 2021-07-28 PROCEDURE — 99232 SBSQ HOSP IP/OBS MODERATE 35: CPT | Performed by: HOSPITALIST

## 2021-07-28 RX ORDER — MAGNESIUM SULFATE HEPTAHYDRATE 40 MG/ML
2 INJECTION, SOLUTION INTRAVENOUS ONCE
Status: COMPLETED | OUTPATIENT
Start: 2021-07-28 | End: 2021-07-28

## 2021-07-28 NOTE — PROGRESS NOTES
Acute Pain Service     POD#23 s/p exp lap / IVETT  Ileus persists - NGT to DEBBY     Patient reports left-sided neck pain persists in addition to nose and throat pain - Hospitalist and Surgery notified.  He reports very little nose and throat pain from nose and

## 2021-07-28 NOTE — PROGRESS NOTES
[facility]  Progress Note     [facility] Patient Status:  Inpatient    1950 MRN [id number]   [facility] 3NW-A [facility] Day [facility] PCP Binta Lindquist MD     Chief Complaint: Abdominal pain 8.0       Estimated Creatinine Clearance: 39.8 mL/min (based on SCr of 1.27 mg/dL). No results for input(s): PTP, INR in the last 168 hours.          COVID-19 Lab Results    COVID-19  Lab Results   Component Value Date    COVID19 Not Detected 07/05/2021 6. 6%  1. hyperglycemia protocol  2. correctional scale  3.  52 units of insulin in TPN - increase  to 55 units today    Plan of care: as above    Quality:  · DVT Prophylaxis: heparin  · CODE status: Full  · [place]: N/A  · Central line: N/A  · If COVID testi

## 2021-07-28 NOTE — PROGRESS NOTES
[facility]  Progress Note     Staci Heck Patient Status:  Inpatient    1950 MRN [id number]   [facility] 3NW-A Attending Dr. Brock Babin Day # 32 PCP Dayron Giron MD     Chief Complaint: Abdominal distention    S: pt results for input(s): PTP, INR in the last 168 hours.          COVID-19 Lab Results    COVID-19  Lab Results   Component Value Date    COVID19 Not Detected 07/05/2021    COVID19 Not Detected 03/28/2021    COVID19 Not Detected 03/22/2021       Pro-Calcitonin scale  3.  Increased insulin in TPN from 52 to 60units    Quality:  · DVT Prophylaxis: heparin  · CODE status: Full  · If COVID testing is negative, may discontinue isolation: yes     Will the patient be referred to TCC on discharge?: no  Estimated date of

## 2021-07-28 NOTE — PROGRESS NOTES
[facility]  Report of Psychiatric Progress Note    Gia Osullivan Patient Status:  Inpatient    1950 MRN [id number]   [facility] 3NW-A Attending Jazz Green MD   Hosp Day # 32 PCP Rina Fuchs MD     Date of Admission:  interruptions if possible. Will Pete    History of Present Illness:  70 yo AA male with recurrent major depression and chronic pain syndrome was admitted on 7/1/21 for eval for his severe abd pain.      He has a hx of of chronic pancreatitis (per ricci decreasing the ketamine dose, but patient says it helps with him feel calm and helps with the pain (abdomen and throat). He feels tired and depressed and helpless, but not hopeless. No suicidal ideation. Psychiatry Review Systems: No voices or visions. Primary localized osteoarthrosis, lower leg, left [715.16] 9/2/2015   • Pulmonary nodule 3/23/2017   • Renal stones    • Right leg DVT ([address] 6/2007   • Shortness of breath    • Spondylolisthesis of lumbar region    • Spondylolisthesis of lumbar region    • 250 mL infusion for pain, 0.15 mg/kg/hr, Intravenous, Continuous  •  HYDROmorphone HCl (DILAUDID) 1 MG/ML injection 0.5 mg, 0.5 mg, Intravenous, Q4H PRN  •  Lidocaine HCl Urethral/Mucosal (XYLOCAINE) 2 % jelly 1 mL, 1 mL, Topical, PRN  •  0.9% NaCl infusio (OFIRMEV) infusion 1,000 mg, 1,000 mg, Intravenous, Q6H    Review of Systems   Constitutional: Positive for malaise/fatigue. Psychiatric/Behavioral: Positive for depression. Negative for hallucinations, substance abuse and suicidal ideas.  The patient is intravenous contrast material. Post contrast coronal MPR imaging was performed.  Dose reduction techniques were used.  Dose information is   trans[facility] to [facility] Cathi Van [address] ([address]) which includes the Do adenopathy.     PELVIC ORGANS:  No visible mass.  Pelvic organs appropriate for patient age.     BONES:  No bony lesion or fracture.     LUNG BASES:  No visible pulmonary or pleural disease.  Pericardial effusion looks smaller.  Now about 1.0 cm thick prev

## 2021-07-28 NOTE — PROGRESS NOTES
[facility]  Progress Note    Kelvin Greer Patient Status:  Inpatient    1950 MRN [id number]   [facility] 3NW-A Attending Fidencio Rodriguez MD   Hosp Day # 32 PCP Eliseo Huntley MD     Subjective:  Patient seen and examined adriane essential HTN     Diabetes mellitus type 2 in nonobese [facility])     COPD (chronic obstructive pulmonary disease) (HCC)     Vitamin D deficiency     Chronic narcotic dependence ([address]     Steatorrhea     Prostate cancer ([facility]     Bilateral kidney stones     CKD pain.  Abdomen soft, nontender. Incision intact.   NGT with bilious output    WBC trending down  Low grade temp  Mild tachycardia    Continue NGT decopmression    Lona Cody MD   [facility]  7/28/2021

## 2021-07-28 NOTE — CONSULTS
[place] is a 21-year-old man who was referred for bedside psychotherapy to address his depressed mood. He was admitted to the hospital 28 days ago with severe abdominal pain.  He has a history of chronic pain including episodes pancreatitis and hospitalization. Individual therapy with a focus on CBT strategies may help him to identify and employ strategies to better cope and manage his mood. We did not discuss his history of opioid use or the plan to decrease his use of opioids.  This however will

## 2021-07-28 NOTE — PLAN OF CARE
A&Ox4. VSS. RA. Telemetry: ST in 1-teens and 120's. MD paged regarding this and the sepsis BPA firing. Cxray ordered, labs ordered in AM, blood cultures ordered, and UA ordered. IVF rate changed.  This RN verified order for IVF when TPN is going--ok to giv unsuccessful or patient reports new pain  - Anticipate increased pain with activity and pre-medicate as appropriate  Outcome: Progressing     Problem: GASTROINTESTINAL - ADULT  Goal: Minimal or absence of nausea and vomiting  Description: INTERVENTIONS:  - ADULT  Goal: Glucose maintained within prescribed range  Description: INTERVENTIONS:  - Monitor Blood Glucose as ordered  - Assess for signs and symptoms of hyperglycemia and hypoglycemia  - Administer ordered medications to maintain glucose within target

## 2021-07-28 NOTE — PROGRESS NOTES
[facility]  Report of Psychiatric Progress Note    Renae Bertrand Patient Status:  Inpatient    1950 MRN [id number]   [facility] 3NW-A Attending Roslyn Jernigan MD   Hosp Day # 22 PCP Arik Chanel MD     Date of Admission:  psychotherapy. Brittney Ro    History of Present Illness:  68 yo AA male with recurrent major depression and chronic pain syndrome was admitted on 7/1/21 for eval for his severe abd pain.      He has a hx of of chronic pancreatitis (per patient, due to Major depressive disorder, recurrent, moderate. Hx of hopelessness on and off the past 3 yrs, but NO hx of suicidal ideation. Substance Use History:  1) Alcohol abuse in his 20-30's. 2) Tried hallucinogens in his teens and 19's.      Psych Family Hist vitamin D deficiency    • Visual impairment     GLASSES   • Weight loss    • Weight loss 5/23/2019     Past Surgical History:   Procedure Laterality Date   • APPENDECTOMY     • APPENDECTOMY     • CHOLECYSTECTOMY     • OTHER SURGICAL HISTORY      pancreatic metoprolol Tartrate (LOPRESSOR) injection 5 mg, 5 mg, Intravenous, Q4H PRN  •  hydrALAzine HCl (APRESOLINE) injection 10 mg, 10 mg, Intravenous, Q6H PRN  •  bisacodyl (DULCOLAX) rectal suppository 10 mg, 10 mg, Rectal, Daily  •  dextrose 10 % infusion, , I BP: 124/70   Pulse: 101   Resp: 18   Temp: 98.3 °F (36.8 °C)     Appearance: fair grooming, thin, tired appearing  Behavior: normal psychomotor, good eye contact  Attitude: cooperative  Gait: not observed    Speech: normal rate, rhythm and volume    Mood HISTORY:(As transcribed by Technologist) Vanessa Jimena states he has a small bowel obstruction.        CONTRAST USED:  65cc of Omnipaque 350       FINDINGS:     LIVER:  No enlargement, atrophy, abnormal density, or significant focal lesion.     BILIARY:  Cholec atelectasis bilaterally.    OTHER:  Negative.                          Impression   CONCLUSION:  Markedly dilated proximal small bowel loops with minimal improvement within the central abdomen.  Other small bowel loops are normal caliber as before.  The col

## 2021-07-28 NOTE — DIETARY NOTE
Loi 14 FOLLOW UP    Debra Miranda     Parenteral Nutrition - infusing via PICC.  Tonight's TPN to provide goal: 1080 dextrose calories, 600 lipid calories, 30% lipids, 80 grams protein in 2100mL fluid and total 2000 josé

## 2021-07-28 NOTE — PLAN OF CARE
Pt resting in bed. Lungs diminished, encouraged to use IS. NT on tele, physician aware. Abd soft,slightly distended,bs hypo,no flatus. Pt on TPN. Ng to [place] with moderate amount of green drainage. Pt encouraged to walk, states he will wait for his spouse.  P preferences  - Enhance eating environment  Outcome: Progressing     Problem: METABOLIC/FLUID AND ELECTROLYTES - ADULT  Goal: Glucose maintained within prescribed range  Description: INTERVENTIONS:  - Monitor Blood Glucose as ordered  - Assess for signs and

## 2021-07-29 LAB
ALBUMIN SERPL-MCNC: 2.1 G/DL (ref 3.4–5)
ALBUMIN/GLOB SERPL: 0.4 {RATIO} (ref 1–2)
ALP LIVER SERPL-CCNC: 203 U/L
ALT SERPL-CCNC: 33 U/L
ANION GAP SERPL CALC-SCNC: 3 MMOL/L (ref 0–18)
AST SERPL-CCNC: 25 U/L (ref 15–37)
BASOPHILS # BLD AUTO: 0.04 X10(3) UL (ref 0–0.2)
BASOPHILS NFR BLD AUTO: 0.2 %
BILIRUB SERPL-MCNC: 0.4 MG/DL (ref 0.1–2)
BUN BLD-MCNC: 20 MG/DL (ref 7–18)
BUN/CREAT SERPL: 17.2 (ref 10–20)
CALCIUM BLD-MCNC: 8.8 MG/DL (ref 8.5–10.1)
CHLORIDE SERPL-SCNC: 112 MMOL/L (ref 98–112)
CO2 SERPL-SCNC: 26 MMOL/L (ref 21–32)
CREAT BLD-MCNC: 1.16 MG/DL
EOSINOPHIL # BLD AUTO: 0.39 X10(3) UL (ref 0–0.7)
EOSINOPHIL NFR BLD AUTO: 2.2 %
ERYTHROCYTE [DISTWIDTH] IN BLOOD BY AUTOMATED COUNT: 16 %
GLOBULIN PLAS-MCNC: 5.2 G/DL (ref 2.8–4.4)
GLUCOSE BLD-MCNC: 176 MG/DL (ref 70–99)
GLUCOSE BLD-MCNC: 178 MG/DL (ref 70–99)
GLUCOSE BLD-MCNC: 191 MG/DL (ref 70–99)
GLUCOSE BLD-MCNC: 195 MG/DL (ref 70–99)
HAV IGM SER QL: 2.1 MG/DL (ref 1.6–2.6)
HCT VFR BLD AUTO: 26 %
HGB BLD-MCNC: 8.7 G/DL
IMM GRANULOCYTES # BLD AUTO: 0.19 X10(3) UL (ref 0–1)
IMM GRANULOCYTES NFR BLD: 1.1 %
LYMPHOCYTES # BLD AUTO: 2.52 X10(3) UL (ref 1–4)
LYMPHOCYTES NFR BLD AUTO: 13.9 %
M PROTEIN MFR SERPL ELPH: 7.3 G/DL (ref 6.4–8.2)
MCH RBC QN AUTO: 29.8 PG (ref 26–34)
MCHC RBC AUTO-ENTMCNC: 33.5 G/DL (ref 31–37)
MCV RBC AUTO: 89 FL
MONOCYTES # BLD AUTO: 1.93 X10(3) UL (ref 0.1–1)
MONOCYTES NFR BLD AUTO: 10.7 %
NEUTROPHILS # BLD AUTO: 13 X10 (3) UL (ref 1.5–7.7)
NEUTROPHILS # BLD AUTO: 13 X10(3) UL (ref 1.5–7.7)
NEUTROPHILS NFR BLD AUTO: 71.9 %
OSMOLALITY SERPL CALC.SUM OF ELEC: 299 MOSM/KG (ref 275–295)
PHOSPHATE SERPL-MCNC: 3.3 MG/DL (ref 2.5–4.9)
PLATELET # BLD AUTO: 538 10(3)UL (ref 150–450)
POTASSIUM SERPL-SCNC: 3.7 MMOL/L (ref 3.5–5.1)
RBC # BLD AUTO: 2.92 X10(6)UL
SODIUM SERPL-SCNC: 141 MMOL/L (ref 136–145)
WBC # BLD AUTO: 18.1 X10(3) UL (ref 4–11)

## 2021-07-29 PROCEDURE — 99232 SBSQ HOSP IP/OBS MODERATE 35: CPT | Performed by: HOSPITALIST

## 2021-07-29 RX ORDER — METHOCARBAMOL 100 MG/ML
250 INJECTION, SOLUTION INTRAMUSCULAR; INTRAVENOUS EVERY 8 HOURS PRN
Status: DISCONTINUED | OUTPATIENT
Start: 2021-07-29 | End: 2021-08-16

## 2021-07-29 NOTE — PROGRESS NOTES
Acute Pain Service     POD#24 s/p exp lap / IVETT  Ileus persists - NGT to DEBBY      Patient reports he went to [email or web address] today. Continues to complain of throat, nose, and neck pain.  Reports minimal abdominal pain      Denies hallucinations from Ketamine

## 2021-07-29 NOTE — PROGRESS NOTES
[facility]  Progress Note    Kelvin Greer Patient Status:  Inpatient    1950 MRN [id number]   [facility] 3NW-A Attending [facility] Day # 32 PCP Eliseo Huntley MD     Subjective:   The patient denies new c essential HTN     Diabetes mellitus type 2 in nonobese [facility])     COPD (chronic obstructive pulmonary disease) (HCC)     Vitamin D deficiency     Chronic narcotic dependence ([address]     Steatorrhea     Prostate cancer ([facility] [address]     Bilateral kidney stones     CKD

## 2021-07-29 NOTE — DIETARY NOTE
Loi 14 FOLLOW UP    Jose Silver     Parenteral Nutrition - infusing via PICC.  Tonight's TPN (Bag #18) to provide goal: 1080 dextrose calories, 600 lipid calories, 30% lipids, 80 grams protein in 2100mL fluid and tot

## 2021-07-29 NOTE — PROGRESS NOTES
[facility]  Progress Note     [facility] Patient Status:  Inpatient    1950 MRN [id number]   [facility] 3NW-A Attending Dr. Karly Spaulding Day # 32 PCP Jeremy Campos MD     Chief Complaint: Abdominal distention    S: Clearance: 43.8 mL/min (based on SCr of 1.16 mg/dL). No results for input(s): PTP, INR in the last 168 hours.          COVID-19 Lab Results    COVID-19  Lab Results   Component Value Date    COVID19 Not Detected 07/05/2021    COVID19 Not Detected 03/28/2 6. 6%  1. hyperglycemia protocol, correctional scale  2. TPN insulin- 60 units    Quality:  · DVT Prophylaxis: heparin  · CODE status: Full  · If COVID testing is negative, may discontinue isolation: yes     Will the patient be referred to TCC on discharge?

## 2021-07-29 NOTE — PROGRESS NOTES
Pt Ax4, vss, afebrile, RUE precautions, TPN started at 55.3 and tapered up to 110.5, A/C Q6H, daily wt, NPO w/ ice chips 1 cup Q8H (being given 1/2 cups Q4H), pt frequently requests more ice off schedule but requests are declined.  Pt states pain is 6/10 an

## 2021-07-30 LAB
ALBUMIN SERPL-MCNC: 2.2 G/DL (ref 3.4–5)
ALBUMIN/GLOB SERPL: 0.4 {RATIO} (ref 1–2)
ALP LIVER SERPL-CCNC: 212 U/L
ALT SERPL-CCNC: 33 U/L
ANION GAP SERPL CALC-SCNC: 4 MMOL/L (ref 0–18)
AST SERPL-CCNC: 20 U/L (ref 15–37)
BILIRUB SERPL-MCNC: 0.4 MG/DL (ref 0.1–2)
BUN BLD-MCNC: 22 MG/DL (ref 7–18)
CALCIUM BLD-MCNC: 9.3 MG/DL (ref 8.5–10.1)
CHLORIDE SERPL-SCNC: 109 MMOL/L (ref 98–112)
CO2 SERPL-SCNC: 27 MMOL/L (ref 21–32)
CREAT BLD-MCNC: 1.03 MG/DL
GLOBULIN PLAS-MCNC: 5.8 G/DL (ref 2.8–4.4)
GLUCOSE BLD-MCNC: 139 MG/DL (ref 70–99)
GLUCOSE BLD-MCNC: 158 MG/DL (ref 70–99)
GLUCOSE BLD-MCNC: 164 MG/DL (ref 70–99)
GLUCOSE BLD-MCNC: 171 MG/DL (ref 70–99)
GLUCOSE BLD-MCNC: 173 MG/DL (ref 70–99)
GLUCOSE BLD-MCNC: 190 MG/DL (ref 70–99)
HAV IGM SER QL: 2 MG/DL (ref 1.6–2.6)
M PROTEIN MFR SERPL ELPH: 8 G/DL (ref 6.4–8.2)
OSMOLALITY SERPL CALC.SUM OF ELEC: 297 MOSM/KG (ref 275–295)
PHOSPHATE SERPL-MCNC: 3.7 MG/DL (ref 2.5–4.9)
POTASSIUM SERPL-SCNC: 3.9 MMOL/L (ref 3.5–5.1)
SODIUM SERPL-SCNC: 140 MMOL/L (ref 136–145)

## 2021-07-30 PROCEDURE — 99232 SBSQ HOSP IP/OBS MODERATE 35: CPT | Performed by: OTHER

## 2021-07-30 PROCEDURE — 99232 SBSQ HOSP IP/OBS MODERATE 35: CPT | Performed by: HOSPITALIST

## 2021-07-30 NOTE — PROGRESS NOTES
[facility]  Report of Psychiatric Progress Note    Martina Zuniga Patient Status:  Inpatient    1950 MRN [id number]   [facility] 3NW-A Attending Everardo Guzmán MD   Hosp Day # 29 PCP Nikole Santamaria MD     Date of Admission:  pancreatico-jejunostomy. He has had chronic abd pain for 30 yrs. He has been on high doses opioids for over 20 yrs (Dilaudid prn, Fentanyl patch 100mcg/hr). He has a hx of major depressive disorder that has worsened the past 3 yrs.  He has decreased doug with him restarting Zoloft. He feels tired and discouraged and irritable and depressed. Psychiatry Review Systems: No voices or visions. No elevated mood, racing thoughts, decreased need for sleep, grandiose thoughts.     Past Psychiatric History:  1) JOSIE • Right leg DVT ([facility] [address] 6/2007   • Shortness of breath    • Spondylolisthesis of lumbar region    • Spondylolisthesis of lumbar region    • Type II or unspecified type diabetes mellitus without mention of complication, uncontrolled    • Unspecified vitamin Intravenous, Continuous  •  HYDROmorphone HCl (DILAUDID) 1 MG/ML injection 0.5 mg, 0.5 mg, Intravenous, Q4H PRN  •  Lidocaine HCl Urethral/Mucosal (XYLOCAINE) 2 % jelly 1 mL, 1 mL, Topical, PRN  •  phenol (CHLORASEPTIC) 1.4 % oral liquid spray, , Oral, Q2H Positive for malaise/fatigue. Psychiatric/Behavioral: Positive for depression. Negative for hallucinations, substance abuse and suicidal ideas. The patient is nervous/anxious.       Mental Status Exam:     Objective       07/30/21  1232   BP: 121/66   Pul ([employer]) Cathi Van 35 ([address]) which includes the Dose Index Registry.       PATIENT STATED HISTORY:(As transcribed by Technologist) Lisa Leonardo states he has a small bowel obstruction.        CONTRAST USED:  65cc of O pulmonary or pleural disease.  Pericardial effusion looks smaller.  Now about 1.0 cm thick previously about 1.5 cm thick.  Mild dependent atelectasis bilaterally.    OTHER:  Negative.                          Impression   CONCLUSION:  Markedly dilated proxi

## 2021-07-30 NOTE — PROGRESS NOTES
I had an opportunity to meet with Mr. juanita Ghosh [facility] bedside this morning. He appeared less fatigued than he did the last time that we met but reports that he continues to have some difficulty with sleep and generally does not feel rested.  He was oriented

## 2021-07-30 NOTE — PROGRESS NOTES
[facility]  Progress Note     Tanika Smith Patient Status:  Inpatient    1950 MRN [id number]   [facility] 3NW-A Attending Dr. Watson Fair # 28 PCP Chante Diggs MD     Chief Complaint: Abdominal distention    S: 07/30/21  0605   * 176* 158*   BUN 23* 20* 22*   CREATSERUM 1.27 1.16 1.03   GFRAA 65 73 84   GFRNAA 56* 63 73   CA 8.8 8.8 9.3   ALB 2.2* 2.1* 2.2*    141 140   K 3.7 3.7 3.9    112 109   CO2 26.0 26.0 27.0   ALKPHO 235* 203* 212*   AST hematoma improving  3. Anemia- Secondary to acute blood loss from hematoma. 1. S/p PRBCs. Hgb stable 8.7  4. Thrombocytosis Likely reactive from intraabdominal process  1. Improving on CBC  5. Fever resolved   6. Leukocytosis, increased  1.  Monitor, NGTD

## 2021-07-30 NOTE — PLAN OF CARE
Problem: Patient/Family Goals  Goal: Patient/Family Long Term Goal  Description: Patient's Long Term Goal: able to eat and feel better     Interventions:advance diet as tolerated ,monitor signs of bowel obstruction    - See additional Care Plan goals for as ordered and tolerated  - Evaluate effectiveness of GI medications  - Encourage mobilization and activity  - Obtain nutritional consult as needed  - Establish a toileting routine/schedule  - Consider collaborating with pharmacy to review patient's medica consult as needed  - Instruct patient on self management of diabetes  Outcome: Progressing   Alert and orient x 4 , on room air , on tele with sr/st . Vitals stable . Abdomen soft , bs hypo . Not passing gas . No BM  noted . C/o severe abdominal pain .  Pro

## 2021-07-30 NOTE — PROGRESS NOTES
[facility]  Progress Note    Rea Noyolaangelnav Patient Status:  Inpatient    1950 MRN [id number]   [facility] 3NW-A Attending [facility] Day # 29 PCP Oren Thomson MD     Subjective:   The patient denies new c narcotic dependence (HCC)     Steatorrhea     Prostate cancer ([facility] [address]     Bilateral kidney stones     CKD (chronic kidney disease) stage 3, GFR 30-59 ml/min (HCC)     Abnormality of thoracic aorta     Community acquired pneumonia of right middle lobe of lung signs.  Extremities:  No lower extremity edema noted.     Incision: Clean, dry, intact, no erythema      Labs:     Lab Results   Component Value Date     07/30/2021    K 3.9 07/30/2021     07/30/2021    CO2 27.0 07/30/2021    BUN 22 07/30/2021 spent in counseling the patient on the above listed diagnoses and treatment options. Lona Real PA-C  6/36/7301  10:04 AM    Addendum:  Dr. Mary Dewey    I have reviewed the above note and evaluation by the physician assistant.  I agree with her ph

## 2021-07-30 NOTE — PROGRESS NOTES
Acute Pain Service     POD#25 s/p exp lap / IVETT  Ileus persists - NGT to LIS. Continues to have high output from NGT. Patient reports he continues discomfort from the NGT to his nares and throat. He also states his has LUQ that is \"off and on\".      D

## 2021-07-30 NOTE — DIETARY NOTE
[facility]  NUTRITION ASSESSMENT    Pt does not meet malnutrition criteria. NUTRITION INTERVENTION:  1. Meal and Snacks - Advance as tolerated to least restrictive diet monitor patient po intake. Encourage adequate po of appropriate diet.   2. Medic obstruction. POD #1 ex lap and IVETT. Pt continue with ng tube to LIS, npo -may have ice chips. ANTHROPOMETRICS:  Ht: 180.3 cm (5' 11\")  Wt: 53 kg (116 lb 12.8 oz). This is 75% of IBW  BMI: Body mass index is 16.29 kg/m².   IBW: 78.2 kg    WEIGHT HISTORY Continues  2. Start alternative nutrition in 24-48 hrs if diet is not able to advance- Met, continues  3.  Tolerate alternative nutrition at 100% of goal - Met, continues    MEDICATIONS: noted    LABS: Reviewed    Pt is at High nutrition risk    FOLLOW-UP D

## 2021-07-30 NOTE — PROGRESS NOTES
Pt alert and orient x 4. Pt has easy non labored breathing on ra. Vs wnl. cpox and telemetry in place. Midline incision healing, simona. Voids adequate amount. Ng to lis, with brownish clear output. Up with sb assist. Pm meds admin plan of care discussed.  Pt

## 2021-07-31 ENCOUNTER — APPOINTMENT (OUTPATIENT)
Dept: CT IMAGING | Facility: HOSPITAL | Age: 71
DRG: 336 | End: 2021-07-31
Attending: COLON & RECTAL SURGERY
Payer: MEDICARE

## 2021-07-31 LAB
ALBUMIN SERPL-MCNC: 2.3 G/DL (ref 3.4–5)
ALBUMIN/GLOB SERPL: 0.4 {RATIO} (ref 1–2)
ALP LIVER SERPL-CCNC: 213 U/L
ALT SERPL-CCNC: 30 U/L
ANION GAP SERPL CALC-SCNC: 5 MMOL/L (ref 0–18)
AST SERPL-CCNC: 19 U/L (ref 15–37)
BILIRUB SERPL-MCNC: 0.4 MG/DL (ref 0.1–2)
BUN BLD-MCNC: 25 MG/DL (ref 7–18)
CALCIUM BLD-MCNC: 9.4 MG/DL (ref 8.5–10.1)
CHLORIDE SERPL-SCNC: 106 MMOL/L (ref 98–112)
CO2 SERPL-SCNC: 27 MMOL/L (ref 21–32)
CREAT BLD-MCNC: 1.18 MG/DL
GLOBULIN PLAS-MCNC: 6.1 G/DL (ref 2.8–4.4)
GLUCOSE BLD-MCNC: 100 MG/DL (ref 70–99)
GLUCOSE BLD-MCNC: 138 MG/DL (ref 70–99)
GLUCOSE BLD-MCNC: 142 MG/DL (ref 70–99)
GLUCOSE BLD-MCNC: 145 MG/DL (ref 70–99)
GLUCOSE BLD-MCNC: 183 MG/DL (ref 70–99)
HAV IGM SER QL: 2 MG/DL (ref 1.6–2.6)
M PROTEIN MFR SERPL ELPH: 8.4 G/DL (ref 6.4–8.2)
OSMOLALITY SERPL CALC.SUM OF ELEC: 293 MOSM/KG (ref 275–295)
PHOSPHATE SERPL-MCNC: 3.8 MG/DL (ref 2.5–4.9)
POTASSIUM SERPL-SCNC: 4.1 MMOL/L (ref 3.5–5.1)
SODIUM SERPL-SCNC: 138 MMOL/L (ref 136–145)

## 2021-07-31 PROCEDURE — 99232 SBSQ HOSP IP/OBS MODERATE 35: CPT | Performed by: HOSPITALIST

## 2021-07-31 PROCEDURE — 74177 CT ABD & PELVIS W/CONTRAST: CPT | Performed by: COLON & RECTAL SURGERY

## 2021-07-31 NOTE — PROGRESS NOTES
[facility]  Progress Note    Jose Silver Patient Status:  Inpatient    1950 MRN [id number]   [facility] 3NW-A Attending [facility] Day # 34 PCP Marry Bradley MD     Subjective:   The patient continues to kidney disease) stage 3, GFR 30-59 ml/min (HCC)     Abnormality of thoracic aorta     Community acquired pneumonia of right middle lobe of lung     Pneumonia of right lower lobe due to infectious organism     Pericardial effusion     Parapneumonic effusion and up to chair   6. Will have gastroenterology reassess the patient to reconsider G-tube versus GJ tube. 7. Plan to repeat CT scan of the abdomen and pelvis today per Dr. Alvino Evans to further assess the patient's mesenteric hematoma.       Padmini Chowdhury [place]

## 2021-07-31 NOTE — PROGRESS NOTES
Pt alert and orient x 4. Easy non labored breathing on ra. cpox and telemetry in place. Double lumen picc. tpn restarted, iv fluids kvo. Voids adequate amount. Midline incision simona healed. Ng to lis. Up with sb assist. Tolerating ice chips.  acchecks q 6 hr

## 2021-07-31 NOTE — PLAN OF CARE
A & O x 4, drowsiness, depressed. Initally refusing Zoloft. Encouragement to take medication. MAYA DEMPSEY LIS at 63cm. Clamped for 30 minutes after Zoloft administration.  C/o spasm in throat at end of clamping, asking for more pain medication; informed too ear patient reports new pain  - Anticipate increased pain with activity and pre-medicate as appropriate  Outcome: Progressing     Problem: GASTROINTESTINAL - ADULT  Goal: Minimal or absence of nausea and vomiting  Description: INTERVENTIONS:  - Maintain adequa maintained within prescribed range  Description: INTERVENTIONS:  - Monitor Blood Glucose as ordered  - Assess for signs and symptoms of hyperglycemia and hypoglycemia  - Administer ordered medications to maintain glucose within target range  - Assess barri

## 2021-07-31 NOTE — PROGRESS NOTES
[facility]  Progress Note     Kelvin Greer Patient Status:  Inpatient    1950 MRN [id number]   [facility] 3NW-A Attending Dr. Inocencia Minor Day # 34 PCP Eliseo Huntley MD     Chief Complaint: Abdominal distention    S: 07/31/21  0753   * 158* 145*   BUN 20* 22* 25*   CREATSERUM 1.16 1.03 1.18   GFRAA 73 84 71   GFRNAA 63 73 62   CA 8.8 9.3 9.4   ALB 2.1* 2.2* 2.3*    140 138   K 3.7 3.9 4.1    109 106   CO2 26.0 27.0 27.0   ALKPHO 203* 212* 213*   AST him.   2. Intra-abdominal hematoma improving  3. Anemia- Secondary to acute blood loss from hematoma. 1. S/p PRBCs. Hgb stable 8.7  4. Thrombocytosis Likely reactive from intraabdominal process  1. Improving on CBC  5. Fever resolved   6.  Leukocytosis, in

## 2021-07-31 NOTE — PROGRESS NOTES
Pt called writer of note into room. Pt's ng pulled out about half way, around 40-41cm. Pt encouraged to swallow a few times and ng advanced back into nostril to 63 cm. Resd checked. green bile output. Ng re taped and secured at 63 cm. Reconnected to john.  P

## 2021-07-31 NOTE — DIETARY NOTE
Loi 14 FOLLOW UP    Geraldine Muñoz     Parenteral Nutrition - infusing via PICC.  Tonight's TPN to provide goal: 1080 dextrose calories, 600 lipid calories, 30% lipids, 80 grams protein in 2100mL fluid and total 2000 josé

## 2021-08-01 LAB
GLUCOSE BLD-MCNC: 121 MG/DL (ref 70–99)
GLUCOSE BLD-MCNC: 137 MG/DL (ref 70–99)
GLUCOSE BLD-MCNC: 148 MG/DL (ref 70–99)

## 2021-08-01 PROCEDURE — 99232 SBSQ HOSP IP/OBS MODERATE 35: CPT | Performed by: HOSPITALIST

## 2021-08-01 NOTE — DIETARY NOTE
Loi 14 FOLLOW UP    Gia Osullivan     Parenteral Nutrition - infusing via PICC.  Tonight's TPN to provide goal: 1080 dextrose calories, 600 lipid calories, 30% lipids, 80 grams protein in 2100mL fluid and total 2000 josé

## 2021-08-01 NOTE — PROGRESS NOTES
[facility]  Progress Note     Tasha Alcazarjayecaitlin Patient Status:  Inpatient    1950 MRN [id number]   [facility] 3NW-A Attending Dr. Elena Bhakta Day # 27 PCP Joy Perez MD     Chief Complaint: Abdominal distention    S: 203* 212* 213*   AST 25 20 19   ALT 33 33 30   BILT 0.4 0.4 0.4   TP 7.3 8.0 8.4*       Estimated Creatinine Clearance: 43 mL/min (based on SCr of 1.18 mg/dL). No results for input(s): PTP, INR in the last 168 hours.          COVID-19 Lab Results    COVI process  1. Improving on CBC  5. Fever resolved   6. Leukocytosis, increased  1. Monitor, NGTD on blood cx  7. Chronic pancreatitis  8. Anxiety/depression  1. Ativan IV  9. Acute on chronic pain syndome  1.  Fentanyl patch 75mcg, prn dilaudid and ketamine p

## 2021-08-01 NOTE — PROGRESS NOTES
[facility]  Progress Note    Gia Osullivan Patient Status:  Inpatient    1950 MRN [id number]   [facility] 3NW-A Attending Lurdes Love*   Date 2021 PCP Rina Fuchs MD     Subjective:  Gia Abran is a( Assessment:  Patient Active Problem List:     Chronic pancreatitis ([facility] [address]     Other chronic pain     Benign essential HTN     Diabetes mellitus type 2 in nonobese (HCC)     COPD (chronic obstructive pulmonary disease) (HCC)     Vitamin D deficiency

## 2021-08-01 NOTE — PROGRESS NOTES
[facility]  Progress Note    Gia Abran Patient Status:  Inpatient    1950 MRN [id number]   [facility]NW[facility]A [facility] P[facility] Rina Fuchs MD     Subjective:   The patient continues to bowel obstruction (HCC)     Abdominal hematoma     Acute blood loss anemia     Major depressive disorder, recurrent episode, moderate (HCC)      POD 27 exploratory laparotomy with lysis of adhesions    Postoperative mesenteric hematoma now causing secondar

## 2021-08-01 NOTE — PLAN OF CARE
Problem: Patient/Family Goals  Goal: Patient/Family Long Term Goal  Description: Patient's Long Term Goal: able to eat and feel better     Interventions:advance diet as tolerated ,monitor signs of bowel obstruction    - See additional Care Plan goals for as ordered and tolerated  - Evaluate effectiveness of GI medications  - Encourage mobilization and activity  - Obtain nutritional consult as needed  - Establish a toileting routine/schedule  - Consider collaborating with pharmacy to review patient's medica consult as needed  - Instruct patient on self management of diabetes  Outcome: Progressing   Alert and orient x 4 , on room air , on tele with sr/st . Vitals stable . Very weak , get upset easily . NPO , n/g tube to lis , draining green color drainage .  Ab

## 2021-08-01 NOTE — PROGRESS NOTES
Pt is Ax4, vss, afebrile, RUE precautions, NPO w/ ice, / Tele, voids freely, bowel sounds audible, NGT LIS, up w/ asst and walker. IVF and TPN infusing, pt denies any n/v, states pain is 6/10, given PRN Dilaudid and Robaxin.  NGT marked at 63cm, draining

## 2021-08-02 ENCOUNTER — ANESTHESIA EVENT (OUTPATIENT)
Dept: ENDOSCOPY | Facility: HOSPITAL | Age: 71
DRG: 336 | End: 2021-08-02
Payer: MEDICARE

## 2021-08-02 ENCOUNTER — ANESTHESIA (OUTPATIENT)
Dept: ENDOSCOPY | Facility: HOSPITAL | Age: 71
DRG: 336 | End: 2021-08-02
Payer: MEDICARE

## 2021-08-02 LAB
ALBUMIN SERPL-MCNC: 2.4 G/DL (ref 3.4–5)
ALBUMIN/GLOB SERPL: 0.4 {RATIO} (ref 1–2)
ALP LIVER SERPL-CCNC: 223 U/L
ALT SERPL-CCNC: 29 U/L
ANION GAP SERPL CALC-SCNC: 4 MMOL/L (ref 0–18)
AST SERPL-CCNC: 24 U/L (ref 15–37)
BILIRUB SERPL-MCNC: 0.4 MG/DL (ref 0.1–2)
BUN BLD-MCNC: 38 MG/DL (ref 7–18)
CALCIUM BLD-MCNC: 9.3 MG/DL (ref 8.5–10.1)
CHLORIDE SERPL-SCNC: 102 MMOL/L (ref 98–112)
CO2 SERPL-SCNC: 30 MMOL/L (ref 21–32)
CREAT BLD-MCNC: 1.46 MG/DL
GLOBULIN PLAS-MCNC: 6.2 G/DL (ref 2.8–4.4)
GLUCOSE BLD-MCNC: 104 MG/DL (ref 70–99)
GLUCOSE BLD-MCNC: 125 MG/DL (ref 70–99)
GLUCOSE BLD-MCNC: 144 MG/DL (ref 70–99)
GLUCOSE BLD-MCNC: 169 MG/DL (ref 70–99)
GLUCOSE BLD-MCNC: 199 MG/DL (ref 70–99)
GLUCOSE BLD-MCNC: 95 MG/DL (ref 70–99)
HAV IGM SER QL: 2 MG/DL (ref 1.6–2.6)
M PROTEIN MFR SERPL ELPH: 8.6 G/DL (ref 6.4–8.2)
OSMOLALITY SERPL CALC.SUM OF ELEC: 291 MOSM/KG (ref 275–295)
PHOSPHATE SERPL-MCNC: 5.2 MG/DL (ref 2.5–4.9)
POTASSIUM SERPL-SCNC: 4.3 MMOL/L (ref 3.5–5.1)
SARS-COV-2 RNA RESP QL NAA+PROBE: NOT DETECTED
SODIUM SERPL-SCNC: 136 MMOL/L (ref 136–145)
TRIGL SERPL-MCNC: 100 MG/DL (ref 30–149)

## 2021-08-02 PROCEDURE — 99232 SBSQ HOSP IP/OBS MODERATE 35: CPT | Performed by: OTHER

## 2021-08-02 PROCEDURE — 99232 SBSQ HOSP IP/OBS MODERATE 35: CPT | Performed by: HOSPITALIST

## 2021-08-02 PROCEDURE — 0DH63UZ INSERTION OF FEEDING DEVICE INTO STOMACH, PERCUTANEOUS APPROACH: ICD-10-PCS | Performed by: INTERNAL MEDICINE

## 2021-08-02 RX ORDER — ONDANSETRON 2 MG/ML
4 INJECTION INTRAMUSCULAR; INTRAVENOUS AS NEEDED
Status: DISCONTINUED | OUTPATIENT
Start: 2021-08-02 | End: 2021-08-02 | Stop reason: HOSPADM

## 2021-08-02 RX ORDER — HYDROCODONE BITARTRATE AND ACETAMINOPHEN 5; 325 MG/1; MG/1
2 TABLET ORAL AS NEEDED
Status: DISCONTINUED | OUTPATIENT
Start: 2021-08-02 | End: 2021-08-02 | Stop reason: HOSPADM

## 2021-08-02 RX ORDER — LIDOCAINE HYDROCHLORIDE 10 MG/ML
INJECTION, SOLUTION EPIDURAL; INFILTRATION; INTRACAUDAL; PERINEURAL AS NEEDED
Status: DISCONTINUED | OUTPATIENT
Start: 2021-08-02 | End: 2021-08-02 | Stop reason: SURG

## 2021-08-02 RX ORDER — LABETALOL HYDROCHLORIDE 5 MG/ML
5 INJECTION, SOLUTION INTRAVENOUS EVERY 5 MIN PRN
Status: DISCONTINUED | OUTPATIENT
Start: 2021-08-02 | End: 2021-08-02 | Stop reason: HOSPADM

## 2021-08-02 RX ORDER — DIPHENHYDRAMINE HYDROCHLORIDE 50 MG/ML
12.5 INJECTION INTRAMUSCULAR; INTRAVENOUS AS NEEDED
Status: DISCONTINUED | OUTPATIENT
Start: 2021-08-02 | End: 2021-08-02 | Stop reason: HOSPADM

## 2021-08-02 RX ORDER — ONDANSETRON 2 MG/ML
INJECTION INTRAMUSCULAR; INTRAVENOUS AS NEEDED
Status: DISCONTINUED | OUTPATIENT
Start: 2021-08-02 | End: 2021-08-02 | Stop reason: SURG

## 2021-08-02 RX ORDER — NALOXONE HYDROCHLORIDE 0.4 MG/ML
80 INJECTION, SOLUTION INTRAMUSCULAR; INTRAVENOUS; SUBCUTANEOUS AS NEEDED
Status: DISCONTINUED | OUTPATIENT
Start: 2021-08-02 | End: 2021-08-02 | Stop reason: HOSPADM

## 2021-08-02 RX ORDER — DEXTROSE MONOHYDRATE 25 G/50ML
50 INJECTION, SOLUTION INTRAVENOUS
Status: DISCONTINUED | OUTPATIENT
Start: 2021-08-02 | End: 2021-08-02 | Stop reason: HOSPADM

## 2021-08-02 RX ORDER — DEXAMETHASONE SODIUM PHOSPHATE 4 MG/ML
4 VIAL (ML) INJECTION AS NEEDED
Status: DISCONTINUED | OUTPATIENT
Start: 2021-08-02 | End: 2021-08-02 | Stop reason: HOSPADM

## 2021-08-02 RX ORDER — MIDAZOLAM HYDROCHLORIDE 1 MG/ML
1 INJECTION INTRAMUSCULAR; INTRAVENOUS EVERY 5 MIN PRN
Status: DISCONTINUED | OUTPATIENT
Start: 2021-08-02 | End: 2021-08-02 | Stop reason: HOSPADM

## 2021-08-02 RX ORDER — HYDROMORPHONE HYDROCHLORIDE 1 MG/ML
INJECTION, SOLUTION INTRAMUSCULAR; INTRAVENOUS; SUBCUTANEOUS
Status: COMPLETED
Start: 2021-08-02 | End: 2021-08-02

## 2021-08-02 RX ORDER — DEXAMETHASONE SODIUM PHOSPHATE 4 MG/ML
VIAL (ML) INJECTION AS NEEDED
Status: DISCONTINUED | OUTPATIENT
Start: 2021-08-02 | End: 2021-08-02 | Stop reason: SURG

## 2021-08-02 RX ORDER — HYDROMORPHONE HYDROCHLORIDE 1 MG/ML
0.4 INJECTION, SOLUTION INTRAMUSCULAR; INTRAVENOUS; SUBCUTANEOUS EVERY 5 MIN PRN
Status: DISCONTINUED | OUTPATIENT
Start: 2021-08-02 | End: 2021-08-02 | Stop reason: HOSPADM

## 2021-08-02 RX ORDER — HYDROCODONE BITARTRATE AND ACETAMINOPHEN 5; 325 MG/1; MG/1
1 TABLET ORAL AS NEEDED
Status: DISCONTINUED | OUTPATIENT
Start: 2021-08-02 | End: 2021-08-02 | Stop reason: HOSPADM

## 2021-08-02 RX ORDER — SODIUM CHLORIDE, SODIUM LACTATE, POTASSIUM CHLORIDE, CALCIUM CHLORIDE 600; 310; 30; 20 MG/100ML; MG/100ML; MG/100ML; MG/100ML
INJECTION, SOLUTION INTRAVENOUS CONTINUOUS
Status: DISCONTINUED | OUTPATIENT
Start: 2021-08-02 | End: 2021-08-02 | Stop reason: HOSPADM

## 2021-08-02 RX ORDER — CEFAZOLIN SODIUM/WATER 2 G/20 ML
SYRINGE (ML) INTRAVENOUS AS NEEDED
Status: DISCONTINUED | OUTPATIENT
Start: 2021-08-02 | End: 2021-08-02 | Stop reason: SURG

## 2021-08-02 RX ADMIN — LIDOCAINE HYDROCHLORIDE 50 MG: 10 INJECTION, SOLUTION EPIDURAL; INFILTRATION; INTRACAUDAL; PERINEURAL at 16:20:00

## 2021-08-02 RX ADMIN — DEXAMETHASONE SODIUM PHOSPHATE 8 MG: 4 MG/ML VIAL (ML) INJECTION at 16:28:00

## 2021-08-02 RX ADMIN — ONDANSETRON 4 MG: 2 INJECTION INTRAMUSCULAR; INTRAVENOUS at 16:28:00

## 2021-08-02 RX ADMIN — CEFAZOLIN SODIUM/WATER 2 G: 2 G/20 ML SYRINGE (ML) INTRAVENOUS at 16:24:00

## 2021-08-02 NOTE — PROGRESS NOTES
Acute Pain Service     POD#28 s/p exp lap / IVETT  Ileus persists - NGT to LIS.  Continues to have high output from NGT despite no ice chips - pt NPO for G-tube placement later today.      Patient complains of discomfort from NGT - nose,throat, neck     Denie

## 2021-08-02 NOTE — PROGRESS NOTES
[facility]  Progress Note    Geraldine Muñoz Patient Status:  Inpatient    1950 MRN [id number]   [facility] 3NW-A Attending Kishor Loving [facility] Day # 32 PCP Pamela Wilcox MD     Subjective:   The patient is resting i (chronic kidney disease) stage 3, GFR 30-59 ml/min (HCC)     Abnormality of thoracic aorta     Community acquired pneumonia of right middle lobe of lung     Pneumonia of right lower lobe due to infectious organism     Pericardial effusion     Parapneumonic

## 2021-08-02 NOTE — DIETARY NOTE
Loi 14 FOLLOW UP    Paola Blunt     Parenteral Nutrition - infusing via PICC.  Tonight's TPN to provide goal: 1080 dextrose calories, 600 lipid calories, 30% lipids, 80 grams protein in 2100mL fluid and total 2000 josé

## 2021-08-02 NOTE — PROGRESS NOTES
[facility]  Progress Note     Conny Seip Patient Status:  Inpatient    1950 MRN [id number]   [facility] 3NW-A Attending Dr. Lincoln Scot Day # 32 PCP Jacquelin Oliveros MD     Chief Complaint: Abdominal distention    S: results for input(s): PTP, INR in the last 168 hours.          COVID-19 Lab Results    COVID-19  Lab Results   Component Value Date    COVID19 Not Detected 08/02/2021    COVID19 Not Detected 07/05/2021    COVID19 Not Detected 03/28/2021       Pro-Calcitonin 6. 6%  1. hyperglycemia protocol, correctional scale  2. TPN insulin- 62 units    Quality:  · DVT Prophylaxis: heparin  · CODE status: Full  · If COVID testing is negative, may discontinue isolation: yes     Will the patient be referred to TCC on discharge?

## 2021-08-02 NOTE — ANESTHESIA PROCEDURE NOTES
Airway  Date/Time: 8/2/2021 4:22 PM  Urgency: elective      General Information and Staff    Patient location during procedure: OR  Anesthesiologist: Nadege Pruett DO  Performed: anesthesiologist     Indications and Patient Condition  Indications for

## 2021-08-02 NOTE — OPERATIVE REPORT
Vin Cost Patient Status:  Inpatient    1950 MRN [id number]   Location [id number] [place] [address] Attending Piotr Jacobs*   Date 2021 PCP Dolly Manzanares MD     PREOPERATIVE DIAGNOSIS/INDICATION: SBO, chronic NG, n through the scope with the help of a snare, the wire was removed en block with the scope, after this a percutaneous gastrostomy tube, [employer]knowledge 20 Fr externally removable with a soft internal bolster was introduced with the Pull method, placement w

## 2021-08-02 NOTE — PLAN OF CARE
Problem: Patient/Family Goals  Goal: Patient/Family Long Term Goal  Description: Patient's Long Term Goal: able to eat and feel better     Interventions:advance diet as tolerated ,monitor signs of bowel obstruction    - See additional Care Plan goals for as ordered and tolerated  - Evaluate effectiveness of GI medications  - Encourage mobilization and activity  - Obtain nutritional consult as needed  - Establish a toileting routine/schedule  - Consider collaborating with pharmacy to review patient's medica consult as needed  - Instruct patient on self management of diabetes  Outcome: Progressing   Alert and orient x 4 , on room air , tele with sr/st . N/g tube to lis , draining green color drainage . Abdomen soft , distended , bs hypo . Not passing gas .  Npo

## 2021-08-02 NOTE — CM/SW NOTE
Spoke w/wife re; discharge planning, introduced role - not ready for dc. Explained difference in home health and uriel. Patient refusing uriel. Gave lists of accepting providers for both hh and uriel pending clinical progression. Will f/u next week.     Accepted

## 2021-08-02 NOTE — ANESTHESIA POSTPROCEDURE EVALUATION
[address]  Patient Status:  Inpatient   Age/Gender 69year old male MRN [id number]   Location [id number] [place] [address] Attending Moe Donaldson [facility] Day # 32 PCP Dusty Lovell MD       Anesthesia Post-op N

## 2021-08-02 NOTE — PROGRESS NOTES
I did meet with Mohinder Stanislaw Pablo this morning. His wife was also present for some of our session. His mood was depressed and his affect sad but congruent.  He was oriented to person, place and circumstance and there was no evidence of impairment noted to his

## 2021-08-02 NOTE — PAYOR COMM NOTE
--------------  CONTINUED STAY REVIEW    Payor: Kleber Butler 673 #:  Alexandra Junior  Authorization Number: [id number]    Admit date: 7/2/21  Admit time:  1:26 AM    Admitting Physician: Tamika Caruso MD  Attending Physician:  Nell Hardy 0.5 mg Intravenous Maksim Hargrove RN      Ketamine HCl (KETALAR) 250 mg in sodium chloride 0.9% 250 mL infusion for pain     Date Action Dose Route User    8/1/2021 2232 New Bag 0.2 mg/kg/hr × 54.4 kg Intravenous Jeffrey Roland RN      LORazepam (ATIVAN Incision healing, staples removed since last exam  Neurologic: No focal neurological deficits. Musculoskeletal: Moves all extremities.   Extremities: No edema.     Diagnostic Data:       Labs:        Recent Labs   Lab 07/27/21  1440 07/28/21  0530 07/29/2 large volume bilious NGT output      POD 27 exploratory laparotomy with lysis of adhesions     Postoperative mesenteric hematoma now causing secondary small bowel obstruction.   Unfortunately, the patient is in a window where his scar tissue from surgery wi

## 2021-08-02 NOTE — ANESTHESIA PREPROCEDURE EVALUATION
PRE-OP EVALUATION    Patient Name: Ernesto Pugh    Admit Diagnosis: Small bowel obstruction ([address] [K63.040]  Acute renal injury ([address] [N17.9]  Acute on chronic pancreatitis ([facility] [K85.90, K86.1]    Pre-op Diagnosis: Bowel obstruction ([address] [L94.223] HCl (DILAUDID) 1 MG/ML injection 0.5 mg, 0.5 mg, Intravenous, Q4H PRN  [COMPLETED] Magnesium Sulfate IVPB premix SOLN 2 g, 2 g, Intravenous, Once  [COMPLETED] iohexol (OMNIPAQUE) 350 MG/ML injection 65 mL, 65 mL, Intravenous, ONCE PRN  [] adult 3 in mEq, Intravenous, Once  [] adult 3 in 1 tpn, , Intravenous, Continuous TPN  metoprolol Tartrate (LOPRESSOR) injection 5 mg, 5 mg, Intravenous, Q4H PRN  hydrALAzine HCl (APRESOLINE) injection 10 mg, 10 mg, Intravenous, Q6H PRN  bisacodyl (DULCOLAX) r injection 15 mg, 15 mg, Intravenous, Q6H PRN  [COMPLETED] HYDROmorphone HCl (DILAUDID) 1 MG/ML injection 1 mg, 1 mg, Intravenous, Once  [COMPLETED] hydrALAzine HCl (APRESOLINE) injection 5 mg, 5 mg, Intravenous, Once  Pantoprazole Sodium (PROTONIX) 40 mg i MOUTH TWICE A DAY, Disp: 120 capsule, Rfl: 0, 2021 at Unknown time  SERTRALINE HCL 25 MG Oral Tab, TAKE ONE TABLET BY MOUTH DAILY, Disp: 90 tablet, Rfl: 0, 2021 at 1800  [] fentaNYL (DURAGESIC-100) 100 MCG/HR Transdermal Patch 72 Hr, Place 1 Rfl: , 7/1/2021 at Unknown time  Insulin Lispro, 1 Unit Dial, (HUMALOG KWIKPEN) 100 UNIT/ML Subcutaneous Solution Pen-injector, PER SLIDING SCALE:120-180 inject 4 units,181-240 inject 8 units,241-300 inject 10 units,301-350 inject 12 units,over 351 inject anemia            (+) arthritis       Pulmonary      (+) asthma  (+) COPD                   Neuro/Psych      (+) depression                        Prostate cancer      Past Surgical History:   Procedure Laterality Date   • APPENDECTOMY     • APPENDECTOMY injury to lips teeth tongue, cardiopulmonary complications) and benefits of anesthesia as outlined in the anesthesia consent were reviewed with the patient. The consent was signed without further questions.    Plan/risks discussed with: patient and spouse

## 2021-08-03 ENCOUNTER — TELEPHONE (OUTPATIENT)
Dept: INTERNAL MEDICINE CLINIC | Facility: CLINIC | Age: 71
End: 2021-08-03

## 2021-08-03 LAB
ALBUMIN SERPL-MCNC: 2.3 G/DL (ref 3.4–5)
ALBUMIN/GLOB SERPL: 0.4 {RATIO} (ref 1–2)
ALP LIVER SERPL-CCNC: 195 U/L
ALT SERPL-CCNC: 25 U/L
ANION GAP SERPL CALC-SCNC: 4 MMOL/L (ref 0–18)
AST SERPL-CCNC: 20 U/L (ref 15–37)
BASOPHILS # BLD AUTO: 0.03 X10(3) UL (ref 0–0.2)
BASOPHILS NFR BLD AUTO: 0.2 %
BILIRUB SERPL-MCNC: 0.4 MG/DL (ref 0.1–2)
BUN BLD-MCNC: 42 MG/DL (ref 7–18)
CALCIUM BLD-MCNC: 9 MG/DL (ref 8.5–10.1)
CHLORIDE SERPL-SCNC: 104 MMOL/L (ref 98–112)
CO2 SERPL-SCNC: 26 MMOL/L (ref 21–32)
CREAT BLD-MCNC: 1.42 MG/DL
EOSINOPHIL # BLD AUTO: 0.03 X10(3) UL (ref 0–0.7)
EOSINOPHIL NFR BLD AUTO: 0.2 %
ERYTHROCYTE [DISTWIDTH] IN BLOOD BY AUTOMATED COUNT: 16.4 %
GLOBULIN PLAS-MCNC: 5.8 G/DL (ref 2.8–4.4)
GLUCOSE BLD-MCNC: 141 MG/DL (ref 70–99)
GLUCOSE BLD-MCNC: 154 MG/DL (ref 70–99)
GLUCOSE BLD-MCNC: 159 MG/DL (ref 70–99)
GLUCOSE BLD-MCNC: 186 MG/DL (ref 70–99)
HAV IGM SER QL: 2.1 MG/DL (ref 1.6–2.6)
HCT VFR BLD AUTO: 26.4 %
HGB BLD-MCNC: 8.9 G/DL
IMM GRANULOCYTES # BLD AUTO: 0.15 X10(3) UL (ref 0–1)
IMM GRANULOCYTES NFR BLD: 0.8 %
LYMPHOCYTES # BLD AUTO: 2.34 X10(3) UL (ref 1–4)
LYMPHOCYTES NFR BLD AUTO: 12 %
M PROTEIN MFR SERPL ELPH: 8.1 G/DL (ref 6.4–8.2)
MCH RBC QN AUTO: 29.9 PG (ref 26–34)
MCHC RBC AUTO-ENTMCNC: 33.7 G/DL (ref 31–37)
MCV RBC AUTO: 88.6 FL
MONOCYTES # BLD AUTO: 1.98 X10(3) UL (ref 0.1–1)
MONOCYTES NFR BLD AUTO: 10.1 %
NEUTROPHILS # BLD AUTO: 15.03 X10 (3) UL (ref 1.5–7.7)
NEUTROPHILS # BLD AUTO: 15.03 X10(3) UL (ref 1.5–7.7)
NEUTROPHILS NFR BLD AUTO: 76.7 %
OSMOLALITY SERPL CALC.SUM OF ELEC: 292 MOSM/KG (ref 275–295)
PHOSPHATE SERPL-MCNC: 3.4 MG/DL (ref 2.5–4.9)
PLATELET # BLD AUTO: 567 10(3)UL (ref 150–450)
POTASSIUM SERPL-SCNC: 5.1 MMOL/L (ref 3.5–5.1)
RBC # BLD AUTO: 2.98 X10(6)UL
SODIUM SERPL-SCNC: 134 MMOL/L (ref 136–145)
WBC # BLD AUTO: 19.6 X10(3) UL (ref 4–11)

## 2021-08-03 PROCEDURE — 99232 SBSQ HOSP IP/OBS MODERATE 35: CPT | Performed by: OTHER

## 2021-08-03 PROCEDURE — 99232 SBSQ HOSP IP/OBS MODERATE 35: CPT | Performed by: HOSPITALIST

## 2021-08-03 RX ORDER — HYDROMORPHONE HYDROCHLORIDE 1 MG/ML
0.5 INJECTION, SOLUTION INTRAMUSCULAR; INTRAVENOUS; SUBCUTANEOUS
Status: DISCONTINUED | OUTPATIENT
Start: 2021-08-03 | End: 2021-08-04

## 2021-08-03 NOTE — PROGRESS NOTES
Brief GI note:    PEG tube intact. Soreness around bumper as expected. No purulence or erythema. Bumper rotates freely. Appropriately to low intermittent suction. States overall abdominal pain improved. NG tube removed. On TPN.  Will defer further managemen

## 2021-08-03 NOTE — PROGRESS NOTES
[facility]  Report of Psychiatric Progress Note    Rea Sen Patient Status:  Inpatient    1950 MRN [id number]   [facility] 3NW-A Attending Natan Bragg MD   Hosp Day # 32 PCP Oren Thomson MD     Date of Admission:  100mcg/hr). He has a hx of major depressive disorder that has worsened the past 3 yrs. He has decreased energy and mood and motivation. He finds less enjoyment in things that used to interest him-- cars and home projects.  The death of his brother in la less throat discomfort/pain. He c/o abd pain and fatigue, anxiety, and depressed mood. No hopelessness. Psychiatry Review Systems: No voices or visions. No elevated mood, racing thoughts, decreased need for sleep, grandiose thoughts.     Past Psychiatri • Renal stones    • Right leg DVT ([facility]) 6/2007   • Shortness of breath    • Spondylolisthesis of lumbar region    • Spondylolisthesis of lumbar region    • Type II or unspecified type diabetes mellitus without mention of complication, uncontrolled    • U sodium chloride 0.9% 250 mL infusion for pain, 0.2 mg/kg/hr, Intravenous, Continuous  •  HYDROmorphone HCl (DILAUDID) 1 MG/ML injection 0.5 mg, 0.5 mg, Intravenous, Q4H PRN  •  Lidocaine HCl Urethral/Mucosal (XYLOCAINE) 2 % jelly 1 mL, 1 mL, Topical, PRN grooming, thin, tired appearing  Behavior: normal psychomotor, fair eye contact  Attitude: cooperative  Gait: not observed    Speech: normal rate, rhythm and volume    Mood: depressed, anxious  Affect: restricted, congruent    Thought process: logical  Tho abnormal density, or significant focal lesion.     BILIARY:  Cholecystectomy.    PANCREAS:  The pancreas looks atrophic.  The pancreatic head is difficult to visualize due to surrounding bowel loops.  There are probably pancreatic calcifications consistent abdomen.  Other small bowel loops are normal caliber as before.  The colon is nondistended. .       Central mesenteric hyperdense hematoma is now consistent with a seroma and smaller measuring 3.3 cm AP x 7.5 cm wide previously 5.1 cm AP x 8.1 cm wide.

## 2021-08-03 NOTE — DIETARY NOTE
[facility]  NUTRITION ASSESSMENT    Pt does not meet malnutrition criteria. NUTRITION INTERVENTION:  1. Meal and Snacks - Advance as tolerated to least restrictive diet monitor patient po intake. Encourage adequate po of appropriate diet.   2. Medic abdominal pain, pt with history of chronic pancreatitis, past pancreatic pseudocyst, splenectomy and cholecystectomy. Pt found to have diffuse adhesions causing complete bowel obstruction. POD #1 ex lap and IVETT.   Pt continue with ng tube to LIS, npo -may h (1.2-1.5 grams protein per kg)  Fluid: ~1 ml/kcal or per MD discretion    NUTRITION DIAGNOSIS/PROBLEM:  Inadequate oral intake related to physiological causes and bowel obstruction as evidenced by need for NPO status    MONITOR AND EVALUATE/NUTRITION GOALS

## 2021-08-03 NOTE — PLAN OF CARE
Pt a&ox4, VSS, afebrile. NSR/ST on telemetry. NPO. Pt voiding well. TPN infusing along via PICC line. Ketamine gtt infusing per mar. PRN pain meds administered as well. G-tube to LIS with green output. Bowel sounds hypoactive.  Pt reports feeling much sarita effectiveness of ordered antiemetic medications  - Provide nonpharmacologic comfort measures as appropriate  - Advance diet as tolerated, if ordered  - Obtain nutritional consult as needed  - Evaluate fluid balance  Outcome: Progressing  Goal: Maintains or diabetes  Outcome: Progressing     Problem: Diabetes/Glucose Control  Goal: Glucose maintained within prescribed range  Description: INTERVENTIONS:  - Monitor Blood Glucose as ordered  - Assess for signs and symptoms of hyperglycemia and hypoglycemia  - Ad

## 2021-08-03 NOTE — PLAN OF CARE
Problem: Patient/Family Goals  Goal: Patient/Family Long Term Goal  Description: Patient's Long Term Goal: able to eat and feel better     Interventions:advance diet as tolerated ,monitor signs of bowel obstruction    - See additional Care Plan goals for as ordered and tolerated  - Evaluate effectiveness of GI medications  - Encourage mobilization and activity  - Obtain nutritional consult as needed  - Establish a toileting routine/schedule  - Consider collaborating with pharmacy to review patient's medica consult as needed  - Instruct patient on self management of diabetes  Outcome: Progressing   Alert and orient x 4 , on room air , c pox . On tele with sr/st . Vitals stable .  Abdomen soft, distended , g tube connected to suction , draining green color drai

## 2021-08-03 NOTE — TELEPHONE ENCOUNTER
Discussed with patient's wife about his hospital admission and take into consideration his got extensive adhesions and concurred with the line of treatment the physicians were offering in the hospital

## 2021-08-03 NOTE — PROGRESS NOTES
[facility]  Progress Note    Martina Zuniga Patient Status:  Inpatient    1950 MRN [id number]   [facility] 3NW-A Attending Bib Louise [facility] Day # 28 PCP Nikole Santamaria MD     Subjective:   The patient is resting i ALT 25 08/03/2021    AST 20 08/03/2021    BILT 0.4 08/03/2021    ALB 2.3 08/03/2021    TP 8.1 08/03/2021          Assessment:  Patient Active Problem List:     Chronic pancreatitis ([facility]     Other chronic pain     Benign essential HTN     Diabetes mellitus editing her note. I have personally examined the patient and reviewed all relevant labs and reports. I agree with the above assessment and plan and again have modified the report to reflect my opinion. Patient successfully underwent PEG placement.   This

## 2021-08-03 NOTE — PROGRESS NOTES
Acute Pain Service     POD#29 s/p exp lap / IVETT  Ileus persists - G-tube to LIS.    Patient denies throat and neck pain today since NGT out but states at 10am he had sharp pain in LUQ of abdomen - points to left of G-tube site.  States pain 7/10 at presen

## 2021-08-03 NOTE — PROGRESS NOTES
[facility]  Progress Note     [facility] Patient Status:  Inpatient    1950 MRN [id number]   [facility] 3NW-A Attending Dr. Cameron Ivan Day # 28 PCP Binta Lindquist MD     Chief Complaint: Abdominal distention    S: Creatinine Clearance: 37.7 mL/min (A) (based on SCr of 1.42 mg/dL (H)). No results for input(s): PTP, INR in the last 168 hours.          COVID-19 Lab Results    COVID-19  Lab Results   Component Value Date    COVID19 Not Detected 08/02/2021    COVID19 N protocol, correctional scale  2.  TPN insulin- 62 units    Quality:  · DVT Prophylaxis: heparin  · CODE status: Full  · If COVID testing is negative, may discontinue isolation: yes     Will the patient be referred to TCC on discharge?: no  Estimated date of

## 2021-08-03 NOTE — PROGRESS NOTES
Writing RN  Answered the call light , patient states  Is having lt abdominal pain . Getting pain  Medicine q4h prn , he wants to talk to pain service . Left message  For pain service .  Updated with patient and family

## 2021-08-03 NOTE — TELEPHONE ENCOUNTER
Pts spouse called requesting to speak with Denise Soriano. She is insisting on only speaking with Denise Soriano and expressed that she doesn't want to speak with a nurse.     She states pt is in the hospital from small bowl obstruction on 7/1 and has not yet been

## 2021-08-03 NOTE — CM/SW NOTE
Care Progression Note:  Active Acute Medical Issue:   Acute on chronic pancreatitis (HCC)   POD 29 exploratory laparotomy with lysis of adhesions  Postoperative mesenteric hematoma now causing secondary small bowel obstruction.  8/2 venting Gtube placed

## 2021-08-04 ENCOUNTER — APPOINTMENT (OUTPATIENT)
Dept: CT IMAGING | Facility: HOSPITAL | Age: 71
DRG: 336 | End: 2021-08-04
Attending: COLON & RECTAL SURGERY
Payer: MEDICARE

## 2021-08-04 LAB
ALBUMIN SERPL-MCNC: 2.1 G/DL (ref 3.4–5)
ALBUMIN/GLOB SERPL: 0.4 {RATIO} (ref 1–2)
ALP LIVER SERPL-CCNC: 184 U/L
ALT SERPL-CCNC: 20 U/L
ANION GAP SERPL CALC-SCNC: 3 MMOL/L (ref 0–18)
AST SERPL-CCNC: 21 U/L (ref 15–37)
BILIRUB SERPL-MCNC: 0.4 MG/DL (ref 0.1–2)
BILIRUB UR QL STRIP.AUTO: NEGATIVE
BUN BLD-MCNC: 35 MG/DL (ref 7–18)
CALCIUM BLD-MCNC: 8.7 MG/DL (ref 8.5–10.1)
CHLORIDE SERPL-SCNC: 106 MMOL/L (ref 98–112)
CLARITY UR REFRACT.AUTO: CLEAR
CO2 SERPL-SCNC: 27 MMOL/L (ref 21–32)
COLOR UR AUTO: YELLOW
CREAT BLD-MCNC: 1.28 MG/DL
GLOBULIN PLAS-MCNC: 5.6 G/DL (ref 2.8–4.4)
GLUCOSE BLD-MCNC: 130 MG/DL (ref 70–99)
GLUCOSE BLD-MCNC: 132 MG/DL (ref 70–99)
GLUCOSE BLD-MCNC: 133 MG/DL (ref 70–99)
GLUCOSE BLD-MCNC: 143 MG/DL (ref 70–99)
GLUCOSE BLD-MCNC: 153 MG/DL (ref 70–99)
GLUCOSE UR STRIP.AUTO-MCNC: NEGATIVE MG/DL
HAV IGM SER QL: 1.9 MG/DL (ref 1.6–2.6)
KETONES UR STRIP.AUTO-MCNC: NEGATIVE MG/DL
LEUKOCYTE ESTERASE UR QL STRIP.AUTO: NEGATIVE
M PROTEIN MFR SERPL ELPH: 7.7 G/DL (ref 6.4–8.2)
NITRITE UR QL STRIP.AUTO: NEGATIVE
OSMOLALITY SERPL CALC.SUM OF ELEC: 292 MOSM/KG (ref 275–295)
PH UR STRIP.AUTO: 5 [PH] (ref 5–8)
PHOSPHATE SERPL-MCNC: 2.6 MG/DL (ref 2.5–4.9)
POTASSIUM SERPL-SCNC: 4.4 MMOL/L (ref 3.5–5.1)
PROT UR STRIP.AUTO-MCNC: NEGATIVE MG/DL
SODIUM SERPL-SCNC: 136 MMOL/L (ref 136–145)
SP GR UR STRIP.AUTO: >1.03 (ref 1–1.03)
UROBILINOGEN UR STRIP.AUTO-MCNC: <2 MG/DL

## 2021-08-04 PROCEDURE — 74177 CT ABD & PELVIS W/CONTRAST: CPT | Performed by: COLON & RECTAL SURGERY

## 2021-08-04 PROCEDURE — 99232 SBSQ HOSP IP/OBS MODERATE 35: CPT | Performed by: HOSPITALIST

## 2021-08-04 RX ORDER — FENTANYL CITRATE 200 UG/1
400 LOZENGE ORAL; TRANSMUCOSAL EVERY 4 HOURS
Status: DISCONTINUED | OUTPATIENT
Start: 2021-08-04 | End: 2021-08-05

## 2021-08-04 RX ORDER — LIDOCAINE AND PRILOCAINE 25; 25 MG/G; MG/G
CREAM TOPICAL 2 TIMES DAILY PRN
Status: DISCONTINUED | OUTPATIENT
Start: 2021-08-04 | End: 2021-08-16

## 2021-08-04 RX ORDER — HYDROMORPHONE HYDROCHLORIDE 1 MG/ML
1 INJECTION, SOLUTION INTRAMUSCULAR; INTRAVENOUS; SUBCUTANEOUS
Status: DISCONTINUED | OUTPATIENT
Start: 2021-08-04 | End: 2021-08-06

## 2021-08-04 RX ORDER — FENTANYL 100 UG/H
1 PATCH TRANSDERMAL
Status: DISCONTINUED | OUTPATIENT
Start: 2021-08-04 | End: 2021-08-11

## 2021-08-04 RX ORDER — HYDROMORPHONE HYDROCHLORIDE 1 MG/ML
1 INJECTION, SOLUTION INTRAMUSCULAR; INTRAVENOUS; SUBCUTANEOUS EVERY 4 HOURS PRN
Status: DISCONTINUED | OUTPATIENT
Start: 2021-08-04 | End: 2021-08-04

## 2021-08-04 NOTE — DIETARY NOTE
[address] FOLLOW UP    Gia Osullivan     Parenteral Nutrition - infusing via PICC.  Tonight's TPN to provide goal: 1080 dextrose calories, 600 lipid calories, 30% lipids, 80 grams protein in 2500mL fluid and total 2000 josé

## 2021-08-04 NOTE — PROGRESS NOTES
[facility]  Progress Note     Tasha Crowe Patient Status:  Inpatient    1950 MRN [id number]   [facility] 3NW-A Attending Dr. Arabella Ahumada Day # 35 PCP Joy Perez MD     Chief Complaint: Abdominal distention    S: pat PTP, INR in the last 168 hours.          COVID-19 Lab Results    COVID-19  Lab Results   Component Value Date    COVID19 Not Detected 08/02/2021    COVID19 Not Detected 07/05/2021    COVID19 Not Detected 03/28/2021       Pro-Calcitonin  No results for input stable  12. COPD, stable  13. DM 2 6.6%  1. hyperglycemia protocol, correctional scale  2.  TPN insulin- 62 units    Quality:  · DVT Prophylaxis: heparin  · CODE status: Full  · If COVID testing is negative, may discontinue isolation: yes     Will the patie

## 2021-08-04 NOTE — PLAN OF CARE
Pt a&ox4, VSS, afebrile. NSR/ST on telemetry. Denies passing flatus, bowel sounds hypoactive. G tube to LIS with large amounts of green output. Ketamine gtt infusing.  Pt continues to report severe pain, prn dilaudid, robaxin, and ativan administered per ma ordered antiemetic medications  - Provide nonpharmacologic comfort measures as appropriate  - Advance diet as tolerated, if ordered  - Obtain nutritional consult as needed  - Evaluate fluid balance  Outcome: Progressing  Goal: Maintains or returns to basel Progressing     Problem: Diabetes/Glucose Control  Goal: Glucose maintained within prescribed range  Description: INTERVENTIONS:  - Monitor Blood Glucose as ordered  - Assess for signs and symptoms of hyperglycemia and hypoglycemia  - Administer ordered me

## 2021-08-04 NOTE — PROGRESS NOTES
Met with Mr. Alena Louise bedside this morning. He reports that he did sleep well last night but that he awoke with pain. He was clearly in pain throughout the course of our conversation although he did during that time receive a PRN pain medication.  His moo

## 2021-08-04 NOTE — PLAN OF CARE
A & O x4, irritable, depressed. Asking for pain meds frequently despite reeducation on time frame medications are due. Pt upset with staff, indirect eye contact. Refusing to get out of bed to use restroom, to chair. Declining to get washed up.      Pt bonnie GASTROINTESTINAL - ADULT  Goal: Minimal or absence of nausea and vomiting  Description: INTERVENTIONS:  - Maintain adequate hydration with IV or PO as ordered and tolerated  - Nasogastric tube to low intermittent suction as ordered  - Evaluate effectivenes of hyperglycemia and hypoglycemia  - Administer ordered medications to maintain glucose within target range  - Assess barriers to adequate nutritional intake and initiate nutrition consult as needed  - Instruct patient on self management of diabetes  Outco

## 2021-08-04 NOTE — PROGRESS NOTES
[facility]  Progress Note    Zeeshan Peacock Patient Status:  Inpatient    1950 MRN [id number]   [facility] 3NW-A Attending Dannielle Gong MD   Hosp Day # 35 PCP Jessica Peralta MD     Subjective: The patient is resting in bed. pain     Benign essential HTN     Diabetes mellitus type 2 in nonobese (HCC)     COPD (chronic obstructive pulmonary disease) (HCC)     Vitamin D deficiency     Chronic narcotic dependence ([address]     Steatorrhea     Prostate cancer (HCC)     Bilateral kidne editing her note. I have personally examined the patient and reviewed all relevant labs and reports. I agree with the above assessment and plan and again have modified the report to reflect my opinion.     Patient complaining of worsening left upper quadran

## 2021-08-04 NOTE — CM/SW NOTE
[facility] needs clarification for home tpn setup: discussed w/Jocelyn CARO.   -signing MD, will [facility] manage orders.  Dr Jr Callejas to sign orders and follow patient, [facility] can manage.  -currently on IV protonix - will look into separate coverage, mo

## 2021-08-04 NOTE — PROGRESS NOTES
[facility]  Report of Psychiatric Progress Note    Joseharoon Silver Patient Status:  Inpatient    1950 MRN [id number]   [facility] 3NW-A Attending Odessa Esposito MD   Hosp Day # 28 PCP Marry Bradley MD     Date of Admission:  over 20 yrs (Dilaudid prn, Fentanyl patch 100mcg/hr). He has a hx of major depressive disorder that has worsened the past 3 yrs. He has decreased energy and mood and motivation.  He finds less enjoyment in things that used to interest him-- cars and la tube out. Has a venting G tube now. Much less throat discomfort/pain. He c/o abd pain and fatigue, anxiety, and depressed mood. No hopelessness. 8/3/21- He c/o abd pain in area of G tube and some throat pain. He feels tired and anxious and depressed.  H • Pneumonia due to organism    • Primary localized osteoarthrosis, lower leg 10/2/2013   • Primary localized osteoarthrosis, lower leg, left [715.16] 9/2/2015   • Pulmonary nodule 3/23/2017   • Renal stones    • [facility] 6/2007   • Shortness o 4 mg, 4 mg, Intravenous, Q6H PRN  •  Prochlorperazine Edisylate (COMPAZINE) injection 10 mg, 10 mg, Intravenous, Q6H PRN  •  fentaNYL (DURAGESIC) 75 MCG/HR 1 patch, 1 patch, Transdermal, Q72H  •  0.9% NaCl infusion, , Intravenous, Continuous  •  Ketamine H substance abuse and suicidal ideas. The patient is nervous/anxious.       Mental Status Exam:     Objective       08/03/21  1841   BP:    Pulse: 101   Resp:    Temp:      Appearance: fair grooming, thin, tired appearing  Behavior: normal psychomotor, fair e [employer] Data Registry) which includes the Dose Index Registry.       PATIENT STATED HISTORY:(As transcribed by Technologist) Vj Dennis states he has a small bowel obstruction.        CONTRAST USED:  65cc of Omnipaque 350       FINDINGS:     LIVER:  No enl looks smaller.  Now about 1.0 cm thick previously about 1.5 cm thick.  Mild dependent atelectasis bilaterally.    OTHER:  Negative.                          Impression   CONCLUSION:  Markedly dilated proximal small bowel loops with minimal improvement withi

## 2021-08-04 NOTE — PROGRESS NOTES
Acute Pain Service     POD#30 s/p exp lap / IVETT  G-tube to LIS. Patient complains his pain is worse today. He describes pain as \"stabbing\" and constant. States he gets very temporary relief after IV Dilaudid.  Dr. Rosalee Dumont at bedside - patient has juan

## 2021-08-05 LAB
ALBUMIN SERPL-MCNC: 2.1 G/DL (ref 3.4–5)
ALBUMIN/GLOB SERPL: 0.4 {RATIO} (ref 1–2)
ALP LIVER SERPL-CCNC: 168 U/L
ALT SERPL-CCNC: 18 U/L
ANION GAP SERPL CALC-SCNC: 7 MMOL/L (ref 0–18)
AST SERPL-CCNC: 13 U/L (ref 15–37)
BASOPHILS # BLD AUTO: 0.04 X10(3) UL (ref 0–0.2)
BASOPHILS NFR BLD AUTO: 0.2 %
BILIRUB SERPL-MCNC: 0.6 MG/DL (ref 0.1–2)
BUN BLD-MCNC: 26 MG/DL (ref 7–18)
CALCIUM BLD-MCNC: 9.1 MG/DL (ref 8.5–10.1)
CHLORIDE SERPL-SCNC: 104 MMOL/L (ref 98–112)
CO2 SERPL-SCNC: 27 MMOL/L (ref 21–32)
CREAT BLD-MCNC: 1.33 MG/DL
EOSINOPHIL # BLD AUTO: 0.07 X10(3) UL (ref 0–0.7)
EOSINOPHIL NFR BLD AUTO: 0.3 %
ERYTHROCYTE [DISTWIDTH] IN BLOOD BY AUTOMATED COUNT: 16.1 %
GLOBULIN PLAS-MCNC: 6 G/DL (ref 2.8–4.4)
GLUCOSE BLD-MCNC: 146 MG/DL (ref 70–99)
GLUCOSE BLD-MCNC: 164 MG/DL (ref 70–99)
GLUCOSE BLD-MCNC: 167 MG/DL (ref 70–99)
GLUCOSE BLD-MCNC: 172 MG/DL (ref 70–99)
GLUCOSE BLD-MCNC: 176 MG/DL (ref 70–99)
GLUCOSE BLD-MCNC: 209 MG/DL (ref 70–99)
HAV IGM SER QL: 2.2 MG/DL (ref 1.6–2.6)
HCT VFR BLD AUTO: 26.4 %
HGB BLD-MCNC: 8.8 G/DL
IMM GRANULOCYTES # BLD AUTO: 0.17 X10(3) UL (ref 0–1)
IMM GRANULOCYTES NFR BLD: 0.8 %
LYMPHOCYTES # BLD AUTO: 2.98 X10(3) UL (ref 1–4)
LYMPHOCYTES NFR BLD AUTO: 13.5 %
M PROTEIN MFR SERPL ELPH: 8.1 G/DL (ref 6.4–8.2)
MCH RBC QN AUTO: 29.1 PG (ref 26–34)
MCHC RBC AUTO-ENTMCNC: 33.3 G/DL (ref 31–37)
MCV RBC AUTO: 87.4 FL
MONOCYTES # BLD AUTO: 2.56 X10(3) UL (ref 0.1–1)
MONOCYTES NFR BLD AUTO: 11.6 %
NEUTROPHILS # BLD AUTO: 16.18 X10 (3) UL (ref 1.5–7.7)
NEUTROPHILS # BLD AUTO: 16.18 X10(3) UL (ref 1.5–7.7)
NEUTROPHILS NFR BLD AUTO: 73.6 %
OSMOLALITY SERPL CALC.SUM OF ELEC: 293 MOSM/KG (ref 275–295)
PHOSPHATE SERPL-MCNC: 2.2 MG/DL (ref 2.5–4.9)
PLATELET # BLD AUTO: 559 10(3)UL (ref 150–450)
PLATELET MORPHOLOGY: NORMAL
POTASSIUM SERPL-SCNC: 3.7 MMOL/L (ref 3.5–5.1)
RBC # BLD AUTO: 3.02 X10(6)UL
SODIUM SERPL-SCNC: 138 MMOL/L (ref 136–145)
WBC # BLD AUTO: 22 X10(3) UL (ref 4–11)

## 2021-08-05 PROCEDURE — 99232 SBSQ HOSP IP/OBS MODERATE 35: CPT | Performed by: OTHER

## 2021-08-05 PROCEDURE — 99232 SBSQ HOSP IP/OBS MODERATE 35: CPT | Performed by: HOSPITALIST

## 2021-08-05 RX ORDER — LORAZEPAM 0.5 MG/1
0.5 TABLET ORAL 2 TIMES DAILY PRN
Status: DISCONTINUED | OUTPATIENT
Start: 2021-08-05 | End: 2021-08-16

## 2021-08-05 RX ORDER — MORPHINE SULFATE 20 MG/ML
20 SOLUTION ORAL EVERY 4 HOURS
Status: DISCONTINUED | OUTPATIENT
Start: 2021-08-05 | End: 2021-08-06

## 2021-08-05 RX ORDER — LORAZEPAM 1 MG/1
1 TABLET ORAL NIGHTLY
Status: DISCONTINUED | OUTPATIENT
Start: 2021-08-05 | End: 2021-08-16

## 2021-08-05 RX ORDER — FENTANYL CITRATE 600 UG/1
600 LOZENGE ORAL; TRANSMUCOSAL EVERY 4 HOURS
Status: DISCONTINUED | OUTPATIENT
Start: 2021-08-05 | End: 2021-08-05

## 2021-08-05 NOTE — PROGRESS NOTES
Pt is Ax4, vss, afebrile, /Tele-ST (110s), bed alarm active, up w/ asst and walker. Scheduled Actiq lollipop started for pain control. Pt states pain is 6-7/10 and requests IV Dilaudid in-between Actiq doses.  Pt slept in longer intervals on pain regimen

## 2021-08-05 NOTE — PLAN OF CARE
Problem: Patient/Family Goals  Goal: Patient/Family Long Term Goal  Description: Patient's Long Term Goal: able to eat and feel better     Interventions:advance diet as tolerated ,monitor signs of bowel obstruction    - See additional Care Plan goals for as ordered and tolerated  - Evaluate effectiveness of GI medications  - Encourage mobilization and activity  - Obtain nutritional consult as needed  - Establish a toileting routine/schedule  - Consider collaborating with pharmacy to review patient's medica consult as needed  - Instruct patient on self management of diabetes  Outcome: Progressing

## 2021-08-05 NOTE — PROGRESS NOTES
[facility]  Report of Psychiatric Progress Note    Jose Silver Patient Status:  Inpatient    1950 MRN [id number]   [facility] 3NW-A Attending Odessa Esposito MD   [address] Day # 29 PCP Marry Bradley MD     Date of Admission:  in things that used to interest him-- cars and home projects. The death of his brother in law in Dec 2019, contributed to worsening mood symptoms. Being in the hospital for 24 days, he has felt more depressed and helpless and at times hopeless.  He has pain. He feels tired and anxious and depressed. He is looking forward to going home soon. 8/5/21- He c/o 5/10 abd pain, down from 7/10 yesterday. He feels tired and weak. Less anxiety today. Continues to have depressed mood and some helplessness.  No ho • Primary localized osteoarthrosis, lower leg 10/2/2013   • Primary localized osteoarthrosis, lower leg, left [715.16] 9/2/2015   • Pulmonary nodule 3/23/2017   • Renal stones    • Right [facility] D[facility] ([facility] 6/2007   • Shortness of breath    • Spondylolisthesis fentaNYL (DURAGESIC) 100 MCG/HR 1 patch, 1 patch, Transdermal, Q72H  •  HYDROmorphone HCl (DILAUDID) 1 MG/ML injection 1 mg, 1 mg, Intravenous, Q3H PRN  •  Piperacillin Sod-Tazobactam So (ZOSYN) 3.375 g in dextrose 5% 100 mL IVPB-ADDV, 3.375 g, Intravenous for hallucinations, substance abuse and suicidal ideas. The patient is nervous/anxious.       Mental Status Exam:     Objective       08/05/21  1227   BP: 113/62   Pulse: 101   Resp: 18   Temp: 100.2 °F (37.9 °C)     Appearance: fair grooming, thin, tired a ([employer]) [address]) which includes the Dose Index Registry.       PATIENT STATED HISTORY:(As transcribed by Technologist) Syed Rios states he has a small bowel obstruction.        CONTRAST USED:  65cc of O pulmonary or pleural disease.  Pericardial effusion looks smaller.  Now about 1.0 cm thick previously about 1.5 cm thick.  Mild dependent atelectasis bilaterally.    OTHER:  Negative.                          Impression   CONCLUSION:  Markedly dilated proxi

## 2021-08-05 NOTE — PROGRESS NOTES
[facility]  Progress Note     Ingrisbrissa Sandersonangel Patient Status:  Inpatient    1950 MRN [id number]   [facility] 3NW-A Attending Dr. Bhargavi Starks Day # 29 PCP Elias Ren MD     Chief Complaint: Abdominal distention    S: pat (H)).    No results for input(s): PTP, INR in the last 168 hours.          COVID-19 Lab Results    COVID-19  Lab Results   Component Value Date    COVID19 Not Detected 08/02/2021    COVID19 Not Detected 07/05/2021    COVID19 Not Detected 03/28/2021       Pr prn dilaudid IV per pain service   10. JONO  1. We increased volume of TPN  On 8/4, trend UO/BMP and gastric losses  11. Hypertension, stable  12. COPD, stable  13. DM 2 6.6%  1. correctional scale  2.  TPN insulin- 62 units    Quality:  · DVT Prophylaxis: h

## 2021-08-05 NOTE — PROGRESS NOTES
[facility]  Progress Note    Yana Boucher Patient Status:  Inpatient    1950 MRN [id number]   [facility] 3NW-A Attending Anh Ferguson MD   Hosp Day # 29 PCP Travis Ty MD     Subjective: The patient is resting in bed. pancreatitis ([facility]     Other chronic pain     Benign essential HTN     Diabetes mellitus type 2 in nonobese [facility])     COPD (chronic obstructive pulmonary disease) (HCC)     Vitamin D deficiency     Chronic narcotic dependence ([facility]     Steatorrhea     Pros above note and evaluation by the physician assistant. I agree with her physical exam and the data listed in the report, and I have made any relevant changes in editing her note.  I have personally examined the patient and reviewed all relevant labs and repo

## 2021-08-05 NOTE — CM/SW NOTE
Spoke w/patient and spouse at bedside for dc planning. Wife is familiar w/TPN as patient has had this previously. Requesting [facility] at discharge.  Message sent to agency to confirm acceptance, will need start of care on day of discharge dt tpn setup, aw

## 2021-08-05 NOTE — PROGRESS NOTES
Notified by nurse of temperature of 102. Patient was tachycardic up to 120s. Had a transient blood pressure of 87/55 after getting up to the chair. Stat CT scan ordered. PEG tube in good position within the stomach.   No injury to surrounding struct

## 2021-08-05 NOTE — DIETARY NOTE
[address] FOLLOW UP    Zeeshan Peacock     Parenteral Nutrition - infusing via PICC.  Tonight's TPN to provide goal: 1080 dextrose calories, 600 lipid calories, 30% lipids, 80 grams protein in 2500mL fluid and total 2000 josé

## 2021-08-06 LAB
ALBUMIN SERPL-MCNC: 1.9 G/DL (ref 3.4–5)
ALBUMIN/GLOB SERPL: 0.3 {RATIO} (ref 1–2)
ALP LIVER SERPL-CCNC: 156 U/L
ALT SERPL-CCNC: 19 U/L
ANION GAP SERPL CALC-SCNC: 4 MMOL/L (ref 0–18)
AST SERPL-CCNC: 16 U/L (ref 15–37)
BASOPHILS # BLD AUTO: 0.05 X10(3) UL (ref 0–0.2)
BASOPHILS NFR BLD AUTO: 0.2 %
BILIRUB SERPL-MCNC: 0.6 MG/DL (ref 0.1–2)
BUN BLD-MCNC: 33 MG/DL (ref 7–18)
CALCIUM BLD-MCNC: 8.8 MG/DL (ref 8.5–10.1)
CHLORIDE SERPL-SCNC: 101 MMOL/L (ref 98–112)
CO2 SERPL-SCNC: 28 MMOL/L (ref 21–32)
CREAT BLD-MCNC: 1.56 MG/DL
EOSINOPHIL # BLD AUTO: 0.25 X10(3) UL (ref 0–0.7)
EOSINOPHIL NFR BLD AUTO: 1.1 %
ERYTHROCYTE [DISTWIDTH] IN BLOOD BY AUTOMATED COUNT: 16 %
GLOBULIN PLAS-MCNC: 6.1 G/DL (ref 2.8–4.4)
GLUCOSE BLD-MCNC: 101 MG/DL (ref 70–99)
GLUCOSE BLD-MCNC: 198 MG/DL (ref 70–99)
GLUCOSE BLD-MCNC: 224 MG/DL (ref 70–99)
GLUCOSE BLD-MCNC: 232 MG/DL (ref 70–99)
HAV IGM SER QL: 2.1 MG/DL (ref 1.6–2.6)
HCT VFR BLD AUTO: 24.4 %
HGB BLD-MCNC: 8.5 G/DL
IMM GRANULOCYTES # BLD AUTO: 0.23 X10(3) UL (ref 0–1)
IMM GRANULOCYTES NFR BLD: 1.1 %
LYMPHOCYTES # BLD AUTO: 2.02 X10(3) UL (ref 1–4)
LYMPHOCYTES NFR BLD AUTO: 9.3 %
M PROTEIN MFR SERPL ELPH: 8 G/DL (ref 6.4–8.2)
MCH RBC QN AUTO: 30.1 PG (ref 26–34)
MCHC RBC AUTO-ENTMCNC: 34.8 G/DL (ref 31–37)
MCV RBC AUTO: 86.5 FL
MONOCYTES # BLD AUTO: 2.22 X10(3) UL (ref 0.1–1)
MONOCYTES NFR BLD AUTO: 10.2 %
NEUTROPHILS # BLD AUTO: 17.05 X10 (3) UL (ref 1.5–7.7)
NEUTROPHILS # BLD AUTO: 17.05 X10(3) UL (ref 1.5–7.7)
NEUTROPHILS NFR BLD AUTO: 78.1 %
OSMOLALITY SERPL CALC.SUM OF ELEC: 289 MOSM/KG (ref 275–295)
PHOSPHATE SERPL-MCNC: 5 MG/DL (ref 2.5–4.9)
PLATELET # BLD AUTO: 522 10(3)UL (ref 150–450)
POTASSIUM SERPL-SCNC: 3.8 MMOL/L (ref 3.5–5.1)
RBC # BLD AUTO: 2.82 X10(6)UL
SODIUM SERPL-SCNC: 133 MMOL/L (ref 136–145)
WBC # BLD AUTO: 21.8 X10(3) UL (ref 4–11)

## 2021-08-06 PROCEDURE — 99232 SBSQ HOSP IP/OBS MODERATE 35: CPT | Performed by: OTHER

## 2021-08-06 PROCEDURE — 99232 SBSQ HOSP IP/OBS MODERATE 35: CPT | Performed by: HOSPITALIST

## 2021-08-06 RX ORDER — MORPHINE SULFATE 20 MG/ML
20 SOLUTION ORAL EVERY 4 HOURS PRN
Status: DISCONTINUED | OUTPATIENT
Start: 2021-08-06 | End: 2021-08-09

## 2021-08-06 RX ORDER — NALOXONE HYDROCHLORIDE 0.4 MG/ML
0.08 INJECTION, SOLUTION INTRAMUSCULAR; INTRAVENOUS; SUBCUTANEOUS
Status: DISCONTINUED | OUTPATIENT
Start: 2021-08-06 | End: 2021-08-13

## 2021-08-06 RX ORDER — HYDROMORPHONE HYDROCHLORIDE 1 MG/ML
1 INJECTION, SOLUTION INTRAMUSCULAR; INTRAVENOUS; SUBCUTANEOUS
Status: ACTIVE | OUTPATIENT
Start: 2021-08-06 | End: 2021-08-06

## 2021-08-06 RX ORDER — DIPHENHYDRAMINE HYDROCHLORIDE 50 MG/ML
12.5 INJECTION INTRAMUSCULAR; INTRAVENOUS EVERY 4 HOURS PRN
Status: DISCONTINUED | OUTPATIENT
Start: 2021-08-06 | End: 2021-08-13

## 2021-08-06 RX ORDER — ONDANSETRON 2 MG/ML
4 INJECTION INTRAMUSCULAR; INTRAVENOUS EVERY 6 HOURS PRN
Status: DISCONTINUED | OUTPATIENT
Start: 2021-08-06 | End: 2021-08-13

## 2021-08-06 RX ORDER — SODIUM CHLORIDE 9 MG/ML
INJECTION, SOLUTION INTRAVENOUS CONTINUOUS
Status: DISCONTINUED | OUTPATIENT
Start: 2021-08-06 | End: 2021-08-13

## 2021-08-06 NOTE — PROGRESS NOTES
Acute Pain Service     POD#31 s/p exp lap / IVETT  G-tube to LIS. Patient states he feels better today - rates pain 5-6/10 at G-tube site at present. Wife at bedside reports patient ambulated in hallway and did his own am care in the bathroom.  He smiled

## 2021-08-06 NOTE — PLAN OF CARE
Problem: Patient/Family Goals  Goal: Patient/Family Long Term Goal  Description: Patient's Long Term Goal: able to eat and feel better     Interventions:advance diet as tolerated ,monitor signs of bowel obstruction    - See additional Care Plan goals for as ordered and tolerated  - Evaluate effectiveness of GI medications  - Encourage mobilization and activity  - Obtain nutritional consult as needed  - Establish a toileting routine/schedule  - Consider collaborating with pharmacy to review patient's medica consult as needed  - Instruct patient on self management of diabetes  Outcome: Progressing      Pt is A&O, VSS, with moderate c/o pain to abdomen- medication given. Pt is on RA with bilateral diminished lung sounds, . IS at bedside strongly encouraged.

## 2021-08-06 NOTE — DIETARY MALNUTRITION NOTE
[facility]    NUTRITION ASSESSMENT    Pt meets moderate malnutrition criteria.     CRITERIA FOR MALNUTRITION DIAGNOSIS:  Criteria for non-severe malnutrition diagnosis: acute illness/injury related to wt loss 5% in 1 month, body fat mild depletion and nausea. NGT to LIS remains. Diet advanced to clear liquids 7/10. Recommend reweigh as able -- no new wt since 7/1.    7/9 - Pt NPO, awaiting further return of bowel function per MD, still with NG to low intermittent suction  7/8 - Pt remains NPO.  NG to LIS Meeting Needs: TPN meeting needs   Food Allergies: No  Cultural/Ethnic/Scientologist Preferences Addresses: Yes    GI SYSTEM REVIEW: abdominal pain    NUTRITION RELATED PHYSICAL FINDINGS:     1. Body Fat/Muscle Mass: not assessed at this time     2.  Fluid Accu

## 2021-08-06 NOTE — PROGRESS NOTES
[facility]  Report of Psychiatric Progress Note    Luis Jones Patient Status:  Inpatient    1950 MRN [id number]   [facility] 3NW-A Attending Reyna Marquez MD   Hosp Day # 28 PCP Benito Florian MD     Date of Admission:  used to interest him-- cars and home projects. The death of his brother in law in Dec 2019, contributed to worsening mood symptoms. Being in the hospital for 24 days, he has felt more depressed and helpless and at times hopeless.  He has NO suicidal ga tired and anxious and depressed. He is looking forward to going home soon. 8/5/21- He c/o 5/10 abd pain, down from 7/10 yesterday. He feels tired and weak. Less anxiety today. Continues to have depressed mood and some helplessness. No hopelessness. of opiate analgesic 6/11/2010   • Multiple risk factors for coronary artery disease 3/28/2019   • Osteoarthritis    • Pain in joints    • Pneumonia due to organism    • Primary localized osteoarthrosis, lower leg 10/2/2013   • Primary localized osteoarthro Oral, Q4H  •  lidocaine-prilocaine (EMLA) cream, , Topical, BID PRN  •  fentaNYL (DURAGESIC) 100 MCG/HR 1 patch, 1 patch, Transdermal, Q72H  •  HYDROmorphone HCl (DILAUDID) 1 MG/ML injection 1 mg, 1 mg, Intravenous, Q3H PRN  •  Piperacillin Sod-Tazobactam malaise/fatigue. Psychiatric/Behavioral: Positive for depression. Negative for hallucinations, substance abuse and suicidal ideas. The patient is nervous/anxious.       Mental Status Exam:     Objective       08/06/21  1200   BP: 102/54   Pulse: 90   Resp reduction techniques were used.  Dose information is   transmitted to the [facility] ([address] [place] [address] of Radiology) [address] ([address]) which includes the Dose Index Registry.       PATIENT STATED HISTORY:(As transcribed by Technologist)  Ros age.     BONES:  No bony lesion or fracture.     LUNG BASES:  No visible pulmonary or pleural disease.  Pericardial effusion looks smaller.  Now about 1.0 cm thick previously about 1.5 cm thick.  Mild dependent atelectasis bilaterally. OTHER:  Negative.

## 2021-08-06 NOTE — PLAN OF CARE
Patient is alert and oriented x3  Has not ambulated today; encouraged. ABD soft, tender on RLQ. Voiding  NPO +ice maintained  States pain is not controlled today. \"doesn't want the morphine; it doesn't work. \"    Problem: Patient/Family Goals  Goal: Kulwinder Morgan as needed  - Evaluate fluid balance  Outcome: Progressing  Goal: Maintains or returns to baseline bowel function  Description: INTERVENTIONS:  - Assess bowel function  - Maintain adequate hydration with IV or PO as ordered and tolerated  - Evaluate effecti ordered  - Assess for signs and symptoms of hyperglycemia and hypoglycemia  - Administer ordered medications to maintain glucose within target range  - Assess barriers to adequate nutritional intake and initiate nutrition consult as needed  - Instruct ricci

## 2021-08-06 NOTE — PROGRESS NOTES
I had an opportunity to meet with  Jeanna Adrianna [facility] this morning. His wife was also present for some of our discussion. He appeared to have a bit more energy and was more engaging than he was in our last meeting.  He was oriented to person, place and c

## 2021-08-06 NOTE — H&P
[facility]  Progress Note    Sebas [facility] Patient Status:  Inpatient    1950 MRN [id number]   [facility] 3NW-A Attending Ramiro Cardenas MD   Hosp Day # 28 PCP Binta Lindquist MD     Subjective: The patient is resting in bed. Benign essential HTN     Diabetes mellitus type 2 in nonobese [facility])     COPD (chronic obstructive pulmonary disease) (HCC)     Vitamin D deficiency     Chronic narcotic dependence (HCC)     Steatorrhea     Prostate cancer ([facility] [address]     Bilateral kidney stones assistant. I agree with her physical exam and the data listed in the report, and I have made any relevant changes in editing her note. I have personally examined the patient and reviewed all relevant labs and reports.  I agree with the above assessment and

## 2021-08-06 NOTE — PAYOR COMM NOTE
--------------  CONTINUED STAY REVIEW    Payor: Kleber Butler 673 #:  Ana Rosa Lainez  Authorization Number: [id number]    Admit date: 7/2/21  Admit time:  1:26 AM    Admitting Physician: Kaci Morrison MD  Attending Physician:  Fidencio Rodriguez MD  Pr Intravenous Dominic Pfeiffer RN    8/5/2021 2359 Given 1 mg Intravenous Dominic Pfeiffer RN    8/5/2021 1951 Given 1 mg Intravenous Dominic Pfeiffer RN    8/5/2021 1614 Given 1 mg Intravenous Johana Green RN      Insulin Aspart Pen (NOVOLOG) 100 UNIT/ML flexpen 1- Luis Felipe Encarnacion RN      sodium phosphate 15 mmol in sodium chloride 0.9% 100 mL IVPB     Date Action Dose Route User    8/5/2021 1951 New Bag 15 mmol Intravenous Mckinley Bean RN      Umeclidinium Bromide (INCRUSE ELLIPTA) 62.5 MCG/INH inhaler 1 puff     Date A and IS as able  7. DVT prophylaxis with heparin. 8. GI prophylaxis Protonix.     I spent 22 minutes face to face with the patient. More that half of that time was spent counseling the patient and/or on coordination of care.  The diagnosis, prognosis, and g DATE

## 2021-08-06 NOTE — PROGRESS NOTES
[facility]  Progress Note     Geovanny Rob Patient Status:  Inpatient    1950 MRN [id number]   [facility] 3NW-A Attending Dr. Rigoberto Olvera Day # 28 PCP Dusty Lovell MD     Chief Complaint: Abdominal distention    S: pat Creatinine Clearance: 35.3 mL/min (A) (based on SCr of 1.56 mg/dL (H)). No results for input(s): PTP, INR in the last 168 hours.          COVID-19 Lab Results    COVID-19  Lab Results   Component Value Date    COVID19 Not Detected 08/02/2021    COVID19 N per pain service   9. JONO  1. We increased volume of TPN  On 8/4, Cr rising - may need to increase again. 10. Hypertension, stable  11. COPD, stable  12. DM 2 6.6%  1. correctional scale  2.  TPN insulin- 62 units    Quality:  · DVT Prophylaxis: heparin

## 2021-08-07 LAB
ALBUMIN SERPL-MCNC: 1.8 G/DL (ref 3.4–5)
ALBUMIN/GLOB SERPL: 0.3 {RATIO} (ref 1–2)
ALP LIVER SERPL-CCNC: 169 U/L
ALT SERPL-CCNC: 18 U/L
ANION GAP SERPL CALC-SCNC: 2 MMOL/L (ref 0–18)
AST SERPL-CCNC: 12 U/L (ref 15–37)
BASOPHILS # BLD: 0 X10(3) UL (ref 0–0.2)
BASOPHILS NFR BLD: 0 %
BILIRUB SERPL-MCNC: 0.5 MG/DL (ref 0.1–2)
BUN BLD-MCNC: 43 MG/DL (ref 7–18)
CALCIUM BLD-MCNC: 8.9 MG/DL (ref 8.5–10.1)
CHLORIDE SERPL-SCNC: 97 MMOL/L (ref 98–112)
CO2 SERPL-SCNC: 32 MMOL/L (ref 21–32)
CREAT BLD-MCNC: 1.68 MG/DL
EOSINOPHIL # BLD: 0.44 X10(3) UL (ref 0–0.7)
EOSINOPHIL NFR BLD: 2 %
ERYTHROCYTE [DISTWIDTH] IN BLOOD BY AUTOMATED COUNT: 15.9 %
GLOBULIN PLAS-MCNC: 6 G/DL (ref 2.8–4.4)
GLUCOSE BLD-MCNC: 124 MG/DL (ref 70–99)
GLUCOSE BLD-MCNC: 150 MG/DL (ref 70–99)
GLUCOSE BLD-MCNC: 187 MG/DL (ref 70–99)
GLUCOSE BLD-MCNC: 213 MG/DL (ref 70–99)
GLUCOSE BLD-MCNC: 96 MG/DL (ref 70–99)
HAV IGM SER QL: 2.2 MG/DL (ref 1.6–2.6)
HCT VFR BLD AUTO: 23.9 %
HGB BLD-MCNC: 8 G/DL
LYMPHOCYTES NFR BLD: 1.78 X10(3) UL (ref 1–4)
LYMPHOCYTES NFR BLD: 8 %
M PROTEIN MFR SERPL ELPH: 7.8 G/DL (ref 6.4–8.2)
MCH RBC QN AUTO: 29 PG (ref 26–34)
MCHC RBC AUTO-ENTMCNC: 33.5 G/DL (ref 31–37)
MCV RBC AUTO: 86.6 FL
MONOCYTES # BLD: 4.45 X10(3) UL (ref 0.1–1)
MONOCYTES NFR BLD: 20 %
NEUTROPHILS # BLD AUTO: 15.94 X10 (3) UL (ref 1.5–7.7)
NEUTROPHILS NFR BLD: 51 %
NEUTS BAND NFR BLD: 18 %
NEUTS HYPERSEG # BLD: 15.13 X10(3) UL (ref 1.5–7.7)
NRBC BLD MANUAL-RTO: 6 %
OSMOLALITY SERPL CALC.SUM OF ELEC: 284 MOSM/KG (ref 275–295)
PHOSPHATE SERPL-MCNC: 4.2 MG/DL (ref 2.5–4.9)
PLATELET # BLD AUTO: 528 10(3)UL (ref 150–450)
PLATELET MORPHOLOGY: NORMAL
POTASSIUM SERPL-SCNC: 3.8 MMOL/L (ref 3.5–5.1)
RBC # BLD AUTO: 2.76 X10(6)UL
SODIUM SERPL-SCNC: 131 MMOL/L (ref 136–145)
TOTAL CELLS COUNTED: 49
WBC # BLD AUTO: 21.8 X10(3) UL (ref 4–11)

## 2021-08-07 PROCEDURE — 99232 SBSQ HOSP IP/OBS MODERATE 35: CPT | Performed by: HOSPITALIST

## 2021-08-07 NOTE — PROGRESS NOTES
Acute Pain Service     POD#32 s/p exp lap / IVETT  G-tube to LIS. Patient reports pain persists around G-tube site - rates pain 6-7/10 throughout the day today. Reports he gets 20 minutes of pain relief after IV Dilaudid then pain escalates.  He ambulate

## 2021-08-07 NOTE — PROGRESS NOTES
The patient has stable vital signs, and he rates his pain level at 5 out of 10. He states that the pain is getting better due to his Dilaudid PCA.

## 2021-08-07 NOTE — PROGRESS NOTES
[facility]  Progress Note    Ramos Mims Patient Status:  Inpatient    1950 MRN [id number]   [facility] 3NW-A Attending Ally Woods MD   Hosp Day # 39 PCP Berry Hall MD     Subjective:  Patient doing okay with PCA pum exploratory laparotomy with lysis of adhesions  Postoperative mesenteric hematoma now causing secondary small bowel obstruction  PPD 5 EGD with PEG placement  CT 8/4- G-tube is good position, distended bowel and complex fluid collections in mesentery are n

## 2021-08-07 NOTE — DIETARY NOTE
L[address] 14 FOLLOW UP    Debra Miranda   Parenteral Nutrition - infusing via PICC.  Tonight's TPN to provide goal: 1080 dextrose calories, 600 lipid calories, 30% lipids, 80 grams protein in 2500mL fluid and total 2000 kcal

## 2021-08-07 NOTE — PROGRESS NOTES
Acute Pain Service     POD#33 s/p exp lap / IVETT  Post G tube day 5    Patient seen sitting up in bed. States pain control has improved with PCA use instead of prn administration by RN. Rates pain 5/10, which is improved from yesterday.  Pt endorses that he

## 2021-08-07 NOTE — PROGRESS NOTES
[facility]  Progress Note     Geovanny Rob Patient Status:  Inpatient    1950 MRN [id number]   [facility] 3NW-A Attending Dr. Rigoberto Olvera Day # 39 PCP Dusty Lovell MD     Chief Complaint: Abdominal distention    S: fee Clearance: 35.2 mL/min (A) (based on SCr of 1.56 mg/dL (H)). No results for input(s): PTP, INR in the last 168 hours.          COVID-19 Lab Results    COVID-19  Lab Results   Component Value Date    COVID19 Not Detected 08/02/2021    COVID19 Not Detected 8/4, Cr rising - may need to increase again. 10. Hypertension, stable  11. COPD, stable  12. DM 2 6.6%  1. correctional scale  2.  TPN insulin- 62 units    Quality:  · DVT Prophylaxis: heparin  · CODE status: Full  · If COVID testing is negative, may disc

## 2021-08-07 NOTE — DIETARY NOTE
Loi 14 FOLLOW UP    Pan Wheat   Parenteral Nutrition - infusing via PICC.  Tonight's TPN to provide goal: 1080 dextrose calories, 600 lipid calories, 30% lipids, 80 grams protein in 2500mL fluid and total 2000 kcal

## 2021-08-07 NOTE — PLAN OF CARE
Pt A&Ox4. RA. NPO w/ ice chips. Pt denies any PARRIS, CP or calf pain at this time. Healing midline c/d/I. PCA pump running. Pt reporting pain 6/10, scheduled tylenol per MAR. TPN running. Gtube to LIS with dark green output noted.  Pt reports passing gas, no medications  - Provide nonpharmacologic comfort measures as appropriate  - Advance diet as tolerated, if ordered  - Obtain nutritional consult as needed  - Evaluate fluid balance  Outcome: Progressing  Goal: Maintains or returns to baseline bowel function Problem: Diabetes/Glucose Control  Goal: Glucose maintained within prescribed range  Description: INTERVENTIONS:  - Monitor Blood Glucose as ordered  - Assess for signs and symptoms of hyperglycemia and hypoglycemia  - Administer ordered medications to m

## 2021-08-07 NOTE — PLAN OF CARE
Problem: Patient/Family Goals  Goal: Patient/Family Long Term Goal  Description: Patient's Long Term Goal: able to eat and feel better     Interventions:advance diet as tolerated ,monitor signs of bowel obstruction    - See additional Care Plan goals for as ordered and tolerated  - Evaluate effectiveness of GI medications  - Encourage mobilization and activity  - Obtain nutritional consult as needed  - Establish a toileting routine/schedule  - Consider collaborating with pharmacy to review patient's medica consult as needed  - Instruct patient on self management of diabetes  Outcome: Progressing      Pt is A&O, VSS, with moderate c/o pain to abdomen- PCA in reach. Pt is on RA- bilateral clear diminished lung sounds. IS at bedside encouraged. Refusing scds.  A

## 2021-08-08 LAB
ALBUMIN SERPL-MCNC: 1.9 G/DL (ref 3.4–5)
ALBUMIN/GLOB SERPL: 0.3 {RATIO} (ref 1–2)
ALP LIVER SERPL-CCNC: 191 U/L
ALT SERPL-CCNC: 16 U/L
ANION GAP SERPL CALC-SCNC: 1 MMOL/L (ref 0–18)
AST SERPL-CCNC: 18 U/L (ref 15–37)
BILIRUB SERPL-MCNC: 0.4 MG/DL (ref 0.1–2)
BUN BLD-MCNC: 40 MG/DL (ref 7–18)
CALCIUM BLD-MCNC: 8.6 MG/DL (ref 8.5–10.1)
CHLORIDE SERPL-SCNC: 98 MMOL/L (ref 98–112)
CO2 SERPL-SCNC: 33 MMOL/L (ref 21–32)
CREAT BLD-MCNC: 1.52 MG/DL
GLOBULIN PLAS-MCNC: 6 G/DL (ref 2.8–4.4)
GLUCOSE BLD-MCNC: 121 MG/DL (ref 70–99)
GLUCOSE BLD-MCNC: 126 MG/DL (ref 70–99)
GLUCOSE BLD-MCNC: 136 MG/DL (ref 70–99)
GLUCOSE BLD-MCNC: 88 MG/DL (ref 70–99)
GLUCOSE BLD-MCNC: 94 MG/DL (ref 70–99)
HAV IGM SER QL: 2.2 MG/DL (ref 1.6–2.6)
M PROTEIN MFR SERPL ELPH: 7.9 G/DL (ref 6.4–8.2)
OSMOLALITY SERPL CALC.SUM OF ELEC: 284 MOSM/KG (ref 275–295)
PHOSPHATE SERPL-MCNC: 3.8 MG/DL (ref 2.5–4.9)
POTASSIUM SERPL-SCNC: 4.3 MMOL/L (ref 3.5–5.1)
SODIUM SERPL-SCNC: 132 MMOL/L (ref 136–145)

## 2021-08-08 PROCEDURE — 99232 SBSQ HOSP IP/OBS MODERATE 35: CPT | Performed by: HOSPITALIST

## 2021-08-08 NOTE — DIETARY NOTE
Loi 14 FOLLOW UP    Deneen Aldrich   Parenteral Nutrition - infusing via PICC.  Tonight's TPN to provide goal: 1080 dextrose calories, 600 lipid calories, 30% lipids, 80 grams protein in 2500mL fluid and total 2000 kcal

## 2021-08-08 NOTE — PROGRESS NOTES
Pt alert and orient x4. Pt has easy non labored breathing on ra. Pt states pain 5/10. Pt states pain is manageable. Pt has iv fluids infusing with out difficulty. Voids in urinal bedside. Midline incision healed. Vs wnl.  Pm meds admin. pca dilaudid in plac

## 2021-08-08 NOTE — PROGRESS NOTES
[facility]  Progress Note    Elfrieda Cost Patient Status:  Inpatient    1950 MRN [id number]   [facility] 3NW-A Attending Adam Jeter MD   Hosp Day # 40 PCP Dolly Manzanares MD     Subjective: The patient is resting in bed. deficiency     Chronic narcotic dependence ([facility] [address]     Steatorrhea     Prostate cancer ([facility] [address]     Bilateral kidney stones     CKD (chronic kidney disease) stage 3, GFR 30-59 ml/min (HCC)     Abnormality of thoracic aorta     Community acquired pneumonia of ri assessment and plan and again have modified the report to reflect my opinion.       Lona Cody MD   [facility]  8/8/2021  11:38 AM

## 2021-08-08 NOTE — PLAN OF CARE
Assumed care of patient at 0730, tele on and hr in the 80's, a+ox4 and follows all commands. Pca infusing and patient stated his pain is better today and he is more comfortable. G tube to LIS with green output, g tube is c/d/I.  Right picc is c/d/I with goo

## 2021-08-08 NOTE — PROGRESS NOTES
[facility]  Progress Note     Saravanan Martines Patient Status:  Inpatient    1950 MRN [id number]   [facility] 3NW-A Attending Dr. Vy Sharpe Day # 40 PCP Mykel Vasquez MD     Chief Complaint: Abdominal distention    S: fee 31.8 mL/min (A) (based on SCr of 1.68 mg/dL (H)). No results for input(s): PTP, INR in the last 168 hours.          COVID-19 Lab Results    COVID-19  Lab Results   Component Value Date    COVID19 Not Detected 08/02/2021    COVID19 Not Detected 07/05/2021 - may need to increase again. 10. Hypertension, stable  11. COPD, stable  12. DM 2 6.6%  1. correctional scale  2.  TPN insulin- 62 units    Quality:  · DVT Prophylaxis: heparin  · CODE status: Full  · If COVID testing is negative, may discontinue isolati

## 2021-08-09 LAB
ALBUMIN SERPL-MCNC: 1.9 G/DL (ref 3.4–5)
ALBUMIN/GLOB SERPL: 0.3 {RATIO} (ref 1–2)
ALP LIVER SERPL-CCNC: 194 U/L
ALT SERPL-CCNC: 15 U/L
ANION GAP SERPL CALC-SCNC: 4 MMOL/L (ref 0–18)
AST SERPL-CCNC: 17 U/L (ref 15–37)
BASOPHILS # BLD: 0 X10(3) UL (ref 0–0.2)
BASOPHILS NFR BLD: 0 %
BILIRUB SERPL-MCNC: 0.4 MG/DL (ref 0.1–2)
BUN BLD-MCNC: 38 MG/DL (ref 7–18)
CALCIUM BLD-MCNC: 9.3 MG/DL (ref 8.5–10.1)
CHLORIDE SERPL-SCNC: 99 MMOL/L (ref 98–112)
CO2 SERPL-SCNC: 30 MMOL/L (ref 21–32)
CREAT BLD-MCNC: 1.42 MG/DL
EOSINOPHIL # BLD: 0.9 X10(3) UL (ref 0–0.7)
EOSINOPHIL NFR BLD: 4 %
ERYTHROCYTE [DISTWIDTH] IN BLOOD BY AUTOMATED COUNT: 15.8 %
GLOBULIN PLAS-MCNC: 5.8 G/DL (ref 2.8–4.4)
GLUCOSE BLD-MCNC: 105 MG/DL (ref 70–99)
GLUCOSE BLD-MCNC: 112 MG/DL (ref 70–99)
GLUCOSE BLD-MCNC: 117 MG/DL (ref 70–99)
GLUCOSE BLD-MCNC: 148 MG/DL (ref 70–99)
GLUCOSE BLD-MCNC: 190 MG/DL (ref 70–99)
GLUCOSE BLD-MCNC: 90 MG/DL (ref 70–99)
HAV IGM SER QL: 2.2 MG/DL (ref 1.6–2.6)
HCT VFR BLD AUTO: 22.8 %
HGB BLD-MCNC: 7.7 G/DL
LYMPHOCYTES NFR BLD: 13 %
LYMPHOCYTES NFR BLD: 2.91 X10(3) UL (ref 1–4)
M PROTEIN MFR SERPL ELPH: 7.7 G/DL (ref 6.4–8.2)
MCH RBC QN AUTO: 29.7 PG (ref 26–34)
MCHC RBC AUTO-ENTMCNC: 33.8 G/DL (ref 31–37)
MCV RBC AUTO: 88 FL
METAMYELOCYTES # BLD: 0.45 X10(3) UL
METAMYELOCYTES NFR BLD: 2 %
MONOCYTES # BLD: 2.02 X10(3) UL (ref 0.1–1)
MONOCYTES NFR BLD: 9 %
NEUTROPHILS # BLD AUTO: 16.35 X10 (3) UL (ref 1.5–7.7)
NEUTROPHILS NFR BLD: 67 %
NEUTS BAND NFR BLD: 5 %
NEUTS HYPERSEG # BLD: 16.13 X10(3) UL (ref 1.5–7.7)
OSMOLALITY SERPL CALC.SUM OF ELEC: 285 MOSM/KG (ref 275–295)
PHOSPHATE SERPL-MCNC: 3.4 MG/DL (ref 2.5–4.9)
PLATELET # BLD AUTO: 565 10(3)UL (ref 150–450)
PLATELET MORPHOLOGY: NORMAL
POTASSIUM SERPL-SCNC: 4.6 MMOL/L (ref 3.5–5.1)
RBC # BLD AUTO: 2.59 X10(6)UL
SODIUM SERPL-SCNC: 133 MMOL/L (ref 136–145)
TOTAL CELLS COUNTED: 100
TRIGL SERPL-MCNC: 98 MG/DL (ref 30–149)
WBC # BLD AUTO: 22.4 X10(3) UL (ref 4–11)

## 2021-08-09 PROCEDURE — 99232 SBSQ HOSP IP/OBS MODERATE 35: CPT | Performed by: HOSPITALIST

## 2021-08-09 RX ORDER — HYDROMORPHONE HYDROCHLORIDE 4 MG/1
2 TABLET ORAL EVERY 6 HOURS SCHEDULED
Status: DISCONTINUED | OUTPATIENT
Start: 2021-08-09 | End: 2021-08-10

## 2021-08-09 NOTE — PROGRESS NOTES
[facility]  Report of Psychiatric Progress Note    Ernesto Pugh Patient Status:  Inpatient    1950 MRN [id number]   [facility] 3NW-A Attending Ceasar Duarte MD   [facility] Day # 45 PCP Jeremy Campos MD     Date of Admission:  and motivation. He finds less enjoyment in things that used to interest him-- cars and home projects. The death of his brother in law in Dec 2019, contributed to worsening mood symptoms.      Being in the hospital for 24 days, he has felt more depressed and pain in area of G tube and some throat pain. He feels tired and anxious and depressed. He is looking forward to going home soon. 8/5/21- He c/o 5/10 abd pain, down from 7/10 yesterday. He feels tired and weak. Less anxiety today.  Continues to have depr Elevated PSA, less than 10 ng/ml 03/08/2018   • Essential hypertension, benign    • Frequent use of laxatives    • Hemorrhoids    • Knee pain 10/2/2013   • Long term current use of opiate analgesic 2/17/2015   • Long-term current use of opiate analgesic 6/ TPN  •  adult 3 in 1 tpn, , Intravenous, Cyclic TPN  •  6.1% NaCl infusion, , Intravenous, Continuous  •  Naloxone HCl (NARCAN) 0.4 MG/ML injection 0.08 mg, 0.08 mg, Intravenous, Q5 Min PRN  •  diphenhydrAMINE (BENADRYL) IV PUSH injection 12.5 mg, 12.5 mg, puff, 2 puff, Inhalation, Q6H PRN  •  Fluticasone Furoate-Vilanterol (BREO ELLIPTA) 200-25 MCG/INH inhaler 1 puff, 1 puff, Inhalation, Daily  •  ipratropium-albuterol (DUONEB) nebulizer solution 3 mL, 3 mL, Nebulization, Q4H PRN  •  Umeclidinium Bromide (I (LDT=42077)       COMPARISON:  [facility] , CT, CT ABDOMEN+PELVIS(CPT=74176), 7/19/2021, 1:44 PM.  [facility] , XR, XR ABDOMEN OBSTRUCTIVE SERIES ROUTINE(2 VW)(CPT=74019), 7/22/2021, 10:03 AM.       INDICATIONS:  small bowel obstruction vs ileus       TECHNIQUE:   effect or obstruction.  Overall appearance of the small bowel is similar.  There is no pneumatosis. Kenton Lout is no free air.  NG tube is within the stomach.  The distal stomach is not visualized.  Appendectomy.    ABDOMINAL WALL:  No mass or hernia.     URI

## 2021-08-09 NOTE — PAYOR COMM NOTE
--------------  CONTINUED STAY REVIEW    Payor: Kleber Butler 673 #:  Ana Rosa Lainez  Authorization Number: [id number]    Admit date: 7/2/21  Admit time:  1:26 AM    Admitting Physician: Kaci Morrison MD  Attending Physician:  Fidencio Rodriguez MD  Pr 8/9/2021 1308 New Bag 3.375 g Intravenous Jamalarlddheeraj Foss RN    8/9/2021 0456 New Bag 3.375 g Intravenous Lashell Rodriguez RN    8/8/2021 2132 New Bag 3.375 g Intravenous Lashell Rodriguez RN      sertraline (ZOLOFT) tab 50 mg     Date Action Dose R 08/09/2021         Assessment/Plan:  Patient Active Problem List:     Chronic pancreatitis ([address]     Other chronic pain     Benign essential HTN     Diabetes mellitus type 2 in nonobese (HCC)     COPD (chronic obstructive pulmonary disease) ([address]     Vitam

## 2021-08-09 NOTE — DIETARY NOTE
Loi 14 FOLLOW UP    Martina Zuniga     Parenteral Nutrition - infusing via PICC.  Tonight's TPN to provide goal: 1080 dextrose calories, 600 lipid calories, 30% lipids, 80 grams protein in 2400mL fluid and total 2000 josé

## 2021-08-09 NOTE — PROGRESS NOTES
Acute Pain Service     POD#35 s/p exp lap / IVETT  Post G tube day 5     Patient reports intermittent episodes of LUQ pain yesterday evening and night. He states his is comfortable at present.  He is frustrated with G-tube connection leaking - RN setting up g

## 2021-08-09 NOTE — PLAN OF CARE
Upon assessment pt is a&o x4, VSS and afebrile. RA, . Tele- NSR. SCDs/ heparin. TPN infusing to R PICC per mar. Q6 accuchecks. Pt reports passing gas and having a BM. PEG tube to LUQ to LIS. Voids in urinal at bedside. Dilaudid PCA per mar. Up with SBA. effectiveness of ordered antiemetic medications  - Provide nonpharmacologic comfort measures as appropriate  - Advance diet as tolerated, if ordered  - Obtain nutritional consult as needed  - Evaluate fluid balance  Outcome: Progressing  Goal: Maintains or diabetes  Outcome: Progressing     Problem: Diabetes/Glucose Control  Goal: Glucose maintained within prescribed range  Description: INTERVENTIONS:  - Monitor Blood Glucose as ordered  - Assess for signs and symptoms of hyperglycemia and hypoglycemia  - Ad

## 2021-08-09 NOTE — PROGRESS NOTES
[facility]  Progress Note    Hortencia Synagogue Patient Status:  Inpatient    1950 MRN [id number]   [facility] 3NW-A Attending Liv Lopez MD   Hosp Day # 45 PCP Disha Gutierrez MD     Subjective:   Intermittent left abdominal oscar lobe due to infectious organism     Pericardial effusion     Parapneumonic effusion     Acute on chronic pancreatitis (HCC)     Acute renal injury ([address]     Small bowel obstruction (HCC)     Abdominal hematoma     Acute blood loss anemia     Major depressi

## 2021-08-10 LAB
ALBUMIN SERPL-MCNC: 1.8 G/DL (ref 3.4–5)
ALBUMIN/GLOB SERPL: 0.3 {RATIO} (ref 1–2)
ALP LIVER SERPL-CCNC: 199 U/L
ALT SERPL-CCNC: 15 U/L
ANION GAP SERPL CALC-SCNC: 5 MMOL/L (ref 0–18)
AST SERPL-CCNC: 17 U/L (ref 15–37)
BILIRUB SERPL-MCNC: 0.4 MG/DL (ref 0.1–2)
BUN BLD-MCNC: 33 MG/DL (ref 7–18)
CALCIUM BLD-MCNC: 9 MG/DL (ref 8.5–10.1)
CHLORIDE SERPL-SCNC: 101 MMOL/L (ref 98–112)
CO2 SERPL-SCNC: 29 MMOL/L (ref 21–32)
CREAT BLD-MCNC: 1.26 MG/DL
GLOBULIN PLAS-MCNC: 5.9 G/DL (ref 2.8–4.4)
GLUCOSE BLD-MCNC: 103 MG/DL (ref 70–99)
GLUCOSE BLD-MCNC: 121 MG/DL (ref 70–99)
GLUCOSE BLD-MCNC: 93 MG/DL (ref 70–99)
GLUCOSE BLD-MCNC: 98 MG/DL (ref 70–99)
HAV IGM SER QL: 2 MG/DL (ref 1.6–2.6)
M PROTEIN MFR SERPL ELPH: 7.7 G/DL (ref 6.4–8.2)
OSMOLALITY SERPL CALC.SUM OF ELEC: 288 MOSM/KG (ref 275–295)
PHOSPHATE SERPL-MCNC: 2.5 MG/DL (ref 2.5–4.9)
POTASSIUM SERPL-SCNC: 4.5 MMOL/L (ref 3.5–5.1)
SODIUM SERPL-SCNC: 135 MMOL/L (ref 136–145)

## 2021-08-10 PROCEDURE — 99232 SBSQ HOSP IP/OBS MODERATE 35: CPT | Performed by: HOSPITALIST

## 2021-08-10 PROCEDURE — 99232 SBSQ HOSP IP/OBS MODERATE 35: CPT | Performed by: OTHER

## 2021-08-10 RX ORDER — HYDROMORPHONE HYDROCHLORIDE 1 MG/ML
INJECTION, SOLUTION INTRAMUSCULAR; INTRAVENOUS; SUBCUTANEOUS
Status: COMPLETED
Start: 2021-08-10 | End: 2021-08-10

## 2021-08-10 RX ORDER — HYDROMORPHONE HYDROCHLORIDE 4 MG/1
4 TABLET ORAL EVERY 4 HOURS
Status: DISCONTINUED | OUTPATIENT
Start: 2021-08-10 | End: 2021-08-13

## 2021-08-10 RX ORDER — HYDROMORPHONE HYDROCHLORIDE 1 MG/ML
1 INJECTION, SOLUTION INTRAMUSCULAR; INTRAVENOUS; SUBCUTANEOUS EVERY 4 HOURS PRN
Status: DISCONTINUED | OUTPATIENT
Start: 2021-08-10 | End: 2021-08-13

## 2021-08-10 NOTE — PROGRESS NOTES
Acute Pain Service     POD#36 s/p exp lap / IVETT  Post G tube day 5     Patient reports he had increased left-sided abdominal pain during the night and today after ambulation. PCA was decreased 50% and oral Dilaudid was resumed.  Unsure if oral medications a

## 2021-08-10 NOTE — CM/SW NOTE
Accepted by [facility] and [facility]. updates sent to both agencies. Will need to confirm same day SOC w/[facility] when dc date known.      Will need physician clarification re: PPI: normally famotidine 20mg or 40mg dependent on kidney function is added to

## 2021-08-10 NOTE — PLAN OF CARE
Patient resting in bed. PCA in place. States passing flatus. Denies calf/chest pain. Declines suppository. RN will offer this afternoon. POC discussed, all questions and concerns addressed. All safety measures in place. Will continue to monitor.

## 2021-08-10 NOTE — PROGRESS NOTES
I met with Mohinder Liv Hung bedside this afternoon. His wife was present and an active participant throughout the course of our conversation. He appeared to be depressed and fatigued although he denied feeling either depression or anxiety.  He was clearly fat

## 2021-08-10 NOTE — PAYOR COMM NOTE
--------------  CONTINUED STAY REVIEW    Payor: Kleber Butler [address]73 #:  Starlett Gloria  Authorization Number: [id number]    Admit date: 7/2/21  Admit time:  1:26 AM    Admitting Physician: Jennifer Shaikh MD  Attending Physician:  Bushra Villalpando MD  Pr Oral Degroote, Deysi Hallman, RN      Insulin Aspart Pen (NOVOLOG) 100 UNIT/ML flexpen 1-10 Units     Date Action Dose Route User    8/10/2021 0000 Given 1 Units Subcutaneous (Left Lower Abdomen) Darleen Simon RN      LORazepam (ATIVAN) tab 0.5 mg     Date Ac with 950 out over the last 24 hours.      Objective/Physical Exam:  BP 98/58 (BP Location: Left arm)   Pulse 82   Temp 98.1 °F (36.7 °C) (Oral)   Resp 17   Ht 71\"   Wt 122 lb 11.2 oz (55.7 kg)   SpO2 95%   BMI 17.11 kg/m²      Intake/Output Summary (Last effusion     Parapneumonic effusion     Acute on chronic pancreatitis (HCC)     Acute renal injury ([address]     Small bowel obstruction (HCC)     Abdominal hematoma     Acute blood loss anemia          POD 36 exploratory laparotomy with lysis of adhesions  Po

## 2021-08-10 NOTE — PROGRESS NOTES
[facility]  Progress Note    Nova Braun Patient Status:  Inpatient    1950 MRN [id number]   [facility] 3NW-A Attending Lopez Balderas MD   Hosp Day # 44 PCP Elvina Bence, MD     Subjective: The patient is resting in bed. HTN     Diabetes mellitus type 2 in nonobese (HCC)     COPD (chronic obstructive pulmonary disease) (HCC)     Vitamin D deficiency     Chronic narcotic dependence ([address]     Steatorrhea     Prostate cancer ([address]     Bilateral kidney stones     CKD (chronic again have modified the report to reflect my opinion. Patient complaining of some left upper abdominal pain since the PEG tube was placed to gravity and his pain regimen was modified. No nausea or vomiting. Patient afebrile. Hemodynamically stable.

## 2021-08-10 NOTE — PROGRESS NOTES
[facility]  Progress Note     Karenann Kawasaki Patient Status:  Inpatient    1950 MRN [id number]   [facility] 3NW-A Attending Dr. Moralez Later Day # 44 PCP Sophie Velez MD     Chief Complaint: Abdominal distention    S: Pt TP 7.9 7.7 7.7       Estimated Creatinine Clearance: 42.4 mL/min (based on SCr of 1.26 mg/dL). No results for input(s): PTP, INR in the last 168 hours.          COVID-19 Lab Results    COVID-19  Lab Results   Component Value Date    COVID19 Not Detecte zoloft  9. Acute on chronic pain syndome  1. Fentanyl patch 100mcg, dilaudid PCA per pain service   10. JONO, improved  1. We increased volume of TPN  On 8/4  11. Hypertension, stable  12. COPD, stable  13. DM 2 6.6%, stable  1. correctional scale  2.  TPN i

## 2021-08-10 NOTE — DIETARY MALNUTRITION NOTE
[facility]    NUTRITION ASSESSMENT    Pt meets moderate malnutrition criteria.     CRITERIA FOR MALNUTRITION DIAGNOSIS:  Criteria for non-severe malnutrition diagnosis: acute illness/injury related to wt loss 5% in 1 month, body fat mild depletion and for TPN. See above for recommendations. PICC line in place. No BM. Some mild nausea. NGT to LIS remains. Diet advanced to clear liquids 7/10.  Recommend reweigh as able -- no new wt since 7/1.    7/9 - Pt NPO, awaiting further return of bowel function per M 08/07/21 1204  0 %    08/07/21 1721  0 %          FOOD/NUTRITION RELATED HISTORY:   Appetite: n/a  Intake: 0%  Intake Meeting Needs: TPN meeting needs   Food Allergies: No  Cultural/Ethnic/Spiritism Preferences Addresses: Yes    GI SYSTEM REVIEW: abdominal

## 2021-08-10 NOTE — PLAN OF CARE
Problem: Patient/Family Goals  Goal: Patient/Family Long Term Goal  Description: Patient's Long Term Goal: able to eat and feel better     Interventions:advance diet as tolerated ,monitor signs of bowel obstruction    - See additional Care Plan goals for as ordered and tolerated  - Evaluate effectiveness of GI medications  - Encourage mobilization and activity  - Obtain nutritional consult as needed  - Establish a toileting routine/schedule  - Consider collaborating with pharmacy to review patient's medica consult as needed  - Instruct patient on self management of diabetes  Outcome: Progressing    Pt is A&O, VSS, with severe c/o abdominal pain. Pt is on RA with bilateral clear diminished lung sounds. . IS at bedside encouraged. NSR on telemetry.  SCDs in

## 2021-08-11 LAB
ALBUMIN SERPL-MCNC: 1.7 G/DL (ref 3.4–5)
ALBUMIN/GLOB SERPL: 0.3 {RATIO} (ref 1–2)
ALP LIVER SERPL-CCNC: 191 U/L
ALT SERPL-CCNC: 13 U/L
ANION GAP SERPL CALC-SCNC: 3 MMOL/L (ref 0–18)
AST SERPL-CCNC: 16 U/L (ref 15–37)
BASOPHILS # BLD: 0 X10(3) UL (ref 0–0.2)
BASOPHILS NFR BLD: 0 %
BILIRUB SERPL-MCNC: 0.3 MG/DL (ref 0.1–2)
BUN BLD-MCNC: 26 MG/DL (ref 7–18)
CALCIUM BLD-MCNC: 8.7 MG/DL (ref 8.5–10.1)
CHLORIDE SERPL-SCNC: 104 MMOL/L (ref 98–112)
CO2 SERPL-SCNC: 27 MMOL/L (ref 21–32)
CREAT BLD-MCNC: 1.21 MG/DL
DEPRECATED HBV CORE AB SER IA-ACNC: 944.9 NG/ML
EOSINOPHIL # BLD: 0.21 X10(3) UL (ref 0–0.7)
EOSINOPHIL NFR BLD: 1 %
ERYTHROCYTE [DISTWIDTH] IN BLOOD BY AUTOMATED COUNT: 16 %
GLOBULIN PLAS-MCNC: 5.8 G/DL (ref 2.8–4.4)
GLUCOSE BLD-MCNC: 126 MG/DL (ref 70–99)
GLUCOSE BLD-MCNC: 128 MG/DL (ref 70–99)
GLUCOSE BLD-MCNC: 128 MG/DL (ref 70–99)
GLUCOSE BLD-MCNC: 84 MG/DL (ref 70–99)
GLUCOSE BLD-MCNC: 90 MG/DL (ref 70–99)
GLUCOSE BLD-MCNC: 93 MG/DL (ref 70–99)
HAV IGM SER QL: 2 MG/DL (ref 1.6–2.6)
HCT VFR BLD AUTO: 21.9 %
HGB BLD-MCNC: 7.3 G/DL
IRON SATURATION: 13 %
IRON SERPL-MCNC: 33 UG/DL
LYMPHOCYTES NFR BLD: 11 %
LYMPHOCYTES NFR BLD: 2.28 X10(3) UL (ref 1–4)
M PROTEIN MFR SERPL ELPH: 7.5 G/DL (ref 6.4–8.2)
MCH RBC QN AUTO: 29.6 PG (ref 26–34)
MCHC RBC AUTO-ENTMCNC: 33.3 G/DL (ref 31–37)
MCV RBC AUTO: 88.7 FL
MONOCYTES # BLD: 1.04 X10(3) UL (ref 0.1–1)
MONOCYTES NFR BLD: 5 %
MYELOCYTES # BLD: 1.04 X10(3) UL
MYELOCYTES NFR BLD: 5 %
NEUTROPHILS # BLD AUTO: 14.54 X10 (3) UL (ref 1.5–7.7)
NEUTROPHILS NFR BLD: 77 %
NEUTS BAND NFR BLD: 1 %
NEUTS HYPERSEG # BLD: 16.15 X10(3) UL (ref 1.5–7.7)
NRBC BLD MANUAL-RTO: 3 %
OSMOLALITY SERPL CALC.SUM OF ELEC: 282 MOSM/KG (ref 275–295)
PHOSPHATE SERPL-MCNC: 3.5 MG/DL (ref 2.5–4.9)
PLATELET # BLD AUTO: 592 10(3)UL (ref 150–450)
PLATELET MORPHOLOGY: NORMAL
POTASSIUM SERPL-SCNC: 4.8 MMOL/L (ref 3.5–5.1)
RBC # BLD AUTO: 2.47 X10(6)UL
SODIUM SERPL-SCNC: 134 MMOL/L (ref 136–145)
TOTAL CELLS COUNTED: 100
TOTAL IRON BINDING CAPACITY: 247 UG/DL (ref 240–450)
TRANSFERRIN SERPL-MCNC: 166 MG/DL (ref 200–360)
VIT B12 SERPL-MCNC: 670 PG/ML (ref 193–986)
WBC # BLD AUTO: 20.7 X10(3) UL (ref 4–11)

## 2021-08-11 PROCEDURE — 99232 SBSQ HOSP IP/OBS MODERATE 35: CPT | Performed by: HOSPITALIST

## 2021-08-11 PROCEDURE — 99232 SBSQ HOSP IP/OBS MODERATE 35: CPT | Performed by: OTHER

## 2021-08-11 RX ORDER — ACETAMINOPHEN 10 MG/ML
1000 INJECTION, SOLUTION INTRAVENOUS EVERY 6 HOURS PRN
Status: DISCONTINUED | OUTPATIENT
Start: 2021-08-11 | End: 2021-08-16

## 2021-08-11 RX ORDER — HYDROMORPHONE HYDROCHLORIDE 1 MG/ML
4 SOLUTION ORAL ONCE
Status: COMPLETED | OUTPATIENT
Start: 2021-08-11 | End: 2021-08-11

## 2021-08-11 RX ORDER — FENTANYL 25 UG/H
1 PATCH TRANSDERMAL
Status: DISCONTINUED | OUTPATIENT
Start: 2021-08-11 | End: 2021-08-13

## 2021-08-11 RX ORDER — FENTANYL 100 UG/H
1 PATCH TRANSDERMAL
Status: DISCONTINUED | OUTPATIENT
Start: 2021-08-11 | End: 2021-08-13

## 2021-08-11 NOTE — PROGRESS NOTES
[facility]  Report of Psychiatric Progress Note    Arbarb Greeneberhane Patient Status:  Inpatient    1950 MRN [id number]   [facility] ([place], [place]) 3NW-A Attending Kitty Plaza MD   Hosp Day # 36 PCP Shree Schmidt MD     Date of Admission:  He has a hx of major depressive disorder that has worsened the past 3 yrs. He has decreased energy and mood and motivation. He finds less enjoyment in things that used to interest him-- cars and home projects.  The death of his brother in law in Dec 201 discomfort/pain. He c/o abd pain and fatigue, anxiety, and depressed mood. No hopelessness. 8/3/21- He c/o abd pain in area of G tube and some throat pain. He feels tired and anxious and depressed. He is looking forward to going home soon.      8/5/21- Abdominal pain    • Angio-edema 4/21/2019   • Arthritis    • Asthma     OUTGREW AT 12Y/O   • Back pain    • Calculus of kidney    • Chronic abdominal pain 1/7/2014   • Chronic pain 1/7/2014   • Chronic pancreatitis ([facility] [address]     for pancreatitis flare ups   • reports that he quit smoking about 21 years ago. His smoking use included cigarettes. He has a 35.00 pack-year smoking history. He has never used smokeless tobacco. He reports that he does not drink alcohol and does not use drugs. Allergies:     Ace Inhi Rectal, Daily  •  dextrose 10 % infusion, , Intravenous, Continuous PRN  •  benzocaine-menthol (CEPACOL (SUGAR-FREE)) 1 lozenge, 1 lozenge, Oral, PRN  •  Insulin Aspart Pen (NOVOLOG) 100 UNIT/ML flexpen 1-10 Units, 1-10 Units, Subcutaneous, 4 times per day 08/11/2021    HGB 7.3 08/11/2021    HCT 21.9 08/11/2021    .0 08/11/2021    CREATSERUM 1.21 08/11/2021    BUN 26 08/11/2021     08/11/2021    K 4.8 08/11/2021     08/11/2021    CO2 27.0 08/11/2021    GLU 93 08/11/2021    CA 8.7 08/11/202 RETROPERITONEUM:  No mass or adenopathy.     BOWEL/MESENTERY: Ann Shy is less free fluid in the dependent pelvis.  There is marked dilatation of proximal small bowel but slightly improved.  The suspected mesenteric hematoma is now a seroma measuring 3.3 c

## 2021-08-11 NOTE — DIETARY NOTE
Loi 14 FOLLOW UP    Saravanan Martines     Parenteral Nutrition - infusing via PICC.  Tonight's TPN to provide goal: 1080 dextrose calories, 600 lipid calories, 30% lipids, 80 grams protein in 2200mL fluid and total 2000 josé

## 2021-08-11 NOTE — PROGRESS NOTES
Acute Pain Service     POD#37 s/p exp lap / IVETT  Post G tube day 5     Patient reports pain in left abdomen persists and is worse at night. He states he has increased pain now after ambulating in hallway.    Dilaudid 1mg clinician bolus given x1        Dila

## 2021-08-11 NOTE — PLAN OF CARE
Problem: Patient/Family Goals  Goal: Patient/Family Long Term Goal  Description: Patient's Long Term Goal: able to eat and feel better     Interventions:advance diet as tolerated ,monitor signs of bowel obstruction    - See additional Care Plan goals for as ordered and tolerated  - Evaluate effectiveness of GI medications  - Encourage mobilization and activity  - Obtain nutritional consult as needed  - Establish a toileting routine/schedule  - Consider collaborating with pharmacy to review patient's medica consult as needed  - Instruct patient on self management of diabetes  Outcome: Progressing       Pt is A&O, fatigued, agitated, VSS, with moderate c/o pain to abdomen- PCA and scheduled pain meds given.  Pt is on RA with bilateral clear diminished lung soun

## 2021-08-11 NOTE — PROGRESS NOTES
[facility]  Progress Note     Elfrieda Cost Patient Status:  Inpatient    1950 MRN [id number]   [facility] 3NW-A Attending Dr. Dani Diaz Day # 36 PCP Dolly Manzanares MD     Chief Complaint: Abdominal distention    S: Katarina Peterson 44.9 mL/min (based on SCr of 1.21 mg/dL). No results for input(s): PTP, INR in the last 168 hours.          COVID-19 Lab Results    COVID-19  Lab Results   Component Value Date    COVID19 Not Detected 08/02/2021    COVID19 Not Detected 07/05/2021    COVI pain syndome  1. Fentanyl patch 100mcg, dilaudid PCA per pain service   10. JONO  1. Resolved   11. COPD, stable  12. DMII  1. A1c  6.6%  2. SSI  3.  TPN insulin- 62 units    Quality:  · DVT Prophylaxis: heparin  · CODE status: Full  · If COVID testing is ne

## 2021-08-11 NOTE — PROGRESS NOTES
[facility]  Progress Note    Daniella Palencia Patient Status:  Inpatient    1950 MRN [id number]   [facility] 3NW-A Attending Monica Luz, DO   Hosp Day # 36 PCP Jennifer Torrez MD     Subjective:   The patient denies new complaint Prostate cancer ([facility]     Bilateral kidney stones     CKD (chronic kidney disease) stage 3, GFR 30-59 ml/min (HCC)     Abnormality of thoracic aorta     Community acquired pneumonia of right middle lobe of lung     Pneumonia of right lower lobe due to infe

## 2021-08-12 LAB
ALBUMIN SERPL-MCNC: 1.7 G/DL (ref 3.4–5)
ALBUMIN/GLOB SERPL: 0.3 {RATIO} (ref 1–2)
ALP LIVER SERPL-CCNC: 184 U/L
ALT SERPL-CCNC: 12 U/L
ANION GAP SERPL CALC-SCNC: 5 MMOL/L (ref 0–18)
AST SERPL-CCNC: 12 U/L (ref 15–37)
BILIRUB SERPL-MCNC: 0.3 MG/DL (ref 0.1–2)
BUN BLD-MCNC: 21 MG/DL (ref 7–18)
CALCIUM BLD-MCNC: 9 MG/DL (ref 8.5–10.1)
CHLORIDE SERPL-SCNC: 106 MMOL/L (ref 98–112)
CO2 SERPL-SCNC: 26 MMOL/L (ref 21–32)
CREAT BLD-MCNC: 1.26 MG/DL
ERYTHROCYTE [DISTWIDTH] IN BLOOD BY AUTOMATED COUNT: 16.2 %
GLOBULIN PLAS-MCNC: 5.9 G/DL (ref 2.8–4.4)
GLUCOSE BLD-MCNC: 100 MG/DL (ref 70–99)
GLUCOSE BLD-MCNC: 103 MG/DL (ref 70–99)
GLUCOSE BLD-MCNC: 103 MG/DL (ref 70–99)
GLUCOSE BLD-MCNC: 106 MG/DL (ref 70–99)
GLUCOSE BLD-MCNC: 77 MG/DL (ref 70–99)
HAV IGM SER QL: 2 MG/DL (ref 1.6–2.6)
HCT VFR BLD AUTO: 21.8 %
HGB BLD-MCNC: 7.5 G/DL
M PROTEIN MFR SERPL ELPH: 7.6 G/DL (ref 6.4–8.2)
MCH RBC QN AUTO: 30.2 PG (ref 26–34)
MCHC RBC AUTO-ENTMCNC: 34.4 G/DL (ref 31–37)
MCV RBC AUTO: 87.9 FL
OSMOLALITY SERPL CALC.SUM OF ELEC: 287 MOSM/KG (ref 275–295)
PHOSPHATE SERPL-MCNC: 3.9 MG/DL (ref 2.5–4.9)
PLATELET # BLD AUTO: 618 10(3)UL (ref 150–450)
POTASSIUM SERPL-SCNC: 4.7 MMOL/L (ref 3.5–5.1)
RBC # BLD AUTO: 2.48 X10(6)UL
SODIUM SERPL-SCNC: 137 MMOL/L (ref 136–145)
WBC # BLD AUTO: 21.4 X10(3) UL (ref 4–11)

## 2021-08-12 PROCEDURE — 99232 SBSQ HOSP IP/OBS MODERATE 35: CPT | Performed by: HOSPITALIST

## 2021-08-12 PROCEDURE — 99232 SBSQ HOSP IP/OBS MODERATE 35: CPT | Performed by: OTHER

## 2021-08-12 NOTE — DIETARY NOTE
Loi 14 FOLLOW UP    Gia Osullivan     Parenteral Nutrition - infusing via PICC.  Tonight's TPN to provide goal: 1080 dextrose calories, 600 lipid calories, 30% lipids, 80 grams protein in 2200mL fluid and total 2000 josé

## 2021-08-12 NOTE — PLAN OF CARE
Patient A&Ox4, VSS. Still c/o unmanaged pain rates 6/10. PCA pump in use as well as prn medications. Tolerating ice chips, TPN initiated. PEG tube in place to gravity. Voiding. PICC flushes and has blood return. Night uneventful, concerns addressed.    Prob ordered  - Obtain nutritional consult as needed  - Evaluate fluid balance  Outcome: Progressing  Goal: Maintains or returns to baseline bowel function  Description: INTERVENTIONS:  - Assess bowel function  - Maintain adequate hydration with IV or PO as ord INTERVENTIONS:  - Monitor Blood Glucose as ordered  - Assess for signs and symptoms of hyperglycemia and hypoglycemia  - Administer ordered medications to maintain glucose within target range  - Assess barriers to adequate nutritional intake and initiate n

## 2021-08-12 NOTE — PROGRESS NOTES
Acute Pain Service     POD#38 s/p exp lap / IVETT  Post G tube day 6     Patient reports pain in left abdomen persists and is worse at night.  He states his pain is around a 4/10.      Dilaudid PCA  No continuous rate  0.5mg pt bolus  30 min lockout  0.5mg ho

## 2021-08-12 NOTE — PROGRESS NOTES
[facility]  Progress Note    Luis Jones Patient Status:  Inpatient    1950 MRN [id number]   [facility] 3NW-A Attending Sanford Newton, [address] [facility] Day # 39 PCP Benito Florian MD     Subjective: The patient is sitting in bed.  A narcotic dependence (HCC)     Steatorrhea     Prostate cancer ([facility] [address]     Bilateral kidney stones     CKD (chronic kidney disease) stage 3, GFR 30-59 ml/min (HCC)     Abnormality of thoracic aorta     Community acquired pneumonia of right middle lobe of lung wean IV medications in preparation for discharge home. No fevers. Abdomen soft. Mild distention. Minimal tympany. Midline incision intact. PEG tube in place with dark bilious drainage.     White blood cell count 21,000.    -We will discontinue Zosyn

## 2021-08-12 NOTE — PROGRESS NOTES
[facility]  Report of Psychiatric Progress Note    Ernesto Pugh Patient Status:  Inpatient    1950 MRN [id number]   [facility] 3NW-A Attending Ceasar Duarte MD   [facility] Day # 39 PCP Jeremy Campos MD     Date of Admission:  pancreatico-jejunostomy. He has had chronic abd pain for 30 yrs. He has been on high doses opioids for over 20 yrs (Dilaudid prn, Fentanyl patch 100mcg/hr). He has a hx of major depressive disorder that has worsened the past 3 yrs.  He has decreased doug with him restarting Zoloft. He feels tired and discouraged and irritable and depressed. 8/2/21- NG tube out. Has a venting G tube now. Much less throat discomfort/pain. He c/o abd pain and fatigue, anxiety, and depressed mood. No hopelessness.      8/3/ Developmental History:  with no children. 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 His wife is a retired special  and his main support.  His brother in law was a  and OTHER SURGICAL HISTORY  07/30/2019    PNBx 07/30/19   • OTHER SURGICAL HISTORY  05/01/2020    cysto, Lt URS, Lt RPG, stone manipulation with laser litho, insertion of Lt stent   • OTHER SURGICAL HISTORY  05/07/2020    string stent removal   • REMOVAL GALLB Q8H PRN  •  lidocaine-menthol 4-1 % 1 patch, 1 patch, Transdermal, Daily  •  Prochlorperazine Edisylate (COMPAZINE) injection 10 mg, 10 mg, Intravenous, Q6H PRN  •  phenol (CHLORASEPTIC) 1.4 % oral liquid spray, , Oral, Q2H PRN  •  metoprolol Tartrate (LOP delusions  Perceptions: no hallucinations  Associations: Intact    Orientation: Oriented person, place, time, situation  Attention and Concentration: fair  Memory:  intact immediate, recent, remote  Language: Intact naming and repetition  Fund of Knowledge bowel loops.  There are probably pancreatic calcifications consistent with chronic pancreatitis. SPLEEN:  No enlargement or focal lesion.     KIDNEYS:  Bilateral nephrolithiasis.  No hydronephrosis.  Left renal cortical cyst 3 cm.  .     ADRENALS:  No mas 3.3 cm AP x 7.5 cm wide previously 5.1 cm AP x 8.1 cm wide.       Small pericardial effusion.  Smaller.  Resolution of free fluid in the pelvis.       Cholecystectomy.  Appendectomy.       Pancreatic atrophy with evidence of chronic pancreatitis.

## 2021-08-13 ENCOUNTER — APPOINTMENT (OUTPATIENT)
Dept: CT IMAGING | Facility: HOSPITAL | Age: 71
DRG: 336 | End: 2021-08-13
Attending: PHYSICIAN ASSISTANT
Payer: MEDICARE

## 2021-08-13 LAB
ALBUMIN SERPL-MCNC: 1.8 G/DL (ref 3.4–5)
ALBUMIN/GLOB SERPL: 0.3 {RATIO} (ref 1–2)
ALP LIVER SERPL-CCNC: 183 U/L
ALT SERPL-CCNC: 17 U/L
ANION GAP SERPL CALC-SCNC: 5 MMOL/L (ref 0–18)
AST SERPL-CCNC: 19 U/L (ref 15–37)
BILIRUB SERPL-MCNC: 0.2 MG/DL (ref 0.1–2)
BUN BLD-MCNC: 19 MG/DL (ref 7–18)
CALCIUM BLD-MCNC: 9.2 MG/DL (ref 8.5–10.1)
CHLORIDE SERPL-SCNC: 107 MMOL/L (ref 98–112)
CO2 SERPL-SCNC: 24 MMOL/L (ref 21–32)
CREAT BLD-MCNC: 1.06 MG/DL
GLOBULIN PLAS-MCNC: 6 G/DL (ref 2.8–4.4)
GLUCOSE BLD-MCNC: 102 MG/DL (ref 70–99)
GLUCOSE BLD-MCNC: 119 MG/DL (ref 70–99)
GLUCOSE BLD-MCNC: 71 MG/DL (ref 70–99)
GLUCOSE BLD-MCNC: 77 MG/DL (ref 70–99)
GLUCOSE BLD-MCNC: 87 MG/DL (ref 70–99)
HAV IGM SER QL: 2 MG/DL (ref 1.6–2.6)
M PROTEIN MFR SERPL ELPH: 7.8 G/DL (ref 6.4–8.2)
OSMOLALITY SERPL CALC.SUM OF ELEC: 283 MOSM/KG (ref 275–295)
PHOSPHATE SERPL-MCNC: 3.8 MG/DL (ref 2.5–4.9)
POTASSIUM SERPL-SCNC: 4.9 MMOL/L (ref 3.5–5.1)
POTASSIUM SERPL-SCNC: 5.3 MMOL/L (ref 3.5–5.1)
SODIUM SERPL-SCNC: 136 MMOL/L (ref 136–145)

## 2021-08-13 PROCEDURE — 99232 SBSQ HOSP IP/OBS MODERATE 35: CPT | Performed by: OTHER

## 2021-08-13 PROCEDURE — 74176 CT ABD & PELVIS W/O CONTRAST: CPT | Performed by: PHYSICIAN ASSISTANT

## 2021-08-13 PROCEDURE — 99232 SBSQ HOSP IP/OBS MODERATE 35: CPT | Performed by: HOSPITALIST

## 2021-08-13 RX ORDER — FENTANYL 100 UG/H
1 PATCH TRANSDERMAL
Status: DISCONTINUED | OUTPATIENT
Start: 2021-08-13 | End: 2021-08-16

## 2021-08-13 RX ORDER — FENTANYL 50 UG/H
1 PATCH TRANSDERMAL
Status: DISCONTINUED | OUTPATIENT
Start: 2021-08-13 | End: 2021-08-16

## 2021-08-13 RX ORDER — HYDROMORPHONE HYDROCHLORIDE 1 MG/ML
1 INJECTION, SOLUTION INTRAMUSCULAR; INTRAVENOUS; SUBCUTANEOUS
Status: DISCONTINUED | OUTPATIENT
Start: 2021-08-13 | End: 2021-08-16

## 2021-08-13 RX ORDER — HYDROMORPHONE HYDROCHLORIDE 4 MG/1
4 TABLET ORAL 4 TIMES DAILY
Status: DISCONTINUED | OUTPATIENT
Start: 2021-08-13 | End: 2021-08-16

## 2021-08-13 RX ORDER — HYDROMORPHONE HYDROCHLORIDE 4 MG/1
8 TABLET ORAL 2 TIMES DAILY
Status: DISCONTINUED | OUTPATIENT
Start: 2021-08-13 | End: 2021-08-16

## 2021-08-13 RX ORDER — MELATONIN
6 NIGHTLY
Status: DISCONTINUED | OUTPATIENT
Start: 2021-08-13 | End: 2021-08-16

## 2021-08-13 NOTE — PLAN OF CARE
Pt A&Ox4. RA. NSR. Pt denies any PARRIS, CP or calf pain at this time. R. Arm precautions. TPN per order. PCA pump per order. Peg tube draining dark green output into rohca. PRN pain meds, muscle relaxer,  Scheduled dilaudid given.  Fentanyl patch in place , o medications  - Provide nonpharmacologic comfort measures as appropriate  - Advance diet as tolerated, if ordered  - Obtain nutritional consult as needed  - Evaluate fluid balance  Outcome: Progressing  Goal: Maintains or returns to baseline bowel function

## 2021-08-13 NOTE — PROGRESS NOTES
[facility]  Progress Note    Daniella Palencia Patient Status:  Inpatient    1950 MRN [id number]   [facility] 3NW-A Attending Monica Luz, [address] [facility] Day # 43 PCP Jennifer Torrez MD     Subjective:   The patient is lying in bed, sta disease) ([facility] [address]     Vitamin D deficiency     Chronic narcotic dependence ([facility] [address]     Steatorrhea     Prostate cancer ([facility] [address]     Bilateral kidney stones     CKD (chronic kidney disease) stage 3, GFR 30-59 ml/min (HCC)     Abnormality of thoracic aorta     Commu

## 2021-08-13 NOTE — DIETARY MALNUTRITION NOTE
[facility]    NUTRITION ASSESSMENT    Pt meets moderate malnutrition criteria.     CRITERIA FOR MALNUTRITION DIAGNOSIS:  Criteria for non-severe malnutrition diagnosis: acute illness/injury related to wt loss 5% in 1 month, body fat mild depletion and 1 cup of ice q 8 hours. NGT to LIS. +BM this AM. TPN reordered. 7/16-Pt with NG LIS. Plan for clamping trial. Diet adv to clears. + BM . TPN at goal via PICC. Down 3.3 kg since admission. 7/12 - RD consulted for TPN. See above for recommendations.  P (145 lb)    NUTRITION:  Diet: No orders of the defined types were placed in this encounter.   Oral Supplements: n/a  Percent Meals Eaten (last 3 days)     Date/Time Percent Meals Eaten (%)    08/11/21 0916  0 %    08/11/21 1213  0 %    08/11/21 1637  0 %

## 2021-08-13 NOTE — PROGRESS NOTES
I did meet with  Vadim Abdiel [facility] this morning. His wife was present for some of our conversation. He was sitting up in bed and appeared to have a bit more energy than during our last visit.  He reports continued pain and discomfort and noted that mary

## 2021-08-13 NOTE — PROGRESS NOTES
[facility]  Progress Note     Saravanan Martines Patient Status:  Inpatient    1950 MRN [id number]   [facility] 3NW-A Attending Dr. Vy Sharpe Day # 43 PCP Mykel Vasquez MD     Chief Complaint: Abdominal distention    S: Bibi Careyo mL/min (based on SCr of 1.06 mg/dL). No results for input(s): PTP, INR in the last 168 hours.          COVID-19 Lab Results    COVID-19  Lab Results   Component Value Date    COVID19 Not Detected 08/02/2021    COVID19 Not Detected 07/05/2021    COVID19 N 125mcg, dilaudid PCA per pain service   9. JONO Resolved   10. Hyperkalemia  1. adjust TPN  11. COPD, stable  12. DMII A1c  6.6%  1.  TPN insulin - reduce from 62 to 50 units today, BMP w/ 71 glucose this am    Quality:  · DVT Prophylaxis: heparin  · CODE st

## 2021-08-13 NOTE — PROGRESS NOTES
[facility]  Report of Psychiatric Progress Note    Litahector Martines Patient Status:  Inpatient    1950 MRN [id number]   [facility] 3NW-A Attending Antonella Johnson MD   [address] Day # 43 PCP Mykel Vasquez MD     Date of Admission:  depressive disorder that has worsened the past 3 yrs. He has decreased energy and mood and motivation. He finds less enjoyment in things that used to interest him-- cars and home projects.  The death of his brother in law in Dec 2019, contributed to [employer] pain and fatigue, anxiety, and depressed mood. No hopelessness. 8/3/21- He c/o abd pain in area of G tube and some throat pain. He feels tired and anxious and depressed. He is looking forward to going home soon.      8/5/21- He c/o 5/10 abd pain, down f Alcohol and substance use disorders (sisters). Social and Developmental History:  with no children. 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  His wife is a retired special ed teache jejunostomy   • OTHER SURGICAL HISTORY      left knee scope 1990   • OTHER SURGICAL HISTORY  07/30/2019    PNBx 07/30/19   • OTHER SURGICAL HISTORY  05/01/2020    cysto, Lt URS, Lt RPG, stone manipulation with laser litho, insertion of Lt stent   • OTHER S hydrALAzine HCl (APRESOLINE) injection 10 mg, 10 mg, Intravenous, Q6H PRN  •  bisacodyl (DULCOLAX) rectal suppository 10 mg, 10 mg, Rectal, Daily  •  dextrose 10 % infusion, , Intravenous, Continuous PRN  •  benzocaine-menthol (CEPACOL (SUGAR-FREE)) 1 loze fair  Judgment: fair    Laboratory Data:  Lab Results   Component Value Date    CREATSERUM 1.06 08/13/2021    BUN 19 08/13/2021     08/13/2021    K 4.9 08/13/2021     08/13/2021    CO2 24.0 08/13/2021    GLU 71 08/13/2021    CA 9.2 08/13/2021 or adenopathy.     BOWEL/MESENTERY: Jono Box is less free fluid in the dependent pelvis.  There is marked dilatation of proximal small bowel but slightly improved.  The suspected mesenteric hematoma is now a seroma measuring 3.3 cm AP x 7.5 cm wide previousl

## 2021-08-13 NOTE — PROGRESS NOTES
Acute Pain Service     POD#39 s/p exp lap / IVETT  Post G tube day 7     Selam Gardner reports pain in left abdomen/flank persists (placed hand on left side more than G-tube site).  He states his pain is worse at night and feels like there are no options for his pain o

## 2021-08-13 NOTE — PLAN OF CARE
Patient A&Ox4. VSS. Patient still c/o of pain, agitated at the beginning of shift, was requesting the PO dilaudid however it was not due. Prior to, patient had refused the last two doses.  Writer explained to patient that in order to keep the pain from gett Nasogastric tube to low intermittent suction as ordered  - Evaluate effectiveness of ordered antiemetic medications  - Provide nonpharmacologic comfort measures as appropriate  - Advance diet as tolerated, if ordered  - Obtain nutritional consult as needed nutrition consult as needed  - Instruct patient on self management of diabetes  Outcome: Progressing     Problem: Diabetes/Glucose Control  Goal: Glucose maintained within prescribed range  Description: INTERVENTIONS:  - Monitor Blood Glucose as ordered  -

## 2021-08-14 LAB
ALBUMIN SERPL-MCNC: 1.8 G/DL (ref 3.4–5)
ALBUMIN/GLOB SERPL: 0.3 {RATIO} (ref 1–2)
ALP LIVER SERPL-CCNC: 170 U/L
ALT SERPL-CCNC: 17 U/L
ANION GAP SERPL CALC-SCNC: 6 MMOL/L (ref 0–18)
AST SERPL-CCNC: 16 U/L (ref 15–37)
BASOPHILS # BLD: 0 X10(3) UL (ref 0–0.2)
BASOPHILS NFR BLD: 0 %
BILIRUB SERPL-MCNC: 0.2 MG/DL (ref 0.1–2)
BUN BLD-MCNC: 20 MG/DL (ref 7–18)
CALCIUM BLD-MCNC: 9.2 MG/DL (ref 8.5–10.1)
CHLORIDE SERPL-SCNC: 110 MMOL/L (ref 98–112)
CO2 SERPL-SCNC: 22 MMOL/L (ref 21–32)
CREAT BLD-MCNC: 0.96 MG/DL
EOSINOPHIL # BLD: 0 X10(3) UL (ref 0–0.7)
EOSINOPHIL NFR BLD: 0 %
ERYTHROCYTE [DISTWIDTH] IN BLOOD BY AUTOMATED COUNT: 16.3 %
GLOBULIN PLAS-MCNC: 5.9 G/DL (ref 2.8–4.4)
GLUCOSE BLD-MCNC: 127 MG/DL (ref 70–99)
GLUCOSE BLD-MCNC: 132 MG/DL (ref 70–99)
GLUCOSE BLD-MCNC: 148 MG/DL (ref 70–99)
GLUCOSE BLD-MCNC: 155 MG/DL (ref 70–99)
GLUCOSE BLD-MCNC: 182 MG/DL (ref 70–99)
HAV IGM SER QL: 1.8 MG/DL (ref 1.6–2.6)
HCT VFR BLD AUTO: 22.6 %
HGB BLD-MCNC: 7.6 G/DL
LYMPHOCYTES NFR BLD: 1.22 X10(3) UL (ref 1–4)
LYMPHOCYTES NFR BLD: 5 %
M PROTEIN MFR SERPL ELPH: 7.7 G/DL (ref 6.4–8.2)
MCH RBC QN AUTO: 29.7 PG (ref 26–34)
MCHC RBC AUTO-ENTMCNC: 33.6 G/DL (ref 31–37)
MCV RBC AUTO: 88.3 FL
MONOCYTES # BLD: 1.22 X10(3) UL (ref 0.1–1)
MONOCYTES NFR BLD: 5 %
NEUTROPHILS # BLD AUTO: 18.41 X10 (3) UL (ref 1.5–7.7)
NEUTROPHILS NFR BLD: 89 %
NEUTS BAND NFR BLD: 1 %
NEUTS HYPERSEG # BLD: 21.96 X10(3) UL (ref 1.5–7.7)
NRBC BLD MANUAL-RTO: 1 %
OSMOLALITY SERPL CALC.SUM OF ELEC: 290 MOSM/KG (ref 275–295)
PHOSPHATE SERPL-MCNC: 3.4 MG/DL (ref 2.5–4.9)
PLATELET # BLD AUTO: 662 10(3)UL (ref 150–450)
PLATELET MORPHOLOGY: NORMAL
POTASSIUM SERPL-SCNC: 4.6 MMOL/L (ref 3.5–5.1)
RBC # BLD AUTO: 2.56 X10(6)UL
SODIUM SERPL-SCNC: 138 MMOL/L (ref 136–145)
TOTAL CELLS COUNTED: 100
WBC # BLD AUTO: 24.4 X10(3) UL (ref 4–11)

## 2021-08-14 PROCEDURE — 99232 SBSQ HOSP IP/OBS MODERATE 35: CPT | Performed by: HOSPITALIST

## 2021-08-14 NOTE — PLAN OF CARE
Problem: Patient/Family Goals  Goal: Patient/Family Long Term Goal  Description: Patient's Long Term Goal: able to eat and feel better     Interventions:advance diet as tolerated ,monitor signs of bowel obstruction    - See additional Care Plan goals for as ordered and tolerated  - Evaluate effectiveness of GI medications  - Encourage mobilization and activity  - Obtain nutritional consult as needed  - Establish a toileting routine/schedule  - Consider collaborating with pharmacy to review patient's medica consult as needed  - Instruct patient on self management of diabetes  Outcome: Progressing      Pt is A7O, fatigued, vss, with moderate to severe c/o pain to abdomen. Pt tristan snot get much relief with the medications he is given. IS at bedside encouraged.  Harmony Cruz

## 2021-08-14 NOTE — PROGRESS NOTES
Patient requests increasing amounts of opioids/muscle relaxants    Complains of a difficult night after discontinuation of IVPCA    Currently on:   Duragesic 125 (recently increased)  Dilaudid oral 4mg q4hr (8mg for nighttime doses)  Robaxin q8hr PRN  Ativ

## 2021-08-14 NOTE — PROGRESS NOTES
[facility]  Progress Note     Kelvin Greer Patient Status:  Inpatient    1950 MRN [id number]   [facility] 3NW-A Attending Dr. Luis Garcia Day # 37 PCP Eliseo Huntley MD     Chief Complaint: Abdominal distention    S: Elmo Reed BILT 0.3  --  0.2  --  0.2   TP 7.6  --  7.8  --  7.7    < > = values in this interval not displayed. Estimated Creatinine Clearance: 56.9 mL/min (based on SCr of 0.96 mg/dL). No results for input(s): PTP, INR in the last 168 hours.          COVI pancreatitis  7. Anxiety/depression  1. Ativan IV, zoloft  8. Acute on chronic pain syndome  1. Per pain service   9. JONO Resolved   10. Hyperkalemia  1. Resolved   11. COPD, stable  12. DMII A1c  6.6%  1.  Continue insulin with TPN - titrate as needed    Q

## 2021-08-14 NOTE — DIETARY NOTE
Loi 14 FOLLOW UP    Pan Wheat     Parenteral Nutrition - infusing via PICC.  Tonight's TPN to provide goal: 1080 dextrose calories, 600 lipid calories, 30% lipids, 80 grams protein in 2200mL fluid and total 2000 josé

## 2021-08-14 NOTE — PLAN OF CARE
Problem: PAIN - ADULT  Goal: Verbalizes/displays adequate comfort level or patient's stated pain goal  Description: INTERVENTIONS:  - Encourage pt to monitor pain and request assistance  - Assess pain using appropriate pain scale  - Administer analgesics and lab values  - Obtain nutritional consult as needed  - Optimize oral hygiene and moisture  - Encourage food from home; allow for food preferences  - Enhance eating environment  Outcome: Progressing     Problem: METABOLIC/FLUID AND ELECTROLYTES - ADULT ambulation in hallway encouraged. Up in room to bathroom. Plan of care discussed with patient. Will monitor.

## 2021-08-14 NOTE — PROGRESS NOTES
[facility]  Progress Note    Tanika Smith Patient Status:  Inpatient    1950 MRN [id number]   [facility] 3NW-A Attending Kanwal Borja, [address] [facility] Day # 37 PCP Chante Diggs MD     Subjective: The patient is lying in bed.  The deficiency     Chronic narcotic dependence ([facility] [address]     Steatorrhea     Prostate cancer ([facility] [address]     Bilateral kidney stones     CKD (chronic kidney disease) stage 3, GFR 30-59 ml/min (HCC)     Abnormality of thoracic aorta     Community acquired pneumonia of ri performed the services described in this documentation  by Ms Katie Marrero, and they are both accurate and complete. Lary Miller.  MD Bruno FACS  [facility]

## 2021-08-15 DIAGNOSIS — I10 ESSENTIAL (PRIMARY) HYPERTENSION: ICD-10-CM

## 2021-08-15 LAB
ALBUMIN SERPL-MCNC: 1.7 G/DL (ref 3.4–5)
ALBUMIN/GLOB SERPL: 0.3 {RATIO} (ref 1–2)
ALP LIVER SERPL-CCNC: 151 U/L
ALT SERPL-CCNC: 16 U/L
ANION GAP SERPL CALC-SCNC: 5 MMOL/L (ref 0–18)
AST SERPL-CCNC: 13 U/L (ref 15–37)
BILIRUB SERPL-MCNC: 0.2 MG/DL (ref 0.1–2)
BUN BLD-MCNC: 19 MG/DL (ref 7–18)
CALCIUM BLD-MCNC: 8.9 MG/DL (ref 8.5–10.1)
CHLORIDE SERPL-SCNC: 108 MMOL/L (ref 98–112)
CO2 SERPL-SCNC: 25 MMOL/L (ref 21–32)
CREAT BLD-MCNC: 0.93 MG/DL
GLOBULIN PLAS-MCNC: 5.9 G/DL (ref 2.8–4.4)
GLUCOSE BLD-MCNC: 110 MG/DL (ref 70–99)
GLUCOSE BLD-MCNC: 129 MG/DL (ref 70–99)
GLUCOSE BLD-MCNC: 144 MG/DL (ref 70–99)
GLUCOSE BLD-MCNC: 174 MG/DL (ref 70–99)
GLUCOSE BLD-MCNC: 212 MG/DL (ref 70–99)
HAV IGM SER QL: 1.8 MG/DL (ref 1.6–2.6)
M PROTEIN MFR SERPL ELPH: 7.6 G/DL (ref 6.4–8.2)
OSMOLALITY SERPL CALC.SUM OF ELEC: 290 MOSM/KG (ref 275–295)
PHOSPHATE SERPL-MCNC: 3.6 MG/DL (ref 2.5–4.9)
POTASSIUM SERPL-SCNC: 4.2 MMOL/L (ref 3.5–5.1)
SODIUM SERPL-SCNC: 138 MMOL/L (ref 136–145)

## 2021-08-15 PROCEDURE — 99232 SBSQ HOSP IP/OBS MODERATE 35: CPT | Performed by: HOSPITALIST

## 2021-08-15 NOTE — PROGRESS NOTES
Pt slept well overnight, stated that pain was much more tolerable, did not request any additional pain meds on noc shift. TPN restarted at 64.7 then tapered up to 129.  Peg tube is draining to gravity, drainage is yellow/green/bilious, lower output than pas

## 2021-08-15 NOTE — PLAN OF CARE
Problem: Patient/Family Goals  Goal: Patient/Family Short Term Goal  Description: Patient's Short Term Goal: pain under control    Interventions: offer pain meds ,bowel rest ,surgery consult     - See additional Care Plan goals for specific interventions profile  Outcome: Progressing  Goal: Maintains adequate nutritional intake (undernourished)  Description: INTERVENTIONS:  - Monitor percentage of each meal consumed  - Identify factors contributing to decreased intake, treat as appropriate  - Assist with m as tolerated ,monitor signs of bowel obstruction    - See additional Care Plan goals for specific interventions  Outcome: Not Progressing

## 2021-08-15 NOTE — PROGRESS NOTES
[facility]  Progress Note     Paola Blunt Patient Status:  Inpatient    1950 MRN [id number]   [facility] 3NW-A Attending Dr. Matt Wilson Day # 40 PCP Gabrielle Huitron MD     Chief Complaint: Abdominal distention    S: Gina Tobin BILT 0.2  --   --  0.2 0.2   TP 7.8  --   --  7.7 7.6    < > = values in this interval not displayed. Estimated Creatinine Clearance: 58.7 mL/min (based on SCr of 0.93 mg/dL). No results for input(s): PTP, INR in the last 168 hours.          COVI monitor  6. Chronic pancreatitis  7. Anxiety/depression  1. Ativan IV, zoloft  8. Acute on chronic pain syndome  1. Per pain service   9. JONO Resolved   10. Hyperkalemia  1. Resolved   11. COPD, stable  12. DMII A1c  6.6%  1.  Continue insulin with TPN - ti

## 2021-08-15 NOTE — DIETARY NOTE
Loi 14 FOLLOW UP    Tasha Crowe     Parenteral Nutrition - infusing via PICC.  Tonight's TPN to provide goal: 1080 dextrose calories, 600 lipid calories, 30% lipids, 80 grams protein in 2200mL fluid and total 2000 josé

## 2021-08-15 NOTE — PROGRESS NOTES
[facility]  Progress Note    Geraldine Muñoz Patient Status:  Inpatient    1950 MRN [id number]   [facility] 3NW-A Attending Amaya Morgan, [address] [facility] Day # 40 PCP Pamela Wilcox MD     Subjective:  Patient feels much better today. 30-59 ml/min (HCC)     Abnormality of thoracic aorta     Community acquired pneumonia of right middle lobe of lung     Pneumonia of right lower lobe due to infectious organism     Pericardial effusion     Parapneumonic effusion     Acute on chronic pancrea

## 2021-08-16 VITALS
DIASTOLIC BLOOD PRESSURE: 53 MMHG | OXYGEN SATURATION: 94 % | BODY MASS INDEX: 17.6 KG/M2 | TEMPERATURE: 99 F | WEIGHT: 125.69 LBS | HEART RATE: 80 BPM | RESPIRATION RATE: 18 BRPM | SYSTOLIC BLOOD PRESSURE: 111 MMHG | HEIGHT: 71 IN

## 2021-08-16 LAB
ALBUMIN SERPL-MCNC: 1.6 G/DL (ref 3.4–5)
ALBUMIN/GLOB SERPL: 0.3 {RATIO} (ref 1–2)
ALP LIVER SERPL-CCNC: 140 U/L
ALT SERPL-CCNC: 19 U/L
ANION GAP SERPL CALC-SCNC: 5 MMOL/L (ref 0–18)
AST SERPL-CCNC: 16 U/L (ref 15–37)
BILIRUB SERPL-MCNC: 0.3 MG/DL (ref 0.1–2)
BUN BLD-MCNC: 18 MG/DL (ref 7–18)
CALCIUM BLD-MCNC: 9 MG/DL (ref 8.5–10.1)
CHLORIDE SERPL-SCNC: 108 MMOL/L (ref 98–112)
CO2 SERPL-SCNC: 26 MMOL/L (ref 21–32)
CREAT BLD-MCNC: 0.86 MG/DL
GLOBULIN PLAS-MCNC: 5.7 G/DL (ref 2.8–4.4)
GLUCOSE BLD-MCNC: 104 MG/DL (ref 70–99)
GLUCOSE BLD-MCNC: 127 MG/DL (ref 70–99)
GLUCOSE BLD-MCNC: 185 MG/DL (ref 70–99)
GLUCOSE BLD-MCNC: 83 MG/DL (ref 70–99)
HAV IGM SER QL: 1.7 MG/DL (ref 1.6–2.6)
M PROTEIN MFR SERPL ELPH: 7.3 G/DL (ref 6.4–8.2)
OSMOLALITY SERPL CALC.SUM OF ELEC: 289 MOSM/KG (ref 275–295)
PHOSPHATE SERPL-MCNC: 3.8 MG/DL (ref 2.5–4.9)
POTASSIUM SERPL-SCNC: 3.9 MMOL/L (ref 3.5–5.1)
SODIUM SERPL-SCNC: 139 MMOL/L (ref 136–145)
TRIGL SERPL-MCNC: 69 MG/DL (ref 30–149)

## 2021-08-16 PROCEDURE — 99239 HOSP IP/OBS DSCHRG MGMT >30: CPT | Performed by: HOSPITALIST

## 2021-08-16 PROCEDURE — 99232 SBSQ HOSP IP/OBS MODERATE 35: CPT | Performed by: OTHER

## 2021-08-16 RX ORDER — HYDROMORPHONE HYDROCHLORIDE 8 MG/1
8 TABLET ORAL 4 TIMES DAILY
Qty: 120 TABLET | Refills: 0 | Status: ON HOLD | OUTPATIENT
Start: 2021-08-16 | End: 2021-08-18

## 2021-08-16 RX ORDER — VERAPAMIL HYDROCHLORIDE 180 MG/1
CAPSULE, EXTENDED RELEASE ORAL
Qty: 120 CAPSULE | Refills: 0 | Status: SHIPPED | OUTPATIENT
Start: 2021-08-16 | End: 2021-08-16

## 2021-08-16 RX ORDER — LORAZEPAM 1 MG/1
1 TABLET ORAL NIGHTLY PRN
Qty: 20 TABLET | Refills: 0 | Status: ON HOLD | OUTPATIENT
Start: 2021-08-16 | End: 2021-08-18

## 2021-08-16 RX ORDER — FENTANYL 50 UG/H
1 PATCH TRANSDERMAL
Qty: 10 PATCH | Refills: 0 | Status: SHIPPED | OUTPATIENT
Start: 2021-08-19 | End: 2021-08-16

## 2021-08-16 RX ORDER — METHOCARBAMOL 500 MG/1
500 TABLET, FILM COATED ORAL 3 TIMES DAILY PRN
Qty: 60 TABLET | Refills: 0 | Status: ON HOLD | OUTPATIENT
Start: 2021-08-16 | End: 2021-08-18

## 2021-08-16 RX ORDER — LORAZEPAM 0.5 MG/1
0.5 TABLET ORAL
Qty: 20 TABLET | Refills: 0 | Status: ON HOLD | OUTPATIENT
Start: 2021-08-16 | End: 2021-08-18

## 2021-08-16 RX ORDER — PANTOPRAZOLE SODIUM 40 MG/1
40 TABLET, DELAYED RELEASE ORAL
Qty: 30 TABLET | Refills: 0 | Status: SHIPPED | OUTPATIENT
Start: 2021-08-17 | End: 2021-10-21

## 2021-08-16 RX ORDER — FENTANYL 50 UG/H
1 PATCH TRANSDERMAL
Qty: 10 PATCH | Refills: 0 | Status: ON HOLD | OUTPATIENT
Start: 2021-08-16 | End: 2021-08-18

## 2021-08-16 RX ORDER — FENTANYL 100 UG/H
1 PATCH TRANSDERMAL
Qty: 10 PATCH | Refills: 0 | Status: ON HOLD | OUTPATIENT
Start: 2021-08-16 | End: 2021-08-18

## 2021-08-16 RX ORDER — ONDANSETRON HYDROCHLORIDE 8 MG/1
4 TABLET, FILM COATED ORAL EVERY 4 HOURS PRN
Qty: 90 TABLET | Refills: 0 | Status: SHIPPED | OUTPATIENT
Start: 2021-08-16 | End: 2021-10-05 | Stop reason: DRUGHIGH

## 2021-08-16 RX ORDER — PANTOPRAZOLE SODIUM 40 MG/1
40 TABLET, DELAYED RELEASE ORAL
Status: DISCONTINUED | OUTPATIENT
Start: 2021-08-17 | End: 2021-08-16

## 2021-08-16 NOTE — DIETARY NOTE
Loi 14 FOLLOW UP    Jignesh Fulton     Parenteral Nutrition - infusing via PICC.  Tonight's TPN to provide goal: 1080 dextrose calories, 600 lipid calories, 30% lipids, 80 grams protein in 2200mL fluid and total 2000 josé

## 2021-08-16 NOTE — PLAN OF CARE
Patient is alert and oriented x3  Up ad black  Voiding  NPO +ice maintained     Patient assessed and ready for DC home  All instructions given to patient for home; including med changes, G tube care and PICC care.  All questions answered to patients level of as ordered and tolerated  - Nasogastric tube to low intermittent suction as ordered  - Evaluate effectiveness of ordered antiemetic medications  - Provide nonpharmacologic comfort measures as appropriate  - Advance diet as tolerated, if ordered  - Obtain n range  - Assess barriers to adequate nutritional intake and initiate nutrition consult as needed  - Instruct patient on self management of diabetes  Outcome: Adequate for Discharge

## 2021-08-16 NOTE — TELEPHONE ENCOUNTER
Protocol passed     Requesting: verapamil hcl ER 180mg     LOV: 4/8/21   RTC: 2 weeks   Filled: 6/23/21 #120 0 refills       Upcoming OV : none scheduled

## 2021-08-16 NOTE — PROGRESS NOTES
[facility]  Report of Psychiatric Progress Note    Billy Tommy Patient Status:  Inpatient    1950 MRN [id number]   [facility] 3NW-A Attending Db Morris MD   Hosp Day # 39 PCP Laron Salgado MD     Date of Admission:  motivation. He finds less enjoyment in things that used to interest him-- cars and home projects. The death of his brother in law in Dec 2019, contributed to worsening mood symptoms.      Being in the hospital for 24 days, he has felt more depressed and hel pain in area of G tube and some throat pain. He feels tired and anxious and depressed. He is looking forward to going home soon. 8/5/21- He c/o 5/10 abd pain, down from 7/10 yesterday. He feels tired and weak. Less anxiety today.  Continues to have depr History:  1) Alcohol abuse in his 20-30's. 2) Tried hallucinogens in his teens and 19's. Psych Family History: Alcohol and substance use disorders (sisters). Social and Developmental History:  with no children. He has a IT college degree. Date   • APPENDECTOMY     • APPENDECTOMY     • CHOLECYSTECTOMY     • OTHER SURGICAL HISTORY      pancreatic jejunostomy   • OTHER SURGICAL HISTORY      left knee scope 1990   • OTHER SURGICAL HISTORY  07/30/2019    PNBx 07/30/19   • OTHER SURGICAL HISTORY Prochlorperazine Edisylate (COMPAZINE) injection 10 mg, 10 mg, Intravenous, Q6H PRN  •  phenol (CHLORASEPTIC) 1.4 % oral liquid spray, , Oral, Q2H PRN  •  metoprolol Tartrate (LOPRESSOR) injection 5 mg, 5 mg, Intravenous, Q4H PRN  •  hydrALAzine HCl (APRES Intact naming and repetition  Fund of Knowledge: Able to recite name of [place] president    Insight: fair  Judgment: fair    Laboratory Data:  Lab Results   Component Value Date    CREATSERUM 0.86 08/16/2021    BUN 18 08/16/2021     08/16/2021    K 3.9 0 aneurysm or dissection.  Markedly calcified aortoiliac plaque formation.    RETROPERITONEUM:  No mass or adenopathy.     BOWEL/MESENTERY: Loye Luke is less free fluid in the dependent pelvis.  There is marked dilatation of proximal small bowel but slightly imp

## 2021-08-16 NOTE — DISCHARGE SUMMARY
[facility] HOSPITALIST  DISCHARGE SUMMARY     Reva Banerjee Patient Status:  Inpatient    1950 MRN [id number]   [facility] 3NW-A Attending Jina Chatterjee, [address] [facility] Day # 39 PCP Sammy Rhoades MD     Date of Admission: 2021  Date of of JONO when IVF stopped. He was started on empiric antibiotics after fevers. We continue to trend leukocytosis and no growth to date on blood cultures. He required suppositories. He underwent placement of venting G-tube on 8/2 per Dr. Audelia Bourgeois.   Gabrielle placement  FINDINGS:  · ESOPHAGUS: Post NG trauma  · EGJ: Small hiatal hernia  · STOMACH: Wide open pylorus  · DUODENUM: Normal        Consultants: General surgery, GI, psychiatry, clinical psychology           Discharge Medications      START taking these third day.    Quantity: 10 patch  Refills: 0     HYDROmorphone HCl 8 MG Tabs  Commonly known as: DILAUDID  What changed:   · medication strength  · how much to take  · when to take this  · reasons to take this  · additional instructions      Take 1 tablet ( Sopn  Commonly known as: HumaLOG KwikPen        metFORMIN HCl 1000 MG Tabs  Commonly known as: GLUCOPHAGE        MULTI VITAMIN DAILY OR        PEG 3350 17 g Pack  Commonly known as: MIRALAX        tamsulosin HCl 0.4 MG Caps  Commonly known as: FLOMAX chart    GORDO Santana    Time spent:  > 30 minutes

## 2021-08-16 NOTE — CM/SW NOTE
Updates sent to both [facility] and [facility]. SW will remain available for continued dc planning.     Mari Walton LCSW

## 2021-08-16 NOTE — PROGRESS NOTES
[facility] HOSPITALIST  Progress Note     [facility] Patient Status:  Inpatient    1950 MRN [id number]   [facility] 3NW-A Attending Dr. Gilmar Geronimo Day # 39 PCP Binta Lindquist MD     Chief Complaint: Abdominal distention    S: Pedrito Dominguez hours.         COVID-19 Lab Results    COVID-19  Lab Results   Component Value Date    COVID19 Not Detected 08/02/2021    COVID19 Not Detected 07/05/2021    COVID19 Not Detected 03/28/2021       Pro-Calcitonin  No results for input(s): PCT in the last 168 stable  12. DMII A1c  6.6%  1.  Continue insulin with TPN - 50 units    Quality:  · DVT Prophylaxis: heparin  · CODE status: Full  · If COVID testing is negative, may discontinue isolation: yes      Estimated date of discharge: Home today    Case d/w RN, pt

## 2021-08-16 NOTE — CM/SW NOTE
MSW spoke with Aguila Franklin from [facility]. TPN delivery between 7-7:30pm.  [facility] of care. Patient will dc by family car this afternoon.     Tamera Rock LCSW

## 2021-08-16 NOTE — PROGRESS NOTES
Acute Pain Service     POD#42 s/p exp lap / IVETT    Duragesic 150mcg patch q72h   Ativan 0.5mg po prn anxiety  Ativan 1 mg po prn insomnia / anxiety at bedtime   Robaxin 250mg IV q8h prn muscle pain change to 500mg po TID prn   Dilaudid to 4mg during day (6

## 2021-08-16 NOTE — PROGRESS NOTES
[facility]  Progress Note    Ludivina Apple Patient Status:  Inpatient    1950 MRN [id number]   [facility] 3NW-A Attending Katlyn Patino, [address] [facility] Day # 39 PCP Micheline Staples MD     Subjective:  Patient is resting in bed.   He s dependence ([facility]     Steatorrhea     Prostate cancer ([facility]     Bilateral kidney stones     CKD (chronic kidney disease) stage 3, GFR 30-59 ml/min (HCC)     Abnormality of thoracic aorta     Community acquired pneumonia of right middle lobe of lung     Pneu

## 2021-08-16 NOTE — PROGRESS NOTES
[facility] HOSPITAL[facility]  Progress Note     Renae Bertrand Patient Status:  Inpatient    1950 MRN [id number]   [facility] 3NW-A Attending Dr. Beatriz Alexander Day # 39 PCP Arik Chanel MD     Chief Complaint: Abdominal distention    S: Laretta Runner Results    COVID-19  Lab Results   Component Value Date    COVID19 Not Detected 08/02/2021    COVID19 Not Detected 07/05/2021    COVID19 Not Detected 03/28/2021       Pro-Calcitonin  No results for input(s): PCT in the last 168 hours.     Cardiac  No result needed  13. Disposition  1.  Discharge planning     Quality:  · DVT Prophylaxis: heparin  · CODE status: Full  · If COVID testing is negative, may discontinue isolation: yes     Will the patient be referred to TCC on discharge?: tbd   Estimated date of disc

## 2021-08-16 NOTE — PROGRESS NOTES
I met briefly with Mr. Nigel Macario this afternoon. He reports that he will be going home later this afternoon. He reports that he feels good enough to do so. We discussed the availability of continued outpatient therapy. His wife has my office number.  He st

## 2021-08-16 NOTE — PLAN OF CARE
Pt is alert and oriented x4. Lungs are clear diminished bilaterally on room air. Bowel sounds are present. Pt reports having a bowel movement earlier today. Reports passing flatus. Denies nausea. Midline incision to abdomen healing and CDI.  G tube to [employer] hydration with IV or PO as ordered and tolerated  - Nasogastric tube to low intermittent suction as ordered  - Evaluate effectiveness of ordered antiemetic medications  - Provide nonpharmacologic comfort measures as appropriate  - Advance diet as tolerated to adequate nutritional intake and initiate nutrition consult as needed  - Instruct patient on self management of diabetes  Outcome: Progressing     Problem: Diabetes/Glucose Control  Goal: Glucose maintained within prescribed range  Description: INTERVENT

## 2021-08-17 ENCOUNTER — APPOINTMENT (OUTPATIENT)
Dept: GENERAL RADIOLOGY | Facility: HOSPITAL | Age: 71
DRG: 388 | End: 2021-08-17
Payer: MEDICARE

## 2021-08-17 ENCOUNTER — TELEPHONE (OUTPATIENT)
Dept: INTERNAL MEDICINE CLINIC | Facility: CLINIC | Age: 71
End: 2021-08-17

## 2021-08-17 ENCOUNTER — HOSPITAL ENCOUNTER (OUTPATIENT)
Facility: HOSPITAL | Age: 71
Setting detail: OBSERVATION
Discharge: HOME HEALTH | DRG: 388 | End: 2021-08-18
Attending: EMERGENCY MEDICINE | Admitting: HOSPITALIST
Payer: MEDICARE

## 2021-08-17 ENCOUNTER — APPOINTMENT (OUTPATIENT)
Dept: CT IMAGING | Facility: HOSPITAL | Age: 71
DRG: 388 | End: 2021-08-17
Attending: EMERGENCY MEDICINE
Payer: MEDICARE

## 2021-08-17 DIAGNOSIS — R09.02 HYPOXIA: Primary | ICD-10-CM

## 2021-08-17 DIAGNOSIS — G89.29 CHRONIC ABDOMINAL PAIN: ICD-10-CM

## 2021-08-17 DIAGNOSIS — Z79.891 LONG TERM CURRENT USE OF OPIATE ANALGESIC: ICD-10-CM

## 2021-08-17 DIAGNOSIS — R41.82 ALTERED MENTAL STATUS, UNSPECIFIED ALTERED MENTAL STATUS TYPE: ICD-10-CM

## 2021-08-17 DIAGNOSIS — R10.9 CHRONIC ABDOMINAL PAIN: ICD-10-CM

## 2021-08-17 DIAGNOSIS — K86.1 CHRONIC PANCREATITIS, UNSPECIFIED PANCREATITIS TYPE (HCC): Chronic | ICD-10-CM

## 2021-08-17 DIAGNOSIS — R41.0 CONFUSION: ICD-10-CM

## 2021-08-17 LAB
ALBUMIN SERPL-MCNC: 1.9 G/DL (ref 3.4–5)
ALBUMIN/GLOB SERPL: 0.3 {RATIO} (ref 1–2)
ALP LIVER SERPL-CCNC: 140 U/L
ALT SERPL-CCNC: 27 U/L
AMMONIA PLAS-MCNC: 23 UMOL/L (ref 11–32)
ANION GAP SERPL CALC-SCNC: 3 MMOL/L (ref 0–18)
AST SERPL-CCNC: 19 U/L (ref 15–37)
BASE EXCESS BLDV CALC-SCNC: -0.8 MMOL/L
BASOPHILS # BLD AUTO: 0.05 X10(3) UL (ref 0–0.2)
BASOPHILS NFR BLD AUTO: 0.2 %
BILIRUB SERPL-MCNC: 0.3 MG/DL (ref 0.1–2)
BUN BLD-MCNC: 21 MG/DL (ref 7–18)
CALCIUM BLD-MCNC: 9.2 MG/DL (ref 8.5–10.1)
CHLORIDE SERPL-SCNC: 110 MMOL/L (ref 98–112)
CO2 SERPL-SCNC: 25 MMOL/L (ref 21–32)
CREAT BLD-MCNC: 1.16 MG/DL
EOSINOPHIL # BLD AUTO: 0.36 X10(3) UL (ref 0–0.7)
EOSINOPHIL NFR BLD AUTO: 1.6 %
ERYTHROCYTE [DISTWIDTH] IN BLOOD BY AUTOMATED COUNT: 16.8 %
GLOBULIN PLAS-MCNC: 6.3 G/DL (ref 2.8–4.4)
GLUCOSE BLD-MCNC: 172 MG/DL (ref 70–99)
HCO3 BLDV-SCNC: 24.3 MEQ/L (ref 23–27)
HCT VFR BLD AUTO: 23 %
HELMET CELLS BLD QL SMEAR: PRESENT
HGB BLD-MCNC: 7.8 G/DL
IMM GRANULOCYTES # BLD AUTO: 0.21 X10(3) UL (ref 0–1)
IMM GRANULOCYTES NFR BLD: 0.9 %
LYMPHOCYTES # BLD AUTO: 2.97 X10(3) UL (ref 1–4)
LYMPHOCYTES NFR BLD AUTO: 13.1 %
M PROTEIN MFR SERPL ELPH: 8.2 G/DL (ref 6.4–8.2)
MCH RBC QN AUTO: 29.9 PG (ref 26–34)
MCHC RBC AUTO-ENTMCNC: 33.9 G/DL (ref 31–37)
MCV RBC AUTO: 88.1 FL
MONOCYTES # BLD AUTO: 2.34 X10(3) UL (ref 0.1–1)
MONOCYTES NFR BLD AUTO: 10.3 %
NEUTROPHILS # BLD AUTO: 16.72 X10 (3) UL (ref 1.5–7.7)
NEUTROPHILS # BLD AUTO: 16.72 X10(3) UL (ref 1.5–7.7)
NEUTROPHILS NFR BLD AUTO: 73.9 %
OSMOLALITY SERPL CALC.SUM OF ELEC: 293 MOSM/KG (ref 275–295)
PCO2 BLDV: 43 MM HG (ref 38–50)
PH BLDV: 7.38 [PH] (ref 7.33–7.43)
PLATELET # BLD AUTO: 647 10(3)UL (ref 150–450)
PLATELET MORPHOLOGY: NORMAL
PO2 BLDV: 43 MM HG (ref 30–50)
POTASSIUM SERPL-SCNC: 3.6 MMOL/L (ref 3.5–5.1)
RBC # BLD AUTO: 2.61 X10(6)UL
SAO2 % BLDV: 69 % (ref 72–78)
SAO2 % BLDV: 77 % (ref 72–78)
SARS-COV-2 RNA RESP QL NAA+PROBE: NOT DETECTED
SODIUM SERPL-SCNC: 138 MMOL/L (ref 136–145)
WBC # BLD AUTO: 22.7 X10(3) UL (ref 4–11)

## 2021-08-17 PROCEDURE — 99223 1ST HOSP IP/OBS HIGH 75: CPT | Performed by: INTERNAL MEDICINE

## 2021-08-17 PROCEDURE — 70450 CT HEAD/BRAIN W/O DYE: CPT | Performed by: EMERGENCY MEDICINE

## 2021-08-17 PROCEDURE — 71045 X-RAY EXAM CHEST 1 VIEW: CPT

## 2021-08-17 PROCEDURE — 71275 CT ANGIOGRAPHY CHEST: CPT | Performed by: EMERGENCY MEDICINE

## 2021-08-17 RX ORDER — SODIUM CHLORIDE 9 MG/ML
1000 INJECTION, SOLUTION INTRAVENOUS CONTINUOUS
Status: ACTIVE | OUTPATIENT
Start: 2021-08-17 | End: 2021-08-18

## 2021-08-17 NOTE — TELEPHONE ENCOUNTER
Spoke with patient and patient's spouse/Mindy, regarding message below. They confirmed patient did not go to UC/ER because his symptoms resolved. Patient is a/ox4 during call, denies shortness of breath/difficulty breathing, chest pain, fever.  No othe

## 2021-08-17 NOTE — ED INITIAL ASSESSMENT (HPI)
Pt states he was watching TV and became SOB that lasted about an hour. Pt denies any SOB at this time. Pt 92% on RA with EMS. Pt denies any cp during that period of SOB and at this time.      Pt states he feels totally back to normal now, pts wife state

## 2021-08-17 NOTE — TELEPHONE ENCOUNTER
Received call from [phone] [address]ote reports upon start of visit with patient, she noticed patient was a little disoriented, short of breath  126 HR   Pulse ox 86  Sluggish    Cheraw Asper states during this time of call patient's shortness of

## 2021-08-17 NOTE — PAYOR COMM NOTE
Discharge Notification    Patient Name: Claudia Mack: Arty Hammans #: [facility] Number: [id number]  Admit Date/Time: 7/1/2021 11:13 PM  Discharge Date/Time: 8/16/2021 5:58 PM

## 2021-08-18 VITALS
BODY MASS INDEX: 17.64 KG/M2 | RESPIRATION RATE: 19 BRPM | SYSTOLIC BLOOD PRESSURE: 118 MMHG | DIASTOLIC BLOOD PRESSURE: 61 MMHG | OXYGEN SATURATION: 94 % | HEART RATE: 83 BPM | WEIGHT: 126 LBS | HEIGHT: 71 IN | TEMPERATURE: 98 F

## 2021-08-18 LAB
ATRIAL RATE: 112 BPM
ATRIAL RATE: 113 BPM
BASOPHILS # BLD AUTO: 0.06 X10(3) UL (ref 0–0.2)
BASOPHILS NFR BLD AUTO: 0.3 %
EOSINOPHIL # BLD AUTO: 0.61 X10(3) UL (ref 0–0.7)
EOSINOPHIL NFR BLD AUTO: 3.2 %
ERYTHROCYTE [DISTWIDTH] IN BLOOD BY AUTOMATED COUNT: 16.7 %
GLUCOSE BLD-MCNC: 130 MG/DL (ref 70–99)
GLUCOSE BLD-MCNC: 133 MG/DL (ref 70–99)
GLUCOSE BLD-MCNC: 54 MG/DL (ref 70–99)
GLUCOSE BLD-MCNC: 80 MG/DL (ref 70–99)
HCT VFR BLD AUTO: 23.4 %
HGB BLD-MCNC: 8.1 G/DL
IMM GRANULOCYTES # BLD AUTO: 0.17 X10(3) UL (ref 0–1)
IMM GRANULOCYTES NFR BLD: 0.9 %
LYMPHOCYTES # BLD AUTO: 2.19 X10(3) UL (ref 1–4)
LYMPHOCYTES NFR BLD AUTO: 11.6 %
MCH RBC QN AUTO: 30.2 PG (ref 26–34)
MCHC RBC AUTO-ENTMCNC: 34.6 G/DL (ref 31–37)
MCV RBC AUTO: 87.3 FL
MONOCYTES # BLD AUTO: 2.23 X10(3) UL (ref 0.1–1)
MONOCYTES NFR BLD AUTO: 11.8 %
NEUTROPHILS # BLD AUTO: 13.64 X10 (3) UL (ref 1.5–7.7)
NEUTROPHILS # BLD AUTO: 13.64 X10(3) UL (ref 1.5–7.7)
NEUTROPHILS NFR BLD AUTO: 72.2 %
P AXIS: 80 DEGREES
P AXIS: 97 DEGREES
P-R INTERVAL: 144 MS
P-R INTERVAL: 168 MS
PLATELET # BLD AUTO: 661 10(3)UL (ref 150–450)
PLATELET MORPHOLOGY: NORMAL
Q-T INTERVAL: 324 MS
Q-T INTERVAL: 328 MS
QRS DURATION: 84 MS
QRS DURATION: 86 MS
QTC CALCULATION (BEZET): 442 MS
QTC CALCULATION (BEZET): 449 MS
R AXIS: 80 DEGREES
R AXIS: 98 DEGREES
RBC # BLD AUTO: 2.68 X10(6)UL
T AXIS: 104 DEGREES
T AXIS: 74 DEGREES
VENTRICULAR RATE: 112 BPM
VENTRICULAR RATE: 113 BPM
WBC # BLD AUTO: 18.9 X10(3) UL (ref 4–11)

## 2021-08-18 PROCEDURE — 99239 HOSP IP/OBS DSCHRG MGMT >30: CPT | Performed by: HOSPITALIST

## 2021-08-18 RX ORDER — IPRATROPIUM BROMIDE AND ALBUTEROL SULFATE 2.5; .5 MG/3ML; MG/3ML
3 SOLUTION RESPIRATORY (INHALATION) EVERY 4 HOURS PRN
Status: DISCONTINUED | OUTPATIENT
Start: 2021-08-18 | End: 2021-08-18

## 2021-08-18 RX ORDER — FENTANYL 50 UG/H
1 PATCH TRANSDERMAL
Qty: 10 PATCH | Refills: 0 | Status: SHIPPED | COMMUNITY
Start: 2021-08-18 | End: 2021-08-20

## 2021-08-18 RX ORDER — LORAZEPAM 0.5 MG/1
0.5 TABLET ORAL
Qty: 20 TABLET | Refills: 0 | Status: SHIPPED | COMMUNITY
Start: 2021-08-18 | End: 2021-08-20 | Stop reason: ALTCHOICE

## 2021-08-18 RX ORDER — LORAZEPAM 0.5 MG/1
0.5 TABLET ORAL
Status: DISCONTINUED | OUTPATIENT
Start: 2021-08-19 | End: 2021-08-18

## 2021-08-18 RX ORDER — HYDROMORPHONE HYDROCHLORIDE 8 MG/1
8 TABLET ORAL 4 TIMES DAILY
Qty: 120 TABLET | Refills: 0 | Status: SHIPPED | OUTPATIENT
Start: 2021-08-18 | End: 2021-08-18

## 2021-08-18 RX ORDER — LORAZEPAM 0.5 MG/1
1 TABLET ORAL NIGHTLY PRN
Status: DISCONTINUED | OUTPATIENT
Start: 2021-08-18 | End: 2021-08-18

## 2021-08-18 RX ORDER — HYDROMORPHONE HYDROCHLORIDE 2 MG/1
4 TABLET ORAL EVERY 6 HOURS
Status: DISCONTINUED | OUTPATIENT
Start: 2021-08-18 | End: 2021-08-18

## 2021-08-18 RX ORDER — HYDROMORPHONE HYDROCHLORIDE 8 MG/1
8 TABLET ORAL 4 TIMES DAILY
Qty: 120 TABLET | Refills: 0 | Status: SHIPPED | COMMUNITY
Start: 2021-08-18 | End: 2021-08-18

## 2021-08-18 RX ORDER — HYDROMORPHONE HYDROCHLORIDE 2 MG/1
8 TABLET ORAL 4 TIMES DAILY
Status: DISCONTINUED | OUTPATIENT
Start: 2021-08-18 | End: 2021-08-18

## 2021-08-18 RX ORDER — DEXTROSE MONOHYDRATE 25 G/50ML
50 INJECTION, SOLUTION INTRAVENOUS
Status: DISCONTINUED | OUTPATIENT
Start: 2021-08-18 | End: 2021-08-18

## 2021-08-18 RX ORDER — FENTANYL 100 UG/H
1 PATCH TRANSDERMAL
Status: SHIPPED | COMMUNITY
Start: 2021-08-18 | End: 2021-10-05

## 2021-08-18 RX ORDER — PANTOPRAZOLE SODIUM 40 MG/1
40 TABLET, DELAYED RELEASE ORAL
Status: DISCONTINUED | OUTPATIENT
Start: 2021-08-18 | End: 2021-08-18

## 2021-08-18 RX ORDER — ALBUTEROL SULFATE 90 UG/1
2 AEROSOL, METERED RESPIRATORY (INHALATION) EVERY 6 HOURS PRN
Status: DISCONTINUED | OUTPATIENT
Start: 2021-08-18 | End: 2021-08-18

## 2021-08-18 RX ORDER — METHOCARBAMOL 500 MG/1
500 TABLET, FILM COATED ORAL 3 TIMES DAILY PRN
Qty: 60 TABLET | Refills: 0 | Status: SHIPPED | COMMUNITY
Start: 2021-08-18 | End: 2021-08-18

## 2021-08-18 RX ORDER — METHOCARBAMOL 500 MG/1
500 TABLET, FILM COATED ORAL 3 TIMES DAILY PRN
Status: DISCONTINUED | OUTPATIENT
Start: 2021-08-18 | End: 2021-08-18

## 2021-08-18 RX ORDER — ENOXAPARIN SODIUM 100 MG/ML
40 INJECTION SUBCUTANEOUS DAILY
Status: DISCONTINUED | OUTPATIENT
Start: 2021-08-18 | End: 2021-08-18

## 2021-08-18 RX ORDER — HYDROMORPHONE HYDROCHLORIDE 2 MG/1
4 TABLET ORAL 4 TIMES DAILY
Status: DISCONTINUED | OUTPATIENT
Start: 2021-08-18 | End: 2021-08-18

## 2021-08-18 RX ORDER — HYDROMORPHONE HYDROCHLORIDE 8 MG/1
8 TABLET ORAL 4 TIMES DAILY
Qty: 120 TABLET | Refills: 0 | Status: SHIPPED | COMMUNITY
Start: 2021-08-18 | End: 2021-10-05

## 2021-08-18 RX ORDER — LORAZEPAM 1 MG/1
1 TABLET ORAL NIGHTLY PRN
Qty: 20 TABLET | Refills: 0 | Status: SHIPPED | COMMUNITY
Start: 2021-08-18

## 2021-08-18 NOTE — CM/SW NOTE
RN from [facility] able to service the pt tonight. They confirmed they received the resume order and all of the clinicals. No changes with the pt's TPN.

## 2021-08-18 NOTE — PROGRESS NOTES
Pain Service  68 yo with recent admission for bowel obstruction s/p IVETT and eventual placement of venting G-tube admitted this am with AMS. He states he took an extra dose of Dilaudid (8mg tablet) after having taken a 4mg dose 1 hour prior.  He states his p

## 2021-08-18 NOTE — CM/SW NOTE
RN stating the pt is ready for dc. Labs sent to Robinson Driscoll from [facility]. Robinson Driscoll stated the pt has TPN at home and is is good to discharge. Resume order sent to [facility]. SW informed them the pt will dc home today. Also sent them clinicals and labs.

## 2021-08-18 NOTE — PLAN OF CARE
MD called & changed dilaudid 4mg p.o to every [facility] so donny was given a dose of dilaudid at 0930am. Patient is anxious & would like to go home soon. MD on page to follow up if rounding soon per patient's request. Awaiting response. Updated patient's spouse

## 2021-08-18 NOTE — PLAN OF CARE
Potassium 3.6, needs replacement per protocol. Suppose to receive IV potassium,refused dose. Md aware. [address] spouse on mobile number. Left message informing discharge plan. RN also Alanna Winn ask if she will  patient.

## 2021-08-18 NOTE — H&P
[facility] HOSPITALIST  History and Physical     [facility] Patient Status:  Inpatient    1950 MRN [id number]   [facility] 4NW-A Attending Fred Gonzalez MD   Hosp Day # 1 PCP Jeremy Campos MD     Chief Complaint: AMS     History complication, uncontrolled    • Unspecified vitamin D deficiency    • Visual impairment     GLASSES   • Weight loss    • Weight loss 5/23/2019        Past Surgical History:   Past Surgical History:   Procedure Laterality Date   • APPENDECTOMY     • APPENDE Oral Tab, Take 1 tablet (0.5 mg total) by mouth daily as needed for Anxiety. In the morning as needed, this script is in conjunction with ativan 1 mg po nightly prn.   Pt was taking both during his hospitalization without event., Disp: 20 tablet, Rfl: 0  pa wheezes. No rhonchi. Cardiovascular: S1, S2. Regular rate and rhythm. No murmurs, rubs or gallops. Equal pulses. Chest and Back: No tenderness or deformity. Abdomen: Soft, nontender, nondistended. Positive bowel sounds.  No rebound, guarding or organom TPN  3. intrabdominal hematoma   4. Leukocytosis- monitor    5. Anemia stable   6. Thrombocytosis   7. DM2  1. ISS  8. COPD   9. Chronic pain   1. Resume home meds. Pt to be re-educated on dosing at home   10.  Anxiety/depression    Quality:  · DVT Prophyla

## 2021-08-18 NOTE — PLAN OF CARE
NURSING ADMISSION NOTE      Patient admitted via Cart   Oriented to room. Safety precautions initiated. Bed in low position. Call light in reach. Patient is alert and oriented. C/o SOB. Oxygen saturation 96% on 3 LNC.  History of COPD not on oxyge

## 2021-08-18 NOTE — ED PROVIDER NOTES
Patient Seen in: [facility] Emergency Department      History   Patient presents with:  Difficulty Breathing    Stated Complaint: SOB    HPI/Subjective:   HPI    Patient is a 69-year-old male presents emergency room for evaluation of confusion, torrey rhythm. Normal S1S2. No S3S4 or murmur. ABDOMEN: Bowel sounds are present. Soft. nondistended, no pulsatile masses. nontender. G-tube site clean, dry, intact. Surgical incisions are healing well  MUSCULOSKELETAL: No calf tenderness.   Dorsalis and Post the individual orders. SCAN SLIDE   RAINBOW DRAW LAVENDER   RAINBOW DRAW LIGHT GREEN   RAINBOW DRAW GOLD   BUN 21. White blood cell count 22.7. Hemoglobin 7.8  EKG    Rate, intervals and axes as noted on EKG Report.   Rate: 113  Rhythm: Sinus Rhythm  Re CT CHEST (CPT=71250), 4/23/2021, 1:15 PM.  INDICATIONS:  SOB  TECHNIQUE:  IV contrast-enhanced multislice CT angiography is performed through the pulmonary arterial anatomy. 3D volume renderings are generated. Dose reduction techniques were used.  Dose inf 8/17/2021 at 6:42 PM              XR CHEST AP PORTABLE  (CPT=71045)    Result Date: 8/17/2021  PROCEDURE:  XR CHEST AP PORTABLE  (CPT=71045)  TECHNIQUE:  AP chest radiograph was obtained.   COMPARISON:  [facility] , XR, XR CHEST AP PORTABLE  (CPT=71045), 7/28/2 (Principal) Hypoxia R09.02 8/17/2021 Unknown

## 2021-08-18 NOTE — PLAN OF CARE
Patient is requesting a dose of pain med at this time. Last taken at 0400. Next dose in MAR at 1200. MD notified. Awaiting MD call back. Alert & oriented 3-4. Able to express needs & concerns. He is expecting to be seen by MD this morning & be able to go home

## 2021-08-19 ENCOUNTER — PATIENT OUTREACH (OUTPATIENT)
Dept: CASE MANAGEMENT | Age: 71
End: 2021-08-19

## 2021-08-19 ENCOUNTER — TELEPHONE (OUTPATIENT)
Dept: INTERNAL MEDICINE CLINIC | Facility: CLINIC | Age: 71
End: 2021-08-19

## 2021-08-19 DIAGNOSIS — Z02.9 ENCOUNTERS FOR ADMINISTRATIVE PURPOSE: ICD-10-CM

## 2021-08-19 NOTE — TELEPHONE ENCOUNTER
Patient spouse called and stated that the patient was just discharged on 8/18 from the hospital and now both of his ankles are swelling. Spouse is requesting to speak with a nurse.      Please advise

## 2021-08-19 NOTE — PAYOR COMM NOTE
--------------  ADMISSION REVIEW     Payor: Kleber Butler 673 #:  [id number] [facility] Number: [id number]    Admit date: 8/17/21  Admit time: 11:35 PM       REVIEW DOCUMENTATION:     ED Provider Notes      ED Provider Notes signed by Esperanza Robin acute distress, well appearing and non-toxic, Alert and oriented X 3. Patient is on 3 L nasal cannula  HEENT: Normocephalic, atraumatic. Moist mucous membranes.   Pupils equal round reactive to light and accommodation, extraocular motion is intact, sclera PLASMA - Normal   RAPID SARS-COV-2 BY PCR - Normal   CBC WITH DIFFERENTIAL WITH PLATELET    Narrative: The following orders were created for panel order CBC With Differential With Platelet.   Procedure                               Abnormality         S CONCLUSION:  No significant midline shift or mass effect. No evidence of acute intracranial hemorrhage. There are probable mild chronic microvascular ischemic changes present. If there is persistent clinical concern then consider MRI.    Dictated by No bony lesion or fracture. OTHER:  Negative. CONCLUSION:  No pulmonary embolism to the first subsegmental arterial level. The main pulmonary artery appears mildly enlarged which is suggestive of pulmonary hypertension.   There are some secret disposition: 8/17/2021  7:06 PM                                  Disposition and Plan     Clinical Impression:  Hypoxia  (primary encounter diagnosis)  Altered mental status, unspecified altered mental status type  Confusion     Disposition:  Admit  8/17/2 • Chronic pain 1/7/2014   • Chronic pancreatitis (HCC)     for pancreatitis flare ups   • COPD (chronic obstructive pulmonary disease) (HCC)    • Coronary atherosclerosis    • Diabetes (HCC)    • Diabetes mellitus (HCC)    • Elevated PSA, less than 10 ng drugs. Family History:   Family History   Problem Relation Age of Onset   • Cancer Mother         pancreatic? • Hypertension Mother         Allergies:    Ace Inhibitors          ANGIOEDEMA    Medications:  No current facility-administered medications o lungs 2 (two) times daily. Rinse and spit after using, Disp: 1 Inhaler, Rfl: 11  ipratropium-albuterol 0.5-2.5 (3) MG/3ML Inhalation Solution, Take 3 mL by nebulization every 4 (four) hours as needed. , Disp: 120 vial, Rfl: 11  lidocaine-menthol 4-1 % Exter 1. 7* 1.6* 1.9*    139 138   K 4.2 3.9 3.6    108 110   CO2 25.0 26.0 25.0   ALKPHO 151* 140* 140*   AST 13* 16 19   ALT 16 19 27   BILT 0.2 0.3 0.3   TP 7.6 7.3 8.2       Estimated Creatinine Clearance: 47.3 mL/min (based on SCr of 1.16 mg/dL). 98.2 °F (36.8 °C)  93  18  113/51  93 %  —  —  — [place]    08/18/21 0515  99 °F (37.2 °C)  90  16  111/52  94 %  —  None (Room air)  — RG    08/18/21 0414  —  —  —  —  94 %  —  None (Room air)  — CP    08/18/21 0317  —  —  —  —  93 %  —  None (Room air)  — CP 8mg (1 tab)     Duragesic 150mcg q72h  Robaxin 750mg po q6h prn     Plan for dc home later today. Patient reports he and his wife are aware of what he should take but he had increased pain and took an extra tablet that was 8mg not 4mg.      Patient justin

## 2021-08-19 NOTE — PROGRESS NOTES
LMTCB for post hospital follow up, NCM contact information provided. NCM called PCP office and there was no answer. NCM will call office again later.

## 2021-08-19 NOTE — TELEPHONE ENCOUNTER
Spoke with patient's spouse/Mindy (ok per verbal release)    Ankle swelling noticed today, slight puffiness present bilaterally  Blood pressure 91/58 this morning, latest /61  with activity, HR does stabilize with rest according to patient's w

## 2021-08-20 ENCOUNTER — OFFICE VISIT (OUTPATIENT)
Dept: INTERNAL MEDICINE CLINIC | Facility: CLINIC | Age: 71
End: 2021-08-20
Payer: MEDICARE

## 2021-08-20 VITALS
BODY MASS INDEX: 17.19 KG/M2 | HEIGHT: 71 IN | RESPIRATION RATE: 16 BRPM | HEART RATE: 103 BPM | SYSTOLIC BLOOD PRESSURE: 106 MMHG | DIASTOLIC BLOOD PRESSURE: 62 MMHG | OXYGEN SATURATION: 94 % | TEMPERATURE: 98 F | WEIGHT: 122.81 LBS

## 2021-08-20 DIAGNOSIS — K56.51 INTESTINAL ADHESIONS WITH PARTIAL OBSTRUCTION (HCC): Primary | ICD-10-CM

## 2021-08-20 DIAGNOSIS — J44.9 CHRONIC OBSTRUCTIVE PULMONARY DISEASE, UNSPECIFIED COPD TYPE (HCC): ICD-10-CM

## 2021-08-20 DIAGNOSIS — E88.09 HYPOALBUMINEMIA: ICD-10-CM

## 2021-08-20 DIAGNOSIS — Z43.1 PEG (PERCUTANEOUS ENDOSCOPIC GASTROSTOMY) ADJUSTMENT/REPLACEMENT/REMOVAL (HCC): ICD-10-CM

## 2021-08-20 PROBLEM — D62 ACUTE BLOOD LOSS ANEMIA: Status: RESOLVED | Noted: 2021-07-08 | Resolved: 2021-08-20

## 2021-08-20 PROBLEM — N17.9 ACUTE RENAL INJURY (HCC): Status: RESOLVED | Noted: 2021-07-02 | Resolved: 2021-08-20

## 2021-08-20 PROBLEM — I31.3 PERICARDIAL EFFUSION: Status: RESOLVED | Noted: 2021-03-28 | Resolved: 2021-08-20

## 2021-08-20 PROBLEM — K85.90 ACUTE ON CHRONIC PANCREATITIS (HCC): Status: RESOLVED | Noted: 2021-07-02 | Resolved: 2021-08-20

## 2021-08-20 PROBLEM — J18.9 COMMUNITY ACQUIRED PNEUMONIA OF RIGHT MIDDLE LOBE OF LUNG: Status: RESOLVED | Noted: 2021-03-22 | Resolved: 2021-08-20

## 2021-08-20 PROBLEM — K86.1 ACUTE ON CHRONIC PANCREATITIS (HCC): Status: RESOLVED | Noted: 2021-07-02 | Resolved: 2021-08-20

## 2021-08-20 PROBLEM — R41.82 ALTERED MENTAL STATUS, UNSPECIFIED ALTERED MENTAL STATUS TYPE: Status: RESOLVED | Noted: 2021-08-17 | Resolved: 2021-08-20

## 2021-08-20 PROBLEM — K56.609 SMALL BOWEL OBSTRUCTION (HCC): Status: RESOLVED | Noted: 2021-07-02 | Resolved: 2021-08-20

## 2021-08-20 PROBLEM — I31.39 PERICARDIAL EFFUSION: Status: RESOLVED | Noted: 2021-03-28 | Resolved: 2021-08-20

## 2021-08-20 PROBLEM — R09.02 HYPOXIA: Status: RESOLVED | Noted: 2021-08-17 | Resolved: 2021-08-20

## 2021-08-20 PROBLEM — R41.0 CONFUSION: Status: RESOLVED | Noted: 2021-08-17 | Resolved: 2021-08-20

## 2021-08-20 PROBLEM — J18.9 PARAPNEUMONIC EFFUSION: Status: RESOLVED | Noted: 2021-03-28 | Resolved: 2021-08-20

## 2021-08-20 PROBLEM — J91.8 PARAPNEUMONIC EFFUSION: Status: RESOLVED | Noted: 2021-03-28 | Resolved: 2021-08-20

## 2021-08-20 PROBLEM — J18.9 PNEUMONIA OF RIGHT LOWER LOBE DUE TO INFECTIOUS ORGANISM: Status: RESOLVED | Noted: 2021-03-28 | Resolved: 2021-08-20

## 2021-08-20 PROCEDURE — 1111F DSCHRG MED/CURRENT MED MERGE: CPT | Performed by: INTERNAL MEDICINE

## 2021-08-20 PROCEDURE — 3078F DIAST BP <80 MM HG: CPT | Performed by: INTERNAL MEDICINE

## 2021-08-20 PROCEDURE — 99214 OFFICE O/P EST MOD 30 MIN: CPT | Performed by: INTERNAL MEDICINE

## 2021-08-20 PROCEDURE — 3008F BODY MASS INDEX DOCD: CPT | Performed by: INTERNAL MEDICINE

## 2021-08-20 PROCEDURE — 3074F SYST BP LT 130 MM HG: CPT | Performed by: INTERNAL MEDICINE

## 2021-08-20 NOTE — PROGRESS NOTES
Hortencia Sikhism  1950 is a 69year old male. Patient presents with:  TCM (Transition Of Care Management): Altered Mental Status: 2021 - 2021--Zeus       HPI:   Post hospital follow-up.   Patient currently denies any abdominal pain h History:   Diagnosis Date   • Abdominal pain    • Angio-edema 4/21/2019   • Arthritis    • Asthma     OUTGREW AT 12Y/O   • Back pain    • Calculus of kidney    • Chronic abdominal pain 1/7/2014   • Chronic pain 1/7/2014   • Chronic pancreatitis ([facility] [address]     f place.  Genitourinary:   Loss of control of urine no. Recurrent Urinary Tract Infection (UTI) no . Blood in urine no. Burning on urination no. Difficulty urinating no. Dysuria none. Flank pain no. Frequent Nighttime Urination none .  Pain with urination non patient indicates understanding of these issues and agrees to the plan. The patient is asked to Return in about 4 weeks (around 9/17/2021) for after seeing consults.   Isabella Ornelas MD

## 2021-08-20 NOTE — PROGRESS NOTES
Pt has appt with PCP today at 1:30  NCM called PCP office and s/w Maria De Jesus. She states that she will send a message to the nurse to see if VT can be changed. NCM will follow up to make sure pt completed appt.

## 2021-08-20 NOTE — PATIENT INSTRUCTIONS
Patient is currently on TPN and is being managed by Dr. Kory Stephens. He also has a PEG in place. This was taken care of by Dr. Cecile Johnson.   Patient and wife advised that the need to follow-up with Dr. Kory Stephens who will decide whether the patient can be started on p

## 2021-08-21 PROBLEM — T50.901A ACCIDENTAL DRUG OVERDOSE: Status: ACTIVE | Noted: 2021-08-21

## 2021-08-21 NOTE — DISCHARGE SUMMARY
[facility] HOSPITALIST  DISCHARGE SUMMARY     Ludivina Apple Patient Status:  Inpatient    1950 MRN [id number]   [facility] 4NW-A Attending No att. providers found   Hosp Day # 1 PCP Micheline Staples MD     Date of Admission: 2021  Da Risk  29-58 Medium Risk  0-28   Low Risk         TCM Follow-Up Recommendation:  LACE > 58: High Risk of readmission after discharge from the hospital.  **Certification    Admission date was 8/17/2021. Inpatient stay was shorter than expected.   Patient's H Quantity: 1 Inhaler  Refills: 11     ipratropium-albuterol 0.5-2.5 (3) MG/3ML Soln  Commonly known as: DUONEB      Take 3 mL by nebulization every 4 (four) hours as needed.    Quantity: 120 vial  Refills: 11     ondansetron 8 MG tablet  Commonly known as: Z No murmurs, rubs or gallops. Abdomen: Soft, nontender, nondistended. Positive bowel sounds. No rebound or guarding. Neurologic: No focal neurological deficits. Musculoskeletal: Moves all extremities. Extremities: No edema.   -------------------------

## 2021-08-23 ENCOUNTER — TELEPHONE (OUTPATIENT)
Dept: SURGERY | Facility: CLINIC | Age: 71
End: 2021-08-23

## 2021-08-23 ENCOUNTER — LAB REQUISITION (OUTPATIENT)
Dept: LAB | Facility: HOSPITAL | Age: 71
End: 2021-08-23
Payer: MEDICARE

## 2021-08-23 DIAGNOSIS — K56.690 OTHER PARTIAL INTESTINAL OBSTRUCTION (HCC): ICD-10-CM

## 2021-08-23 LAB
ALBUMIN SERPL-MCNC: 1.8 G/DL (ref 3.4–5)
ALBUMIN/GLOB SERPL: 0.3 {RATIO} (ref 1–2)
ALP LIVER SERPL-CCNC: 105 U/L
ALT SERPL-CCNC: 26 U/L
ANION GAP SERPL CALC-SCNC: 1 MMOL/L (ref 0–18)
AST SERPL-CCNC: 37 U/L (ref 15–37)
BASOPHILS # BLD AUTO: 0.06 X10(3) UL (ref 0–0.2)
BASOPHILS NFR BLD AUTO: 0.4 %
BILIRUB SERPL-MCNC: 0.4 MG/DL (ref 0.1–2)
BUN BLD-MCNC: 19 MG/DL (ref 7–18)
CALCIUM BLD-MCNC: 8.4 MG/DL (ref 8.5–10.1)
CHLORIDE SERPL-SCNC: 110 MMOL/L (ref 98–112)
CO2 SERPL-SCNC: 30 MMOL/L (ref 21–32)
CREAT BLD-MCNC: 0.82 MG/DL
EOSINOPHIL # BLD AUTO: 0.63 X10(3) UL (ref 0–0.7)
EOSINOPHIL NFR BLD AUTO: 4.7 %
ERYTHROCYTE [DISTWIDTH] IN BLOOD BY AUTOMATED COUNT: 17.7 %
GLOBULIN PLAS-MCNC: 5.5 G/DL (ref 2.8–4.4)
GLUCOSE BLD-MCNC: 23 MG/DL (ref 70–99)
HAV IGM SER QL: 2.1 MG/DL (ref 1.6–2.6)
HCT VFR BLD AUTO: 25.1 %
HGB BLD-MCNC: 8.4 G/DL
IMM GRANULOCYTES # BLD AUTO: 0.06 X10(3) UL (ref 0–1)
IMM GRANULOCYTES NFR BLD: 0.4 %
LYMPHOCYTES # BLD AUTO: 2.32 X10(3) UL (ref 1–4)
LYMPHOCYTES NFR BLD AUTO: 17.4 %
M PROTEIN MFR SERPL ELPH: 7.3 G/DL (ref 6.4–8.2)
MCH RBC QN AUTO: 30.1 PG (ref 26–34)
MCHC RBC AUTO-ENTMCNC: 33.5 G/DL (ref 31–37)
MCV RBC AUTO: 90 FL
MONOCYTES # BLD AUTO: 1.57 X10(3) UL (ref 0.1–1)
MONOCYTES NFR BLD AUTO: 11.7 %
NEUTROPHILS # BLD AUTO: 8.73 X10 (3) UL (ref 1.5–7.7)
NEUTROPHILS # BLD AUTO: 8.73 X10(3) UL (ref 1.5–7.7)
NEUTROPHILS NFR BLD AUTO: 65.4 %
OSMOLALITY SERPL CALC.SUM OF ELEC: 290 MOSM/KG (ref 275–295)
PHOSPHATE SERPL-MCNC: 2.7 MG/DL (ref 2.5–4.9)
PLATELET # BLD AUTO: 661 10(3)UL (ref 150–450)
POTASSIUM SERPL-SCNC: 4.5 MMOL/L (ref 3.5–5.1)
RBC # BLD AUTO: 2.79 X10(6)UL
SODIUM SERPL-SCNC: 141 MMOL/L (ref 136–145)
WBC # BLD AUTO: 13.4 X10(3) UL (ref 4–11)

## 2021-08-23 PROCEDURE — 84100 ASSAY OF PHOSPHORUS: CPT | Performed by: COLON & RECTAL SURGERY

## 2021-08-23 PROCEDURE — 85025 COMPLETE CBC W/AUTO DIFF WBC: CPT | Performed by: COLON & RECTAL SURGERY

## 2021-08-23 PROCEDURE — 83735 ASSAY OF MAGNESIUM: CPT | Performed by: COLON & RECTAL SURGERY

## 2021-08-23 PROCEDURE — 80053 COMPREHEN METABOLIC PANEL: CPT | Performed by: COLON & RECTAL SURGERY

## 2021-08-23 NOTE — TELEPHONE ENCOUNTER
Writer attempted to call pt on both numbers listed and left message on both line as well to call office with update. [facility] received critical results of glucose 23 this morning. Pt needs to reach out to PCP regarding this.  If pt is feeling bad or worse th

## 2021-08-23 NOTE — TELEPHONE ENCOUNTER
Pt called back and states feeling good over all. States took his own blood glucose about 0800 or 0900, and it was 87. Will relay information to [facility]. If [facility] states to do anything else, writer will relay to pt.

## 2021-08-24 ENCOUNTER — TELEPHONE (OUTPATIENT)
Dept: SURGERY | Facility: CLINIC | Age: 71
End: 2021-08-24

## 2021-08-24 NOTE — TELEPHONE ENCOUNTER
Pt wife called stating at 0800 pt blood glucose was 53. Wife states she administered liquid glucose. States about 15-20 mins after administering the liquid glucose, blood glucose went up to 81. Paged to [facility] in regards to above.  [facility] states either pt and

## 2021-08-26 ENCOUNTER — TELEPHONE (OUTPATIENT)
Dept: SURGERY | Facility: CLINIC | Age: 71
End: 2021-08-26

## 2021-08-26 NOTE — TELEPHONE ENCOUNTER
Pt wife called with update for blood glucose. Since TPN switch, pts glucose at 0900 was 75 and at 1000 it was 95. Pt c/o hunger pains and wondering if able to have clear liquids. Pt denies BMs yet, but is passing gas.  Denies abd bloating or pain, besides t

## 2021-08-30 ENCOUNTER — OFFICE VISIT (OUTPATIENT)
Dept: SURGERY | Facility: CLINIC | Age: 71
End: 2021-08-30

## 2021-08-30 ENCOUNTER — LAB REQUISITION (OUTPATIENT)
Dept: LAB | Facility: HOSPITAL | Age: 71
End: 2021-08-30
Payer: MEDICARE

## 2021-08-30 VITALS
TEMPERATURE: 97 F | DIASTOLIC BLOOD PRESSURE: 64 MMHG | HEART RATE: 70 BPM | HEIGHT: 71 IN | WEIGHT: 130.81 LBS | SYSTOLIC BLOOD PRESSURE: 123 MMHG | BODY MASS INDEX: 18.31 KG/M2

## 2021-08-30 DIAGNOSIS — Z09 FOLLOW-UP EXAMINATION: ICD-10-CM

## 2021-08-30 DIAGNOSIS — I10 BENIGN ESSENTIAL HTN: Chronic | ICD-10-CM

## 2021-08-30 DIAGNOSIS — K56.609 SMALL BOWEL OBSTRUCTION (HCC): Primary | ICD-10-CM

## 2021-08-30 DIAGNOSIS — K56.699 OTHER INTESTINAL OBSTRUCTION UNSPECIFIED AS TO PARTIAL VERSUS COMPLETE OBSTRUCTION (HCC): ICD-10-CM

## 2021-08-30 DIAGNOSIS — J44.9 CHRONIC OBSTRUCTIVE PULMONARY DISEASE, UNSPECIFIED COPD TYPE (HCC): Chronic | ICD-10-CM

## 2021-08-30 DIAGNOSIS — E11.9 DIABETES MELLITUS TYPE 2 IN NONOBESE (HCC): Chronic | ICD-10-CM

## 2021-08-30 DIAGNOSIS — F33.1 MAJOR DEPRESSIVE DISORDER, RECURRENT EPISODE, MODERATE (HCC): ICD-10-CM

## 2021-08-30 DIAGNOSIS — N18.30 STAGE 3 CHRONIC KIDNEY DISEASE, UNSPECIFIED WHETHER STAGE 3A OR 3B CKD (HCC): Chronic | ICD-10-CM

## 2021-08-30 DIAGNOSIS — K86.1 CHRONIC PANCREATITIS, UNSPECIFIED PANCREATITIS TYPE (HCC): Chronic | ICD-10-CM

## 2021-08-30 DIAGNOSIS — C61 PROSTATE CANCER (HCC): Chronic | ICD-10-CM

## 2021-08-30 DIAGNOSIS — F11.20 CHRONIC NARCOTIC DEPENDENCE (HCC): Chronic | ICD-10-CM

## 2021-08-30 LAB
ALBUMIN SERPL-MCNC: 2 G/DL (ref 3.4–5)
ALBUMIN/GLOB SERPL: 0.4 {RATIO} (ref 1–2)
ALP LIVER SERPL-CCNC: 119 U/L
ALT SERPL-CCNC: 29 U/L
ANION GAP SERPL CALC-SCNC: 6 MMOL/L (ref 0–18)
AST SERPL-CCNC: 35 U/L (ref 15–37)
BASOPHILS # BLD AUTO: 0.07 X10(3) UL (ref 0–0.2)
BASOPHILS NFR BLD AUTO: 0.6 %
BILIRUB SERPL-MCNC: 0.4 MG/DL (ref 0.1–2)
BUN BLD-MCNC: 19 MG/DL (ref 7–18)
CALCIUM BLD-MCNC: 8.8 MG/DL (ref 8.5–10.1)
CHLORIDE SERPL-SCNC: 109 MMOL/L (ref 98–112)
CO2 SERPL-SCNC: 29 MMOL/L (ref 21–32)
CREAT BLD-MCNC: 0.87 MG/DL
EOSINOPHIL # BLD AUTO: 0.85 X10(3) UL (ref 0–0.7)
EOSINOPHIL NFR BLD AUTO: 7.6 %
ERYTHROCYTE [DISTWIDTH] IN BLOOD BY AUTOMATED COUNT: 17.7 %
GLOBULIN PLAS-MCNC: 5.1 G/DL (ref 2.8–4.4)
GLUCOSE BLD-MCNC: 63 MG/DL (ref 70–99)
HAV IGM SER QL: 2 MG/DL (ref 1.6–2.6)
HCT VFR BLD AUTO: 27.3 %
HGB BLD-MCNC: 8.6 G/DL
IMM GRANULOCYTES # BLD AUTO: 0.05 X10(3) UL (ref 0–1)
IMM GRANULOCYTES NFR BLD: 0.4 %
LYMPHOCYTES # BLD AUTO: 2.2 X10(3) UL (ref 1–4)
LYMPHOCYTES NFR BLD AUTO: 19.7 %
M PROTEIN MFR SERPL ELPH: 7.1 G/DL (ref 6.4–8.2)
MCH RBC QN AUTO: 28.9 PG (ref 26–34)
MCHC RBC AUTO-ENTMCNC: 31.5 G/DL (ref 31–37)
MCV RBC AUTO: 91.6 FL
MONOCYTES # BLD AUTO: 1.12 X10(3) UL (ref 0.1–1)
MONOCYTES NFR BLD AUTO: 10 %
NEUTROPHILS # BLD AUTO: 6.89 X10 (3) UL (ref 1.5–7.7)
NEUTROPHILS # BLD AUTO: 6.89 X10(3) UL (ref 1.5–7.7)
NEUTROPHILS NFR BLD AUTO: 61.7 %
OSMOLALITY SERPL CALC.SUM OF ELEC: 298 MOSM/KG (ref 275–295)
PHOSPHATE SERPL-MCNC: 3.4 MG/DL (ref 2.5–4.9)
PLATELET # BLD AUTO: 535 10(3)UL (ref 150–450)
POTASSIUM SERPL-SCNC: 4.3 MMOL/L (ref 3.5–5.1)
RBC # BLD AUTO: 2.98 X10(6)UL
SODIUM SERPL-SCNC: 144 MMOL/L (ref 136–145)
WBC # BLD AUTO: 11.2 X10(3) UL (ref 4–11)

## 2021-08-30 PROCEDURE — 85025 COMPLETE CBC W/AUTO DIFF WBC: CPT | Performed by: COLON & RECTAL SURGERY

## 2021-08-30 PROCEDURE — 99024 POSTOP FOLLOW-UP VISIT: CPT | Performed by: COLON & RECTAL SURGERY

## 2021-08-30 PROCEDURE — 3008F BODY MASS INDEX DOCD: CPT | Performed by: COLON & RECTAL SURGERY

## 2021-08-30 PROCEDURE — 80053 COMPREHEN METABOLIC PANEL: CPT | Performed by: COLON & RECTAL SURGERY

## 2021-08-30 PROCEDURE — 3074F SYST BP LT 130 MM HG: CPT | Performed by: COLON & RECTAL SURGERY

## 2021-08-30 PROCEDURE — 83735 ASSAY OF MAGNESIUM: CPT | Performed by: COLON & RECTAL SURGERY

## 2021-08-30 PROCEDURE — 3078F DIAST BP <80 MM HG: CPT | Performed by: COLON & RECTAL SURGERY

## 2021-08-30 PROCEDURE — 84100 ASSAY OF PHOSPHORUS: CPT | Performed by: COLON & RECTAL SURGERY

## 2021-08-30 NOTE — PROGRESS NOTES
Office Visit Note       Active Problems      1. Small bowel obstruction ([facility] [address]    2. Follow-up examination    3. Chronic pancreat[facility], [facility]    4. Diabetes mellitus type 2 in nonobese (HCC)    5. Prostate cancer ([facility]    6.  Yajaira Meyers now 8.6. Platelets trending down slowly back to normal, now 535. Transaminases are within normal limits. Albumin is improving and is now up to 2. Allergies  Alicja Solid is allergic to ace inhibitors.     Past Medical / Surgical / Social / Family Histo 07/30/2019    PNBx 07/30/19   • OTHER SURGICAL HISTORY  05/01/2020    cysto, Lt URS, Lt RPG, stone manipulation with laser litho, insertion of Lt stent   • OTHER SURGICAL HISTORY  05/07/2020    string stent removal   • REMOVAL GALLBLADDER     • SPLENECTOM Inhalation Aerosol, Inhale 2 puffs into the lungs 2 (two) times daily. Rinse and spit after using  ipratropium-albuterol 0.5-2.5 (3) MG/3ML Inhalation Solution, Take 3 mL by nebulization every 4 (four) hours as needed.     No current facility-administered m obstruction has resolved. In the meantime it is okay for the patient to have a serving of p.o. once daily. He should discontinue this if he becomes bloated, has abdominal pain, or experiences nausea or vomiting.     Patient should follow-up after the CT

## 2021-08-31 ENCOUNTER — TELEPHONE (OUTPATIENT)
Dept: SURGERY | Facility: CLINIC | Age: 71
End: 2021-08-31

## 2021-09-05 PROCEDURE — 85025 COMPLETE CBC W/AUTO DIFF WBC: CPT | Performed by: COLON & RECTAL SURGERY

## 2021-09-07 ENCOUNTER — LAB REQUISITION (OUTPATIENT)
Dept: LAB | Facility: HOSPITAL | Age: 71
End: 2021-09-07
Payer: MEDICARE

## 2021-09-07 ENCOUNTER — MED REC SCAN ONLY (OUTPATIENT)
Dept: SURGERY | Facility: CLINIC | Age: 71
End: 2021-09-07

## 2021-09-07 ENCOUNTER — TELEPHONE (OUTPATIENT)
Dept: INTERNAL MEDICINE CLINIC | Facility: CLINIC | Age: 71
End: 2021-09-07

## 2021-09-07 DIAGNOSIS — K56.690 OTHER PARTIAL INTESTINAL OBSTRUCTION (HCC): ICD-10-CM

## 2021-09-07 LAB
ALBUMIN SERPL-MCNC: 2 G/DL (ref 3.4–5)
ALBUMIN/GLOB SERPL: 0.4 {RATIO} (ref 1–2)
ALP LIVER SERPL-CCNC: 196 U/L
ALT SERPL-CCNC: 88 U/L
ANION GAP SERPL CALC-SCNC: 5 MMOL/L (ref 0–18)
AST SERPL-CCNC: 97 U/L (ref 15–37)
BASOPHILS # BLD AUTO: 0.06 X10(3) UL (ref 0–0.2)
BASOPHILS NFR BLD AUTO: 0.7 %
BILIRUB SERPL-MCNC: 0.6 MG/DL (ref 0.1–2)
BUN BLD-MCNC: 18 MG/DL (ref 7–18)
CALCIUM BLD-MCNC: 8.4 MG/DL (ref 8.5–10.1)
CHLORIDE SERPL-SCNC: 108 MMOL/L (ref 98–112)
CO2 SERPL-SCNC: 30 MMOL/L (ref 21–32)
CREAT BLD-MCNC: 0.97 MG/DL
EOSINOPHIL # BLD AUTO: 0.58 X10(3) UL (ref 0–0.7)
EOSINOPHIL NFR BLD AUTO: 6.8 %
ERYTHROCYTE [DISTWIDTH] IN BLOOD BY AUTOMATED COUNT: 17.2 %
GLOBULIN PLAS-MCNC: 5 G/DL (ref 2.8–4.4)
GLUCOSE BLD-MCNC: 60 MG/DL (ref 70–99)
HAV IGM SER QL: 1.8 MG/DL (ref 1.6–2.6)
HCT VFR BLD AUTO: 25.3 %
HGB BLD-MCNC: 8.3 G/DL
IMM GRANULOCYTES # BLD AUTO: 0.03 X10(3) UL (ref 0–1)
IMM GRANULOCYTES NFR BLD: 0.4 %
LYMPHOCYTES # BLD AUTO: 2.58 X10(3) UL (ref 1–4)
LYMPHOCYTES NFR BLD AUTO: 30.1 %
M PROTEIN MFR SERPL ELPH: 7 G/DL (ref 6.4–8.2)
MCH RBC QN AUTO: 29.2 PG (ref 26–34)
MCHC RBC AUTO-ENTMCNC: 32.8 G/DL (ref 31–37)
MCV RBC AUTO: 89.1 FL
MONOCYTES # BLD AUTO: 1.06 X10(3) UL (ref 0.1–1)
MONOCYTES NFR BLD AUTO: 12.4 %
NEUTROPHILS # BLD AUTO: 4.26 X10 (3) UL (ref 1.5–7.7)
NEUTROPHILS # BLD AUTO: 4.26 X10(3) UL (ref 1.5–7.7)
NEUTROPHILS NFR BLD AUTO: 49.6 %
OSMOLALITY SERPL CALC.SUM OF ELEC: 296 MOSM/KG (ref 275–295)
PHOSPHATE SERPL-MCNC: 3.5 MG/DL (ref 2.5–4.9)
PLATELET # BLD AUTO: 380 10(3)UL (ref 150–450)
POTASSIUM SERPL-SCNC: 4 MMOL/L (ref 3.5–5.1)
RBC # BLD AUTO: 2.84 X10(6)UL
SODIUM SERPL-SCNC: 143 MMOL/L (ref 136–145)
TRIGL SERPL-MCNC: 37 MG/DL (ref 30–149)
WBC # BLD AUTO: 8.6 X10(3) UL (ref 4–11)

## 2021-09-07 PROCEDURE — 85025 COMPLETE CBC W/AUTO DIFF WBC: CPT | Performed by: COLON & RECTAL SURGERY

## 2021-09-07 PROCEDURE — 84100 ASSAY OF PHOSPHORUS: CPT | Performed by: COLON & RECTAL SURGERY

## 2021-09-07 PROCEDURE — 83735 ASSAY OF MAGNESIUM: CPT | Performed by: COLON & RECTAL SURGERY

## 2021-09-07 PROCEDURE — 84478 ASSAY OF TRIGLYCERIDES: CPT | Performed by: COLON & RECTAL SURGERY

## 2021-09-07 PROCEDURE — 80053 COMPREHEN METABOLIC PANEL: CPT | Performed by: COLON & RECTAL SURGERY

## 2021-09-07 NOTE — TELEPHONE ENCOUNTER
Incoming detailed written order via fax from [facility]. Order placed in  in basket for completion.

## 2021-09-08 ENCOUNTER — TELEPHONE (OUTPATIENT)
Dept: SURGERY | Facility: CLINIC | Age: 71
End: 2021-09-08

## 2021-09-10 ENCOUNTER — HOSPITAL ENCOUNTER (OUTPATIENT)
Dept: CT IMAGING | Facility: HOSPITAL | Age: 71
Discharge: HOME | End: 2021-09-10
Attending: COLON & RECTAL SURGERY
Payer: MEDICARE

## 2021-09-10 DIAGNOSIS — K56.609 SMALL BOWEL OBSTRUCTION (HCC): ICD-10-CM

## 2021-09-10 PROCEDURE — 74177 CT ABD & PELVIS W/CONTRAST: CPT | Performed by: COLON & RECTAL SURGERY

## 2021-09-10 NOTE — TELEPHONE ENCOUNTER
Faxed signed detailed written order to [facility] [place] [facility]   Fax confirmation received   Sent to scan and copy placed in accordion

## 2021-09-13 ENCOUNTER — IMMUNIZATION (OUTPATIENT)
Dept: LAB | Facility: HOSPITAL | Age: 71
End: 2021-09-13
Attending: EMERGENCY MEDICINE
Payer: MEDICARE

## 2021-09-13 ENCOUNTER — LAB REQUISITION (OUTPATIENT)
Dept: LAB | Facility: HOSPITAL | Age: 71
End: 2021-09-13
Payer: MEDICARE

## 2021-09-13 DIAGNOSIS — K56.690 OTHER PARTIAL INTESTINAL OBSTRUCTION (HCC): ICD-10-CM

## 2021-09-13 DIAGNOSIS — Z23 NEED FOR VACCINATION: Primary | ICD-10-CM

## 2021-09-13 LAB
ALBUMIN SERPL-MCNC: 2.3 G/DL (ref 3.4–5)
ALBUMIN/GLOB SERPL: 0.5 {RATIO} (ref 1–2)
ALP LIVER SERPL-CCNC: 205 U/L
ALT SERPL-CCNC: 63 U/L
ANION GAP SERPL CALC-SCNC: 6 MMOL/L (ref 0–18)
AST SERPL-CCNC: 62 U/L (ref 15–37)
BASOPHILS # BLD AUTO: 0.03 X10(3) UL (ref 0–0.2)
BASOPHILS NFR BLD AUTO: 0.4 %
BILIRUB SERPL-MCNC: 0.5 MG/DL (ref 0.1–2)
BUN BLD-MCNC: 17 MG/DL (ref 7–18)
CALCIUM BLD-MCNC: 8.3 MG/DL (ref 8.5–10.1)
CHLORIDE SERPL-SCNC: 103 MMOL/L (ref 98–112)
CO2 SERPL-SCNC: 32 MMOL/L (ref 21–32)
CREAT BLD-MCNC: 0.95 MG/DL
EOSINOPHIL # BLD AUTO: 0.68 X10(3) UL (ref 0–0.7)
EOSINOPHIL NFR BLD AUTO: 8 %
ERYTHROCYTE [DISTWIDTH] IN BLOOD BY AUTOMATED COUNT: 17.9 %
GLOBULIN PLAS-MCNC: 4.6 G/DL (ref 2.8–4.4)
GLUCOSE BLD-MCNC: 61 MG/DL (ref 70–99)
HCT VFR BLD AUTO: 27 %
HGB BLD-MCNC: 9.2 G/DL
IMM GRANULOCYTES # BLD AUTO: 0.02 X10(3) UL (ref 0–1)
IMM GRANULOCYTES NFR BLD: 0.2 %
LYMPHOCYTES # BLD AUTO: 1.83 X10(3) UL (ref 1–4)
LYMPHOCYTES NFR BLD AUTO: 21.7 %
M PROTEIN MFR SERPL ELPH: 6.9 G/DL (ref 6.4–8.2)
MAGNESIUM SERPL-MCNC: 1.9 MG/DL (ref 1.6–2.6)
MCH RBC QN AUTO: 30.4 PG (ref 26–34)
MCHC RBC AUTO-ENTMCNC: 34.1 G/DL (ref 31–37)
MCV RBC AUTO: 89.1 FL
MONOCYTES # BLD AUTO: 1.22 X10(3) UL (ref 0.1–1)
MONOCYTES NFR BLD AUTO: 14.4 %
NEUTROPHILS # BLD AUTO: 4.67 X10 (3) UL (ref 1.5–7.7)
NEUTROPHILS # BLD AUTO: 4.67 X10(3) UL (ref 1.5–7.7)
NEUTROPHILS NFR BLD AUTO: 55.3 %
OSMOLALITY SERPL CALC.SUM OF ELEC: 291 MOSM/KG (ref 275–295)
PHOSPHATE SERPL-MCNC: 3.9 MG/DL (ref 2.5–4.9)
PLATELET # BLD AUTO: 286 10(3)UL (ref 150–450)
POTASSIUM SERPL-SCNC: 3 MMOL/L (ref 3.5–5.1)
RBC # BLD AUTO: 3.03 X10(6)UL
SODIUM SERPL-SCNC: 141 MMOL/L (ref 136–145)
WBC # BLD AUTO: 8.5 X10(3) UL (ref 4–11)

## 2021-09-13 PROCEDURE — 85025 COMPLETE CBC W/AUTO DIFF WBC: CPT | Performed by: COLON & RECTAL SURGERY

## 2021-09-13 PROCEDURE — 83735 ASSAY OF MAGNESIUM: CPT | Performed by: COLON & RECTAL SURGERY

## 2021-09-13 PROCEDURE — 84100 ASSAY OF PHOSPHORUS: CPT | Performed by: COLON & RECTAL SURGERY

## 2021-09-13 PROCEDURE — 0003A SARSCOV2 VAC 30MCG/0.3ML IM: CPT

## 2021-09-13 PROCEDURE — 80053 COMPREHEN METABOLIC PANEL: CPT | Performed by: COLON & RECTAL SURGERY

## 2021-09-14 ENCOUNTER — TELEPHONE (OUTPATIENT)
Dept: INTERNAL MEDICINE CLINIC | Facility: CLINIC | Age: 71
End: 2021-09-14

## 2021-09-14 DIAGNOSIS — E11.9 DIABETES MELLITUS TYPE 2 IN NONOBESE (HCC): Primary | ICD-10-CM

## 2021-09-14 NOTE — TELEPHONE ENCOUNTER
As far as I know I got no paperwork pending in my inbox. As far as Dexcom I have no problems him getting it I am not sure whether he qualifies.   As far as I know anyone on insulin is eligible to get it but I do not think his med sheet is updated it does n

## 2021-09-14 NOTE — TELEPHONE ENCOUNTER
Spoke with Mindy//patient's spouse. She states [facility] sent additional paperwork that still needs to be completed and faxed.      Do we know if any additional paperwork has been received aside from forms originally sent 9/10 (see TE 9/7)    Gavino Pratt

## 2021-09-15 ENCOUNTER — OFFICE VISIT (OUTPATIENT)
Dept: SURGERY | Facility: CLINIC | Age: 71
End: 2021-09-15

## 2021-09-15 VITALS
BODY MASS INDEX: 17.92 KG/M2 | WEIGHT: 128 LBS | HEIGHT: 71 IN | DIASTOLIC BLOOD PRESSURE: 82 MMHG | SYSTOLIC BLOOD PRESSURE: 176 MMHG | TEMPERATURE: 97 F | HEART RATE: 63 BPM

## 2021-09-15 DIAGNOSIS — Z09 FOLLOW-UP EXAMINATION: Primary | ICD-10-CM

## 2021-09-15 PROCEDURE — 3008F BODY MASS INDEX DOCD: CPT | Performed by: COLON & RECTAL SURGERY

## 2021-09-15 PROCEDURE — 3077F SYST BP >= 140 MM HG: CPT | Performed by: COLON & RECTAL SURGERY

## 2021-09-15 PROCEDURE — 99024 POSTOP FOLLOW-UP VISIT: CPT | Performed by: COLON & RECTAL SURGERY

## 2021-09-15 PROCEDURE — 3079F DIAST BP 80-89 MM HG: CPT | Performed by: COLON & RECTAL SURGERY

## 2021-09-15 NOTE — PROGRESS NOTES
Office Visit Note       Active Problems      1.  Follow-up examination          Chief Complaint   Patient presents with:  Results        History of Present Illness  Nova Braun is a 69year old male who presents today for ongoing evaluation and treatme • Type II or unspecified type diabetes mellitus without mention of complication, uncontrolled    • Unspecified vitamin D deficiency    • Visual impairment     GLASSES   • Weight loss    • Weight loss 5/23/2019     Past Surgical History:   Procedure Later Oral Tab EC, Take 1 tablet (40 mg total) by mouth every morning before breakfast.  Tiotropium Bromide Monohydrate 18 MCG CAPS, albuterol 90 MCG/ACT AERS, 1 capsule See Admin Instructions.  Via HandiHaler once daily at the same time everyday   Albuterol Sulf

## 2021-09-15 NOTE — TELEPHONE ENCOUNTER
Spoke with Brynn JEAN, she stated she will be faxing over additional form to be reviewed by Dr. Cassaundra Eisenmenger at this time. Left message for patient's spouse/Mindy to call back to provide message below as well.

## 2021-09-15 NOTE — TELEPHONE ENCOUNTER
Spoke with patient's spouse/Laura (one-time consent received from patient)    Fernando Carranza stated she administers 20 units of Humalin R in patient's TPN daily, which is adjusted weekly.  Also stated this was initiated by hospitalist during in-patient sta

## 2021-09-16 NOTE — TELEPHONE ENCOUNTER
Spoke with patient's spouse,    She stated TPN (short term) was Initiated by [facility] through hospitalist at  [address] [place] [address] Drive currently managing insulin while patient is on TPN.      Informed patient's spouse/Laura [facility]  [facility] 
MR#:  [id number] :  1949 ACCOUNT #:  [de-identified] DATE OF SERVICE:  11/15/2019 LOCATION:  The patient is in cardiac surgery ICU bed 36. ATTENDING:  Cristobal Zepeda MD 
 
CHIEF COMPLAINT:  Unobtainable as the patient is unresponsive to questions and commands. REASON FOR CONSULTATION:  Elevated procalcitonin level-possible sepsis. The consultation was requested by Angelic Waldrop NP. The history is obtained by discussion with the patient's nurse and review of the medical records. The patient can provide no additional history. HISTORY OF PRESENT ILLNESS:  This patient is a 26-year-old male with a history of NIDDM, hypertension, hyperlipidemia, and ischemic heart disease. He was admitted to hospital with NSTEMI. The patient subsequently underwent CABG on 10/22/2019. He has remained in the intensive care unit on a ventilator since then. He recently had a trach this week. The patient is currently unresponsive to questions and commands. The patient is not currently on antibiotics. He has had hypotension, thrombocytopenia, deterioration of his renal function, and evidence of shock liver. A procalcitonin level was sent and is elevated. There is concern for evolving sepsis and we are asked to see the patient for further evaluation. PAST MEDICAL HISTORY:  Tobacco, unknown. Alcohol, unknown. MEDICATIONS PRIOR TO ADMISSION: 
1. Plavix 75 mg daily. 2.  Lisinopril 40 mg daily. 3.  Pravastatin 40 mg p.o. daily. 4.  Zoloft 100 mg p.o. daily. ALLERGIES:  NO KNOWN MEDICATION ALLERGIES. ILLNESSES: 
1. Organic heart disease-ischemic heart disease. 2.  NIDDM. 3.  Hypertension. 4.  Hyperlipidemia. SURGERIES: 
1. CABG on 10/22/2019 as in the HPI. 2.  History of lumbar laminectomy at L5-S1. SOCIAL HISTORY:  Otherwise unobtainable. FAMILY HISTORY:  Otherwise unobtainable. REVIEW OF SYSTEMS:  Unobtainable. PHYSICAL EXAMINATION: 
GENERAL:  Chronically ill-appearing male, in no acute distress. VITAL SIGNS:  Blood pressure 135/57, heart rate 95 and regular, respiratory rate 28. He is currently afebrile with maximum temperature today 100.3 degrees Fahrenheit. HEENT:  Sclerae and conjunctivae benign, PERRL, trach site is benign. NECK:  Supple. NODES:  No adenopathy. SKIN:  No rashes. LUNGS:  Basilar rales. CARDIOVASCULAR:  Regular rate and rhythm without murmurs. CHEST WALL:  Sternal incision is healing nicely. Sternum is stable to palpation. ABDOMEN:  Soft, nondistended, nontender, no masses are felt, bowel sounds are present. GENITALIA:  Normal circumcised male with indwelling Devlin catheter. EXTREMITIES:  Warm, no cellulitis. MUSCULOSKELETAL:  Muscles seem nontender and joints benign. NEUROLOGIC:  Unresponsive to questions and commands. He does occasionally move all four extremities spontaneously. LABORATORY DATA:  CBC reveals hemoglobin of 6.7, white count 9200, and platelet count 41,272. Chemistry data reveals sodium 152, , creatinine 2.07, bilirubin 0.4, SGOT 629, SGPT 454, and alkaline phosphatase 94. Procalcitonin level is elevated to 2.9. MICROBIOLOGY DATA:  No cultures sent in the past week. RADIOLOGY DATA:  Chest x-ray reveals interstitial and parenchymal edema. IMPRESSION: 
1. Elevated procalcitonin level-the significance of this is uncertain in this setting (three weeks of critical care illness in the ICU): No infection apparent thus far; however, there are clinical signs which suggest evolving sepsis, and I think we should treat him empirically with antibiotics. 2.  Organic heart disease-ischemic heart disease and congestive heart failure-status post coronary artery bypass graft on 10/22/2019. 
3.  Chronic kidney disease with acute exacerbation. 4.  Anemia. 5.  Thrombocytopenia. 6.  Acute rise in transaminases-possible shock liver. 7.  Non-insulin-dependent diabetes mellitus. 8.  Hypertension. 9.  Hyperlipidemia. PLAN: 
1. Blood cultures. 2.  Consider CT of the chest, abdomen, and pelvis without IV contrast. 
3.  Empiric Zosyn pending cultures. Thank you much for allowing us see this patient with you. Kandis Barrett MD 
 
 
[id number] 
D:  11/16/2019 0:58 T:  11/16/2019 2:01 JOB #:  [id number] CC:  MD Bandar Garcia MD

## 2021-09-16 NOTE — TELEPHONE ENCOUNTER
Televisit is not going to help.   If the patient is requesting Dexcom -then  prior authorization needs to be done from [employer] whoever is responsible for it

## 2021-09-17 NOTE — TELEPHONE ENCOUNTER
Faxed signed detailed written order to [facility] care   Fax confirmation received   Sent to scan and copy placed in accordion

## 2021-09-17 NOTE — TELEPHONE ENCOUNTER
Diabetes education referral authorized and completed forms and faxed back to [address] [place]. Left message for patient and pt's spouse to call back to inform.

## 2021-09-20 ENCOUNTER — APPOINTMENT (OUTPATIENT)
Dept: GENERAL RADIOLOGY | Facility: HOSPITAL | Age: 71
End: 2021-09-20
Attending: STUDENT IN AN ORGANIZED HEALTH CARE EDUCATION/TRAINING PROGRAM
Payer: MEDICARE

## 2021-09-20 ENCOUNTER — HOSPITAL ENCOUNTER (EMERGENCY)
Facility: HOSPITAL | Age: 71
Discharge: HOME | End: 2021-09-20
Attending: STUDENT IN AN ORGANIZED HEALTH CARE EDUCATION/TRAINING PROGRAM
Payer: MEDICARE

## 2021-09-20 VITALS
OXYGEN SATURATION: 95 % | HEART RATE: 97 BPM | RESPIRATION RATE: 16 BRPM | WEIGHT: 139 LBS | HEIGHT: 71 IN | SYSTOLIC BLOOD PRESSURE: 121 MMHG | BODY MASS INDEX: 19.46 KG/M2 | DIASTOLIC BLOOD PRESSURE: 77 MMHG | TEMPERATURE: 98 F

## 2021-09-20 DIAGNOSIS — M54.9 BACK PAIN WITHOUT RADIATION: ICD-10-CM

## 2021-09-20 DIAGNOSIS — R06.00 DYSPNEA, UNSPECIFIED TYPE: ICD-10-CM

## 2021-09-20 DIAGNOSIS — J44.1 ACUTE EXACERBATION OF CHRONIC OBSTRUCTIVE PULMONARY DISEASE (COPD) (HCC): Primary | ICD-10-CM

## 2021-09-20 LAB
ALBUMIN SERPL-MCNC: 2.6 G/DL (ref 3.4–5)
ALBUMIN/GLOB SERPL: 0.5 {RATIO} (ref 1–2)
ALP LIVER SERPL-CCNC: 381 U/L
ALT SERPL-CCNC: 74 U/L
ANION GAP SERPL CALC-SCNC: 5 MMOL/L (ref 0–18)
AST SERPL-CCNC: 81 U/L (ref 15–37)
BASOPHILS # BLD AUTO: 0.05 X10(3) UL (ref 0–0.2)
BASOPHILS NFR BLD AUTO: 0.5 %
BILIRUB SERPL-MCNC: 0.5 MG/DL (ref 0.1–2)
BUN BLD-MCNC: 13 MG/DL (ref 7–18)
CALCIUM BLD-MCNC: 8.7 MG/DL (ref 8.5–10.1)
CHLORIDE SERPL-SCNC: 107 MMOL/L (ref 98–112)
CO2 SERPL-SCNC: 27 MMOL/L (ref 21–32)
CREAT BLD-MCNC: 0.89 MG/DL
EOSINOPHIL # BLD AUTO: 0.75 X10(3) UL (ref 0–0.7)
EOSINOPHIL NFR BLD AUTO: 6.9 %
ERYTHROCYTE [DISTWIDTH] IN BLOOD BY AUTOMATED COUNT: 17.2 %
GLOBULIN PLAS-MCNC: 5.5 G/DL (ref 2.8–4.4)
GLUCOSE BLD-MCNC: 108 MG/DL (ref 70–99)
HCT VFR BLD AUTO: 31 %
HGB BLD-MCNC: 10.6 G/DL
IMM GRANULOCYTES # BLD AUTO: 0.02 X10(3) UL (ref 0–1)
IMM GRANULOCYTES NFR BLD: 0.2 %
LYMPHOCYTES # BLD AUTO: 3.77 X10(3) UL (ref 1–4)
LYMPHOCYTES NFR BLD AUTO: 34.6 %
MCH RBC QN AUTO: 29.4 PG (ref 26–34)
MCHC RBC AUTO-ENTMCNC: 34.2 G/DL (ref 31–37)
MCV RBC AUTO: 86.1 FL
MONOCYTES # BLD AUTO: 1.2 X10(3) UL (ref 0.1–1)
MONOCYTES NFR BLD AUTO: 11 %
NEUTROPHILS # BLD AUTO: 5.1 X10 (3) UL (ref 1.5–7.7)
NEUTROPHILS # BLD AUTO: 5.1 X10(3) UL (ref 1.5–7.7)
NEUTROPHILS NFR BLD AUTO: 46.8 %
NT-PROBNP SERPL-MCNC: 958 PG/ML (ref ?–125)
OSMOLALITY SERPL CALC.SUM OF ELEC: 289 MOSM/KG (ref 275–295)
PLATELET # BLD AUTO: 434 10(3)UL (ref 150–450)
POTASSIUM SERPL-SCNC: 3.5 MMOL/L (ref 3.5–5.1)
PROT SERPL-MCNC: 8.1 G/DL (ref 6.4–8.2)
RBC # BLD AUTO: 3.6 X10(6)UL
SARS-COV-2 RNA RESP QL NAA+PROBE: NOT DETECTED
SODIUM SERPL-SCNC: 139 MMOL/L (ref 136–145)
TROPONIN I SERPL-MCNC: <0.045 NG/ML (ref ?–0.04)
WBC # BLD AUTO: 10.9 X10(3) UL (ref 4–11)

## 2021-09-20 PROCEDURE — 83880 ASSAY OF NATRIURETIC PEPTIDE: CPT | Performed by: STUDENT IN AN ORGANIZED HEALTH CARE EDUCATION/TRAINING PROGRAM

## 2021-09-20 PROCEDURE — 93005 ELECTROCARDIOGRAM TRACING: CPT

## 2021-09-20 PROCEDURE — 96376 TX/PRO/DX INJ SAME DRUG ADON: CPT

## 2021-09-20 PROCEDURE — 85025 COMPLETE CBC W/AUTO DIFF WBC: CPT | Performed by: STUDENT IN AN ORGANIZED HEALTH CARE EDUCATION/TRAINING PROGRAM

## 2021-09-20 PROCEDURE — 93010 ELECTROCARDIOGRAM REPORT: CPT

## 2021-09-20 PROCEDURE — 84484 ASSAY OF TROPONIN QUANT: CPT | Performed by: STUDENT IN AN ORGANIZED HEALTH CARE EDUCATION/TRAINING PROGRAM

## 2021-09-20 PROCEDURE — 96374 THER/PROPH/DIAG INJ IV PUSH: CPT

## 2021-09-20 PROCEDURE — 94640 AIRWAY INHALATION TREATMENT: CPT

## 2021-09-20 PROCEDURE — 99285 EMERGENCY DEPT VISIT HI MDM: CPT

## 2021-09-20 PROCEDURE — 94002 VENT MGMT INPAT INIT DAY: CPT

## 2021-09-20 PROCEDURE — 80053 COMPREHEN METABOLIC PANEL: CPT | Performed by: STUDENT IN AN ORGANIZED HEALTH CARE EDUCATION/TRAINING PROGRAM

## 2021-09-20 PROCEDURE — 71045 X-RAY EXAM CHEST 1 VIEW: CPT | Performed by: STUDENT IN AN ORGANIZED HEALTH CARE EDUCATION/TRAINING PROGRAM

## 2021-09-20 PROCEDURE — 96375 TX/PRO/DX INJ NEW DRUG ADDON: CPT

## 2021-09-20 RX ORDER — PREDNISONE 20 MG/1
60 TABLET ORAL DAILY
Qty: 15 TABLET | Refills: 0 | Status: SHIPPED | OUTPATIENT
Start: 2021-09-20 | End: 2021-09-20

## 2021-09-20 RX ORDER — PREDNISONE 20 MG/1
60 TABLET ORAL DAILY
Qty: 15 TABLET | Refills: 0 | Status: SHIPPED | OUTPATIENT
Start: 2021-09-20 | End: 2021-09-25

## 2021-09-20 RX ORDER — HYDROMORPHONE HYDROCHLORIDE 1 MG/ML
1 INJECTION, SOLUTION INTRAMUSCULAR; INTRAVENOUS; SUBCUTANEOUS ONCE
Status: COMPLETED | OUTPATIENT
Start: 2021-09-20 | End: 2021-09-20

## 2021-09-20 RX ORDER — ALBUTEROL SULFATE 2.5 MG/3ML
2.5 SOLUTION RESPIRATORY (INHALATION) EVERY 4 HOURS PRN
Qty: 30 EACH | Refills: 0 | Status: SHIPPED | OUTPATIENT
Start: 2021-09-20 | End: 2021-09-20

## 2021-09-20 RX ORDER — ALBUTEROL SULFATE 2.5 MG/3ML
2.5 SOLUTION RESPIRATORY (INHALATION) EVERY 4 HOURS PRN
Qty: 30 EACH | Refills: 0 | Status: SHIPPED | OUTPATIENT
Start: 2021-09-20 | End: 2021-10-20

## 2021-09-20 RX ORDER — METHYLPREDNISOLONE SODIUM SUCCINATE 125 MG/2ML
125 INJECTION, POWDER, LYOPHILIZED, FOR SOLUTION INTRAMUSCULAR; INTRAVENOUS ONCE
Status: COMPLETED | OUTPATIENT
Start: 2021-09-20 | End: 2021-09-20

## 2021-09-20 RX ORDER — IPRATROPIUM BROMIDE AND ALBUTEROL SULFATE 2.5; .5 MG/3ML; MG/3ML
3 SOLUTION RESPIRATORY (INHALATION)
Status: DISCONTINUED | OUTPATIENT
Start: 2021-09-20 | End: 2021-09-20

## 2021-09-20 NOTE — ED QUICK NOTES
Pt's TPN via home pump complerte and d/c'd by wife. Line flushed with 10ml NS as directed by wife upon completion. Pt appears comfortable on cart, rates pain as 6/10, but states chronic pain normally runs 3/10.

## 2021-09-20 NOTE — ED QUICK NOTES
Pt awake and alert, skin w/d,resps reg/unlabored. Pt reports he has chronic pain related to his pancreas and states he takes dilaudid every 4 hours.  Pt reports he has a 100 mcg fentanyl patch on right now but is normally on 150 mcg patch (pharmacy did not

## 2021-09-20 NOTE — ED QUICK NOTES
Pt's pharmacy updated to [facility]. attempt made to call mail order pharmacy but put on hold and unable to speak to physical person. pt's wife made aware she may need to call that pharmacy to ensure it is not filled.  Wife verbalized understanding and w

## 2021-09-20 NOTE — ED QUICK NOTES
Pt awake and alert, appears comfortable. Pt reports his pain is unchanged and requesting additional dose of pain medication.

## 2021-09-20 NOTE — ED PROVIDER NOTES
Patient Seen in: [facility] Emergency Department      History   Patient presents with:  Difficulty Breathing    Stated Complaint: SOB    Subjective:   HPI    Patient is a 28-year-old gentleman with history of COPD, diabetes, hypertension, small kameron deficiency    • Visual impairment     GLASSES   • Weight loss    • Weight loss 5/23/2019              Past Surgical History:   Procedure Laterality Date   • APPENDECTOMY     • APPENDECTOMY     • CHOLECYSTECTOMY     • OTHER SURGICAL HISTORY      pancreatic sounds are diminished at the bases, wheezing present in upper lung fields. Abdominal: Soft. Bowel sounds are hypoactive, no distension and no mass. Mild diffuse tenderness. There is no rebound and no guarding.    Musculoskeletal: Normal range of motion, wave abnormalities noted, no significant change from prior EKG. MDM      Extensive differential diagnosis was considered for the patient including pulmonary, infectious, cardiac, thromboembolic and other pathology.     On arrival patient h Soln  Take 3 mL (2.5 mg total) by nebulization every 4 (four) hours as needed for Wheezing or Shortness of Breath.   Qty: 30 each Refills: 0

## 2021-09-20 NOTE — ED QUICK NOTES
Pt states he is feeling much better after treatment. Pt's wife went to go get his clothing and will be back shortly.

## 2021-09-20 NOTE — ED INITIAL ASSESSMENT (HPI)
Patient here with c/o SOB over the last week that has gotten progressively worse tonight. Patient has history of COPD. Patient has pain in his back due to pancreatitis. PICC line in place, patient is getting TPN due to an SBO.   Patient lives at home wit

## 2021-09-21 ENCOUNTER — PATIENT OUTREACH (OUTPATIENT)
Dept: CASE MANAGEMENT | Age: 71
End: 2021-09-21

## 2021-09-21 NOTE — PROGRESS NOTES
Attempted contacting pt to intro [facility] services with no answer. Will continue contacting to discuss.

## 2021-09-22 LAB
ATRIAL RATE: 110 BPM
P AXIS: 70 DEGREES
P-R INTERVAL: 142 MS
Q-T INTERVAL: 366 MS
QRS DURATION: 84 MS
QTC CALCULATION (BEZET): 495 MS
R AXIS: 63 DEGREES
T AXIS: 77 DEGREES
VENTRICULAR RATE: 110 BPM

## 2021-09-27 ENCOUNTER — TELEPHONE (OUTPATIENT)
Dept: SURGERY | Facility: CLINIC | Age: 71
End: 2021-09-27

## 2021-09-27 NOTE — TELEPHONE ENCOUNTER
Camryn Franco from [facility] called and stated \"because PT. Is hospitalized at [facility], they will not be able to see the patient today\". I v/u and passed the message along to [facility].

## 2021-09-27 NOTE — TELEPHONE ENCOUNTER
I called back patient's wife per her request.    The patient had been admitted to [facility] with a severe COPD exacerbation, initially requiring intubation.     I was able to speak to his treating physician over the phone who indicates that he ha

## 2021-09-28 ENCOUNTER — TELEPHONE (OUTPATIENT)
Dept: INTERNAL MEDICINE CLINIC | Facility: CLINIC | Age: 71
End: 2021-09-28

## 2021-09-28 NOTE — TELEPHONE ENCOUNTER
Pt spouse called requesting to speak with a nurse regarding what has happened recently with her , pt is currently at the [facility]. Pt spouse wants to discuss possible cardiologists and treatment for pt     Please advise

## 2021-09-28 NOTE — TELEPHONE ENCOUNTER
Spoke with patient's wife/Laura    She states [facility] (Also see ER note from 9/20)    Pt's spouse stated patient will now be having an angiogram tomorrow.  She is requesting recommendation/

## 2021-10-01 ENCOUNTER — TELEPHONE (OUTPATIENT)
Dept: INTERNAL MEDICINE CLINIC | Facility: CLINIC | Age: 71
End: 2021-10-01

## 2021-10-04 ENCOUNTER — TELEPHONE (OUTPATIENT)
Dept: ENDOCRINOLOGY CLINIC | Facility: CLINIC | Age: 71
End: 2021-10-04

## 2021-10-05 ENCOUNTER — OFFICE VISIT (OUTPATIENT)
Dept: INTERNAL MEDICINE CLINIC | Facility: CLINIC | Age: 71
End: 2021-10-05
Payer: MEDICARE

## 2021-10-05 ENCOUNTER — TELEPHONE (OUTPATIENT)
Dept: INTERNAL MEDICINE CLINIC | Facility: CLINIC | Age: 71
End: 2021-10-05

## 2021-10-05 VITALS
SYSTOLIC BLOOD PRESSURE: 110 MMHG | DIASTOLIC BLOOD PRESSURE: 60 MMHG | HEIGHT: 71 IN | TEMPERATURE: 98 F | HEART RATE: 91 BPM | WEIGHT: 129.19 LBS | BODY MASS INDEX: 18.09 KG/M2 | OXYGEN SATURATION: 97 % | RESPIRATION RATE: 16 BRPM

## 2021-10-05 DIAGNOSIS — Z43.1 PEG (PERCUTANEOUS ENDOSCOPIC GASTROSTOMY) ADJUSTMENT/REPLACEMENT/REMOVAL (HCC): ICD-10-CM

## 2021-10-05 DIAGNOSIS — J44.9 CHRONIC OBSTRUCTIVE PULMONARY DISEASE, UNSPECIFIED COPD TYPE (HCC): ICD-10-CM

## 2021-10-05 DIAGNOSIS — E11.9 DIABETES MELLITUS TYPE 2 IN NONOBESE (HCC): Primary | ICD-10-CM

## 2021-10-05 PROCEDURE — 3074F SYST BP LT 130 MM HG: CPT | Performed by: INTERNAL MEDICINE

## 2021-10-05 PROCEDURE — 3008F BODY MASS INDEX DOCD: CPT | Performed by: INTERNAL MEDICINE

## 2021-10-05 PROCEDURE — 3078F DIAST BP <80 MM HG: CPT | Performed by: INTERNAL MEDICINE

## 2021-10-05 PROCEDURE — 99495 TRANSJ CARE MGMT MOD F2F 14D: CPT | Performed by: INTERNAL MEDICINE

## 2021-10-05 PROCEDURE — G0008 ADMIN INFLUENZA VIRUS VAC: HCPCS | Performed by: INTERNAL MEDICINE

## 2021-10-05 PROCEDURE — 90662 IIV NO PRSV INCREASED AG IM: CPT | Performed by: INTERNAL MEDICINE

## 2021-10-05 PROCEDURE — 1111F DSCHRG MED/CURRENT MED MERGE: CPT | Performed by: INTERNAL MEDICINE

## 2021-10-05 RX ORDER — PREDNISONE 10 MG/1
TABLET ORAL
Status: ON HOLD | COMMUNITY
Start: 2021-09-30 | End: 2021-10-14

## 2021-10-05 RX ORDER — TIOTROPIUM BROMIDE 18 UG/1
1 CAPSULE ORAL; RESPIRATORY (INHALATION) DAILY
COMMUNITY
Start: 2021-08-27 | End: 2021-11-26

## 2021-10-05 RX ORDER — HYDRALAZINE HYDROCHLORIDE 25 MG/1
12.5 TABLET, FILM COATED ORAL 3 TIMES DAILY
COMMUNITY
Start: 2021-09-30 | End: 2021-10-21

## 2021-10-05 RX ORDER — METOPROLOL SUCCINATE 25 MG/1
25 TABLET, EXTENDED RELEASE ORAL DAILY
COMMUNITY
Start: 2021-09-30 | End: 2021-10-21

## 2021-10-05 RX ORDER — SPIRONOLACTONE 25 MG/1
25 TABLET ORAL DAILY
COMMUNITY
Start: 2021-09-30 | End: 2021-10-21

## 2021-10-05 RX ORDER — INSULIN DETEMIR 100 [IU]/ML
35 INJECTION, SOLUTION SUBCUTANEOUS NIGHTLY
COMMUNITY
Start: 2021-09-30

## 2021-10-05 RX ORDER — FENTANYL 100 UG/H
1 PATCH TRANSDERMAL
COMMUNITY

## 2021-10-05 RX ORDER — TORSEMIDE 10 MG/1
10 TABLET ORAL DAILY
COMMUNITY
Start: 2021-09-30 | End: 2021-10-15

## 2021-10-05 RX ORDER — ONDANSETRON 4 MG/1
4 TABLET, FILM COATED ORAL EVERY 4 HOURS PRN
COMMUNITY
Start: 2021-08-16

## 2021-10-05 RX ORDER — ASPIRIN 325 MG
325 TABLET ORAL DAILY
COMMUNITY
Start: 2021-09-30

## 2021-10-05 RX ORDER — INSULIN LISPRO 100 [IU]/ML
INJECTION, SOLUTION INTRAVENOUS; SUBCUTANEOUS
COMMUNITY
End: 2021-12-21

## 2021-10-05 NOTE — PATIENT INSTRUCTIONS
Patient informed that I still do not have the discharge summary or his cath report. I will need to review all his records before he gets a final course of action from me.   In the meantime he has been advised to follow-up with Dr. Clair Greco at [facility]

## 2021-10-05 NOTE — PROGRESS NOTES
Venkatesh Acuña  1950 is a 66year old male. Patient presents with:  Hospital F/U      HPI:   Respiratory:   Coughing up blood no. Shortness of breath when lying flat no. fever no. Blood-tinged sputum no. Chest congestion noneChest pain none.  C Transdermal Patch 72 Hr Place 1 patch onto the skin every third day. • albuterol sulfate (2.5 MG/3ML) 0.083% Inhalation Nebu Soln Take 3 mL (2.5 mg total) by nebulization every 4 (four) hours as needed for Wheezing or Shortness of Breath.  30 each 0   • Essential hypertension, benign    • Frequent use of laxatives    • Hemorrhoids    • Knee pain 10/2/2013   • Long term current use of opiate analgesic 2/17/2015   • Long-term current use of opiate analgesic 6/11/2010   • Multiple risk factors for coronary a orders for this visit:    Diabetes mellitus type 2 in nonobese (HCC)    PEG (percutaneous endoscopic gastrostomy) adjustment/replacement/removal ([facility]    Chronic obstructive pulmonary disease, unspecified COPD type ([facility]    Other orders  -     FLU VACC HIG

## 2021-10-05 NOTE — TELEPHONE ENCOUNTER
Ret. Pt.'s call regarding his interest in purchasing aa Continuous glucose monitor system. I explained that he needs to be checking his blood sugars at least 4 times/day  &  injecting insulin at least 3 times per day to qualify for Medicare coverage. Pt. states that he has an appt. today w/his PCP. Gave pt.'s wife the 3 sentences that Dr. Beverly Person need to add in the chart note from his office visit today . Will also send as a staff message. Pt. & wife v/u & was agreeable w/plan.

## 2021-10-05 NOTE — TELEPHONE ENCOUNTER
Faxed signed ROR to [facility] to obtain discharge summary and cath report from hospital stay on 9/20/21-9/30/21   Fax confirmation received   Placed in accordion       Awaiting records

## 2021-10-07 ENCOUNTER — ORDER TRANSCRIPTION (OUTPATIENT)
Dept: SLEEP CENTER | Age: 71
End: 2021-10-07

## 2021-10-07 ENCOUNTER — OFFICE VISIT (OUTPATIENT)
Dept: SURGERY | Facility: CLINIC | Age: 71
End: 2021-10-07

## 2021-10-07 VITALS
SYSTOLIC BLOOD PRESSURE: 140 MMHG | BODY MASS INDEX: 18.06 KG/M2 | RESPIRATION RATE: 16 BRPM | HEIGHT: 71 IN | TEMPERATURE: 98 F | HEART RATE: 82 BPM | DIASTOLIC BLOOD PRESSURE: 70 MMHG | WEIGHT: 129 LBS

## 2021-10-07 DIAGNOSIS — Z09 FOLLOW-UP EXAMINATION: Primary | ICD-10-CM

## 2021-10-07 DIAGNOSIS — Z93.1 S/P PERCUTANEOUS ENDOSCOPIC GASTROSTOMY (PEG) TUBE PLACEMENT (HCC): ICD-10-CM

## 2021-10-07 DIAGNOSIS — J44.9 COPD (CHRONIC OBSTRUCTIVE PULMONARY DISEASE) (HCC): ICD-10-CM

## 2021-10-07 DIAGNOSIS — R09.02 HYPOXEMIA: Primary | ICD-10-CM

## 2021-10-07 PROCEDURE — 3077F SYST BP >= 140 MM HG: CPT | Performed by: COLON & RECTAL SURGERY

## 2021-10-07 PROCEDURE — 99024 POSTOP FOLLOW-UP VISIT: CPT | Performed by: COLON & RECTAL SURGERY

## 2021-10-07 PROCEDURE — 3008F BODY MASS INDEX DOCD: CPT | Performed by: COLON & RECTAL SURGERY

## 2021-10-07 PROCEDURE — 3078F DIAST BP <80 MM HG: CPT | Performed by: COLON & RECTAL SURGERY

## 2021-10-11 ENCOUNTER — TELEPHONE (OUTPATIENT)
Dept: INTERNAL MEDICINE CLINIC | Facility: CLINIC | Age: 71
End: 2021-10-11

## 2021-10-11 NOTE — TELEPHONE ENCOUNTER
Pt wife Yousuf Reederhector called and stated that Dr Najma Barnhart wants pt to complete labs and would like Dr Manpreet Queen to order them. Pt has appointment with Dr Najma Barnhart in 3 weeks.  Yousuf Reederhector states that she believes that Dr Najma Barnhart would want her  to complete CBC with angie with patient

## 2021-10-11 NOTE — TELEPHONE ENCOUNTER
Per LOV 10/5 \"Patient informed that I still do not have the discharge summary or his cath report.   I will need to review all his records before he gets a final course of action from me\"     Patient's spouse/Mindy calling for an update on patient's recor

## 2021-10-12 ENCOUNTER — TELEPHONE (OUTPATIENT)
Dept: INTERNAL MEDICINE CLINIC | Facility: CLINIC | Age: 71
End: 2021-10-12

## 2021-10-12 ENCOUNTER — ORDER TRANSCRIPTION (OUTPATIENT)
Dept: SLEEP CENTER | Age: 71
End: 2021-10-12

## 2021-10-12 DIAGNOSIS — I71.2 ASCENDING AORTIC ANEURYSM (HCC): ICD-10-CM

## 2021-10-12 DIAGNOSIS — Z11.59 SCREENING FOR VIRAL DISEASE: ICD-10-CM

## 2021-10-12 DIAGNOSIS — I25.10 CORONARY ARTERY DISEASE INVOLVING NATIVE CORONARY ARTERY OF NATIVE HEART WITHOUT ANGINA PECTORIS: ICD-10-CM

## 2021-10-12 DIAGNOSIS — I21.4 NON-ST ELEVATION MYOCARDIAL INFARCTION (NSTEMI) (HCC): Primary | ICD-10-CM

## 2021-10-12 DIAGNOSIS — Z01.818 PREOP EXAMINATION: Primary | ICD-10-CM

## 2021-10-12 PROBLEM — R93.1 DECREASED CARDIAC EJECTION FRACTION: Status: ACTIVE | Noted: 2021-10-12

## 2021-10-12 PROBLEM — S30.1XXA ABDOMINAL HEMATOMA: Status: RESOLVED | Noted: 2021-07-08 | Resolved: 2021-10-12

## 2021-10-12 PROBLEM — I71.21 ASCENDING AORTIC ANEURYSM (HCC): Status: ACTIVE | Noted: 2021-10-12

## 2021-10-12 PROBLEM — T50.901A ACCIDENTAL DRUG OVERDOSE: Status: RESOLVED | Noted: 2021-08-21 | Resolved: 2021-10-12

## 2021-10-12 PROBLEM — I71.21 ASCENDING AORTIC ANEURYSM: Status: ACTIVE | Noted: 2021-10-12

## 2021-10-12 PROBLEM — R41.82 ALTERED MENTAL STATUS: Status: RESOLVED | Noted: 2020-02-26 | Resolved: 2021-10-12

## 2021-10-12 NOTE — TELEPHONE ENCOUNTER
D/w wife  Records reviewed from [facility]  To see consults -see orders   And see me for f/u next week  P/L updated

## 2021-10-13 ENCOUNTER — HOSPITAL ENCOUNTER (INPATIENT)
Facility: HOSPITAL | Age: 71
LOS: 1 days | Discharge: HOME | DRG: 389 | End: 2021-10-15
Attending: EMERGENCY MEDICINE | Admitting: HOSPITALIST
Payer: MEDICARE

## 2021-10-13 ENCOUNTER — APPOINTMENT (OUTPATIENT)
Dept: CT IMAGING | Facility: HOSPITAL | Age: 71
DRG: 389 | End: 2021-10-13
Attending: EMERGENCY MEDICINE
Payer: MEDICARE

## 2021-10-13 DIAGNOSIS — K56.609 SMALL BOWEL OBSTRUCTION (HCC): Primary | ICD-10-CM

## 2021-10-13 PROBLEM — E87.1 HYPONATREMIA: Status: ACTIVE | Noted: 2021-10-13

## 2021-10-13 PROBLEM — R73.9 HYPERGLYCEMIA: Status: ACTIVE | Noted: 2021-10-13

## 2021-10-13 PROBLEM — N17.9 ACUTE RENAL FAILURE (ARF) (HCC): Status: ACTIVE | Noted: 2021-10-13

## 2021-10-13 PROCEDURE — 99223 1ST HOSP IP/OBS HIGH 75: CPT | Performed by: HOSPITALIST

## 2021-10-13 PROCEDURE — 74176 CT ABD & PELVIS W/O CONTRAST: CPT | Performed by: EMERGENCY MEDICINE

## 2021-10-13 RX ORDER — HYDROMORPHONE HYDROCHLORIDE 1 MG/ML
0.5 INJECTION, SOLUTION INTRAMUSCULAR; INTRAVENOUS; SUBCUTANEOUS EVERY 30 MIN PRN
Status: COMPLETED | OUTPATIENT
Start: 2021-10-13 | End: 2021-10-14

## 2021-10-13 RX ORDER — ONDANSETRON 2 MG/ML
4 INJECTION INTRAMUSCULAR; INTRAVENOUS ONCE
Status: COMPLETED | OUTPATIENT
Start: 2021-10-13 | End: 2021-10-13

## 2021-10-13 RX ORDER — FENTANYL 100 UG/H
1 PATCH TRANSDERMAL ONCE
Status: DISCONTINUED | OUTPATIENT
Start: 2021-10-13 | End: 2021-10-14 | Stop reason: SDUPTHER

## 2021-10-13 NOTE — ED INITIAL ASSESSMENT (HPI)
Patient c/o abd pain and distention started yesterday. Patient sts earlier in the year had a bowel obstruction which needed surgery then had a complication and now has a g-tube which wife said noticed pus around site.  Dr Pb Rees was surgeon and they contacte

## 2021-10-14 ENCOUNTER — APPOINTMENT (OUTPATIENT)
Dept: CV DIAGNOSTICS | Facility: HOSPITAL | Age: 71
DRG: 389 | End: 2021-10-14
Attending: INTERNAL MEDICINE
Payer: MEDICARE

## 2021-10-14 PROCEDURE — 99222 1ST HOSP IP/OBS MODERATE 55: CPT | Performed by: COLON & RECTAL SURGERY

## 2021-10-14 PROCEDURE — 93306 TTE W/DOPPLER COMPLETE: CPT | Performed by: INTERNAL MEDICINE

## 2021-10-14 PROCEDURE — 99233 SBSQ HOSP IP/OBS HIGH 50: CPT | Performed by: HOSPITALIST

## 2021-10-14 RX ORDER — ASPIRIN 81 MG/1
81 TABLET, CHEWABLE ORAL DAILY
Status: DISCONTINUED | OUTPATIENT
Start: 2021-10-14 | End: 2021-10-15

## 2021-10-14 RX ORDER — MORPHINE SULFATE 2 MG/ML
1 INJECTION, SOLUTION INTRAMUSCULAR; INTRAVENOUS EVERY 2 HOUR PRN
Status: DISCONTINUED | OUTPATIENT
Start: 2021-10-13 | End: 2021-10-15

## 2021-10-14 RX ORDER — HYDROMORPHONE HYDROCHLORIDE 1 MG/ML
0.8 INJECTION, SOLUTION INTRAMUSCULAR; INTRAVENOUS; SUBCUTANEOUS EVERY 2 HOUR PRN
Status: DISCONTINUED | OUTPATIENT
Start: 2021-10-14 | End: 2021-10-15

## 2021-10-14 RX ORDER — SODIUM PHOSPHATE, DIBASIC AND SODIUM PHOSPHATE, MONOBASIC 7; 19 G/133ML; G/133ML
1 ENEMA RECTAL ONCE AS NEEDED
Status: COMPLETED | OUTPATIENT
Start: 2021-10-14 | End: 2021-10-14

## 2021-10-14 RX ORDER — FENTANYL 100 UG/H
1 PATCH TRANSDERMAL
Status: DISCONTINUED | OUTPATIENT
Start: 2021-10-16 | End: 2021-10-15

## 2021-10-14 RX ORDER — METOCLOPRAMIDE HYDROCHLORIDE 5 MG/ML
5 INJECTION INTRAMUSCULAR; INTRAVENOUS EVERY 8 HOURS PRN
Status: DISCONTINUED | OUTPATIENT
Start: 2021-10-14 | End: 2021-10-15

## 2021-10-14 RX ORDER — HYDROMORPHONE HYDROCHLORIDE 1 MG/ML
0.2 INJECTION, SOLUTION INTRAMUSCULAR; INTRAVENOUS; SUBCUTANEOUS EVERY 2 HOUR PRN
Status: DISCONTINUED | OUTPATIENT
Start: 2021-10-14 | End: 2021-10-15

## 2021-10-14 RX ORDER — FENTANYL 25 UG/H
1 PATCH TRANSDERMAL
Status: DISCONTINUED | OUTPATIENT
Start: 2021-10-14 | End: 2021-10-15

## 2021-10-14 RX ORDER — DEXTROSE MONOHYDRATE 25 G/50ML
50 INJECTION, SOLUTION INTRAVENOUS
Status: DISCONTINUED | OUTPATIENT
Start: 2021-10-13 | End: 2021-10-15

## 2021-10-14 RX ORDER — FENTANYL 100 UG/H
1 PATCH TRANSDERMAL ONCE
Status: DISCONTINUED | OUTPATIENT
Start: 2021-10-16 | End: 2021-10-14

## 2021-10-14 RX ORDER — POLYETHYLENE GLYCOL 3350 17 G/17G
17 POWDER, FOR SOLUTION ORAL DAILY
Status: DISCONTINUED | OUTPATIENT
Start: 2021-10-14 | End: 2021-10-15

## 2021-10-14 RX ORDER — MORPHINE SULFATE 2 MG/ML
2 INJECTION, SOLUTION INTRAMUSCULAR; INTRAVENOUS EVERY 2 HOUR PRN
Status: DISCONTINUED | OUTPATIENT
Start: 2021-10-13 | End: 2021-10-15

## 2021-10-14 RX ORDER — ENOXAPARIN SODIUM 100 MG/ML
30 INJECTION SUBCUTANEOUS DAILY
Status: DISCONTINUED | OUTPATIENT
Start: 2021-10-14 | End: 2021-10-15

## 2021-10-14 RX ORDER — ONDANSETRON 2 MG/ML
4 INJECTION INTRAMUSCULAR; INTRAVENOUS EVERY 6 HOURS PRN
Status: DISCONTINUED | OUTPATIENT
Start: 2021-10-14 | End: 2021-10-15

## 2021-10-14 RX ORDER — POLYETHYLENE GLYCOL 3350 17 G/17G
17 POWDER, FOR SOLUTION ORAL DAILY
Status: ON HOLD | COMMUNITY
End: 2021-10-15

## 2021-10-14 RX ORDER — HYDROMORPHONE HYDROCHLORIDE 1 MG/ML
0.4 INJECTION, SOLUTION INTRAMUSCULAR; INTRAVENOUS; SUBCUTANEOUS EVERY 2 HOUR PRN
Status: DISCONTINUED | OUTPATIENT
Start: 2021-10-14 | End: 2021-10-15

## 2021-10-14 RX ORDER — ALBUTEROL SULFATE 2.5 MG/3ML
2.5 SOLUTION RESPIRATORY (INHALATION) EVERY 4 HOURS PRN
Status: DISCONTINUED | OUTPATIENT
Start: 2021-10-14 | End: 2021-10-15

## 2021-10-14 RX ORDER — SODIUM CHLORIDE 9 MG/ML
INJECTION, SOLUTION INTRAVENOUS CONTINUOUS
Status: DISCONTINUED | OUTPATIENT
Start: 2021-10-14 | End: 2021-10-15

## 2021-10-14 RX ORDER — ATORVASTATIN CALCIUM 10 MG/1
10 TABLET, FILM COATED ORAL NIGHTLY
COMMUNITY

## 2021-10-14 NOTE — CDS QUERY
Clarification - Potential Diagnosis Association  CLINICAL DOCUMENTATION CLARIFICATION FORM  Dear Doctor:   Clinical information in the patient's medical record (provided below) includes documentation of diagnoses with potential association.  For accurate IC

## 2021-10-14 NOTE — PLAN OF CARE
NSR on telemetry. VSS. No c/o cardiac symptoms. Cardiac records requested from [address] states his wife will be bringing them in today around noon. Echo ordered and being done in the room. On room air. SPO2 remaining >90%.   maintaine

## 2021-10-14 NOTE — H&P
[facility]  History and Physical     Staci Heck Patient Status:  Emergency    1950 MRN [id number]   Location [address] [facility] Attending Moises Worley MD   Hosp Day # 0 PCP Dayron Giron MD     Chief Complai term current use of opiate analgesic 2/17/2015   • Long-term current use of opiate analgesic 6/11/2010   • Multiple risk factors for coronary artery disease 3/28/2019   • Osteoarthritis    • Pain in joints    • Pneumonia due to organism    • Primary locali WHEEZING, Disp: 54 g, Rfl: 2  aspirin 325 MG Oral Tab, , Disp: , Rfl:   hydrALAZINE 25 MG Oral Tab, 12.5 mg 3 (three) times daily. , Disp: , Rfl:   LEVEMIR FLEXTOUCH 100 UNIT/ML Subcutaneous Solution Pen-injector, 35 Units nightly., Disp: , Rfl:   metoprolo daily at the same time everyday , Disp: , Rfl:   Budesonide-Formoterol Fumarate 160-4.5 MCG/ACT Inhalation Aerosol, Inhale 2 puffs into the lungs 2 (two) times daily.  Rinse and spit after using, Disp: 1 Inhaler, Rfl: 11  ipratropium-albuterol 0.5-2.5 (3) M Not Detected 08/17/2021    COVID19 Not Detected 08/02/2021       Pro-Calcitonin  No results for input(s): PCT in the last 168 hours. Cardiac  No results for input(s): TROP, PBNP in the last 168 hours.     Creatinine Kinase  No results for input(s): CK in

## 2021-10-14 NOTE — CONSULTS
[facility]  Report of Consultation    Do Jimenez Patient Status:  Inpatient    1950 MRN [id number]   [facility] 8NE-A Attending Shey Rivas MD   Hosp Day # 0 PCP Anuel Blevins MD     Requesting Physician:  Dr. Ama Fatima throughout the colon. The patient reports that since his admission that he was given an enema. He states that he had a moderate sized bowel movement following the enema.   He states that he had resolution of his abdominal pain and distention following t HISTORY  05/01/2020    cysto, Lt URS, Lt RPG, stone manipulation with laser litho, insertion of Lt stent   • OTHER SURGICAL HISTORY  05/07/2020    string stent removal   • REMOVAL GALLBLADDER     • SPLENECTOMY  1992     Family History   Problem Relation Ag nebulization every 4 (four) hours as needed for Wheezing or Shortness of Breath. fentaNYL 50 MCG/HR Transdermal Patch 72 Hr, Place 1 patch onto the skin every third day.   HYDROmorphone 8 MG Oral Tab, Take 1 tablet (8 mg total) by mouth 4 (four) times jenn Transdermal, Q72H  •  insulin detemir (LEVEMIR) 100 UNIT/ML flextouch 10 Units, 10 Units, Subcutaneous, Nightly  •  aspirin chewable tab 81 mg, 81 mg, Oral, Daily  •  HYDROmorphone HCl (DILAUDID) 1 MG/ML injection 0.2 mg, 0.2 mg, Intravenous, Q2H PRN **OR* tenderness. There is no rebound. Abdomen is soft, flat, nontender to palpation. Nondistended. G-tube noted without surrounding erythema or drainage noted. Neurological: He is alert and oriented to person, place, and time.    Skin: Skin is warm and dry CKD (chronic kidney disease) stage 3, GFR 30-59 ml/min (HCC)     Abnormality of thoracic aorta     SBO (small bowel obstruction) (HCC)     Hypoalbuminemia     PEG (percutaneous endoscopic gastrostomy) adjustment/replacement/removal (HCC)     Ascending ao There was also marked stool-filled enlargement of the colon. Patient was admitted to [facility].    The patient was given an enema and this stimulated a large bowel movement. After this the patient's symptoms significantly improved.     Over the cour

## 2021-10-14 NOTE — CONSULTS
[facility]  Consultation    Staci Heck Patient Status:  Inpatient    1950 MRN [id number]   [facility] 8NE-A Attending Esperanza Hardy MD   Hosp Day # 0 PCP Dayron Giron MD     Consults    Reason for Consultation:  Heart disease) [facility])    • Coronary atherosclerosis    • Diabetes ([facility]    • Diabetes mellitus ([facility]    • Elevated PSA, less than 10 ng/ml 03/08/2018   • Essential hypertension, benign    • Frequent use of laxatives    • Hemorrhoids    • Knee pain 10/2/2013   • L ANGIOEDEMA    Medications:    Current Facility-Administered Medications:   •  glucose (DEX4) oral liquid 15 g, 15 g, Oral, Q15 Min PRN **OR** glucose-vitamin C (DEX-4) chewable tab 4 tablet, 4 tablet, Oral, Q15 Min PRN **OR** dextrose 50 % injection 50 mL, Encounters:  10/14/21 : 116 lb 11.2 oz (52.9 kg)  10/07/21 : 129 lb (58.5 kg)  10/05/21 : 129 lb 3.2 oz (58.6 kg)      Physical Exam:  Objective:     General: Not in acute distress  HEENT: Normocephalic, atraumatic. Neck: Supple  CNS: A&Ox3. CVS: RRR.  No compensated  #Undefined need for lifevest  #JONO, creatinine of 1.7, baseline of 0.9 in September 2021  #HTN  #DM  #CAD, reportedly non-obstructive     Plan:  -Reviewed prior records. EKG in September 2021 showed sinus tachycardia.   Echocardiogram from [place]

## 2021-10-14 NOTE — PROGRESS NOTES
[facility]  Progress note     Karenann Kawasaki Patient Status:  Emergency    1950 MRN [id number]   Location [address] [facility] Attending Nancy Carroll MD   Hosp Day # 0 PCP Sophie Velez MD     Chief Complaint: Raine Blunt results for input(s): PCT in the last 168 hours. Cardiac  No results for input(s): TROP, PBNP in the last 168 hours. Creatinine Kinase  No results for input(s): CK in the last 168 hours.     Inflammatory Markers  No results for input(s): CRP, ELVIA, LDH

## 2021-10-14 NOTE — ED PROVIDER NOTES
Patient Seen in: [facility] Emergency Department      History   Patient presents with:  Abdomen/Flank Pain    Stated Complaint: stomach pain and distension.   pt has gtube, pt had bowel obstruction    Subjective:   HPI    Patient presents with abdomi due to organism    • Primary localized osteoarthrosis, lower leg 10/2/2013   • Primary localized osteoarthrosis, lower leg, left [715.16] 9/2/2015   • Pulmonary nodule 3/23/2017   • Renal stones    • Right leg DVT ([facility] 6/2007   • Shortness of breath    • Ht 180.3 cm (5' 11\")   Wt 57.2 kg   SpO2 95%   BMI 17.57 kg/m²         Physical Exam    General: Alert and oriented x3 in no acute distress. HEENT: Normocephalic, atraumatic, pupils equal round and reactive to light, oropharynx clear, uvula midline.   Nec -----------         ------                     CBC W/ DIFFERENTIAL[[id number]]          Abnormal            Final result                 Please view results for these tests on the individual orders.    URINE CULTURE, ROUTINE        CT ABDOMEN+PELVIS(CPT= LUNG BASES:  No visible pulmonary or pleural disease. OTHER:  Negative. CONCLUSION:   1. G-tube in the stomach is noted. The stomach is moderately distended.   Multiple fluid-filled distended loops of bowel throughout the abdomen are suggestiv vomiting and NG tube was therefore deferred. I discussed his case with Dr. Alicia Enciso from general surgery who will consult. He will be admitted for further treatment to Dr. Jaimie Vega from the hospitalist service.   Admission disposition: 10/13/2021  9:44 PM

## 2021-10-14 NOTE — ED QUICK NOTES
Orders for admission, patient is aware of plan and ready to go upstairs. Any questions, please call ED RN Reta Bumpers at extension [phone]. Vaccinated?  yest  Type of COVID test sent:yes  COVID Suspicion level: Low      Titratable drug(s) infusing:none  Rate

## 2021-10-14 NOTE — PROGRESS NOTES
[facility] Note: Renal dose adjustment for Enoxaparin (Lovenox)  Reva Banerjee has been prescribed Enoxaparin (Lovenox)  40 mg subcutaneously every 24 hours. Estimated Creatinine Clearance: 26.7 mL/min (A) (based on SCr of 1.87 mg/dL (H)).     His

## 2021-10-15 ENCOUNTER — APPOINTMENT (OUTPATIENT)
Dept: GENERAL RADIOLOGY | Facility: HOSPITAL | Age: 71
DRG: 389 | End: 2021-10-15
Attending: COLON & RECTAL SURGERY
Payer: MEDICARE

## 2021-10-15 VITALS
SYSTOLIC BLOOD PRESSURE: 118 MMHG | TEMPERATURE: 98 F | HEIGHT: 71 IN | WEIGHT: 118.63 LBS | OXYGEN SATURATION: 92 % | HEART RATE: 76 BPM | BODY MASS INDEX: 16.61 KG/M2 | DIASTOLIC BLOOD PRESSURE: 65 MMHG | RESPIRATION RATE: 16 BRPM

## 2021-10-15 PROCEDURE — 74019 RADEX ABDOMEN 2 VIEWS: CPT | Performed by: COLON & RECTAL SURGERY

## 2021-10-15 PROCEDURE — 99239 HOSP IP/OBS DSCHRG MGMT >30: CPT | Performed by: HOSPITALIST

## 2021-10-15 RX ORDER — POLYETHYLENE GLYCOL 3350 17 G/17G
17 POWDER, FOR SOLUTION ORAL 2 TIMES DAILY
Qty: 1 EACH | Refills: 0 | Status: SHIPPED | COMMUNITY
Start: 2021-10-15

## 2021-10-15 NOTE — PROGRESS NOTES
[facility]  Progress Note    Bing Moss Patient Status:  Inpatient    1950 MRN [id number]   [facility] 8NE-A Attending Taqueria Thornton MD   Hosp Day # 1 PCP Alhaji Chang MD     Subjective:   The patient states he is doing w aortic aneurysm (HCC)     Decreased cardiac ejection fraction     Coronary artery disease involving native coronary artery without angina pectoris     Non-ST elevation myocardial infarction (NSTEMI) ([address]     Hyponatremia     Acute renal failure (ARF) ([address]

## 2021-10-15 NOTE — PROGRESS NOTES
NURSING DISCHARGE NOTE    Discharged Home via Wheelchair. Accompanied by Spouse  Belongings Returned to patient from safe. Telemetry dc'd. IV removed, catheter intact. Discharge teaching completed, pt verbalized understanding.

## 2021-10-15 NOTE — PLAN OF CARE
Received pt at 1930. Alert and oriented. NSR on tele. Adequately saturated on room air. NS infusing @75cc/hr. Abdomen distended, nontender to palpation. Bowel sounds active in all 4 quadrants. Pt reports that he is tolerating the clear liquid diet.

## 2021-10-15 NOTE — PROGRESS NOTES
Seen and examined; discharge if cleared by surgery today, following bowel series xrays. Patient adamant about wanting to discharge. Resume oral meds upon discahrge if leaves this afternoon.

## 2021-10-15 NOTE — PAYOR COMM NOTE
--------------  ADMISSION REVIEW     Payor: Kleber Butler [id number] #:  Renata Gramajo  Authorization Number: [id number]    Admit date: 10/14/21  Admit time: 12:56 AM       REVIEW DOCUMENTATION:     ED Provider Notes      ED Provider Notes signed by Andrew Gregg obstructive pulmonary disease) (HCC)    • Coronary atherosclerosis    • Diabetes ([facility] [address] [facility]    • [facility]    • Elevated PSA, less than 10 ng/ml 03/08/2018   • Essential hypertension, benign    • Frequent use of laxatives    • Hemorrhoids    • Kne Systems    Positive for stated complaint: stomach pain and distension. pt has gtube, pt had bowel obstruction  Other systems are as noted in HPI. Constitutional and vital signs reviewed. All other systems reviewed and negative except as noted above. Notable for the following components:    WBC 12.4 (*)     HCT 37.7 (*)     Neutrophil Absolute Prelim 8.75 (*)     Neutrophil Absolute 8.75 (*)     Monocyte Absolute 1.09 (*)     All other components within normal limits   RAPID SARS-COV-2 BY PCR - Normal adenopathy. BOWEL/MESENTERY:  Moderate to large amount of stool throughout the colon. Fluid-filled dilated loops of small bowel measuring up to 4.5 cm in diameter are identified. Sutures throughout the abdomen are noted.   A distinct transition point is Rekha Jimenez MD on 9/20/2021 at 8:14 AM       Medications   HYDROmorphone HCl (DILAUDID) 1 MG/ML injection 0.5 mg (0.5 mg Intravenous Given 10/13/21 2108)   fentaNYL (DURAGESIC) 100 MCG/HR 1 patch (1 patch Transdermal Fentanyl Patch Applied 10/13/21 2211 Service: 10/13/2021  9:09 PM Status: Signed    : Trisha Guerrero MD (Physician)           [facility]  History and Physical     Billy Sanders Patient Status:  Emergency    1950 MRN [id number]   Location [id number] [place] [address] hypertension, benign    • Frequent use of laxatives    • Hemorrhoids    • Knee pain 10/2/2013   • Long term current use of opiate analgesic 2/17/2015   • Long-term current use of opiate analgesic 6/11/2010   • Multiple risk factors for coronary artery dise  (90 Base) MCG/ACT Inhalation Aero Soln, USE 2 INHALATIONS EVERY 6 HOURS AS NEEDED FOR WHEEZING, Disp: 54 g, Rfl: 2  aspirin 325 MG Oral Tab, , Disp: , Rfl:   hydrALAZINE 25 MG Oral Tab, 12.5 mg 3 (three) times daily. , Disp: , Rfl:   LEVEMIR FLEXTOU 18 MCG CAPS, albuterol 90 MCG/ACT AERS, 1 capsule See Admin Instructions.  Via HandiHaler once daily at the same time everyday , Disp: , Rfl:   Budesonide-Formoterol Fumarate 160-4.5 MCG/ACT Inhalation Aerosol, Inhale 2 puffs into the lungs 2 (two) times da Results    COVID-19  Lab Results   Component Value Date    COVID19 Not Detected 09/20/2021    COVID19 Not Detected 08/17/2021    COVID19 Not Detected 08/02/2021       Pro-Calcitonin  No results for input(s): PCT in the last 168 hours.     Cardiac  No result User    Discharged on 10/15/2021    10/15/2021 0543 Given 0.2 mg Intravenous Angelica Leghorn, RN    10/15/2021 0043 Given 0.2 mg Intravenous Angelica Leghorn, RN    10/14/2021 2132 Given 0.2 mg Intravenous Coloma Leghorn, RN    10/14/2021 1800 Given 0.2 mg In 10/14/21 1753 — 89 — — — — — — RS    10/14/21 1616 — — — — — — — 0 L/min EP    10/14/21 1616 98.7 °F (37.1 °C) 81 16 116/63 95 % — None (Room air) — RS    10/14/21 1203 98.6 °F (37 °C) 82 18 112/60 95 % — None (Room air) — RS    10/14/21 0824 98.5 °F (36.9 sure about the dose. He said it was cut in half, maybe more than once and currently was only taking 5 mg daily. The remainder of his symptoms are related to his abdominal complaints     OBJECTIVE  Blood pressure 106/58, pulse 89, temperature 98.5 °F (36. currently. -Need for LifeVest will depend on LVEF assessment. Will order TTE.  -Hold all diuretics. Patient is currently compensated without evidence of volume overload.   Overdiuresis may predispose to JONO, electrolyte urinary losses with associated wor G-tube in the stomach. The stomach is moderately distended. Multiple fluid-filled distended loops of bowel throughout the abdomen are suggestive of a small bowel obstruction versus ileus. A transition point is not identified.   There is moderate to large consultation. We will continue to follow.     10/14 HOSPITALIST NOTE  Chief Complaint: Abdominal pain     Subjective: feels ok at the moment, feels less abd distention and pain after enema induced BM, per surgery's recs.     Review of Systems:   A comprehe lifevest from [facility]     Quality:  · DVT Prophylaxis: lovenox  · CODE status: Full  · Kellogg: no  · If COVID testing is negative, may discontinue isolation: yes     10/15 GEN SURGERY NOTE  Subjective:   The patient states he is doing well today warm/dry    Assessment:  · SBO. Was admitted for 7-1 to 8-16 for this. initially treated conservatively but was then taken to the OR on 7/5 for lysis of adhesions. G tube was placed on 8/2.    · CAD, LHC at [place] [address] last month showed a heavily c 12:56 AM  Discharge Date: 10/15/2021  1:14 PM     Admitting Physician: Jennifer Farley MD  Attending Physician:  No att. providers found  Primary Care Physician: Shaheen Walsh MD       Discharge Summary Notes    No notes of this type exist for this encoun

## 2021-10-15 NOTE — DISCHARGE SUMMARY
[facility] [place] HOSPITALIST  DISCHARGE SUMMARY     Saravanan Martines Patient Status:  Inpatient    1950 MRN [id number]   [facility] 8NE-A Attending John Martinez MD   Hosp Day # 1 PCP Mykel Vasquez MD     Date of Admission: 10/13/2021  Date o [facility]. TCM Follow-Up Recommendation:  LACE > 58: High Risk of readmission after discharge from the hospital.  **Certification    Admission date was 10/13/2021. Inpatient stay was shorter than expected.   Patient's Small bowel [address] 1 patch onto the skin every third day. Quantity: 10 patch  Refills: 0     hydrALAZINE 25 MG Tabs  Commonly known as: APRESOLINE      Take 12.5 mg by mouth 3 (three) times daily.    Refills: 0     HYDROmorphone 8 MG Tabs  Commonly know no    Follow-up appointment:   [address] [place] [address]  Schedule an appointment as soon as possible for a visit in 3 days      Appointments for Next 30 Days 10/15/2021 - 11/14/20 Vital signs:  Temp:  [98.3 °F (36.8 °C)-98.8 °F (37.1 °C)] 98.3 °F (36.8 °C)  Pulse:  [76-88] 76  Resp:  [16-18] 16  BP: (110-124)/(59-68) 118/65    Physical Exam:    General: No acute distress. Respiratory: Clear to auscultation bilaterally.  No wh

## 2021-10-15 NOTE — PROGRESS NOTES
[facility]     Cardiology Progress Note    Subjective:  No chest pain or shortness of breath.     Objective:  /65 (BP Location: Left arm)   Pulse 76   Temp 98.3 °F (36.8 °C) (Oral)   Resp 16   Ht 180.3 cm (5' 11\")   Wt 118 lb 9.7 oz (53.8 kg) the OR on 7/5 for lysis of adhesions. G tube was placed on 8/2. · CAD, LHC at [place] [address] last month showed a heavily calcified LM 40-50% lesion. +mid RCA 40%.   · HFpEF, EF yesterday greatly improved from previus echo at [facility], now 60-65%

## 2021-10-18 ENCOUNTER — LAB ENCOUNTER (OUTPATIENT)
Dept: LAB | Age: 71
End: 2021-10-18
Attending: HOSPITALIST
Payer: MEDICARE

## 2021-10-18 ENCOUNTER — PATIENT OUTREACH (OUTPATIENT)
Dept: CASE MANAGEMENT | Age: 71
End: 2021-10-18

## 2021-10-18 DIAGNOSIS — K56.609 SBO (SMALL BOWEL OBSTRUCTION) (HCC): ICD-10-CM

## 2021-10-18 DIAGNOSIS — Z11.59 SCREENING FOR VIRAL DISEASE: ICD-10-CM

## 2021-10-18 DIAGNOSIS — Z02.9 ENCOUNTERS FOR UNSPECIFIED ADMINISTRATIVE PURPOSE: ICD-10-CM

## 2021-10-18 DIAGNOSIS — Z01.818 PREOP EXAMINATION: ICD-10-CM

## 2021-10-18 PROCEDURE — 1111F DSCHRG MED/CURRENT MED MERGE: CPT

## 2021-10-18 NOTE — PROGRESS NOTES
MELINA called PCP office and s/w Steven Mcclellan. She states that she will speak with Dr. Talat Tolentino assistant and make sure that appt can be changed to a HFU appt. MELINA will follow up to make sure appt was completed.

## 2021-10-19 NOTE — PROGRESS NOTES
Initial Post Discharge Follow Up   Discharge Date: 10/15/21  Contact Date: 10/19/2021    Consent Verification:  Assessment Completed With: Patient  HIPAA Verified? Yes    Discharge Dx:  1.  Small bowel obstruction, possible constipation-Pt has venting G different diet per AVS? yes  o If so, which diet? Soft   - Are there any barriers to following this diet? no    Medications: Reviewed medication list.  Medications are up to date.       Current Outpatient Medications   Medication Sig Dispense Refill   • PE pantoprazole 40 MG Oral Tab EC Take 1 tablet (40 mg total) by mouth every morning before breakfast. 30 tablet 0   • Budesonide-Formoterol Fumarate 160-4.5 MCG/ACT Inhalation Aerosol Inhale 2 puffs into the lungs 2 (two) times daily.  Rinse and spit after us [address] [facility] ([address], [facility])    For the safety of our patients, visitors and care teams, all patients and visitors are required to wear a mask during their entire visit, only to be removed if asked to do so by your provider. cannot make your appointments? No     NCM Reviewed upcoming Specialist Appt with patient     Yes     Interventions by NCM:   NCM reviewed recent admission and discharge instructions with patient.    He denies any new or worsening symptoms and states his a

## 2021-10-20 ENCOUNTER — OFFICE VISIT (OUTPATIENT)
Dept: SLEEP CENTER | Age: 71
End: 2021-10-20
Attending: Other
Payer: MEDICARE

## 2021-10-20 DIAGNOSIS — J44.9 COPD (CHRONIC OBSTRUCTIVE PULMONARY DISEASE) (HCC): ICD-10-CM

## 2021-10-20 DIAGNOSIS — R09.02 HYPOXEMIA: ICD-10-CM

## 2021-10-20 PROCEDURE — 95810 POLYSOM 6/> YRS 4/> PARAM: CPT

## 2021-10-21 ENCOUNTER — OFFICE VISIT (OUTPATIENT)
Dept: INTERNAL MEDICINE CLINIC | Facility: CLINIC | Age: 71
End: 2021-10-21
Payer: MEDICARE

## 2021-10-21 VITALS
HEART RATE: 83 BPM | SYSTOLIC BLOOD PRESSURE: 120 MMHG | OXYGEN SATURATION: 99 % | TEMPERATURE: 98 F | RESPIRATION RATE: 16 BRPM | BODY MASS INDEX: 18.11 KG/M2 | HEIGHT: 71 IN | WEIGHT: 129.38 LBS | DIASTOLIC BLOOD PRESSURE: 64 MMHG

## 2021-10-21 DIAGNOSIS — K56.609 SBO (SMALL BOWEL OBSTRUCTION) (HCC): Primary | ICD-10-CM

## 2021-10-21 PROBLEM — I21.4 NON-ST ELEVATION MYOCARDIAL INFARCTION (NSTEMI) (HCC): Chronic | Status: ACTIVE | Noted: 2021-10-12

## 2021-10-21 PROBLEM — E87.1 HYPONATREMIA: Status: RESOLVED | Noted: 2021-10-13 | Resolved: 2021-10-21

## 2021-10-21 PROBLEM — N17.9 ACUTE RENAL FAILURE (ARF) (HCC): Status: RESOLVED | Noted: 2021-10-13 | Resolved: 2021-10-21

## 2021-10-21 PROBLEM — I25.10 CORONARY ARTERY DISEASE INVOLVING NATIVE CORONARY ARTERY WITHOUT ANGINA PECTORIS: Chronic | Status: ACTIVE | Noted: 2021-10-12

## 2021-10-21 PROBLEM — Z43.1 PEG (PERCUTANEOUS ENDOSCOPIC GASTROSTOMY) ADJUSTMENT/REPLACEMENT/REMOVAL (HCC): Chronic | Status: ACTIVE | Noted: 2021-08-20

## 2021-10-21 PROCEDURE — 99214 OFFICE O/P EST MOD 30 MIN: CPT | Performed by: INTERNAL MEDICINE

## 2021-10-21 RX ORDER — METOPROLOL SUCCINATE 25 MG/1
25 TABLET, EXTENDED RELEASE ORAL DAILY
Qty: 90 TABLET | Refills: 0 | Status: SHIPPED | OUTPATIENT
Start: 2021-10-21 | End: 2022-01-20

## 2021-10-21 RX ORDER — HYDRALAZINE HYDROCHLORIDE 25 MG/1
12.5 TABLET, FILM COATED ORAL 3 TIMES DAILY
Qty: 90 TABLET | Refills: 0 | Status: SHIPPED | OUTPATIENT
Start: 2021-10-21 | End: 2021-12-20

## 2021-10-21 RX ORDER — SPIRONOLACTONE 25 MG/1
25 TABLET ORAL DAILY
Qty: 90 TABLET | Refills: 0 | Status: SHIPPED | OUTPATIENT
Start: 2021-10-21 | End: 2022-01-20

## 2021-10-21 RX ORDER — PANTOPRAZOLE SODIUM 40 MG/1
40 TABLET, DELAYED RELEASE ORAL
Qty: 30 TABLET | Refills: 0 | Status: SHIPPED | OUTPATIENT
Start: 2021-10-21 | End: 2021-11-16

## 2021-10-21 NOTE — PROGRESS NOTES
Deneen Aldrich [facility] 1950 is a 66year old male. Patient presents with:  TCM (Transition Of Care Management)      HPI:   Post hospital follow-up. Patient currently denies any abdominal pain however does confess to passing some gas.   Denies any pro times daily. May split tablet into half, take one half four times a day and two whole tablets at night 120 tablet 0   • LORazepam 1 MG Oral Tab Take 1 tablet (1 mg total) by mouth nightly as needed for Anxiety.  20 tablet 0   • Budesonide-Formoterol Fumarat status: Former Smoker        Packs/day: 1.00        Years: 35.00        Pack years: 35        Types: Cigarettes        Quit date: 2000        Years since quittin.8      Smokeless tobacco: Never Used    Vaping Use      Vaping Use: Never used    Alc MG Oral Tablet 24 Hr; Take 1 tablet (25 mg total) by mouth daily. -     pantoprazole 40 MG Oral Tab EC; Take 1 tablet (40 mg total) by mouth every morning before breakfast.  -     hydrALAZINE 25 MG Oral Tab;  Take 0.5 tablets (12.5 mg total) by mouth 3 (th

## 2021-10-27 ENCOUNTER — OFFICE VISIT (OUTPATIENT)
Dept: SURGERY | Facility: CLINIC | Age: 71
End: 2021-10-27
Payer: MEDICARE

## 2021-10-27 VITALS
SYSTOLIC BLOOD PRESSURE: 146 MMHG | BODY MASS INDEX: 17.89 KG/M2 | HEIGHT: 71 IN | HEART RATE: 99 BPM | WEIGHT: 127.81 LBS | DIASTOLIC BLOOD PRESSURE: 83 MMHG | TEMPERATURE: 98 F

## 2021-10-27 DIAGNOSIS — K56.609 SBO (SMALL BOWEL OBSTRUCTION) (HCC): Primary | Chronic | ICD-10-CM

## 2021-10-27 PROCEDURE — 3077F SYST BP >= 140 MM HG: CPT | Performed by: PHYSICIAN ASSISTANT

## 2021-10-27 PROCEDURE — 3079F DIAST BP 80-89 MM HG: CPT | Performed by: PHYSICIAN ASSISTANT

## 2021-10-27 PROCEDURE — 3008F BODY MASS INDEX DOCD: CPT | Performed by: PHYSICIAN ASSISTANT

## 2021-10-27 PROCEDURE — 99212 OFFICE O/P EST SF 10 MIN: CPT | Performed by: PHYSICIAN ASSISTANT

## 2021-10-27 NOTE — PROGRESS NOTES
Follow Up Visit Note       Active Problems      No diagnosis found. Chief Complaint   No chief complaint on file. History of Present Illness    The patient presents to the clinic today for continued care.     The patient states he is overall fee Renal stones    • Right leg DVT ([facility]) 6/2007   • Shortness of breath    • Spondylolisthesis of lumbar region    • Spondylolisthesis of lumbar region    • Type II or unspecified type diabetes mellitus without mention of complication, uncontrolled    • Unspe mouth 3 (three) times daily. , Disp: 90 tablet, Rfl: 0  •  sertraline 50 MG Oral Tab, Take 1 tablet (50 mg total) by mouth daily. , Disp: 30 tablet, Rfl: 0  •  PEG 3350 17 g Oral Powd Pack, Take 17 g by mouth 2 (two) times a day., Disp: 1 each, Rfl: 0  •  at Systems   Constitutional: Negative for chills, diaphoresis, fatigue, fever and unexpected weight change. HENT: Negative for hearing loss, nosebleeds, sore throat and trouble swallowing.     Respiratory: Negative for apnea, cough, shortness of breath and w patient and his wife that the PEG tube site appears healthy at this time. He does not appear to have an infection. The patient will return the clinic in roughly 10 days for evaluation by Dr. Fito Piper.     The patient will return to the clinic on an as need

## 2021-11-01 ENCOUNTER — TELEPHONE (OUTPATIENT)
Dept: INTERNAL MEDICINE CLINIC | Facility: CLINIC | Age: 71
End: 2021-11-01

## 2021-11-01 NOTE — TELEPHONE ENCOUNTER
Please set patient up for a telephone appointment tomorrow at 9:15 in the morning all questions will be addressed at that moment

## 2021-11-01 NOTE — TELEPHONE ENCOUNTER
Spoke with patient's wife Latonia Kessler, while patient was in the hospital they discontinued several medications, pt was just in to see VM and forgot to ask if he should restart metformin 1000mg twice daily. On average pt's BS @ 300-400 per wife.  Please advis

## 2021-11-01 NOTE — TELEPHONE ENCOUNTER
Future Appointments   Date Time Provider Basilio Rashmi   11/5/2021  9:30 AM Antonella Shin MD [facility] [place]

## 2021-11-01 NOTE — TELEPHONE ENCOUNTER
Patient spouse called and stated she has multiple questions for the nurse, when asked to elaborate she just stated it was due to medications and diabetic center questions.      Please advise

## 2021-11-02 ENCOUNTER — TELEPHONE (OUTPATIENT)
Dept: INTERNAL MEDICINE CLINIC | Facility: CLINIC | Age: 71
End: 2021-11-02

## 2021-11-02 ENCOUNTER — VIRTUAL PHONE E/M (OUTPATIENT)
Dept: INTERNAL MEDICINE CLINIC | Facility: CLINIC | Age: 71
End: 2021-11-02
Payer: MEDICARE

## 2021-11-02 DIAGNOSIS — R73.9 HYPERGLYCEMIA: ICD-10-CM

## 2021-11-02 DIAGNOSIS — E11.9 DIABETES MELLITUS TYPE 2 IN NONOBESE (HCC): Primary | Chronic | ICD-10-CM

## 2021-11-02 PROBLEM — N18.30 CKD (CHRONIC KIDNEY DISEASE) STAGE 3, GFR 30-59 ML/MIN (HCC): Chronic | Status: RESOLVED | Noted: 2020-06-08 | Resolved: 2021-11-02

## 2021-11-02 PROBLEM — R93.1 DECREASED CARDIAC EJECTION FRACTION: Status: RESOLVED | Noted: 2021-10-12 | Resolved: 2021-11-02

## 2021-11-02 PROBLEM — E88.09 HYPOALBUMINEMIA: Status: RESOLVED | Noted: 2021-08-20 | Resolved: 2021-11-02

## 2021-11-02 PROCEDURE — 99213 OFFICE O/P EST LOW 20 MIN: CPT | Performed by: INTERNAL MEDICINE

## 2021-11-02 RX ORDER — BLOOD-GLUCOSE METER
KIT MISCELLANEOUS
Refills: 0 | Status: CANCELLED | OUTPATIENT
Start: 2021-11-02

## 2021-11-02 RX ORDER — BLOOD SUGAR DIAGNOSTIC
STRIP MISCELLANEOUS
Qty: 400 STRIP | Refills: 3 | Status: CANCELLED | OUTPATIENT
Start: 2021-11-02

## 2021-11-02 NOTE — PROGRESS NOTES
Virtual Telephone Check-In    Hortencia Manning verbally consents to a Virtual/Telephone Check-In visit on 11/02/21. Patient has been referred to the [facility] website at [email or web address] to review the yearly Consent to Treat document.     Patient unders 4 times a day and is currently on parental insulin at least 3 injections/day depending on his sugar numbers.   He apparently has been making adjustments on his insulin as advised to him while he was in the hospital.  Diagnoses and all orders for this visit:

## 2021-11-03 ENCOUNTER — HOSPITAL ENCOUNTER (OUTPATIENT)
Dept: GENERAL RADIOLOGY | Age: 71
Discharge: HOME | End: 2021-11-03
Attending: THORACIC SURGERY (CARDIOTHORACIC VASCULAR SURGERY)
Payer: MEDICARE

## 2021-11-03 DIAGNOSIS — R06.02 SHORTNESS OF BREATH: ICD-10-CM

## 2021-11-03 PROCEDURE — 71046 X-RAY EXAM CHEST 2 VIEWS: CPT | Performed by: THORACIC SURGERY (CARDIOTHORACIC VASCULAR SURGERY)

## 2021-11-08 ENCOUNTER — OFFICE VISIT (OUTPATIENT)
Dept: SURGERY | Facility: CLINIC | Age: 71
End: 2021-11-08
Payer: MEDICARE

## 2021-11-08 VITALS
TEMPERATURE: 97 F | HEART RATE: 85 BPM | WEIGHT: 131.63 LBS | HEIGHT: 71 IN | SYSTOLIC BLOOD PRESSURE: 114 MMHG | DIASTOLIC BLOOD PRESSURE: 70 MMHG | BODY MASS INDEX: 18.43 KG/M2

## 2021-11-08 DIAGNOSIS — Z09 FOLLOW-UP EXAMINATION: Primary | ICD-10-CM

## 2021-11-08 DIAGNOSIS — E88.09 HYPOALBUMINEMIA: ICD-10-CM

## 2021-11-08 PROCEDURE — 17250 CHEM CAUT OF GRANLTJ TISSUE: CPT | Performed by: COLON & RECTAL SURGERY

## 2021-11-08 PROCEDURE — 99213 OFFICE O/P EST LOW 20 MIN: CPT | Performed by: COLON & RECTAL SURGERY

## 2021-11-08 PROCEDURE — 3078F DIAST BP <80 MM HG: CPT | Performed by: COLON & RECTAL SURGERY

## 2021-11-08 PROCEDURE — 3008F BODY MASS INDEX DOCD: CPT | Performed by: COLON & RECTAL SURGERY

## 2021-11-08 PROCEDURE — 3074F SYST BP LT 130 MM HG: CPT | Performed by: COLON & RECTAL SURGERY

## 2021-11-08 NOTE — PROGRESS NOTES
Office Visit Note       Active Problems      1.  Follow-up examination          Chief Complaint   Patient presents with:  Anal Problem: 1month follow up--SBO-         History of Present Illness  Keaton Florian a 69year old male who presents today for localized osteoarthrosis, lower leg 10/2/2013   • Primary localized osteoarthrosis, lower leg, left [715.16] 9/2/2015   • Pulmonary nodule 3/23/2017   • Renal stones    • [facility] 6[facility]2007   • Shortness of breath    • Spondylolisthesis of lumbar r meals.  spironolactone 25 MG Oral Tab, Take 1 tablet (25 mg total) by mouth daily. metoprolol succinate 25 MG Oral Tablet 24 Hr, Take 1 tablet (25 mg total) by mouth daily.   pantoprazole 40 MG Oral Tab EC, Take 1 tablet (40 mg total) by mouth every mornin granuloma present. This is treated at the bedside with silver nitrate. Expected mucus-like drainage from around the tube. The tube is clamped. No erythema or skin breakdown.        Assessment   Follow-up examination  (primary encounter diagnosis)    Shelby

## 2021-11-12 ENCOUNTER — ORDER TRANSCRIPTION (OUTPATIENT)
Dept: ADMINISTRATIVE | Facility: HOSPITAL | Age: 71
End: 2021-11-12

## 2021-11-12 DIAGNOSIS — I25.10 CAD (CORONARY ARTERY DISEASE): Primary | ICD-10-CM

## 2021-11-12 DIAGNOSIS — R93.1 DECREASED CARDIAC EJECTION FRACTION: ICD-10-CM

## 2021-11-16 RX ORDER — PANTOPRAZOLE SODIUM 40 MG/1
TABLET, DELAYED RELEASE ORAL
Qty: 30 TABLET | Refills: 0 | Status: SHIPPED | OUTPATIENT
Start: 2021-11-16 | End: 2021-12-15

## 2021-11-16 NOTE — TELEPHONE ENCOUNTER
Name from pharmacy: PANTOPRAZOLE SOD DR 40 MG TAB         Will file in chart as: PANTOPRAZOLE 40 MG Oral Tab EC    Sig: TAKE 1 TABLET BY MOUTH EVERY DAY IN THE MORNING BEFORE BREAKFAST    Disp:  30 tablet    Refills:  0 (Pharmacy requested: Not specified)

## 2021-11-26 ENCOUNTER — LAB ENCOUNTER (OUTPATIENT)
Dept: LAB | Age: 71
End: 2021-11-26
Attending: COLON & RECTAL SURGERY
Payer: MEDICARE

## 2021-11-26 DIAGNOSIS — E88.09 HYPOALBUMINEMIA: ICD-10-CM

## 2021-11-26 DIAGNOSIS — N20.0 RENAL CALCULI: ICD-10-CM

## 2021-11-26 PROCEDURE — 84153 ASSAY OF PSA TOTAL: CPT

## 2021-11-26 PROCEDURE — 36415 COLL VENOUS BLD VENIPUNCTURE: CPT

## 2021-11-26 PROCEDURE — 85025 COMPLETE CBC W/AUTO DIFF WBC: CPT

## 2021-11-26 PROCEDURE — 80053 COMPREHEN METABOLIC PANEL: CPT

## 2021-11-26 RX ORDER — TIOTROPIUM BROMIDE 18 UG/1
CAPSULE ORAL; RESPIRATORY (INHALATION)
Qty: 90 CAPSULE | Refills: 2 | Status: SHIPPED | OUTPATIENT
Start: 2021-11-26

## 2021-11-26 NOTE — TELEPHONE ENCOUNTER
Protocol failed     Requesting: Jose Bhatia 18 MCG Inhalation Cap    LOV: 11/2/21   RTC: none noted   Filled: 8/27/21     Upcoming OV: none scheduled

## 2021-11-30 ENCOUNTER — TELEPHONE (OUTPATIENT)
Dept: INTERNAL MEDICINE CLINIC | Facility: CLINIC | Age: 71
End: 2021-11-30

## 2021-11-30 RX ORDER — BUDESONIDE AND FORMOTEROL FUMARATE DIHYDRATE 160; 4.5 UG/1; UG/1
2 AEROSOL RESPIRATORY (INHALATION) 2 TIMES DAILY
Qty: 3 EACH | Refills: 0 | Status: SHIPPED | OUTPATIENT
Start: 2021-11-30 | End: 2021-12-07

## 2021-11-30 NOTE — TELEPHONE ENCOUNTER
Refill needed Symbicort    Previously prescribed, discontinued at hospital, patient is still using    [id number]

## 2021-12-01 ENCOUNTER — TELEPHONE (OUTPATIENT)
Dept: INTERNAL MEDICINE CLINIC | Facility: CLINIC | Age: 71
End: 2021-12-01

## 2021-12-02 NOTE — TELEPHONE ENCOUNTER
Pts spouse called stating insurance needs a PA for the medication, pts spouse is upset because the pt is completely out.  Please advise

## 2021-12-03 NOTE — TELEPHONE ENCOUNTER
PA form faxed with confirmation    Awaiting response. Copy sent to scanning and copy in teal accordion file.

## 2021-12-06 ENCOUNTER — OFFICE VISIT (OUTPATIENT)
Dept: SURGERY | Facility: CLINIC | Age: 71
End: 2021-12-06
Payer: MEDICARE

## 2021-12-06 VITALS — HEART RATE: 89 BPM | DIASTOLIC BLOOD PRESSURE: 73 MMHG | SYSTOLIC BLOOD PRESSURE: 139 MMHG | TEMPERATURE: 98 F

## 2021-12-06 DIAGNOSIS — I10 BENIGN ESSENTIAL HTN: Chronic | ICD-10-CM

## 2021-12-06 DIAGNOSIS — J44.9 CHRONIC OBSTRUCTIVE PULMONARY DISEASE, UNSPECIFIED COPD TYPE (HCC): Chronic | ICD-10-CM

## 2021-12-06 DIAGNOSIS — E11.9 DIABETES MELLITUS TYPE 2 IN NONOBESE (HCC): Chronic | ICD-10-CM

## 2021-12-06 DIAGNOSIS — K86.1 CHRONIC PANCREATITIS, UNSPECIFIED PANCREATITIS TYPE (HCC): Chronic | ICD-10-CM

## 2021-12-06 DIAGNOSIS — E88.09 HYPOALBUMINEMIA: ICD-10-CM

## 2021-12-06 DIAGNOSIS — Z93.1 GASTROSTOMY TUBE IN PLACE (HCC): Primary | ICD-10-CM

## 2021-12-06 PROCEDURE — 3078F DIAST BP <80 MM HG: CPT | Performed by: COLON & RECTAL SURGERY

## 2021-12-06 PROCEDURE — 99213 OFFICE O/P EST LOW 20 MIN: CPT | Performed by: COLON & RECTAL SURGERY

## 2021-12-06 PROCEDURE — 3075F SYST BP GE 130 - 139MM HG: CPT | Performed by: COLON & RECTAL SURGERY

## 2021-12-06 NOTE — PROGRESS NOTES
Office Visit Note       Active Problems      1. Gastrostomy tube in place [facility])    2. Hypoalbuminemia    3. Diabetes mellitus type 2 in nonobese (HCC)    4. Chronic pancreatitis, unspecified pancreatitis type ([facility] [address]    5. Benign essential HTN    6.  Chronic pain/belching     After small bowel obstruction surgery   • Frequent use of laxatives    • Hemorrhoids    • Knee pain 10/2/2013   • Long term current use of opiate analgesic 2/17/2015   • Long-term current use of opiate analgesic 6/11/2010   • Multiple ris comment: Quit    Vaping Use      Vaping Use: Never used    Alcohol use: No    Drug use: No     SPIRIVA HANDIHALER 18 MCG Inhalation Cap, INHALE 1 CAPSULE VIA HANDIHALER ONCE DAILY AT THE SAME TIME EVERY DAY  metFORMIN HCl 1000 MG Oral Tab, Take 1 tablet (1 unspecified pancreatitis type ([facility] [address]  Benign essential HTN  Chronic obstructive pulmonary disease, unspecified COPD type ([facility] [address]    Plan   Overall the patent is doing well. Tolerating a diet. Not using PEG tube. Albumin almost within normal limits.     OK from

## 2021-12-07 RX ORDER — FLUTICASONE PROPIONATE AND SALMETEROL 250; 50 UG/1; UG/1
1 POWDER RESPIRATORY (INHALATION) EVERY 12 HOURS SCHEDULED
Qty: 60 EACH | Refills: 0 | Status: SHIPPED | OUTPATIENT
Start: 2021-12-07 | End: 2021-12-08

## 2021-12-07 NOTE — TELEPHONE ENCOUNTER
Pt spouse Soila Freeman requesting to speak with Leanna Bourgeois regarding medication.      Soila Freeman - [phone]

## 2021-12-07 NOTE — TELEPHONE ENCOUNTER
Pt spouse/Mindy received letter at home from insurance stating PA for symbicort was denied.     Replacement medication needed ASAP

## 2021-12-07 NOTE — TELEPHONE ENCOUNTER
Spoke to pts spouse. She received letter stating PA is denied. [facility] and [facility] state [facility] is not covered. Insurance would like pt to try Advair first.     Informed pt, she would like me to try [place] as she says she never had an issue there before.      C

## 2021-12-07 NOTE — TELEPHONE ENCOUNTER
Patient spouse called back and stated that the pharmacy told her now that they cannot fill the medication because insurance denied it. Spouse is concerned and doesn't want patient to end up in the emergency room.

## 2021-12-08 RX ORDER — BUDESONIDE AND FORMOTEROL FUMARATE DIHYDRATE 160; 4.5 UG/1; UG/1
2 AEROSOL RESPIRATORY (INHALATION) 2 TIMES DAILY
Qty: 3 EACH | Refills: 0 | OUTPATIENT
Start: 2021-12-08

## 2021-12-08 NOTE — TELEPHONE ENCOUNTER
Called insurance plan [phone]  Member ID # [id number]    Redid PA for medication Symbicort generic. Case ID# [id number]  Approved - through 12/8/22. Pharmacy to remove SANYA, 3 inhalers at a time. Informed pts spouse of outcome.

## 2021-12-15 RX ORDER — PANTOPRAZOLE SODIUM 40 MG/1
TABLET, DELAYED RELEASE ORAL
Qty: 30 TABLET | Refills: 0 | Status: SHIPPED | OUTPATIENT
Start: 2021-12-15 | End: 2022-01-12

## 2021-12-17 ENCOUNTER — HOSPITAL ENCOUNTER (OUTPATIENT)
Dept: CT IMAGING | Age: 71
Discharge: HOME | End: 2021-12-17
Attending: INTERNAL MEDICINE
Payer: MEDICARE

## 2021-12-17 PROCEDURE — 71250 CT THORAX DX C-: CPT | Performed by: INTERNAL MEDICINE

## 2021-12-20 RX ORDER — HYDRALAZINE HYDROCHLORIDE 25 MG/1
TABLET, FILM COATED ORAL
Qty: 90 TABLET | Refills: 0 | Status: SHIPPED | OUTPATIENT
Start: 2021-12-20

## 2021-12-20 NOTE — IMAGING NOTE
Call placed to pt regarding CTA [place] on 12/22/21. Instructed to arrive at 1215 pm. Instructed to drink plenty of fluids a day prior to procedure and day of procedure. May eat a light breakfast/lunch.  Advised to hold caffeine 12 hrs prior to proce

## 2021-12-21 ENCOUNTER — TELEPHONE (OUTPATIENT)
Dept: ENDOCRINOLOGY CLINIC | Facility: CLINIC | Age: 71
End: 2021-12-21

## 2021-12-21 RX ORDER — INSULIN LISPRO 100 [IU]/ML
INJECTION, SOLUTION INTRAVENOUS; SUBCUTANEOUS
COMMUNITY

## 2021-12-21 NOTE — TELEPHONE ENCOUNTER
Pt. Is interested in getting a Dexcom G6 CGM . Per medicare guidelines, pt. Needs to be on 3 injections of insulin /day. I am only seeing Levemir insulin; can you please let me know if he no longer takes mealtime insulin?  Thanks, [employer]

## 2021-12-21 NOTE — TELEPHONE ENCOUNTER
Contacted pt and clarified his use of insulin. Pt is using insulin lispro tid per sliding scale, refer to medlist and insulin levemir 35 units nightly.

## 2021-12-22 ENCOUNTER — HOSPITAL ENCOUNTER (OUTPATIENT)
Dept: CT IMAGING | Facility: HOSPITAL | Age: 71
Discharge: HOME | End: 2021-12-22
Attending: NURSE PRACTITIONER
Payer: MEDICARE

## 2021-12-22 VITALS — HEART RATE: 69 BPM | DIASTOLIC BLOOD PRESSURE: 83 MMHG | SYSTOLIC BLOOD PRESSURE: 128 MMHG

## 2021-12-22 VITALS — SYSTOLIC BLOOD PRESSURE: 130 MMHG | HEART RATE: 75 BPM | RESPIRATION RATE: 15 BRPM | DIASTOLIC BLOOD PRESSURE: 75 MMHG

## 2021-12-22 DIAGNOSIS — R93.1 DECREASED CARDIAC EJECTION FRACTION: ICD-10-CM

## 2021-12-22 DIAGNOSIS — I25.10 CAD (CORONARY ARTERY DISEASE): ICD-10-CM

## 2021-12-22 PROCEDURE — 75574 CT ANGIO HRT W/3D IMAGE: CPT | Performed by: NURSE PRACTITIONER

## 2021-12-22 RX ORDER — DILTIAZEM HYDROCHLORIDE 5 MG/ML
INJECTION INTRAVENOUS
Status: COMPLETED
Start: 2021-12-22 | End: 2021-12-22

## 2021-12-22 RX ORDER — NITROGLYCERIN 0.4 MG/1
TABLET SUBLINGUAL
Status: COMPLETED
Start: 2021-12-22 | End: 2021-12-22

## 2021-12-22 RX ORDER — METOPROLOL TARTRATE 5 MG/5ML
INJECTION INTRAVENOUS
Status: COMPLETED
Start: 2021-12-22 | End: 2021-12-22

## 2021-12-22 RX ADMIN — NITROGLYCERIN 0.4 MG: 0.4 TABLET SUBLINGUAL at 14:28:00

## 2021-12-22 RX ADMIN — METOPROLOL TARTRATE 5 MG: 5 INJECTION INTRAVENOUS at 13:12:00

## 2021-12-22 RX ADMIN — DILTIAZEM HYDROCHLORIDE 5 MG: 5 INJECTION INTRAVENOUS at 13:47:00

## 2021-12-22 RX ADMIN — DILTIAZEM HYDROCHLORIDE 5 MG: 5 INJECTION INTRAVENOUS at 13:25:00

## 2021-12-22 RX ADMIN — METOPROLOL TARTRATE 5 MG: 5 INJECTION INTRAVENOUS at 13:18:00

## 2021-12-22 RX ADMIN — METOPROLOL TARTRATE 5 MG: 5 INJECTION INTRAVENOUS at 13:00:00

## 2021-12-22 RX ADMIN — DILTIAZEM HYDROCHLORIDE 5 MG: 5 INJECTION INTRAVENOUS at 13:53:00

## 2021-12-22 RX ADMIN — METOPROLOL TARTRATE 5 MG: 5 INJECTION INTRAVENOUS at 13:06:00

## 2021-12-22 RX ADMIN — DILTIAZEM HYDROCHLORIDE 5 MG: 5 INJECTION INTRAVENOUS at 14:01:00

## 2021-12-22 RX ADMIN — DILTIAZEM HYDROCHLORIDE 5 MG: 5 INJECTION INTRAVENOUS at 13:31:00

## 2021-12-22 NOTE — IMAGING NOTE
Pt in Rad recovery area, 1456 pt ambulated around the holding area, pt denies dizziness, IV x 2 removed, pt tolerated well. See vitals.

## 2021-12-22 NOTE — IMAGING NOTE
Received Elfrieda Cost in Radiology Holding area. Verified name, , allergies. Pt denies use of long acting nitrates like, Imdur, Cialis, Levitra and Viagra.  Patient states he took unknown dose of Metoprolol last night and this morning  around 7 am.

## 2022-01-01 RX ORDER — PANTOPRAZOLE SODIUM 40 MG/1
TABLET, DELAYED RELEASE ORAL
Qty: 90 TABLET | Refills: 0 | Status: CANCELLED | OUTPATIENT
Start: 2022-01-01

## 2022-01-12 RX ORDER — PANTOPRAZOLE SODIUM 40 MG/1
TABLET, DELAYED RELEASE ORAL
Qty: 30 TABLET | Refills: 0 | Status: SHIPPED | OUTPATIENT
Start: 2022-01-12

## 2022-01-20 RX ORDER — SPIRONOLACTONE 25 MG/1
TABLET ORAL
Qty: 90 TABLET | Refills: 0 | Status: SHIPPED | OUTPATIENT
Start: 2022-01-20

## 2022-01-20 RX ORDER — METOPROLOL SUCCINATE 25 MG/1
TABLET, EXTENDED RELEASE ORAL
Qty: 90 TABLET | Refills: 0 | Status: SHIPPED | OUTPATIENT
Start: 2022-01-20

## 2022-01-24 ENCOUNTER — TELEPHONE (OUTPATIENT)
Dept: INTERNAL MEDICINE CLINIC | Facility: CLINIC | Age: 72
End: 2022-01-24

## 2022-01-24 DIAGNOSIS — Z13.29 SCREENING FOR THYROID DISORDER: ICD-10-CM

## 2022-01-24 DIAGNOSIS — E55.9 VITAMIN D DEFICIENCY: ICD-10-CM

## 2022-01-24 DIAGNOSIS — C61 PROSTATE CANCER (HCC): ICD-10-CM

## 2022-01-24 DIAGNOSIS — N28.9 RENAL INSUFFICIENCY: ICD-10-CM

## 2022-01-24 DIAGNOSIS — I10 BENIGN ESSENTIAL HTN: Primary | ICD-10-CM

## 2022-01-24 DIAGNOSIS — I25.10 CORONARY ARTERY DISEASE INVOLVING NATIVE CORONARY ARTERY WITHOUT ANGINA PECTORIS, UNSPECIFIED WHETHER NATIVE OR TRANSPLANTED HEART: ICD-10-CM

## 2022-01-24 DIAGNOSIS — R73.9 HYPERGLYCEMIA: ICD-10-CM

## 2022-01-24 DIAGNOSIS — Z00.00 LABORATORY EXAMINATION ORDERED AS PART OF A ROUTINE GENERAL MEDICAL EXAMINATION: ICD-10-CM

## 2022-01-24 DIAGNOSIS — Z12.5 SCREENING PSA (PROSTATE SPECIFIC ANTIGEN): ICD-10-CM

## 2022-01-24 DIAGNOSIS — E11.9 DIABETES MELLITUS TYPE 2 IN NONOBESE (HCC): ICD-10-CM

## 2022-01-24 NOTE — TELEPHONE ENCOUNTER
Pt spouse requesting for lab work to be entered prior to [facility].     Please advise     Future Appointments   Date Time Provider Basilio Caraballo   4/1/2022 10:00 AM Reynaldo Pickens MD [facility] [place]   6/17/2022 10:00 AM [facility]

## 2022-01-27 ENCOUNTER — TELEPHONE (OUTPATIENT)
Dept: INTERNAL MEDICINE CLINIC | Facility: CLINIC | Age: 72
End: 2022-01-27

## 2022-01-27 NOTE — TELEPHONE ENCOUNTER
Incoming fax from [facility] [employer] with detailed written order to be reviewed and signed   Placed in VM in-basket

## 2022-02-14 RX ORDER — PANTOPRAZOLE SODIUM 40 MG/1
TABLET, DELAYED RELEASE ORAL
Qty: 30 TABLET | Refills: 0 | Status: SHIPPED | OUTPATIENT
Start: 2022-02-14 | End: 2022-03-14

## 2022-02-14 NOTE — TELEPHONE ENCOUNTER
No Protocol     Requesting: pantoprazole 40     LOV: 11/2/21   RTC: none noted   Filled: 1/12/22 #30 0 refills     Upcoming OV: 4/1/22

## 2022-02-18 RX ORDER — SPIRONOLACTONE 25 MG/1
TABLET ORAL
Qty: 90 TABLET | Refills: 0 | Status: SHIPPED | OUTPATIENT
Start: 2022-02-18

## 2022-02-18 NOTE — TELEPHONE ENCOUNTER
Passed protocol     Last refill:  SPIRONOLACTONE 25 MG Oral Tab 90 tablet 0 1/20/2022    Sig: Lillie Bustillo 1 TABLET BY MOUTH EVERY DAY       Last virtual phone e/m -  11/2/21 Dr Neelam Perez   10/21/2021 Dr Neelam Perez RTC 3 months  FOV scheduled 04/01/2022

## 2022-02-21 RX ORDER — BUDESONIDE AND FORMOTEROL FUMARATE DIHYDRATE 160; 4.5 UG/1; UG/1
2 AEROSOL RESPIRATORY (INHALATION) 2 TIMES DAILY
Qty: 3 EACH | Refills: 0 | Status: SHIPPED | OUTPATIENT
Start: 2022-02-21 | End: 2022-03-21

## 2022-02-21 NOTE — TELEPHONE ENCOUNTER
Protocol failed     Requesting: caren 160-4.5mcg/act     LOV: 10/21/21   RTC: 3 months   Filled: 12/8/21 #3 0 refills   Recent Labs: 11/26/21     Upcoming OV: 4/1/22

## 2022-03-07 RX ORDER — PANCRELIPASE 36000; 180000; 114000 [USP'U]/1; [USP'U]/1; [USP'U]/1
CAPSULE, DELAYED RELEASE PELLETS ORAL
Qty: 180 CAPSULE | Refills: 0 | Status: SHIPPED | OUTPATIENT
Start: 2022-03-07

## 2022-03-07 NOTE — TELEPHONE ENCOUNTER
Protocol passed     Requesting: Daysi [id number] units     LOV: 11/2/21   RTC: none noted   Recent Labs: 11/26/21     Upcoming OV: 4/1/22

## 2022-03-14 RX ORDER — PANTOPRAZOLE SODIUM 40 MG/1
TABLET, DELAYED RELEASE ORAL
Qty: 30 TABLET | Refills: 0 | Status: SHIPPED | OUTPATIENT
Start: 2022-03-14

## 2022-03-14 NOTE — TELEPHONE ENCOUNTER
No Protocol     Requesting: pantoprazole 40mg     LOV: 11/2/21   RTC: none noted   Filled: 2/14/22 #30 0 refills     Upcoming OV: 4/1/22

## 2022-03-14 NOTE — TELEPHONE ENCOUNTER
No Protocol     Requesting: sertraline 50mg     LOV: 10/21/21   RTC: 3 months   Filled: 2/7/22     Upcoming OV: 4/1/22

## 2022-03-18 RX ORDER — METOPROLOL SUCCINATE 25 MG/1
TABLET, EXTENDED RELEASE ORAL
Qty: 90 TABLET | Refills: 0 | Status: SHIPPED | OUTPATIENT
Start: 2022-03-18

## 2022-03-18 NOTE — TELEPHONE ENCOUNTER
Protocol passed   Metoprolol succinate 25mg filled 1/20/22 #90 0 refills     No Protocol   Sertraline 50mg filled 3/14/22 #30 0 refills     LOV:11/2/21   RTC: none noted   Upcoming OV: 4/1/22

## 2022-03-21 ENCOUNTER — HOSPITAL ENCOUNTER (OUTPATIENT)
Dept: GENERAL RADIOLOGY | Age: 72
Discharge: HOME | End: 2022-03-21
Attending: UROLOGY
Payer: MEDICARE

## 2022-03-21 ENCOUNTER — HOSPITAL ENCOUNTER (OUTPATIENT)
Dept: CT IMAGING | Age: 72
Discharge: HOME | End: 2022-03-21
Attending: HOSPITALIST
Payer: MEDICARE

## 2022-03-21 ENCOUNTER — OFFICE VISIT (OUTPATIENT)
Dept: INTERNAL MEDICINE CLINIC | Facility: CLINIC | Age: 72
End: 2022-03-21
Payer: MEDICARE

## 2022-03-21 VITALS
TEMPERATURE: 98 F | RESPIRATION RATE: 14 BRPM | HEART RATE: 76 BPM | DIASTOLIC BLOOD PRESSURE: 80 MMHG | HEIGHT: 71 IN | SYSTOLIC BLOOD PRESSURE: 130 MMHG | OXYGEN SATURATION: 95 % | BODY MASS INDEX: 20.3 KG/M2 | WEIGHT: 145 LBS

## 2022-03-21 DIAGNOSIS — M25.541 PAIN IN THUMB JOINT WITH MOVEMENT OF RIGHT HAND: Primary | ICD-10-CM

## 2022-03-21 DIAGNOSIS — J18.9 COMMUNITY ACQUIRED PNEUMONIA OF RIGHT MIDDLE LOBE OF LUNG: ICD-10-CM

## 2022-03-21 DIAGNOSIS — N20.0 RENAL CALCULI: ICD-10-CM

## 2022-03-21 PROCEDURE — 99213 OFFICE O/P EST LOW 20 MIN: CPT | Performed by: INTERNAL MEDICINE

## 2022-03-21 PROCEDURE — 3079F DIAST BP 80-89 MM HG: CPT | Performed by: INTERNAL MEDICINE

## 2022-03-21 PROCEDURE — 3008F BODY MASS INDEX DOCD: CPT | Performed by: INTERNAL MEDICINE

## 2022-03-21 PROCEDURE — 3075F SYST BP GE 130 - 139MM HG: CPT | Performed by: INTERNAL MEDICINE

## 2022-03-21 PROCEDURE — 74018 RADEX ABDOMEN 1 VIEW: CPT | Performed by: UROLOGY

## 2022-03-21 RX ORDER — HYDROCHLOROTHIAZIDE 12.5 MG/1
1 TABLET ORAL DAILY
COMMUNITY
Start: 2021-10-08 | End: 2022-03-21

## 2022-03-21 RX ORDER — PREDNISONE 20 MG/1
TABLET ORAL
COMMUNITY
Start: 2021-12-16 | End: 2022-03-21 | Stop reason: ALTCHOICE

## 2022-03-21 RX ORDER — TORSEMIDE 10 MG/1
10 TABLET ORAL DAILY
COMMUNITY
Start: 2021-11-03 | End: 2022-03-21

## 2022-03-21 RX ORDER — FLUTICASONE FUROATE, UMECLIDINIUM BROMIDE AND VILANTEROL TRIFENATATE 100; 62.5; 25 UG/1; UG/1; UG/1
POWDER RESPIRATORY (INHALATION)
COMMUNITY
Start: 2022-03-17

## 2022-03-22 ENCOUNTER — TELEPHONE (OUTPATIENT)
Dept: INTERNAL MEDICINE CLINIC | Facility: CLINIC | Age: 72
End: 2022-03-22

## 2022-03-22 NOTE — TELEPHONE ENCOUNTER
Spoke with patient's spouse/Laura, provided with Dr. Esteban Stratton message below and pt spouse verbalized understanding at this time. Love Bearded states patient is taking miralax daily at this time, denies loose stools and discomfort, no other s/s. Pt/pt spouse to contact our office back if anything changes for further recs from .

## 2022-03-22 NOTE — TELEPHONE ENCOUNTER
Pt's spouse called and stated pt was seen in office on 3/21 by dr. Valentine Giles. During the visit, pt had mentiones about colon issues and pt had x-ray done. Souse stated pt's colon was filled with stool and would like to know if dr. Valentine Giles can prescribe a depository. Please advise.

## 2022-03-22 NOTE — TELEPHONE ENCOUNTER
Per my discussion with the patient he was nhaving bowel movements and passing gas- really does not need anything but if it something does bother him he can  some MiraLAX over-the-counter

## 2022-03-22 NOTE — TELEPHONE ENCOUNTER
See xray of abdomen ordered yesterday 3/21 ordered by Dr. Mary Jo Mcclure. Would you like to recommend over the counter stool softeners or would you like to send rx at this time? Please advise.

## 2022-03-28 ENCOUNTER — LAB ENCOUNTER (OUTPATIENT)
Dept: LAB | Age: 72
End: 2022-03-28
Attending: INTERNAL MEDICINE
Payer: MEDICARE

## 2022-03-28 DIAGNOSIS — Z12.5 SCREENING PSA (PROSTATE SPECIFIC ANTIGEN): ICD-10-CM

## 2022-03-28 DIAGNOSIS — Z01.818 PRE-OPERATIVE CLEARANCE: ICD-10-CM

## 2022-03-28 DIAGNOSIS — N28.9 RENAL INSUFFICIENCY: ICD-10-CM

## 2022-03-28 DIAGNOSIS — I10 BENIGN ESSENTIAL HTN: ICD-10-CM

## 2022-03-28 DIAGNOSIS — I25.10 CORONARY ARTERY DISEASE INVOLVING NATIVE CORONARY ARTERY WITHOUT ANGINA PECTORIS, UNSPECIFIED WHETHER NATIVE OR TRANSPLANTED HEART: ICD-10-CM

## 2022-03-28 DIAGNOSIS — E11.9 DIABETES MELLITUS TYPE 2 IN NONOBESE (HCC): ICD-10-CM

## 2022-03-28 DIAGNOSIS — Z13.29 SCREENING FOR THYROID DISORDER: ICD-10-CM

## 2022-03-28 DIAGNOSIS — C61 PROSTATE CANCER (HCC): ICD-10-CM

## 2022-03-28 DIAGNOSIS — Z00.00 LABORATORY EXAMINATION ORDERED AS PART OF A ROUTINE GENERAL MEDICAL EXAMINATION: ICD-10-CM

## 2022-03-28 DIAGNOSIS — R73.9 HYPERGLYCEMIA: ICD-10-CM

## 2022-03-28 DIAGNOSIS — E55.9 VITAMIN D DEFICIENCY: ICD-10-CM

## 2022-03-28 LAB
ALBUMIN SERPL-MCNC: 3.7 G/DL (ref 3.4–5)
ALBUMIN/GLOB SERPL: 1 {RATIO} (ref 1–2)
ALP LIVER SERPL-CCNC: 111 U/L
ALT SERPL-CCNC: 31 U/L
ANION GAP SERPL CALC-SCNC: 3 MMOL/L (ref 0–18)
AST SERPL-CCNC: 22 U/L (ref 15–37)
BASOPHILS # BLD AUTO: 0.06 X10(3) UL (ref 0–0.2)
BASOPHILS NFR BLD AUTO: 0.5 %
BILIRUB SERPL-MCNC: 0.4 MG/DL (ref 0.1–2)
BILIRUB UR QL STRIP.AUTO: NEGATIVE
BUN BLD-MCNC: 34 MG/DL (ref 7–18)
CALCIUM BLD-MCNC: 9.5 MG/DL (ref 8.5–10.1)
CHLORIDE SERPL-SCNC: 105 MMOL/L (ref 98–112)
CHOLEST SERPL-MCNC: 97 MG/DL (ref ?–200)
CLARITY UR REFRACT.AUTO: CLEAR
CO2 SERPL-SCNC: 29 MMOL/L (ref 21–32)
COLOR UR AUTO: YELLOW
COMPLEXED PSA SERPL-MCNC: 13.1 NG/ML (ref ?–4)
CREAT BLD-MCNC: 1.28 MG/DL
CREAT UR-SCNC: 53.3 MG/DL
EOSINOPHIL # BLD AUTO: 0.75 X10(3) UL (ref 0–0.7)
EOSINOPHIL NFR BLD AUTO: 6.3 %
ERYTHROCYTE [DISTWIDTH] IN BLOOD BY AUTOMATED COUNT: 18.6 %
EST. AVERAGE GLUCOSE BLD GHB EST-MCNC: 166 MG/DL (ref 68–126)
FASTING PATIENT LIPID ANSWER: YES
FASTING STATUS PATIENT QL REPORTED: YES
GLOBULIN PLAS-MCNC: 3.6 G/DL (ref 2.8–4.4)
GLUCOSE BLD-MCNC: 68 MG/DL (ref 70–99)
GLUCOSE UR STRIP.AUTO-MCNC: NEGATIVE MG/DL
HBA1C MFR BLD: 7.4 % (ref ?–5.7)
HCT VFR BLD AUTO: 40.5 %
HDLC SERPL-MCNC: 37 MG/DL (ref 40–59)
HGB BLD-MCNC: 13.7 G/DL
IMM GRANULOCYTES # BLD AUTO: 0.04 X10(3) UL (ref 0–1)
IMM GRANULOCYTES NFR BLD: 0.3 %
KETONES UR STRIP.AUTO-MCNC: NEGATIVE MG/DL
LDLC SERPL CALC-MCNC: 49 MG/DL (ref ?–100)
LEUKOCYTE ESTERASE UR QL STRIP.AUTO: NEGATIVE
LYMPHOCYTES # BLD AUTO: 3.79 X10(3) UL (ref 1–4)
LYMPHOCYTES NFR BLD AUTO: 32 %
MCH RBC QN AUTO: 30.1 PG (ref 26–34)
MCHC RBC AUTO-ENTMCNC: 33.8 G/DL (ref 31–37)
MCV RBC AUTO: 89 FL
MICROALBUMIN UR-MCNC: 1.4 MG/DL
MICROALBUMIN/CREAT 24H UR-RTO: 26.3 UG/MG (ref ?–30)
MONOCYTES # BLD AUTO: 1.31 X10(3) UL (ref 0.1–1)
MONOCYTES NFR BLD AUTO: 11.1 %
NEUTROPHILS # BLD AUTO: 5.88 X10 (3) UL (ref 1.5–7.7)
NEUTROPHILS # BLD AUTO: 5.88 X10(3) UL (ref 1.5–7.7)
NEUTROPHILS NFR BLD AUTO: 49.8 %
NITRITE UR QL STRIP.AUTO: NEGATIVE
NONHDLC SERPL-MCNC: 60 MG/DL (ref ?–130)
OSMOLALITY SERPL CALC.SUM OF ELEC: 290 MOSM/KG (ref 275–295)
PH UR STRIP.AUTO: 6 [PH] (ref 5–8)
PLATELET # BLD AUTO: 353 10(3)UL (ref 150–450)
POTASSIUM SERPL-SCNC: 4.3 MMOL/L (ref 3.5–5.1)
PROT SERPL-MCNC: 7.3 G/DL (ref 6.4–8.2)
PROT UR STRIP.AUTO-MCNC: NEGATIVE MG/DL
RBC # BLD AUTO: 4.55 X10(6)UL
RBC #/AREA URNS AUTO: >10 /HPF
SODIUM SERPL-SCNC: 137 MMOL/L (ref 136–145)
SP GR UR STRIP.AUTO: 1.01 (ref 1–1.03)
T4 FREE SERPL-MCNC: 0.9 NG/DL (ref 0.8–1.7)
TRIGL SERPL-MCNC: 39 MG/DL (ref 30–149)
TSI SER-ACNC: 3.62 MIU/ML (ref 0.36–3.74)
UROBILINOGEN UR STRIP.AUTO-MCNC: <2 MG/DL
VIT D+METAB SERPL-MCNC: 44.3 NG/ML (ref 30–100)
VLDLC SERPL CALC-MCNC: 6 MG/DL (ref 0–30)
WBC # BLD AUTO: 11.8 X10(3) UL (ref 4–11)

## 2022-03-28 PROCEDURE — 3061F NEG MICROALBUMINURIA REV: CPT | Performed by: INTERNAL MEDICINE

## 2022-03-28 PROCEDURE — 81001 URINALYSIS AUTO W/SCOPE: CPT

## 2022-03-28 PROCEDURE — 82570 ASSAY OF URINE CREATININE: CPT

## 2022-03-28 PROCEDURE — 84439 ASSAY OF FREE THYROXINE: CPT

## 2022-03-28 PROCEDURE — 36415 COLL VENOUS BLD VENIPUNCTURE: CPT

## 2022-03-28 PROCEDURE — 80061 LIPID PANEL: CPT

## 2022-03-28 PROCEDURE — 82043 UR ALBUMIN QUANTITATIVE: CPT

## 2022-03-28 PROCEDURE — 84443 ASSAY THYROID STIM HORMONE: CPT

## 2022-03-28 PROCEDURE — 83036 HEMOGLOBIN GLYCOSYLATED A1C: CPT

## 2022-03-28 PROCEDURE — 3051F HG A1C>EQUAL 7.0%<8.0%: CPT | Performed by: INTERNAL MEDICINE

## 2022-03-28 PROCEDURE — 85025 COMPLETE CBC W/AUTO DIFF WBC: CPT

## 2022-03-28 PROCEDURE — 80053 COMPREHEN METABOLIC PANEL: CPT

## 2022-03-28 PROCEDURE — 82306 VITAMIN D 25 HYDROXY: CPT

## 2022-03-30 ENCOUNTER — LAB ENCOUNTER (OUTPATIENT)
Dept: LAB | Age: 72
End: 2022-03-30
Attending: HOSPITALIST
Payer: MEDICARE

## 2022-03-30 DIAGNOSIS — Z01.818 PREOP EXAMINATION: ICD-10-CM

## 2022-03-31 LAB — SARS-COV-2 RNA RESP QL NAA+PROBE: NOT DETECTED

## 2022-04-01 ENCOUNTER — OFFICE VISIT (OUTPATIENT)
Dept: INTERNAL MEDICINE CLINIC | Facility: CLINIC | Age: 72
End: 2022-04-01
Payer: MEDICARE

## 2022-04-01 VITALS
OXYGEN SATURATION: 95 % | SYSTOLIC BLOOD PRESSURE: 122 MMHG | HEART RATE: 85 BPM | WEIGHT: 145 LBS | RESPIRATION RATE: 14 BRPM | DIASTOLIC BLOOD PRESSURE: 60 MMHG | BODY MASS INDEX: 20.3 KG/M2 | TEMPERATURE: 98 F | HEIGHT: 71 IN

## 2022-04-01 DIAGNOSIS — I25.10 CORONARY ARTERY DISEASE INVOLVING NATIVE CORONARY ARTERY OF NATIVE HEART WITHOUT ANGINA PECTORIS: ICD-10-CM

## 2022-04-01 DIAGNOSIS — Z01.818 PREOP EXAM FOR INTERNAL MEDICINE: Primary | ICD-10-CM

## 2022-04-01 DIAGNOSIS — I21.4 NON-ST ELEVATION MYOCARDIAL INFARCTION (NSTEMI) (HCC): ICD-10-CM

## 2022-04-01 DIAGNOSIS — J44.9 CHRONIC OBSTRUCTIVE PULMONARY DISEASE, UNSPECIFIED COPD TYPE (HCC): ICD-10-CM

## 2022-04-01 DIAGNOSIS — E11.9 DIABETES MELLITUS TYPE 2 IN NONOBESE (HCC): ICD-10-CM

## 2022-04-01 PROBLEM — I71.21 ASCENDING AORTIC ANEURYSM: Chronic | Status: ACTIVE | Noted: 2021-10-12

## 2022-04-01 PROBLEM — I71.2 ASCENDING AORTIC ANEURYSM (HCC): Chronic | Status: ACTIVE | Noted: 2021-10-12

## 2022-04-01 PROBLEM — I71.2 ASCENDING AORTIC ANEURYSM: Chronic | Status: ACTIVE | Noted: 2021-10-12

## 2022-04-01 PROBLEM — I71.21 ASCENDING AORTIC ANEURYSM (HCC): Chronic | Status: ACTIVE | Noted: 2021-10-12

## 2022-04-01 PROCEDURE — 93000 ELECTROCARDIOGRAM COMPLETE: CPT | Performed by: INTERNAL MEDICINE

## 2022-04-01 PROCEDURE — 3074F SYST BP LT 130 MM HG: CPT | Performed by: INTERNAL MEDICINE

## 2022-04-01 PROCEDURE — 3078F DIAST BP <80 MM HG: CPT | Performed by: INTERNAL MEDICINE

## 2022-04-01 PROCEDURE — 99214 OFFICE O/P EST MOD 30 MIN: CPT | Performed by: INTERNAL MEDICINE

## 2022-04-01 PROCEDURE — 3008F BODY MASS INDEX DOCD: CPT | Performed by: INTERNAL MEDICINE

## 2022-04-01 RX ORDER — INSULIN DETEMIR 100 [IU]/ML
INJECTION, SOLUTION SUBCUTANEOUS
Qty: 30 ML | Refills: 3 | Status: SHIPPED | OUTPATIENT
Start: 2022-04-01

## 2022-04-01 NOTE — TELEPHONE ENCOUNTER
No Protocol     Requesting: levemir flextouch 100 unit/mL     LOV: 4/1/22   RTC: no follow ups on file   Filled: patient report   Recent Labs: 3/28/22     Upcoming OV: 5/3/22

## 2022-04-01 NOTE — PATIENT INSTRUCTIONS
Patient apparently has been cleared by cardiovascular surgery as well as cardiology. pending final clearance from pulmonary.

## 2022-04-02 ENCOUNTER — RT VISIT (OUTPATIENT)
Dept: RESPIRATORY THERAPY | Facility: HOSPITAL | Age: 72
End: 2022-04-02
Attending: HOSPITALIST
Payer: MEDICARE

## 2022-04-02 PROCEDURE — 94729 DIFFUSING CAPACITY: CPT

## 2022-04-02 PROCEDURE — 94060 EVALUATION OF WHEEZING: CPT

## 2022-04-02 PROCEDURE — 94726 PLETHYSMOGRAPHY LUNG VOLUMES: CPT

## 2022-04-03 NOTE — PROCEDURES
Findings:  Postbronchodilator FEV1 is 1.36L, 49% predicted. Postbronchodilator FVC is 3.91L, 107% predicted. FEV1/ FVC ratio is 0.35. There is a significant bronchodilator response after   administration of albuterol. The flow-volume loop demonstrates an obstructive pattern. The TLC is 6.53L, 89% predicted. The residual volume 2.93L, 108% predicted. The diffusion capacity is 35% predicted and 50% predicted when corrected for alveolar volume. Impression:  There is severe airway obstruction on spirometry and visualized on flow-volume loop. There is a significant bronchodilator response. Lung volumes are normal.  Diffusion capacity is severely reduced with DLCO of 34%. Given the severity of the reduced diffusion capacity, would recommend evaluation for hypoxia and supplemental oxygenation if not already performed. When compared to previous pulmonary function testing dated 9/14/2020, there has been a decrease in residual volume (previously 3.88L, 145%) and diffusion capacity (previously DLCO 42% predicted).   Miah Stapleton MD

## 2022-04-04 ENCOUNTER — OFFICE VISIT (OUTPATIENT)
Dept: ORTHOPEDICS CLINIC | Facility: CLINIC | Age: 72
End: 2022-04-04
Payer: MEDICARE

## 2022-04-04 ENCOUNTER — HOSPITAL ENCOUNTER (OUTPATIENT)
Dept: GENERAL RADIOLOGY | Age: 72
Discharge: HOME | End: 2022-04-04
Attending: ORTHOPAEDIC SURGERY
Payer: MEDICARE

## 2022-04-04 VITALS — HEIGHT: 71 IN | WEIGHT: 145 LBS | BODY MASS INDEX: 20.3 KG/M2

## 2022-04-04 DIAGNOSIS — M65.311 TRIGGER FINGER OF RIGHT THUMB: ICD-10-CM

## 2022-04-04 DIAGNOSIS — M79.644 THUMB PAIN, RIGHT: Primary | ICD-10-CM

## 2022-04-04 DIAGNOSIS — M79.644 THUMB PAIN, RIGHT: ICD-10-CM

## 2022-04-04 PROCEDURE — 73140 X-RAY EXAM OF FINGER(S): CPT | Performed by: ORTHOPAEDIC SURGERY

## 2022-04-04 PROCEDURE — 3008F BODY MASS INDEX DOCD: CPT | Performed by: ORTHOPAEDIC SURGERY

## 2022-04-04 PROCEDURE — 99203 OFFICE O/P NEW LOW 30 MIN: CPT | Performed by: ORTHOPAEDIC SURGERY

## 2022-04-04 PROCEDURE — 20550 NJX 1 TENDON SHEATH/LIGAMENT: CPT | Performed by: ORTHOPAEDIC SURGERY

## 2022-04-04 RX ORDER — TRIAMCINOLONE ACETONIDE 40 MG/ML
40 INJECTION, SUSPENSION INTRA-ARTICULAR; INTRAMUSCULAR ONCE
Status: COMPLETED | OUTPATIENT
Start: 2022-04-04 | End: 2022-04-04

## 2022-04-04 RX ADMIN — TRIAMCINOLONE ACETONIDE 40 MG: 40 INJECTION, SUSPENSION INTRA-ARTICULAR; INTRAMUSCULAR at 14:35:00

## 2022-04-06 ENCOUNTER — IMMUNIZATION (OUTPATIENT)
Dept: LAB | Age: 72
End: 2022-04-06
Attending: EMERGENCY MEDICINE
Payer: MEDICARE

## 2022-04-06 DIAGNOSIS — Z23 NEED FOR VACCINATION: Primary | ICD-10-CM

## 2022-04-06 PROCEDURE — 0054A SARSCOV2 VAC 30MCG TRS SUCR: CPT

## 2022-04-18 RX ORDER — PANTOPRAZOLE SODIUM 40 MG/1
TABLET, DELAYED RELEASE ORAL
Qty: 30 TABLET | Refills: 0 | Status: SHIPPED | OUTPATIENT
Start: 2022-04-18

## 2022-04-18 NOTE — TELEPHONE ENCOUNTER
No Protocol     Requesting: pantoprazole 40mg     LOV: 4/1/22   RTC: no follow ups onfile   Filled: 3/14/22 #30 0 refills   Recent Labs: 3/28/22     Upcoming OV: 5/3/22

## 2022-04-25 ENCOUNTER — LAB ENCOUNTER (OUTPATIENT)
Dept: LAB | Age: 72
End: 2022-04-25
Attending: UROLOGY
Payer: MEDICARE

## 2022-04-25 DIAGNOSIS — Z01.818 PREOP TESTING: ICD-10-CM

## 2022-04-26 LAB — SARS-COV-2 RNA RESP QL NAA+PROBE: NOT DETECTED

## 2022-04-27 ENCOUNTER — HOSPITAL ENCOUNTER (OUTPATIENT)
Facility: HOSPITAL | Age: 72
Setting detail: HOSPITAL OUTPATIENT SURGERY
Discharge: HOME | End: 2022-04-27
Attending: UROLOGY | Admitting: UROLOGY
Payer: MEDICARE

## 2022-04-27 ENCOUNTER — ANESTHESIA (OUTPATIENT)
Dept: SURGERY | Facility: HOSPITAL | Age: 72
End: 2022-04-27
Payer: MEDICARE

## 2022-04-27 ENCOUNTER — ANESTHESIA EVENT (OUTPATIENT)
Dept: SURGERY | Facility: HOSPITAL | Age: 72
End: 2022-04-27
Payer: MEDICARE

## 2022-04-27 VITALS
SYSTOLIC BLOOD PRESSURE: 128 MMHG | HEART RATE: 73 BPM | DIASTOLIC BLOOD PRESSURE: 63 MMHG | RESPIRATION RATE: 16 BRPM | BODY MASS INDEX: 20.16 KG/M2 | HEIGHT: 71 IN | OXYGEN SATURATION: 92 % | WEIGHT: 144 LBS | TEMPERATURE: 98 F

## 2022-04-27 DIAGNOSIS — Z01.818 PREOP TESTING: Primary | ICD-10-CM

## 2022-04-27 LAB
GLUCOSE BLDC GLUCOMTR-MCNC: 120 MG/DL (ref 70–99)
GLUCOSE BLDC GLUCOMTR-MCNC: 138 MG/DL (ref 70–99)

## 2022-04-27 PROCEDURE — 0T768DZ DILATION OF RIGHT URETER WITH INTRALUMINAL DEVICE, VIA NATURAL OR ARTIFICIAL OPENING ENDOSCOPIC: ICD-10-PCS | Performed by: UROLOGY

## 2022-04-27 PROCEDURE — 82962 GLUCOSE BLOOD TEST: CPT

## 2022-04-27 PROCEDURE — 0TF6XZZ FRAGMENTATION IN RIGHT URETER, EXTERNAL APPROACH: ICD-10-PCS | Performed by: UROLOGY

## 2022-04-27 DEVICE — URETERAL STENT
Type: IMPLANTABLE DEVICE | Site: URETER | Status: FUNCTIONAL
Brand: ASCERTA™

## 2022-04-27 RX ORDER — METOPROLOL TARTRATE 5 MG/5ML
2.5 INJECTION INTRAVENOUS ONCE
Status: DISCONTINUED | OUTPATIENT
Start: 2022-04-27 | End: 2022-04-27

## 2022-04-27 RX ORDER — MORPHINE SULFATE 4 MG/ML
2 INJECTION, SOLUTION INTRAMUSCULAR; INTRAVENOUS EVERY 10 MIN PRN
Status: DISCONTINUED | OUTPATIENT
Start: 2022-04-27 | End: 2022-04-27

## 2022-04-27 RX ORDER — ONDANSETRON 2 MG/ML
4 INJECTION INTRAMUSCULAR; INTRAVENOUS ONCE AS NEEDED
Status: DISCONTINUED | OUTPATIENT
Start: 2022-04-27 | End: 2022-04-27

## 2022-04-27 RX ORDER — ACETAMINOPHEN 500 MG
1000 TABLET ORAL ONCE
Status: COMPLETED | OUTPATIENT
Start: 2022-04-27 | End: 2022-04-27

## 2022-04-27 RX ORDER — HYDROMORPHONE HYDROCHLORIDE 1 MG/ML
0.4 INJECTION, SOLUTION INTRAMUSCULAR; INTRAVENOUS; SUBCUTANEOUS EVERY 5 MIN PRN
Status: DISCONTINUED | OUTPATIENT
Start: 2022-04-27 | End: 2022-04-27

## 2022-04-27 RX ORDER — NALOXONE HYDROCHLORIDE 0.4 MG/ML
80 INJECTION, SOLUTION INTRAMUSCULAR; INTRAVENOUS; SUBCUTANEOUS AS NEEDED
Status: DISCONTINUED | OUTPATIENT
Start: 2022-04-27 | End: 2022-04-27

## 2022-04-27 RX ORDER — NICOTINE POLACRILEX 4 MG
30 LOZENGE BUCCAL
Status: DISCONTINUED | OUTPATIENT
Start: 2022-04-27 | End: 2022-04-27

## 2022-04-27 RX ORDER — PROCHLORPERAZINE EDISYLATE 5 MG/ML
5 INJECTION INTRAMUSCULAR; INTRAVENOUS ONCE AS NEEDED
Status: DISCONTINUED | OUTPATIENT
Start: 2022-04-27 | End: 2022-04-27

## 2022-04-27 RX ORDER — MAGNESIUM HYDROXIDE 1200 MG/15ML
LIQUID ORAL AS NEEDED
Status: DISCONTINUED | OUTPATIENT
Start: 2022-04-27 | End: 2022-04-27 | Stop reason: HOSPADM

## 2022-04-27 RX ORDER — DEXAMETHASONE SODIUM PHOSPHATE 4 MG/ML
VIAL (ML) INJECTION AS NEEDED
Status: DISCONTINUED | OUTPATIENT
Start: 2022-04-27 | End: 2022-04-27 | Stop reason: SURG

## 2022-04-27 RX ORDER — HYDROCODONE BITARTRATE AND ACETAMINOPHEN 5; 325 MG/1; MG/1
1 TABLET ORAL AS NEEDED
Status: DISCONTINUED | OUTPATIENT
Start: 2022-04-27 | End: 2022-04-27

## 2022-04-27 RX ORDER — MORPHINE SULFATE 4 MG/ML
4 INJECTION, SOLUTION INTRAMUSCULAR; INTRAVENOUS EVERY 10 MIN PRN
Status: DISCONTINUED | OUTPATIENT
Start: 2022-04-27 | End: 2022-04-27

## 2022-04-27 RX ORDER — SODIUM CHLORIDE, SODIUM LACTATE, POTASSIUM CHLORIDE, CALCIUM CHLORIDE 600; 310; 30; 20 MG/100ML; MG/100ML; MG/100ML; MG/100ML
INJECTION, SOLUTION INTRAVENOUS CONTINUOUS
Status: DISCONTINUED | OUTPATIENT
Start: 2022-04-27 | End: 2022-04-27

## 2022-04-27 RX ORDER — MORPHINE SULFATE 10 MG/ML
6 INJECTION, SOLUTION INTRAMUSCULAR; INTRAVENOUS EVERY 10 MIN PRN
Status: DISCONTINUED | OUTPATIENT
Start: 2022-04-27 | End: 2022-04-27

## 2022-04-27 RX ORDER — ASPIRIN 81 MG/1
81 TABLET ORAL DAILY
COMMUNITY

## 2022-04-27 RX ORDER — CEFAZOLIN SODIUM/WATER 2 G/20 ML
2 SYRINGE (ML) INTRAVENOUS ONCE
Status: COMPLETED | OUTPATIENT
Start: 2022-04-27 | End: 2022-04-27

## 2022-04-27 RX ORDER — LIDOCAINE HYDROCHLORIDE 20 MG/ML
JELLY TOPICAL AS NEEDED
Status: DISCONTINUED | OUTPATIENT
Start: 2022-04-27 | End: 2022-04-27 | Stop reason: HOSPADM

## 2022-04-27 RX ORDER — NICOTINE POLACRILEX 4 MG
15 LOZENGE BUCCAL
Status: DISCONTINUED | OUTPATIENT
Start: 2022-04-27 | End: 2022-04-27

## 2022-04-27 RX ORDER — HYDROMORPHONE HYDROCHLORIDE 1 MG/ML
0.6 INJECTION, SOLUTION INTRAMUSCULAR; INTRAVENOUS; SUBCUTANEOUS EVERY 5 MIN PRN
Status: DISCONTINUED | OUTPATIENT
Start: 2022-04-27 | End: 2022-04-27

## 2022-04-27 RX ORDER — LIDOCAINE HYDROCHLORIDE 10 MG/ML
INJECTION, SOLUTION EPIDURAL; INFILTRATION; INTRACAUDAL; PERINEURAL AS NEEDED
Status: DISCONTINUED | OUTPATIENT
Start: 2022-04-27 | End: 2022-04-27 | Stop reason: SURG

## 2022-04-27 RX ORDER — HYDROCODONE BITARTRATE AND ACETAMINOPHEN 5; 325 MG/1; MG/1
2 TABLET ORAL AS NEEDED
Status: DISCONTINUED | OUTPATIENT
Start: 2022-04-27 | End: 2022-04-27

## 2022-04-27 RX ORDER — DEXTROSE MONOHYDRATE 25 G/50ML
50 INJECTION, SOLUTION INTRAVENOUS
Status: DISCONTINUED | OUTPATIENT
Start: 2022-04-27 | End: 2022-04-27

## 2022-04-27 RX ORDER — HYDROMORPHONE HYDROCHLORIDE 1 MG/ML
0.2 INJECTION, SOLUTION INTRAMUSCULAR; INTRAVENOUS; SUBCUTANEOUS EVERY 5 MIN PRN
Status: DISCONTINUED | OUTPATIENT
Start: 2022-04-27 | End: 2022-04-27

## 2022-04-27 RX ORDER — EPHEDRINE SULFATE 50 MG/ML
INJECTION INTRAVENOUS AS NEEDED
Status: DISCONTINUED | OUTPATIENT
Start: 2022-04-27 | End: 2022-04-27 | Stop reason: SURG

## 2022-04-27 RX ORDER — ONDANSETRON 2 MG/ML
INJECTION INTRAMUSCULAR; INTRAVENOUS AS NEEDED
Status: DISCONTINUED | OUTPATIENT
Start: 2022-04-27 | End: 2022-04-27 | Stop reason: SURG

## 2022-04-27 RX ORDER — HALOPERIDOL 5 MG/ML
0.25 INJECTION INTRAMUSCULAR ONCE AS NEEDED
Status: DISCONTINUED | OUTPATIENT
Start: 2022-04-27 | End: 2022-04-27

## 2022-04-27 RX ADMIN — LIDOCAINE HYDROCHLORIDE 50 MG: 10 INJECTION, SOLUTION EPIDURAL; INFILTRATION; INTRACAUDAL; PERINEURAL at 07:36:00

## 2022-04-27 RX ADMIN — EPHEDRINE SULFATE 5 MG: 50 INJECTION INTRAVENOUS at 07:42:00

## 2022-04-27 RX ADMIN — ONDANSETRON 4 MG: 2 INJECTION INTRAMUSCULAR; INTRAVENOUS at 07:34:00

## 2022-04-27 RX ADMIN — SODIUM CHLORIDE, SODIUM LACTATE, POTASSIUM CHLORIDE, CALCIUM CHLORIDE: 600; 310; 30; 20 INJECTION, SOLUTION INTRAVENOUS at 08:29:00

## 2022-04-27 RX ADMIN — DEXAMETHASONE SODIUM PHOSPHATE 8 MG: 4 MG/ML VIAL (ML) INJECTION at 07:34:00

## 2022-04-27 RX ADMIN — SODIUM CHLORIDE, SODIUM LACTATE, POTASSIUM CHLORIDE, CALCIUM CHLORIDE: 600; 310; 30; 20 INJECTION, SOLUTION INTRAVENOUS at 07:30:00

## 2022-04-27 RX ADMIN — CEFAZOLIN SODIUM/WATER 2 G: 2 G/20 ML SYRINGE (ML) INTRAVENOUS at 07:40:00

## 2022-04-27 NOTE — OPERATIVE REPORT
Donald Christiansen    FACILITY: [facility]    DATE OF OPERATION;  4/27/2022    PREOPERATIVE DIAGNOSIS:  Right Renal Calculus. POSTOPERATIVE DIAGNOSIS: Same     SURGEON: Chan Carroll M.D. PROCEDURE PERFORMED: Right ESWL, Cystoscopy, and Placement of  Right Ureteral Stent    ANESTHESIA:  General.     INDICATIONS: 14 x7 mm right renal calulus    FINDINGS:  Stone fragmented very well. The bladder Amedeo Faden was mild/moderately trabeculated and the ureteral orifices were normal. Prostate *was small and non-obstructing. PUL 2.5cm. No coaptation. EBL: none(Blood administered;None)    COMPLICATIONS: None     TECHNIQUE:    A time-out was reviewed. A general anesthesia was induced. Preoperative antibiotics were administered. The patient was prepped and draped in the dorsal lithotomy position. Sequential  compression devices were in place on the lower legs. The cystoscope was passed into the bladder and the bladder was  examined. The ureteral orifices were in normal position. The  Cystoscope and floroscopy  were used to guide a guidewire into the right  ureteral orifice . A 6-Romansh 26-cm  stent was then passed over a guidewire up into the ureter. The  stent was in good position when the guidewire was removed. The patient was positioned on the lithotriptor table. Sequential compression devices were in place on the lower legs. The stone was centered in the treatment focus using fluoroscopic guidance. The stone was treated  with 3,000 shocks, and a maximum power setting of 24kv, starting at low power, and increasing after a few hundred shocks. Rate:  shocks/min. Total fluoroscopy time was <2 minutes. Good fragmentation was observed. The patient tolerated the procedure well. Pineda Santos M.D.  4/27/2022  7:52 AM

## 2022-04-27 NOTE — ANESTHESIA PROCEDURE NOTES
Airway  Date/Time: 4/27/2022 7:38 AM  Urgency: Elective      General Information and Staff    Patient location during procedure: OR  Anesthesiologist: Romero Daily MD  Performed: anesthesiologist     Indications and Patient Condition  Indications for airway management: anesthesia  Sedation level: deep  Preoxygenated: yes  Patient position: sniffing  Mask difficulty assessment: 2 - vent by mask + OA or adjuvant +/- NMBA    Final Airway Details  Final airway type: supraglottic airway      Successful airway: classic  Size 4      Number of attempts at approach: 1  Number of other approaches attempted: 0

## 2022-05-02 RX ORDER — PANCRELIPASE 36000; 180000; 114000 [USP'U]/1; [USP'U]/1; [USP'U]/1
CAPSULE, DELAYED RELEASE PELLETS ORAL
Qty: 180 CAPSULE | Refills: 0 | Status: SHIPPED | OUTPATIENT
Start: 2022-05-02

## 2022-05-02 NOTE — TELEPHONE ENCOUNTER
Protocol passed     Requesting: Daysi [id number] units oral cap     LOV: 4/1/22   RTC: no follow up on file   Filled: 3/7/22 #180 0 refills   Recent Labs: 3/28/22     Upcoming OV: 5/3/22

## 2022-05-03 ENCOUNTER — OFFICE VISIT (OUTPATIENT)
Dept: INTERNAL MEDICINE CLINIC | Facility: CLINIC | Age: 72
End: 2022-05-03
Payer: MEDICARE

## 2022-05-03 VITALS
DIASTOLIC BLOOD PRESSURE: 68 MMHG | TEMPERATURE: 98 F | WEIGHT: 146 LBS | OXYGEN SATURATION: 96 % | BODY MASS INDEX: 20.44 KG/M2 | RESPIRATION RATE: 16 BRPM | HEIGHT: 71 IN | HEART RATE: 86 BPM | SYSTOLIC BLOOD PRESSURE: 124 MMHG

## 2022-05-03 DIAGNOSIS — K86.1 CHRONIC PANCREATITIS, UNSPECIFIED PANCREATITIS TYPE (HCC): Chronic | ICD-10-CM

## 2022-05-03 DIAGNOSIS — Q25.40 ABNORMALITY OF THORACIC AORTA: Chronic | ICD-10-CM

## 2022-05-03 DIAGNOSIS — J44.9 CHRONIC OBSTRUCTIVE PULMONARY DISEASE, UNSPECIFIED COPD TYPE (HCC): Chronic | ICD-10-CM

## 2022-05-03 DIAGNOSIS — I10 BENIGN ESSENTIAL HTN: Chronic | ICD-10-CM

## 2022-05-03 DIAGNOSIS — E55.9 VITAMIN D DEFICIENCY: Chronic | ICD-10-CM

## 2022-05-03 DIAGNOSIS — N20.0 BILATERAL KIDNEY STONES: Chronic | ICD-10-CM

## 2022-05-03 DIAGNOSIS — E11.9 DIABETES MELLITUS TYPE 2 IN NONOBESE (HCC): Chronic | ICD-10-CM

## 2022-05-03 DIAGNOSIS — I25.2 HISTORY OF MYOCARDIAL INFARCT AT AGE GREATER THAN 60 YEARS: ICD-10-CM

## 2022-05-03 DIAGNOSIS — Z00.00 ROUTINE GENERAL MEDICAL EXAMINATION AT A HEALTH CARE FACILITY: Primary | ICD-10-CM

## 2022-05-03 DIAGNOSIS — I25.10 CORONARY ARTERY DISEASE INVOLVING NATIVE CORONARY ARTERY OF NATIVE HEART WITHOUT ANGINA PECTORIS: Chronic | ICD-10-CM

## 2022-05-03 DIAGNOSIS — C61 PROSTATE CANCER (HCC): Chronic | ICD-10-CM

## 2022-05-03 DIAGNOSIS — K90.9 STEATORRHEA: Chronic | ICD-10-CM

## 2022-05-03 DIAGNOSIS — F11.20 CHRONIC NARCOTIC DEPENDENCE (HCC): Chronic | ICD-10-CM

## 2022-05-03 DIAGNOSIS — I71.2 ASCENDING AORTIC ANEURYSM (HCC): Chronic | ICD-10-CM

## 2022-05-03 PROBLEM — Z43.1 PEG (PERCUTANEOUS ENDOSCOPIC GASTROSTOMY) ADJUSTMENT/REPLACEMENT/REMOVAL (HCC): Chronic | Status: RESOLVED | Noted: 2021-08-20 | Resolved: 2022-05-03

## 2022-05-03 PROBLEM — K56.609 SBO (SMALL BOWEL OBSTRUCTION) (HCC): Chronic | Status: RESOLVED | Noted: 2021-07-02 | Resolved: 2022-05-03

## 2022-05-03 PROCEDURE — 96160 PT-FOCUSED HLTH RISK ASSMT: CPT | Performed by: INTERNAL MEDICINE

## 2022-05-03 PROCEDURE — 3074F SYST BP LT 130 MM HG: CPT | Performed by: INTERNAL MEDICINE

## 2022-05-03 PROCEDURE — G0439 PPPS, SUBSEQ VISIT: HCPCS | Performed by: INTERNAL MEDICINE

## 2022-05-03 PROCEDURE — 3078F DIAST BP <80 MM HG: CPT | Performed by: INTERNAL MEDICINE

## 2022-05-03 PROCEDURE — 3008F BODY MASS INDEX DOCD: CPT | Performed by: INTERNAL MEDICINE

## 2022-05-03 PROCEDURE — 99397 PER PM REEVAL EST PAT 65+ YR: CPT | Performed by: INTERNAL MEDICINE

## 2022-05-03 RX ORDER — ONDANSETRON 4 MG/1
4 TABLET, FILM COATED ORAL EVERY 8 HOURS PRN
Qty: 90 TABLET | Refills: 0 | Status: SHIPPED | OUTPATIENT
Start: 2022-05-03 | End: 2022-06-02

## 2022-05-05 PROBLEM — I25.2 HISTORY OF MYOCARDIAL INFARCT AT AGE GREATER THAN 60 YEARS: Chronic | Status: ACTIVE | Noted: 2022-05-05

## 2022-05-05 PROBLEM — I25.2 HISTORY OF MYOCARDIAL INFARCT AT AGE GREATER THAN 60 YEARS: Status: ACTIVE | Noted: 2022-05-05

## 2022-05-05 PROBLEM — E11.22 TYPE 2 DIABETES MELLITUS WITH DIABETIC CHRONIC KIDNEY DISEASE (HCC): Status: ACTIVE | Noted: 2022-05-05

## 2022-05-05 PROBLEM — E46 PROTEIN-CALORIE MALNUTRITION, UNSPECIFIED SEVERITY (HCC): Status: ACTIVE | Noted: 2022-05-05

## 2022-05-16 RX ORDER — PANTOPRAZOLE SODIUM 40 MG/1
TABLET, DELAYED RELEASE ORAL
Qty: 30 TABLET | Refills: 0 | Status: SHIPPED | OUTPATIENT
Start: 2022-05-16

## 2022-05-16 NOTE — TELEPHONE ENCOUNTER
No Protocol     Requesting: pantoprazole 40mg     LOV:5/3/22   RTC: 6 months   Filled: 4/18/22 #30 0 refills   Recent Labs:3/28/22      Upcoming OV: none scheduled

## 2022-05-25 ENCOUNTER — TELEPHONE (OUTPATIENT)
Dept: INTERNAL MEDICINE CLINIC | Facility: CLINIC | Age: 72
End: 2022-05-25

## 2022-05-26 RX ORDER — BUDESONIDE AND FORMOTEROL FUMARATE DIHYDRATE 160; 4.5 UG/1; UG/1
2 AEROSOL RESPIRATORY (INHALATION) 2 TIMES DAILY
Qty: 30.6 EACH | Refills: 0 | OUTPATIENT
Start: 2022-05-26

## 2022-05-26 RX ORDER — PANCRELIPASE 36000; 180000; 114000 [USP'U]/1; [USP'U]/1; [USP'U]/1
CAPSULE, DELAYED RELEASE PELLETS ORAL
Qty: 180 CAPSULE | Refills: 0 | OUTPATIENT
Start: 2022-05-26

## 2022-05-26 NOTE — TELEPHONE ENCOUNTER
Per [place] dispense [place]:   BUDESONIDE-FORMOTEROL 160-4.5 02/21/2022 02/21/2022  30.6 g  [address] [id number] .. BUDESONIDE-FORMOTEROL 160-4.5 12/08/2021 11/30/2021  30.6 g  [facility] #[id number]- [id number]. .. BUDESONIDE-FORMOTEROL 160-4.5 12/08/2021 11/30/2021  30.6 g  [facility]/pharmacy #[id number]- [place]. .. BUDESONIDE-FORMOTEROL 160-4.5 12/08/2021 11/30/2021 160-4. 5MCG 30 Undefined  90 ROZ RAMIREZ [id number] [place] ..

## 2022-05-26 NOTE — TELEPHONE ENCOUNTER
Pt wife Awais De Luna stated that pt still uses this medication and they are out. Requesting refill -     Budesonide-Formoterol Fumarate 160-4.5 MCG/ACT Inhalation Aerosol    [facility]Y #[id number] Dhara Shearer [place] - [facility]  [address] [facility], [phone], [phone]

## 2022-05-27 RX ORDER — BUDESONIDE AND FORMOTEROL FUMARATE DIHYDRATE 160; 4.5 UG/1; UG/1
2 AEROSOL RESPIRATORY (INHALATION) 2 TIMES DAILY
Qty: 10.2 G | Refills: 0 | Status: SHIPPED | OUTPATIENT
Start: 2022-05-27

## 2022-05-31 NOTE — TELEPHONE ENCOUNTER
No Protocol     Requesting: sertraline 50mg     LOV: 5/3/22   RTC: 6 months   Filled: 5/5/22 #30 0 refills   Recent Labs: 3/28/22     Upcoming OV: none scheduled

## 2022-06-01 ENCOUNTER — HOSPITAL ENCOUNTER (OUTPATIENT)
Dept: INTERVENTIONAL RADIOLOGY/VASCULAR | Facility: HOSPITAL | Age: 72
Discharge: HOME | End: 2022-06-01
Attending: THORACIC SURGERY (CARDIOTHORACIC VASCULAR SURGERY)
Payer: MEDICARE

## 2022-06-01 ENCOUNTER — HOSPITAL ENCOUNTER (OUTPATIENT)
Dept: CARDIOLOGY CLINIC | Facility: HOSPITAL | Age: 72
Discharge: HOME | End: 2022-06-01
Attending: THORACIC SURGERY (CARDIOTHORACIC VASCULAR SURGERY)
Payer: MEDICARE

## 2022-06-01 ENCOUNTER — HOSPITAL ENCOUNTER (OUTPATIENT)
Dept: CV DIAGNOSTICS | Facility: HOSPITAL | Age: 72
Discharge: HOME | End: 2022-06-01
Attending: THORACIC SURGERY (CARDIOTHORACIC VASCULAR SURGERY)
Payer: MEDICARE

## 2022-06-01 ENCOUNTER — HOSPITAL ENCOUNTER (OUTPATIENT)
Dept: LAB | Facility: HOSPITAL | Age: 72
Discharge: HOME | End: 2022-06-01
Attending: THORACIC SURGERY (CARDIOTHORACIC VASCULAR SURGERY)
Payer: MEDICARE

## 2022-06-01 ENCOUNTER — HOSPITAL ENCOUNTER (OUTPATIENT)
Dept: GENERAL RADIOLOGY | Facility: HOSPITAL | Age: 72
Discharge: HOME | End: 2022-06-01
Attending: THORACIC SURGERY (CARDIOTHORACIC VASCULAR SURGERY)
Payer: MEDICARE

## 2022-06-01 DIAGNOSIS — N20.0 RENAL CALCULI: ICD-10-CM

## 2022-06-01 DIAGNOSIS — Z01.818 PRE-OP TESTING: ICD-10-CM

## 2022-06-01 DIAGNOSIS — Z91.89 AT RISK FOR BLEEDING: ICD-10-CM

## 2022-06-01 DIAGNOSIS — I25.10 CORONARY ARTERY DISEASE INVOLVING NATIVE CORONARY ARTERY OF NATIVE HEART WITHOUT ANGINA PECTORIS: ICD-10-CM

## 2022-06-01 LAB
ALBUMIN SERPL-MCNC: 3.5 G/DL (ref 3.4–5)
ALBUMIN/GLOB SERPL: 0.8 {RATIO} (ref 1–2)
ALP LIVER SERPL-CCNC: 100 U/L
ALT SERPL-CCNC: 22 U/L
ANION GAP SERPL CALC-SCNC: 9 MMOL/L (ref 0–18)
ANTIBODY SCREEN: NEGATIVE
APTT PPP: 33.3 SECONDS (ref 23.3–35.6)
AST SERPL-CCNC: 14 U/L (ref 15–37)
BASOPHILS # BLD AUTO: 0.05 X10(3) UL (ref 0–0.2)
BASOPHILS NFR BLD AUTO: 0.5 %
BILIRUB SERPL-MCNC: 0.3 MG/DL (ref 0.1–2)
BILIRUB UR QL STRIP.AUTO: NEGATIVE
BUN BLD-MCNC: 23 MG/DL (ref 7–18)
CALCIUM BLD-MCNC: 9.4 MG/DL (ref 8.5–10.1)
CHLORIDE SERPL-SCNC: 106 MMOL/L (ref 98–112)
CO2 SERPL-SCNC: 24 MMOL/L (ref 21–32)
COLOR UR AUTO: YELLOW
CREAT BLD-MCNC: 1.44 MG/DL
EOSINOPHIL # BLD AUTO: 0.54 X10(3) UL (ref 0–0.7)
EOSINOPHIL NFR BLD AUTO: 4.9 %
ERYTHROCYTE [DISTWIDTH] IN BLOOD BY AUTOMATED COUNT: 15 %
EST. AVERAGE GLUCOSE BLD GHB EST-MCNC: 148 MG/DL (ref 68–126)
FASTING STATUS PATIENT QL REPORTED: NO
GLOBULIN PLAS-MCNC: 4.2 G/DL (ref 2.8–4.4)
GLUCOSE BLD-MCNC: 211 MG/DL (ref 70–99)
GLUCOSE UR STRIP.AUTO-MCNC: NEGATIVE MG/DL
HBA1C MFR BLD: 6.8 % (ref ?–5.7)
HCT VFR BLD AUTO: 37.7 %
HGB BLD-MCNC: 12.8 G/DL
IMM GRANULOCYTES # BLD AUTO: 0.03 X10(3) UL (ref 0–1)
IMM GRANULOCYTES NFR BLD: 0.3 %
INR BLD: 1.06 (ref 0.8–1.2)
KETONES UR STRIP.AUTO-MCNC: NEGATIVE MG/DL
LYMPHOCYTES # BLD AUTO: 2.59 X10(3) UL (ref 1–4)
LYMPHOCYTES NFR BLD AUTO: 23.4 %
MCH RBC QN AUTO: 31.4 PG (ref 26–34)
MCHC RBC AUTO-ENTMCNC: 34 G/DL (ref 31–37)
MCV RBC AUTO: 92.6 FL
MONOCYTES # BLD AUTO: 1.41 X10(3) UL (ref 0.1–1)
MONOCYTES NFR BLD AUTO: 12.7 %
NEUTROPHILS # BLD AUTO: 6.47 X10 (3) UL (ref 1.5–7.7)
NEUTROPHILS # BLD AUTO: 6.47 X10(3) UL (ref 1.5–7.7)
NEUTROPHILS NFR BLD AUTO: 58.2 %
NITRITE UR QL STRIP.AUTO: NEGATIVE
OSMOLALITY SERPL CALC.SUM OF ELEC: 298 MOSM/KG (ref 275–295)
PH UR STRIP.AUTO: 5 [PH] (ref 5–8)
PLATELET # BLD AUTO: 343 10(3)UL (ref 150–450)
POTASSIUM SERPL-SCNC: 4.3 MMOL/L (ref 3.5–5.1)
PROT SERPL-MCNC: 7.7 G/DL (ref 6.4–8.2)
PROT UR STRIP.AUTO-MCNC: 30 MG/DL
PROTHROMBIN TIME: 13.8 SECONDS (ref 11.6–14.8)
PSA SERPL-MCNC: 13.6 NG/ML (ref ?–4)
RBC # BLD AUTO: 4.07 X10(6)UL
RBC #/AREA URNS AUTO: >10 /HPF
RH BLOOD TYPE: POSITIVE
SODIUM SERPL-SCNC: 139 MMOL/L (ref 136–145)
SP GR UR STRIP.AUTO: 1.01 (ref 1–1.03)
UROBILINOGEN UR STRIP.AUTO-MCNC: <2 MG/DL
WBC # BLD AUTO: 11.1 X10(3) UL (ref 4–11)

## 2022-06-01 PROCEDURE — 81001 URINALYSIS AUTO W/SCOPE: CPT | Performed by: THORACIC SURGERY (CARDIOTHORACIC VASCULAR SURGERY)

## 2022-06-01 PROCEDURE — 84153 ASSAY OF PSA TOTAL: CPT

## 2022-06-01 PROCEDURE — 86900 BLOOD TYPING SEROLOGIC ABO: CPT | Performed by: THORACIC SURGERY (CARDIOTHORACIC VASCULAR SURGERY)

## 2022-06-01 PROCEDURE — 71045 X-RAY EXAM CHEST 1 VIEW: CPT | Performed by: THORACIC SURGERY (CARDIOTHORACIC VASCULAR SURGERY)

## 2022-06-01 PROCEDURE — 86850 RBC ANTIBODY SCREEN: CPT | Performed by: THORACIC SURGERY (CARDIOTHORACIC VASCULAR SURGERY)

## 2022-06-01 PROCEDURE — 87086 URINE CULTURE/COLONY COUNT: CPT | Performed by: THORACIC SURGERY (CARDIOTHORACIC VASCULAR SURGERY)

## 2022-06-01 PROCEDURE — 36415 COLL VENOUS BLD VENIPUNCTURE: CPT | Performed by: THORACIC SURGERY (CARDIOTHORACIC VASCULAR SURGERY)

## 2022-06-01 PROCEDURE — 83036 HEMOGLOBIN GLYCOSYLATED A1C: CPT | Performed by: THORACIC SURGERY (CARDIOTHORACIC VASCULAR SURGERY)

## 2022-06-01 PROCEDURE — 85730 THROMBOPLASTIN TIME PARTIAL: CPT | Performed by: THORACIC SURGERY (CARDIOTHORACIC VASCULAR SURGERY)

## 2022-06-01 PROCEDURE — 85610 PROTHROMBIN TIME: CPT | Performed by: THORACIC SURGERY (CARDIOTHORACIC VASCULAR SURGERY)

## 2022-06-01 PROCEDURE — 93010 ELECTROCARDIOGRAM REPORT: CPT | Performed by: INTERNAL MEDICINE

## 2022-06-01 PROCEDURE — 3044F HG A1C LEVEL LT 7.0%: CPT | Performed by: INTERNAL MEDICINE

## 2022-06-01 PROCEDURE — 80053 COMPREHEN METABOLIC PANEL: CPT | Performed by: THORACIC SURGERY (CARDIOTHORACIC VASCULAR SURGERY)

## 2022-06-01 PROCEDURE — 86901 BLOOD TYPING SEROLOGIC RH(D): CPT | Performed by: THORACIC SURGERY (CARDIOTHORACIC VASCULAR SURGERY)

## 2022-06-01 PROCEDURE — 85025 COMPLETE CBC W/AUTO DIFF WBC: CPT | Performed by: THORACIC SURGERY (CARDIOTHORACIC VASCULAR SURGERY)

## 2022-06-01 PROCEDURE — 93005 ELECTROCARDIOGRAM TRACING: CPT

## 2022-06-01 NOTE — CM/SW NOTE
Met with patient and his spouse Julia Houston to discuss discharge planning as patient is scheduled for CABG surgery on June 6, 2022 with Dr Kwabena Brown. 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 He currently resides in a [place] ranch [facility] with his spouse of 37 years, Ellyn Zamora. Patient shared his recent prolonged hospitalization from 7/1 to 9/1 with an SBO on TPN. Since that time, patient has gained some weight and is independent with his ADL's--has an inogen oxygen tank and concentrator, which he 'rarely uses'--no assistive devices. As for hobbies and interests, patient enjoys flying his remote control planes, watching movies and listening to music, fishing and biking; has an interest in sports cars and photography. PCP is Dr Ruslan Vazquez; [facility]. Reviewed what to expect day of / post surgery. Explained the role of hhc--patient to select within insurance network. Reviewed how PT and OT will work with patient to customize plan. Wife retired; will be with patient @ discharge. Emotional support provided. CM/SW to remain available for any discharge needs     06/01/22 1400   CM/SW Referral Data   Referral Source Physician;   Reason for Referral Discharge planning;Protocol order set   Specify order set CABG   Informant Patient;Spouse/Significant Other   Pertinent Medical Hx   Does patient have an established PCP?  Yes   Patient Info   Patient's Current Mental Status at Time of Assessment Alert;Oriented   Patient's [address]   Number of Levels in Home   (tri level)   Patient lives with Spouse/Significant other   Patient Status Prior to Admission   Independent with ADLs and Mobility Yes   Discharge Needs   Anticipated D/C needs Home health care   Choice of Post-Acute Provider   Informed patient of right to choose their preferred provider Yes

## 2022-06-01 NOTE — PROGRESS NOTES
Met with patient and wife in PAT to discuss pre op teaching, post op expectations, discharge planning. Discussed roles of CM/SW, PT/OT, Cardiac rehab in education and recovery. All questions answered, binder given. Discussed typical post op and at home recovery, pts wife will be home to assist at discharge, will follow up post op.   Raina Newton RN  Clinical Coordinator  CV [facility]

## 2022-06-01 NOTE — PAT NURSING NOTE
PAT note: met patient and spouse Rich Simon in [facility] to discuss his CABG scheduled 6/6/22 with Dr. Katie Lovell; labs, UA, CXR, EKG done during visit; Covid scheduled 6/4; tosergio 0530; carmelo christina; reg @; strict npo after 2200; no gum, candy, mints, etc., morning of; reviewed binder and soap instructions; last dose of vitamins, NSAIDs 5/29, aspirin 5/31; only take AM inhalers, Metoprolol, can keep Fentanyl patch on; shower w/ Hibiclens as instructed; admit 5-7 days; pack binder, insurance cards/ID; no jewelry; bring denture cup and eye glasses case; intra-op procedure information and post-op expectations discussed-pt v/u; roles of PT/OT reviewed; all questions answered.     5 meter walk: 8.70, 8.68, 8.19

## 2022-06-03 LAB
ATRIAL RATE: 88 BPM
P AXIS: 93 DEGREES
P-R INTERVAL: 162 MS
Q-T INTERVAL: 348 MS
QRS DURATION: 88 MS
QTC CALCULATION (BEZET): 421 MS
R AXIS: 70 DEGREES
T AXIS: 66 DEGREES
VENTRICULAR RATE: 88 BPM

## 2022-06-04 ENCOUNTER — LAB ENCOUNTER (OUTPATIENT)
Dept: LAB | Age: 72
End: 2022-06-04
Attending: THORACIC SURGERY (CARDIOTHORACIC VASCULAR SURGERY)
Payer: MEDICARE

## 2022-06-04 DIAGNOSIS — Z01.818 PRE-OP TESTING: ICD-10-CM

## 2022-06-04 DIAGNOSIS — Z91.89 AT RISK FOR BLEEDING: ICD-10-CM

## 2022-06-04 DIAGNOSIS — I25.10 CORONARY ARTERY DISEASE INVOLVING NATIVE CORONARY ARTERY OF NATIVE HEART WITHOUT ANGINA PECTORIS: ICD-10-CM

## 2022-06-05 ENCOUNTER — ANESTHESIA EVENT (OUTPATIENT)
Dept: CARDIAC SURGERY | Facility: HOSPITAL | Age: 72
End: 2022-06-05
Payer: MEDICARE

## 2022-06-05 LAB — SARS-COV-2 RNA RESP QL NAA+PROBE: NOT DETECTED

## 2022-06-06 ENCOUNTER — ANESTHESIA (OUTPATIENT)
Dept: CARDIAC SURGERY | Facility: HOSPITAL | Age: 72
End: 2022-06-06
Payer: MEDICARE

## 2022-06-06 ENCOUNTER — HOSPITAL ENCOUNTER (INPATIENT)
Facility: HOSPITAL | Age: 72
LOS: 5 days | Discharge: HOME | End: 2022-06-11
Attending: THORACIC SURGERY (CARDIOTHORACIC VASCULAR SURGERY) | Admitting: THORACIC SURGERY (CARDIOTHORACIC VASCULAR SURGERY)
Payer: MEDICARE

## 2022-06-06 ENCOUNTER — APPOINTMENT (OUTPATIENT)
Dept: GENERAL RADIOLOGY | Facility: HOSPITAL | Age: 72
End: 2022-06-06
Attending: PHYSICIAN ASSISTANT
Payer: MEDICARE

## 2022-06-06 DIAGNOSIS — Z91.89 AT RISK FOR BLEEDING: ICD-10-CM

## 2022-06-06 DIAGNOSIS — D57.3 SICKLE CELL TRAIT (HCC): ICD-10-CM

## 2022-06-06 DIAGNOSIS — Z01.818 PRE-OP TESTING: ICD-10-CM

## 2022-06-06 DIAGNOSIS — J90 PLEURAL EFFUSION: ICD-10-CM

## 2022-06-06 DIAGNOSIS — I25.10 CORONARY ARTERY DISEASE INVOLVING NATIVE CORONARY ARTERY OF NATIVE HEART WITHOUT ANGINA PECTORIS: Primary | ICD-10-CM

## 2022-06-06 PROBLEM — Z95.1 S/P CABG (CORONARY ARTERY BYPASS GRAFT): Status: ACTIVE | Noted: 2022-06-06

## 2022-06-06 PROBLEM — E11.9 DIABETES MELLITUS (HCC): Status: ACTIVE | Noted: 2022-05-05

## 2022-06-06 LAB
APTT PPP: 32.2 SECONDS (ref 23.3–35.6)
APTT PPP: 33.7 SECONDS (ref 23.3–35.6)
ATRIAL RATE: 78 BPM
BASE EXCESS BLD CALC-SCNC: 1 MMOL/L
BASE EXCESS BLD CALC-SCNC: 1 MMOL/L
BASE EXCESS BLDA CALC-SCNC: -0.8 MMOL/L (ref ?–2)
BASE EXCESS BLDA CALC-SCNC: -1.3 MMOL/L (ref ?–2)
BASE EXCESS BLDA CALC-SCNC: 0 MMOL/L (ref ?–30)
BASE EXCESS BLDA CALC-SCNC: 4 MMOL/L (ref ?–30)
BODY TEMPERATURE: 98.6 F
BODY TEMPERATURE: 98.6 F
BUN BLD-MCNC: 20 MG/DL (ref 7–18)
CA-I BLD-SCNC: 1.25 MMOL/L (ref 1.12–1.32)
CA-I BLD-SCNC: 1.26 MMOL/L (ref 0.95–1.32)
CA-I BLD-SCNC: 1.29 MMOL/L (ref 1.12–1.32)
CA-I BLDA-SCNC: 1.12 MMOL/L (ref 1.12–1.32)
CA-I BLDA-SCNC: 1.33 MMOL/L (ref 1.12–1.32)
CALCIUM BLD-MCNC: 8.8 MG/DL (ref 8.5–10.1)
CHLORIDE SERPL-SCNC: 113 MMOL/L (ref 98–112)
CO2 BLD-SCNC: 27 MMOL/L (ref 22–32)
CO2 BLD-SCNC: 29 MMOL/L (ref 22–32)
CO2 BLDA-SCNC: 28 MMOL/L (ref 22–32)
CO2 BLDA-SCNC: 29 MMOL/L (ref 22–32)
CO2 SERPL-SCNC: 26 MMOL/L (ref 21–32)
COHGB MFR BLD: 0.1 % SAT (ref 0–3)
COHGB MFR BLD: 0.2 % SAT (ref 0–3)
CREAT BLD-MCNC: 1.14 MG/DL
ERYTHROCYTE [DISTWIDTH] IN BLOOD BY AUTOMATED COUNT: 14.7 %
FIBRINOGEN PPP-MCNC: 315 MG/DL (ref 180–480)
FIBRINOGEN PPP-MCNC: 358 MG/DL (ref 180–480)
FIO2: 40 %
FIO2: 40 %
GLUCOSE BLD-MCNC: 101 MG/DL (ref 70–99)
GLUCOSE BLD-MCNC: 106 MG/DL (ref 70–99)
GLUCOSE BLD-MCNC: 116 MG/DL (ref 70–99)
GLUCOSE BLD-MCNC: 117 MG/DL (ref 70–99)
GLUCOSE BLD-MCNC: 122 MG/DL (ref 70–99)
GLUCOSE BLD-MCNC: 123 MG/DL (ref 70–99)
GLUCOSE BLD-MCNC: 125 MG/DL (ref 70–99)
GLUCOSE BLD-MCNC: 125 MG/DL (ref 70–99)
GLUCOSE BLD-MCNC: 127 MG/DL (ref 70–99)
GLUCOSE BLD-MCNC: 128 MG/DL (ref 70–99)
GLUCOSE BLD-MCNC: 129 MG/DL (ref 70–99)
GLUCOSE BLD-MCNC: 131 MG/DL (ref 70–99)
GLUCOSE BLD-MCNC: 131 MG/DL (ref 70–99)
GLUCOSE BLD-MCNC: 134 MG/DL (ref 70–99)
GLUCOSE BLD-MCNC: 135 MG/DL (ref 70–99)
GLUCOSE BLD-MCNC: 142 MG/DL (ref 70–99)
GLUCOSE BLDA-MCNC: 145 MG/DL (ref 70–99)
GLUCOSE BLDA-MCNC: 160 MG/DL (ref 70–99)
HCO3 BLD-SCNC: 25.7 MEQ/L
HCO3 BLD-SCNC: 27 MEQ/L
HCO3 BLDA-SCNC: 23.9 MEQ/L (ref 21–27)
HCO3 BLDA-SCNC: 24.3 MEQ/L (ref 21–27)
HCO3 BLDA-SCNC: 26.1 MEQ/L (ref 22–26)
HCO3 BLDA-SCNC: 27.8 MEQ/L (ref 22–26)
HCT VFR BLD AUTO: 32.1 %
HCT VFR BLD CALC: 32 %
HCT VFR BLD CALC: 35 %
HCT VFR BLDA CALC: 24 %
HCT VFR BLDA CALC: 26 %
HGB BLD-MCNC: 11 G/DL
HGB BLD-MCNC: 11.1 G/DL
HGB BLD-MCNC: 11.2 G/DL
HGB BLD-MCNC: 8.8 G/DL
INR BLD: 1.33 (ref 0.8–1.2)
INR BLD: 1.6 (ref 0.8–1.2)
ISTAT ACTIVATED CLOTTING TIME: 154 SECONDS (ref 74–137)
ISTAT ACTIVATED CLOTTING TIME: 374 SECONDS (ref 74–137)
ISTAT PATIENT TEMPERATURE: 32 DEGREE
ISTAT PATIENT TEMPERATURE: 37 DEGREE
LACTATE BLD-SCNC: 1.2 MMOL/L (ref 0.5–2)
MAGNESIUM SERPL-MCNC: 2.4 MG/DL (ref 1.6–2.6)
MCH RBC QN AUTO: 31.4 PG (ref 26–34)
MCHC RBC AUTO-ENTMCNC: 34.6 G/DL (ref 31–37)
MCV RBC AUTO: 90.9 FL
METHGB MFR BLD: 0 % SAT (ref 0.4–1.5)
METHGB MFR BLD: 0 % SAT (ref 0.4–1.5)
OXYHGB MFR BLDA: 97.2 % (ref 92–100)
OXYHGB MFR BLDA: 97.7 % (ref 92–100)
P AXIS: 74 DEGREES
P-R INTERVAL: 152 MS
PCO2 BLD: 41.7 MMHG
PCO2 BLD: 52 MMHG
PCO2 BLDA: 39.6 MMHG (ref 35–45)
PCO2 BLDA: 40 MM HG (ref 35–45)
PCO2 BLDA: 40.6 MMHG (ref 35–45)
PCO2 BLDA: 41 MM HG (ref 35–45)
PEEP: 5 CM H2O
PEEP: 5 CM H2O
PH BLD: 7.32 [PH]
PH BLD: 7.4 [PH]
PH BLDA: 7.38 [PH] (ref 7.35–7.45)
PH BLDA: 7.38 [PH] (ref 7.35–7.45)
PH BLDA: 7.4 [PH] (ref 7.35–7.45)
PH BLDA: 7.44 [PH] (ref 7.35–7.45)
PLATELET # BLD AUTO: 213 10(3)UL (ref 150–450)
PLATELET # BLD AUTO: 235 10(3)UL (ref 150–450)
PO2 BLD: 156 MMHG
PO2 BLD: 472 MMHG
PO2 BLDA: 161 MM HG (ref 80–100)
PO2 BLDA: 164 MM HG (ref 80–100)
PO2 BLDA: 367 MMHG (ref 80–105)
PO2 BLDA: 399 MMHG (ref 80–105)
POTASSIUM BLD-SCNC: 3.9 MMOL/L (ref 3.6–5.1)
POTASSIUM BLD-SCNC: 4.4 MMOL/L (ref 3.6–5.1)
POTASSIUM BLD-SCNC: 4.7 MMOL/L (ref 3.6–5.1)
POTASSIUM SERPL-SCNC: 4.5 MMOL/L (ref 3.5–5.1)
PRESSURE SUPPORT: 5 CM H2O
PROTHROMBIN TIME: 16.5 SECONDS (ref 11.6–14.8)
PROTHROMBIN TIME: 19.1 SECONDS (ref 11.6–14.8)
Q-T INTERVAL: 414 MS
QRS DURATION: 82 MS
QTC CALCULATION (BEZET): 471 MS
R AXIS: 79 DEGREES
RBC # BLD AUTO: 3.53 X10(6)UL
SAO2 % BLD: 100 %
SAO2 % BLD: 99 %
SAO2 % BLDA: 100 % (ref 92–100)
SAO2 % BLDA: 100 % (ref 92–100)
SODIUM BLD-SCNC: 141 MMOL/L (ref 135–145)
SODIUM BLD-SCNC: 142 MMOL/L (ref 136–145)
SODIUM BLD-SCNC: 143 MMOL/L (ref 136–145)
SODIUM BLDA-SCNC: 137 MMOL/L (ref 136–145)
SODIUM BLDA-SCNC: 138 MMOL/L (ref 136–145)
SODIUM BLDA-SCNC: 4.5 MMOL/L (ref 3.6–5.1)
SODIUM BLDA-SCNC: 4.9 MMOL/L (ref 3.6–5.1)
SODIUM SERPL-SCNC: 143 MMOL/L (ref 136–145)
T AXIS: 81 DEGREES
TIDAL VOLUME: 500 ML
VENT RATE: 12 /MIN
VENTRICULAR RATE: 78 BPM
WBC # BLD AUTO: 22.3 X10(3) UL (ref 4–11)

## 2022-06-06 PROCEDURE — 76942 ECHO GUIDE FOR BIOPSY: CPT | Performed by: ANESTHESIOLOGY

## 2022-06-06 PROCEDURE — S0028 INJECTION, FAMOTIDINE, 20 MG: HCPCS | Performed by: ANESTHESIOLOGY

## 2022-06-06 PROCEDURE — 93312 ECHO TRANSESOPHAGEAL: CPT | Performed by: ANESTHESIOLOGY

## 2022-06-06 PROCEDURE — 02100Z9 BYPASS CORONARY ARTERY, ONE ARTERY FROM LEFT INTERNAL MAMMARY, OPEN APPROACH: ICD-10-PCS | Performed by: THORACIC SURGERY (CARDIOTHORACIC VASCULAR SURGERY)

## 2022-06-06 PROCEDURE — 5A1221Z PERFORMANCE OF CARDIAC OUTPUT, CONTINUOUS: ICD-10-PCS | Performed by: THORACIC SURGERY (CARDIOTHORACIC VASCULAR SURGERY)

## 2022-06-06 PROCEDURE — 021109W BYPASS CORONARY ARTERY, TWO ARTERIES FROM AORTA WITH AUTOLOGOUS VENOUS TISSUE, OPEN APPROACH: ICD-10-PCS | Performed by: THORACIC SURGERY (CARDIOTHORACIC VASCULAR SURGERY)

## 2022-06-06 PROCEDURE — 06BQ4ZZ EXCISION OF LEFT SAPHENOUS VEIN, PERCUTANEOUS ENDOSCOPIC APPROACH: ICD-10-PCS | Performed by: THORACIC SURGERY (CARDIOTHORACIC VASCULAR SURGERY)

## 2022-06-06 PROCEDURE — 71045 X-RAY EXAM CHEST 1 VIEW: CPT | Performed by: PHYSICIAN ASSISTANT

## 2022-06-06 DEVICE — IMPLANTABLE DEVICE
Type: IMPLANTABLE DEVICE | Site: CHEST | Status: FUNCTIONAL
Brand: STERNALOCK® BLU SYSTEM

## 2022-06-06 RX ORDER — DIPHENHYDRAMINE HYDROCHLORIDE 50 MG/ML
12.5 INJECTION INTRAMUSCULAR; INTRAVENOUS EVERY 4 HOURS PRN
Status: DISCONTINUED | OUTPATIENT
Start: 2022-06-06 | End: 2022-06-11

## 2022-06-06 RX ORDER — POLYETHYLENE GLYCOL 3350 17 G/17G
17 POWDER, FOR SOLUTION ORAL DAILY PRN
Status: DISCONTINUED | OUTPATIENT
Start: 2022-06-06 | End: 2022-06-11

## 2022-06-06 RX ORDER — CHLORHEXIDINE GLUCONATE 0.12 MG/ML
15 RINSE ORAL
Status: DISCONTINUED | OUTPATIENT
Start: 2022-06-06 | End: 2022-06-06

## 2022-06-06 RX ORDER — ALBUMIN, HUMAN INJ 5% 5 %
SOLUTION INTRAVENOUS
Status: COMPLETED
Start: 2022-06-06 | End: 2022-06-06

## 2022-06-06 RX ORDER — CEFAZOLIN SODIUM/WATER 2 G/20 ML
SYRINGE (ML) INTRAVENOUS AS NEEDED
Status: DISCONTINUED | OUTPATIENT
Start: 2022-06-06 | End: 2022-06-06 | Stop reason: SURG

## 2022-06-06 RX ORDER — ATORVASTATIN CALCIUM 10 MG/1
10 TABLET, FILM COATED ORAL NIGHTLY
Status: DISCONTINUED | OUTPATIENT
Start: 2022-06-06 | End: 2022-06-08

## 2022-06-06 RX ORDER — SODIUM CHLORIDE 9 MG/ML
INJECTION, SOLUTION INTRAVENOUS CONTINUOUS
Status: DISCONTINUED | OUTPATIENT
Start: 2022-06-06 | End: 2022-06-11

## 2022-06-06 RX ORDER — METHYLPREDNISOLONE SODIUM SUCCINATE 500 MG/8ML
INJECTION INTRAMUSCULAR; INTRAVENOUS AS NEEDED
Status: DISCONTINUED | OUTPATIENT
Start: 2022-06-06 | End: 2022-06-06 | Stop reason: SURG

## 2022-06-06 RX ORDER — PROTAMINE SULFATE 10 MG/ML
INJECTION, SOLUTION INTRAVENOUS AS NEEDED
Status: DISCONTINUED | OUTPATIENT
Start: 2022-06-06 | End: 2022-06-06 | Stop reason: SURG

## 2022-06-06 RX ORDER — DEXTROSE AND SODIUM CHLORIDE 5; .45 G/100ML; G/100ML
INJECTION, SOLUTION INTRAVENOUS CONTINUOUS
Status: DISCONTINUED | OUTPATIENT
Start: 2022-06-06 | End: 2022-06-11

## 2022-06-06 RX ORDER — SENNOSIDES 8.6 MG
17.2 TABLET ORAL NIGHTLY PRN
Status: DISCONTINUED | OUTPATIENT
Start: 2022-06-06 | End: 2022-06-11

## 2022-06-06 RX ORDER — NITROGLYCERIN 20 MG/100ML
INJECTION INTRAVENOUS CONTINUOUS PRN
Status: DISCONTINUED | OUTPATIENT
Start: 2022-06-06 | End: 2022-06-11

## 2022-06-06 RX ORDER — NITROGLYCERIN 20 MG/100ML
INJECTION INTRAVENOUS CONTINUOUS PRN
Status: DISCONTINUED | OUTPATIENT
Start: 2022-06-06 | End: 2022-06-06 | Stop reason: SURG

## 2022-06-06 RX ORDER — POTASSIUM CHLORIDE 29.8 MG/ML
40 INJECTION INTRAVENOUS AS NEEDED
Status: DISCONTINUED | OUTPATIENT
Start: 2022-06-06 | End: 2022-06-11

## 2022-06-06 RX ORDER — MAGNESIUM SULFATE HEPTAHYDRATE 40 MG/ML
2 INJECTION, SOLUTION INTRAVENOUS AS NEEDED
Status: DISCONTINUED | OUTPATIENT
Start: 2022-06-06 | End: 2022-06-11

## 2022-06-06 RX ORDER — MELATONIN
3 NIGHTLY PRN
Status: DISCONTINUED | OUTPATIENT
Start: 2022-06-06 | End: 2022-06-11

## 2022-06-06 RX ORDER — CEFAZOLIN SODIUM 1 G/3ML
INJECTION, POWDER, FOR SOLUTION INTRAMUSCULAR; INTRAVENOUS AS NEEDED
Status: DISCONTINUED | OUTPATIENT
Start: 2022-06-06 | End: 2022-06-06 | Stop reason: HOSPADM

## 2022-06-06 RX ORDER — SODIUM PHOSPHATE, DIBASIC AND SODIUM PHOSPHATE, MONOBASIC 7; 19 G/133ML; G/133ML
1 ENEMA RECTAL ONCE AS NEEDED
Status: DISCONTINUED | OUTPATIENT
Start: 2022-06-06 | End: 2022-06-11

## 2022-06-06 RX ORDER — ASPIRIN 81 MG/1
81 TABLET ORAL DAILY
Status: DISCONTINUED | OUTPATIENT
Start: 2022-06-06 | End: 2022-06-11

## 2022-06-06 RX ORDER — PHENYLEPHRINE HCL 10 MG/ML
VIAL (ML) INJECTION AS NEEDED
Status: DISCONTINUED | OUTPATIENT
Start: 2022-06-06 | End: 2022-06-06 | Stop reason: SURG

## 2022-06-06 RX ORDER — MIDAZOLAM HYDROCHLORIDE 1 MG/ML
1 INJECTION INTRAMUSCULAR; INTRAVENOUS EVERY 30 MIN PRN
Status: DISCONTINUED | OUTPATIENT
Start: 2022-06-06 | End: 2022-06-06

## 2022-06-06 RX ORDER — ALBUMIN, HUMAN INJ 5% 5 %
500 SOLUTION INTRAVENOUS ONCE
Status: COMPLETED | OUTPATIENT
Start: 2022-06-06 | End: 2022-06-06

## 2022-06-06 RX ORDER — CEFAZOLIN SODIUM/WATER 2 G/20 ML
2 SYRINGE (ML) INTRAVENOUS EVERY 8 HOURS
Status: COMPLETED | OUTPATIENT
Start: 2022-06-06 | End: 2022-06-08

## 2022-06-06 RX ORDER — NICOTINE POLACRILEX 4 MG
15 LOZENGE BUCCAL
Status: DISCONTINUED | OUTPATIENT
Start: 2022-06-06 | End: 2022-06-11

## 2022-06-06 RX ORDER — ONDANSETRON 2 MG/ML
4 INJECTION INTRAMUSCULAR; INTRAVENOUS EVERY 6 HOURS PRN
Status: DISCONTINUED | OUTPATIENT
Start: 2022-06-06 | End: 2022-06-11

## 2022-06-06 RX ORDER — DEXMEDETOMIDINE HYDROCHLORIDE 4 UG/ML
INJECTION, SOLUTION INTRAVENOUS CONTINUOUS
Status: DISCONTINUED | OUTPATIENT
Start: 2022-06-06 | End: 2022-06-06

## 2022-06-06 RX ORDER — MIDAZOLAM HYDROCHLORIDE 1 MG/ML
INJECTION INTRAMUSCULAR; INTRAVENOUS AS NEEDED
Status: DISCONTINUED | OUTPATIENT
Start: 2022-06-06 | End: 2022-06-06 | Stop reason: SURG

## 2022-06-06 RX ORDER — DEXTROSE MONOHYDRATE 25 G/50ML
50 INJECTION, SOLUTION INTRAVENOUS
Status: DISCONTINUED | OUTPATIENT
Start: 2022-06-06 | End: 2022-06-11

## 2022-06-06 RX ORDER — HEPARIN SODIUM 1000 [USP'U]/ML
INJECTION, SOLUTION INTRAVENOUS; SUBCUTANEOUS AS NEEDED
Status: DISCONTINUED | OUTPATIENT
Start: 2022-06-06 | End: 2022-06-06 | Stop reason: SURG

## 2022-06-06 RX ORDER — PHYTONADIONE 10 MG/ML
INJECTION, EMULSION INTRAMUSCULAR; INTRAVENOUS; SUBCUTANEOUS AS NEEDED
Status: DISCONTINUED | OUTPATIENT
Start: 2022-06-06 | End: 2022-06-06 | Stop reason: SURG

## 2022-06-06 RX ORDER — DIPHENHYDRAMINE HYDROCHLORIDE 50 MG/ML
INJECTION INTRAMUSCULAR; INTRAVENOUS AS NEEDED
Status: DISCONTINUED | OUTPATIENT
Start: 2022-06-06 | End: 2022-06-06 | Stop reason: SURG

## 2022-06-06 RX ORDER — DEXMEDETOMIDINE HYDROCHLORIDE 4 UG/ML
INJECTION, SOLUTION INTRAVENOUS CONTINUOUS PRN
Status: DISCONTINUED | OUTPATIENT
Start: 2022-06-06 | End: 2022-06-06 | Stop reason: SURG

## 2022-06-06 RX ORDER — ACETAMINOPHEN 10 MG/ML
1000 INJECTION, SOLUTION INTRAVENOUS EVERY 6 HOURS
Status: COMPLETED | OUTPATIENT
Start: 2022-06-06 | End: 2022-06-07

## 2022-06-06 RX ORDER — ROCURONIUM BROMIDE 10 MG/ML
INJECTION, SOLUTION INTRAVENOUS AS NEEDED
Status: DISCONTINUED | OUTPATIENT
Start: 2022-06-06 | End: 2022-06-06 | Stop reason: SURG

## 2022-06-06 RX ORDER — FAMOTIDINE 10 MG/ML
INJECTION, SOLUTION INTRAVENOUS AS NEEDED
Status: DISCONTINUED | OUTPATIENT
Start: 2022-06-06 | End: 2022-06-06 | Stop reason: SURG

## 2022-06-06 RX ORDER — BISACODYL 10 MG
10 SUPPOSITORY, RECTAL RECTAL
Status: DISCONTINUED | OUTPATIENT
Start: 2022-06-06 | End: 2022-06-11

## 2022-06-06 RX ORDER — MAGNESIUM SULFATE 1 G/100ML
1 INJECTION INTRAVENOUS AS NEEDED
Status: DISCONTINUED | OUTPATIENT
Start: 2022-06-06 | End: 2022-06-11

## 2022-06-06 RX ORDER — NALOXONE HYDROCHLORIDE 0.4 MG/ML
0.08 INJECTION, SOLUTION INTRAMUSCULAR; INTRAVENOUS; SUBCUTANEOUS
Status: DISCONTINUED | OUTPATIENT
Start: 2022-06-06 | End: 2022-06-11

## 2022-06-06 RX ORDER — IPRATROPIUM BROMIDE AND ALBUTEROL SULFATE 2.5; .5 MG/3ML; MG/3ML
3 SOLUTION RESPIRATORY (INHALATION) EVERY 4 HOURS PRN
Status: DISCONTINUED | OUTPATIENT
Start: 2022-06-06 | End: 2022-06-06

## 2022-06-06 RX ORDER — POTASSIUM CHLORIDE 14.9 MG/ML
20 INJECTION INTRAVENOUS AS NEEDED
Status: DISCONTINUED | OUTPATIENT
Start: 2022-06-06 | End: 2022-06-11

## 2022-06-06 RX ORDER — ALBUTEROL SULFATE 90 UG/1
AEROSOL, METERED RESPIRATORY (INHALATION) AS NEEDED
Status: DISCONTINUED | OUTPATIENT
Start: 2022-06-06 | End: 2022-06-06 | Stop reason: SURG

## 2022-06-06 RX ORDER — NICOTINE POLACRILEX 4 MG
30 LOZENGE BUCCAL
Status: DISCONTINUED | OUTPATIENT
Start: 2022-06-06 | End: 2022-06-11

## 2022-06-06 RX ORDER — PANTOPRAZOLE SODIUM 40 MG/1
40 TABLET, DELAYED RELEASE ORAL
Status: DISCONTINUED | OUTPATIENT
Start: 2022-06-06 | End: 2022-06-11

## 2022-06-06 RX ORDER — SODIUM CHLORIDE 9 MG/ML
INJECTION, SOLUTION INTRAVENOUS CONTINUOUS PRN
Status: DISCONTINUED | OUTPATIENT
Start: 2022-06-06 | End: 2022-06-06 | Stop reason: SURG

## 2022-06-06 RX ORDER — DOBUTAMINE HYDROCHLORIDE 200 MG/100ML
INJECTION INTRAVENOUS CONTINUOUS PRN
Status: DISCONTINUED | OUTPATIENT
Start: 2022-06-06 | End: 2022-06-11

## 2022-06-06 RX ORDER — ALBUMIN, HUMAN INJ 5% 5 %
250 SOLUTION INTRAVENOUS ONCE AS NEEDED
Status: DISCONTINUED | OUTPATIENT
Start: 2022-06-06 | End: 2022-06-11

## 2022-06-06 RX ADMIN — NITROGLYCERIN 50 MCG/MIN: 20 INJECTION INTRAVENOUS at 07:32:00

## 2022-06-06 RX ADMIN — MIDAZOLAM HYDROCHLORIDE 2 MG: 1 INJECTION INTRAMUSCULAR; INTRAVENOUS at 09:15:00

## 2022-06-06 RX ADMIN — ROCURONIUM BROMIDE 50 MG: 10 INJECTION, SOLUTION INTRAVENOUS at 10:02:00

## 2022-06-06 RX ADMIN — NITROGLYCERIN 25 MCG/MIN: 20 INJECTION INTRAVENOUS at 08:07:00

## 2022-06-06 RX ADMIN — NITROGLYCERIN 25 MCG/MIN: 20 INJECTION INTRAVENOUS at 07:40:00

## 2022-06-06 RX ADMIN — NITROGLYCERIN 30 MCG/MIN: 20 INJECTION INTRAVENOUS at 07:30:00

## 2022-06-06 RX ADMIN — DIPHENHYDRAMINE HYDROCHLORIDE 50 MG: 50 INJECTION INTRAMUSCULAR; INTRAVENOUS at 06:33:00

## 2022-06-06 RX ADMIN — ALBUTEROL SULFATE 8 PUFF: 90 AEROSOL, METERED RESPIRATORY (INHALATION) at 09:40:00

## 2022-06-06 RX ADMIN — PHENYLEPHRINE HCL 100 MCG: 10 MG/ML VIAL (ML) INJECTION at 08:24:00

## 2022-06-06 RX ADMIN — CEFAZOLIN SODIUM/WATER 2 G: 2 G/20 ML SYRINGE (ML) INTRAVENOUS at 07:00:00

## 2022-06-06 RX ADMIN — DEXMEDETOMIDINE HYDROCHLORIDE 0.7 MCG/KG/HR: 4 INJECTION, SOLUTION INTRAVENOUS at 07:36:00

## 2022-06-06 RX ADMIN — PROTAMINE SULFATE 50 MG: 10 INJECTION, SOLUTION INTRAVENOUS at 09:51:00

## 2022-06-06 RX ADMIN — MIDAZOLAM HYDROCHLORIDE 2 MG: 1 INJECTION INTRAMUSCULAR; INTRAVENOUS at 10:02:00

## 2022-06-06 RX ADMIN — PROTAMINE SULFATE 50 MG: 10 INJECTION, SOLUTION INTRAVENOUS at 09:49:00

## 2022-06-06 RX ADMIN — NITROGLYCERIN 5 MCG/MIN: 20 INJECTION INTRAVENOUS at 07:50:00

## 2022-06-06 RX ADMIN — PHYTONADIONE 5 MG: 10 INJECTION, EMULSION INTRAMUSCULAR; INTRAVENOUS; SUBCUTANEOUS at 09:52:00

## 2022-06-06 RX ADMIN — PROTAMINE SULFATE 50 MG: 10 INJECTION, SOLUTION INTRAVENOUS at 09:52:00

## 2022-06-06 RX ADMIN — MIDAZOLAM HYDROCHLORIDE 2 MG: 1 INJECTION INTRAMUSCULAR; INTRAVENOUS at 06:34:00

## 2022-06-06 RX ADMIN — SODIUM CHLORIDE: 9 INJECTION, SOLUTION INTRAVENOUS at 10:49:00

## 2022-06-06 RX ADMIN — NITROGLYCERIN 10 MCG/MIN: 20 INJECTION INTRAVENOUS at 07:24:00

## 2022-06-06 RX ADMIN — FAMOTIDINE 20 MG: 10 INJECTION, SOLUTION INTRAVENOUS at 06:33:00

## 2022-06-06 RX ADMIN — HEPARIN SODIUM 15000 UNITS: 1000 INJECTION, SOLUTION INTRAVENOUS; SUBCUTANEOUS at 08:22:00

## 2022-06-06 RX ADMIN — MIDAZOLAM HYDROCHLORIDE 2 MG: 1 INJECTION INTRAMUSCULAR; INTRAVENOUS at 08:24:00

## 2022-06-06 RX ADMIN — DEXMEDETOMIDINE HYDROCHLORIDE 0.5 MCG/KG/HR: 4 INJECTION, SOLUTION INTRAVENOUS at 06:59:00

## 2022-06-06 RX ADMIN — METHYLPREDNISOLONE SODIUM SUCCINATE 500 MG: 500 INJECTION INTRAMUSCULAR; INTRAVENOUS at 06:59:00

## 2022-06-06 RX ADMIN — PHENYLEPHRINE HCL 100 MCG: 10 MG/ML VIAL (ML) INJECTION at 08:26:00

## 2022-06-06 RX ADMIN — PROTAMINE SULFATE 50 MG: 10 INJECTION, SOLUTION INTRAVENOUS at 09:48:00

## 2022-06-06 RX ADMIN — ROCURONIUM BROMIDE 100 MG: 10 INJECTION, SOLUTION INTRAVENOUS at 08:24:00

## 2022-06-06 RX ADMIN — NITROGLYCERIN 15 MCG/MIN: 20 INJECTION INTRAVENOUS at 07:46:00

## 2022-06-06 RX ADMIN — MIDAZOLAM HYDROCHLORIDE 2 MG: 1 INJECTION INTRAMUSCULAR; INTRAVENOUS at 06:39:00

## 2022-06-06 RX ADMIN — SODIUM CHLORIDE: 9 INJECTION, SOLUTION INTRAVENOUS at 06:33:00

## 2022-06-06 RX ADMIN — PROTAMINE SULFATE 50 MG: 10 INJECTION, SOLUTION INTRAVENOUS at 09:50:00

## 2022-06-06 RX ADMIN — HEPARIN SODIUM 5000 UNITS: 1000 INJECTION, SOLUTION INTRAVENOUS; SUBCUTANEOUS at 08:10:00

## 2022-06-06 RX ADMIN — PHYTONADIONE 5 MG: 10 INJECTION, EMULSION INTRAMUSCULAR; INTRAVENOUS; SUBCUTANEOUS at 09:48:00

## 2022-06-06 NOTE — ANESTHESIA PROCEDURE NOTES
Central Line  Performed by: Nithin Padgett MD  Authorized by: Nithin Padgett MD     General Information and Staff    Procedure Start:  6/6/2022 6:43 AM  Procedure End:  6/6/2022 6:53 AM  Anesthesiologist:  Nithin Padgett MD  Performed by:   Anesthesiologist  Patient Location:  OR  Indication: central venous access and CVP monitoring    Site Identification: real time ultrasound guided    Preanesthetic Checklist: 2 patient identifiers, IV checked, risks and benefits discussed, monitors and equipment checked, pre-op evaluation, timeout performed, anesthesia consent and sterile technique used    Procedure Detail    Patient Position:  Trendelenburg  Laterality:  Right  Site:  Internal jugular  Prep:  Chloraprep  Catheter Size:  9 Fr  Catheter Type:  MAC introducer  Number of Lumens:  Double lumen  Procedure Detail: target vein identified, needle advanced into vein and blood aspirated and guidewire advanced into vein    Seldinger Technique?: Yes    Intravenous Verification: verified by ultrasound and venous blood return    Post Insertion: all ports aspirated, all ports flushed easily, guidewire was removed intact, line was sutured in place and dressing was applied      Assessment    Events: patient tolerated procedure well with no complications      PA Catheter Placement    PA Catheter Placed?: Yes    PA Catheter Type:  Oximetric  PA Catheter Size:  8  Laterality:  Right  Site:  Internal jugular  Placement Confirmation: pressure tracing changes and verified by MATA    PA Catheter Depth (cm):  50  Events: patient tolerated procedure well with no complications      Additional Comments

## 2022-06-06 NOTE — PROGRESS NOTES
Anesthesia Ventilator Management    Patient is s/p 3V CABG   POD#0. Patient is currently sedated and intubated. Vent settings: PRVC 500/12/50%/5  ABG, CXR pending    Prop@ 25  Dex@ 0.7    Will wean FiO2 as tolerated.   Plan for extubation after CPAP trial.

## 2022-06-06 NOTE — ANESTHESIA POSTPROCEDURE EVALUATION
[address] Patient Status:  Inpatient   Age/Gender 66year old male MRN [id number]   [facility] 6NE-A Attending Sage Gandhi MD   Hosp Day # 0 PCP Tye Boykin MD       Anesthesia Post-op Note    CORONARY ARTERY BYPASS GRAFT x3. INTRAOPERATIVE MATA. ENDOSCOPIC HARVEST OF LEFT SAPHENOUS VEIN. Procedure Summary     Date: 06/06/22 Room / Location: [address] [place] [address] 02 / [address] [facility]    Anesthesia Start: 2473 Anesthesia Stop:     Procedure: CORONARY ARTERY BYPASS GRAFT x3. INTRAOPERATIVE MATA. ENDOSCOPIC HARVEST OF LEFT SAPHENOUS VEIN. (N/A Chest) Diagnosis: (CORONARY ARTERY DISEASE)    Surgeons: Sage Gandhi MD Anesthesiologist: Domingo Kennedy MD    Anesthesia Type: general ASA Status: 4          Anesthesia Type: general    Vitals Value Taken Time   /63 06/06/22 1049   Temp 97 06/06/22 1049   Pulse 87 06/06/22 1048   Resp 20 06/06/22 1048   SpO2 97 % 06/06/22 1049   Vitals shown include unvalidated device data. Patient Location: ICU    Anesthesia Type: general    Airway Patency: intubated    Postop Pain Control: Other: (Intubated and sedated)    Mental Status: Other: (Intubated and sedated)    Nausea/Vomiting: Other: (Intubated and sedated)    Cardiopulmonary/Hydration status: stable euvolemic    Complications: no apparent anesthesia related complications    Postop vital signs: stable    Dental Exam: Unchanged from Preop    Sign out given to ICU staff.

## 2022-06-06 NOTE — ANESTHESIA PROCEDURE NOTES
Airway  Date/Time: 6/6/2022 6:42 AM  Urgency: elective    Airway not difficult    General Information and Staff    Patient location during procedure: OR  Anesthesiologist: Young Adams MD  Performed: anesthesiologist     Indications and Patient Condition  Indications for airway management: anesthesia  Sedation level: deep  Preoxygenated: yes  Patient position: sniffing  Mask difficulty assessment: 1 - vent by mask    Final Airway Details  Final airway type: endotracheal airway      Successful airway: ETT  Cuffed: yes   Successful intubation technique: direct laryngoscopy  Endotracheal tube insertion site: oral  Blade: Oli  Blade size: #3  ETT size (mm): 8.0    Cormack-Lehane Classification: grade I - full view of glottis  Placement verified by: chest auscultation and capnometry   Cuff volume (mL): 9  Measured from: lips  ETT to lips (cm): 22  Number of attempts at approach: 1

## 2022-06-06 NOTE — ANESTHESIA PROCEDURE NOTES
Arterial Line  Performed by: Jeison Tomas MD  Authorized by: Jeison Tomas MD     General Information and Staff    Procedure Start:  6/6/2022 6:35 AM  Procedure End:  6/6/2022 6:38 AM  Anesthesiologist:  Jeison Tomas MD  Performed By:  Anesthesiologist  Patient Location:  OR  Indication: continuous blood pressure monitoring and blood sampling needed    Site Identification: real time ultrasound guided and surface landmarks    Preanesthetic Checklist: 2 patient identifiers, IV checked, risks and benefits discussed, monitors and equipment checked, pre-op evaluation, timeout performed, anesthesia consent and sterile technique used    Procedure Details    Catheter Size:  20 G  Catheter Length:  1 and 3/4 inchCatheter Type:  Arrow  Seldinger Technique?: Yes    Laterality:  LeftSite:  Radial artery  Site Prep: chlorhexidine  Line Secured:  Wrist Brace, tape and Tegaderm    Assessment    Events: patient tolerated procedure well with no complications      Medications      Additional Comments

## 2022-06-06 NOTE — DIETARY NOTE
Clinical Nutrition     Dietitian consult received per cardiac rehab standing order. Pt to be educated by cardiac rehab staff and encouraged to attend outpatient classes taught by RD. RD available PRN.     James Rodríguez RD, LDN  Clinical Dietitian  Phone [phone]  Pager [phone]

## 2022-06-06 NOTE — PLAN OF CARE
Received patient from [facility] 23 s/p CABG x3. Usual lines. Insulin/propofol/precedex gtts infusing per orders. Dilaudid PCA initiated for pain control per anesthesia orders. Propofol/precedex weaned off. Patient opens eyes/follows commands. cpap trail done. Patient extubated approx 1610. Kellogg remains to gravity. CT x2 to suction/ no air leak noted. Bolus via pca pump given per orders. Dosage adjusted per anesthesia. Patient currently comfortable. Spouse at bedside. Questions encouraged. Answered. Vss. See assessment for completed details. Will continue to monitor.

## 2022-06-06 NOTE — ANESTHESIA PROCEDURE NOTES
Procedure Performed: MATA       Start Time:  6/6/2022 7:15 AM       End Time:   6/6/2022 10:15 AM    Preanesthesia Checklist:  Patient identified, IV assessed, risks and benefits discussed, monitors and equipment assessed, procedure being performed at surgeon's request and anesthesia consent obtained. General Procedure Information  Diagnostic Indications for Echo:  assessment of ascending aorta and hemodynamic monitoring  Physician Requesting Echo: Sammy Jackson MD  Location performed:  OR  Intubated  Bite block placed  Heart visualized  Probe Insertion:  Easy  Probe Type:  Multiplane  Modalities:  2D only, color flow mapping, pulse wave Doppler and continuous wave Doppler    Echocardiographic and Doppler Measurements    Ventricles    Right Ventricle:  Cavity size normal.  Hypertrophy not present. Thrombus not present. Global function normal.    Left Ventricle:  Cavity size normal.  Hypertrophy present. Thrombus not present. Global Function normal.  Ejection Fraction 55%. Ventricular Regional Function:  1- Basal Anteroseptal:  hypokinetic  2- Basal Anterior:  normal  4- Basal Inferolateral:  normal  5- Basal Inferior:  normal  6- Basal Inferoseptal:  hypokinetic  7- Mid Anteroseptal:  hypokinetic  8- Mid Anterior:  normal  9- Mid Anterolateral:  normal  10- Mid Inferolateral:  normal  11- Mid Inferior:  normal  12- Mid Inferoseptal:  hypokinetic  13- Apical Anterior:  normal  14- Apical Lateral:  normal  15- Apical Inferior:  normal  16- Apical Septal:  hypokinetic  17- [place]:  hypokinetic      Valves    Aortic Valve: Annulus normal.  Stenosis not present. Regurgitation +1. Leaflets normal.  Leaflet motions normal.      Mitral Valve: Annulus normal.  Stenosis not present. Regurgitation +1. Leaflets normal.  Leaflet motions normal.      Tricuspid Valve: Annulus normal.  Stenosis not present. Regurgitation +1. Leaflets normal.  Leaflet motions normal.    Pulmonic Valve:   Annulus normal.  Stenosis not present. Regurgitation +1. Aorta    Ascending Aorta:  Size normal.  Dissection not present. Plaque thickness less than 3 mm. Mobile plaque not present. Aortic Arch:  Size normal.  Dissection not present. Plaque thickness less than 3 mm. Mobile plaque not present. Descending Aorta:  Size normal.  Dissection not present. Plaque thickness less than 3 mm. Mobile plaque not present. Atria    Right Atrium:  Size normal.  Spontaneous echo contrast not present. Thrombus not present. Tumor not present. Device present. Left Atrium:  Size normal.  Spontaneous echo contrast not present. Thrombus not present. Tumor not present. Device not present.     Left atrial appendage normal.      Septa    Atrial Septum:  Intra-atrial septal morphology normal.      Ventricular Septum:  Intra-ventricular septum morphology normal.      Diastolic Function Measurements:  Diastolic Dysfunction Grade= I  E= ms  A= ms  E/A Ratio=   DT= ms  S/D=  IVRT= ms    Other Findings  Pericardium:  normal  Pleural Effusion:  none  Pulmonary Arteries:  normal  Pulmonary Venous Flow:  normal    Anesthesia Information  Performed Personally  Anesthesiologist:  Arianna Balbuena MD      Post    Ventricular FXN:  Global FXN: Improved   Regional FXN: Improved  Valve FXN:  Native Valve:No change      Comments: No aortic dissection    Complications:None

## 2022-06-06 NOTE — CONSULTS
Pharmacy consulted to dose post-op antibiotics. SCr 1.14 mg/dL; estimated CrCl 52.6 mL/min. Ok for Ancef 2 grams IV every 8 hours x5 doses and vancomycin 1 gram IV every 12 hours x2 doses as ordered.     Michael De La Torre, PharmD  06/06/22 12:52 PM

## 2022-06-06 NOTE — OPERATIVE REPORT
Operative Note    Patient Name: Juan Barr    Preoperative Diagnosis: CORONARY ARTERY DISEASE    Postoperative Diagnosis: same    Primary Surgeon: Keith Singh MD    Assistant: Marco Hoffman PA-C    Procedures: CABG x 3; LIMA to LAD, SVG to OM, SVG to RCA    Anesthesia: Cardiac    Complications: none    Implants: none    Specimen: none    Drains: 36 Fr CT x 2    Condition: stable but critical    Estimated Blood Loss: ~800cc

## 2022-06-07 ENCOUNTER — APPOINTMENT (OUTPATIENT)
Dept: GENERAL RADIOLOGY | Facility: HOSPITAL | Age: 72
End: 2022-06-07
Attending: THORACIC SURGERY (CARDIOTHORACIC VASCULAR SURGERY)
Payer: MEDICARE

## 2022-06-07 ENCOUNTER — APPOINTMENT (OUTPATIENT)
Dept: GENERAL RADIOLOGY | Facility: HOSPITAL | Age: 72
End: 2022-06-07
Attending: PHYSICIAN ASSISTANT
Payer: MEDICARE

## 2022-06-07 LAB
ATRIAL RATE: 107 BPM
BASOPHILS # BLD AUTO: 0.03 X10(3) UL (ref 0–0.2)
BASOPHILS NFR BLD AUTO: 0.1 %
BUN BLD-MCNC: 20 MG/DL (ref 7–18)
CALCIUM BLD-MCNC: 8.3 MG/DL (ref 8.5–10.1)
CHLORIDE SERPL-SCNC: 112 MMOL/L (ref 98–112)
CO2 SERPL-SCNC: 24 MMOL/L (ref 21–32)
CREAT BLD-MCNC: 1.19 MG/DL
EOSINOPHIL # BLD AUTO: 0 X10(3) UL (ref 0–0.7)
EOSINOPHIL NFR BLD AUTO: 0 %
ERYTHROCYTE [DISTWIDTH] IN BLOOD BY AUTOMATED COUNT: 14.6 %
GLUCOSE BLD-MCNC: 103 MG/DL (ref 70–99)
GLUCOSE BLD-MCNC: 128 MG/DL (ref 70–99)
GLUCOSE BLD-MCNC: 132 MG/DL (ref 70–99)
GLUCOSE BLD-MCNC: 137 MG/DL (ref 70–99)
GLUCOSE BLD-MCNC: 137 MG/DL (ref 70–99)
GLUCOSE BLD-MCNC: 149 MG/DL (ref 70–99)
GLUCOSE BLD-MCNC: 155 MG/DL (ref 70–99)
GLUCOSE BLD-MCNC: 167 MG/DL (ref 70–99)
GLUCOSE BLD-MCNC: 205 MG/DL (ref 70–99)
GLUCOSE BLD-MCNC: 258 MG/DL (ref 70–99)
GLUCOSE BLD-MCNC: 57 MG/DL (ref 70–99)
GLUCOSE BLD-MCNC: 57 MG/DL (ref 70–99)
GLUCOSE BLD-MCNC: 95 MG/DL (ref 70–99)
HCT VFR BLD AUTO: 29.4 %
HGB BLD-MCNC: 10.3 G/DL
IMM GRANULOCYTES # BLD AUTO: 0.14 X10(3) UL (ref 0–1)
IMM GRANULOCYTES NFR BLD: 0.6 %
ISTAT ACTIVATED CLOTTING TIME: 136 SECONDS (ref 74–137)
ISTAT ACTIVATED CLOTTING TIME: 136 SECONDS (ref 74–137)
ISTAT ACTIVATED CLOTTING TIME: 743 SECONDS (ref 74–137)
LYMPHOCYTES # BLD AUTO: 1.61 X10(3) UL (ref 1–4)
LYMPHOCYTES NFR BLD AUTO: 6.5 %
MAGNESIUM SERPL-MCNC: 1.9 MG/DL (ref 1.6–2.6)
MCH RBC QN AUTO: 31.9 PG (ref 26–34)
MCHC RBC AUTO-ENTMCNC: 35 G/DL (ref 31–37)
MCV RBC AUTO: 91 FL
MONOCYTES # BLD AUTO: 3.07 X10(3) UL (ref 0.1–1)
MONOCYTES NFR BLD AUTO: 12.3 %
NEUTROPHILS # BLD AUTO: 20.03 X10 (3) UL (ref 1.5–7.7)
NEUTROPHILS # BLD AUTO: 20.03 X10(3) UL (ref 1.5–7.7)
NEUTROPHILS NFR BLD AUTO: 80.5 %
P-R INTERVAL: 128 MS
PLATELET # BLD AUTO: 233 10(3)UL (ref 150–450)
POTASSIUM SERPL-SCNC: 4.4 MMOL/L (ref 3.5–5.1)
Q-T INTERVAL: 334 MS
QRS DURATION: 82 MS
QTC CALCULATION (BEZET): 441 MS
R AXIS: 73 DEGREES
RBC # BLD AUTO: 3.23 X10(6)UL
SODIUM SERPL-SCNC: 142 MMOL/L (ref 136–145)
T AXIS: 80 DEGREES
VENTRICULAR RATE: 105 BPM
WBC # BLD AUTO: 24.9 X10(3) UL (ref 4–11)

## 2022-06-07 PROCEDURE — 71045 X-RAY EXAM CHEST 1 VIEW: CPT | Performed by: PHYSICIAN ASSISTANT

## 2022-06-07 PROCEDURE — 71045 X-RAY EXAM CHEST 1 VIEW: CPT | Performed by: THORACIC SURGERY (CARDIOTHORACIC VASCULAR SURGERY)

## 2022-06-07 RX ORDER — KETOROLAC TROMETHAMINE 15 MG/ML
15 INJECTION, SOLUTION INTRAMUSCULAR; INTRAVENOUS EVERY 6 HOURS PRN
Status: COMPLETED | OUTPATIENT
Start: 2022-06-07 | End: 2022-06-07

## 2022-06-07 RX ORDER — FUROSEMIDE 10 MG/ML
20 INJECTION INTRAMUSCULAR; INTRAVENOUS ONCE
Status: COMPLETED | OUTPATIENT
Start: 2022-06-07 | End: 2022-06-07

## 2022-06-07 RX ORDER — ALBUTEROL SULFATE 90 UG/1
2 AEROSOL, METERED RESPIRATORY (INHALATION) EVERY 4 HOURS PRN
Status: DISCONTINUED | OUTPATIENT
Start: 2022-06-07 | End: 2022-06-09

## 2022-06-07 NOTE — CM/SW NOTE
Department  notified of request for katelyn Kilgore referrals started. Assigned CM/SW to follow up with pt/family on further discharge planning.      Rogelio Number   June 07, 2022   09:09

## 2022-06-07 NOTE — PLAN OF CARE
Assumed patient care at 0730. Patient sitting up in chair, alert/oriented. Per Dr. Rochelle Hager, chest tubes, rocha, a-line, and cordis removed. Nasal cannula on, denies SOB. Monitor shows sinus rhythm. Pacing wires secured to dressing. Insulin gtt transitioned to subcutaneous. Hypoglycemic event in am, treated per protocol, MD notified. Voiding in urinal. Patient complained of severe pain despite ofirmev and dilaudid pca. MD aware, toradol added to pain regimen. Up to chair with steady assist. Plan of care updated with patient and wife at bedside.

## 2022-06-07 NOTE — PROGRESS NOTES
06/06/22 1923   Clinical Encounter Type   Visited With Patient not available;Health care provider   RN suggested a visit on 6/7. For further spiritual care, please enter a consult in Epic, followed by contacting pager [phone] as needed.

## 2022-06-07 NOTE — PROGRESS NOTES
06/07/22 1309   Clinical Encounter Type   Visited With Patient; Health care provider   Routine Visit   (Responded to consult)   Referral From    Referral To Nurse  (regarding patient's physical discomfort.)   Confucianism Encounters   Confucianism Needs Prayer   Taxonomy   Intended Effects Lessen anxiety   Methods Encourage self care;Encourage self reflection;Encourage sharing of feelings; Offer spiritual/Restoration support   Interventions Acknowledge current situation; Active listening;[place]   For further spiritual care, please enter a consult in Epic followed by contacting pager [phone] as needed.

## 2022-06-07 NOTE — OCCUPATIONAL THERAPY NOTE
OCCUPATIONAL THERAPY  Therapy orders received, chart review completed. Per RN, okay to see. Pt currently experiencing pain and SOB, refused therapy at this time. Will follow up as able and appropriate.

## 2022-06-07 NOTE — PHYSICAL THERAPY NOTE
Attempted to see pt for physical therapy evaluation at this time, but pt adamantly refuses citing increased pain and his COPD. With encouragement, pt slightly agitated, \"I can't walk! I told you I can't walk! Can't you see I can't breathe? \"  Vitals WNL. RN at bedside. Will plan to re-attempt to see pt again tomorrow.

## 2022-06-07 NOTE — PROGRESS NOTES
Assumed care of pt at 1930  A/Ox4 GNOZALEZ, DARYA  Issues with pain control overnight despite dilaudid PCA with boluses- dose eventually increased per MD- patient reports minimal relief    CTx2 and rocha in place- see flowsheet for outputs  Wife updated on plan of care

## 2022-06-07 NOTE — OPERATIVE REPORT
[facility]    PATIENT'S NAME: Jeannette Gomez   ATTENDING PHYSICIAN: Sasha Medley MD   OPERATING PHYSICIAN: Sasha Medley MD   PATIENT ACCOUNT#:   [id number]    LOCATION:  [address] [place] [address]  MEDICAL RECORD #:   [id number]       YOB: 1950  ADMISSION DATE:       06/06/2022      OPERATION DATE:  06/06/2022    OPERATIVE REPORT    PREOPERATIVE DIAGNOSIS:  Coronary artery disease. POSTOPERATIVE DIAGNOSIS:  Coronary artery disease. PROCEDURE:  Coronary revascularization x3:  Left internal mammary artery graft to the left anterior descending, saphenous vein graft to the obtuse marginal and right coronary artery. ASSISTANT:  Felicia Spangler PA-C. ANESTHESIA:  General endotracheal.    BLOOD LOSS:  600 mL. COMPLICATIONS:  None. TRANSFUSIONS:  None. INDICATIONS:  The patient is a super nice middle-aged gentleman who remotely underwent cardiac catheterization at [facility] that demonstrated clear-cut left main coronary stenosis which was moderate to moderately severe as well as moderate disease of the right coronary artery. The patient has significant COPD. He has been seen by the pulmonary service and felt to be suitable for surgical revascularization, which by angiogram is his best treatment option. Risks, benefits, and options were discussed with the patient and his wife thoroughly at 2 preoperative meetings. OPERATIVE TECHNIQUE:  Appropriate lines and catheters were placed by Dr. Marley Ford. General anesthesia was induced. [place] was placed. Transesophageal echo was placed. Transcranial oximetry was placed. Patient prepped and draped. Sternotomy was performed. Left internal mammary was taken down. Greater saphenous graft was harvested endoscopically from the left leg. The patient was heparinized and cannulated for bypass. On bypass, patient was cooled to 32 degrees centigrade. The aorta was crossclamped.   Blood cardioplegia was infused to achieve electromechanical arrest of the heart. The obtuse marginal was bypassed first.  This was actually a 1.5 mm artery of quite good quality. A segment of saphenous graft was placed here. Cardioplegia was given, following which the right coronary artery was bypassed. Specifically, I bypassed what I would call a hybrid-type acute marginal branch of the right that began at the acute margin and crossed over the acute margin to the inferior wall of the heart. This was a 2 mm artery. Cardioplegia was given, following which the left internal mammary artery was placed to the LAD in the midportion of the LAD. LAD was a good quality artery as was the internal mammary. Rewarming was begun while proximal anastomoses for the 2 vein grafts were constructed end-to-side to the aorta. Warm cardioplegia was given. The aorta was unclamped. The heart spontaneously defibrillated to sinus rhythm. Following complete and thorough rewarming, the patient weaned from bypass with no difficulty. Pump time 76 minutes. Crossclamp was 60 minutes. Cardioplegia 3100 mL. Patient was decannulated. Protamine was administered. Pacing leads were applied to the heart. Chest was closed with double-stranded wire, supplemented with 2 titanium plates due to the patient's significant COPD. The patient was taken to the ICU in stable condition. The patient's wife was updated by me regarding the patient's condition and the conduct of the operation.     Dictated By Richard Torres MD  d: 06/06/2022 13:58:30  t: 06/06/2022 23:37:37  Job [id number]  /

## 2022-06-08 LAB
ANION GAP SERPL CALC-SCNC: 3 MMOL/L (ref 0–18)
BUN BLD-MCNC: 31 MG/DL (ref 7–18)
CALCIUM BLD-MCNC: 8.6 MG/DL (ref 8.5–10.1)
CHLORIDE SERPL-SCNC: 110 MMOL/L (ref 98–112)
CO2 SERPL-SCNC: 25 MMOL/L (ref 21–32)
CREAT BLD-MCNC: 1.53 MG/DL
ERYTHROCYTE [DISTWIDTH] IN BLOOD BY AUTOMATED COUNT: 14.8 %
GLUCOSE BLD-MCNC: 101 MG/DL (ref 70–99)
GLUCOSE BLD-MCNC: 118 MG/DL (ref 70–99)
GLUCOSE BLD-MCNC: 156 MG/DL (ref 70–99)
GLUCOSE BLD-MCNC: 350 MG/DL (ref 70–99)
GLUCOSE BLD-MCNC: 98 MG/DL (ref 70–99)
HCT VFR BLD AUTO: 30.1 %
HGB BLD-MCNC: 10.5 G/DL
MCH RBC QN AUTO: 31.6 PG (ref 26–34)
MCHC RBC AUTO-ENTMCNC: 34.9 G/DL (ref 31–37)
MCV RBC AUTO: 90.7 FL
OSMOLALITY SERPL CALC.SUM OF ELEC: 296 MOSM/KG (ref 275–295)
PLATELET # BLD AUTO: 250 10(3)UL (ref 150–450)
POTASSIUM SERPL-SCNC: 4.5 MMOL/L (ref 3.5–5.1)
RBC # BLD AUTO: 3.32 X10(6)UL
SODIUM SERPL-SCNC: 138 MMOL/L (ref 136–145)
WBC # BLD AUTO: 13.4 X10(3) UL (ref 4–11)

## 2022-06-08 PROCEDURE — 99222 1ST HOSP IP/OBS MODERATE 55: CPT | Performed by: INTERNAL MEDICINE

## 2022-06-08 RX ORDER — ATORVASTATIN CALCIUM 40 MG/1
40 TABLET, FILM COATED ORAL NIGHTLY
Status: DISCONTINUED | OUTPATIENT
Start: 2022-06-08 | End: 2022-06-11

## 2022-06-08 RX ORDER — FOLIC ACID 1 MG/1
1 TABLET ORAL DAILY
Status: DISCONTINUED | OUTPATIENT
Start: 2022-06-08 | End: 2022-06-11

## 2022-06-08 RX ORDER — PANTOPRAZOLE SODIUM 40 MG/1
TABLET, DELAYED RELEASE ORAL
Qty: 30 TABLET | Refills: 0 | Status: SHIPPED | OUTPATIENT
Start: 2022-06-08

## 2022-06-08 RX ORDER — FENTANYL 12 UG/H
1 PATCH TRANSDERMAL
Status: DISCONTINUED | OUTPATIENT
Start: 2022-06-09 | End: 2022-06-11

## 2022-06-08 RX ORDER — MORPHINE SULFATE 4 MG/ML
4 INJECTION, SOLUTION INTRAMUSCULAR; INTRAVENOUS
Status: DISCONTINUED | OUTPATIENT
Start: 2022-06-08 | End: 2022-06-11

## 2022-06-08 RX ORDER — FENTANYL 50 UG/H
1 PATCH TRANSDERMAL
Status: DISCONTINUED | OUTPATIENT
Start: 2022-06-09 | End: 2022-06-11

## 2022-06-08 NOTE — PROGRESS NOTES
Patient received hemodynamically stable and neurologically intact. Pacing wires secured to dressing on patient's chest and disconnected from pacemaker. Patient using IS this shift; achieved 1000 mL. Attempted to wean patient of NC; pt desaturated into mid-80s so currently on 2 L NC. Patient had difficulty managing pain with dilaudid PCA and boluses so PRN Morphine added Q1H which has helped make pain more tolerable. Patient was able to walk 3x today with staff; tolerated fairly well and able to recognize his own limits. Pt visited by providers this AM; will remain on the unit for another day and plan for transfer to CTU tmrw. CV surgery called regarding abnormal hgba1c value; hematology consulted.

## 2022-06-08 NOTE — PROGRESS NOTES
Reviewed HgbA1C path comment with Dr. Rochelle Hager:  Path Comment Hgba1C An abnormal hemoglobin was detected during the assay for   HGBA1C. 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 This does not   interfere with the reporting of the patient's HGBA1C. A formal hemoglobin electrophoresis for identification and   quantitation of the hemoglobin variant is suggested, if   clinically indicated. Reviewed by Douglas Rodriguez M.D. Pathology 06/06/22 at 3:04 PM      Per Dr. Rochelle Hager, consult hematology.   Yovani Lamb RN  Clinical Coordinator  CV Surgery

## 2022-06-08 NOTE — PLAN OF CARE
Assumed care of pt at [address] for pain control  Pain seemed to be better controlled overnight- patient able to rest and sleep

## 2022-06-08 NOTE — PROGRESS NOTES
S/P POD 2 CABG x2   Patient seen by Dr. Jace Lockett   Patient requiring extensive pain medications  Sees Dr. Iona Regalado outpatient for pain management, uses dilaudid and fentanyl patch since 1990's  Dilaudid with bolus giving slight relief, morphine PRN for additional relief  Cr trending upto 1.53 from 1.19, caution using multiple pain medications, explained this to patient & family, who verbalizes understanding   Labs, vitals, Wounds stable  Continue PT/OT  Will need to f/u with pain medicine upon discharge    D/w Dr. Keyanna Finnegan, PA-C  Cardiovascular and Thoracic Surgery

## 2022-06-08 NOTE — CM/SW NOTE
CM discussed [place] [id number] choices with patient. List of choices left at bedside. Patient will discuss choices with his spouse.

## 2022-06-08 NOTE — CARDIAC REHAB
Patient seen by Cardiac rehab for CAD education post CABG. Education completed with patient. Instructed on how to play CV surg video. Phase 2 appointment made.

## 2022-06-09 LAB
ANION GAP SERPL CALC-SCNC: 5 MMOL/L (ref 0–18)
BUN BLD-MCNC: 26 MG/DL (ref 7–18)
CALCIUM BLD-MCNC: 8.9 MG/DL (ref 8.5–10.1)
CHLORIDE SERPL-SCNC: 109 MMOL/L (ref 98–112)
CO2 SERPL-SCNC: 24 MMOL/L (ref 21–32)
CREAT BLD-MCNC: 1.11 MG/DL
CRP SERPL-MCNC: 15.1 MG/DL (ref ?–0.3)
ERYTHROCYTE [DISTWIDTH] IN BLOOD BY AUTOMATED COUNT: 14.6 %
ERYTHROCYTE [SEDIMENTATION RATE] IN BLOOD: 21 MM/HR
GLUCOSE BLD-MCNC: 198 MG/DL (ref 70–99)
GLUCOSE BLD-MCNC: 238 MG/DL (ref 70–99)
GLUCOSE BLD-MCNC: 246 MG/DL (ref 70–99)
GLUCOSE BLD-MCNC: 288 MG/DL (ref 70–99)
GLUCOSE BLD-MCNC: 300 MG/DL (ref 70–99)
HCT VFR BLD AUTO: 27 %
HGB BLD-MCNC: 9.4 G/DL
MCH RBC QN AUTO: 31 PG (ref 26–34)
MCHC RBC AUTO-ENTMCNC: 34.8 G/DL (ref 31–37)
MCV RBC AUTO: 89.1 FL
OSMOLALITY SERPL CALC.SUM OF ELEC: 299 MOSM/KG (ref 275–295)
PLATELET # BLD AUTO: 250 10(3)UL (ref 150–450)
POTASSIUM SERPL-SCNC: 4.8 MMOL/L (ref 3.5–5.1)
RBC # BLD AUTO: 3.03 X10(6)UL
SODIUM SERPL-SCNC: 138 MMOL/L (ref 136–145)
WBC # BLD AUTO: 14.1 X10(3) UL (ref 4–11)

## 2022-06-09 RX ORDER — ALBUTEROL SULFATE 90 UG/1
2 AEROSOL, METERED RESPIRATORY (INHALATION) EVERY 4 HOURS PRN
Status: DISCONTINUED | OUTPATIENT
Start: 2022-06-09 | End: 2022-06-11

## 2022-06-09 RX ORDER — ALBUTEROL SULFATE 90 UG/1
2 AEROSOL, METERED RESPIRATORY (INHALATION) 4 TIMES DAILY
Status: DISCONTINUED | OUTPATIENT
Start: 2022-06-09 | End: 2022-06-09

## 2022-06-09 RX ORDER — IPRATROPIUM BROMIDE AND ALBUTEROL SULFATE 2.5; .5 MG/3ML; MG/3ML
3 SOLUTION RESPIRATORY (INHALATION) EVERY 4 HOURS PRN
Status: DISCONTINUED | OUTPATIENT
Start: 2022-06-09 | End: 2022-06-11

## 2022-06-09 NOTE — PLAN OF CARE
Pt received from CNICU via wheelchair. A&Ox4. On 2L O2 nasal cannula saturating 90's, will wean as able. Sinus rhythm on the monitor. PW in, plan to remove today. Reports incisional pain, dilaudid PCA, morphine PRN, fentanyl patch to L arm. Up with standby assistance. Oriented to room and call light. Updated on plan of care.       Problem: Patient/Family Goals  Goal: Patient/Family Long Term Goal  Description: Patient's Long Term Goal: to go home    Interventions:  - manage surgical incision  - maintain SBP within desired parameters  - See additional Care Plan goals for specific interventions  Outcome: Progressing  Goal: Patient/Family Short Term Goal  Description: Patient's Short Term Goal: manage pain    Interventions:   - PCA pump  - PRN pain medication available  - See additional Care Plan goals for specific interventions  Outcome: Progressing

## 2022-06-09 NOTE — PLAN OF CARE
Assumed care of pt at approximately 2000. VSS, PCA pump & morphine available and utilized frequently throughout night for pain management. Pt on 2L NC, using IS frequently. Voids using the urinal. Pt called and updated his wife about his POC during the night. Attempted to wean off supplemental O2 throughout night, pt continues to desat into high 80's, NC put back on. Will continue to monitor. Dr. Jennifer Emerson updated, ordered for patient to transfer to floor, to continue the PCA pump and morphine order, and to call him with the Cr results from BMP.

## 2022-06-09 NOTE — PROGRESS NOTES
06/09/22 1606   Clinical Encounter Type   Visited With Family; Health care provider   Routine Visit Follow-up   Family Spiritual Encounters   Family Coping Accepting  (Met w/ spouse. Provided emotional and spiritual follow up support.)   Taxonomy   Intended Effects Promote a sense of peace   Methods Encourage sharing of feelings;Encourage self reflection;Encourage self care; Offer support   Interventions Acknowledge current situation; Active listening;Assist with identifying strengths; Share words of hope and inspiration   For further spiritual care, please enter a consult in Epic followed by contacting pager [phone] as needed.

## 2022-06-09 NOTE — PROGRESS NOTES
JELENA HOSPITALIST  Progress Note     [facility] Patient Status:  Inpatient    1950 MRN [id number]   [facility] 3NW-A Attending Dr. Bernard Guy Day # 39 PCP Binta Lindquist MD     Chief Complaint: Abdominal distention    S: [place] Mill[place] RN Diabetes Education Progress Note      Reason for Visit: Follow-Up Diabetes Education.      Highlights of today's visit:  Rao Hidalgo, a 87 year old male presents for diabetes education follow up visit, referred by PCP Dr. Birmingham. Recent A1C of 6.7% on 5/2/2022. PMHx significant for DM type 2, HTN, HLD, CKD stage 3a, hypothyroidism, anemia, thrombocytopenia, leukopenia, GERD, memory concerns, depression and incontinence of feces.     During diabetes education visit today, Pt reports has been checking home BG 2-3 times daily, however did not bring meter to visit. Pt states this AM BG was 162 mg/dL. Reviewed diabetes disease process, A1C and risk factors. Discussed mechanism of action of each of his diabetic medications. Pt agreed to return for further teaching 7/29/2022 with RN Diabetic Educator.     Current Medication and Medication Update/Changes:  Glipizide 10 mg, take 1 tab PO BID  Januvia 100 mg, take 1 tab PO daily  Metformin  mg, take 1 tab PO TID (with meals)    Assessment of Blood Sugars:   Per Pt report, 162 mg/dL fasting in AM 6/10/2022    The patient was seen for Diabetes Self-Management Education in an individual setting.   The patient was seen from  0845 to 1000 and billed for 60 minutes. Relevant medical history reviewed.  The instruction was given to the patient and caregiver.    Material was presented using verbal, written, demonstration and return demonstration.    Learning needs were assessed and the patient requires education in diabetes disease process/treatment options, medical nutrition therapy, physical activity, blood glucose monitoring, diabetes medications, acute complications, chronic complications, diabetes psychosocial adjustment and strategies and goal setting.     Labs:  Hemoglobin A1C: target value and patient current value reviewed   No results found for: BSENUJLM4C  Hemoglobin A1C (%)   Date Value   05/02/2022 6.7 (H)       Lipid panel:  target value and patient current  15* 13* 12*   BILT 0.4 0.3 0.3   TP 7.7 7.5 7.6       Estimated Creatinine Clearance: 43.7 mL/min (based on SCr of 1.26 mg/dL). No results for input(s): PTP, INR in the last 168 hours.          COVID-19 Lab Results    COVID-19  Lab Results   Component Va value reviewed   Cholesterol (mg/dL)   Date Value   05/02/2022 125     LDL (mg/dL)   Date Value   05/02/2022 52     HDL (mg/dL)   Date Value   05/02/2022 28 (L)     Triglycerides (mg/dL)   Date Value   05/02/2022 227 (H)     Microalbumin:  target value and patient current value reviewed   Microalbumin, Urine (mg/dL)   Date Value   07/21/2021 <0.50     Creatinine:  target value and patient current value reviewed   Creatinine (mg/dL)   Date Value   05/02/2022 1.40 (H)     Anthropometric Measurements:  Estimated body mass index is 24.5 kg/m² as calculated from the following:    Height as of 6/8/22: 5' 9\" (1.753 m).    Weight as of 5/24/22: 75.3 kg (165 lb 14.4 oz).   Wt Readings from Last 2 Encounters:   05/24/22 75.3 kg (165 lb 14.4 oz)   04/19/22 76.3 kg (168 lb 3.4 oz)     Physical Activity - Incorporating Activity into Lifestyle :  Pt currently does walking as physical activity  5-7 times per week for 60+ minutes.  Discussed benefit of physical activity for blood sugar management     Print/Written Resources Provided:   After Visit Summary (AVS)  Diabetes identification card, A1c - For Your Well-Being, Med Man, and Diabetes in Control Booklet    Plan:  Chart routed to referring Provider, Dr. Birmingham  Goal Setting to Promote Health & Problem Solving for Daily Living was discussed.  See DM Care Plan for goals and topics covered.  See Patient Instructions for further information.     Order:  Order Expires: 5/23/2023  Order located in EMR.    Recommended Follow-up:  Pt to follow up with DM education in a one on one visit.  The patient was encouraged to call back with any questions or concerns.    Assessments :  Verify assessment form is up to date: Current 6/10/2022      Diabetes Self Management Education Assessment    Learning Style  Learning style preference:  looking at diagrams, charts, or graphs and reading pamphlets, books, or articles  Reading health information can be difficult. Sometimes  How often do you have  <7  6. Thrombocytosis Likely reactive, monitor  7. Chronic pancreatitis  8. Anxiety/depression  1. Ativan IV, zoloft  9. Acute on chronic pain syndome  1. Fentanyl patch 125mcg, dilaudid PCA per pain service   10. JONO Resolved   11. COPD, stable  12.  DMII a problem understanding what is told to you about your medical condition? Sometimes    Health History  Do you know what type of diabetes you have?  Yes  Does anyone in your family have diabetes? Yes, mother  Have you had diabetes education before? Yes    Nutrition / Meal Planning  Do you eat three meals a day?  Yes  Do you eat at about the same time each day?  Yes  Do have diet limitations? (low salt, no dairy products, no wheat products)  No  Do you have any weight or eating concerns? No  Are you trying to lose weight; currently following a diet/meal plan? No  Is it hard to control what you eat? No  Do you drink regular soda or fruit drinks (orange juice, fruit punch)?  No  Do you eat desserts/sweets more than once or twice a week?  Yes  Do you drink alcohol? No     Do you \"eat out\" or get \"carry out\" more than once per week? Yes, restaurant 1x weekly.   Do you do your own cooking? Yes  Do you do your own grocery shopping?  No    Activity  Do you exercise at least 30 minutes/3 or more times per week?  Yes  If yes, what type of activity do you do?  Walks 60+ minutes daily  Is there a medical reason for limiting activity?  No    Monitoring  Do you have a blood sugar monitor? Yes  How many times per day are you testing? 1-2 times per day  Do you check your blood sugar level before driving? No  Do you have a low blood sugar more than 1 time per week? No  In the past 12 months, have you had a high or low blood sugar that required treatment help from another person or hospitalization?  Yes, last year 10/2021  Do you have a sharps container?  Yes    Medication  Do you miss or forget to take your pills or insulin?  No  Do you ever omit your pills or insulin on purpose? No  Do you carry a current medication list or card in your wallet?  Yes  Do you wear diabetes identification?  Yes  Do you carry a form of fast-acting sugar with you?  No    Complications/Problems  Do you have numbness, tingling, sores, or pain in your hands  or feet?  Yes  Do you check your feet daily for any signs of problems?  Yes  Have you had a dilated eye exam in the past year?  Yes  Have you seen a dentist in the past year?  No  Have you had a change or loss in hearing?  No  Do you sleep 6-8 hours per night?  No, wakes at 0345 nightly for urination  Do you have sleep apnea?  No  Do you have any sexual problems?      Socioeconomic  Do you live alone?  Yes  Do you feel safe in relationships at home?  Yes  Do you work outside the home?  No  Do you work second or third shift? No  Do money concerns keep you from:   Taking your medications as prescribed? No   Attending medical appointments? No  Within the past 12 months, have you worried whether food would run out before you had money to buy more? No   Do you have cultural or Religion practices or beliefs that influence how you care for your diabetes?  No  Over the past two weeks have you felt little interest or pleasure in doing things? Several days  Over the past two weeks have you felt down, depressed or hopeless? Several days, r/t fecal incontinence  To what degree, during the past month, have you been distressed or bothered by the following:     Feeling overwhelmed with the diagnosis of diabetes? A moderate problem   Feeling overwhelmed by the demands of living with diabetes? A moderate problem    How do you get support?   friends and healthcare provider    Which Topics Would You Like to Learn About?  planning Meals, monitoring/blood sugar goals, treating high/low blood sugars, benefits of physical activity, diabetes medications, what is diabetes and preventing complications      Gurpreet Eid RN Diabetic Educator

## 2022-06-10 ENCOUNTER — APPOINTMENT (OUTPATIENT)
Dept: GENERAL RADIOLOGY | Facility: HOSPITAL | Age: 72
End: 2022-06-10
Attending: NURSE PRACTITIONER
Payer: MEDICARE

## 2022-06-10 LAB
ANION GAP SERPL CALC-SCNC: 3 MMOL/L (ref 0–18)
BLOOD TYPE BARCODE: 5100
BUN BLD-MCNC: 26 MG/DL (ref 7–18)
CALCIUM BLD-MCNC: 8.9 MG/DL (ref 8.5–10.1)
CHLORIDE SERPL-SCNC: 112 MMOL/L (ref 98–112)
CO2 SERPL-SCNC: 27 MMOL/L (ref 21–32)
CREAT BLD-MCNC: 1.18 MG/DL
ERYTHROCYTE [DISTWIDTH] IN BLOOD BY AUTOMATED COUNT: 14.6 %
GLUCOSE BLD-MCNC: 113 MG/DL (ref 70–99)
GLUCOSE BLD-MCNC: 178 MG/DL (ref 70–99)
GLUCOSE BLD-MCNC: 209 MG/DL (ref 70–99)
GLUCOSE BLD-MCNC: 60 MG/DL (ref 70–99)
GLUCOSE BLD-MCNC: 63 MG/DL (ref 70–99)
GLUCOSE BLD-MCNC: 81 MG/DL (ref 70–99)
GLUCOSE BLD-MCNC: 94 MG/DL (ref 70–99)
HCT VFR BLD AUTO: 24.6 %
HGB BLD-MCNC: 8.7 G/DL
MCH RBC QN AUTO: 31.8 PG (ref 26–34)
MCHC RBC AUTO-ENTMCNC: 35.4 G/DL (ref 31–37)
MCV RBC AUTO: 89.8 FL
OSMOLALITY SERPL CALC.SUM OF ELEC: 298 MOSM/KG (ref 275–295)
PLATELET # BLD AUTO: 283 10(3)UL (ref 150–450)
POTASSIUM SERPL-SCNC: 4.1 MMOL/L (ref 3.5–5.1)
RBC # BLD AUTO: 2.74 X10(6)UL
SODIUM SERPL-SCNC: 142 MMOL/L (ref 136–145)
WBC # BLD AUTO: 16.2 X10(3) UL (ref 4–11)

## 2022-06-10 PROCEDURE — 71045 X-RAY EXAM CHEST 1 VIEW: CPT | Performed by: NURSE PRACTITIONER

## 2022-06-10 RX ORDER — ACETAMINOPHEN 325 MG/1
650 TABLET ORAL EVERY 6 HOURS PRN
Status: DISCONTINUED | OUTPATIENT
Start: 2022-06-10 | End: 2022-06-11

## 2022-06-10 RX ORDER — KETOROLAC TROMETHAMINE 30 MG/ML
15 INJECTION, SOLUTION INTRAMUSCULAR; INTRAVENOUS EVERY 6 HOURS PRN
Status: DISCONTINUED | OUTPATIENT
Start: 2022-06-10 | End: 2022-06-10

## 2022-06-10 RX ORDER — KETOROLAC TROMETHAMINE 30 MG/ML
30 INJECTION, SOLUTION INTRAMUSCULAR; INTRAVENOUS EVERY 6 HOURS PRN
Status: DISCONTINUED | OUTPATIENT
Start: 2022-06-10 | End: 2022-06-10

## 2022-06-10 NOTE — CM/SW NOTE
Pt accepted by Nick Cotton. Reserved in [address]. AVS updated.     [address]  P: [phone]  F: [facility], [facility], [facility]   / Discharge [address] [facility].

## 2022-06-10 NOTE — PLAN OF CARE
Received patient, alert and oriented. On PCA Dilaudid and claimed adequate pain relief. Discussed POC. Due meds given. Reminded to use incentive spirometer 10x/hr while awake. Safety measures reinforced, call light within reach. Needs attended to. Will continue to monitor. Problem: Patient/Family Goals  Goal: Patient/Family Long Term Goal  Description: Patient's Long Term Goal: to go home    Interventions:  - manage surgical incision  - maintain SBP within desired parameters  - See additional Care Plan goals for specific interventions  Outcome: Progressing  Goal: Patient/Family Short Term Goal  Description: Patient's Short Term Goal: manage pain    Interventions:   - PCA pump  - PRN pain medication available  - See additional Care Plan goals for specific interventions  Outcome: Progressing     Problem: SAFETY ADULT - FALL  Goal: Free from fall injury  Description: INTERVENTIONS:  - Assess pt frequently for physical needs  - Identify cognitive and physical deficits and behaviors that affect risk of falls.   - [place] fall precautions as indicated by assessment.  - Educate pt/family on patient safety including physical limitations  - Instruct pt to call for assistance with activity based on assessment  - Modify environment to reduce risk of injury  - Provide assistive devices as appropriate  - Consider OT/PT consult to assist with strengthening/mobility  - Encourage toileting schedule  Outcome: Progressing     Problem: CARDIOVASCULAR SYSTEM  Goal: Maintain optimal cardiac output and hemodynamic stability  Description: Interventions:  - Monitor blood pressure and heart rate  - Monitor pulses and perfusion  - Monitor urine output  - Assess for signs and symptoms of decreased cardiac output  - Administer fluids as ordered  - Administer vasoactive medications as ordered  Outcome: Progressing     Problem: RESPIRATORY  Goal: Optimal ventilation and oxygenation for gestation and disease state  Description: Interventions: - Assess respiratory rate, work of breathing, breath sounds, chest rise  - Monitor SpO2 and administer/wean supplemental oxygen as ordered  - Position infant to facilitate oxygenation and minimize respiratory effort  - Administer surfactant as ordered  - Assess the need for suctioning  - Monitor blood gases as ordered  - If on CPAP or HF cannula, place feeding tube open to gravity between feedings  - Monitor for adverse effects and complications of mechanical ventilation  - Provide chest physiotherapy and vibes as ordered  - Provide nebulizer treatment medications as ordered  - Provide teaching to family of disease process and treatment plan  Outcome: Progressing

## 2022-06-10 NOTE — PLAN OF CARE
Assumed patient care at 0730 this AM. Patient A&O x4, slightly groggy. Received pt on 1L O2, weaned to RA. Pt reports CRUZ. Re-educated on IS, best effort 1000, continued use encouraged. Nsr on tele. CABG POD4, midsternal and LLE incisions approximated with steri strips, painted with betadine. FINA hose and SCDs applied. Pt reports moderate-severe surgical pain (mostly in right shoulder/neck); receiving Dilaudid PCA, PRN Morphine, has Fentanyl patch in place. Pt also provided with hot and cold packs. Continent of B&B, BM x2 yesterday 6/9 per pt. QID accucheck. BG low early this AM, pt not wanting to eat breakfast- PO intake encouraged by this RN. Up SBA for safety, pt encouraged to increase ambulation. Updated on POC, needs met. Problem: Patient/Family Goals  Goal: Patient/Family Long Term Goal  Description: Patient's Long Term Goal: to go home    Interventions:  - manage surgical incision  - maintain SBP within desired parameters  - See additional Care Plan goals for specific interventions  Outcome: Progressing  Goal: Patient/Family Short Term Goal  Description: Patient's Short Term Goal: manage pain    Interventions:   - PCA pump  - PRN pain medication available  - See additional Care Plan goals for specific interventions  Outcome: Progressing     Problem: SAFETY ADULT - FALL  Goal: Free from fall injury  Description: INTERVENTIONS:  - Assess pt frequently for physical needs  - Identify cognitive and physical deficits and behaviors that affect risk of falls.   - [place] fall precautions as indicated by assessment.  - Educate pt/family on patient safety including physical limitations  - Instruct pt to call for assistance with activity based on assessment  - Modify environment to reduce risk of injury  - Provide assistive devices as appropriate  - Consider OT/PT consult to assist with strengthening/mobility  - Encourage toileting schedule  Outcome: Progressing     Problem: CARDIOVASCULAR SYSTEM  Goal: Maintain optimal cardiac output and hemodynamic stability  Description: Interventions:  - Monitor blood pressure and heart rate  - Monitor pulses and perfusion  - Monitor urine output  - Assess for signs and symptoms of decreased cardiac output  - Administer fluids as ordered  - Administer vasoactive medications as ordered  Outcome: Progressing     Problem: RESPIRATORY  Goal: Optimal ventilation and oxygenation for gestation and disease state  Description: Interventions:   - Assess respiratory rate, work of breathing, breath sounds, chest rise  - Monitor SpO2 and administer/wean supplemental oxygen as ordered  - Position infant to facilitate oxygenation and minimize respiratory effort  - Administer surfactant as ordered  - Assess the need for suctioning  - Monitor blood gases as ordered  - If on CPAP or HF cannula, place feeding tube open to gravity between feedings  - Monitor for adverse effects and complications of mechanical ventilation  - Provide chest physiotherapy and vibes as ordered  - Provide nebulizer treatment medications as ordered  - Provide teaching to family of disease process and treatment plan  Outcome: Progressing

## 2022-06-10 NOTE — PLAN OF CARE
Patient Aox4. NSR on tele. Pt on room air. Shortness of breath with exertion. Pt reports one time occurrence of productive cough, per patient was green in color. Reaches 1250 with IS. PCA pump infusing, IV Morphine given x2 for breakthrough incisional pain. Midsternal incision and LLE incision steri strips without drainage. Continent of B&B. Up standby. Call light within reach. Handed patient off to oncoming RN at [phone]. Problem: Patient/Family Goals  Goal: Patient/Family Long Term Goal  Description: Patient's Long Term Goal: to go home    Interventions:  - manage surgical incision  - maintain SBP within desired parameters  - See additional Care Plan goals for specific interventions  6/10/2022 0121 by Maksim Zamora RN  Outcome: Progressing  6/10/2022 0121 by Maksim Zamora RN  Outcome: Progressing  Goal: Patient/Family Short Term Goal  Description: Patient's Short Term Goal: manage pain    Interventions:   - PCA pump  - PRN pain medication available  - See additional Care Plan goals for specific interventions  6/10/2022 0121 by Maksim Zamora RN  Outcome: Progressing  6/10/2022 0121 by Maksim Zamora RN  Outcome: Progressing     Problem: SAFETY ADULT - FALL  Goal: Free from fall injury  Description: INTERVENTIONS:  - Assess pt frequently for physical needs  - Identify cognitive and physical deficits and behaviors that affect risk of falls.   - [place] fall precautions as indicated by assessment.  - Educate pt/family on patient safety including physical limitations  - Instruct pt to call for assistance with activity based on assessment  - Modify environment to reduce risk of injury  - Provide assistive devices as appropriate  - Consider OT/PT consult to assist with strengthening/mobility  - Encourage toileting schedule  6/10/2022 0121 by Maksim Zamora RN  Outcome: Progressing  6/10/2022 0121 by Maksim Zamora RN  Outcome: Progressing     Problem: CARDIOVASCULAR SYSTEM  Goal: Maintain optimal cardiac output and hemodynamic stability  Description: Interventions:  - Monitor blood pressure and heart rate  - Monitor pulses and perfusion  - Monitor urine output  - Assess for signs and symptoms of decreased cardiac output  - Administer fluids as ordered  - Administer vasoactive medications as ordered  6/10/2022 0121 by Chrissy Baca RN  Outcome: Progressing  6/10/2022 0121 by Chrissy Baca RN  Outcome: Progressing     Problem: RESPIRATORY  Goal: Optimal ventilation and oxygenation for gestation and disease state  Description: Interventions:   - Assess respiratory rate, work of breathing, breath sounds, chest rise  - Monitor SpO2 and administer/wean supplemental oxygen as ordered  - Position infant to facilitate oxygenation and minimize respiratory effort  - Administer surfactant as ordered  - Assess the need for suctioning  - Monitor blood gases as ordered  - If on CPAP or HF cannula, place feeding tube open to gravity between feedings  - Monitor for adverse effects and complications of mechanical ventilation  - Provide chest physiotherapy and vibes as ordered  - Provide nebulizer treatment medications as ordered  - Provide teaching to family of disease process and treatment plan  6/10/2022 0121 by Chrissy Baca RN  Outcome: Progressing  6/10/2022 0121 by Chrissy Baca RN  Outcome: Progressing

## 2022-06-11 VITALS
HEART RATE: 89 BPM | SYSTOLIC BLOOD PRESSURE: 116 MMHG | HEIGHT: 71 IN | DIASTOLIC BLOOD PRESSURE: 71 MMHG | OXYGEN SATURATION: 96 % | TEMPERATURE: 98 F | BODY MASS INDEX: 18.92 KG/M2 | WEIGHT: 135.13 LBS | RESPIRATION RATE: 18 BRPM

## 2022-06-11 LAB
ANION GAP SERPL CALC-SCNC: 2 MMOL/L (ref 0–18)
BUN BLD-MCNC: 21 MG/DL (ref 7–18)
CALCIUM BLD-MCNC: 9 MG/DL (ref 8.5–10.1)
CHLORIDE SERPL-SCNC: 110 MMOL/L (ref 98–112)
CO2 SERPL-SCNC: 29 MMOL/L (ref 21–32)
CREAT BLD-MCNC: 1.1 MG/DL
ERYTHROCYTE [DISTWIDTH] IN BLOOD BY AUTOMATED COUNT: 14.8 %
GLUCOSE BLD-MCNC: 117 MG/DL (ref 70–99)
GLUCOSE BLD-MCNC: 227 MG/DL (ref 70–99)
GLUCOSE BLD-MCNC: 93 MG/DL (ref 70–99)
HCT VFR BLD AUTO: 27.7 %
HGB BLD-MCNC: 9.4 G/DL
MCH RBC QN AUTO: 30.6 PG (ref 26–34)
MCHC RBC AUTO-ENTMCNC: 33.9 G/DL (ref 31–37)
MCV RBC AUTO: 90.2 FL
OSMOLALITY SERPL CALC.SUM OF ELEC: 295 MOSM/KG (ref 275–295)
PLATELET # BLD AUTO: 329 10(3)UL (ref 150–450)
POTASSIUM SERPL-SCNC: 3.9 MMOL/L (ref 3.5–5.1)
RBC # BLD AUTO: 3.07 X10(6)UL
SODIUM SERPL-SCNC: 141 MMOL/L (ref 136–145)
WBC # BLD AUTO: 13.2 X10(3) UL (ref 4–11)

## 2022-06-11 RX ORDER — KETOROLAC TROMETHAMINE 15 MG/ML
15 INJECTION, SOLUTION INTRAMUSCULAR; INTRAVENOUS ONCE
Status: COMPLETED | OUTPATIENT
Start: 2022-06-11 | End: 2022-06-11

## 2022-06-11 RX ORDER — HYDROMORPHONE HYDROCHLORIDE 1 MG/ML
0.5 INJECTION, SOLUTION INTRAMUSCULAR; INTRAVENOUS; SUBCUTANEOUS EVERY 2 HOUR PRN
Status: DISCONTINUED | OUTPATIENT
Start: 2022-06-11 | End: 2022-06-11

## 2022-06-11 RX ORDER — METOPROLOL SUCCINATE 50 MG/1
50 TABLET, EXTENDED RELEASE ORAL DAILY
Qty: 30 TABLET | Refills: 3 | Status: SHIPPED | OUTPATIENT
Start: 2022-06-11

## 2022-06-11 RX ORDER — ATORVASTATIN CALCIUM 40 MG/1
40 TABLET, FILM COATED ORAL NIGHTLY
Qty: 30 TABLET | Refills: 3 | Status: SHIPPED | OUTPATIENT
Start: 2022-06-11

## 2022-06-11 RX ORDER — FOLIC ACID 1 MG/1
1 TABLET ORAL DAILY
Qty: 30 TABLET | Refills: 1 | Status: SHIPPED | OUTPATIENT
Start: 2022-06-12

## 2022-06-11 NOTE — PLAN OF CARE
N: AOx4  R: CTA bilaterally. Room air. C: NSR on tele. GI: Patient reports a bowel movement this morning. : WNL  Skin: Midsteral and left lower extremity are approximated, clean, and dry. Painted with betadine. POC:  Discharge to home with Abigail Anderson. Problem: Patient/Family Goals  Goal: Patient/Family Long Term Goal  Description: Patient's Long Term Goal: to go home    Interventions:  - manage surgical incision  - maintain SBP within desired parameters  - See additional Care Plan goals for specific interventions  Outcome: Adequate for Discharge  Goal: Patient/Family Short Term Goal  Description: Patient's Short Term Goal: manage pain    Interventions:   - PCA pump  - PRN pain medication available  - See additional Care Plan goals for specific interventions  Outcome: Adequate for Discharge     Problem: SAFETY ADULT - FALL  Goal: Free from fall injury  Description: INTERVENTIONS:  - Assess pt frequently for physical needs  - Identify cognitive and physical deficits and behaviors that affect risk of falls.   - [place] fall precautions as indicated by assessment.  - Educate pt/family on patient safety including physical limitations  - Instruct pt to call for assistance with activity based on assessment  - Modify environment to reduce risk of injury  - Provide assistive devices as appropriate  - Consider OT/PT consult to assist with strengthening/mobility  - Encourage toileting schedule  Outcome: Adequate for Discharge     Problem: CARDIOVASCULAR SYSTEM  Goal: Maintain optimal cardiac output and hemodynamic stability  Description: Interventions:  - Monitor blood pressure and heart rate  - Monitor pulses and perfusion  - Monitor urine output  - Assess for signs and symptoms of decreased cardiac output  - Administer fluids as ordered  - Administer vasoactive medications as ordered  Outcome: Adequate for Discharge     Problem: RESPIRATORY  Goal: Optimal ventilation and oxygenation for gestation and disease state  Description: Interventions:   - Assess respiratory rate, work of breathing, breath sounds, chest rise  - Monitor SpO2 and administer/wean supplemental oxygen as ordered  - Position infant to facilitate oxygenation and minimize respiratory effort  - Administer surfactant as ordered  - Assess the need for suctioning  - Monitor blood gases as ordered  - If on CPAP or HF cannula, place feeding tube open to gravity between feedings  - Monitor for adverse effects and complications of mechanical ventilation  - Provide chest physiotherapy and vibes as ordered  - Provide nebulizer treatment medications as ordered  - Provide teaching to family of disease process and treatment plan  Outcome: Adequate for Discharge

## 2022-06-11 NOTE — PLAN OF CARE
Alert and oriented x4 on tele monitor hr 90's sinus rhythm. Mid sternal and left leg x1 steri strips simona c/d/I. Encouraged used of IS. Morphine 4 mg ivp given prn for pain. Updated w/ poc and verbalized understanding. All needs attended and will continue to monitor. Call light within reach. Problem: Patient/Family Goals  Goal: Patient/Family Long Term Goal  Description: Patient's Long Term Goal: to go home    Interventions:  - manage surgical incision  - maintain SBP within desired parameters  - See additional Care Plan goals for specific interventions  Outcome: Progressing  Goal: Patient/Family Short Term Goal  Description: Patient's Short Term Goal: manage pain    Interventions:   - PCA pump  - PRN pain medication available  - See additional Care Plan goals for specific interventions  Outcome: Progressing     Problem: SAFETY ADULT - FALL  Goal: Free from fall injury  Description: INTERVENTIONS:  - Assess pt frequently for physical needs  - Identify cognitive and physical deficits and behaviors that affect risk of falls.   - [place] fall precautions as indicated by assessment.  - Educate pt/family on patient safety including physical limitations  - Instruct pt to call for assistance with activity based on assessment  - Modify environment to reduce risk of injury  - Provide assistive devices as appropriate  - Consider OT/PT consult to assist with strengthening/mobility  - Encourage toileting schedule  Outcome: Progressing     Problem: CARDIOVASCULAR SYSTEM  Goal: Maintain optimal cardiac output and hemodynamic stability  Description: Interventions:  - Monitor blood pressure and heart rate  - Monitor pulses and perfusion  - Monitor urine output  - Assess for signs and symptoms of decreased cardiac output  - Administer fluids as ordered  - Administer vasoactive medications as ordered  Outcome: Progressing     Problem: RESPIRATORY  Goal: Optimal ventilation and oxygenation for gestation and disease state  Description: Interventions:   - Assess respiratory rate, work of breathing, breath sounds, chest rise  - Monitor SpO2 and administer/wean supplemental oxygen as ordered  - Position infant to facilitate oxygenation and minimize respiratory effort  - Administer surfactant as ordered  - Assess the need for suctioning  - Monitor blood gases as ordered  - If on CPAP or HF cannula, place feeding tube open to gravity between feedings  - Monitor for adverse effects and complications of mechanical ventilation  - Provide chest physiotherapy and vibes as ordered  - Provide nebulizer treatment medications as ordered  - Provide teaching to family of disease process and treatment plan  Outcome: Progressing

## 2022-06-11 NOTE — PROGRESS NOTES
Reviewed the AVS with the patient and his wife. All questions answered. Discharged home with [facility].

## 2022-06-13 ENCOUNTER — HOSPITAL ENCOUNTER (OUTPATIENT)
Facility: HOSPITAL | Age: 72
Setting detail: OBSERVATION
Discharge: HOME | End: 2022-06-15
Attending: EMERGENCY MEDICINE | Admitting: STUDENT IN AN ORGANIZED HEALTH CARE EDUCATION/TRAINING PROGRAM
Payer: MEDICARE

## 2022-06-13 ENCOUNTER — TELEPHONE (OUTPATIENT)
Dept: INTERNAL MEDICINE CLINIC | Facility: CLINIC | Age: 72
End: 2022-06-13

## 2022-06-13 ENCOUNTER — APPOINTMENT (OUTPATIENT)
Dept: GENERAL RADIOLOGY | Facility: HOSPITAL | Age: 72
End: 2022-06-13
Attending: EMERGENCY MEDICINE
Payer: MEDICARE

## 2022-06-13 ENCOUNTER — PATIENT OUTREACH (OUTPATIENT)
Dept: CASE MANAGEMENT | Age: 72
End: 2022-06-13

## 2022-06-13 DIAGNOSIS — R07.9 CHEST PAIN, RULE OUT ACUTE MYOCARDIAL INFARCTION: Primary | ICD-10-CM

## 2022-06-13 DIAGNOSIS — N17.9 ACUTE RENAL INJURY (HCC): ICD-10-CM

## 2022-06-13 DIAGNOSIS — Z02.9 ENCOUNTERS FOR UNSPECIFIED ADMINISTRATIVE PURPOSE: ICD-10-CM

## 2022-06-13 DIAGNOSIS — Z95.1 S/P CABG (CORONARY ARTERY BYPASS GRAFT): ICD-10-CM

## 2022-06-13 LAB — SARS-COV-2 RNA RESP QL NAA+PROBE: NOT DETECTED

## 2022-06-13 PROCEDURE — 71045 X-RAY EXAM CHEST 1 VIEW: CPT | Performed by: EMERGENCY MEDICINE

## 2022-06-13 PROCEDURE — 1111F DSCHRG MED/CURRENT MED MERGE: CPT

## 2022-06-13 RX ORDER — MORPHINE SULFATE 4 MG/ML
4 INJECTION, SOLUTION INTRAMUSCULAR; INTRAVENOUS EVERY 30 MIN PRN
Status: DISCONTINUED | OUTPATIENT
Start: 2022-06-13 | End: 2022-06-14

## 2022-06-13 RX ORDER — PANCRELIPASE 36000; 180000; 114000 [USP'U]/1; [USP'U]/1; [USP'U]/1
CAPSULE, DELAYED RELEASE PELLETS ORAL
Qty: 180 CAPSULE | Refills: 0 | Status: SHIPPED | OUTPATIENT
Start: 2022-06-13

## 2022-06-13 NOTE — DISCHARGE SUMMARY
[facility]  Discharge Summary    Carey Schmidt Patient Status:  Inpatient    1950 MRN [id number]   [facility] 8NE-A Attending No att. providers found   Hosp Day # 5 PCP Biju Braun MD     Admit date: 2022    Discharge date and time: 2022  2:13 PM     Discharge Diagnoses:   CAD    Procedures Performed:   CABG    HPI & Hospital Course: Carey Schmidt is a 66year old year old gentleman with CAD who was admitted to the hospital for CABG. Surgery was performed and recovery was uneventful. For more detailed information please see the medical record. Discharge Condition: Stable     Discharge Instructions:  Pt was instructed not to lift anything heavy and to follow up with Dr. Gabby Darby as directed.      Signed:  Holly Pina MD  2022

## 2022-06-13 NOTE — TELEPHONE ENCOUNTER
FYI pt completed TCM today. Pt states he is doing well since going home from the hospital. Pt is scheduled for TCM/HFU with PCP on 6/23/22. TCM/HFU appt recommended by 6/18/22 as pt is a high risk for readmission. Please discuss with PCP and advise. Thank you.      BOOK BY DATE (last date for TCM): 6/25/22

## 2022-06-14 ENCOUNTER — APPOINTMENT (OUTPATIENT)
Dept: CT IMAGING | Facility: HOSPITAL | Age: 72
End: 2022-06-14
Attending: INTERNAL MEDICINE
Payer: MEDICARE

## 2022-06-14 ENCOUNTER — APPOINTMENT (OUTPATIENT)
Dept: CV DIAGNOSTICS | Facility: HOSPITAL | Age: 72
End: 2022-06-14
Attending: INTERNAL MEDICINE
Payer: MEDICARE

## 2022-06-14 PROBLEM — N17.9 ACUTE RENAL INJURY (HCC): Status: ACTIVE | Noted: 2022-06-14

## 2022-06-14 PROBLEM — D64.9 ANEMIA: Status: ACTIVE | Noted: 2022-06-14

## 2022-06-14 PROBLEM — N17.9 ACUTE KIDNEY INJURY (HCC): Status: ACTIVE | Noted: 2022-06-14

## 2022-06-14 PROBLEM — Z95.1 HX OF CABG: Status: ACTIVE | Noted: 2022-06-06

## 2022-06-14 PROBLEM — D72.829 LEUKOCYTOSIS: Status: ACTIVE | Noted: 2022-06-14

## 2022-06-14 LAB
ADENOVIRUS PCR:: NOT DETECTED
ALBUMIN SERPL-MCNC: 2.5 G/DL (ref 3.4–5)
ALBUMIN SERPL-MCNC: 2.6 G/DL (ref 3.4–5)
ALBUMIN/GLOB SERPL: 0.6 {RATIO} (ref 1–2)
ALBUMIN/GLOB SERPL: 0.6 {RATIO} (ref 1–2)
ALP LIVER SERPL-CCNC: 63 U/L
ALP LIVER SERPL-CCNC: 64 U/L
ALT SERPL-CCNC: 28 U/L
ALT SERPL-CCNC: 31 U/L
ANION GAP SERPL CALC-SCNC: 5 MMOL/L (ref 0–18)
ANION GAP SERPL CALC-SCNC: 6 MMOL/L (ref 0–18)
ARTERIAL PATENCY WRIST A: POSITIVE
AST SERPL-CCNC: 20 U/L (ref 15–37)
AST SERPL-CCNC: 23 U/L (ref 15–37)
ATRIAL RATE: 106 BPM
B PARAPERT DNA SPEC QL NAA+PROBE: NOT DETECTED
B PERT DNA SPEC QL NAA+PROBE: NOT DETECTED
BASE EXCESS BLDA CALC-SCNC: 3.3 MMOL/L (ref ?–2)
BASOPHILS # BLD AUTO: 0.04 X10(3) UL (ref 0–0.2)
BASOPHILS # BLD AUTO: 0.05 X10(3) UL (ref 0–0.2)
BASOPHILS NFR BLD AUTO: 0.3 %
BASOPHILS NFR BLD AUTO: 0.3 %
BILIRUB SERPL-MCNC: 0.3 MG/DL (ref 0.1–2)
BILIRUB SERPL-MCNC: 0.4 MG/DL (ref 0.1–2)
BODY TEMPERATURE: 98.6 F
BUN BLD-MCNC: 18 MG/DL (ref 7–18)
BUN BLD-MCNC: 20 MG/DL (ref 7–18)
C PNEUM DNA SPEC QL NAA+PROBE: NOT DETECTED
CALCIUM BLD-MCNC: 9.1 MG/DL (ref 8.5–10.1)
CALCIUM BLD-MCNC: 9.3 MG/DL (ref 8.5–10.1)
CHLORIDE SERPL-SCNC: 108 MMOL/L (ref 98–112)
CHLORIDE SERPL-SCNC: 108 MMOL/L (ref 98–112)
CHOLEST SERPL-MCNC: 62 MG/DL (ref ?–200)
CO2 SERPL-SCNC: 25 MMOL/L (ref 21–32)
CO2 SERPL-SCNC: 29 MMOL/L (ref 21–32)
COHGB MFR BLD: 0.7 % SAT (ref 0–3)
CORONAVIRUS 229E PCR:: NOT DETECTED
CORONAVIRUS HKU1 PCR:: NOT DETECTED
CORONAVIRUS NL63 PCR:: NOT DETECTED
CORONAVIRUS OC43 PCR:: NOT DETECTED
CREAT BLD-MCNC: 1.17 MG/DL
CREAT BLD-MCNC: 1.33 MG/DL
EOSINOPHIL # BLD AUTO: 0.58 X10(3) UL (ref 0–0.7)
EOSINOPHIL # BLD AUTO: 0.61 X10(3) UL (ref 0–0.7)
EOSINOPHIL NFR BLD AUTO: 3.7 %
EOSINOPHIL NFR BLD AUTO: 3.9 %
ERYTHROCYTE [DISTWIDTH] IN BLOOD BY AUTOMATED COUNT: 14.2 %
ERYTHROCYTE [DISTWIDTH] IN BLOOD BY AUTOMATED COUNT: 14.5 %
FLUAV RNA SPEC QL NAA+PROBE: NOT DETECTED
FLUBV RNA SPEC QL NAA+PROBE: NOT DETECTED
GLOBULIN PLAS-MCNC: 4.1 G/DL (ref 2.8–4.4)
GLOBULIN PLAS-MCNC: 4.3 G/DL (ref 2.8–4.4)
GLUCOSE BLD-MCNC: 101 MG/DL (ref 70–99)
GLUCOSE BLD-MCNC: 117 MG/DL (ref 70–99)
GLUCOSE BLD-MCNC: 117 MG/DL (ref 70–99)
GLUCOSE BLD-MCNC: 170 MG/DL (ref 70–99)
GLUCOSE BLD-MCNC: 192 MG/DL (ref 70–99)
GLUCOSE BLD-MCNC: 60 MG/DL (ref 70–99)
GLUCOSE BLD-MCNC: 60 MG/DL (ref 70–99)
GLUCOSE BLD-MCNC: 80 MG/DL (ref 70–99)
HCO3 BLDA-SCNC: 27.4 MEQ/L (ref 21–27)
HCT VFR BLD AUTO: 27.8 %
HCT VFR BLD AUTO: 28 %
HDLC SERPL-MCNC: 25 MG/DL (ref 40–59)
HGB BLD-MCNC: 10.2 G/DL
HGB BLD-MCNC: 9.5 G/DL
HGB BLD-MCNC: 9.7 G/DL
IMM GRANULOCYTES # BLD AUTO: 0.17 X10(3) UL (ref 0–1)
IMM GRANULOCYTES # BLD AUTO: 0.23 X10(3) UL (ref 0–1)
IMM GRANULOCYTES NFR BLD: 1.1 %
IMM GRANULOCYTES NFR BLD: 1.4 %
LDLC SERPL CALC-MCNC: 22 MG/DL (ref ?–100)
LYMPHOCYTES # BLD AUTO: 2.29 X10(3) UL (ref 1–4)
LYMPHOCYTES # BLD AUTO: 2.38 X10(3) UL (ref 1–4)
LYMPHOCYTES NFR BLD AUTO: 14.6 %
LYMPHOCYTES NFR BLD AUTO: 15.4 %
MCH RBC QN AUTO: 30.5 PG (ref 26–34)
MCH RBC QN AUTO: 31.3 PG (ref 26–34)
MCHC RBC AUTO-ENTMCNC: 33.9 G/DL (ref 31–37)
MCHC RBC AUTO-ENTMCNC: 34.9 G/DL (ref 31–37)
MCV RBC AUTO: 89.7 FL
MCV RBC AUTO: 90 FL
METAPNEUMOVIRUS PCR:: NOT DETECTED
METHGB MFR BLD: 0 % SAT (ref 0.4–1.5)
MONOCYTES # BLD AUTO: 1.48 X10(3) UL (ref 0.1–1)
MONOCYTES # BLD AUTO: 1.53 X10(3) UL (ref 0.1–1)
MONOCYTES NFR BLD AUTO: 10.3 %
MONOCYTES NFR BLD AUTO: 9.1 %
MYCOPLASMA PNEUMONIA PCR:: NOT DETECTED
NEUTROPHILS # BLD AUTO: 10.3 X10 (3) UL (ref 1.5–7.7)
NEUTROPHILS # BLD AUTO: 10.3 X10(3) UL (ref 1.5–7.7)
NEUTROPHILS # BLD AUTO: 11.57 X10 (3) UL (ref 1.5–7.7)
NEUTROPHILS # BLD AUTO: 11.57 X10(3) UL (ref 1.5–7.7)
NEUTROPHILS NFR BLD AUTO: 69 %
NEUTROPHILS NFR BLD AUTO: 70.9 %
NONHDLC SERPL-MCNC: 37 MG/DL (ref ?–130)
OSMOLALITY SERPL CALC.SUM OF ELEC: 292 MOSM/KG (ref 275–295)
OSMOLALITY SERPL CALC.SUM OF ELEC: 294 MOSM/KG (ref 275–295)
OXYHGB MFR BLDA: 93.4 % (ref 92–100)
P AXIS: 72 DEGREES
P-R INTERVAL: 128 MS
PARAINFLUENZA 1 PCR:: NOT DETECTED
PARAINFLUENZA 2 PCR:: NOT DETECTED
PARAINFLUENZA 3 PCR:: NOT DETECTED
PARAINFLUENZA 4 PCR:: NOT DETECTED
PCO2 BLDA: 35 MM HG (ref 35–45)
PH BLDA: 7.49 [PH] (ref 7.35–7.45)
PLATELET # BLD AUTO: 442 10(3)UL (ref 150–450)
PLATELET # BLD AUTO: 466 10(3)UL (ref 150–450)
PO2 BLDA: 65 MM HG (ref 80–100)
POTASSIUM SERPL-SCNC: 4.2 MMOL/L (ref 3.5–5.1)
POTASSIUM SERPL-SCNC: 4.5 MMOL/L (ref 3.5–5.1)
PROT SERPL-MCNC: 6.6 G/DL (ref 6.4–8.2)
PROT SERPL-MCNC: 6.9 G/DL (ref 6.4–8.2)
Q-T INTERVAL: 328 MS
QRS DURATION: 84 MS
QTC CALCULATION (BEZET): 435 MS
R AXIS: 78 DEGREES
RBC # BLD AUTO: 3.1 X10(6)UL
RBC # BLD AUTO: 3.11 X10(6)UL
RHINOVIRUS/ENTERO PCR:: NOT DETECTED
RSV RNA SPEC QL NAA+PROBE: NOT DETECTED
SARS-COV-2 RNA NPH QL NAA+NON-PROBE: NOT DETECTED
SODIUM SERPL-SCNC: 139 MMOL/L (ref 136–145)
SODIUM SERPL-SCNC: 142 MMOL/L (ref 136–145)
T AXIS: 61 DEGREES
TRIGL SERPL-MCNC: 63 MG/DL (ref 30–149)
TROPONIN I HIGH SENSITIVITY: 102 NG/L
TROPONIN I HIGH SENSITIVITY: 102 NG/L
VENTRICULAR RATE: 106 BPM
VLDLC SERPL CALC-MCNC: 8 MG/DL (ref 0–30)
WBC # BLD AUTO: 14.9 X10(3) UL (ref 4–11)
WBC # BLD AUTO: 16.3 X10(3) UL (ref 4–11)

## 2022-06-14 PROCEDURE — 93306 TTE W/DOPPLER COMPLETE: CPT | Performed by: INTERNAL MEDICINE

## 2022-06-14 PROCEDURE — 99219 INITIAL OBSERVATION CARE,LEVL II: CPT | Performed by: STUDENT IN AN ORGANIZED HEALTH CARE EDUCATION/TRAINING PROGRAM

## 2022-06-14 PROCEDURE — 71275 CT ANGIOGRAPHY CHEST: CPT | Performed by: INTERNAL MEDICINE

## 2022-06-14 RX ORDER — HYDROCODONE BITARTRATE AND ACETAMINOPHEN 5; 325 MG/1; MG/1
1 TABLET ORAL EVERY 4 HOURS PRN
Status: DISCONTINUED | OUTPATIENT
Start: 2022-06-14 | End: 2022-06-14

## 2022-06-14 RX ORDER — ASPIRIN 81 MG/1
81 TABLET ORAL DAILY
Status: DISCONTINUED | OUTPATIENT
Start: 2022-06-14 | End: 2022-06-15

## 2022-06-14 RX ORDER — FENTANYL 25 UG/H
1 PATCH TRANSDERMAL
Status: DISCONTINUED | OUTPATIENT
Start: 2022-06-14 | End: 2022-06-15

## 2022-06-14 RX ORDER — NICOTINE POLACRILEX 4 MG
30 LOZENGE BUCCAL
Status: DISCONTINUED | OUTPATIENT
Start: 2022-06-14 | End: 2022-06-15

## 2022-06-14 RX ORDER — ACETAMINOPHEN 10 MG/ML
1000 INJECTION, SOLUTION INTRAVENOUS EVERY 6 HOURS
Status: COMPLETED | OUTPATIENT
Start: 2022-06-14 | End: 2022-06-14

## 2022-06-14 RX ORDER — IPRATROPIUM BROMIDE AND ALBUTEROL SULFATE 2.5; .5 MG/3ML; MG/3ML
3 SOLUTION RESPIRATORY (INHALATION) EVERY 4 HOURS PRN
Status: DISCONTINUED | OUTPATIENT
Start: 2022-06-14 | End: 2022-06-14

## 2022-06-14 RX ORDER — HYDROCODONE BITARTRATE AND ACETAMINOPHEN 5; 325 MG/1; MG/1
2 TABLET ORAL EVERY 4 HOURS PRN
Status: DISCONTINUED | OUTPATIENT
Start: 2022-06-14 | End: 2022-06-14

## 2022-06-14 RX ORDER — LORAZEPAM 1 MG/1
1 TABLET ORAL NIGHTLY PRN
Status: DISCONTINUED | OUTPATIENT
Start: 2022-06-14 | End: 2022-06-15

## 2022-06-14 RX ORDER — METOPROLOL SUCCINATE 50 MG/1
50 TABLET, EXTENDED RELEASE ORAL DAILY
Status: DISCONTINUED | OUTPATIENT
Start: 2022-06-14 | End: 2022-06-15

## 2022-06-14 RX ORDER — MORPHINE SULFATE 2 MG/ML
2 INJECTION, SOLUTION INTRAMUSCULAR; INTRAVENOUS EVERY 2 HOUR PRN
Status: DISCONTINUED | OUTPATIENT
Start: 2022-06-14 | End: 2022-06-15

## 2022-06-14 RX ORDER — HYDROMORPHONE HYDROCHLORIDE 1 MG/ML
0.5 INJECTION, SOLUTION INTRAMUSCULAR; INTRAVENOUS; SUBCUTANEOUS EVERY 4 HOURS PRN
Status: DISCONTINUED | OUTPATIENT
Start: 2022-06-14 | End: 2022-06-14

## 2022-06-14 RX ORDER — ONDANSETRON 2 MG/ML
4 INJECTION INTRAMUSCULAR; INTRAVENOUS EVERY 6 HOURS PRN
Status: DISCONTINUED | OUTPATIENT
Start: 2022-06-14 | End: 2022-06-15

## 2022-06-14 RX ORDER — ALBUTEROL SULFATE 90 UG/1
2 AEROSOL, METERED RESPIRATORY (INHALATION) EVERY 6 HOURS PRN
Status: DISCONTINUED | OUTPATIENT
Start: 2022-06-14 | End: 2022-06-15

## 2022-06-14 RX ORDER — IPRATROPIUM BROMIDE AND ALBUTEROL SULFATE 2.5; .5 MG/3ML; MG/3ML
3 SOLUTION RESPIRATORY (INHALATION)
Status: DISCONTINUED | OUTPATIENT
Start: 2022-06-14 | End: 2022-06-15

## 2022-06-14 RX ORDER — FENTANYL 12 UG/H
1 PATCH TRANSDERMAL
Status: DISCONTINUED | OUTPATIENT
Start: 2022-06-14 | End: 2022-06-14

## 2022-06-14 RX ORDER — BISACODYL 10 MG
10 SUPPOSITORY, RECTAL RECTAL
Status: DISCONTINUED | OUTPATIENT
Start: 2022-06-14 | End: 2022-06-15

## 2022-06-14 RX ORDER — HYDROMORPHONE HYDROCHLORIDE 1 MG/ML
0.5 INJECTION, SOLUTION INTRAMUSCULAR; INTRAVENOUS; SUBCUTANEOUS EVERY 30 MIN PRN
Status: DISCONTINUED | OUTPATIENT
Start: 2022-06-14 | End: 2022-06-14

## 2022-06-14 RX ORDER — HYDROMORPHONE HYDROCHLORIDE 2 MG/1
8 TABLET ORAL EVERY 8 HOURS PRN
Status: DISCONTINUED | OUTPATIENT
Start: 2022-06-14 | End: 2022-06-14

## 2022-06-14 RX ORDER — SODIUM PHOSPHATE, DIBASIC AND SODIUM PHOSPHATE, MONOBASIC 7; 19 G/133ML; G/133ML
1 ENEMA RECTAL ONCE AS NEEDED
Status: DISCONTINUED | OUTPATIENT
Start: 2022-06-14 | End: 2022-06-15

## 2022-06-14 RX ORDER — ATORVASTATIN CALCIUM 40 MG/1
40 TABLET, FILM COATED ORAL NIGHTLY
Status: DISCONTINUED | OUTPATIENT
Start: 2022-06-14 | End: 2022-06-15

## 2022-06-14 RX ORDER — NICOTINE POLACRILEX 4 MG
15 LOZENGE BUCCAL
Status: DISCONTINUED | OUTPATIENT
Start: 2022-06-14 | End: 2022-06-15

## 2022-06-14 RX ORDER — POLYETHYLENE GLYCOL 3350 17 G/17G
17 POWDER, FOR SOLUTION ORAL DAILY PRN
Status: DISCONTINUED | OUTPATIENT
Start: 2022-06-14 | End: 2022-06-15

## 2022-06-14 RX ORDER — PANTOPRAZOLE SODIUM 40 MG/1
40 TABLET, DELAYED RELEASE ORAL
Status: DISCONTINUED | OUTPATIENT
Start: 2022-06-14 | End: 2022-06-15

## 2022-06-14 RX ORDER — MORPHINE SULFATE 2 MG/ML
1 INJECTION, SOLUTION INTRAMUSCULAR; INTRAVENOUS EVERY 2 HOUR PRN
Status: DISCONTINUED | OUTPATIENT
Start: 2022-06-14 | End: 2022-06-15

## 2022-06-14 RX ORDER — METOCLOPRAMIDE HYDROCHLORIDE 5 MG/ML
10 INJECTION INTRAMUSCULAR; INTRAVENOUS EVERY 8 HOURS PRN
Status: DISCONTINUED | OUTPATIENT
Start: 2022-06-14 | End: 2022-06-15

## 2022-06-14 RX ORDER — ENOXAPARIN SODIUM 100 MG/ML
40 INJECTION SUBCUTANEOUS DAILY
Status: DISCONTINUED | OUTPATIENT
Start: 2022-06-14 | End: 2022-06-15

## 2022-06-14 RX ORDER — IOHEXOL 350 MG/ML
100 INJECTION, SOLUTION INTRAVENOUS
Status: COMPLETED | OUTPATIENT
Start: 2022-06-14 | End: 2022-06-14

## 2022-06-14 RX ORDER — SENNOSIDES 8.6 MG
17.2 TABLET ORAL NIGHTLY PRN
Status: DISCONTINUED | OUTPATIENT
Start: 2022-06-14 | End: 2022-06-15

## 2022-06-14 RX ORDER — FENTANYL 50 UG/H
1 PATCH TRANSDERMAL
Status: DISCONTINUED | OUTPATIENT
Start: 2022-06-14 | End: 2022-06-15

## 2022-06-14 RX ORDER — DEXTROSE MONOHYDRATE 25 G/50ML
50 INJECTION, SOLUTION INTRAVENOUS
Status: DISCONTINUED | OUTPATIENT
Start: 2022-06-14 | End: 2022-06-15

## 2022-06-14 RX ORDER — ACETAMINOPHEN 325 MG/1
650 TABLET ORAL EVERY 4 HOURS PRN
Status: DISCONTINUED | OUTPATIENT
Start: 2022-06-14 | End: 2022-06-14

## 2022-06-14 RX ORDER — HYDROMORPHONE HYDROCHLORIDE 2 MG/1
8 TABLET ORAL EVERY 6 HOURS PRN
Status: DISCONTINUED | OUTPATIENT
Start: 2022-06-14 | End: 2022-06-15

## 2022-06-14 RX ORDER — IPRATROPIUM BROMIDE AND ALBUTEROL SULFATE 2.5; .5 MG/3ML; MG/3ML
3 SOLUTION RESPIRATORY (INHALATION)
Status: DISCONTINUED | OUTPATIENT
Start: 2022-06-14 | End: 2022-06-14

## 2022-06-14 RX ORDER — MORPHINE SULFATE 2 MG/ML
0.5 INJECTION, SOLUTION INTRAMUSCULAR; INTRAVENOUS EVERY 2 HOUR PRN
Status: DISCONTINUED | OUTPATIENT
Start: 2022-06-14 | End: 2022-06-15

## 2022-06-14 RX ORDER — ECHINACEA PURPUREA EXTRACT 125 MG
1 TABLET ORAL
Status: DISCONTINUED | OUTPATIENT
Start: 2022-06-14 | End: 2022-06-15

## 2022-06-14 NOTE — PLAN OF CARE
Patient is alert & oriented x4. Ambulates well independently. Breath sounds are clear bilateral. On room air. Normal sinus rhythm. Pain reported across incisional site, right shoulder, and back. Dilauded and fentanyl patch manage pain. Incisional site at sternum, left groin, and left shin is clean, dry, and intact. Morning blood glucose level:60. Gave apple juice x2->blood glucose at 80. Gave one more apple juice. Bed in low position and call light within reach. Reviewed plan of care and patient verbalizes understanding.          Problem: Patient/Family Goals  Goal: Patient/Family Long Term Goal  Description: Patient's Long Term Goal: Resolve pain, go home    Interventions:  - Pain management  - See additional Care Plan goals for specific interventions  Outcome: Progressing  Goal: Patient/Family Short Term Goal  Description: Patient's Short Term Goal: manage pain, f/u MD recommendations    Interventions:   - cards to see  -CV to see  - See additional Care Plan goals for specific interventions  Outcome: Progressing

## 2022-06-14 NOTE — PROGRESS NOTES
Paged by cardiology  Ordering ABG and CTA chest  On  Narcotics- d/w his outpt pain team and to adjust medications  H/o COPD- add nebs scheduled       Denton Woody MD Detail Level: Detailed Detail Level: Generalized Detail Level: Zone

## 2022-06-14 NOTE — PROGRESS NOTES
06/14/22 0200 06/14/22 0201 06/14/22 0203   Vital Signs   /79 118/53 123/56   MAP (mmHg) 89 68 70   BP Location Right arm Right arm Right arm   BP Method Automatic Automatic Automatic   Patient Position Lying Sitting Standing   Asymptomatic

## 2022-06-14 NOTE — ED INITIAL ASSESSMENT (HPI)
Arrived via EMS for c/o PARRIS, \"pain to my chest from surgery going up to the R shoulder and back, Triple Bypass on 6/6.\" Accucheck 180 per EMS. Also reports hx. Chronic Pancreatitis

## 2022-06-14 NOTE — CM/SW NOTE
06/14/22 1000   CM/SW Referral Data   Referral Source Social Work (self-referral)   Reason for Referral Discharge planning   Informant Patient;EMR   Patient Info   Patient's Current Mental Status at Time of Assessment Oriented; Alert   [address]'s [address]   Patient lives with Spouse/Significant other   Patient Status Prior to Admission   Services in place prior to admission [address] Provider [facility]   Discharge Needs   Anticipated D/C needs Home health care; To be determined     Patient is a 67 y/o male who admitted with chest pain, r/o acute myocardial infraction. Patient was recently hospitalized 6/6 - 6/11 for CABG. He discharge home with Nick Cotton. Met with patient, who is alert and oriented, to discuss discharge planning. He lives in a [address] [place] [address] [address] in [address] with his spouse, Eloina Swenson. Patient confirmed he his current with Nick Cotton, they saw him yesterday, and he plans to resume with Nick Cotton at discharge. Sent updates and SOMMER in [address]. SW will continue to follow.      RODOLFO Zapata  Discharge Planner  [phone]

## 2022-06-14 NOTE — TELEPHONE ENCOUNTER
Spoke with pt who states he is back in the hospital, pt states he thinks he will be discharged tomorrow. Pt has a HFU w/ Leanna Meckel on 6/23, pt will r/s appt if he is discharged tomorrow. Appt on hold for pt w/ Yonatan Groves for Friday 6/17 at 2pm. Pt will call back if he is discharged and can take that spot.

## 2022-06-14 NOTE — PROGRESS NOTES
Received call from Dr. Ruth Ann Levine [facility] pain clinic (pts pain management physician), per Dr. Dorothea Vera and Angelo You will now defer pain management to hospitalist.  Reviewed in hospital pain regimen with Dr. Ruth Ann Levine, he suggested increasing duragesic to 75mcg and increasing dilaudid po frequency to q6hrs. D/w Dr. Niraj Echavarria who will change orders and manage pain moving forward. D/w patients wife, they have a video visit with [facility] [place] tomorrow. She states pt just called her to report Morphine isn't working, reassured we can try new regimen. Dr. Niraj Echavarria will follow up later today. D/w pts RN Pallavi Lisa.    Brian Reyes RN  Clinical Coordinator  CV Surgery

## 2022-06-14 NOTE — ED QUICK NOTES
Orders for admission, patient is aware of plan and ready to go upstairs.  Any questions, please call ED RN Vinay Oakes at extension [phone]    Patient Covid vaccination status: Fully vaccinated     COVID Test Ordered in ED: Rapid SARS-CoV-2 by PCR    COVID Suspicion at Admission: N/A    Running Infusions:  None    Mental Status/LOC at time of transport: A/O4    Other pertinent information:   CIWA score: N/A   NIH score:  N/A

## 2022-06-14 NOTE — TELEPHONE ENCOUNTER
[facility] office, please contact pt and recommend scheduling with another provider no later than 6/18/22.  ty

## 2022-06-15 ENCOUNTER — TELEPHONE (OUTPATIENT)
Dept: CARDIOLOGY UNIT | Facility: HOSPITAL | Age: 72
End: 2022-06-15

## 2022-06-15 VITALS
HEART RATE: 88 BPM | BODY MASS INDEX: 19.78 KG/M2 | HEIGHT: 71 IN | TEMPERATURE: 99 F | OXYGEN SATURATION: 95 % | WEIGHT: 141.31 LBS | SYSTOLIC BLOOD PRESSURE: 112 MMHG | DIASTOLIC BLOOD PRESSURE: 64 MMHG | RESPIRATION RATE: 18 BRPM

## 2022-06-15 LAB — GLUCOSE BLD-MCNC: 140 MG/DL (ref 70–99)

## 2022-06-15 PROCEDURE — 99217 OBSERVATION CARE DISCHARGE: CPT | Performed by: INTERNAL MEDICINE

## 2022-06-15 NOTE — TELEPHONE ENCOUNTER
Pt discharged. He would like to keep appointment with  on 6/23. Appointment taken off hold with Dr. Juan Buerger on Friday.     Future Appointments   Date Time Provider Basilio Rashmi   6/22/2022  9:30 AM  XR RM5 ([facility]) [id number] [facility]   6/23/2022  2:30 PM Angelita Gonzalez MD EM[facility] 8 [facility] [place]   7/12/2022 10:00 AM DELPHINE Joel CSA [facility]SA   7/28/2022 10:00 AM CARD PULM INITIAL [address]

## 2022-06-15 NOTE — PLAN OF CARE
Assumed pt care at 0730. A&Ox4. Room air, pt states he has severe 7/10 pain. 0.5 mg IV dilaudid given, no relief of pain from patient. IV tylenol and Q2 morphine added, CV surgery team in contact with pt's pain doctor, dilaudid was switched from IV to 8mg PO Q6H. Pt states he is beginning to feel pain relief with the fentanyl patches, Q2H morphine, and Q6H dilaudid. Pain management deferred to hospitalist. No shortness of breath, lung sounds clear, vital signs stable, NSR on tele. Voids, up independently. Plan of care: pain control, QID accuchecks    Plan of care updated with patient and wife at bedside. Questions answered, pt verbalized understanding. Call light is within reach, will continue to monitor.         Problem: Patient/Family Goals  Goal: Patient/Family Long Term Goal  Description: Patient's Long Term Goal: Resolve pain, go home    Interventions:  - Pain management  - See additional Care Plan goals for specific interventions  Outcome: Progressing  Goal: Patient/Family Short Term Goal  Description: Patient's Short Term Goal: manage pain, f/u MD recommendations    Interventions:   - cards to see  -CV to see  - See additional Care Plan goals for specific interventions  Outcome: Progressing

## 2022-06-15 NOTE — PROGRESS NOTES
Patient tele discontinued, IV discontinued with catheter intact. Patient denies chest pain, shortness of breath, dizziness, or palpitations. Patient discharge instructions reviewed with patient, verbalize understanding. Patient escorted via wheelchair to the [facility] by transportation.

## 2022-06-15 NOTE — PLAN OF CARE
Patient is alert & oriented x4. Ambulates well independently. Breath sounds are clear bilateral. IS:1250. On room air. Normal sinus rhythm. Continues to report incisional sternal pain (pain scale 5 out of 10), continuing pain management. Incisional site is clean, dry, and intact. Pt to follow up with video visit with [facility] pain clinic on 6/15. Bed in low position and call light within reach. Reviewed plan of care and patient verbalizes understanding.       Problem: Patient/Family Goals  Goal: Patient/Family Long Term Goal  Description: Patient's Long Term Goal: Resolve pain, go home    Interventions:  - Pain management  - See additional Care Plan goals for specific interventions  Outcome: Progressing  Goal: Patient/Family Short Term Goal  Description: Patient's Short Term Goal: manage pain, f/u MD recommendations    Interventions:   - cards to see  -CV to see  - See additional Care Plan goals for specific interventions  Outcome: Progressing

## 2022-06-15 NOTE — PLAN OF CARE
Chart and results reviewed. CTA and TTE reviewed. No changes to cardiac function, no PE. He remains on GDMT for his CAD with aspirin, statin, beta blocker. Trop has remained negative. Post-surgical pain management per primary team in collaboration with patient's pain clinic (he has virtual appointment scheduled for today). Cardiology will sign off at this time, discussed with Dr. Asia Harrison. Patient should follow-up with [facility] as previously scheduled. Please call with any questions or concerns.     Alicia Brewster, DELPHINE  06/15/22  8:48 AM

## 2022-06-15 NOTE — PHYSICAL THERAPY NOTE
Pt chart reviewed and attempted this AM. Pt stated he was to be dc'd shortly and declined PT at this time. Pt and family stated HHC was set up with scheduled appointments and pt recited sternal precautions. PT to discharge pt.

## 2022-06-15 NOTE — PLAN OF CARE
Shift Note:  Assumed care of patient. Patient alert and oriented x4, glasses at bedside. Patient on room air, denies difficulty breathing, lung sounds clear. Denies any cardiac symptoms, NSR on tele. Patient reports continuous pain, has Morphine, Diuladid and Fentanyl patches ordered for pain control  Continent of bowel and bladder. Ambulates independently, call light within reach, tolerating care well.      POC:  - DC planning today       Problem: Patient/Family Goals  Goal: Patient/Family Long Term Goal  Description: Patient's Long Term Goal: Resolve pain, go home    Interventions:  - Pain management  - See additional Care Plan goals for specific interventions  Outcome: Completed  Goal: Patient/Family Short Term Goal  Description: Patient's Short Term Goal: manage pain, f/u MD recommendations    Interventions:   - cards to see  -CV to see  - See additional Care Plan goals for specific interventions  Outcome: Completed

## 2022-06-15 NOTE — PROGRESS NOTES
Follow Up Phone Call  Readmitted.   Currently in the hospital.    Christine Fernandes RN  6/15/2022  2:47 PM

## 2022-06-15 NOTE — OCCUPATIONAL THERAPY NOTE
att'd for OT eval. Pt reports IND at baseline, no concerns prior to d/c today. Reports understanding of sternal precautions and intends to follow up with [facility] [place] upon return home.  OT will sign off as pt presents with no skilled IP OT needs at this time and is politely declining services while in hospital.

## 2022-06-15 NOTE — PROGRESS NOTES
D/w Dr. Aram Hassan to discharge home today if ok with other services, f/u as previously scheduled.   Kadi Elliott RN  Clinical Coordinator  CV Surgery

## 2022-06-16 ENCOUNTER — PATIENT OUTREACH (OUTPATIENT)
Dept: CASE MANAGEMENT | Age: 72
End: 2022-06-16

## 2022-06-22 ENCOUNTER — HOSPITAL ENCOUNTER (OUTPATIENT)
Dept: GENERAL RADIOLOGY | Facility: HOSPITAL | Age: 72
Discharge: HOME | End: 2022-06-22
Attending: THORACIC SURGERY (CARDIOTHORACIC VASCULAR SURGERY)
Payer: MEDICARE

## 2022-06-22 DIAGNOSIS — J90 PLEURAL EFFUSION: ICD-10-CM

## 2022-06-22 PROCEDURE — 71048 X-RAY EXAM CHEST 4+ VIEWS: CPT | Performed by: THORACIC SURGERY (CARDIOTHORACIC VASCULAR SURGERY)

## 2022-06-23 ENCOUNTER — OFFICE VISIT (OUTPATIENT)
Dept: INTERNAL MEDICINE CLINIC | Facility: CLINIC | Age: 72
End: 2022-06-23
Payer: MEDICARE

## 2022-06-23 VITALS
HEART RATE: 96 BPM | RESPIRATION RATE: 16 BRPM | HEIGHT: 71 IN | WEIGHT: 135.19 LBS | OXYGEN SATURATION: 96 % | BODY MASS INDEX: 18.93 KG/M2 | SYSTOLIC BLOOD PRESSURE: 130 MMHG | DIASTOLIC BLOOD PRESSURE: 60 MMHG | TEMPERATURE: 98 F

## 2022-06-23 DIAGNOSIS — Z95.1 HX OF CABG: Primary | ICD-10-CM

## 2022-06-23 DIAGNOSIS — E11.9 DIABETES MELLITUS TYPE 2 IN NONOBESE (HCC): ICD-10-CM

## 2022-06-23 DIAGNOSIS — J44.9 CHRONIC OBSTRUCTIVE PULMONARY DISEASE, UNSPECIFIED COPD TYPE (HCC): ICD-10-CM

## 2022-06-23 PROBLEM — R07.9 CHEST PAIN, RULE OUT ACUTE MYOCARDIAL INFARCTION: Status: RESOLVED | Noted: 2022-06-13 | Resolved: 2022-06-23

## 2022-06-23 PROBLEM — N17.9 ACUTE KIDNEY INJURY (HCC): Status: RESOLVED | Noted: 2022-06-14 | Resolved: 2022-06-23

## 2022-06-23 PROBLEM — D72.829 LEUKOCYTOSIS: Status: RESOLVED | Noted: 2022-06-14 | Resolved: 2022-06-23

## 2022-06-23 PROBLEM — N17.9 ACUTE RENAL INJURY (HCC): Status: RESOLVED | Noted: 2022-06-14 | Resolved: 2022-06-23

## 2022-06-23 PROBLEM — E46 PROTEIN-CALORIE MALNUTRITION, UNSPECIFIED SEVERITY (HCC): Chronic | Status: ACTIVE | Noted: 2022-05-05

## 2022-06-23 PROCEDURE — 1111F DSCHRG MED/CURRENT MED MERGE: CPT | Performed by: INTERNAL MEDICINE

## 2022-06-23 PROCEDURE — 99495 TRANSJ CARE MGMT MOD F2F 14D: CPT | Performed by: INTERNAL MEDICINE

## 2022-06-23 PROCEDURE — 3078F DIAST BP <80 MM HG: CPT | Performed by: INTERNAL MEDICINE

## 2022-06-23 PROCEDURE — 3008F BODY MASS INDEX DOCD: CPT | Performed by: INTERNAL MEDICINE

## 2022-06-23 PROCEDURE — 3075F SYST BP GE 130 - 139MM HG: CPT | Performed by: INTERNAL MEDICINE

## 2022-06-23 RX ORDER — FENTANYL 25 UG/H
1 PATCH TRANSDERMAL
COMMUNITY
Start: 2022-06-17 | End: 2022-06-23

## 2022-06-23 RX ORDER — INSULIN DETEMIR 100 [IU]/ML
35 INJECTION, SOLUTION SUBCUTANEOUS NIGHTLY
Qty: 30 ML | Refills: 3 | Status: ON HOLD | OUTPATIENT
Start: 2022-06-23

## 2022-06-23 RX ORDER — BLOOD-GLUCOSE SENSOR
EACH MISCELLANEOUS
Qty: 3 EACH | Refills: 0 | Status: SHIPPED | OUTPATIENT
Start: 2022-06-23 | End: 2022-06-27

## 2022-06-23 RX ORDER — METOCLOPRAMIDE 10 MG/1
1 TABLET ORAL EVERY 8 HOURS PRN
Status: ON HOLD | COMMUNITY
Start: 2022-05-04

## 2022-06-23 RX ORDER — BLOOD-GLUCOSE,RECEIVER,CONT
EACH MISCELLANEOUS
Qty: 1 EACH | Refills: 0 | Status: SHIPPED | OUTPATIENT
Start: 2022-06-23 | End: 2022-06-27

## 2022-06-23 RX ORDER — BLOOD-GLUCOSE TRANSMITTER
EACH MISCELLANEOUS
Qty: 1 EACH | Refills: 0 | Status: SHIPPED | OUTPATIENT
Start: 2022-06-23 | End: 2022-06-27

## 2022-06-27 ENCOUNTER — APPOINTMENT (OUTPATIENT)
Dept: CT IMAGING | Facility: HOSPITAL | Age: 72
End: 2022-06-27
Attending: EMERGENCY MEDICINE
Payer: MEDICARE

## 2022-06-27 ENCOUNTER — HOSPITAL ENCOUNTER (OUTPATIENT)
Facility: HOSPITAL | Age: 72
Setting detail: OBSERVATION
Discharge: HOME | End: 2022-06-29
Attending: EMERGENCY MEDICINE | Admitting: HOSPITALIST
Payer: MEDICARE

## 2022-06-27 ENCOUNTER — TELEPHONE (OUTPATIENT)
Dept: INTERNAL MEDICINE CLINIC | Facility: CLINIC | Age: 72
End: 2022-06-27

## 2022-06-27 DIAGNOSIS — N12 PYELONEPHRITIS: Primary | ICD-10-CM

## 2022-06-27 DIAGNOSIS — N23 RENAL COLIC: ICD-10-CM

## 2022-06-27 PROBLEM — D72.829 LEUKOCYTOSIS: Status: ACTIVE | Noted: 2022-06-27

## 2022-06-27 PROBLEM — N17.9 ACUTE KIDNEY INJURY (HCC): Status: ACTIVE | Noted: 2022-06-27

## 2022-06-27 PROBLEM — I25.10 CORONARY ARTERY DISEASE INVOLVING NATIVE HEART WITHOUT ANGINA PECTORIS: Status: ACTIVE | Noted: 2021-10-12

## 2022-06-27 LAB
ALBUMIN SERPL-MCNC: 3.1 G/DL (ref 3.4–5)
ALBUMIN/GLOB SERPL: 0.6 {RATIO} (ref 1–2)
ALP LIVER SERPL-CCNC: 125 U/L
ALT SERPL-CCNC: 18 U/L
ANION GAP SERPL CALC-SCNC: 8 MMOL/L (ref 0–18)
AST SERPL-CCNC: 18 U/L (ref 15–37)
BASOPHILS # BLD AUTO: 0.02 X10(3) UL (ref 0–0.2)
BASOPHILS NFR BLD AUTO: 0.1 %
BILIRUB SERPL-MCNC: 0.3 MG/DL (ref 0.1–2)
BILIRUB UR QL STRIP.AUTO: NEGATIVE
BUN BLD-MCNC: 26 MG/DL (ref 7–18)
CALCIUM BLD-MCNC: 9.6 MG/DL (ref 8.5–10.1)
CHLORIDE SERPL-SCNC: 105 MMOL/L (ref 98–112)
CO2 SERPL-SCNC: 25 MMOL/L (ref 21–32)
CREAT BLD-MCNC: 1.33 MG/DL
EOSINOPHIL # BLD AUTO: 0.49 X10(3) UL (ref 0–0.7)
EOSINOPHIL NFR BLD AUTO: 3.4 %
ERYTHROCYTE [DISTWIDTH] IN BLOOD BY AUTOMATED COUNT: 14.7 %
GLOBULIN PLAS-MCNC: 5 G/DL (ref 2.8–4.4)
GLUCOSE BLD-MCNC: 119 MG/DL (ref 70–99)
GLUCOSE BLD-MCNC: 122 MG/DL (ref 70–99)
GLUCOSE UR STRIP.AUTO-MCNC: NEGATIVE MG/DL
HCT VFR BLD AUTO: 31.2 %
HGB BLD-MCNC: 10.3 G/DL
IMM GRANULOCYTES # BLD AUTO: 0.05 X10(3) UL (ref 0–1)
IMM GRANULOCYTES NFR BLD: 0.4 %
KETONES UR STRIP.AUTO-MCNC: NEGATIVE MG/DL
LYMPHOCYTES # BLD AUTO: 2.25 X10(3) UL (ref 1–4)
LYMPHOCYTES NFR BLD AUTO: 15.8 %
MCH RBC QN AUTO: 29.9 PG (ref 26–34)
MCHC RBC AUTO-ENTMCNC: 33 G/DL (ref 31–37)
MCV RBC AUTO: 90.7 FL
MONOCYTES # BLD AUTO: 1.67 X10(3) UL (ref 0.1–1)
MONOCYTES NFR BLD AUTO: 11.7 %
NEUTROPHILS # BLD AUTO: 9.77 X10 (3) UL (ref 1.5–7.7)
NEUTROPHILS # BLD AUTO: 9.77 X10(3) UL (ref 1.5–7.7)
NEUTROPHILS NFR BLD AUTO: 68.6 %
NITRITE UR QL STRIP.AUTO: NEGATIVE
OSMOLALITY SERPL CALC.SUM OF ELEC: 292 MOSM/KG (ref 275–295)
PH UR STRIP.AUTO: 6 [PH] (ref 5–8)
PLATELET # BLD AUTO: 722 10(3)UL (ref 150–450)
POTASSIUM SERPL-SCNC: 4.4 MMOL/L (ref 3.5–5.1)
PROT SERPL-MCNC: 8.1 G/DL (ref 6.4–8.2)
PROT UR STRIP.AUTO-MCNC: 100 MG/DL
RBC # BLD AUTO: 3.44 X10(6)UL
RBC #/AREA URNS AUTO: >10 /HPF
SARS-COV-2 RNA RESP QL NAA+PROBE: NOT DETECTED
SODIUM SERPL-SCNC: 138 MMOL/L (ref 136–145)
SP GR UR STRIP.AUTO: 1.01 (ref 1–1.03)
UROBILINOGEN UR STRIP.AUTO-MCNC: <2 MG/DL
WBC # BLD AUTO: 14.3 X10(3) UL (ref 4–11)
WBC #/AREA URNS AUTO: >50 /HPF

## 2022-06-27 PROCEDURE — 74176 CT ABD & PELVIS W/O CONTRAST: CPT | Performed by: EMERGENCY MEDICINE

## 2022-06-27 PROCEDURE — 99223 1ST HOSP IP/OBS HIGH 75: CPT | Performed by: INTERNAL MEDICINE

## 2022-06-27 RX ORDER — BLOOD-GLUCOSE,RECEIVER,CONT
EACH MISCELLANEOUS
Qty: 1 EACH | Refills: 0 | Status: ON HOLD | OUTPATIENT
Start: 2022-06-27

## 2022-06-27 RX ORDER — NICOTINE POLACRILEX 4 MG
30 LOZENGE BUCCAL
Status: DISCONTINUED | OUTPATIENT
Start: 2022-06-27 | End: 2022-06-29

## 2022-06-27 RX ORDER — ATORVASTATIN CALCIUM 40 MG/1
40 TABLET, FILM COATED ORAL NIGHTLY
Status: DISCONTINUED | OUTPATIENT
Start: 2022-06-27 | End: 2022-06-29

## 2022-06-27 RX ORDER — HYDROMORPHONE HYDROCHLORIDE 8 MG/1
8 TABLET ORAL EVERY 8 HOURS PRN
Status: DISCONTINUED | OUTPATIENT
Start: 2022-06-27 | End: 2022-06-28

## 2022-06-27 RX ORDER — BLOOD-GLUCOSE TRANSMITTER
EACH MISCELLANEOUS
Qty: 1 EACH | Refills: 0 | Status: ON HOLD | OUTPATIENT
Start: 2022-06-27

## 2022-06-27 RX ORDER — ONDANSETRON 2 MG/ML
4 INJECTION INTRAMUSCULAR; INTRAVENOUS EVERY 6 HOURS PRN
Status: DISCONTINUED | OUTPATIENT
Start: 2022-06-27 | End: 2022-06-29

## 2022-06-27 RX ORDER — ASPIRIN 81 MG/1
81 TABLET ORAL DAILY
Status: DISCONTINUED | OUTPATIENT
Start: 2022-06-28 | End: 2022-06-29

## 2022-06-27 RX ORDER — HYDROMORPHONE HYDROCHLORIDE 1 MG/ML
0.5 INJECTION, SOLUTION INTRAMUSCULAR; INTRAVENOUS; SUBCUTANEOUS EVERY 30 MIN PRN
Status: DISCONTINUED | OUTPATIENT
Start: 2022-06-27 | End: 2022-06-27 | Stop reason: DRUGHIGH

## 2022-06-27 RX ORDER — HYDROMORPHONE HYDROCHLORIDE 1 MG/ML
0.4 INJECTION, SOLUTION INTRAMUSCULAR; INTRAVENOUS; SUBCUTANEOUS EVERY 2 HOUR PRN
Status: DISCONTINUED | OUTPATIENT
Start: 2022-06-27 | End: 2022-06-29

## 2022-06-27 RX ORDER — IPRATROPIUM BROMIDE AND ALBUTEROL SULFATE 2.5; .5 MG/3ML; MG/3ML
3 SOLUTION RESPIRATORY (INHALATION) EVERY 4 HOURS PRN
Status: DISCONTINUED | OUTPATIENT
Start: 2022-06-27 | End: 2022-06-29

## 2022-06-27 RX ORDER — NICOTINE POLACRILEX 4 MG
15 LOZENGE BUCCAL
Status: DISCONTINUED | OUTPATIENT
Start: 2022-06-27 | End: 2022-06-29

## 2022-06-27 RX ORDER — DEXTROSE MONOHYDRATE 25 G/50ML
50 INJECTION, SOLUTION INTRAVENOUS
Status: DISCONTINUED | OUTPATIENT
Start: 2022-06-27 | End: 2022-06-29

## 2022-06-27 RX ORDER — FLUTICASONE FUROATE AND VILANTEROL 200; 25 UG/1; UG/1
1 POWDER RESPIRATORY (INHALATION) DAILY
Status: DISCONTINUED | OUTPATIENT
Start: 2022-06-28 | End: 2022-06-28

## 2022-06-27 RX ORDER — FENTANYL 12 UG/H
1 PATCH TRANSDERMAL
Status: DISCONTINUED | OUTPATIENT
Start: 2022-06-27 | End: 2022-06-28

## 2022-06-27 RX ORDER — HYDROMORPHONE HYDROCHLORIDE 1 MG/ML
0.2 INJECTION, SOLUTION INTRAMUSCULAR; INTRAVENOUS; SUBCUTANEOUS EVERY 2 HOUR PRN
Status: DISCONTINUED | OUTPATIENT
Start: 2022-06-27 | End: 2022-06-29

## 2022-06-27 RX ORDER — PANTOPRAZOLE SODIUM 40 MG/1
40 TABLET, DELAYED RELEASE ORAL
Status: DISCONTINUED | OUTPATIENT
Start: 2022-06-28 | End: 2022-06-29

## 2022-06-27 RX ORDER — BLOOD-GLUCOSE SENSOR
EACH MISCELLANEOUS
Qty: 3 EACH | Refills: 0 | Status: ON HOLD | OUTPATIENT
Start: 2022-06-27

## 2022-06-27 RX ORDER — HYDROMORPHONE HYDROCHLORIDE 1 MG/ML
1 INJECTION, SOLUTION INTRAMUSCULAR; INTRAVENOUS; SUBCUTANEOUS ONCE
Status: COMPLETED | OUTPATIENT
Start: 2022-06-27 | End: 2022-06-27

## 2022-06-27 RX ORDER — METOCLOPRAMIDE HYDROCHLORIDE 5 MG/ML
10 INJECTION INTRAMUSCULAR; INTRAVENOUS EVERY 8 HOURS PRN
Status: DISCONTINUED | OUTPATIENT
Start: 2022-06-27 | End: 2022-06-29

## 2022-06-27 RX ORDER — HYDROMORPHONE HYDROCHLORIDE 1 MG/ML
0.8 INJECTION, SOLUTION INTRAMUSCULAR; INTRAVENOUS; SUBCUTANEOUS EVERY 2 HOUR PRN
Status: DISCONTINUED | OUTPATIENT
Start: 2022-06-27 | End: 2022-06-29

## 2022-06-27 RX ORDER — ONDANSETRON 2 MG/ML
4 INJECTION INTRAMUSCULAR; INTRAVENOUS ONCE
Status: COMPLETED | OUTPATIENT
Start: 2022-06-27 | End: 2022-06-27

## 2022-06-27 RX ORDER — FOLIC ACID 1 MG/1
1 TABLET ORAL DAILY
Status: DISCONTINUED | OUTPATIENT
Start: 2022-06-28 | End: 2022-06-29

## 2022-06-27 RX ORDER — SODIUM CHLORIDE 9 MG/ML
INJECTION, SOLUTION INTRAVENOUS CONTINUOUS
Status: DISCONTINUED | OUTPATIENT
Start: 2022-06-27 | End: 2022-06-28

## 2022-06-27 RX ORDER — METOPROLOL SUCCINATE 50 MG/1
50 TABLET, EXTENDED RELEASE ORAL
Status: DISCONTINUED | OUTPATIENT
Start: 2022-06-28 | End: 2022-06-29

## 2022-06-27 RX ORDER — LORAZEPAM 0.5 MG/1
1 TABLET ORAL NIGHTLY PRN
Status: DISCONTINUED | OUTPATIENT
Start: 2022-06-27 | End: 2022-06-29

## 2022-06-27 NOTE — ED INITIAL ASSESSMENT (HPI)
Patient complaining of pain in the left flank radiating to the groin that patient attributes to an existing kidney stone and hematuria. Patient relates he has a urinary stent in place. Patient acuity MARIZOL 2 due to patients triple bypass three weeks ago and patients obvious distress.

## 2022-06-27 NOTE — TELEPHONE ENCOUNTER
Mindy/patient's wife is calling because she states the pharmacy informed her that they are having trouble filling the prescription for dexcom g6 transmitter. She states they need diagnosis codes in order to get it approved by medicare.      [facility]/pharmacy #[id number] [place], [place] - [address] [place] [address], [phone], [phone]

## 2022-06-28 ENCOUNTER — ANESTHESIA (OUTPATIENT)
Dept: SURGERY | Facility: HOSPITAL | Age: 72
End: 2022-06-28
Payer: MEDICARE

## 2022-06-28 ENCOUNTER — APPOINTMENT (OUTPATIENT)
Dept: GENERAL RADIOLOGY | Facility: HOSPITAL | Age: 72
End: 2022-06-28
Attending: UROLOGY
Payer: MEDICARE

## 2022-06-28 ENCOUNTER — ANESTHESIA EVENT (OUTPATIENT)
Dept: SURGERY | Facility: HOSPITAL | Age: 72
End: 2022-06-28
Payer: MEDICARE

## 2022-06-28 LAB
ANION GAP SERPL CALC-SCNC: 4 MMOL/L (ref 0–18)
BASOPHILS # BLD AUTO: 0.03 X10(3) UL (ref 0–0.2)
BASOPHILS NFR BLD AUTO: 0.2 %
BUN BLD-MCNC: 21 MG/DL (ref 7–18)
CALCIUM BLD-MCNC: 9.5 MG/DL (ref 8.5–10.1)
CHLORIDE SERPL-SCNC: 109 MMOL/L (ref 98–112)
CO2 SERPL-SCNC: 25 MMOL/L (ref 21–32)
CREAT BLD-MCNC: 1.25 MG/DL
EOSINOPHIL # BLD AUTO: 0.52 X10(3) UL (ref 0–0.7)
EOSINOPHIL NFR BLD AUTO: 3.4 %
ERYTHROCYTE [DISTWIDTH] IN BLOOD BY AUTOMATED COUNT: 14.7 %
GLUCOSE BLD-MCNC: 111 MG/DL (ref 70–99)
GLUCOSE BLD-MCNC: 120 MG/DL (ref 70–99)
GLUCOSE BLD-MCNC: 141 MG/DL (ref 70–99)
GLUCOSE BLD-MCNC: 149 MG/DL (ref 70–99)
GLUCOSE BLD-MCNC: 217 MG/DL (ref 70–99)
GLUCOSE BLD-MCNC: 46 MG/DL (ref 70–99)
GLUCOSE BLD-MCNC: 47 MG/DL (ref 70–99)
GLUCOSE BLD-MCNC: 51 MG/DL (ref 70–99)
GLUCOSE BLD-MCNC: 63 MG/DL (ref 70–99)
GLUCOSE BLD-MCNC: 66 MG/DL (ref 70–99)
GLUCOSE BLD-MCNC: 67 MG/DL (ref 70–99)
GLUCOSE BLD-MCNC: 73 MG/DL (ref 70–99)
GLUCOSE BLD-MCNC: 88 MG/DL (ref 70–99)
HCT VFR BLD AUTO: 30.1 %
HGB BLD-MCNC: 10 G/DL
IMM GRANULOCYTES # BLD AUTO: 0.06 X10(3) UL (ref 0–1)
IMM GRANULOCYTES NFR BLD: 0.4 %
LYMPHOCYTES # BLD AUTO: 2.84 X10(3) UL (ref 1–4)
LYMPHOCYTES NFR BLD AUTO: 18.7 %
MCH RBC QN AUTO: 30.3 PG (ref 26–34)
MCHC RBC AUTO-ENTMCNC: 33.2 G/DL (ref 31–37)
MCV RBC AUTO: 91.2 FL
MONOCYTES # BLD AUTO: 1.72 X10(3) UL (ref 0.1–1)
MONOCYTES NFR BLD AUTO: 11.3 %
NEUTROPHILS # BLD AUTO: 10.05 X10 (3) UL (ref 1.5–7.7)
NEUTROPHILS # BLD AUTO: 10.05 X10(3) UL (ref 1.5–7.7)
NEUTROPHILS NFR BLD AUTO: 66 %
OSMOLALITY SERPL CALC.SUM OF ELEC: 286 MOSM/KG (ref 275–295)
PLATELET # BLD AUTO: 596 10(3)UL (ref 150–450)
POTASSIUM SERPL-SCNC: 4.9 MMOL/L (ref 3.5–5.1)
RBC # BLD AUTO: 3.3 X10(6)UL
SODIUM SERPL-SCNC: 138 MMOL/L (ref 136–145)
WBC # BLD AUTO: 15.2 X10(3) UL (ref 4–11)

## 2022-06-28 PROCEDURE — 96376 TX/PRO/DX INJ SAME DRUG ADON: CPT

## 2022-06-28 PROCEDURE — BT1D0ZZ FLUOROSCOPY OF RIGHT KIDNEY, URETER AND BLADDER USING HIGH OSMOLAR CONTRAST: ICD-10-PCS | Performed by: UROLOGY

## 2022-06-28 PROCEDURE — 0T768DZ DILATION OF RIGHT URETER WITH INTRALUMINAL DEVICE, VIA NATURAL OR ARTIFICIAL OPENING ENDOSCOPIC: ICD-10-PCS | Performed by: UROLOGY

## 2022-06-28 PROCEDURE — 96375 TX/PRO/DX INJ NEW DRUG ADDON: CPT

## 2022-06-28 PROCEDURE — 0TP98DZ REMOVAL OF INTRALUMINAL DEVICE FROM URETER, VIA NATURAL OR ARTIFICIAL OPENING ENDOSCOPIC: ICD-10-PCS | Performed by: UROLOGY

## 2022-06-28 PROCEDURE — 99232 SBSQ HOSP IP/OBS MODERATE 35: CPT | Performed by: HOSPITALIST

## 2022-06-28 PROCEDURE — 0TF38ZZ FRAGMENTATION IN RIGHT KIDNEY PELVIS, VIA NATURAL OR ARTIFICIAL OPENING ENDOSCOPIC: ICD-10-PCS | Performed by: UROLOGY

## 2022-06-28 DEVICE — URETERAL STENT
Type: IMPLANTABLE DEVICE | Status: FUNCTIONAL
Brand: ASCERTA™

## 2022-06-28 RX ORDER — CEFAZOLIN SODIUM/WATER 2 G/20 ML
SYRINGE (ML) INTRAVENOUS AS NEEDED
Status: DISCONTINUED | OUTPATIENT
Start: 2022-06-28 | End: 2022-06-28 | Stop reason: SURG

## 2022-06-28 RX ORDER — POLYETHYLENE GLYCOL 3350 17 G/17G
17 POWDER, FOR SOLUTION ORAL 2 TIMES DAILY
Status: DISCONTINUED | OUTPATIENT
Start: 2022-06-28 | End: 2022-06-29

## 2022-06-28 RX ORDER — SODIUM CHLORIDE, SODIUM LACTATE, POTASSIUM CHLORIDE, CALCIUM CHLORIDE 600; 310; 30; 20 MG/100ML; MG/100ML; MG/100ML; MG/100ML
INJECTION, SOLUTION INTRAVENOUS CONTINUOUS PRN
Status: DISCONTINUED | OUTPATIENT
Start: 2022-06-28 | End: 2022-06-28 | Stop reason: SURG

## 2022-06-28 RX ORDER — ONDANSETRON 2 MG/ML
INJECTION INTRAMUSCULAR; INTRAVENOUS AS NEEDED
Status: DISCONTINUED | OUTPATIENT
Start: 2022-06-28 | End: 2022-06-28 | Stop reason: SURG

## 2022-06-28 RX ORDER — ONDANSETRON 2 MG/ML
4 INJECTION INTRAMUSCULAR; INTRAVENOUS EVERY 6 HOURS PRN
Status: DISCONTINUED | OUTPATIENT
Start: 2022-06-28 | End: 2022-06-28 | Stop reason: HOSPADM

## 2022-06-28 RX ORDER — HYDROMORPHONE HYDROCHLORIDE 1 MG/ML
0.6 INJECTION, SOLUTION INTRAMUSCULAR; INTRAVENOUS; SUBCUTANEOUS EVERY 5 MIN PRN
Status: DISCONTINUED | OUTPATIENT
Start: 2022-06-28 | End: 2022-06-28 | Stop reason: HOSPADM

## 2022-06-28 RX ORDER — DEXAMETHASONE SODIUM PHOSPHATE 4 MG/ML
VIAL (ML) INJECTION AS NEEDED
Status: DISCONTINUED | OUTPATIENT
Start: 2022-06-28 | End: 2022-06-28 | Stop reason: SURG

## 2022-06-28 RX ORDER — FENTANYL 25 UG/H
1 PATCH TRANSDERMAL
Status: DISCONTINUED | OUTPATIENT
Start: 2022-06-28 | End: 2022-06-29

## 2022-06-28 RX ORDER — METOPROLOL TARTRATE 5 MG/5ML
2.5 INJECTION INTRAVENOUS ONCE
Status: DISCONTINUED | OUTPATIENT
Start: 2022-06-28 | End: 2022-06-28 | Stop reason: HOSPADM

## 2022-06-28 RX ORDER — LIDOCAINE HYDROCHLORIDE 10 MG/ML
INJECTION, SOLUTION EPIDURAL; INFILTRATION; INTRACAUDAL; PERINEURAL AS NEEDED
Status: DISCONTINUED | OUTPATIENT
Start: 2022-06-28 | End: 2022-06-28 | Stop reason: SURG

## 2022-06-28 RX ORDER — FENTANYL 50 UG/H
1 PATCH TRANSDERMAL
Status: DISCONTINUED | OUTPATIENT
Start: 2022-06-28 | End: 2022-06-29

## 2022-06-28 RX ORDER — HYDROMORPHONE HYDROCHLORIDE 1 MG/ML
0.2 INJECTION, SOLUTION INTRAMUSCULAR; INTRAVENOUS; SUBCUTANEOUS EVERY 5 MIN PRN
Status: DISCONTINUED | OUTPATIENT
Start: 2022-06-28 | End: 2022-06-28 | Stop reason: HOSPADM

## 2022-06-28 RX ORDER — NALOXONE HYDROCHLORIDE 0.4 MG/ML
80 INJECTION, SOLUTION INTRAMUSCULAR; INTRAVENOUS; SUBCUTANEOUS AS NEEDED
Status: DISCONTINUED | OUTPATIENT
Start: 2022-06-28 | End: 2022-06-28 | Stop reason: HOSPADM

## 2022-06-28 RX ORDER — HYDROMORPHONE HYDROCHLORIDE 1 MG/ML
INJECTION, SOLUTION INTRAMUSCULAR; INTRAVENOUS; SUBCUTANEOUS
Status: COMPLETED
Start: 2022-06-28 | End: 2022-06-28

## 2022-06-28 RX ORDER — HYDROMORPHONE HYDROCHLORIDE 1 MG/ML
0.4 INJECTION, SOLUTION INTRAMUSCULAR; INTRAVENOUS; SUBCUTANEOUS EVERY 5 MIN PRN
Status: DISCONTINUED | OUTPATIENT
Start: 2022-06-28 | End: 2022-06-28 | Stop reason: HOSPADM

## 2022-06-28 RX ORDER — DEXTROSE AND SODIUM CHLORIDE 5; .9 G/100ML; G/100ML
INJECTION, SOLUTION INTRAVENOUS CONTINUOUS
Status: DISCONTINUED | OUTPATIENT
Start: 2022-06-28 | End: 2022-06-29

## 2022-06-28 RX ORDER — SODIUM CHLORIDE, SODIUM LACTATE, POTASSIUM CHLORIDE, CALCIUM CHLORIDE 600; 310; 30; 20 MG/100ML; MG/100ML; MG/100ML; MG/100ML
INJECTION, SOLUTION INTRAVENOUS CONTINUOUS
Status: DISCONTINUED | OUTPATIENT
Start: 2022-06-28 | End: 2022-06-28 | Stop reason: HOSPADM

## 2022-06-28 RX ORDER — HYDROMORPHONE HYDROCHLORIDE 8 MG/1
8 TABLET ORAL EVERY 6 HOURS PRN
Status: DISCONTINUED | OUTPATIENT
Start: 2022-06-28 | End: 2022-06-29

## 2022-06-28 RX ADMIN — ONDANSETRON 4 MG: 2 INJECTION INTRAMUSCULAR; INTRAVENOUS at 19:52:00

## 2022-06-28 RX ADMIN — DEXAMETHASONE SODIUM PHOSPHATE 4 MG: 4 MG/ML VIAL (ML) INJECTION at 18:31:00

## 2022-06-28 RX ADMIN — SODIUM CHLORIDE, SODIUM LACTATE, POTASSIUM CHLORIDE, CALCIUM CHLORIDE: 600; 310; 30; 20 INJECTION, SOLUTION INTRAVENOUS at 19:35:00

## 2022-06-28 RX ADMIN — CEFAZOLIN SODIUM/WATER 2 G: 2 G/20 ML SYRINGE (ML) INTRAVENOUS at 18:31:00

## 2022-06-28 RX ADMIN — LIDOCAINE HYDROCHLORIDE 50 MG: 10 INJECTION, SOLUTION EPIDURAL; INFILTRATION; INTRACAUDAL; PERINEURAL at 18:24:00

## 2022-06-28 RX ADMIN — SODIUM CHLORIDE, SODIUM LACTATE, POTASSIUM CHLORIDE, CALCIUM CHLORIDE: 600; 310; 30; 20 INJECTION, SOLUTION INTRAVENOUS at 18:19:00

## 2022-06-28 NOTE — ANESTHESIA PROCEDURE NOTES
Airway  Date/Time: 6/28/2022 6:28 PM  Urgency: elective      General Information and Staff    Patient location during procedure: OR  Anesthesiologist: Miri Cristina MD  Performed: anesthesiologist     Indications and Patient Condition  Indications for airway management: anesthesia  Sedation level: deep  Preoxygenated: yes  Patient position: sniffing  Mask difficulty assessment: 0 - not attempted    Final Airway Details  Final airway type: supraglottic airway      Successful airway: classic  Size 4      Number of attempts at approach: 1

## 2022-06-28 NOTE — PLAN OF CARE
NURSING ADMISSION NOTE      Patient admitted via Cart  Oriented to room. Safety precautions initiated. Bed in low position. Call light in reach. Patient alert and oriented x4. 2L nasal cannula satting in mid 90s. Desat to low 80s on RA. Received from ED for hematuria and flank pain. PRN pain medication administered per MAR. Medications administered per MAR. Navigator complete. Will continue to monitor. 4267: Patient hypoglycemic. MD notified. Glucose gel administered per protocol.

## 2022-06-28 NOTE — CM/SW NOTE
SW was notified by patient is current with Nick Cotton. DEBI sent referral to [facility]. DEBI will continue to remain available for any additional DC needs or concerns.      Breana Keith MSW, LSW  Discharge Planner   [phone]

## 2022-06-29 ENCOUNTER — HOSPITAL ENCOUNTER (EMERGENCY)
Facility: HOSPITAL | Age: 72
Discharge: HOME | End: 2022-06-30
Attending: EMERGENCY MEDICINE
Payer: MEDICARE

## 2022-06-29 VITALS
SYSTOLIC BLOOD PRESSURE: 105 MMHG | HEART RATE: 71 BPM | BODY MASS INDEX: 19 KG/M2 | RESPIRATION RATE: 21 BRPM | OXYGEN SATURATION: 96 % | WEIGHT: 135 LBS | DIASTOLIC BLOOD PRESSURE: 53 MMHG | TEMPERATURE: 98 F

## 2022-06-29 DIAGNOSIS — R33.9 URINARY RETENTION: Primary | ICD-10-CM

## 2022-06-29 LAB
BILIRUB UR QL STRIP.AUTO: NEGATIVE
COLOR UR AUTO: YELLOW
GLUCOSE BLD-MCNC: 234 MG/DL (ref 70–99)
GLUCOSE BLD-MCNC: 346 MG/DL (ref 70–99)
GLUCOSE UR STRIP.AUTO-MCNC: 50 MG/DL
HYALINE CASTS #/AREA URNS AUTO: PRESENT /LPF
KETONES UR STRIP.AUTO-MCNC: NEGATIVE MG/DL
NITRITE UR QL STRIP.AUTO: NEGATIVE
PH UR STRIP.AUTO: 6 [PH] (ref 5–8)
PROT UR STRIP.AUTO-MCNC: 100 MG/DL
RBC #/AREA URNS AUTO: >10 /HPF
SP GR UR STRIP.AUTO: 1.01 (ref 1–1.03)
UROBILINOGEN UR STRIP.AUTO-MCNC: <2 MG/DL
WBC #/AREA URNS AUTO: >50 /HPF

## 2022-06-29 PROCEDURE — 99284 EMERGENCY DEPT VISIT MOD MDM: CPT

## 2022-06-29 PROCEDURE — 0T9B70Z DRAINAGE OF BLADDER WITH DRAINAGE DEVICE, VIA NATURAL OR ARTIFICIAL OPENING: ICD-10-PCS | Performed by: UROLOGY

## 2022-06-29 PROCEDURE — 81001 URINALYSIS AUTO W/SCOPE: CPT | Performed by: EMERGENCY MEDICINE

## 2022-06-29 PROCEDURE — 83605 ASSAY OF LACTIC ACID: CPT | Performed by: EMERGENCY MEDICINE

## 2022-06-29 PROCEDURE — 99239 HOSP IP/OBS DSCHRG MGMT >30: CPT | Performed by: INTERNAL MEDICINE

## 2022-06-29 PROCEDURE — 96365 THER/PROPH/DIAG IV INF INIT: CPT

## 2022-06-29 PROCEDURE — 85025 COMPLETE CBC W/AUTO DIFF WBC: CPT | Performed by: EMERGENCY MEDICINE

## 2022-06-29 PROCEDURE — 80053 COMPREHEN METABOLIC PANEL: CPT | Performed by: EMERGENCY MEDICINE

## 2022-06-29 RX ORDER — HYDROMORPHONE HYDROCHLORIDE 2 MG/1
4 TABLET ORAL ONCE
Status: COMPLETED | OUTPATIENT
Start: 2022-06-29 | End: 2022-06-29

## 2022-06-29 RX ORDER — FENTANYL 25 UG/H
1 PATCH TRANSDERMAL
COMMUNITY

## 2022-06-29 RX ORDER — LIDOCAINE HYDROCHLORIDE 20 MG/ML
10 JELLY TOPICAL ONCE
Status: COMPLETED | OUTPATIENT
Start: 2022-06-29 | End: 2022-06-29

## 2022-06-29 RX ORDER — LIDOCAINE HYDROCHLORIDE 20 MG/ML
JELLY TOPICAL
Status: COMPLETED
Start: 2022-06-29 | End: 2022-06-29

## 2022-06-29 RX ORDER — FENTANYL 50 UG/H
1 PATCH TRANSDERMAL
COMMUNITY

## 2022-06-29 NOTE — OPERATIVE REPORT
[id number] [address] Patient Status:  Inpatient    1950 MRN [id number]   Location [facility] Attending Claudette Forbes, MD   Hosp Day # 1 PCP Kaz Sears MD        DATE of OPERATION:  2022      PREOPERATIVE DIAGNOSIS: Right renal calculus    POSTOPERATIVE DIAGNOSIS: Right renal calculus, encrusted ureteral stent    PROCEDURE PERFORMED: Right ureteroscopic Stone Extraction, with Laser Lithotripsy, Cystoscopy, Retrograde Pyelogram, And Ureteral Stent Change    ANESTHESIA:  General.     INDICATIONS: Right flank pain. This patient had ESWL of a 14 x 7 mm right renal calculus 1 month ago at [facility].  He had some residual fragments and we are planning to observe these, in anticipation of spontaneous passage. Corrine Ahumada He subsequently underwent a CABG and removal of his stent was therefore pushed back. He presented with right flank pain, hydronephrosis and several stone fragments remaining in the right kidney measuring up to approximately 6 mm. He also has several stones in the left kidney the largest of which might be 5 or 6 mm. FINDINGS: Encrusted, ureteral stent. I had to break a small amount of encrustation off of the proximal coil in order to remove the stent. I do not see any large fragments remaining in the kidney but visualization was limited by the amount of stone debris from the stent removal.  I fragmented a collection of stone fragments in the lower pole of the right kidney. I recommend we treat remaining stones when they become symptomatic. Okay to discharge whenever his pain is well controlled. I anticipate this will be tomorrow morning. The patient may remove his Dangler stent Friday. EBL: None    COMPLICATIONS: None     TECHNIQUE:  A general anesthesia was induced. A time-out was  reviewed. Preoperative antibiotics were administered. The patient  was prepped and draped in the dorsal lithotomy position. Sequential  compression devices were in place on the lower legs. The cystoscope was passed into the bladder and the bladder was  examined. The prostate was small and nonobstructing. The right ureteral stent had a small but dense appearing film of encrustation which broke off as I grasped the ureteral stent. When I removed the stent it would not go beyond the level of the UPJ. A guidewire was therefore passed alongside the ureteral stent and then a semirigid ureteroscope was advanced up along the stent. I fragmented stone debris off of the proximal end of the stent. There was minimal encrustation along the midportion of the stent. I then passed the flexible ureteroscope over guidewire and fragmented more of the stone off of the proximal coil after pushing it back up into the intrarenal collecting system. I was then able to remove the stent easily. I also fragmented some stone debris in the lower pole calyx. I examined some of the other calyces but did not find any obvious large stone fragments. I injected contrast retrograde during a couple of portions of the procedure and there was no extravasation. A 4.8 x 26 cm ureteral stent was easily passed over the guidewire and was in good position and the guidewire was removed. The Dangler was taped to the patient's penis. The patient was  awakened, and taken to the recovery area in good condition.      Pantera Álvarez MD  6/28/2022  8:38 PM

## 2022-06-29 NOTE — ANESTHESIA POSTPROCEDURE EVALUATION
[address]  Patient Status:  Inpatient   Age/Gender 66year old male MRN [id number]   Location [facility] Attending Rogelio Wolfe MD   Hosp Day # 1 PCP Asia Leal MD       Anesthesia Post-op Note    CYSTOSCOPY , RIGHT URETEROSCOPY, STONE EXTRACTION , RIGHT pyelogram RIGHT URETERAL STENT REMOVAL STENT EXCHANGE    Procedure Summary     Date: 06/28/22 Room / Location: [facility] MAIN OR 07 / [facility] MAIN OR    Anesthesia Start: 1819 Anesthesia Stop: 2019    Procedure: CYSTOSCOPY , RIGHT URETEROSCOPY, STONE EXTRACTION , RIGHT pyelogram RIGHT URETERAL STENT REMOVAL STENT EXCHANGE (Right Ureter) Diagnosis:       Hydronephrosis      (Encrusted stent, renal calculi, Hydronephrosis [N13.30])    Surgeons: John Delaney MD Anesthesiologist: Caren Gonsales MD    Anesthesia Type: general ASA Status: 3          Anesthesia Type: general    Vitals Value Taken Time   /82 06/28/22 2019   Temp 97.5 06/28/22 2019   Pulse 85 06/28/22 2019   Resp 19 06/28/22 2019   SpO2 99 06/28/22 2019       Patient Location: PACU    Anesthesia Type: general    Airway Patency: patent    Postop Pain Control: adequate    Mental Status: mildly sedated but able to meaningfully participate in the post-anesthesia evaluation    Nausea/Vomiting: none    Cardiopulmonary/Hydration status: stable euvolemic    Complications: no apparent anesthesia related complications    Postop vital signs: stable    Dental Exam: Unchanged from Preop    Patient to be discharged from PACU when criteria met.

## 2022-06-29 NOTE — PLAN OF CARE
Patient returned from cysto alert and oriented, lungs diminished on room air, using IS, abdomen soft, tender, complains of right flank pain, medicated per MAR, IVF infusing, blood glucose monitored, levemir held per MD order. 06:00-IV pain medications throughout night-see MAR, feels he is emptying bladder, pain with urination, urine bloody, dangler intact.

## 2022-06-30 ENCOUNTER — TELEPHONE (OUTPATIENT)
Dept: INTERNAL MEDICINE CLINIC | Facility: CLINIC | Age: 72
End: 2022-06-30

## 2022-06-30 ENCOUNTER — PATIENT OUTREACH (OUTPATIENT)
Dept: CASE MANAGEMENT | Age: 72
End: 2022-06-30

## 2022-06-30 VITALS
SYSTOLIC BLOOD PRESSURE: 127 MMHG | TEMPERATURE: 98 F | DIASTOLIC BLOOD PRESSURE: 88 MMHG | RESPIRATION RATE: 20 BRPM | HEART RATE: 85 BPM | BODY MASS INDEX: 19 KG/M2 | WEIGHT: 134.94 LBS | OXYGEN SATURATION: 96 %

## 2022-06-30 LAB
ALBUMIN SERPL-MCNC: 2.4 G/DL (ref 3.4–5)
ALBUMIN/GLOB SERPL: 0.6 {RATIO} (ref 1–2)
ALP LIVER SERPL-CCNC: 94 U/L
ALT SERPL-CCNC: 21 U/L
ANION GAP SERPL CALC-SCNC: 5 MMOL/L (ref 0–18)
AST SERPL-CCNC: 27 U/L (ref 15–37)
BASOPHILS # BLD AUTO: 0.04 X10(3) UL (ref 0–0.2)
BASOPHILS NFR BLD AUTO: 0.3 %
BILIRUB SERPL-MCNC: 0.2 MG/DL (ref 0.1–2)
BUN BLD-MCNC: 17 MG/DL (ref 7–18)
CALCIUM BLD-MCNC: 9.1 MG/DL (ref 8.5–10.1)
CHLORIDE SERPL-SCNC: 110 MMOL/L (ref 98–112)
CO2 SERPL-SCNC: 23 MMOL/L (ref 21–32)
CREAT BLD-MCNC: 1.29 MG/DL
EOSINOPHIL # BLD AUTO: 0.54 X10(3) UL (ref 0–0.7)
EOSINOPHIL NFR BLD AUTO: 4.4 %
ERYTHROCYTE [DISTWIDTH] IN BLOOD BY AUTOMATED COUNT: 14.6 %
GLOBULIN PLAS-MCNC: 4.3 G/DL (ref 2.8–4.4)
GLUCOSE BLD-MCNC: 128 MG/DL (ref 70–99)
HCT VFR BLD AUTO: 26.8 %
HGB BLD-MCNC: 9.1 G/DL
IMM GRANULOCYTES # BLD AUTO: 0.05 X10(3) UL (ref 0–1)
IMM GRANULOCYTES NFR BLD: 0.4 %
LACTATE SERPL-SCNC: 2.2 MMOL/L (ref 0.4–2)
LYMPHOCYTES # BLD AUTO: 1.95 X10(3) UL (ref 1–4)
LYMPHOCYTES NFR BLD AUTO: 15.8 %
MCH RBC QN AUTO: 30.3 PG (ref 26–34)
MCHC RBC AUTO-ENTMCNC: 34 G/DL (ref 31–37)
MCV RBC AUTO: 89.3 FL
MONOCYTES # BLD AUTO: 1.42 X10(3) UL (ref 0.1–1)
MONOCYTES NFR BLD AUTO: 11.5 %
NEUTROPHILS # BLD AUTO: 8.32 X10 (3) UL (ref 1.5–7.7)
NEUTROPHILS # BLD AUTO: 8.32 X10(3) UL (ref 1.5–7.7)
NEUTROPHILS NFR BLD AUTO: 67.6 %
OSMOLALITY SERPL CALC.SUM OF ELEC: 289 MOSM/KG (ref 275–295)
PLATELET # BLD AUTO: 517 10(3)UL (ref 150–450)
POTASSIUM SERPL-SCNC: 4.5 MMOL/L (ref 3.5–5.1)
PROT SERPL-MCNC: 6.7 G/DL (ref 6.4–8.2)
RBC # BLD AUTO: 3 X10(6)UL
SODIUM SERPL-SCNC: 138 MMOL/L (ref 136–145)
WBC # BLD AUTO: 12.3 X10(3) UL (ref 4–11)

## 2022-06-30 RX ORDER — SULFAMETHOXAZOLE AND TRIMETHOPRIM 800; 160 MG/1; MG/1
1 TABLET ORAL 2 TIMES DAILY
Qty: 14 TABLET | Refills: 0 | Status: SHIPPED | OUTPATIENT
Start: 2022-06-30 | End: 2022-07-07

## 2022-06-30 RX ORDER — INSULIN LISPRO 100 [IU]/ML
INJECTION, SOLUTION INTRAVENOUS; SUBCUTANEOUS
Qty: 10 ML | Refills: 0 | Status: SHIPPED | OUTPATIENT
Start: 2022-06-30

## 2022-06-30 NOTE — TELEPHONE ENCOUNTER
Pt discharged 6/29/22. Pt does not have HFU appt scheduled at this time. TCM/HFU appt recommended by 7/6/22 as pt is a high risk for readmission. Please discuss with PCP and contact pt accordingly. Thank you!     BOOK BY DATE (last date for TCM): 7/13/22

## 2022-06-30 NOTE — TELEPHONE ENCOUNTER
Spoke with pt who states he will call us when is ready to schedule.  Pt would like to see his urologist first.

## 2022-06-30 NOTE — TELEPHONE ENCOUNTER
Pt's spouse requesting insulin lispro/humalog pens. Prescription was sent for the solution and that's not what pt needs.      [facility] - [address] [phone], [phone]   [place] [address]   Phone: [phone] Fax: [phone]

## 2022-06-30 NOTE — PAYOR COMM NOTE
Discharge Notification    Patient Name: Henrry Spaulding #: [id number]  Authorization Number: [id number]  Admit Date/Time: 6/27/2022 6:00 PM  Discharge Date/Time: 6/29/2022 11:34 AM

## 2022-07-01 RX ORDER — METOPROLOL SUCCINATE 25 MG/1
TABLET, EXTENDED RELEASE ORAL
Qty: 90 TABLET | Refills: 0 | OUTPATIENT
Start: 2022-07-01

## 2022-07-05 ENCOUNTER — TELEPHONE (OUTPATIENT)
Dept: INTERNAL MEDICINE CLINIC | Facility: CLINIC | Age: 72
End: 2022-07-05

## 2022-07-05 RX ORDER — PANTOPRAZOLE SODIUM 40 MG/1
TABLET, DELAYED RELEASE ORAL
Qty: 30 TABLET | Refills: 0 | Status: SHIPPED | OUTPATIENT
Start: 2022-07-05

## 2022-07-06 ENCOUNTER — TELEPHONE (OUTPATIENT)
Dept: INTERNAL MEDICINE CLINIC | Facility: CLINIC | Age: 72
End: 2022-07-06

## 2022-07-06 NOTE — TELEPHONE ENCOUNTER
Incoming fax for Clarification on Humalog. DV sign off on clarification paperwork for this medication. [facility]ed to [facility], Received confirmation report. Made a copy original was sent to scan and copy was placed in [address] [place] [address].

## 2022-07-06 NOTE — TELEPHONE ENCOUNTER
Pt's spouse requesting for nurse to call her back. Spouse has further questions in regards to the Dexcom. Please advise. [phone].

## 2022-07-06 NOTE — TELEPHONE ENCOUNTER
Spoke with wife, Yinka Rahman. Wife just wanted confirmation that the new RX orders were faxed to [facility]. Confirmed to her that they were faxed over this morning.

## 2022-07-06 NOTE — TELEPHONE ENCOUNTER
Faxed over new RX orders to [facility] as requested by patient, placed in purple folder pod #2

## 2022-07-07 NOTE — TELEPHONE ENCOUNTER
Laura/kyung's wife is calling to follow up on the status of this request, informed her that the fax was signed off on and faxed back yesterday. She will follow up with [facility].

## 2022-07-18 RX ORDER — BUDESONIDE AND FORMOTEROL FUMARATE DIHYDRATE 160; 4.5 UG/1; UG/1
AEROSOL RESPIRATORY (INHALATION)
Qty: 30.6 EACH | Refills: 0 | Status: SHIPPED | OUTPATIENT
Start: 2022-07-18

## 2022-07-18 RX ORDER — METOPROLOL SUCCINATE 25 MG/1
TABLET, EXTENDED RELEASE ORAL
Qty: 90 TABLET | Refills: 0 | OUTPATIENT
Start: 2022-07-18

## 2022-07-18 RX ORDER — PANTOPRAZOLE SODIUM 40 MG/1
40 TABLET, DELAYED RELEASE ORAL
Qty: 90 TABLET | Refills: 0 | Status: SHIPPED | OUTPATIENT
Start: 2022-07-18

## 2022-07-20 ENCOUNTER — ORDER TRANSCRIPTION (OUTPATIENT)
Dept: ADMINISTRATIVE | Facility: HOSPITAL | Age: 72
End: 2022-07-20

## 2022-07-20 DIAGNOSIS — Z11.59 ENCOUNTER FOR SCREENING FOR OTHER VIRAL DISEASES: Primary | ICD-10-CM

## 2022-07-20 DIAGNOSIS — Z01.818 PRE-OP TESTING: ICD-10-CM

## 2022-07-22 ENCOUNTER — LAB ENCOUNTER (OUTPATIENT)
Dept: LAB | Age: 72
End: 2022-07-22
Attending: INTERNAL MEDICINE
Payer: MEDICARE

## 2022-07-22 DIAGNOSIS — Z11.59 ENCOUNTER FOR SCREENING FOR OTHER VIRAL DISEASES: ICD-10-CM

## 2022-07-22 DIAGNOSIS — Z01.818 PRE-OP TESTING: ICD-10-CM

## 2022-07-22 NOTE — TELEPHONE ENCOUNTER
Pt wife Sana Divers requesting refill - Sana Divers stated pt is out of medication    Zofran 4mg     [id number] - [place], [place] - [address] [phone], [phone] [address] [place] [address]   Phone: [phone] Fax: [phone]

## 2022-07-23 LAB — SARS-COV-2 RNA RESP QL NAA+PROBE: NOT DETECTED

## 2022-07-24 RX ORDER — ONDANSETRON 4 MG/1
4 TABLET, FILM COATED ORAL EVERY 8 HOURS PRN
Qty: 90 TABLET | Refills: 0 | Status: SHIPPED | OUTPATIENT
Start: 2022-07-24 | End: 2022-08-23

## 2022-07-25 ENCOUNTER — HOSPITAL ENCOUNTER (OUTPATIENT)
Dept: CV DIAGNOSTICS | Facility: HOSPITAL | Age: 72
Discharge: HOME | End: 2022-07-25
Attending: INTERNAL MEDICINE
Payer: MEDICARE

## 2022-07-25 DIAGNOSIS — Z95.1 HX OF CABG: ICD-10-CM

## 2022-07-25 DIAGNOSIS — R07.9 CHEST PAIN, UNSPECIFIED TYPE: ICD-10-CM

## 2022-07-25 DIAGNOSIS — Z95.1 S/P CABG X 3: ICD-10-CM

## 2022-07-25 DIAGNOSIS — I25.10 CORONARY ARTERY DISEASE INVOLVING NATIVE CORONARY ARTERY WITHOUT ANGINA PECTORIS, UNSPECIFIED WHETHER NATIVE OR TRANSPLANTED HEART: ICD-10-CM

## 2022-07-25 PROCEDURE — 93018 CV STRESS TEST I&R ONLY: CPT | Performed by: INTERNAL MEDICINE

## 2022-07-25 PROCEDURE — 93017 CV STRESS TEST TRACING ONLY: CPT | Performed by: INTERNAL MEDICINE

## 2022-07-25 RX ORDER — PANCRELIPASE 36000; 180000; 114000 [USP'U]/1; [USP'U]/1; [USP'U]/1
CAPSULE, DELAYED RELEASE PELLETS ORAL
Qty: 180 CAPSULE | Refills: 0 | Status: SHIPPED | OUTPATIENT
Start: 2022-07-25

## 2022-07-25 NOTE — TELEPHONE ENCOUNTER
Passed protocol     Last refill:  CREON [id number] units Oral Cap  Particles 180 capsule 0 6/13/2022     Sig: TAKE TWO CAPSULES BY MOUTH THREE TIMES DAILY    Sent to pharmacy as: Creon [id number] UNIT Oral Capsule Delayed Release Particles (Pancrelipase (Lip-Prot-Amyl))      LOV:   6/23/22 Dr Adolph King RT   No FOV scheduled

## 2022-07-27 ENCOUNTER — ORDER TRANSCRIPTION (OUTPATIENT)
Dept: CARDIAC REHAB | Facility: HOSPITAL | Age: 72
End: 2022-07-27

## 2022-07-27 DIAGNOSIS — Z95.1 S/P CABG (CORONARY ARTERY BYPASS GRAFT): Primary | ICD-10-CM

## 2022-07-28 ENCOUNTER — CARDPULM VISIT (OUTPATIENT)
Dept: CARDIAC REHAB | Facility: HOSPITAL | Age: 72
End: 2022-07-28
Attending: INTERNAL MEDICINE
Payer: MEDICARE

## 2022-08-01 ENCOUNTER — CARDPULM VISIT (OUTPATIENT)
Dept: CARDIAC REHAB | Facility: HOSPITAL | Age: 72
End: 2022-08-01
Attending: INTERNAL MEDICINE
Payer: MEDICARE

## 2022-08-01 PROCEDURE — 93798 PHYS/QHP OP CAR RHAB W/ECG: CPT

## 2022-08-02 ENCOUNTER — TELEPHONE (OUTPATIENT)
Dept: INTERNAL MEDICINE CLINIC | Facility: CLINIC | Age: 72
End: 2022-08-02

## 2022-08-02 NOTE — TELEPHONE ENCOUNTER
Faxed signed form to [facility] medical equipment and supplies with receipt of confirmation   Sent to scan and copy placed in accordion

## 2022-08-03 ENCOUNTER — CARDPULM VISIT (OUTPATIENT)
Dept: CARDIAC REHAB | Facility: HOSPITAL | Age: 72
End: 2022-08-03
Attending: INTERNAL MEDICINE
Payer: MEDICARE

## 2022-08-03 PROCEDURE — 93798 PHYS/QHP OP CAR RHAB W/ECG: CPT

## 2022-08-05 ENCOUNTER — TELEPHONE (OUTPATIENT)
Dept: INTERNAL MEDICINE CLINIC | Facility: CLINIC | Age: 72
End: 2022-08-05

## 2022-08-05 ENCOUNTER — CARDPULM VISIT (OUTPATIENT)
Dept: CARDIAC REHAB | Facility: HOSPITAL | Age: 72
End: 2022-08-05
Attending: INTERNAL MEDICINE
Payer: MEDICARE

## 2022-08-05 PROCEDURE — 93798 PHYS/QHP OP CAR RHAB W/ECG: CPT

## 2022-08-05 NOTE — TELEPHONE ENCOUNTER
Protocol failed     Requesting: ventolin 108 (90 Base) mcg/act     LOV: 6/23/22   RTC: no follow ups on file   Filled: 10/11/21 #54g 2 refills   Recent Labs: 3/28/22     Upcoming OV: none scheduled

## 2022-08-05 NOTE — TELEPHONE ENCOUNTER
Incoming form from [address] [place] [address] for Continous Glucose Monitoring and Supplies Order Form placed in [facility].

## 2022-08-08 ENCOUNTER — CARDPULM VISIT (OUTPATIENT)
Dept: CARDIAC REHAB | Facility: HOSPITAL | Age: 72
End: 2022-08-08
Attending: INTERNAL MEDICINE
Payer: MEDICARE

## 2022-08-08 PROCEDURE — 93798 PHYS/QHP OP CAR RHAB W/ECG: CPT

## 2022-08-09 NOTE — TELEPHONE ENCOUNTER
Pt spouse Eric Marie calling to f/u on status of forms received.  Spouse also providing fax # for [facility]. [phone]

## 2022-08-10 ENCOUNTER — CARDPULM VISIT (OUTPATIENT)
Dept: CARDIAC REHAB | Facility: HOSPITAL | Age: 72
End: 2022-08-10
Attending: INTERNAL MEDICINE
Payer: MEDICARE

## 2022-08-10 PROCEDURE — 93798 PHYS/QHP OP CAR RHAB W/ECG: CPT

## 2022-08-12 ENCOUNTER — CARDPULM VISIT (OUTPATIENT)
Dept: CARDIAC REHAB | Facility: HOSPITAL | Age: 72
End: 2022-08-12
Attending: INTERNAL MEDICINE
Payer: MEDICARE

## 2022-08-12 PROCEDURE — 93798 PHYS/QHP OP CAR RHAB W/ECG: CPT

## 2022-08-15 ENCOUNTER — CARDPULM VISIT (OUTPATIENT)
Dept: CARDIAC REHAB | Facility: HOSPITAL | Age: 72
End: 2022-08-15
Attending: INTERNAL MEDICINE
Payer: MEDICARE

## 2022-08-15 DIAGNOSIS — D57.3 SICKLE CELL TRAIT (HCC): ICD-10-CM

## 2022-08-15 PROCEDURE — 93798 PHYS/QHP OP CAR RHAB W/ECG: CPT

## 2022-08-15 RX ORDER — FOLIC ACID 1 MG/1
1 TABLET ORAL DAILY
Qty: 90 TABLET | Refills: 1 | Status: SHIPPED | OUTPATIENT
Start: 2022-08-15

## 2022-08-15 NOTE — TELEPHONE ENCOUNTER
Pt spouse requesting refill  Originally prescribed at hospital stay     folic acid 1 MG Oral Tab    [id number] [place] - [address]  [address] [facility], [phone], [phone]

## 2022-08-17 ENCOUNTER — CARDPULM VISIT (OUTPATIENT)
Dept: CARDIAC REHAB | Facility: HOSPITAL | Age: 72
End: 2022-08-17
Attending: INTERNAL MEDICINE
Payer: MEDICARE

## 2022-08-17 PROCEDURE — 93798 PHYS/QHP OP CAR RHAB W/ECG: CPT

## 2022-08-19 ENCOUNTER — CARDPULM VISIT (OUTPATIENT)
Dept: CARDIAC REHAB | Facility: HOSPITAL | Age: 72
End: 2022-08-19
Attending: INTERNAL MEDICINE
Payer: MEDICARE

## 2022-08-19 PROCEDURE — 93798 PHYS/QHP OP CAR RHAB W/ECG: CPT

## 2022-08-22 ENCOUNTER — CARDPULM VISIT (OUTPATIENT)
Dept: CARDIAC REHAB | Facility: HOSPITAL | Age: 72
End: 2022-08-22
Attending: INTERNAL MEDICINE
Payer: MEDICARE

## 2022-08-22 PROCEDURE — 93798 PHYS/QHP OP CAR RHAB W/ECG: CPT

## 2022-08-24 ENCOUNTER — CARDPULM VISIT (OUTPATIENT)
Dept: CARDIAC REHAB | Facility: HOSPITAL | Age: 72
End: 2022-08-24
Attending: INTERNAL MEDICINE
Payer: MEDICARE

## 2022-08-24 PROCEDURE — 93798 PHYS/QHP OP CAR RHAB W/ECG: CPT

## 2022-08-26 ENCOUNTER — CARDPULM VISIT (OUTPATIENT)
Dept: CARDIAC REHAB | Facility: HOSPITAL | Age: 72
End: 2022-08-26
Attending: INTERNAL MEDICINE
Payer: MEDICARE

## 2022-08-26 PROCEDURE — 93798 PHYS/QHP OP CAR RHAB W/ECG: CPT

## 2022-08-29 ENCOUNTER — CARDPULM VISIT (OUTPATIENT)
Dept: CARDIAC REHAB | Facility: HOSPITAL | Age: 72
End: 2022-08-29
Attending: INTERNAL MEDICINE
Payer: MEDICARE

## 2022-08-29 PROCEDURE — 93798 PHYS/QHP OP CAR RHAB W/ECG: CPT

## 2022-08-30 ENCOUNTER — LAB ENCOUNTER (OUTPATIENT)
Dept: LAB | Age: 72
End: 2022-08-30
Attending: HOSPITALIST
Payer: MEDICARE

## 2022-08-30 DIAGNOSIS — R09.02 HYPOXEMIA: Primary | ICD-10-CM

## 2022-08-30 LAB
ANION GAP SERPL CALC-SCNC: 4 MMOL/L (ref 0–18)
BASOPHILS # BLD AUTO: 0.04 X10(3) UL (ref 0–0.2)
BASOPHILS NFR BLD AUTO: 0.4 %
BUN BLD-MCNC: 19 MG/DL (ref 7–18)
CALCIUM BLD-MCNC: 9.8 MG/DL (ref 8.5–10.1)
CHLORIDE SERPL-SCNC: 107 MMOL/L (ref 98–112)
CO2 SERPL-SCNC: 27 MMOL/L (ref 21–32)
CREAT BLD-MCNC: 1.44 MG/DL
EOSINOPHIL # BLD AUTO: 0.58 X10(3) UL (ref 0–0.7)
EOSINOPHIL NFR BLD AUTO: 5.1 %
ERYTHROCYTE [DISTWIDTH] IN BLOOD BY AUTOMATED COUNT: 17.2 %
FASTING STATUS PATIENT QL REPORTED: NO
GFR SERPLBLD BASED ON 1.73 SQ M-ARVRAT: 52 ML/MIN/1.73M2 (ref 60–?)
GLUCOSE BLD-MCNC: 162 MG/DL (ref 70–99)
HCT VFR BLD AUTO: 32.7 %
HGB BLD-MCNC: 10.7 G/DL
IMM GRANULOCYTES # BLD AUTO: 0.04 X10(3) UL (ref 0–1)
IMM GRANULOCYTES NFR BLD: 0.4 %
LYMPHOCYTES # BLD AUTO: 2.15 X10(3) UL (ref 1–4)
LYMPHOCYTES NFR BLD AUTO: 18.9 %
MCH RBC QN AUTO: 27.3 PG (ref 26–34)
MCHC RBC AUTO-ENTMCNC: 32.7 G/DL (ref 31–37)
MCV RBC AUTO: 83.4 FL
MONOCYTES # BLD AUTO: 1.18 X10(3) UL (ref 0.1–1)
MONOCYTES NFR BLD AUTO: 10.4 %
NEUTROPHILS # BLD AUTO: 7.37 X10 (3) UL (ref 1.5–7.7)
NEUTROPHILS # BLD AUTO: 7.37 X10(3) UL (ref 1.5–7.7)
NEUTROPHILS NFR BLD AUTO: 64.8 %
OSMOLALITY SERPL CALC.SUM OF ELEC: 292 MOSM/KG (ref 275–295)
PLATELET # BLD AUTO: 384 10(3)UL (ref 150–450)
POTASSIUM SERPL-SCNC: 4.6 MMOL/L (ref 3.5–5.1)
RBC # BLD AUTO: 3.92 X10(6)UL
SODIUM SERPL-SCNC: 138 MMOL/L (ref 136–145)
WBC # BLD AUTO: 11.4 X10(3) UL (ref 4–11)

## 2022-08-30 PROCEDURE — 80048 BASIC METABOLIC PNL TOTAL CA: CPT

## 2022-08-30 PROCEDURE — 36415 COLL VENOUS BLD VENIPUNCTURE: CPT

## 2022-08-30 PROCEDURE — 85025 COMPLETE CBC W/AUTO DIFF WBC: CPT

## 2022-08-31 ENCOUNTER — HOSPITAL ENCOUNTER (OUTPATIENT)
Dept: GENERAL RADIOLOGY | Age: 72
Discharge: HOME | End: 2022-08-31
Attending: HOSPITALIST
Payer: MEDICARE

## 2022-08-31 ENCOUNTER — CARDPULM VISIT (OUTPATIENT)
Dept: CARDIAC REHAB | Facility: HOSPITAL | Age: 72
End: 2022-08-31
Attending: INTERNAL MEDICINE
Payer: MEDICARE

## 2022-08-31 DIAGNOSIS — J44.9 COPD, SEVERE (HCC): ICD-10-CM

## 2022-08-31 PROCEDURE — 93798 PHYS/QHP OP CAR RHAB W/ECG: CPT

## 2022-08-31 PROCEDURE — 71046 X-RAY EXAM CHEST 2 VIEWS: CPT | Performed by: HOSPITALIST

## 2022-09-01 PROBLEM — I42.9 CARDIOMYOPATHY (HCC): Status: ACTIVE | Noted: 2021-10-12

## 2022-09-02 ENCOUNTER — CARDPULM VISIT (OUTPATIENT)
Dept: CARDIAC REHAB | Facility: HOSPITAL | Age: 72
End: 2022-09-02
Attending: INTERNAL MEDICINE
Payer: MEDICARE

## 2022-09-02 ENCOUNTER — LAB ENCOUNTER (OUTPATIENT)
Dept: LAB | Facility: HOSPITAL | Age: 72
End: 2022-09-02
Attending: HOSPITALIST
Payer: MEDICARE

## 2022-09-02 DIAGNOSIS — R09.02 HYPOXEMIA: Primary | ICD-10-CM

## 2022-09-02 LAB — D DIMER PPP FEU-MCNC: 0.4 UG/ML FEU (ref ?–0.72)

## 2022-09-02 PROCEDURE — 85379 FIBRIN DEGRADATION QUANT: CPT

## 2022-09-02 PROCEDURE — 36415 COLL VENOUS BLD VENIPUNCTURE: CPT

## 2022-09-02 PROCEDURE — 93798 PHYS/QHP OP CAR RHAB W/ECG: CPT

## 2022-09-07 ENCOUNTER — CARDPULM VISIT (OUTPATIENT)
Dept: CARDIAC REHAB | Facility: HOSPITAL | Age: 72
End: 2022-09-07
Attending: INTERNAL MEDICINE
Payer: MEDICARE

## 2022-09-07 PROCEDURE — 93798 PHYS/QHP OP CAR RHAB W/ECG: CPT

## 2022-09-09 ENCOUNTER — CARDPULM VISIT (OUTPATIENT)
Dept: CARDIAC REHAB | Facility: HOSPITAL | Age: 72
End: 2022-09-09
Attending: INTERNAL MEDICINE
Payer: MEDICARE

## 2022-09-09 PROCEDURE — 93798 PHYS/QHP OP CAR RHAB W/ECG: CPT

## 2022-09-09 RX ORDER — ONDANSETRON 4 MG/1
TABLET, FILM COATED ORAL
Qty: 90 TABLET | Refills: 0 | Status: SHIPPED | OUTPATIENT
Start: 2022-09-09

## 2022-09-12 ENCOUNTER — APPOINTMENT (OUTPATIENT)
Dept: CARDIAC REHAB | Facility: HOSPITAL | Age: 72
End: 2022-09-12
Attending: INTERNAL MEDICINE
Payer: MEDICARE

## 2022-09-13 ENCOUNTER — IMMUNIZATION (OUTPATIENT)
Dept: LAB | Age: 72
End: 2022-09-13
Attending: EMERGENCY MEDICINE
Payer: MEDICARE

## 2022-09-13 DIAGNOSIS — Z23 NEED FOR VACCINATION: Primary | ICD-10-CM

## 2022-09-13 PROCEDURE — 0124A SARSCOV2 VAC BVL 30MCG/0.3ML: CPT

## 2022-09-14 ENCOUNTER — CARDPULM VISIT (OUTPATIENT)
Dept: CARDIAC REHAB | Facility: HOSPITAL | Age: 72
End: 2022-09-14
Attending: INTERNAL MEDICINE
Payer: MEDICARE

## 2022-09-14 PROCEDURE — 93798 PHYS/QHP OP CAR RHAB W/ECG: CPT

## 2022-09-16 ENCOUNTER — CARDPULM VISIT (OUTPATIENT)
Dept: CARDIAC REHAB | Facility: HOSPITAL | Age: 72
End: 2022-09-16
Attending: INTERNAL MEDICINE
Payer: MEDICARE

## 2022-09-16 PROCEDURE — 93798 PHYS/QHP OP CAR RHAB W/ECG: CPT

## 2022-09-19 ENCOUNTER — CARDPULM VISIT (OUTPATIENT)
Dept: CARDIAC REHAB | Facility: HOSPITAL | Age: 72
End: 2022-09-19
Attending: INTERNAL MEDICINE
Payer: MEDICARE

## 2022-09-19 PROCEDURE — 93798 PHYS/QHP OP CAR RHAB W/ECG: CPT

## 2022-09-20 RX ORDER — PANCRELIPASE 36000; 180000; 114000 [USP'U]/1; [USP'U]/1; [USP'U]/1
CAPSULE, DELAYED RELEASE PELLETS ORAL
Qty: 180 CAPSULE | Refills: 0 | Status: SHIPPED | OUTPATIENT
Start: 2022-09-20

## 2022-09-21 ENCOUNTER — APPOINTMENT (OUTPATIENT)
Dept: CARDIAC REHAB | Facility: HOSPITAL | Age: 72
End: 2022-09-21
Attending: INTERNAL MEDICINE
Payer: MEDICARE

## 2022-09-23 ENCOUNTER — CARDPULM VISIT (OUTPATIENT)
Dept: CARDIAC REHAB | Facility: HOSPITAL | Age: 72
End: 2022-09-23
Attending: INTERNAL MEDICINE
Payer: MEDICARE

## 2022-09-23 PROCEDURE — 93798 PHYS/QHP OP CAR RHAB W/ECG: CPT

## 2022-09-25 ENCOUNTER — APPOINTMENT (OUTPATIENT)
Dept: GENERAL RADIOLOGY | Facility: HOSPITAL | Age: 72
End: 2022-09-25

## 2022-09-25 ENCOUNTER — APPOINTMENT (OUTPATIENT)
Dept: CT IMAGING | Facility: HOSPITAL | Age: 72
End: 2022-09-25
Attending: EMERGENCY MEDICINE

## 2022-09-25 ENCOUNTER — HOSPITAL ENCOUNTER (EMERGENCY)
Facility: HOSPITAL | Age: 72
Discharge: HOME | End: 2022-09-25
Attending: EMERGENCY MEDICINE

## 2022-09-25 VITALS
WEIGHT: 145.5 LBS | DIASTOLIC BLOOD PRESSURE: 68 MMHG | TEMPERATURE: 99 F | RESPIRATION RATE: 14 BRPM | HEART RATE: 85 BPM | BODY MASS INDEX: 20 KG/M2 | SYSTOLIC BLOOD PRESSURE: 116 MMHG | OXYGEN SATURATION: 93 %

## 2022-09-25 DIAGNOSIS — R07.9 CHEST PAIN OF UNCERTAIN ETIOLOGY: Primary | ICD-10-CM

## 2022-09-25 LAB
ALBUMIN SERPL-MCNC: 3.7 G/DL (ref 3.4–5)
ALBUMIN/GLOB SERPL: 0.9 {RATIO} (ref 1–2)
ALP LIVER SERPL-CCNC: 107 U/L
ALT SERPL-CCNC: 24 U/L
ANION GAP SERPL CALC-SCNC: 6 MMOL/L (ref 0–18)
APTT PPP: 33.7 SECONDS (ref 23.3–35.6)
AST SERPL-CCNC: 20 U/L (ref 15–37)
ATRIAL RATE: 116 BPM
BASOPHILS # BLD AUTO: 0.03 X10(3) UL (ref 0–0.2)
BASOPHILS NFR BLD AUTO: 0.2 %
BILIRUB SERPL-MCNC: 0.6 MG/DL (ref 0.1–2)
BILIRUB UR QL STRIP.AUTO: NEGATIVE
BUN BLD-MCNC: 25 MG/DL (ref 7–18)
CALCIUM BLD-MCNC: 9.5 MG/DL (ref 8.5–10.1)
CHLORIDE SERPL-SCNC: 106 MMOL/L (ref 98–112)
CLARITY UR REFRACT.AUTO: CLEAR
CO2 SERPL-SCNC: 25 MMOL/L (ref 21–32)
COLOR UR AUTO: YELLOW
CREAT BLD-MCNC: 1.54 MG/DL
EOSINOPHIL # BLD AUTO: 0.18 X10(3) UL (ref 0–0.7)
EOSINOPHIL NFR BLD AUTO: 1.1 %
ERYTHROCYTE [DISTWIDTH] IN BLOOD BY AUTOMATED COUNT: 18.9 %
GFR SERPLBLD BASED ON 1.73 SQ M-ARVRAT: 47 ML/MIN/1.73M2 (ref 60–?)
GLOBULIN PLAS-MCNC: 4.3 G/DL (ref 2.8–4.4)
GLUCOSE BLD-MCNC: 127 MG/DL (ref 70–99)
GLUCOSE UR STRIP.AUTO-MCNC: NEGATIVE MG/DL
HCT VFR BLD AUTO: 37 %
HGB BLD-MCNC: 12.2 G/DL
IMM GRANULOCYTES # BLD AUTO: 0.06 X10(3) UL (ref 0–1)
IMM GRANULOCYTES NFR BLD: 0.4 %
INR BLD: 1.15 (ref 0.85–1.16)
KETONES UR STRIP.AUTO-MCNC: NEGATIVE MG/DL
LACTATE SERPL-SCNC: 1.3 MMOL/L (ref 0.4–2)
LEUKOCYTE ESTERASE UR QL STRIP.AUTO: NEGATIVE
LIPASE SERPL-CCNC: 30 U/L (ref 73–393)
LYMPHOCYTES # BLD AUTO: 1.88 X10(3) UL (ref 1–4)
LYMPHOCYTES NFR BLD AUTO: 11.1 %
MCH RBC QN AUTO: 26.4 PG (ref 26–34)
MCHC RBC AUTO-ENTMCNC: 33 G/DL (ref 31–37)
MCV RBC AUTO: 80.1 FL
MONOCYTES # BLD AUTO: 1.6 X10(3) UL (ref 0.1–1)
MONOCYTES NFR BLD AUTO: 9.5 %
NEUTROPHILS # BLD AUTO: 13.15 X10 (3) UL (ref 1.5–7.7)
NEUTROPHILS # BLD AUTO: 13.15 X10(3) UL (ref 1.5–7.7)
NEUTROPHILS NFR BLD AUTO: 77.7 %
NITRITE UR QL STRIP.AUTO: NEGATIVE
OSMOLALITY SERPL CALC.SUM OF ELEC: 290 MOSM/KG (ref 275–295)
P AXIS: 78 DEGREES
P-R INTERVAL: 136 MS
PH UR STRIP.AUTO: 5 [PH] (ref 5–8)
PLATELET # BLD AUTO: 415 10(3)UL (ref 150–450)
POTASSIUM SERPL-SCNC: 4.2 MMOL/L (ref 3.5–5.1)
PROT SERPL-MCNC: 8 G/DL (ref 6.4–8.2)
PROT UR STRIP.AUTO-MCNC: NEGATIVE MG/DL
PROTHROMBIN TIME: 14.7 SECONDS (ref 11.6–14.8)
Q-T INTERVAL: 304 MS
QRS DURATION: 86 MS
QTC CALCULATION (BEZET): 422 MS
R AXIS: 85 DEGREES
RBC # BLD AUTO: 4.62 X10(6)UL
RBC UR QL AUTO: NEGATIVE
SARS-COV-2 RNA RESP QL NAA+PROBE: NOT DETECTED
SODIUM SERPL-SCNC: 137 MMOL/L (ref 136–145)
SP GR UR STRIP.AUTO: 1.03 (ref 1–1.03)
T AXIS: 63 DEGREES
TROPONIN I HIGH SENSITIVITY: 10 NG/L
TROPONIN I HIGH SENSITIVITY: 7 NG/L
UROBILINOGEN UR STRIP.AUTO-MCNC: 2 MG/DL
VENTRICULAR RATE: 116 BPM
WBC # BLD AUTO: 16.9 X10(3) UL (ref 4–11)

## 2022-09-25 PROCEDURE — 85025 COMPLETE CBC W/AUTO DIFF WBC: CPT | Performed by: EMERGENCY MEDICINE

## 2022-09-25 PROCEDURE — 80053 COMPREHEN METABOLIC PANEL: CPT | Performed by: EMERGENCY MEDICINE

## 2022-09-25 PROCEDURE — 93005 ELECTROCARDIOGRAM TRACING: CPT

## 2022-09-25 PROCEDURE — 99285 EMERGENCY DEPT VISIT HI MDM: CPT | Performed by: EMERGENCY MEDICINE

## 2022-09-25 PROCEDURE — 84484 ASSAY OF TROPONIN QUANT: CPT

## 2022-09-25 PROCEDURE — 80053 COMPREHEN METABOLIC PANEL: CPT

## 2022-09-25 PROCEDURE — 85610 PROTHROMBIN TIME: CPT | Performed by: EMERGENCY MEDICINE

## 2022-09-25 PROCEDURE — 84484 ASSAY OF TROPONIN QUANT: CPT | Performed by: EMERGENCY MEDICINE

## 2022-09-25 PROCEDURE — 93010 ELECTROCARDIOGRAM REPORT: CPT | Performed by: EMERGENCY MEDICINE

## 2022-09-25 PROCEDURE — 71045 X-RAY EXAM CHEST 1 VIEW: CPT | Performed by: EMERGENCY MEDICINE

## 2022-09-25 PROCEDURE — 85025 COMPLETE CBC W/AUTO DIFF WBC: CPT

## 2022-09-25 PROCEDURE — 83605 ASSAY OF LACTIC ACID: CPT | Performed by: EMERGENCY MEDICINE

## 2022-09-25 PROCEDURE — 71275 CT ANGIOGRAPHY CHEST: CPT | Performed by: EMERGENCY MEDICINE

## 2022-09-25 PROCEDURE — 96361 HYDRATE IV INFUSION ADD-ON: CPT | Performed by: EMERGENCY MEDICINE

## 2022-09-25 PROCEDURE — 96374 THER/PROPH/DIAG INJ IV PUSH: CPT | Performed by: EMERGENCY MEDICINE

## 2022-09-25 PROCEDURE — 94799 UNLISTED PULMONARY SVC/PX: CPT

## 2022-09-25 PROCEDURE — 85730 THROMBOPLASTIN TIME PARTIAL: CPT | Performed by: EMERGENCY MEDICINE

## 2022-09-25 PROCEDURE — 81003 URINALYSIS AUTO W/O SCOPE: CPT | Performed by: EMERGENCY MEDICINE

## 2022-09-25 PROCEDURE — 96376 TX/PRO/DX INJ SAME DRUG ADON: CPT | Performed by: EMERGENCY MEDICINE

## 2022-09-25 PROCEDURE — 83690 ASSAY OF LIPASE: CPT | Performed by: EMERGENCY MEDICINE

## 2022-09-25 RX ORDER — SODIUM CHLORIDE 9 MG/ML
1000 INJECTION, SOLUTION INTRAVENOUS ONCE
Status: COMPLETED | OUTPATIENT
Start: 2022-09-25 | End: 2022-09-25

## 2022-09-25 RX ORDER — ASPIRIN 81 MG/1
324 TABLET, CHEWABLE ORAL ONCE
Status: COMPLETED | OUTPATIENT
Start: 2022-09-25 | End: 2022-09-25

## 2022-09-25 RX ORDER — HYDROMORPHONE HYDROCHLORIDE 1 MG/ML
1 INJECTION, SOLUTION INTRAMUSCULAR; INTRAVENOUS; SUBCUTANEOUS EVERY 30 MIN PRN
Status: DISCONTINUED | OUTPATIENT
Start: 2022-09-25 | End: 2022-09-25

## 2022-09-25 NOTE — ED QUICK NOTES
Pt states fentanyl patch is on day 3 and ready for change. Pt did take dilaudid this morning at 0600. Pt takes he has chronic pancreatitis and this is why he takes pain medication.

## 2022-09-25 NOTE — RESPIRATORY THERAPY NOTE
RT called to assess pt with SOB and Left sided pain on inhalation. Pt was placed on NRM 15L by RN. Pt had bilaterally diminished lung sounds other wise sitting on stretcher in no distress. RT switched pt to 3L NC pt resting comfortably at this time will continue to monitor.

## 2022-09-25 NOTE — ED INITIAL ASSESSMENT (HPI)
Pt presents to ER with SOB. Oxygen level 88% on room air. Pain radiates to the left flank and pain to left side of body. Pt had cardiac by-pass surgery, June 6th. Pt is speaking clearly with nurse. Skin warm and dry. Pt states that pain got bad on left side last night, but has been coming on gradually. No fever. Yes nausea, no vomiting.

## 2022-09-26 ENCOUNTER — APPOINTMENT (OUTPATIENT)
Dept: CARDIAC REHAB | Facility: HOSPITAL | Age: 72
End: 2022-09-26
Attending: INTERNAL MEDICINE
Payer: MEDICARE

## 2022-09-26 LAB
ATRIAL RATE: 93 BPM
P AXIS: 72 DEGREES
P-R INTERVAL: 146 MS
Q-T INTERVAL: 356 MS
QRS DURATION: 90 MS
QTC CALCULATION (BEZET): 442 MS
R AXIS: 77 DEGREES
T AXIS: 73 DEGREES
VENTRICULAR RATE: 93 BPM

## 2022-09-28 ENCOUNTER — APPOINTMENT (OUTPATIENT)
Dept: CARDIAC REHAB | Facility: HOSPITAL | Age: 72
End: 2022-09-28
Attending: INTERNAL MEDICINE
Payer: MEDICARE

## 2022-09-30 ENCOUNTER — APPOINTMENT (OUTPATIENT)
Dept: CARDIAC REHAB | Facility: HOSPITAL | Age: 72
End: 2022-09-30
Attending: INTERNAL MEDICINE
Payer: MEDICARE

## 2022-10-03 ENCOUNTER — APPOINTMENT (OUTPATIENT)
Dept: CARDIAC REHAB | Facility: HOSPITAL | Age: 72
End: 2022-10-03
Attending: INTERNAL MEDICINE
Payer: MEDICARE

## 2022-10-05 ENCOUNTER — OFFICE VISIT (OUTPATIENT)
Facility: CLINIC | Age: 72
End: 2022-10-05
Payer: MEDICARE

## 2022-10-05 ENCOUNTER — APPOINTMENT (OUTPATIENT)
Dept: CARDIAC REHAB | Facility: HOSPITAL | Age: 72
End: 2022-10-05
Attending: INTERNAL MEDICINE
Payer: MEDICARE

## 2022-10-05 ENCOUNTER — TELEPHONE (OUTPATIENT)
Facility: CLINIC | Age: 72
End: 2022-10-05

## 2022-10-05 VITALS
BODY MASS INDEX: 20.25 KG/M2 | SYSTOLIC BLOOD PRESSURE: 124 MMHG | DIASTOLIC BLOOD PRESSURE: 68 MMHG | WEIGHT: 144.63 LBS | HEIGHT: 71 IN | RESPIRATION RATE: 16 BRPM | HEART RATE: 81 BPM | OXYGEN SATURATION: 93 %

## 2022-10-05 DIAGNOSIS — R09.02 HYPOXIA: ICD-10-CM

## 2022-10-05 DIAGNOSIS — J44.1 ACUTE EXACERBATION OF CHRONIC OBSTRUCTIVE PULMONARY DISEASE (COPD) (HCC): ICD-10-CM

## 2022-10-05 DIAGNOSIS — R59.0 HILAR LYMPHADENOPATHY: ICD-10-CM

## 2022-10-05 DIAGNOSIS — J47.9 BRONCHIECTASIS WITHOUT COMPLICATION (HCC): ICD-10-CM

## 2022-10-05 DIAGNOSIS — J43.2 CENTRILOBULAR EMPHYSEMA (HCC): Primary | ICD-10-CM

## 2022-10-05 DIAGNOSIS — I27.20 PULMONARY HYPERTENSION (HCC): ICD-10-CM

## 2022-10-05 PROCEDURE — 3008F BODY MASS INDEX DOCD: CPT | Performed by: INTERNAL MEDICINE

## 2022-10-05 PROCEDURE — 99214 OFFICE O/P EST MOD 30 MIN: CPT | Performed by: INTERNAL MEDICINE

## 2022-10-05 PROCEDURE — 3074F SYST BP LT 130 MM HG: CPT | Performed by: INTERNAL MEDICINE

## 2022-10-05 PROCEDURE — 3078F DIAST BP <80 MM HG: CPT | Performed by: INTERNAL MEDICINE

## 2022-10-05 RX ORDER — PREDNISONE 10 MG/1
TABLET ORAL
Qty: 30 TABLET | Refills: 0 | Status: SHIPPED | OUTPATIENT
Start: 2022-10-05

## 2022-10-05 NOTE — PATIENT INSTRUCTIONS
Start prednisone taper   We talked about your inhalers and nebulizers such as changing to only nebulizer including yupelri once a day;  budesonide and arformoterol are twice a day. For now continue the trelegy daily and schedule the ipratropium/albuterol every 6 hours. We need to get repeat tests in March: PFTs, CT chest and 6 minute walk test  I will send a message to DR. Fior Calderon as well and ask about the pulmonary hypertension.    I think you can resume the cardiac rehab - will plan to go back on Monday 10/10/22  Follow up in one month or call if something changes

## 2022-10-05 NOTE — TELEPHONE ENCOUNTER
----- Message from Libby Ly MD sent at 10/5/2022  3:27 PM CDT -----  Please send fax to [facility] that the patient may resume cardiac rehab on Monday 10/10/22. Letter needs to be faxed to [phone].

## 2022-10-07 ENCOUNTER — APPOINTMENT (OUTPATIENT)
Dept: CARDIAC REHAB | Facility: HOSPITAL | Age: 72
End: 2022-10-07
Attending: INTERNAL MEDICINE
Payer: MEDICARE

## 2022-10-10 ENCOUNTER — CARDPULM VISIT (OUTPATIENT)
Dept: CARDIAC REHAB | Facility: HOSPITAL | Age: 72
End: 2022-10-10
Attending: INTERNAL MEDICINE
Payer: MEDICARE

## 2022-10-10 PROCEDURE — 93798 PHYS/QHP OP CAR RHAB W/ECG: CPT

## 2022-10-12 ENCOUNTER — CARDPULM VISIT (OUTPATIENT)
Dept: CARDIAC REHAB | Facility: HOSPITAL | Age: 72
End: 2022-10-12
Attending: INTERNAL MEDICINE
Payer: MEDICARE

## 2022-10-12 PROCEDURE — 93798 PHYS/QHP OP CAR RHAB W/ECG: CPT

## 2022-10-12 RX ORDER — ATORVASTATIN CALCIUM 40 MG/1
40 TABLET, FILM COATED ORAL NIGHTLY
Qty: 30 TABLET | Refills: 3 | Status: SHIPPED | OUTPATIENT
Start: 2022-10-12

## 2022-10-12 NOTE — TELEPHONE ENCOUNTER
Refill needed:    atorvastatin 40 MG Oral Tab    [id number] Ralph Rohit Amaya [phone], [phone]    Pts wife states he is all out

## 2022-10-13 DIAGNOSIS — J43.2 CENTRILOBULAR EMPHYSEMA (HCC): Primary | ICD-10-CM

## 2022-10-13 RX ORDER — ALBUTEROL SULFATE 90 UG/1
2 AEROSOL, METERED RESPIRATORY (INHALATION)
Qty: 3 EACH | Refills: 3 | Status: SHIPPED | OUTPATIENT
Start: 2022-10-13

## 2022-10-13 NOTE — TELEPHONE ENCOUNTER
Needs a script for Ventolin HFA 90mcg 3 month supply sent to [facility] in [address] [place] [address],[address] on [address] ph# [phone]

## 2022-10-14 ENCOUNTER — CARDPULM VISIT (OUTPATIENT)
Dept: CARDIAC REHAB | Facility: HOSPITAL | Age: 72
End: 2022-10-14
Attending: INTERNAL MEDICINE
Payer: MEDICARE

## 2022-10-14 PROCEDURE — 93798 PHYS/QHP OP CAR RHAB W/ECG: CPT

## 2022-10-17 ENCOUNTER — CARDPULM VISIT (OUTPATIENT)
Dept: CARDIAC REHAB | Facility: HOSPITAL | Age: 72
End: 2022-10-17
Attending: INTERNAL MEDICINE
Payer: MEDICARE

## 2022-10-17 DIAGNOSIS — D57.3 SICKLE CELL TRAIT (HCC): ICD-10-CM

## 2022-10-17 PROCEDURE — 93798 PHYS/QHP OP CAR RHAB W/ECG: CPT

## 2022-10-17 RX ORDER — METOPROLOL SUCCINATE 50 MG/1
50 TABLET, EXTENDED RELEASE ORAL DAILY
Qty: 30 TABLET | Refills: 3 | Status: SHIPPED | OUTPATIENT
Start: 2022-10-17

## 2022-10-17 RX ORDER — ONDANSETRON 4 MG/1
4 TABLET, FILM COATED ORAL EVERY 8 HOURS PRN
Qty: 90 TABLET | Refills: 0 | Status: SHIPPED | OUTPATIENT
Start: 2022-10-17

## 2022-10-17 RX ORDER — FOLIC ACID 1 MG/1
1 TABLET ORAL DAILY
Qty: 90 TABLET | Refills: 1 | Status: SHIPPED | OUTPATIENT
Start: 2022-10-17

## 2022-10-17 NOTE — TELEPHONE ENCOUNTER
Protocol passed   Metoprolol succinate ER 50mg filled 6/11/22 #90 0 refills     No Protocol   Ondansetron 4mg filled 9/9/22 #31 0 refills   Folic acid 1mg filled 5/00/93 #90 1 refill     LOV: 6/23/22   RTC: no follow ups on file   Recent Labs: 6/27/22     Upcoming OV: none noted

## 2022-10-17 NOTE — TELEPHONE ENCOUNTER
Pt requesting refill for the pt:  metoprolol succinate ER 50 MG Oral Tablet 24 Hr- pt is completely out and needs asap    folic acid 1 MG Oral Tab    ondansetron (ZOFRAN) 4 mg tablet    She is requesting 90 day supply,    [id number] [address], [facility] [phone], [phone]

## 2022-10-19 ENCOUNTER — CARDPULM VISIT (OUTPATIENT)
Dept: CARDIAC REHAB | Facility: HOSPITAL | Age: 72
End: 2022-10-19
Attending: INTERNAL MEDICINE
Payer: MEDICARE

## 2022-10-19 PROCEDURE — 93798 PHYS/QHP OP CAR RHAB W/ECG: CPT

## 2022-10-21 ENCOUNTER — CARDPULM VISIT (OUTPATIENT)
Dept: CARDIAC REHAB | Facility: HOSPITAL | Age: 72
End: 2022-10-21
Attending: INTERNAL MEDICINE
Payer: MEDICARE

## 2022-10-21 PROCEDURE — 93798 PHYS/QHP OP CAR RHAB W/ECG: CPT

## 2022-10-21 NOTE — PLAN OF CARE
Patient resting in bed. Denies passing flatus, did have flatus yesterday. Denies calf pain. Does not want suppository now, will do later. POC discussed, all questions and concerns addressed. All safety measures in place. Will continue to monitor. C/o cough, headache and nausea x 2 weeks. Hx covid 9/22/22. LMP 7/30/22 " its irregular"

## 2022-10-21 NOTE — PROGRESS NOTES
[address] [facility] Preliminary Evaluation    Addis Brightcaitlin Patient Status:  Observation    1950 MRN [id number]   [facility] 3NE-A Attending Lizette Smith, DO   Hosp Day # 0 PCP MD Jackson Almanza 1293 • Long term current use of opiate analgesic 2/17/2015   • Long-term current use of opiate analgesic 6/11/2010   • Osteoarthritis    • Pain in joints    • Pneumonia due to organism    • Primary localized osteoarthrosis, lower leg 10/2/2013   • Primary loc cap, Take 1 capsule (0.4 mg total) by mouth daily.  1/2 hr after dinner, Disp: 30 capsule, Rfl: 11, 2/26/2020 at Unknown time  Tiotropium Bromide Monohydrate (SPIRIVA HANDIHALER) 18 MCG Inhalation Cap, INHALE CONTENTS OF 1 CAPSULE BY MOUTH DAILY, Disp: 30 c 100 UNIT/ML Subcutaneous Solution, Inject 35 Units into the skin nightly., Disp: , Rfl: , 2/26/2020 at Unknown time  Insulin Lispro 100 UNIT/ML Subcutaneous Solution, Inject into the skin 3 (three) times daily before meals.  Per SS  , Disp: , Rfl: , 2/26/20 Oral, Q6H PRN  ondansetron HCl (ZOFRAN) injection 4 mg, 4 mg, Intravenous, Q6H PRN  pancrelipase (Lip-Prot-Amyl) (ZENPEP) DR particles cap 40,000 Units, 40,000 Units, Oral, TID CC        REVIEW OF SYSTEMS:  A 10-point system was reviewed.   Pertinent positi Adult Echiverri to follow and determine with MRI brain and EEG are necessary.     TABBY Dunn  [employer] [phone]  Pager [phone]  2/27/2020, 9:44 AM

## 2022-10-24 ENCOUNTER — CARDPULM VISIT (OUTPATIENT)
Dept: CARDIAC REHAB | Facility: HOSPITAL | Age: 72
End: 2022-10-24
Attending: INTERNAL MEDICINE
Payer: MEDICARE

## 2022-10-24 PROCEDURE — 93798 PHYS/QHP OP CAR RHAB W/ECG: CPT

## 2022-10-25 ENCOUNTER — IMMUNIZATION (OUTPATIENT)
Dept: INTERNAL MEDICINE CLINIC | Facility: CLINIC | Age: 72
End: 2022-10-25
Payer: MEDICARE

## 2022-10-25 DIAGNOSIS — Z23 NEED FOR VACCINATION: Primary | ICD-10-CM

## 2022-10-25 PROCEDURE — 90662 IIV NO PRSV INCREASED AG IM: CPT | Performed by: INTERNAL MEDICINE

## 2022-10-25 PROCEDURE — G0008 ADMIN INFLUENZA VIRUS VAC: HCPCS | Performed by: INTERNAL MEDICINE

## 2022-10-26 ENCOUNTER — CARDPULM VISIT (OUTPATIENT)
Dept: CARDIAC REHAB | Facility: HOSPITAL | Age: 72
End: 2022-10-26
Attending: INTERNAL MEDICINE
Payer: MEDICARE

## 2022-10-26 PROCEDURE — 93798 PHYS/QHP OP CAR RHAB W/ECG: CPT

## 2022-10-28 ENCOUNTER — CARDPULM VISIT (OUTPATIENT)
Dept: CARDIAC REHAB | Facility: HOSPITAL | Age: 72
End: 2022-10-28
Attending: INTERNAL MEDICINE
Payer: MEDICARE

## 2022-10-28 PROCEDURE — 93798 PHYS/QHP OP CAR RHAB W/ECG: CPT

## 2022-10-31 ENCOUNTER — CARDPULM VISIT (OUTPATIENT)
Dept: CARDIAC REHAB | Facility: HOSPITAL | Age: 72
End: 2022-10-31
Attending: INTERNAL MEDICINE
Payer: MEDICARE

## 2022-10-31 PROCEDURE — 93798 PHYS/QHP OP CAR RHAB W/ECG: CPT

## 2022-10-31 RX ORDER — PANCRELIPASE 36000; 180000; 114000 [USP'U]/1; [USP'U]/1; [USP'U]/1
CAPSULE, DELAYED RELEASE PELLETS ORAL
Qty: 180 CAPSULE | Refills: 0 | Status: SHIPPED | OUTPATIENT
Start: 2022-10-31

## 2022-11-02 ENCOUNTER — CARDPULM VISIT (OUTPATIENT)
Dept: CARDIAC REHAB | Facility: HOSPITAL | Age: 72
End: 2022-11-02
Attending: INTERNAL MEDICINE
Payer: MEDICARE

## 2022-11-02 PROCEDURE — 93798 PHYS/QHP OP CAR RHAB W/ECG: CPT

## 2022-11-04 ENCOUNTER — CARDPULM VISIT (OUTPATIENT)
Dept: CARDIAC REHAB | Facility: HOSPITAL | Age: 72
End: 2022-11-04
Attending: INTERNAL MEDICINE
Payer: MEDICARE

## 2022-11-04 PROCEDURE — 93798 PHYS/QHP OP CAR RHAB W/ECG: CPT

## 2022-11-07 ENCOUNTER — CARDPULM VISIT (OUTPATIENT)
Dept: CARDIAC REHAB | Facility: HOSPITAL | Age: 72
End: 2022-11-07
Attending: INTERNAL MEDICINE
Payer: MEDICARE

## 2022-11-07 PROCEDURE — 93798 PHYS/QHP OP CAR RHAB W/ECG: CPT

## 2022-11-08 ENCOUNTER — OFFICE VISIT (OUTPATIENT)
Facility: CLINIC | Age: 72
End: 2022-11-08
Payer: MEDICARE

## 2022-11-08 VITALS
SYSTOLIC BLOOD PRESSURE: 120 MMHG | WEIGHT: 145 LBS | HEIGHT: 71 IN | HEART RATE: 88 BPM | RESPIRATION RATE: 16 BRPM | BODY MASS INDEX: 20.3 KG/M2 | OXYGEN SATURATION: 94 % | DIASTOLIC BLOOD PRESSURE: 70 MMHG

## 2022-11-08 DIAGNOSIS — J43.2 CENTRILOBULAR EMPHYSEMA (HCC): Primary | ICD-10-CM

## 2022-11-08 DIAGNOSIS — R91.1 LUNG NODULE: ICD-10-CM

## 2022-11-08 DIAGNOSIS — R06.09 DOE (DYSPNEA ON EXERTION): ICD-10-CM

## 2022-11-08 PROCEDURE — 3008F BODY MASS INDEX DOCD: CPT | Performed by: INTERNAL MEDICINE

## 2022-11-08 PROCEDURE — 3078F DIAST BP <80 MM HG: CPT | Performed by: INTERNAL MEDICINE

## 2022-11-08 PROCEDURE — 3074F SYST BP LT 130 MM HG: CPT | Performed by: INTERNAL MEDICINE

## 2022-11-08 PROCEDURE — 99214 OFFICE O/P EST MOD 30 MIN: CPT | Performed by: INTERNAL MEDICINE

## 2022-11-08 RX ORDER — PREDNISONE 1 MG/1
TABLET ORAL
Qty: 70 TABLET | Refills: 0 | Status: SHIPPED | OUTPATIENT
Start: 2022-11-08 | End: 2022-11-09

## 2022-11-08 RX ORDER — ARFORMOTEROL TARTRATE 15 UG/2ML
15 SOLUTION RESPIRATORY (INHALATION) 2 TIMES DAILY
Qty: 120 ML | Refills: 11 | Status: SHIPPED | OUTPATIENT
Start: 2022-11-08

## 2022-11-08 RX ORDER — BUDESONIDE 0.25 MG/2ML
0.25 INHALANT ORAL 2 TIMES DAILY
Qty: 120 ML | Refills: 11 | Status: SHIPPED | OUTPATIENT
Start: 2022-11-08

## 2022-11-08 NOTE — PATIENT INSTRUCTIONS
We reviewed the inhalers and nebulizers. We should change to scheduled nebulizers: yupelri nebulized once a day, budesonide and arformoterol are twice a day.  Once you start the nebulizers, then stop the trelegy  You can continue to use either albuterol inhaler or duoneb nebulizer every 6 hours as needed for your breathing  Start the prednisone regimen- start 20mg once a day for 7 days then 15mg once a day for 7 days, then 10mg once a day for 7 days then 5mg once a day for 7 days then stop  Let me know if your breathing gets worse  I will contact you after I speak with Dr. Delvin Talbot  We should touch base next month about your breathing - feel free to call or send a message in [place]  If you're doing well, then we will plan on having you come back in March 2023 with testing

## 2022-11-09 ENCOUNTER — CARDPULM VISIT (OUTPATIENT)
Dept: CARDIAC REHAB | Facility: HOSPITAL | Age: 72
End: 2022-11-09
Attending: INTERNAL MEDICINE
Payer: MEDICARE

## 2022-11-09 PROCEDURE — 93798 PHYS/QHP OP CAR RHAB W/ECG: CPT

## 2022-11-09 RX ORDER — PREDNISONE 1 MG/1
TABLET ORAL
Qty: 70 TABLET | Refills: 0 | Status: SHIPPED | OUTPATIENT
Start: 2022-11-09 | End: 2022-12-07

## 2022-11-10 RX ORDER — ARFORMOTEROL TARTRATE 15 UG/2ML
15 SOLUTION RESPIRATORY (INHALATION) 2 TIMES DAILY
Qty: 120 ML | Refills: 5 | Status: SHIPPED | OUTPATIENT
Start: 2022-11-10 | End: 2023-05-09

## 2022-11-10 RX ORDER — REVEFENACIN 175 UG/3ML
175 SOLUTION RESPIRATORY (INHALATION) DAILY
Qty: 90 ML | Refills: 5 | Status: SHIPPED | OUTPATIENT
Start: 2022-11-10 | End: 2023-05-09

## 2022-11-10 RX ORDER — BUDESONIDE 0.5 MG/2ML
INHALANT ORAL
Qty: 120 ML | Refills: 5 | Status: SHIPPED | OUTPATIENT
Start: 2022-11-10

## 2022-11-10 NOTE — TELEPHONE ENCOUNTER
Call to pharmacy, they will fax p.a. forms needed. Call to pt, notified of p.a.'s needed and turn around time may take a while. Pt verbalized understanding.

## 2022-11-10 NOTE — TELEPHONE ENCOUNTER
Pharmacy needs additional information to give patient Yupelri, Budesonide & Arformoterol to pass [facility] guidelines.

## 2022-11-11 ENCOUNTER — APPOINTMENT (OUTPATIENT)
Dept: CARDIAC REHAB | Facility: HOSPITAL | Age: 72
End: 2022-11-11
Payer: MEDICARE

## 2022-11-11 NOTE — TELEPHONE ENCOUNTER
Call to pt, notified of meds being canceled at [facility] and being sent through [facility]. Pt asked to call us in a few days if not contacted by Abran Hassan. Pt verbalized understanding.

## 2022-11-15 ENCOUNTER — TELEPHONE (OUTPATIENT)
Facility: CLINIC | Age: 72
End: 2022-11-15

## 2022-11-15 NOTE — TELEPHONE ENCOUNTER
Wife stopped at office to discuss pt's new  medications. They have not received [place] and Minta Pickerel. Spoke with George Loomis at [facility] and the order is being processed and as long as pt has nebulizer, meds will be shipped. Wife notified and advised to have pt continue current regimen until they receive the nebulizer meds. They received Budesonide from local pharmacy.

## 2022-11-20 RX ORDER — INSULIN LISPRO 100 [IU]/ML
INJECTION, SOLUTION INTRAVENOUS; SUBCUTANEOUS
Qty: 30 ML | Refills: 3 | Status: SHIPPED | OUTPATIENT
Start: 2022-11-20

## 2022-11-29 RX ORDER — ONDANSETRON 4 MG/1
TABLET, FILM COATED ORAL
Qty: 90 TABLET | Refills: 0 | Status: SHIPPED | OUTPATIENT
Start: 2022-11-29

## 2022-11-30 RX ORDER — PANCRELIPASE 36000; 180000; 114000 [USP'U]/1; [USP'U]/1; [USP'U]/1
CAPSULE, DELAYED RELEASE PELLETS ORAL
Qty: 180 CAPSULE | Refills: 0 | Status: SHIPPED | OUTPATIENT
Start: 2022-11-30

## 2022-12-06 NOTE — PROGRESS NOTES
Alert & oriented x4. With tolerable pain. Passing flatus. Ambulated in hallway with walker and standby assist earlier. Use of I.S. encouraged. Plan of care discussed with patient. Will monitor. 4 = No assist / stand by assistance

## 2022-12-13 ENCOUNTER — APPOINTMENT (OUTPATIENT)
Dept: GENERAL RADIOLOGY | Facility: HOSPITAL | Age: 72
End: 2022-12-13
Attending: EMERGENCY MEDICINE
Payer: MEDICARE

## 2022-12-13 ENCOUNTER — HOSPITAL ENCOUNTER (OUTPATIENT)
Age: 72
Discharge: ANOTHER HOSPITAL | End: 2022-12-13
Attending: EMERGENCY MEDICINE
Payer: MEDICARE

## 2022-12-13 ENCOUNTER — HOSPITAL ENCOUNTER (EMERGENCY)
Facility: HOSPITAL | Age: 72
Discharge: HOME | End: 2022-12-13
Attending: EMERGENCY MEDICINE
Payer: MEDICARE

## 2022-12-13 VITALS
HEART RATE: 113 BPM | TEMPERATURE: 103 F | OXYGEN SATURATION: 97 % | DIASTOLIC BLOOD PRESSURE: 92 MMHG | RESPIRATION RATE: 19 BRPM | SYSTOLIC BLOOD PRESSURE: 164 MMHG

## 2022-12-13 VITALS
HEART RATE: 100 BPM | TEMPERATURE: 99 F | OXYGEN SATURATION: 94 % | DIASTOLIC BLOOD PRESSURE: 76 MMHG | RESPIRATION RATE: 18 BRPM | SYSTOLIC BLOOD PRESSURE: 146 MMHG

## 2022-12-13 DIAGNOSIS — U07.1 COVID-19 VIRUS INFECTION: Primary | ICD-10-CM

## 2022-12-13 DIAGNOSIS — J44.1 COPD EXACERBATION (HCC): Primary | ICD-10-CM

## 2022-12-13 DIAGNOSIS — U07.1 COVID-19: ICD-10-CM

## 2022-12-13 DIAGNOSIS — J44.1 COPD EXACERBATION (HCC): ICD-10-CM

## 2022-12-13 DIAGNOSIS — Z01.818 PRE-OPERATIVE CLEARANCE: ICD-10-CM

## 2022-12-13 LAB
POCT INFLUENZA A: NEGATIVE
POCT INFLUENZA B: NEGATIVE
SARS-COV-2 RNA RESP QL NAA+PROBE: DETECTED

## 2022-12-13 PROCEDURE — 99214 OFFICE O/P EST MOD 30 MIN: CPT

## 2022-12-13 PROCEDURE — 99283 EMERGENCY DEPT VISIT LOW MDM: CPT | Performed by: EMERGENCY MEDICINE

## 2022-12-13 PROCEDURE — 94640 AIRWAY INHALATION TREATMENT: CPT

## 2022-12-13 PROCEDURE — 96374 THER/PROPH/DIAG INJ IV PUSH: CPT

## 2022-12-13 PROCEDURE — 87502 INFLUENZA DNA AMP PROBE: CPT | Performed by: EMERGENCY MEDICINE

## 2022-12-13 PROCEDURE — 71045 X-RAY EXAM CHEST 1 VIEW: CPT | Performed by: EMERGENCY MEDICINE

## 2022-12-13 PROCEDURE — 93005 ELECTROCARDIOGRAM TRACING: CPT

## 2022-12-13 RX ORDER — METHYLPREDNISOLONE SODIUM SUCCINATE 125 MG/2ML
125 INJECTION, POWDER, LYOPHILIZED, FOR SOLUTION INTRAMUSCULAR; INTRAVENOUS ONCE
Status: COMPLETED | OUTPATIENT
Start: 2022-12-13 | End: 2022-12-13

## 2022-12-13 RX ORDER — ACETAMINOPHEN 500 MG
1000 TABLET ORAL ONCE
Status: COMPLETED | OUTPATIENT
Start: 2022-12-13 | End: 2022-12-13

## 2022-12-13 RX ORDER — ALBUTEROL SULFATE 90 UG/1
8 AEROSOL, METERED RESPIRATORY (INHALATION) ONCE
Status: COMPLETED | OUTPATIENT
Start: 2022-12-13 | End: 2022-12-13

## 2022-12-13 RX ORDER — PREDNISONE 20 MG/1
40 TABLET ORAL DAILY
Qty: 10 TABLET | Refills: 0 | Status: SHIPPED | OUTPATIENT
Start: 2022-12-13 | End: 2022-12-21

## 2022-12-14 LAB
ATRIAL RATE: 109 BPM
P AXIS: 72 DEGREES
P-R INTERVAL: 142 MS
Q-T INTERVAL: 312 MS
QRS DURATION: 82 MS
QTC CALCULATION (BEZET): 420 MS
R AXIS: 74 DEGREES
T AXIS: 59 DEGREES
VENTRICULAR RATE: 109 BPM

## 2022-12-14 NOTE — ED INITIAL ASSESSMENT (HPI)
Patient presents to IC with c/o sob x one week getting worse.+covid at home.h/o copd and extensive medical history

## 2022-12-14 NOTE — ED INITIAL ASSESSMENT (HPI)
Pt sent from IC via EMS d/t SOB. Pt states breathing now better s/o medications given by IC. Pt did test positive for COVID at home yesterday, and today at [address] [place] [address]. Hypoxic at IC, however, 97% on RA here. Hx of COPD.

## 2022-12-15 ENCOUNTER — TELEPHONE (OUTPATIENT)
Facility: CLINIC | Age: 72
End: 2022-12-15

## 2022-12-15 ENCOUNTER — TELEPHONE (OUTPATIENT)
Dept: INTERNAL MEDICINE CLINIC | Facility: CLINIC | Age: 72
End: 2022-12-15

## 2022-12-15 DIAGNOSIS — R05.1 ACUTE COUGH: Primary | ICD-10-CM

## 2022-12-15 NOTE — TELEPHONE ENCOUNTER
Patient was seen at the [facility] ER for a drop in O2, treated with steroids, tested positive for covid.   Coughing up a lot of green phlegm,  Looking for advice as to what he should do

## 2022-12-15 NOTE — TELEPHONE ENCOUNTER
Pt Covid + 12/13, ED visit 12/13  Spouse would like a call back due to his sxs. She states he is coughing up green phlegm. Would like to know if lung specialist should be contacted.      Please advise

## 2022-12-15 NOTE — TELEPHONE ENCOUNTER
Call to pt, notified of order entered. Advised pt to call central scheduling before heading to lab to make sure they have sterile cups and instructions. Pt verbalized understanding.

## 2022-12-15 NOTE — TELEPHONE ENCOUNTER
[facility] office scheduled pt for TV at 11:30am today with VM. Called pt's wife Nancy Clemens to inform her of appts scheduled for both the pt and herself. Wife stated she got in touch with pt's pulmonary doctor, Dr. Joaquim Aguilar, and they are taking care of pt's needs. Nancy Clemens stated ok to cancel today's appt with VM. Notified front office again, TV for today at 11:30AM with VM has been cancelled.

## 2022-12-15 NOTE — TELEPHONE ENCOUNTER
Call to pt. States he is feeling better since the 13th. States his oxygen saturation has been running 92-91%, his breathing is \"comfortable\". Pt not prescribed Paxlovid due to interactions with his other meds. Pt states his only concern is a new symptom of green phlegm with his cough. States he noticed this yesterday. Pt would like to know if he needs an antibiotic.

## 2022-12-20 ENCOUNTER — HOSPITAL ENCOUNTER (OUTPATIENT)
Facility: HOSPITAL | Age: 72
Setting detail: OBSERVATION
Discharge: HOME | End: 2022-12-21
Attending: EMERGENCY MEDICINE | Admitting: HOSPITALIST
Payer: MEDICARE

## 2022-12-20 ENCOUNTER — HOSPITAL ENCOUNTER (INPATIENT)
Facility: HOSPITAL | Age: 72
LOS: 1 days | Discharge: HOME | End: 2022-12-21
Attending: EMERGENCY MEDICINE | Admitting: HOSPITALIST
Payer: MEDICARE

## 2022-12-20 ENCOUNTER — APPOINTMENT (OUTPATIENT)
Dept: GENERAL RADIOLOGY | Facility: HOSPITAL | Age: 72
End: 2022-12-20
Attending: EMERGENCY MEDICINE
Payer: MEDICARE

## 2022-12-20 DIAGNOSIS — J44.1 COPD EXACERBATION (HCC): Primary | ICD-10-CM

## 2022-12-20 DIAGNOSIS — R09.02 HYPOXIA: ICD-10-CM

## 2022-12-20 DIAGNOSIS — U07.1 COVID-19 VIRUS INFECTION: ICD-10-CM

## 2022-12-20 DIAGNOSIS — D72.829 LEUKOCYTOSIS, UNSPECIFIED TYPE: ICD-10-CM

## 2022-12-20 LAB
ALBUMIN SERPL-MCNC: 3.2 G/DL (ref 3.4–5)
ALBUMIN/GLOB SERPL: 0.8 {RATIO} (ref 1–2)
ALP LIVER SERPL-CCNC: 88 U/L
ALT SERPL-CCNC: 24 U/L
ANION GAP SERPL CALC-SCNC: 2 MMOL/L (ref 0–18)
AST SERPL-CCNC: 22 U/L (ref 15–37)
ATRIAL RATE: 93 BPM
BASOPHILS # BLD AUTO: 0.02 X10(3) UL (ref 0–0.2)
BASOPHILS NFR BLD AUTO: 0.1 %
BILIRUB SERPL-MCNC: 0.5 MG/DL (ref 0.1–2)
BUN BLD-MCNC: 17 MG/DL (ref 7–18)
CALCIUM BLD-MCNC: 8.9 MG/DL (ref 8.5–10.1)
CHLORIDE SERPL-SCNC: 108 MMOL/L (ref 98–112)
CK SERPL-CCNC: 70 U/L
CO2 SERPL-SCNC: 27 MMOL/L (ref 21–32)
CREAT BLD-MCNC: 1.35 MG/DL
D DIMER PPP FEU-MCNC: 0.35 UG/ML FEU (ref ?–0.73)
EOSINOPHIL # BLD AUTO: 0.12 X10(3) UL (ref 0–0.7)
EOSINOPHIL NFR BLD AUTO: 0.6 %
ERYTHROCYTE [DISTWIDTH] IN BLOOD BY AUTOMATED COUNT: 19.7 %
EST. AVERAGE GLUCOSE BLD GHB EST-MCNC: 186 MG/DL (ref 68–126)
FLUAV + FLUBV RNA SPEC NAA+PROBE: NEGATIVE
FLUAV + FLUBV RNA SPEC NAA+PROBE: NEGATIVE
GFR SERPLBLD BASED ON 1.73 SQ M-ARVRAT: 55 ML/MIN/1.73M2 (ref 60–?)
GLOBULIN PLAS-MCNC: 4 G/DL (ref 2.8–4.4)
GLUCOSE BLD-MCNC: 174 MG/DL (ref 70–99)
GLUCOSE BLD-MCNC: 226 MG/DL (ref 70–99)
GLUCOSE BLD-MCNC: 437 MG/DL (ref 70–99)
GLUCOSE BLD-MCNC: 50 MG/DL (ref 70–99)
HBA1C MFR BLD: 8.1 % (ref ?–5.7)
HCT VFR BLD AUTO: 34.3 %
HGB BLD-MCNC: 11.5 G/DL
IMM GRANULOCYTES # BLD AUTO: 0.12 X10(3) UL (ref 0–1)
IMM GRANULOCYTES NFR BLD: 0.6 %
LYMPHOCYTES # BLD AUTO: 2.38 X10(3) UL (ref 1–4)
LYMPHOCYTES NFR BLD AUTO: 12 %
MCH RBC QN AUTO: 26.4 PG (ref 26–34)
MCHC RBC AUTO-ENTMCNC: 33.5 G/DL (ref 31–37)
MCV RBC AUTO: 78.7 FL
MONOCYTES # BLD AUTO: 2.09 X10(3) UL (ref 0.1–1)
MONOCYTES NFR BLD AUTO: 10.5 %
NEUTROPHILS # BLD AUTO: 15.15 X10 (3) UL (ref 1.5–7.7)
NEUTROPHILS # BLD AUTO: 15.15 X10(3) UL (ref 1.5–7.7)
NEUTROPHILS NFR BLD AUTO: 76.2 %
NT-PROBNP SERPL-MCNC: 309 PG/ML (ref ?–125)
OSMOLALITY SERPL CALC.SUM OF ELEC: 283 MOSM/KG (ref 275–295)
P AXIS: 63 DEGREES
P-R INTERVAL: 132 MS
PLATELET # BLD AUTO: 420 10(3)UL (ref 150–450)
POTASSIUM SERPL-SCNC: 4.1 MMOL/L (ref 3.5–5.1)
PROCALCITONIN SERPL-MCNC: 0.5 NG/ML (ref ?–0.16)
PROT SERPL-MCNC: 7.2 G/DL (ref 6.4–8.2)
Q-T INTERVAL: 362 MS
QRS DURATION: 90 MS
QTC CALCULATION (BEZET): 450 MS
R AXIS: 70 DEGREES
RBC # BLD AUTO: 4.36 X10(6)UL
RSV RNA SPEC NAA+PROBE: NEGATIVE
SARS-COV-2 RNA RESP QL NAA+PROBE: DETECTED
SODIUM SERPL-SCNC: 137 MMOL/L (ref 136–145)
T AXIS: 54 DEGREES
TROPONIN I HIGH SENSITIVITY: 13 NG/L
VENTRICULAR RATE: 93 BPM
WBC # BLD AUTO: 19.9 X10(3) UL (ref 4–11)

## 2022-12-20 PROCEDURE — 99223 1ST HOSP IP/OBS HIGH 75: CPT | Performed by: INTERNAL MEDICINE

## 2022-12-20 PROCEDURE — 3052F HG A1C>EQUAL 8.0%<EQUAL 9.0%: CPT | Performed by: INTERNAL MEDICINE

## 2022-12-20 PROCEDURE — 71045 X-RAY EXAM CHEST 1 VIEW: CPT | Performed by: EMERGENCY MEDICINE

## 2022-12-20 RX ORDER — HYDROMORPHONE HYDROCHLORIDE 1 MG/ML
0.1 INJECTION, SOLUTION INTRAMUSCULAR; INTRAVENOUS; SUBCUTANEOUS EVERY 4 HOURS PRN
Status: DISCONTINUED | OUTPATIENT
Start: 2022-12-20 | End: 2022-12-21

## 2022-12-20 RX ORDER — METOPROLOL SUCCINATE 50 MG/1
50 TABLET, EXTENDED RELEASE ORAL DAILY
Status: DISCONTINUED | OUTPATIENT
Start: 2022-12-20 | End: 2022-12-21

## 2022-12-20 RX ORDER — ONDANSETRON 2 MG/ML
4 INJECTION INTRAMUSCULAR; INTRAVENOUS EVERY 6 HOURS PRN
Status: DISCONTINUED | OUTPATIENT
Start: 2022-12-20 | End: 2022-12-21

## 2022-12-20 RX ORDER — HYDROMORPHONE HYDROCHLORIDE 2 MG/1
8 TABLET ORAL EVERY 8 HOURS PRN
Status: DISCONTINUED | OUTPATIENT
Start: 2022-12-20 | End: 2022-12-21

## 2022-12-20 RX ORDER — FENTANYL 25 UG/H
1 PATCH TRANSDERMAL
Status: DISCONTINUED | OUTPATIENT
Start: 2022-12-21 | End: 2022-12-21

## 2022-12-20 RX ORDER — ACETAMINOPHEN 500 MG
500 TABLET ORAL EVERY 4 HOURS PRN
Status: DISCONTINUED | OUTPATIENT
Start: 2022-12-20 | End: 2022-12-21

## 2022-12-20 RX ORDER — ATORVASTATIN CALCIUM 40 MG/1
40 TABLET, FILM COATED ORAL NIGHTLY
Status: DISCONTINUED | OUTPATIENT
Start: 2022-12-20 | End: 2022-12-21

## 2022-12-20 RX ORDER — FENTANYL 50 UG/H
1 PATCH TRANSDERMAL
Status: DISCONTINUED | OUTPATIENT
Start: 2022-12-21 | End: 2022-12-21

## 2022-12-20 RX ORDER — AZITHROMYCIN 250 MG/1
500 TABLET, FILM COATED ORAL ONCE
Status: COMPLETED | OUTPATIENT
Start: 2022-12-20 | End: 2022-12-20

## 2022-12-20 RX ORDER — POLYETHYLENE GLYCOL 3350 17 G/17G
17 POWDER, FOR SOLUTION ORAL 2 TIMES DAILY
Status: DISCONTINUED | OUTPATIENT
Start: 2022-12-20 | End: 2022-12-21

## 2022-12-20 RX ORDER — PANTOPRAZOLE SODIUM 20 MG/1
20 TABLET, DELAYED RELEASE ORAL
Status: DISCONTINUED | OUTPATIENT
Start: 2022-12-21 | End: 2022-12-21

## 2022-12-20 RX ORDER — ALBUTEROL SULFATE 90 UG/1
4 AEROSOL, METERED RESPIRATORY (INHALATION) EVERY 4 HOURS PRN
Status: DISCONTINUED | OUTPATIENT
Start: 2022-12-20 | End: 2022-12-21

## 2022-12-20 RX ORDER — GUAIFENESIN 600 MG/1
600 TABLET, EXTENDED RELEASE ORAL 2 TIMES DAILY
Status: DISCONTINUED | OUTPATIENT
Start: 2022-12-20 | End: 2022-12-21

## 2022-12-20 RX ORDER — ENOXAPARIN SODIUM 100 MG/ML
40 INJECTION SUBCUTANEOUS DAILY
Status: DISCONTINUED | OUTPATIENT
Start: 2022-12-20 | End: 2022-12-21

## 2022-12-20 RX ORDER — AZITHROMYCIN 250 MG/1
500 TABLET, FILM COATED ORAL
Status: DISCONTINUED | OUTPATIENT
Start: 2022-12-21 | End: 2022-12-21

## 2022-12-20 RX ORDER — BENZONATATE 200 MG/1
200 CAPSULE ORAL 3 TIMES DAILY PRN
Status: DISCONTINUED | OUTPATIENT
Start: 2022-12-20 | End: 2022-12-21

## 2022-12-20 RX ORDER — IPRATROPIUM BROMIDE AND ALBUTEROL SULFATE 2.5; .5 MG/3ML; MG/3ML
3 SOLUTION RESPIRATORY (INHALATION) ONCE
Status: COMPLETED | OUTPATIENT
Start: 2022-12-20 | End: 2022-12-20

## 2022-12-20 RX ORDER — DEXTROSE MONOHYDRATE 25 G/50ML
50 INJECTION, SOLUTION INTRAVENOUS ONCE
Status: COMPLETED | OUTPATIENT
Start: 2022-12-20 | End: 2022-12-20

## 2022-12-20 RX ORDER — FLUTICASONE FUROATE AND VILANTEROL 200; 25 UG/1; UG/1
1 POWDER RESPIRATORY (INHALATION) DAILY
Status: DISCONTINUED | OUTPATIENT
Start: 2022-12-20 | End: 2022-12-21

## 2022-12-20 RX ORDER — DEXAMETHASONE SODIUM PHOSPHATE 4 MG/ML
6 VIAL (ML) INJECTION DAILY
Status: DISCONTINUED | OUTPATIENT
Start: 2022-12-20 | End: 2022-12-20

## 2022-12-20 RX ORDER — LORAZEPAM 1 MG/1
1 TABLET ORAL NIGHTLY PRN
Status: DISCONTINUED | OUTPATIENT
Start: 2022-12-20 | End: 2022-12-21

## 2022-12-20 RX ORDER — NICOTINE POLACRILEX 4 MG
30 LOZENGE BUCCAL
Status: DISCONTINUED | OUTPATIENT
Start: 2022-12-20 | End: 2022-12-21

## 2022-12-20 RX ORDER — METHYLPREDNISOLONE SODIUM SUCCINATE 125 MG/2ML
125 INJECTION, POWDER, LYOPHILIZED, FOR SOLUTION INTRAMUSCULAR; INTRAVENOUS ONCE
Status: COMPLETED | OUTPATIENT
Start: 2022-12-20 | End: 2022-12-20

## 2022-12-20 RX ORDER — FOLIC ACID 1 MG/1
1 TABLET ORAL DAILY
Status: DISCONTINUED | OUTPATIENT
Start: 2022-12-20 | End: 2022-12-21

## 2022-12-20 RX ORDER — ASPIRIN 81 MG/1
81 TABLET ORAL DAILY
Status: DISCONTINUED | OUTPATIENT
Start: 2022-12-20 | End: 2022-12-21

## 2022-12-20 RX ORDER — MELATONIN
3 NIGHTLY PRN
Status: DISCONTINUED | OUTPATIENT
Start: 2022-12-20 | End: 2022-12-21

## 2022-12-20 RX ORDER — NICOTINE POLACRILEX 4 MG
15 LOZENGE BUCCAL
Status: DISCONTINUED | OUTPATIENT
Start: 2022-12-20 | End: 2022-12-21

## 2022-12-20 RX ORDER — DEXTROSE MONOHYDRATE 25 G/50ML
50 INJECTION, SOLUTION INTRAVENOUS
Status: DISCONTINUED | OUTPATIENT
Start: 2022-12-20 | End: 2022-12-21

## 2022-12-20 RX ORDER — DEXTROSE MONOHYDRATE 25 G/50ML
INJECTION, SOLUTION INTRAVENOUS
Status: COMPLETED
Start: 2022-12-20 | End: 2022-12-20

## 2022-12-20 NOTE — PROGRESS NOTES
NURSING ADMISSION NOTE      Patient admitted via Cart  Oriented to room. Safety precautions initiated. Bed in low position. Call light in reach. Assumed care at [phone], pt alert and oriented x4. Currently on RA. Pt does have home O2. ID and pulm consulted for COVID and COPD exacerbation. IV abx. Qid accucheck, carb-controlled diet. C/o pain, see MAR. Saline-locked. Need sputum sample. Pt updated on poc. Admission navigator completed. Call light in reach. Safety precautions in place. All needs are met at this time.

## 2022-12-20 NOTE — ED QUICK NOTES
Orders for admission, patient is aware of plan and ready to go upstairs. Any questions, please call ED RN Amandeep Blackwell at extension [phone].      Patient Covid vaccination status: Fully vaccinated and immunocompromised     COVID Test Ordered in ED: SARS-CoV-2/Flu A and B/RSV by PCR (GeneXpert)         COVID Suspicion at Admission: N/A    Running Infusions:      Mental Status/LOC at time of transport: a/ox4    Other pertinent information:   CIWA score: N/A   NIH score:  N/A      tested positive for covid 7 days ago  2L o2.  IV abx given in ER- see MAR  Up ad black

## 2022-12-20 NOTE — ED INITIAL ASSESSMENT (HPI)
Pt reports low oxygen for most of the night, on 2 L at home but wouldn't go over 90%. Fever of 101. COVID + seven days ago. Pt denies trouble breathing at this moment, denies CP.

## 2022-12-21 VITALS
HEART RATE: 75 BPM | WEIGHT: 142 LBS | RESPIRATION RATE: 18 BRPM | DIASTOLIC BLOOD PRESSURE: 66 MMHG | TEMPERATURE: 99 F | HEIGHT: 71 IN | OXYGEN SATURATION: 92 % | SYSTOLIC BLOOD PRESSURE: 126 MMHG | BODY MASS INDEX: 19.88 KG/M2

## 2022-12-21 LAB
ALBUMIN SERPL-MCNC: 2.7 G/DL (ref 3.4–5)
ALBUMIN/GLOB SERPL: 0.8 {RATIO} (ref 1–2)
ALP LIVER SERPL-CCNC: 76 U/L
ALT SERPL-CCNC: 20 U/L
ANION GAP SERPL CALC-SCNC: 6 MMOL/L (ref 0–18)
AST SERPL-CCNC: 21 U/L (ref 15–37)
BASOPHILS # BLD AUTO: 0.02 X10(3) UL (ref 0–0.2)
BASOPHILS NFR BLD AUTO: 0.1 %
BILIRUB SERPL-MCNC: 0.4 MG/DL (ref 0.1–2)
BILIRUB UR QL STRIP.AUTO: NEGATIVE
BUN BLD-MCNC: 26 MG/DL (ref 7–18)
CALCIUM BLD-MCNC: 9 MG/DL (ref 8.5–10.1)
CHLORIDE SERPL-SCNC: 106 MMOL/L (ref 98–112)
CLARITY UR REFRACT.AUTO: CLEAR
CO2 SERPL-SCNC: 24 MMOL/L (ref 21–32)
COLOR UR AUTO: YELLOW
CREAT BLD-MCNC: 1.25 MG/DL
CRP SERPL-MCNC: 9.56 MG/DL (ref ?–0.3)
D DIMER PPP FEU-MCNC: <0.27 UG/ML FEU (ref ?–0.73)
DEPRECATED HBV CORE AB SER IA-ACNC: 84.3 NG/ML
EOSINOPHIL # BLD AUTO: 0 X10(3) UL (ref 0–0.7)
EOSINOPHIL NFR BLD AUTO: 0 %
ERYTHROCYTE [DISTWIDTH] IN BLOOD BY AUTOMATED COUNT: 19.2 %
GFR SERPLBLD BASED ON 1.73 SQ M-ARVRAT: 61 ML/MIN/1.73M2 (ref 60–?)
GLOBULIN PLAS-MCNC: 3.5 G/DL (ref 2.8–4.4)
GLUCOSE BLD-MCNC: 174 MG/DL (ref 70–99)
GLUCOSE BLD-MCNC: 281 MG/DL (ref 70–99)
GLUCOSE BLD-MCNC: 306 MG/DL (ref 70–99)
GLUCOSE BLD-MCNC: 402 MG/DL (ref 70–99)
GLUCOSE UR STRIP.AUTO-MCNC: NEGATIVE MG/DL
HCT VFR BLD AUTO: 31.5 %
HGB BLD-MCNC: 11.2 G/DL
IMM GRANULOCYTES # BLD AUTO: 0.15 X10(3) UL (ref 0–1)
IMM GRANULOCYTES NFR BLD: 0.7 %
KETONES UR STRIP.AUTO-MCNC: NEGATIVE MG/DL
LDH SERPL L TO P-CCNC: 204 U/L
LEUKOCYTE ESTERASE UR QL STRIP.AUTO: NEGATIVE
LYMPHOCYTES # BLD AUTO: 2.21 X10(3) UL (ref 1–4)
LYMPHOCYTES NFR BLD AUTO: 11 %
MCH RBC QN AUTO: 27.9 PG (ref 26–34)
MCHC RBC AUTO-ENTMCNC: 35.6 G/DL (ref 31–37)
MCV RBC AUTO: 78.6 FL
MONOCYTES # BLD AUTO: 1.7 X10(3) UL (ref 0.1–1)
MONOCYTES NFR BLD AUTO: 8.4 %
NEUTROPHILS # BLD AUTO: 16.1 X10 (3) UL (ref 1.5–7.7)
NEUTROPHILS # BLD AUTO: 16.1 X10(3) UL (ref 1.5–7.7)
NEUTROPHILS NFR BLD AUTO: 79.8 %
NITRITE UR QL STRIP.AUTO: NEGATIVE
OSMOLALITY SERPL CALC.SUM OF ELEC: 297 MOSM/KG (ref 275–295)
PH UR STRIP.AUTO: 6.5 [PH] (ref 5–8)
PLATELET # BLD AUTO: 375 10(3)UL (ref 150–450)
POTASSIUM SERPL-SCNC: 4.9 MMOL/L (ref 3.5–5.1)
PROT SERPL-MCNC: 6.2 G/DL (ref 6.4–8.2)
RBC # BLD AUTO: 4.01 X10(6)UL
RBC #/AREA URNS AUTO: >10 /HPF
RBC #/AREA URNS AUTO: >10 /HPF
SODIUM SERPL-SCNC: 136 MMOL/L (ref 136–145)
SP GR UR STRIP.AUTO: 1.02 (ref 1–1.03)
UROBILINOGEN UR STRIP.AUTO-MCNC: 0.2 MG/DL
WBC # BLD AUTO: 20.2 X10(3) UL (ref 4–11)

## 2022-12-21 PROCEDURE — 99239 HOSP IP/OBS DSCHRG MGMT >30: CPT | Performed by: HOSPITALIST

## 2022-12-21 RX ORDER — PREDNISONE 20 MG/1
40 TABLET ORAL
Status: DISCONTINUED | OUTPATIENT
Start: 2022-12-21 | End: 2022-12-21

## 2022-12-21 RX ORDER — PREDNISONE 20 MG/1
40 TABLET ORAL DAILY
Qty: 10 TABLET | Refills: 0 | Status: SHIPPED | OUTPATIENT
Start: 2022-12-21 | End: 2022-12-26

## 2022-12-21 RX ORDER — AZITHROMYCIN 250 MG/1
500 TABLET, FILM COATED ORAL
Status: DISCONTINUED | OUTPATIENT
Start: 2022-12-21 | End: 2022-12-21

## 2022-12-21 RX ORDER — AZITHROMYCIN 250 MG/1
500 TABLET, FILM COATED ORAL DAILY
Qty: 4 TABLET | Refills: 0 | Status: SHIPPED | OUTPATIENT
Start: 2022-12-21 | End: 2022-12-23

## 2022-12-21 NOTE — PLAN OF CARE
Problem: Copd exacerbation  Data: Patient Ax04. Telemetry- sinus rhythm. , on room air, overnight. 02 sats 92-93%. Afebrile. Verbalized abdominal pain. Hx. Pancreatitis. Left upper arm fentanyl patch. IV and PO dilaudid prn. CGM on left shoulder. Accu checks QID. 1800 ADA diet. Blood sugar elevated overnight. Received 28 units of levemir at noc. Novolog given based on MD orders- See MAR. Continent. Saline lock. Up at black. Intervention:  Levemir 28 units. Novolog, lipitor, IV dilaudid. PO dilaudid. Education: Pain medications, blood sugars, call light   Response:Cooperative with care.  Wants to discuss pain regimen with MD.     Problem: Diabetes/Glucose Control  Goal: Glucose maintained within prescribed range  Description: INTERVENTIONS:  - Monitor Blood Glucose as ordered  - Assess for signs and symptoms of hyperglycemia and hypoglycemia  - Administer ordered medications to maintain glucose within target range  - Assess barriers to adequate nutritional intake and initiate nutrition consult as needed  - Instruct patient on self management of diabetes  Outcome: Progressing     Problem: Patient/Family Goals  Goal: Patient/Family Long Term Goal  Description: Patient's Long Term Goal:   Discharge to home     Interventions:  - Follow plan of care    - See additional Care Plan goals for specific interventions  Outcome: Progressing  Goal: Patient/Family Short Term Goal  Description: Patient's Short Term Goal:   12/20-Improve Blood sugars      Interventions:   - Medications  -consults  - Vitals   - See additional Care Plan goals for specific interventions  Outcome: Progressing     Problem: RESPIRATORY - ADULT  Goal: Achieves optimal ventilation and oxygenation  Description: INTERVENTIONS:  - Assess for changes in respiratory status  - Assess for changes in mentation and behavior  - Position to facilitate oxygenation and minimize respiratory effort  - Oxygen supplementation based on oxygen saturation or ABGs  - Provide Smoking Cessation handout, if applicable  - Encourage broncho-pulmonary hygiene including cough, deep breathe, Incentive Spirometry  - Assess the need for suctioning and perform as needed  - Assess and instruct to report SOB or any respiratory difficulty  - Respiratory Therapy support as indicated  - Manage/alleviate anxiety  - Monitor for signs/symptoms of CO2 retention  Outcome: Progressing

## 2022-12-21 NOTE — CDS QUERY
Uncertain Diagnosis  Remigio Dubose  Dear Dr. Georgie Estrada information (provided below) indicates a documented diagnosis of pneumonia. For accurate ICD-10-CM code assignment,   PLEASE clarify the diagnosis of pneumonia     [   ] Pneumonia ruled in   [x   ] Pneumonia ruled out   [   ] Other (please specify):      Documentation from the Medical Record:   Risk: COPD, Covid-19. Home O2 use. Sickle cell trait    Clinical indicators; reported fever at home, productive cough,SOB, wheezing,   hypoxia, procal 0.50, WBC 19.9    CXR from ER-CONCLUSION:  Hyperinflation COPD. Bibasilar atelectasis and scarring. Calcified granuloma in the left lung and calcified left hilar lymph nodes. Postsurgical changes median sternotomy. Mild blunting right costophrenic angle. ER clinical impression-suspected pneumonia    H&P-COPD exacerbation due to COVID 19 virus infection with hypoxia, shortness of breath and suspected bacterial superinfection    Pulmonology consult:HPI- and assessment and plan-CXR was without focal infiltrate  12/21 Hospitalist PN- Acute COPD exacerbation# COVID 19 infection-steroids given in ER, has not received any since-no wheezing on exam, saturating well on RA  Treatment: IV antibiotics-1 dose, ID consult, pulmonology consult. For questions regarding this query, please contact Clinical :   Inez Hartman RN, BSN, [email or web address]                                                             THIS FORM IS A PERMANENT PART OF THE MEDICAL RECORD

## 2022-12-22 ENCOUNTER — PATIENT OUTREACH (OUTPATIENT)
Dept: CASE MANAGEMENT | Age: 72
End: 2022-12-22

## 2022-12-22 DIAGNOSIS — Z02.9 ENCOUNTERS FOR ADMINISTRATIVE PURPOSE: ICD-10-CM

## 2022-12-22 PROCEDURE — 1111F DSCHRG MED/CURRENT MED MERGE: CPT

## 2022-12-22 PROCEDURE — 1126F AMNT PAIN NOTED NONE PRSNT: CPT

## 2022-12-22 NOTE — PLAN OF CARE
NURSING DISCHARGE NOTE    Discharged Home via Wheelchair. Accompanied by Support staff  Belongings Taken by patient/family. Patient discharged in stable condition and with all of belongings. AVS reviewed with patient, all questions answered. Tele box taken off, IV taken out.

## 2022-12-29 ENCOUNTER — OFFICE VISIT (OUTPATIENT)
Dept: INTERNAL MEDICINE CLINIC | Facility: CLINIC | Age: 72
End: 2022-12-29
Payer: MEDICARE

## 2022-12-29 ENCOUNTER — TELEPHONE (OUTPATIENT)
Dept: INTERNAL MEDICINE CLINIC | Facility: CLINIC | Age: 72
End: 2022-12-29

## 2022-12-29 ENCOUNTER — LAB ENCOUNTER (OUTPATIENT)
Dept: LAB | Age: 72
End: 2022-12-29
Attending: INTERNAL MEDICINE
Payer: MEDICARE

## 2022-12-29 VITALS
HEIGHT: 71 IN | WEIGHT: 144.81 LBS | SYSTOLIC BLOOD PRESSURE: 110 MMHG | DIASTOLIC BLOOD PRESSURE: 60 MMHG | OXYGEN SATURATION: 93 % | HEART RATE: 102 BPM | RESPIRATION RATE: 16 BRPM | TEMPERATURE: 98 F | BODY MASS INDEX: 20.27 KG/M2

## 2022-12-29 DIAGNOSIS — J41.1 MUCOPURULENT CHRONIC BRONCHITIS (HCC): Primary | ICD-10-CM

## 2022-12-29 DIAGNOSIS — J41.1 MUCOPURULENT CHRONIC BRONCHITIS (HCC): ICD-10-CM

## 2022-12-29 PROBLEM — R09.02 HYPOXIA: Status: RESOLVED | Noted: 2022-12-20 | Resolved: 2022-12-29

## 2022-12-29 PROBLEM — D72.829 LEUKOCYTOSIS: Status: RESOLVED | Noted: 2022-06-27 | Resolved: 2022-12-29

## 2022-12-29 PROBLEM — D72.829 LEUKOCYTOSIS, UNSPECIFIED TYPE: Status: RESOLVED | Noted: 2022-12-20 | Resolved: 2022-12-29

## 2022-12-29 PROBLEM — J44.1 COPD EXACERBATION (HCC): Status: RESOLVED | Noted: 2022-01-01 | Resolved: 2022-01-01

## 2022-12-29 PROBLEM — N12 PYELONEPHRITIS: Status: RESOLVED | Noted: 2022-06-27 | Resolved: 2022-01-01

## 2022-12-29 PROBLEM — I25.10 CORONARY ARTERY DISEASE INVOLVING NATIVE HEART WITHOUT ANGINA PECTORIS: Chronic | Status: ACTIVE | Noted: 2021-10-12

## 2022-12-29 PROBLEM — N17.9 ACUTE KIDNEY INJURY (HCC): Status: RESOLVED | Noted: 2022-06-27 | Resolved: 2022-01-01

## 2022-12-29 PROBLEM — J44.1 COPD EXACERBATION (HCC): Status: RESOLVED | Noted: 2022-12-20 | Resolved: 2022-12-29

## 2022-12-29 PROBLEM — D72.829 LEUKOCYTOSIS: Status: RESOLVED | Noted: 2022-06-27 | Resolved: 2022-01-01

## 2022-12-29 PROBLEM — N23 RENAL COLIC: Status: RESOLVED | Noted: 2022-06-27 | Resolved: 2022-12-29

## 2022-12-29 PROBLEM — N12 PYELONEPHRITIS: Status: RESOLVED | Noted: 2022-06-27 | Resolved: 2022-12-29

## 2022-12-29 PROBLEM — N23 RENAL COLIC: Status: RESOLVED | Noted: 2022-06-27 | Resolved: 2022-01-01

## 2022-12-29 PROBLEM — D72.829 LEUKOCYTOSIS, UNSPECIFIED TYPE: Status: RESOLVED | Noted: 2022-01-01 | Resolved: 2022-01-01

## 2022-12-29 PROBLEM — U07.1 COVID-19 VIRUS INFECTION: Status: RESOLVED | Noted: 2022-12-20 | Resolved: 2022-12-29

## 2022-12-29 PROBLEM — U07.1 COVID-19 VIRUS INFECTION: Status: RESOLVED | Noted: 2022-01-01 | Resolved: 2022-01-01

## 2022-12-29 PROBLEM — N17.9 ACUTE KIDNEY INJURY (HCC): Status: RESOLVED | Noted: 2022-06-27 | Resolved: 2022-12-29

## 2022-12-29 PROBLEM — R09.02 HYPOXIA: Status: RESOLVED | Noted: 2022-01-01 | Resolved: 2022-01-01

## 2022-12-29 LAB
ANION GAP SERPL CALC-SCNC: 2 MMOL/L (ref 0–18)
BASOPHILS # BLD AUTO: 0.03 X10(3) UL (ref 0–0.2)
BASOPHILS NFR BLD AUTO: 0.2 %
BUN BLD-MCNC: 20 MG/DL (ref 7–18)
CALCIUM BLD-MCNC: 9.4 MG/DL (ref 8.5–10.1)
CHLORIDE SERPL-SCNC: 105 MMOL/L (ref 98–112)
CO2 SERPL-SCNC: 28 MMOL/L (ref 21–32)
CREAT BLD-MCNC: 1.4 MG/DL
EOSINOPHIL # BLD AUTO: 0.14 X10(3) UL (ref 0–0.7)
EOSINOPHIL NFR BLD AUTO: 0.8 %
ERYTHROCYTE [DISTWIDTH] IN BLOOD BY AUTOMATED COUNT: 20.5 %
FASTING STATUS PATIENT QL REPORTED: NO
GFR SERPLBLD BASED ON 1.73 SQ M-ARVRAT: 53 ML/MIN/1.73M2 (ref 60–?)
GLUCOSE BLD-MCNC: 211 MG/DL (ref 70–99)
HCT VFR BLD AUTO: 34 %
HGB BLD-MCNC: 11.1 G/DL
IMM GRANULOCYTES # BLD AUTO: 0.12 X10(3) UL (ref 0–1)
IMM GRANULOCYTES NFR BLD: 0.7 %
LYMPHOCYTES # BLD AUTO: 2.7 X10(3) UL (ref 1–4)
LYMPHOCYTES NFR BLD AUTO: 15.7 %
MCH RBC QN AUTO: 27.1 PG (ref 26–34)
MCHC RBC AUTO-ENTMCNC: 32.6 G/DL (ref 31–37)
MCV RBC AUTO: 82.9 FL
MONOCYTES # BLD AUTO: 1.29 X10(3) UL (ref 0.1–1)
MONOCYTES NFR BLD AUTO: 7.5 %
NEUTROPHILS # BLD AUTO: 12.88 X10 (3) UL (ref 1.5–7.7)
NEUTROPHILS # BLD AUTO: 12.88 X10(3) UL (ref 1.5–7.7)
NEUTROPHILS NFR BLD AUTO: 75.1 %
OSMOLALITY SERPL CALC.SUM OF ELEC: 289 MOSM/KG (ref 275–295)
PLATELET # BLD AUTO: 487 10(3)UL (ref 150–450)
POTASSIUM SERPL-SCNC: 4.9 MMOL/L (ref 3.5–5.1)
RBC # BLD AUTO: 4.1 X10(6)UL
SODIUM SERPL-SCNC: 135 MMOL/L (ref 136–145)
WBC # BLD AUTO: 17.2 X10(3) UL (ref 4–11)

## 2022-12-29 PROCEDURE — 3074F SYST BP LT 130 MM HG: CPT | Performed by: INTERNAL MEDICINE

## 2022-12-29 PROCEDURE — 3078F DIAST BP <80 MM HG: CPT | Performed by: INTERNAL MEDICINE

## 2022-12-29 PROCEDURE — 1111F DSCHRG MED/CURRENT MED MERGE: CPT | Performed by: INTERNAL MEDICINE

## 2022-12-29 PROCEDURE — 99495 TRANSJ CARE MGMT MOD F2F 14D: CPT | Performed by: INTERNAL MEDICINE

## 2022-12-29 PROCEDURE — 85025 COMPLETE CBC W/AUTO DIFF WBC: CPT

## 2022-12-29 PROCEDURE — 80048 BASIC METABOLIC PNL TOTAL CA: CPT

## 2022-12-29 PROCEDURE — 3008F BODY MASS INDEX DOCD: CPT | Performed by: INTERNAL MEDICINE

## 2022-12-29 PROCEDURE — 36415 COLL VENOUS BLD VENIPUNCTURE: CPT

## 2022-12-29 RX ORDER — LEVOFLOXACIN 500 MG/1
500 TABLET, FILM COATED ORAL DAILY
Qty: 10 TABLET | Refills: 0 | Status: SHIPPED | OUTPATIENT
Start: 2022-12-29 | End: 2023-01-08

## 2023-01-01 ENCOUNTER — APPOINTMENT (OUTPATIENT)
Dept: GENERAL RADIOLOGY | Facility: HOSPITAL | Age: 73
End: 2023-01-01
Attending: EMERGENCY MEDICINE
Payer: MEDICARE

## 2023-01-01 ENCOUNTER — HOSPITAL ENCOUNTER (INPATIENT)
Facility: HOSPITAL | Age: 73
LOS: 3 days | End: 2023-01-01
Attending: EMERGENCY MEDICINE | Admitting: INTERNAL MEDICINE
Payer: MEDICARE

## 2023-01-01 ENCOUNTER — APPOINTMENT (OUTPATIENT)
Dept: CT IMAGING | Facility: HOSPITAL | Age: 73
End: 2023-01-01
Attending: EMERGENCY MEDICINE
Payer: MEDICARE

## 2023-01-01 ENCOUNTER — APPOINTMENT (OUTPATIENT)
Dept: GENERAL RADIOLOGY | Facility: HOSPITAL | Age: 73
End: 2023-01-01
Attending: INTERNAL MEDICINE
Payer: MEDICARE

## 2023-01-01 ENCOUNTER — ANESTHESIA EVENT (OUTPATIENT)
Dept: MEDSURG UNIT | Facility: HOSPITAL | Age: 73
End: 2023-01-01
Payer: MEDICARE

## 2023-01-01 ENCOUNTER — APPOINTMENT (OUTPATIENT)
Dept: GENERAL RADIOLOGY | Facility: HOSPITAL | Age: 73
End: 2023-01-01
Payer: MEDICARE

## 2023-01-01 ENCOUNTER — ANESTHESIA (OUTPATIENT)
Dept: MEDSURG UNIT | Facility: HOSPITAL | Age: 73
End: 2023-01-01
Payer: MEDICARE

## 2023-01-01 VITALS
WEIGHT: 134.5 LBS | DIASTOLIC BLOOD PRESSURE: 70 MMHG | BODY MASS INDEX: 19 KG/M2 | TEMPERATURE: 99 F | OXYGEN SATURATION: 100 % | HEART RATE: 112 BPM | RESPIRATION RATE: 20 BRPM | SYSTOLIC BLOOD PRESSURE: 115 MMHG

## 2023-01-01 DIAGNOSIS — K56.609 BOWEL OBSTRUCTION (HCC): ICD-10-CM

## 2023-01-01 DIAGNOSIS — K56.609 SBO (SMALL BOWEL OBSTRUCTION) (HCC): Primary | ICD-10-CM

## 2023-01-01 LAB
ALBUMIN SERPL-MCNC: 2.3 G/DL (ref 3.4–5)
ALBUMIN SERPL-MCNC: 2.6 G/DL (ref 3.4–5)
ALBUMIN SERPL-MCNC: 2.7 G/DL (ref 3.4–5)
ALBUMIN SERPL-MCNC: 3.4 G/DL (ref 3.4–5)
ALBUMIN/GLOB SERPL: 0.7 {RATIO} (ref 1–2)
ALBUMIN/GLOB SERPL: 0.9 {RATIO} (ref 1–2)
ALBUMIN/GLOB SERPL: 1 {RATIO} (ref 1–2)
ALBUMIN/GLOB SERPL: 1.1 {RATIO} (ref 1–2)
ALP LIVER SERPL-CCNC: 54 U/L
ALP LIVER SERPL-CCNC: 55 U/L
ALP LIVER SERPL-CCNC: 59 U/L
ALP LIVER SERPL-CCNC: 74 U/L
ALT SERPL-CCNC: 24 U/L
ALT SERPL-CCNC: 29 U/L
ALT SERPL-CCNC: 30 U/L
ALT SERPL-CCNC: 89 U/L
ANION GAP SERPL CALC-SCNC: 12 MMOL/L (ref 0–18)
ANION GAP SERPL CALC-SCNC: 4 MMOL/L (ref 0–18)
ANION GAP SERPL CALC-SCNC: 4 MMOL/L (ref 0–18)
ANION GAP SERPL CALC-SCNC: 9 MMOL/L (ref 0–18)
ANTIBODY SCREEN: NEGATIVE
ARTERIAL PATENCY WRIST A: POSITIVE
AST SERPL-CCNC: 17 U/L (ref 15–37)
AST SERPL-CCNC: 26 U/L (ref 15–37)
AST SERPL-CCNC: 41 U/L (ref 15–37)
AST SERPL-CCNC: 96 U/L (ref 15–37)
ATRIAL RATE: 109 BPM
BASE EXCESS BLDA CALC-SCNC: -10.3 MMOL/L (ref ?–2)
BASOPHILS # BLD AUTO: 0.01 X10(3) UL (ref 0–0.2)
BASOPHILS # BLD AUTO: 0.03 X10(3) UL (ref 0–0.2)
BASOPHILS NFR BLD AUTO: 0.2 %
BASOPHILS NFR BLD AUTO: 0.2 %
BILIRUB SERPL-MCNC: 0.5 MG/DL (ref 0.1–2)
BILIRUB SERPL-MCNC: 0.5 MG/DL (ref 0.1–2)
BILIRUB SERPL-MCNC: 0.7 MG/DL (ref 0.1–2)
BILIRUB SERPL-MCNC: 0.8 MG/DL (ref 0.1–2)
BILIRUB UR QL STRIP.AUTO: NEGATIVE
BODY TEMPERATURE: 98.6 F
BUN BLD-MCNC: 16 MG/DL (ref 7–18)
BUN BLD-MCNC: 22 MG/DL (ref 7–18)
BUN BLD-MCNC: 27 MG/DL (ref 7–18)
BUN BLD-MCNC: 32 MG/DL (ref 7–18)
CA-I BLD-SCNC: 1.14 MMOL/L (ref 0.95–1.32)
CALCIUM BLD-MCNC: 7.6 MG/DL (ref 8.5–10.1)
CALCIUM BLD-MCNC: 7.7 MG/DL (ref 8.5–10.1)
CALCIUM BLD-MCNC: 7.8 MG/DL (ref 8.5–10.1)
CALCIUM BLD-MCNC: 9.2 MG/DL (ref 8.5–10.1)
CHLORIDE SERPL-SCNC: 110 MMOL/L (ref 98–112)
CHLORIDE SERPL-SCNC: 115 MMOL/L (ref 98–112)
CHLORIDE SERPL-SCNC: 116 MMOL/L (ref 98–112)
CHLORIDE SERPL-SCNC: 116 MMOL/L (ref 98–112)
CO2 SERPL-SCNC: 16 MMOL/L (ref 21–32)
CO2 SERPL-SCNC: 19 MMOL/L (ref 21–32)
CO2 SERPL-SCNC: 24 MMOL/L (ref 21–32)
CO2 SERPL-SCNC: 27 MMOL/L (ref 21–32)
COHGB MFR BLD: 0 % SAT (ref 0–3)
COLOR UR AUTO: YELLOW
CREAT BLD-MCNC: 1.15 MG/DL
CREAT BLD-MCNC: 1.39 MG/DL
CREAT BLD-MCNC: 1.6 MG/DL
CREAT BLD-MCNC: 1.79 MG/DL
EGFRCR SERPLBLD CKD-EPI 2021: 40 ML/MIN/1.73M2 (ref 60–?)
EGFRCR SERPLBLD CKD-EPI 2021: 45 ML/MIN/1.73M2 (ref 60–?)
EGFRCR SERPLBLD CKD-EPI 2021: 54 ML/MIN/1.73M2 (ref 60–?)
EGFRCR SERPLBLD CKD-EPI 2021: 67 ML/MIN/1.73M2 (ref 60–?)
EOSINOPHIL # BLD AUTO: 0 X10(3) UL (ref 0–0.7)
EOSINOPHIL # BLD AUTO: 0.2 X10(3) UL (ref 0–0.7)
EOSINOPHIL NFR BLD AUTO: 0 %
EOSINOPHIL NFR BLD AUTO: 1.5 %
ERYTHROCYTE [DISTWIDTH] IN BLOOD BY AUTOMATED COUNT: 14.6 %
ERYTHROCYTE [DISTWIDTH] IN BLOOD BY AUTOMATED COUNT: 14.9 %
ERYTHROCYTE [DISTWIDTH] IN BLOOD BY AUTOMATED COUNT: 15 %
ERYTHROCYTE [DISTWIDTH] IN BLOOD BY AUTOMATED COUNT: 15 %
EST. AVERAGE GLUCOSE BLD GHB EST-MCNC: 189 MG/DL (ref 68–126)
GLOBULIN PLAS-MCNC: 2.5 G/DL (ref 2.8–4.4)
GLOBULIN PLAS-MCNC: 2.7 G/DL (ref 2.8–4.4)
GLOBULIN PLAS-MCNC: 3.1 G/DL (ref 2.8–4.4)
GLOBULIN PLAS-MCNC: 3.8 G/DL (ref 2.8–4.4)
GLUCOSE BLD-MCNC: 100 MG/DL (ref 70–99)
GLUCOSE BLD-MCNC: 100 MG/DL (ref 70–99)
GLUCOSE BLD-MCNC: 102 MG/DL (ref 70–99)
GLUCOSE BLD-MCNC: 116 MG/DL (ref 70–99)
GLUCOSE BLD-MCNC: 118 MG/DL (ref 70–99)
GLUCOSE BLD-MCNC: 127 MG/DL (ref 70–99)
GLUCOSE BLD-MCNC: 135 MG/DL (ref 70–99)
GLUCOSE BLD-MCNC: 136 MG/DL (ref 70–99)
GLUCOSE BLD-MCNC: 155 MG/DL (ref 70–99)
GLUCOSE BLD-MCNC: 173 MG/DL (ref 70–99)
GLUCOSE BLD-MCNC: 199 MG/DL (ref 70–99)
GLUCOSE BLD-MCNC: 213 MG/DL (ref 70–99)
GLUCOSE BLD-MCNC: 216 MG/DL (ref 70–99)
GLUCOSE BLD-MCNC: 231 MG/DL (ref 70–99)
GLUCOSE BLD-MCNC: 279 MG/DL (ref 70–99)
GLUCOSE BLD-MCNC: 306 MG/DL (ref 70–99)
GLUCOSE BLD-MCNC: 307 MG/DL (ref 70–99)
GLUCOSE BLD-MCNC: 329 MG/DL (ref 70–99)
GLUCOSE BLD-MCNC: 48 MG/DL (ref 70–99)
GLUCOSE UR STRIP.AUTO-MCNC: NEGATIVE MG/DL
HBA1C MFR BLD: 8.2 % (ref ?–5.7)
HCO3 BLDA-SCNC: 16.9 MEQ/L (ref 21–27)
HCT VFR BLD AUTO: 29.2 %
HCT VFR BLD AUTO: 33.3 %
HCT VFR BLD AUTO: 34.6 %
HCT VFR BLD AUTO: 40.2 %
HGB BLD-MCNC: 10.1 G/DL
HGB BLD-MCNC: 11.1 G/DL
HGB BLD-MCNC: 11.3 G/DL
HGB BLD-MCNC: 11.5 G/DL
HGB BLD-MCNC: 14.2 G/DL
IMM GRANULOCYTES # BLD AUTO: 0.01 X10(3) UL (ref 0–1)
IMM GRANULOCYTES # BLD AUTO: 0.05 X10(3) UL (ref 0–1)
IMM GRANULOCYTES NFR BLD: 0.2 %
IMM GRANULOCYTES NFR BLD: 0.4 %
KETONES UR STRIP.AUTO-MCNC: NEGATIVE MG/DL
L/M: 3 L/MIN
LACTATE BLD-SCNC: 6.6 MMOL/L (ref 0.5–2)
LACTATE SERPL-SCNC: 3.1 MMOL/L (ref 0.4–2)
LACTATE SERPL-SCNC: 4.3 MMOL/L (ref 0.4–2)
LACTATE SERPL-SCNC: 6.4 MMOL/L (ref 0.4–2)
LIPASE SERPL-CCNC: 17 U/L (ref 13–75)
LYMPHOCYTES # BLD AUTO: 0.24 X10(3) UL (ref 1–4)
LYMPHOCYTES # BLD AUTO: 2.03 X10(3) UL (ref 1–4)
LYMPHOCYTES NFR BLD AUTO: 14.9 %
LYMPHOCYTES NFR BLD AUTO: 4.1 %
MAGNESIUM SERPL-MCNC: 1.1 MG/DL (ref 1.6–2.6)
MAGNESIUM SERPL-MCNC: 2.3 MG/DL (ref 1.6–2.6)
MCH RBC QN AUTO: 31 PG (ref 26–34)
MCH RBC QN AUTO: 31.3 PG (ref 26–34)
MCH RBC QN AUTO: 31.5 PG (ref 26–34)
MCH RBC QN AUTO: 31.6 PG (ref 26–34)
MCHC RBC AUTO-ENTMCNC: 33.2 G/DL (ref 31–37)
MCHC RBC AUTO-ENTMCNC: 33.3 G/DL (ref 31–37)
MCHC RBC AUTO-ENTMCNC: 34.6 G/DL (ref 31–37)
MCHC RBC AUTO-ENTMCNC: 35.3 G/DL (ref 31–37)
MCV RBC AUTO: 88.7 FL
MCV RBC AUTO: 91.3 FL
MCV RBC AUTO: 93 FL
MCV RBC AUTO: 94.8 FL
METHGB MFR BLD: 0 % SAT (ref 0.4–1.5)
MONOCYTES # BLD AUTO: 0.94 X10(3) UL (ref 0.1–1)
MONOCYTES # BLD AUTO: 1.18 X10(3) UL (ref 0.1–1)
MONOCYTES NFR BLD AUTO: 16 %
MONOCYTES NFR BLD AUTO: 8.7 %
MRSA DNA SPEC QL NAA+PROBE: NEGATIVE
NEUTROPHILS # BLD AUTO: 10.15 X10 (3) UL (ref 1.5–7.7)
NEUTROPHILS # BLD AUTO: 10.15 X10(3) UL (ref 1.5–7.7)
NEUTROPHILS # BLD AUTO: 4.68 X10 (3) UL (ref 1.5–7.7)
NEUTROPHILS # BLD AUTO: 4.68 X10(3) UL (ref 1.5–7.7)
NEUTROPHILS NFR BLD AUTO: 74.3 %
NEUTROPHILS NFR BLD AUTO: 79.5 %
NITRITE UR QL STRIP.AUTO: NEGATIVE
OSMOLALITY SERPL CALC.SUM OF ELEC: 294 MOSM/KG (ref 275–295)
OSMOLALITY SERPL CALC.SUM OF ELEC: 305 MOSM/KG (ref 275–295)
OSMOLALITY SERPL CALC.SUM OF ELEC: 309 MOSM/KG (ref 275–295)
OSMOLALITY SERPL CALC.SUM OF ELEC: 313 MOSM/KG (ref 275–295)
OXYHGB MFR BLDA: 97.1 % (ref 92–100)
P AXIS: 75 DEGREES
P-R INTERVAL: 130 MS
PCO2 BLDA: 25 MM HG (ref 35–45)
PH BLDA: 7.35 [PH] (ref 7.35–7.45)
PH UR STRIP.AUTO: 5 [PH] (ref 5–8)
PHOSPHATE SERPL-MCNC: 4 MG/DL (ref 2.5–4.9)
PLATELET # BLD AUTO: 229 10(3)UL (ref 150–450)
PLATELET # BLD AUTO: 235 10(3)UL (ref 150–450)
PLATELET # BLD AUTO: 242 10(3)UL (ref 150–450)
PLATELET # BLD AUTO: 308 10(3)UL (ref 150–450)
PO2 BLDA: 123 MM HG (ref 80–100)
POTASSIUM BLD-SCNC: 4.5 MMOL/L (ref 3.6–5.1)
POTASSIUM SERPL-SCNC: 4 MMOL/L (ref 3.5–5.1)
POTASSIUM SERPL-SCNC: 4.2 MMOL/L (ref 3.5–5.1)
POTASSIUM SERPL-SCNC: 4.3 MMOL/L (ref 3.5–5.1)
POTASSIUM SERPL-SCNC: 4.4 MMOL/L (ref 3.5–5.1)
PROT SERPL-MCNC: 5.2 G/DL (ref 6.4–8.2)
PROT SERPL-MCNC: 5.3 G/DL (ref 6.4–8.2)
PROT SERPL-MCNC: 5.4 G/DL (ref 6.4–8.2)
PROT SERPL-MCNC: 7.2 G/DL (ref 6.4–8.2)
PROT UR STRIP.AUTO-MCNC: NEGATIVE MG/DL
Q-T INTERVAL: 334 MS
QRS DURATION: 70 MS
QTC CALCULATION (BEZET): 449 MS
R AXIS: 81 DEGREES
RBC # BLD AUTO: 3.2 X10(6)UL
RBC # BLD AUTO: 3.58 X10(6)UL
RBC # BLD AUTO: 3.65 X10(6)UL
RBC # BLD AUTO: 4.53 X10(6)UL
RBC #/AREA URNS AUTO: >10 /HPF
RH BLOOD TYPE: POSITIVE
SARS-COV-2 RNA RESP QL NAA+PROBE: NOT DETECTED
SODIUM BLD-SCNC: 137 MMOL/L (ref 135–145)
SODIUM SERPL-SCNC: 141 MMOL/L (ref 136–145)
SODIUM SERPL-SCNC: 143 MMOL/L (ref 136–145)
SODIUM SERPL-SCNC: 144 MMOL/L (ref 136–145)
SODIUM SERPL-SCNC: 144 MMOL/L (ref 136–145)
SP GR UR STRIP.AUTO: 1.01 (ref 1–1.03)
T AXIS: 74 DEGREES
UROBILINOGEN UR STRIP.AUTO-MCNC: <2 MG/DL
VENTRICULAR RATE: 109 BPM
WBC # BLD AUTO: 13.6 X10(3) UL (ref 4–11)
WBC # BLD AUTO: 17 X10(3) UL (ref 4–11)
WBC # BLD AUTO: 5.9 X10(3) UL (ref 4–11)
WBC # BLD AUTO: 6.9 X10(3) UL (ref 4–11)

## 2023-01-01 PROCEDURE — 0BH17EZ INSERTION OF ENDOTRACHEAL AIRWAY INTO TRACHEA, VIA NATURAL OR ARTIFICIAL OPENING: ICD-10-PCS | Performed by: ANESTHESIOLOGY

## 2023-01-01 PROCEDURE — 99223 1ST HOSP IP/OBS HIGH 75: CPT | Performed by: INTERNAL MEDICINE

## 2023-01-01 PROCEDURE — 74177 CT ABD & PELVIS W/CONTRAST: CPT | Performed by: EMERGENCY MEDICINE

## 2023-01-01 PROCEDURE — 0DNU0ZZ RELEASE OMENTUM, OPEN APPROACH: ICD-10-PCS | Performed by: SURGERY

## 2023-01-01 PROCEDURE — 71045 X-RAY EXAM CHEST 1 VIEW: CPT | Performed by: EMERGENCY MEDICINE

## 2023-01-01 PROCEDURE — 44120 REMOVAL OF SMALL INTESTINE: CPT | Performed by: SURGERY

## 2023-01-01 PROCEDURE — 99223 1ST HOSP IP/OBS HIGH 75: CPT | Performed by: SURGERY

## 2023-01-01 PROCEDURE — 99291 CRITICAL CARE FIRST HOUR: CPT | Performed by: NURSE PRACTITIONER

## 2023-01-01 PROCEDURE — 99233 SBSQ HOSP IP/OBS HIGH 50: CPT | Performed by: INTERNAL MEDICINE

## 2023-01-01 PROCEDURE — 3E0T3BZ INTRODUCTION OF ANESTHETIC AGENT INTO PERIPHERAL NERVES AND PLEXI, PERCUTANEOUS APPROACH: ICD-10-PCS | Performed by: ANESTHESIOLOGY

## 2023-01-01 PROCEDURE — 44120 REMOVAL OF SMALL INTESTINE: CPT | Performed by: STUDENT IN AN ORGANIZED HEALTH CARE EDUCATION/TRAINING PROGRAM

## 2023-01-01 PROCEDURE — 99232 SBSQ HOSP IP/OBS MODERATE 35: CPT | Performed by: INTERNAL MEDICINE

## 2023-01-01 PROCEDURE — 71045 X-RAY EXAM CHEST 1 VIEW: CPT | Performed by: INTERNAL MEDICINE

## 2023-01-01 PROCEDURE — 5A12012 PERFORMANCE OF CARDIAC OUTPUT, SINGLE, MANUAL: ICD-10-PCS | Performed by: INTERNAL MEDICINE

## 2023-01-01 PROCEDURE — 71045 X-RAY EXAM CHEST 1 VIEW: CPT | Performed by: NURSE PRACTITIONER

## 2023-01-01 PROCEDURE — 5A1935Z RESPIRATORY VENTILATION, LESS THAN 24 CONSECUTIVE HOURS: ICD-10-PCS | Performed by: ANESTHESIOLOGY

## 2023-01-01 PROCEDURE — 99291 CRITICAL CARE FIRST HOUR: CPT | Performed by: INTERNAL MEDICINE

## 2023-01-01 PROCEDURE — 0DB80ZZ EXCISION OF SMALL INTESTINE, OPEN APPROACH: ICD-10-PCS | Performed by: SURGERY

## 2023-01-01 DEVICE — SEPRAFILM ADHESION BARRIER (MEMBRANE) IS A STERILE, BIORESORBABLE, TRANSLUCENT ADHESION BARRIER COMPOSED OF TWO ANIONIC POLYSACCHARIDES, SODIUM HYALURONATE (HA) AND CARBOXYMETHYLCELLULOSE (CMC).
Type: IMPLANTABLE DEVICE | Site: ABDOMEN | Status: FUNCTIONAL
Brand: SEPRAFILM

## 2023-01-01 RX ORDER — POLYETHYLENE GLYCOL 3350 17 G/17G
17 POWDER, FOR SOLUTION ORAL DAILY PRN
Status: DISCONTINUED | OUTPATIENT
Start: 2023-01-01 | End: 2023-01-01

## 2023-01-01 RX ORDER — ACETAMINOPHEN 500 MG
500 TABLET ORAL EVERY 4 HOURS PRN
Status: DISCONTINUED | OUTPATIENT
Start: 2023-01-01 | End: 2023-01-01

## 2023-01-01 RX ORDER — FUROSEMIDE 10 MG/ML
INJECTION INTRAMUSCULAR; INTRAVENOUS
Status: COMPLETED
Start: 2023-01-01 | End: 2023-01-01

## 2023-01-01 RX ORDER — ONDANSETRON 2 MG/ML
4 INJECTION INTRAMUSCULAR; INTRAVENOUS EVERY 6 HOURS PRN
Status: DISCONTINUED | OUTPATIENT
Start: 2023-01-01 | End: 2023-01-01

## 2023-01-01 RX ORDER — HYDROMORPHONE HYDROCHLORIDE 1 MG/ML
0.2 INJECTION, SOLUTION INTRAMUSCULAR; INTRAVENOUS; SUBCUTANEOUS EVERY 5 MIN PRN
Status: DISCONTINUED | OUTPATIENT
Start: 2023-01-01 | End: 2023-01-01 | Stop reason: HOSPADM

## 2023-01-01 RX ORDER — MIDAZOLAM HYDROCHLORIDE 1 MG/ML
1 INJECTION INTRAMUSCULAR; INTRAVENOUS EVERY 5 MIN PRN
Status: DISCONTINUED | OUTPATIENT
Start: 2023-01-01 | End: 2023-01-01 | Stop reason: HOSPADM

## 2023-01-01 RX ORDER — HEPARIN SODIUM 5000 [USP'U]/ML
5000 INJECTION, SOLUTION INTRAVENOUS; SUBCUTANEOUS EVERY 8 HOURS SCHEDULED
Status: DISCONTINUED | OUTPATIENT
Start: 2023-01-01 | End: 2023-01-01

## 2023-01-01 RX ORDER — SENNOSIDES 8.6 MG
17.2 TABLET ORAL NIGHTLY PRN
Status: DISCONTINUED | OUTPATIENT
Start: 2023-01-01 | End: 2023-01-01

## 2023-01-01 RX ORDER — HYDRALAZINE HYDROCHLORIDE 25 MG/1
25 TABLET, FILM COATED ORAL 2 TIMES DAILY
Status: DISCONTINUED | OUTPATIENT
Start: 2023-01-01 | End: 2023-01-01

## 2023-01-01 RX ORDER — DILTIAZEM HYDROCHLORIDE 120 MG/1
120 CAPSULE, COATED, EXTENDED RELEASE ORAL DAILY
COMMUNITY

## 2023-01-01 RX ORDER — ENOXAPARIN SODIUM 100 MG/ML
40 INJECTION SUBCUTANEOUS DAILY
Status: DISCONTINUED | OUTPATIENT
Start: 2023-01-01 | End: 2023-01-01

## 2023-01-01 RX ORDER — ALBUTEROL SULFATE 90 UG/1
2 AEROSOL, METERED RESPIRATORY (INHALATION) EVERY 6 HOURS PRN
Status: DISCONTINUED | OUTPATIENT
Start: 2023-01-01 | End: 2023-01-01

## 2023-01-01 RX ORDER — PREDNISONE 10 MG/1
10 TABLET ORAL DAILY
Status: DISCONTINUED | OUTPATIENT
Start: 2023-01-01 | End: 2023-01-01

## 2023-01-01 RX ORDER — IPRATROPIUM BROMIDE AND ALBUTEROL SULFATE 2.5; .5 MG/3ML; MG/3ML
3 SOLUTION RESPIRATORY (INHALATION) 2 TIMES DAILY
Status: DISCONTINUED | OUTPATIENT
Start: 2023-01-01 | End: 2023-01-01

## 2023-01-01 RX ORDER — DIPHENHYDRAMINE HYDROCHLORIDE 50 MG/ML
12.5 INJECTION INTRAMUSCULAR; INTRAVENOUS EVERY 4 HOURS PRN
Status: DISCONTINUED | OUTPATIENT
Start: 2023-01-01 | End: 2023-01-01

## 2023-01-01 RX ORDER — HYDROMORPHONE HYDROCHLORIDE 1 MG/ML
0.6 INJECTION, SOLUTION INTRAMUSCULAR; INTRAVENOUS; SUBCUTANEOUS EVERY 5 MIN PRN
Status: DISCONTINUED | OUTPATIENT
Start: 2023-01-01 | End: 2023-01-01 | Stop reason: HOSPADM

## 2023-01-01 RX ORDER — FENTANYL 12.5 UG/1
2 PATCH TRANSDERMAL
Status: DISCONTINUED | OUTPATIENT
Start: 2023-01-01 | End: 2023-01-01

## 2023-01-01 RX ORDER — OXYCODONE HYDROCHLORIDE 5 MG/1
2.5 TABLET ORAL EVERY 4 HOURS PRN
Status: DISCONTINUED | OUTPATIENT
Start: 2023-01-01 | End: 2023-01-01

## 2023-01-01 RX ORDER — HYDROMORPHONE HYDROCHLORIDE 1 MG/ML
1 INJECTION, SOLUTION INTRAMUSCULAR; INTRAVENOUS; SUBCUTANEOUS EVERY 5 MIN PRN
Status: DISCONTINUED | OUTPATIENT
Start: 2023-01-01 | End: 2023-01-01

## 2023-01-01 RX ORDER — SODIUM CHLORIDE, SODIUM LACTATE, POTASSIUM CHLORIDE, CALCIUM CHLORIDE 600; 310; 30; 20 MG/100ML; MG/100ML; MG/100ML; MG/100ML
INJECTION, SOLUTION INTRAVENOUS CONTINUOUS
Status: DISCONTINUED | OUTPATIENT
Start: 2023-01-01 | End: 2023-01-01

## 2023-01-01 RX ORDER — HYDROMORPHONE HYDROCHLORIDE 1 MG/ML
0.2 INJECTION, SOLUTION INTRAMUSCULAR; INTRAVENOUS; SUBCUTANEOUS EVERY 2 HOUR PRN
Status: DISCONTINUED | OUTPATIENT
Start: 2023-01-01 | End: 2023-01-01

## 2023-01-01 RX ORDER — ISOSORBIDE DINITRATE 20 MG/1
20 TABLET ORAL 2 TIMES DAILY
COMMUNITY

## 2023-01-01 RX ORDER — MEPERIDINE HYDROCHLORIDE 25 MG/ML
12.5 INJECTION INTRAMUSCULAR; INTRAVENOUS; SUBCUTANEOUS AS NEEDED
Status: DISCONTINUED | OUTPATIENT
Start: 2023-01-01 | End: 2023-01-01 | Stop reason: HOSPADM

## 2023-01-01 RX ORDER — NICOTINE POLACRILEX 4 MG
15 LOZENGE BUCCAL
Status: DISCONTINUED | OUTPATIENT
Start: 2023-01-01 | End: 2023-01-01

## 2023-01-01 RX ORDER — FAMOTIDINE 10 MG/ML
20 INJECTION, SOLUTION INTRAVENOUS 2 TIMES DAILY
Status: DISCONTINUED | OUTPATIENT
Start: 2023-01-01 | End: 2023-01-01

## 2023-01-01 RX ORDER — IBUPROFEN 600 MG/1
600 TABLET ORAL EVERY 6 HOURS SCHEDULED
Status: DISCONTINUED | OUTPATIENT
Start: 2023-01-01 | End: 2023-01-01

## 2023-01-01 RX ORDER — FUROSEMIDE 10 MG/ML
40 INJECTION INTRAMUSCULAR; INTRAVENOUS ONCE
Status: COMPLETED | OUTPATIENT
Start: 2023-01-01 | End: 2023-01-01

## 2023-01-01 RX ORDER — KETOROLAC TROMETHAMINE 15 MG/ML
15 INJECTION, SOLUTION INTRAMUSCULAR; INTRAVENOUS EVERY 6 HOURS
Status: COMPLETED | OUTPATIENT
Start: 2023-01-01 | End: 2023-01-01

## 2023-01-01 RX ORDER — SODIUM CHLORIDE 9 MG/ML
INJECTION, SOLUTION INTRAVENOUS CONTINUOUS
Status: DISCONTINUED | OUTPATIENT
Start: 2023-01-01 | End: 2023-01-01

## 2023-01-01 RX ORDER — ACETAMINOPHEN 500 MG
1000 TABLET ORAL ONCE AS NEEDED
Status: DISCONTINUED | OUTPATIENT
Start: 2023-01-01 | End: 2023-01-01 | Stop reason: HOSPADM

## 2023-01-01 RX ORDER — MORPHINE SULFATE 4 MG/ML
4 INJECTION, SOLUTION INTRAMUSCULAR; INTRAVENOUS EVERY 30 MIN PRN
Status: DISCONTINUED | OUTPATIENT
Start: 2023-01-01 | End: 2023-01-01

## 2023-01-01 RX ORDER — MAGNESIUM OXIDE 400 MG/1
400 TABLET ORAL DAILY
Status: DISCONTINUED | OUTPATIENT
Start: 2023-01-01 | End: 2023-01-01

## 2023-01-01 RX ORDER — ARFORMOTEROL TARTRATE 15 UG/2ML
15 SOLUTION RESPIRATORY (INHALATION) 2 TIMES DAILY
Status: DISCONTINUED | OUTPATIENT
Start: 2023-01-01 | End: 2023-01-01

## 2023-01-01 RX ORDER — IPRATROPIUM BROMIDE AND ALBUTEROL SULFATE 2.5; .5 MG/3ML; MG/3ML
3 SOLUTION RESPIRATORY (INHALATION) EVERY 4 HOURS PRN
Status: DISCONTINUED | OUTPATIENT
Start: 2023-01-01 | End: 2023-01-01

## 2023-01-01 RX ORDER — HYDROCODONE BITARTRATE AND ACETAMINOPHEN 10; 325 MG/1; MG/1
2 TABLET ORAL ONCE AS NEEDED
Status: DISCONTINUED | OUTPATIENT
Start: 2023-01-01 | End: 2023-01-01 | Stop reason: HOSPADM

## 2023-01-01 RX ORDER — NALOXONE HYDROCHLORIDE 0.4 MG/ML
0.08 INJECTION, SOLUTION INTRAMUSCULAR; INTRAVENOUS; SUBCUTANEOUS
Status: DISCONTINUED | OUTPATIENT
Start: 2023-01-01 | End: 2023-01-01

## 2023-01-01 RX ORDER — METOPROLOL SUCCINATE 25 MG/1
25 TABLET, EXTENDED RELEASE ORAL DAILY
Status: DISCONTINUED | OUTPATIENT
Start: 2023-01-01 | End: 2023-01-01

## 2023-01-01 RX ORDER — OXYCODONE HYDROCHLORIDE 5 MG/1
5 TABLET ORAL EVERY 4 HOURS PRN
Status: DISCONTINUED | OUTPATIENT
Start: 2023-01-01 | End: 2023-01-01

## 2023-01-01 RX ORDER — FAMOTIDINE 10 MG/ML
20 INJECTION, SOLUTION INTRAVENOUS DAILY
Status: DISCONTINUED | OUTPATIENT
Start: 2023-01-01 | End: 2023-01-01

## 2023-01-01 RX ORDER — METOCLOPRAMIDE HYDROCHLORIDE 5 MG/ML
10 INJECTION INTRAMUSCULAR; INTRAVENOUS EVERY 8 HOURS PRN
Status: DISCONTINUED | OUTPATIENT
Start: 2023-01-01 | End: 2023-01-01 | Stop reason: HOSPADM

## 2023-01-01 RX ORDER — ONDANSETRON 2 MG/ML
4 INJECTION INTRAMUSCULAR; INTRAVENOUS EVERY 6 HOURS PRN
Status: DISCONTINUED | OUTPATIENT
Start: 2023-01-01 | End: 2023-01-01 | Stop reason: HOSPADM

## 2023-01-01 RX ORDER — NICOTINE POLACRILEX 4 MG
30 LOZENGE BUCCAL
Status: DISCONTINUED | OUTPATIENT
Start: 2023-01-01 | End: 2023-01-01

## 2023-01-01 RX ORDER — FAMOTIDINE 20 MG/1
20 TABLET, FILM COATED ORAL 2 TIMES DAILY
Status: DISCONTINUED | OUTPATIENT
Start: 2023-01-01 | End: 2023-01-01

## 2023-01-01 RX ORDER — METHYLPREDNISOLONE SODIUM SUCCINATE 40 MG/ML
40 INJECTION, POWDER, LYOPHILIZED, FOR SOLUTION INTRAMUSCULAR; INTRAVENOUS DAILY
Status: DISCONTINUED | OUTPATIENT
Start: 2023-01-01 | End: 2023-01-01

## 2023-01-01 RX ORDER — FAMOTIDINE 20 MG/1
20 TABLET, FILM COATED ORAL DAILY
Status: DISCONTINUED | OUTPATIENT
Start: 2023-01-01 | End: 2023-01-01

## 2023-01-01 RX ORDER — ACETAMINOPHEN 500 MG
1000 TABLET ORAL EVERY 8 HOURS SCHEDULED
Status: DISCONTINUED | OUTPATIENT
Start: 2023-01-01 | End: 2023-01-01

## 2023-01-01 RX ORDER — ONDANSETRON 2 MG/ML
4 INJECTION INTRAMUSCULAR; INTRAVENOUS ONCE
Status: COMPLETED | OUTPATIENT
Start: 2023-01-01 | End: 2023-01-01

## 2023-01-01 RX ORDER — METOPROLOL TARTRATE 5 MG/5ML
5 INJECTION INTRAVENOUS EVERY 6 HOURS
Status: DISCONTINUED | OUTPATIENT
Start: 2023-01-01 | End: 2023-01-01

## 2023-01-01 RX ORDER — HYDROMORPHONE HYDROCHLORIDE 1 MG/ML
1 INJECTION, SOLUTION INTRAMUSCULAR; INTRAVENOUS; SUBCUTANEOUS EVERY 5 MIN PRN
Status: DISCONTINUED | OUTPATIENT
Start: 2023-01-01 | End: 2023-01-01 | Stop reason: HOSPADM

## 2023-01-01 RX ORDER — NALBUPHINE HYDROCHLORIDE 10 MG/ML
2.5 INJECTION, SOLUTION INTRAMUSCULAR; INTRAVENOUS; SUBCUTANEOUS EVERY 4 HOURS PRN
Status: DISCONTINUED | OUTPATIENT
Start: 2023-01-01 | End: 2023-01-01

## 2023-01-01 RX ORDER — DEXTROSE MONOHYDRATE 25 G/50ML
50 INJECTION, SOLUTION INTRAVENOUS
Status: DISCONTINUED | OUTPATIENT
Start: 2023-01-01 | End: 2023-01-01 | Stop reason: HOSPADM

## 2023-01-01 RX ORDER — FENTANYL 12.5 UG/1
1 PATCH TRANSDERMAL
Status: DISCONTINUED | OUTPATIENT
Start: 2023-01-01 | End: 2023-01-01

## 2023-01-01 RX ORDER — FENTANYL 25 UG/1
1 PATCH TRANSDERMAL
Status: DISCONTINUED | OUTPATIENT
Start: 2023-01-01 | End: 2023-01-01

## 2023-01-01 RX ORDER — HYDROMORPHONE HYDROCHLORIDE 1 MG/ML
0.4 INJECTION, SOLUTION INTRAMUSCULAR; INTRAVENOUS; SUBCUTANEOUS EVERY 5 MIN PRN
Status: DISCONTINUED | OUTPATIENT
Start: 2023-01-01 | End: 2023-01-01 | Stop reason: HOSPADM

## 2023-01-01 RX ORDER — NICOTINE POLACRILEX 4 MG
30 LOZENGE BUCCAL
Status: DISCONTINUED | OUTPATIENT
Start: 2023-01-01 | End: 2023-01-01 | Stop reason: HOSPADM

## 2023-01-01 RX ORDER — NALOXONE HYDROCHLORIDE 0.4 MG/ML
80 INJECTION, SOLUTION INTRAMUSCULAR; INTRAVENOUS; SUBCUTANEOUS AS NEEDED
Status: DISCONTINUED | OUTPATIENT
Start: 2023-01-01 | End: 2023-01-01 | Stop reason: HOSPADM

## 2023-01-01 RX ORDER — DEXTROSE MONOHYDRATE 25 G/50ML
50 INJECTION, SOLUTION INTRAVENOUS
Status: DISCONTINUED | OUTPATIENT
Start: 2023-01-01 | End: 2023-01-01

## 2023-01-01 RX ORDER — NICOTINE POLACRILEX 4 MG
15 LOZENGE BUCCAL
Status: DISCONTINUED | OUTPATIENT
Start: 2023-01-01 | End: 2023-01-01 | Stop reason: HOSPADM

## 2023-01-01 RX ORDER — HYDROMORPHONE HYDROCHLORIDE 1 MG/ML
0.5 INJECTION, SOLUTION INTRAMUSCULAR; INTRAVENOUS; SUBCUTANEOUS EVERY 30 MIN PRN
Status: COMPLETED | OUTPATIENT
Start: 2023-01-01 | End: 2023-01-01

## 2023-01-01 RX ORDER — LIDOCAINE HYDROCHLORIDE 20 MG/ML
10 JELLY TOPICAL AS NEEDED
Status: DISCONTINUED | OUTPATIENT
Start: 2023-01-01 | End: 2023-01-01

## 2023-01-01 RX ORDER — METOCLOPRAMIDE HYDROCHLORIDE 5 MG/ML
10 INJECTION INTRAMUSCULAR; INTRAVENOUS EVERY 8 HOURS PRN
Status: DISCONTINUED | OUTPATIENT
Start: 2023-01-01 | End: 2023-01-01

## 2023-01-01 RX ORDER — HYDROMORPHONE HYDROCHLORIDE 1 MG/ML
INJECTION, SOLUTION INTRAMUSCULAR; INTRAVENOUS; SUBCUTANEOUS
Status: COMPLETED
Start: 2023-01-01 | End: 2023-01-01

## 2023-01-01 RX ORDER — METOCLOPRAMIDE HYDROCHLORIDE 5 MG/ML
5 INJECTION INTRAMUSCULAR; INTRAVENOUS EVERY 8 HOURS PRN
Status: DISCONTINUED | OUTPATIENT
Start: 2023-01-01 | End: 2023-01-01

## 2023-01-01 RX ORDER — ACETAMINOPHEN 10 MG/ML
1000 INJECTION, SOLUTION INTRAVENOUS EVERY 6 HOURS
Status: DISCONTINUED | OUTPATIENT
Start: 2023-01-01 | End: 2023-01-01

## 2023-01-01 RX ORDER — HYDROMORPHONE HYDROCHLORIDE 1 MG/ML
0.4 INJECTION, SOLUTION INTRAMUSCULAR; INTRAVENOUS; SUBCUTANEOUS EVERY 2 HOUR PRN
Status: DISCONTINUED | OUTPATIENT
Start: 2023-01-01 | End: 2023-01-01

## 2023-01-01 RX ORDER — SODIUM CHLORIDE, SODIUM LACTATE, POTASSIUM CHLORIDE, CALCIUM CHLORIDE 600; 310; 30; 20 MG/100ML; MG/100ML; MG/100ML; MG/100ML
INJECTION, SOLUTION INTRAVENOUS CONTINUOUS
Status: DISCONTINUED | OUTPATIENT
Start: 2023-01-01 | End: 2023-01-01 | Stop reason: HOSPADM

## 2023-01-01 RX ORDER — DEXTROSE, SODIUM CHLORIDE, SODIUM LACTATE, POTASSIUM CHLORIDE, AND CALCIUM CHLORIDE 5; .6; .31; .03; .02 G/100ML; G/100ML; G/100ML; G/100ML; G/100ML
INJECTION, SOLUTION INTRAVENOUS CONTINUOUS
Status: DISCONTINUED | OUTPATIENT
Start: 2023-01-01 | End: 2023-01-01

## 2023-01-01 RX ORDER — BUDESONIDE 0.25 MG/2ML
0.25 INHALANT ORAL 2 TIMES DAILY
Status: DISCONTINUED | OUTPATIENT
Start: 2023-01-01 | End: 2023-01-01

## 2023-01-01 RX ORDER — HYDRALAZINE HYDROCHLORIDE 20 MG/ML
10 INJECTION INTRAMUSCULAR; INTRAVENOUS EVERY 4 HOURS PRN
Status: DISCONTINUED | OUTPATIENT
Start: 2023-01-01 | End: 2023-01-01

## 2023-01-01 RX ORDER — HYDROCODONE BITARTRATE AND ACETAMINOPHEN 10; 325 MG/1; MG/1
1 TABLET ORAL ONCE AS NEEDED
Status: DISCONTINUED | OUTPATIENT
Start: 2023-01-01 | End: 2023-01-01 | Stop reason: HOSPADM

## 2023-01-01 RX ORDER — MIDAZOLAM HYDROCHLORIDE 1 MG/ML
INJECTION INTRAMUSCULAR; INTRAVENOUS
Status: COMPLETED
Start: 2023-01-01 | End: 2023-01-01

## 2023-01-05 RX ORDER — ONDANSETRON 4 MG/1
TABLET, FILM COATED ORAL
Qty: 90 TABLET | Refills: 0 | Status: SHIPPED | OUTPATIENT
Start: 2023-01-05

## 2023-01-16 ENCOUNTER — ANESTHESIA EVENT (OUTPATIENT)
Dept: ENDOSCOPY | Facility: HOSPITAL | Age: 73
End: 2023-01-16
Payer: MEDICARE

## 2023-01-17 ENCOUNTER — HOSPITAL ENCOUNTER (OUTPATIENT)
Facility: HOSPITAL | Age: 73
Setting detail: HOSPITAL OUTPATIENT SURGERY
Discharge: HOME | End: 2023-01-17
Attending: INTERNAL MEDICINE | Admitting: INTERNAL MEDICINE
Payer: MEDICARE

## 2023-01-17 ENCOUNTER — ANESTHESIA (OUTPATIENT)
Dept: ENDOSCOPY | Facility: HOSPITAL | Age: 73
End: 2023-01-17
Payer: MEDICARE

## 2023-01-17 VITALS
TEMPERATURE: 98 F | WEIGHT: 144 LBS | SYSTOLIC BLOOD PRESSURE: 118 MMHG | BODY MASS INDEX: 20.16 KG/M2 | HEART RATE: 71 BPM | DIASTOLIC BLOOD PRESSURE: 70 MMHG | RESPIRATION RATE: 16 BRPM | OXYGEN SATURATION: 97 % | HEIGHT: 71 IN

## 2023-01-17 DIAGNOSIS — K59.09 CHRONIC CONSTIPATION: ICD-10-CM

## 2023-01-17 DIAGNOSIS — Z86.010 PERSONAL HISTORY OF COLONIC POLYPS: ICD-10-CM

## 2023-01-17 LAB — GLUCOSE BLD-MCNC: 86 MG/DL (ref 70–99)

## 2023-01-17 PROCEDURE — 88305 TISSUE EXAM BY PATHOLOGIST: CPT | Performed by: INTERNAL MEDICINE

## 2023-01-17 PROCEDURE — 0DBK8ZX EXCISION OF ASCENDING COLON, VIA NATURAL OR ARTIFICIAL OPENING ENDOSCOPIC, DIAGNOSTIC: ICD-10-PCS | Performed by: INTERNAL MEDICINE

## 2023-01-17 PROCEDURE — 82962 GLUCOSE BLOOD TEST: CPT

## 2023-01-17 PROCEDURE — 0DBL8ZX EXCISION OF TRANSVERSE COLON, VIA NATURAL OR ARTIFICIAL OPENING ENDOSCOPIC, DIAGNOSTIC: ICD-10-PCS | Performed by: INTERNAL MEDICINE

## 2023-01-17 RX ORDER — NICOTINE POLACRILEX 4 MG
15 LOZENGE BUCCAL
Status: DISCONTINUED | OUTPATIENT
Start: 2023-01-17 | End: 2023-01-17

## 2023-01-17 RX ORDER — SODIUM CHLORIDE, SODIUM LACTATE, POTASSIUM CHLORIDE, CALCIUM CHLORIDE 600; 310; 30; 20 MG/100ML; MG/100ML; MG/100ML; MG/100ML
INJECTION, SOLUTION INTRAVENOUS CONTINUOUS
Status: DISCONTINUED | OUTPATIENT
Start: 2023-01-17 | End: 2023-01-17

## 2023-01-17 RX ORDER — NICOTINE POLACRILEX 4 MG
30 LOZENGE BUCCAL
Status: DISCONTINUED | OUTPATIENT
Start: 2023-01-17 | End: 2023-01-17

## 2023-01-17 RX ORDER — SODIUM CHLORIDE, SODIUM LACTATE, POTASSIUM CHLORIDE, CALCIUM CHLORIDE 600; 310; 30; 20 MG/100ML; MG/100ML; MG/100ML; MG/100ML
INJECTION, SOLUTION INTRAVENOUS CONTINUOUS PRN
Status: DISCONTINUED | OUTPATIENT
Start: 2023-01-17 | End: 2023-01-17 | Stop reason: SURG

## 2023-01-17 RX ORDER — DEXTROSE MONOHYDRATE 25 G/50ML
50 INJECTION, SOLUTION INTRAVENOUS
Status: DISCONTINUED | OUTPATIENT
Start: 2023-01-17 | End: 2023-01-17

## 2023-01-17 RX ORDER — LIDOCAINE HYDROCHLORIDE 10 MG/ML
INJECTION, SOLUTION EPIDURAL; INFILTRATION; INTRACAUDAL; PERINEURAL AS NEEDED
Status: DISCONTINUED | OUTPATIENT
Start: 2023-01-17 | End: 2023-01-17 | Stop reason: SURG

## 2023-01-17 RX ADMIN — LIDOCAINE HYDROCHLORIDE 50 MG: 10 INJECTION, SOLUTION EPIDURAL; INFILTRATION; INTRACAUDAL; PERINEURAL at 13:13:00

## 2023-01-17 RX ADMIN — SODIUM CHLORIDE, SODIUM LACTATE, POTASSIUM CHLORIDE, CALCIUM CHLORIDE: 600; 310; 30; 20 INJECTION, SOLUTION INTRAVENOUS at 13:09:00

## 2023-01-17 NOTE — OPERATIVE REPORT
[facility] Patient Status:  [facility]    1950 MRN [id number]   Location [address] [place] [address] Attending Nicholas Blood MD   Date 2023 PCP Kathy Benites MD     PREOPERATIVE DIAGNOSIS/INDICATION: H/o polyps  POSTOPERTATIVE DIAGNOSIS: polyps  PROCEDURE PERFORMED: COLONOSCOPY with biopsy  SEDATION: MAC sedation provided by General Anesthesia    TIME OUT WAS PERFORMED    INFORMED CONSENT: Risks, benefits and alternatives to the procedure were explained to the patient including but not limited to bleeding, infection, perforation, adverse drug reactions, pancreatitis and the need for hospitalization and surgery if this occurs, the patient understands and agrees to procedure. PROCEDURE DESCRIPTION: After careful digital rectal examination a pediatric colonoscope was introduced into the patients rectum, advanced pass the recto sigmoid junction, into the descending colon, splenic flexure, transverse colon, hepatic flexure, ascending colon, cecum, confirmed by landmarks, including the appendiceal orifice and ileocecal valve. Careful examination of the above described areas was performed on withdrawal of the endoscope. Retroflexion was performed on the rectum. The patient tolerated the procedure well, there were no immediate complication immediately following the procedure, and the patient was transferred to recovery in stable condition. QUALITY OF PREPARATION: [place] Bowel Preparation Scale:            -      Right colon 3, Transverse colon 3, Left colon 3   FINDINGS/THERAPEUTICS:  - COLON: Two 2 mm sessile ascending colon polyps s/p excisional biopsy with cold forceps, three 2 mm sessile polyps transverse colon s/p excisional biopsy with cold forceps  RECOMMENDATIONS:   - Post Colonoscopy/polypectomy precautions, watch for bleeding, infection, perforation, adverse drug reactions   - Follow biopsies.  - Repeat colonoscopy in 3 years.     Jailene Olvera, MD  1/17/2023  1:58 PM

## 2023-01-17 NOTE — ANESTHESIA POSTPROCEDURE EVALUATION
[address] [facility] Patient Status:  [facility] Outpatient [facility]   Age/Gender 67year old male MRN [id number]   Location [address] [place] [address] Attending Jian Cline MD   Hosp Day # 0 PCP Rafael Abad MD       Anesthesia Post-op Note    COLONOSCOPY with biopsies    Procedure Summary     Date: 01/17/23 Room / Location: [facility] ENDOSCOPY 02 / [facility] ENDOSCOPY    Anesthesia Start: 8694 Anesthesia Stop: 9822 3749    Procedure: COLONOSCOPY with biopsies Diagnosis:       Chronic constipation      Personal history of colonic polyps      (polyps, hemorrhoids)    Surgeons: Jian Cline MD Anesthesiologist: Christen Osorio MD    Anesthesia Type: MAC ASA Status: 3          Anesthesia Type: MAC    Vitals Value Taken Time   /54 01/17/23 1349   Temp 98 01/17/23 1354   Pulse 89 01/17/23 1353   Resp 16 01/17/23 1345   SpO2 93 % 01/17/23 1353   Vitals shown include unvalidated device data. Patient Location: Endoscopy    Anesthesia Type: MAC    Airway Patency: patent and extubated    Postop Pain Control: adequate    Mental Status: mildly sedated but able to meaningfully participate in the post-anesthesia evaluation    Nausea/Vomiting: none    Cardiopulmonary/Hydration status: stable euvolemic    Complications: no apparent anesthesia related complications    Postop vital signs: stable    Dental Exam: Unchanged from Preop    Patient to be discharged from PACU when criteria met.

## 2023-01-18 ENCOUNTER — HOSPITAL ENCOUNTER (INPATIENT)
Facility: HOSPITAL | Age: 73
LOS: 1 days | Discharge: HOME | End: 2023-01-19
Attending: EMERGENCY MEDICINE | Admitting: HOSPITALIST
Payer: MEDICARE

## 2023-01-18 ENCOUNTER — HOSPITAL ENCOUNTER (OUTPATIENT)
Facility: HOSPITAL | Age: 73
Setting detail: OBSERVATION
Discharge: HOME | End: 2023-01-19
Attending: EMERGENCY MEDICINE | Admitting: HOSPITALIST
Payer: MEDICARE

## 2023-01-18 ENCOUNTER — APPOINTMENT (OUTPATIENT)
Dept: GENERAL RADIOLOGY | Facility: HOSPITAL | Age: 73
End: 2023-01-18
Attending: EMERGENCY MEDICINE
Payer: MEDICARE

## 2023-01-18 DIAGNOSIS — R09.02 HYPOXIA: ICD-10-CM

## 2023-01-18 DIAGNOSIS — J44.1 COPD EXACERBATION (HCC): Primary | ICD-10-CM

## 2023-01-18 PROBLEM — D64.9 ANEMIA: Status: ACTIVE | Noted: 2023-01-18

## 2023-01-18 PROBLEM — E87.3 METABOLIC ALKALOSIS: Status: ACTIVE | Noted: 2023-01-18

## 2023-01-18 PROBLEM — D64.9 ANEMIA: Status: ACTIVE | Noted: 2023-01-01

## 2023-01-18 PROBLEM — E87.3 METABOLIC ALKALOSIS: Status: ACTIVE | Noted: 2023-01-01

## 2023-01-18 LAB
ALBUMIN SERPL-MCNC: 3.2 G/DL (ref 3.4–5)
ALBUMIN/GLOB SERPL: 0.8 {RATIO} (ref 1–2)
ALP LIVER SERPL-CCNC: 120 U/L
ALT SERPL-CCNC: 24 U/L
ANION GAP SERPL CALC-SCNC: 3 MMOL/L (ref 0–18)
ARTERIAL PATENCY WRIST A: POSITIVE
AST SERPL-CCNC: 30 U/L (ref 15–37)
ATRIAL RATE: 107 BPM
BASE EXCESS BLDA CALC-SCNC: -0.2 MMOL/L (ref ?–2)
BASOPHILS # BLD AUTO: 0.04 X10(3) UL (ref 0–0.2)
BASOPHILS NFR BLD AUTO: 0.4 %
BILIRUB SERPL-MCNC: 0.4 MG/DL (ref 0.1–2)
BODY TEMPERATURE: 98.6 F
BUN BLD-MCNC: 8 MG/DL (ref 7–18)
CA-I BLD-SCNC: 1.23 MMOL/L (ref 0.95–1.32)
CALCIUM BLD-MCNC: 8.9 MG/DL (ref 8.5–10.1)
CHLORIDE SERPL-SCNC: 111 MMOL/L (ref 98–112)
CO2 SERPL-SCNC: 27 MMOL/L (ref 21–32)
COHGB MFR BLD: 0.7 % SAT (ref 0–3)
CREAT BLD-MCNC: 1.24 MG/DL
D DIMER PPP FEU-MCNC: 0.55 UG/ML FEU (ref ?–0.73)
EOSINOPHIL # BLD AUTO: 0.56 X10(3) UL (ref 0–0.7)
EOSINOPHIL NFR BLD AUTO: 5.7 %
ERYTHROCYTE [DISTWIDTH] IN BLOOD BY AUTOMATED COUNT: 18.3 %
GFR SERPLBLD BASED ON 1.73 SQ M-ARVRAT: 61 ML/MIN/1.73M2 (ref 60–?)
GLOBULIN PLAS-MCNC: 3.8 G/DL (ref 2.8–4.4)
GLUCOSE BLD-MCNC: 140 MG/DL (ref 70–99)
GLUCOSE BLD-MCNC: 92 MG/DL (ref 70–99)
GLUCOSE BLD-MCNC: 97 MG/DL (ref 70–99)
HCO3 BLDA-SCNC: 24.7 MEQ/L (ref 21–27)
HCT VFR BLD AUTO: 31.8 %
HGB BLD-MCNC: 10.7 G/DL
HGB BLD-MCNC: 9.9 G/DL
IMM GRANULOCYTES # BLD AUTO: 0.02 X10(3) UL (ref 0–1)
IMM GRANULOCYTES NFR BLD: 0.2 %
L/M: 2 L/MIN
LACTATE BLD-SCNC: 0.7 MMOL/L (ref 0.5–2)
LACTATE SERPL-SCNC: 0.9 MMOL/L (ref 0.4–2)
LYMPHOCYTES # BLD AUTO: 3.82 X10(3) UL (ref 1–4)
LYMPHOCYTES NFR BLD AUTO: 39.1 %
MCH RBC QN AUTO: 27.2 PG (ref 26–34)
MCHC RBC AUTO-ENTMCNC: 33.6 G/DL (ref 31–37)
MCV RBC AUTO: 80.9 FL
METHGB MFR BLD: 0 % SAT (ref 0.4–1.5)
MONOCYTES # BLD AUTO: 1.39 X10(3) UL (ref 0.1–1)
MONOCYTES NFR BLD AUTO: 14.2 %
NEUTROPHILS # BLD AUTO: 3.95 X10 (3) UL (ref 1.5–7.7)
NEUTROPHILS # BLD AUTO: 3.95 X10(3) UL (ref 1.5–7.7)
NEUTROPHILS NFR BLD AUTO: 40.4 %
NT-PROBNP SERPL-MCNC: 418 PG/ML (ref ?–125)
OSMOLALITY SERPL CALC.SUM OF ELEC: 290 MOSM/KG (ref 275–295)
OXYHGB MFR BLDA: 95.1 % (ref 92–100)
P AXIS: 65 DEGREES
P-R INTERVAL: 154 MS
PCO2 BLDA: 45 MM HG (ref 35–45)
PH BLDA: 7.36 [PH] (ref 7.35–7.45)
PLATELET # BLD AUTO: 449 10(3)UL (ref 150–450)
PO2 BLDA: 82 MM HG (ref 80–100)
POTASSIUM BLD-SCNC: 3.9 MMOL/L (ref 3.6–5.1)
POTASSIUM SERPL-SCNC: 3.9 MMOL/L (ref 3.5–5.1)
PROT SERPL-MCNC: 7 G/DL (ref 6.4–8.2)
Q-T INTERVAL: 346 MS
QRS DURATION: 88 MS
QTC CALCULATION (BEZET): 461 MS
R AXIS: 53 DEGREES
RBC # BLD AUTO: 3.93 X10(6)UL
SARS-COV-2 RNA RESP QL NAA+PROBE: NOT DETECTED
SODIUM BLD-SCNC: 137 MMOL/L (ref 135–145)
SODIUM SERPL-SCNC: 141 MMOL/L (ref 136–145)
T AXIS: 51 DEGREES
TROPONIN I HIGH SENSITIVITY: 9 NG/L
VENTRICULAR RATE: 107 BPM
WBC # BLD AUTO: 9.8 X10(3) UL (ref 4–11)

## 2023-01-18 PROCEDURE — 71045 X-RAY EXAM CHEST 1 VIEW: CPT | Performed by: EMERGENCY MEDICINE

## 2023-01-18 PROCEDURE — 99223 1ST HOSP IP/OBS HIGH 75: CPT | Performed by: HOSPITALIST

## 2023-01-18 RX ORDER — NICOTINE POLACRILEX 4 MG
15 LOZENGE BUCCAL
Status: DISCONTINUED | OUTPATIENT
Start: 2023-01-18 | End: 2023-01-19

## 2023-01-18 RX ORDER — ASPIRIN 81 MG/1
81 TABLET ORAL DAILY
Status: DISCONTINUED | OUTPATIENT
Start: 2023-01-19 | End: 2023-01-19

## 2023-01-18 RX ORDER — FENTANYL 50 UG/H
1 PATCH TRANSDERMAL
Status: DISCONTINUED | OUTPATIENT
Start: 2023-01-18 | End: 2023-01-19

## 2023-01-18 RX ORDER — ENOXAPARIN SODIUM 100 MG/ML
40 INJECTION SUBCUTANEOUS DAILY
Status: DISCONTINUED | OUTPATIENT
Start: 2023-01-19 | End: 2023-01-19

## 2023-01-18 RX ORDER — ATORVASTATIN CALCIUM 40 MG/1
40 TABLET, FILM COATED ORAL NIGHTLY
Status: DISCONTINUED | OUTPATIENT
Start: 2023-01-18 | End: 2023-01-19

## 2023-01-18 RX ORDER — SODIUM CHLORIDE 9 MG/ML
INJECTION, SOLUTION INTRAVENOUS CONTINUOUS
Status: DISCONTINUED | OUTPATIENT
Start: 2023-01-18 | End: 2023-01-19

## 2023-01-18 RX ORDER — FOLIC ACID 1 MG/1
1 TABLET ORAL DAILY
Status: DISCONTINUED | OUTPATIENT
Start: 2023-01-19 | End: 2023-01-19

## 2023-01-18 RX ORDER — ARFORMOTEROL TARTRATE 15 UG/2ML
15 SOLUTION RESPIRATORY (INHALATION) 2 TIMES DAILY
Status: DISCONTINUED | OUTPATIENT
Start: 2023-01-18 | End: 2023-01-19

## 2023-01-18 RX ORDER — METHYLPREDNISOLONE SODIUM SUCCINATE 125 MG/2ML
125 INJECTION, POWDER, LYOPHILIZED, FOR SOLUTION INTRAMUSCULAR; INTRAVENOUS ONCE
Status: COMPLETED | OUTPATIENT
Start: 2023-01-18 | End: 2023-01-18

## 2023-01-18 RX ORDER — NICOTINE POLACRILEX 4 MG
30 LOZENGE BUCCAL
Status: DISCONTINUED | OUTPATIENT
Start: 2023-01-18 | End: 2023-01-19

## 2023-01-18 RX ORDER — IPRATROPIUM BROMIDE AND ALBUTEROL SULFATE 2.5; .5 MG/3ML; MG/3ML
3 SOLUTION RESPIRATORY (INHALATION) ONCE
Status: COMPLETED | OUTPATIENT
Start: 2023-01-18 | End: 2023-01-18

## 2023-01-18 RX ORDER — SENNOSIDES 8.6 MG
17.2 TABLET ORAL NIGHTLY PRN
Status: DISCONTINUED | OUTPATIENT
Start: 2023-01-18 | End: 2023-01-19

## 2023-01-18 RX ORDER — METOPROLOL SUCCINATE 50 MG/1
50 TABLET, EXTENDED RELEASE ORAL
Status: DISCONTINUED | OUTPATIENT
Start: 2023-01-19 | End: 2023-01-19

## 2023-01-18 RX ORDER — HYDROMORPHONE HYDROCHLORIDE 2 MG/1
8 TABLET ORAL EVERY 8 HOURS PRN
Status: DISCONTINUED | OUTPATIENT
Start: 2023-01-18 | End: 2023-01-19

## 2023-01-18 RX ORDER — BUDESONIDE 0.25 MG/2ML
0.25 INHALANT ORAL 2 TIMES DAILY
Status: DISCONTINUED | OUTPATIENT
Start: 2023-01-18 | End: 2023-01-19

## 2023-01-18 RX ORDER — METOCLOPRAMIDE HYDROCHLORIDE 5 MG/ML
10 INJECTION INTRAMUSCULAR; INTRAVENOUS EVERY 8 HOURS PRN
Status: DISCONTINUED | OUTPATIENT
Start: 2023-01-18 | End: 2023-01-19

## 2023-01-18 RX ORDER — IPRATROPIUM BROMIDE AND ALBUTEROL SULFATE 2.5; .5 MG/3ML; MG/3ML
3 SOLUTION RESPIRATORY (INHALATION) EVERY 4 HOURS PRN
Status: DISCONTINUED | OUTPATIENT
Start: 2023-01-18 | End: 2023-01-19

## 2023-01-18 RX ORDER — LORAZEPAM 1 MG/1
1 TABLET ORAL NIGHTLY PRN
Status: DISCONTINUED | OUTPATIENT
Start: 2023-01-18 | End: 2023-01-19

## 2023-01-18 RX ORDER — ALBUTEROL SULFATE 90 UG/1
2 AEROSOL, METERED RESPIRATORY (INHALATION) EVERY 6 HOURS PRN
Status: DISCONTINUED | OUTPATIENT
Start: 2023-01-18 | End: 2023-01-19

## 2023-01-18 RX ORDER — FUROSEMIDE 10 MG/ML
20 INJECTION INTRAMUSCULAR; INTRAVENOUS ONCE
Status: COMPLETED | OUTPATIENT
Start: 2023-01-18 | End: 2023-01-18

## 2023-01-18 RX ORDER — BISACODYL 10 MG
10 SUPPOSITORY, RECTAL RECTAL
Status: DISCONTINUED | OUTPATIENT
Start: 2023-01-18 | End: 2023-01-19

## 2023-01-18 RX ORDER — POLYETHYLENE GLYCOL 3350 17 G/17G
17 POWDER, FOR SOLUTION ORAL DAILY PRN
Status: DISCONTINUED | OUTPATIENT
Start: 2023-01-18 | End: 2023-01-19

## 2023-01-18 RX ORDER — PANTOPRAZOLE SODIUM 20 MG/1
20 TABLET, DELAYED RELEASE ORAL
Status: DISCONTINUED | OUTPATIENT
Start: 2023-01-19 | End: 2023-01-19

## 2023-01-18 RX ORDER — DEXTROSE MONOHYDRATE 25 G/50ML
50 INJECTION, SOLUTION INTRAVENOUS
Status: DISCONTINUED | OUTPATIENT
Start: 2023-01-18 | End: 2023-01-19

## 2023-01-18 RX ORDER — ONDANSETRON 2 MG/ML
4 INJECTION INTRAMUSCULAR; INTRAVENOUS EVERY 6 HOURS PRN
Status: DISCONTINUED | OUTPATIENT
Start: 2023-01-18 | End: 2023-01-19

## 2023-01-18 RX ORDER — ACETAMINOPHEN 500 MG
500 TABLET ORAL EVERY 4 HOURS PRN
Status: DISCONTINUED | OUTPATIENT
Start: 2023-01-18 | End: 2023-01-19

## 2023-01-18 RX ORDER — SODIUM PHOSPHATE, DIBASIC AND SODIUM PHOSPHATE, MONOBASIC 7; 19 G/133ML; G/133ML
1 ENEMA RECTAL ONCE AS NEEDED
Status: DISCONTINUED | OUTPATIENT
Start: 2023-01-18 | End: 2023-01-19

## 2023-01-18 NOTE — ED INITIAL ASSESSMENT (HPI)
Pt was brought in by EMS from home for AMS and SOB. EMS reports that pt is answering questions appropriately when they arrived on scene but gave delayed answers. Pt was placed on 2L NC en route due SpO2 in the upper 80s.

## 2023-01-19 ENCOUNTER — HOSPITAL ENCOUNTER (EMERGENCY)
Facility: HOSPITAL | Age: 73
Discharge: HOME | End: 2023-01-19
Attending: EMERGENCY MEDICINE
Payer: MEDICARE

## 2023-01-19 ENCOUNTER — APPOINTMENT (OUTPATIENT)
Dept: GENERAL RADIOLOGY | Facility: HOSPITAL | Age: 73
End: 2023-01-19
Attending: EMERGENCY MEDICINE
Payer: MEDICARE

## 2023-01-19 ENCOUNTER — APPOINTMENT (OUTPATIENT)
Dept: CV DIAGNOSTICS | Facility: HOSPITAL | Age: 73
End: 2023-01-19
Attending: HOSPITALIST
Payer: MEDICARE

## 2023-01-19 VITALS
DIASTOLIC BLOOD PRESSURE: 62 MMHG | BODY MASS INDEX: 19.8 KG/M2 | HEIGHT: 71.65 IN | TEMPERATURE: 99 F | SYSTOLIC BLOOD PRESSURE: 144 MMHG | RESPIRATION RATE: 16 BRPM | WEIGHT: 144.63 LBS | OXYGEN SATURATION: 93 % | HEART RATE: 65 BPM

## 2023-01-19 VITALS
DIASTOLIC BLOOD PRESSURE: 64 MMHG | OXYGEN SATURATION: 96 % | SYSTOLIC BLOOD PRESSURE: 143 MMHG | TEMPERATURE: 98 F | RESPIRATION RATE: 19 BRPM | HEART RATE: 98 BPM

## 2023-01-19 DIAGNOSIS — J44.1 COPD EXACERBATION (HCC): Primary | ICD-10-CM

## 2023-01-19 LAB
ALBUMIN SERPL-MCNC: 3.2 G/DL (ref 3.4–5)
ALBUMIN/GLOB SERPL: 0.8 {RATIO} (ref 1–2)
ALP LIVER SERPL-CCNC: 111 U/L
ALT SERPL-CCNC: 23 U/L
ANION GAP SERPL CALC-SCNC: 3 MMOL/L (ref 0–18)
ANION GAP SERPL CALC-SCNC: 4 MMOL/L (ref 0–18)
AST SERPL-CCNC: 25 U/L (ref 15–37)
BASOPHILS # BLD AUTO: 0.01 X10(3) UL (ref 0–0.2)
BASOPHILS # BLD AUTO: 0.03 X10(3) UL (ref 0–0.2)
BASOPHILS NFR BLD AUTO: 0.2 %
BASOPHILS NFR BLD AUTO: 0.2 %
BILIRUB SERPL-MCNC: 0.4 MG/DL (ref 0.1–2)
BUN BLD-MCNC: 14 MG/DL (ref 7–18)
BUN BLD-MCNC: 19 MG/DL (ref 7–18)
CALCIUM BLD-MCNC: 8.9 MG/DL (ref 8.5–10.1)
CALCIUM BLD-MCNC: 9.4 MG/DL (ref 8.5–10.1)
CHLORIDE SERPL-SCNC: 105 MMOL/L (ref 98–112)
CHLORIDE SERPL-SCNC: 107 MMOL/L (ref 98–112)
CO2 SERPL-SCNC: 26 MMOL/L (ref 21–32)
CO2 SERPL-SCNC: 27 MMOL/L (ref 21–32)
CREAT BLD-MCNC: 1.32 MG/DL
CREAT BLD-MCNC: 1.71 MG/DL
EOSINOPHIL # BLD AUTO: 0 X10(3) UL (ref 0–0.7)
EOSINOPHIL # BLD AUTO: 0.24 X10(3) UL (ref 0–0.7)
EOSINOPHIL NFR BLD AUTO: 0 %
EOSINOPHIL NFR BLD AUTO: 1.7 %
ERYTHROCYTE [DISTWIDTH] IN BLOOD BY AUTOMATED COUNT: 18.1 %
ERYTHROCYTE [DISTWIDTH] IN BLOOD BY AUTOMATED COUNT: 18.2 %
GFR SERPLBLD BASED ON 1.73 SQ M-ARVRAT: 42 ML/MIN/1.73M2 (ref 60–?)
GFR SERPLBLD BASED ON 1.73 SQ M-ARVRAT: 57 ML/MIN/1.73M2 (ref 60–?)
GLOBULIN PLAS-MCNC: 4 G/DL (ref 2.8–4.4)
GLUCOSE BLD-MCNC: 151 MG/DL (ref 70–99)
GLUCOSE BLD-MCNC: 212 MG/DL (ref 70–99)
GLUCOSE BLD-MCNC: 323 MG/DL (ref 70–99)
GLUCOSE BLD-MCNC: 354 MG/DL (ref 70–99)
HCT VFR BLD AUTO: 29.1 %
HCT VFR BLD AUTO: 31.2 %
HGB BLD-MCNC: 10.4 G/DL
HGB BLD-MCNC: 9.6 G/DL
IMM GRANULOCYTES # BLD AUTO: 0.01 X10(3) UL (ref 0–1)
IMM GRANULOCYTES # BLD AUTO: 0.05 X10(3) UL (ref 0–1)
IMM GRANULOCYTES NFR BLD: 0.2 %
IMM GRANULOCYTES NFR BLD: 0.4 %
LYMPHOCYTES # BLD AUTO: 1.13 X10(3) UL (ref 1–4)
LYMPHOCYTES # BLD AUTO: 3.06 X10(3) UL (ref 1–4)
LYMPHOCYTES NFR BLD AUTO: 22 %
LYMPHOCYTES NFR BLD AUTO: 26.9 %
MCH RBC QN AUTO: 26.7 PG (ref 26–34)
MCH RBC QN AUTO: 26.7 PG (ref 26–34)
MCHC RBC AUTO-ENTMCNC: 33 G/DL (ref 31–37)
MCHC RBC AUTO-ENTMCNC: 33.3 G/DL (ref 31–37)
MCV RBC AUTO: 80.2 FL
MCV RBC AUTO: 81.1 FL
MONOCYTES # BLD AUTO: 0.14 X10(3) UL (ref 0.1–1)
MONOCYTES # BLD AUTO: 1.63 X10(3) UL (ref 0.1–1)
MONOCYTES NFR BLD AUTO: 11.7 %
MONOCYTES NFR BLD AUTO: 3.3 %
NEUTROPHILS # BLD AUTO: 2.91 X10 (3) UL (ref 1.5–7.7)
NEUTROPHILS # BLD AUTO: 2.91 X10(3) UL (ref 1.5–7.7)
NEUTROPHILS # BLD AUTO: 8.89 X10 (3) UL (ref 1.5–7.7)
NEUTROPHILS # BLD AUTO: 8.89 X10(3) UL (ref 1.5–7.7)
NEUTROPHILS NFR BLD AUTO: 64 %
NEUTROPHILS NFR BLD AUTO: 69.4 %
NT-PROBNP SERPL-MCNC: 893 PG/ML (ref ?–125)
OSMOLALITY SERPL CALC.SUM OF ELEC: 289 MOSM/KG (ref 275–295)
OSMOLALITY SERPL CALC.SUM OF ELEC: 295 MOSM/KG (ref 275–295)
PLATELET # BLD AUTO: 423 10(3)UL (ref 150–450)
PLATELET # BLD AUTO: 443 10(3)UL (ref 150–450)
POTASSIUM SERPL-SCNC: 4.5 MMOL/L (ref 3.5–5.1)
POTASSIUM SERPL-SCNC: 5.1 MMOL/L (ref 3.5–5.1)
PROT SERPL-MCNC: 7.2 G/DL (ref 6.4–8.2)
RBC # BLD AUTO: 3.59 X10(6)UL
RBC # BLD AUTO: 3.89 X10(6)UL
SODIUM SERPL-SCNC: 135 MMOL/L (ref 136–145)
SODIUM SERPL-SCNC: 137 MMOL/L (ref 136–145)
WBC # BLD AUTO: 13.9 X10(3) UL (ref 4–11)
WBC # BLD AUTO: 4.2 X10(3) UL (ref 4–11)

## 2023-01-19 PROCEDURE — 99239 HOSP IP/OBS DSCHRG MGMT >30: CPT | Performed by: INTERNAL MEDICINE

## 2023-01-19 PROCEDURE — 93306 TTE W/DOPPLER COMPLETE: CPT | Performed by: HOSPITALIST

## 2023-01-19 PROCEDURE — 71045 X-RAY EXAM CHEST 1 VIEW: CPT | Performed by: EMERGENCY MEDICINE

## 2023-01-19 PROCEDURE — 83880 ASSAY OF NATRIURETIC PEPTIDE: CPT | Performed by: EMERGENCY MEDICINE

## 2023-01-19 PROCEDURE — 36415 COLL VENOUS BLD VENIPUNCTURE: CPT

## 2023-01-19 PROCEDURE — 94640 AIRWAY INHALATION TREATMENT: CPT

## 2023-01-19 PROCEDURE — 85025 COMPLETE CBC W/AUTO DIFF WBC: CPT | Performed by: EMERGENCY MEDICINE

## 2023-01-19 PROCEDURE — 99285 EMERGENCY DEPT VISIT HI MDM: CPT

## 2023-01-19 RX ORDER — IPRATROPIUM BROMIDE AND ALBUTEROL SULFATE 2.5; .5 MG/3ML; MG/3ML
3 SOLUTION RESPIRATORY (INHALATION) ONCE
Status: COMPLETED | OUTPATIENT
Start: 2023-01-19 | End: 2023-01-19

## 2023-01-19 NOTE — PLAN OF CARE
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  Nonpitting edema R ankle  Continent of bladder and bowel  Fall precautions in place  Pt updated on plan of care, all needs met at this time          Problem: CARDIOVASCULAR - ADULT  Goal: Maintains optimal cardiac output and hemodynamic stability  Description: INTERVENTIONS:  - Monitor vital signs, rhythm, and trends  - Monitor for bleeding, hypotension and signs of decreased cardiac output  - Evaluate effectiveness of vasoactive medications to optimize hemodynamic stability  - Monitor arterial and/or venous puncture sites for bleeding and/or hematoma  - Assess quality of pulses, skin color and temperature  - Assess for signs of decreased coronary artery perfusion - ex.  Angina  - Evaluate fluid balance, assess for edema, trend weights  Outcome: Progressing  Goal: Absence of cardiac arrhythmias or at baseline  Description: INTERVENTIONS:  - Continuous cardiac monitoring, monitor vital signs, obtain 12 lead EKG if indicated  - Evaluate effectiveness of antiarrhythmic and heart rate control medications as ordered  - Initiate emergency measures for life threatening arrhythmias  - Monitor electrolytes and administer replacement therapy as ordered  Outcome: Progressing     Problem: RESPIRATORY - ADULT  Goal: Achieves optimal ventilation and oxygenation  Description: INTERVENTIONS:  - Assess for changes in respiratory status  - Assess for changes in mentation and behavior  - Position to facilitate oxygenation and minimize respiratory effort  - Oxygen supplementation based on oxygen saturation or ABGs  - Provide Smoking Cessation handout, if applicable  - Encourage broncho-pulmonary hygiene including cough, deep breathe, Incentive Spirometry  - Assess the need for suctioning and perform as needed  - Assess and instruct to report SOB or any respiratory difficulty  - Respiratory Therapy support as indicated  - Manage/alleviate anxiety  - Monitor for signs/symptoms of CO2 retention  Outcome: Progressing     Problem: PAIN - ADULT  Goal: Verbalizes/displays adequate comfort level or patient's stated pain goal  Description: INTERVENTIONS:  - Encourage pt to monitor pain and request assistance  - Assess pain using appropriate pain scale  - Administer analgesics based on type and severity of pain and evaluate response  - Implement non-pharmacological measures as appropriate and evaluate response  - Consider cultural and social influences on pain and pain management  - Manage/alleviate anxiety  - Utilize distraction and/or relaxation techniques  - Monitor for opioid side effects  - Notify MD/LIP if interventions unsuccessful or patient reports new pain  - Anticipate increased pain with activity and pre-medicate as appropriate  Outcome: Progressing     Problem: RISK FOR INFECTION - ADULT  Goal: Absence of fever/infection during anticipated neutropenic period  Description: INTERVENTIONS  - Monitor WBC  - Administer growth factors as ordered  - Implement neutropenic guidelines  Outcome: Progressing     Problem: SAFETY ADULT - FALL  Goal: Free from fall injury  Description: INTERVENTIONS:  - Assess pt frequently for physical needs  - Identify cognitive and physical deficits and behaviors that affect risk of falls.   - [place] fall precautions as indicated by assessment.  - Educate pt/family on patient safety including physical limitations  - Instruct pt to call for assistance with activity based on assessment  - Modify environment to reduce risk of injury  - Provide assistive devices as appropriate  - Consider OT/PT consult to assist with strengthening/mobility  - Encourage toileting schedule  Outcome: Progressing     Problem: DISCHARGE PLANNING  Goal: Discharge to home or other facility with appropriate resources  Description: INTERVENTIONS:  - Identify barriers to discharge w/pt and caregiver  - Include patient/family/discharge partner in discharge planning  - Arrange for needed discharge resources and transportation as appropriate  - Identify discharge learning needs (meds, wound care, etc)  - Arrange for interpreters to assist at discharge as needed  - Consider post-discharge preferences of patient/family/discharge partner  - Complete POLST form as appropriate  - Assess patient's ability to be responsible for managing their own health  - Refer to Case Management Department for coordinating discharge planning if the patient needs post-hospital services based on physician/LIP order or complex needs related to functional status, cognitive ability or social support system  Outcome: Progressing

## 2023-01-19 NOTE — ED QUICK NOTES
Orders for admission, patient is aware of plan and ready to go upstairs. Any questions, please call ED RN Mike Foley at extension #[phone].      Patient Covid vaccination status: Fully vaccinated and immunocompromised     COVID Test Ordered in ED: Rapid SARS-CoV-2 by PCR    COVID Suspicion at Admission: N/A    Running Infusions:  None    Mental Status/LOC at time of transport: A&Ox4     Other pertinent information: 3L NC  CIWA score: N/A   NIH score:  N/A

## 2023-01-19 NOTE — PLAN OF CARE
Pt is Ok to discharge per primary and consults. Discharge instructions including medications and follow ups given and patient verbalizes understanding. IV removed, tele monitor discontinued. All belongings taken with pt/ Pt transported off unit via wheelchair alongside tech.

## 2023-01-19 NOTE — ED QUICK NOTES
Pt report from Sophia Smith RN. Pt resting in bed, \"I'm better. \" States he is breathing \"fine, pain is a 4.\" Admission pending.  Pt and family aware of the POC

## 2023-01-19 NOTE — PLAN OF CARE
Received patient at 0730. Alert and Oriented x4. Tele Rhythm NSR. Pt on 1L. Breath sounds diminished. Bed is locked and in low position. Call light and personal items within reach. Pt complained of pain in abdomen/back from pancreatitis, pain medication given. Pt voiding with no issue. Pt up ad black, some SOB when ambulating. Skin dry and intact. Pt receiving nebs. Reviewed plan of care and patient verbalizes understanding. Plan:  - wean O2  - echo  - nebs    Problem: CARDIOVASCULAR - ADULT  Goal: Maintains optimal cardiac output and hemodynamic stability  Description: INTERVENTIONS:  - Monitor vital signs, rhythm, and trends  - Monitor for bleeding, hypotension and signs of decreased cardiac output  - Evaluate effectiveness of vasoactive medications to optimize hemodynamic stability  - Monitor arterial and/or venous puncture sites for bleeding and/or hematoma  - Assess quality of pulses, skin color and temperature  - Assess for signs of decreased coronary artery perfusion - ex.  Angina  - Evaluate fluid balance, assess for edema, trend weights  Outcome: Progressing  Goal: Absence of cardiac arrhythmias or at baseline  Description: INTERVENTIONS:  - Continuous cardiac monitoring, monitor vital signs, obtain 12 lead EKG if indicated  - Evaluate effectiveness of antiarrhythmic and heart rate control medications as ordered  - Initiate emergency measures for life threatening arrhythmias  - Monitor electrolytes and administer replacement therapy as ordered  Outcome: Progressing     Problem: RESPIRATORY - ADULT  Goal: Achieves optimal ventilation and oxygenation  Description: INTERVENTIONS:  - Assess for changes in respiratory status  - Assess for changes in mentation and behavior  - Position to facilitate oxygenation and minimize respiratory effort  - Oxygen supplementation based on oxygen saturation or ABGs  - Provide Smoking Cessation handout, if applicable  - Encourage broncho-pulmonary hygiene including cough, deep breathe, Incentive Spirometry  - Assess the need for suctioning and perform as needed  - Assess and instruct to report SOB or any respiratory difficulty  - Respiratory Therapy support as indicated  - Manage/alleviate anxiety  - Monitor for signs/symptoms of CO2 retention  Outcome: Progressing     Problem: PAIN - ADULT  Goal: Verbalizes/displays adequate comfort level or patient's stated pain goal  Description: INTERVENTIONS:  - Encourage pt to monitor pain and request assistance  - Assess pain using appropriate pain scale  - Administer analgesics based on type and severity of pain and evaluate response  - Implement non-pharmacological measures as appropriate and evaluate response  - Consider cultural and social influences on pain and pain management  - Manage/alleviate anxiety  - Utilize distraction and/or relaxation techniques  - Monitor for opioid side effects  - Notify MD/LIP if interventions unsuccessful or patient reports new pain  - Anticipate increased pain with activity and pre-medicate as appropriate  Outcome: Progressing     Problem: DISCHARGE PLANNING  Goal: Discharge to home or other facility with appropriate resources  Description: INTERVENTIONS:  - Identify barriers to discharge w/pt and caregiver  - Include patient/family/discharge partner in discharge planning  - Arrange for needed discharge resources and transportation as appropriate  - Identify discharge learning needs (meds, wound care, etc)  - Arrange for interpreters to assist at discharge as needed  - Consider post-discharge preferences of patient/family/discharge partner  - Complete POLST form as appropriate  - Assess patient's ability to be responsible for managing their own health  - Refer to Case Management Department for coordinating discharge planning if the patient needs post-hospital services based on physician/LIP order or complex needs related to functional status, cognitive ability or social support system  Outcome: Progressing     Problem: SAFETY ADULT - FALL  Goal: Free from fall injury  Description: INTERVENTIONS:  - Assess pt frequently for physical needs  - Identify cognitive and physical deficits and behaviors that affect risk of falls.   - [place] fall precautions as indicated by assessment.  - Educate pt/family on patient safety including physical limitations  - Instruct pt to call for assistance with activity based on assessment  - Modify environment to reduce risk of injury  - Provide assistive devices as appropriate  - Consider OT/PT consult to assist with strengthening/mobility  - Encourage toileting schedule  Outcome: Progressing     Problem: RISK FOR INFECTION - ADULT  Goal: Absence of fever/infection during anticipated neutropenic period  Description: INTERVENTIONS  - Monitor WBC  - Administer growth factors as ordered  - Implement neutropenic guidelines  Outcome: Progressing

## 2023-01-19 NOTE — PROGRESS NOTES
01/19/23 1300   Mobility   O2 walk? Yes   SPO2% on Room Air at Rest 95   At rest oxygen flow (liters per minute) 0   SPO2% Ambulation on Room Air 87   Ambulation oxygen flow (liters per minute) 0   At rest, Pt's oxygen level was at 95% on room air. Upon ambulation, pt's oxygen fell to 87% on room air. Pt quickly recovered back to the 90's at rest on room air.

## 2023-01-20 ENCOUNTER — PATIENT OUTREACH (OUTPATIENT)
Dept: CASE MANAGEMENT | Age: 73
End: 2023-01-20

## 2023-01-20 ENCOUNTER — TELEPHONE (OUTPATIENT)
Facility: CLINIC | Age: 73
End: 2023-01-20

## 2023-01-20 ENCOUNTER — HOSPITAL ENCOUNTER (OUTPATIENT)
Facility: HOSPITAL | Age: 73
Setting detail: OBSERVATION
Discharge: HOME | End: 2023-01-22
Attending: EMERGENCY MEDICINE | Admitting: HOSPITALIST
Payer: MEDICARE

## 2023-01-20 ENCOUNTER — TELEPHONE (OUTPATIENT)
Dept: INTERNAL MEDICINE CLINIC | Facility: CLINIC | Age: 73
End: 2023-01-20

## 2023-01-20 DIAGNOSIS — J44.1 COPD EXACERBATION (HCC): ICD-10-CM

## 2023-01-20 DIAGNOSIS — N28.9 ACUTE RENAL INSUFFICIENCY: Primary | ICD-10-CM

## 2023-01-20 LAB
ALBUMIN SERPL-MCNC: 3.4 G/DL (ref 3.4–5)
ALBUMIN/GLOB SERPL: 0.9 {RATIO} (ref 1–2)
ALP LIVER SERPL-CCNC: 107 U/L
ALT SERPL-CCNC: 25 U/L
ANION GAP SERPL CALC-SCNC: 8 MMOL/L (ref 0–18)
AST SERPL-CCNC: 37 U/L (ref 15–37)
BASOPHILS # BLD AUTO: 0.04 X10(3) UL (ref 0–0.2)
BASOPHILS NFR BLD AUTO: 0.4 %
BILIRUB SERPL-MCNC: 0.4 MG/DL (ref 0.1–2)
BUN BLD-MCNC: 26 MG/DL (ref 7–18)
CALCIUM BLD-MCNC: 9.3 MG/DL (ref 8.5–10.1)
CHLORIDE SERPL-SCNC: 108 MMOL/L (ref 98–112)
CO2 SERPL-SCNC: 24 MMOL/L (ref 21–32)
CREAT BLD-MCNC: 2.07 MG/DL
EOSINOPHIL # BLD AUTO: 0.75 X10(3) UL (ref 0–0.7)
EOSINOPHIL NFR BLD AUTO: 6.6 %
ERYTHROCYTE [DISTWIDTH] IN BLOOD BY AUTOMATED COUNT: 18.3 %
FLUAV + FLUBV RNA SPEC NAA+PROBE: NEGATIVE
FLUAV + FLUBV RNA SPEC NAA+PROBE: NEGATIVE
GFR SERPLBLD BASED ON 1.73 SQ M-ARVRAT: 33 ML/MIN/1.73M2 (ref 60–?)
GLOBULIN PLAS-MCNC: 3.9 G/DL (ref 2.8–4.4)
GLUCOSE BLD-MCNC: 89 MG/DL (ref 70–99)
GLUCOSE BLD-MCNC: 95 MG/DL (ref 70–99)
HCT VFR BLD AUTO: 29.3 %
HGB BLD-MCNC: 9.9 G/DL
IMM GRANULOCYTES # BLD AUTO: 0.05 X10(3) UL (ref 0–1)
IMM GRANULOCYTES NFR BLD: 0.4 %
LYMPHOCYTES # BLD AUTO: 2.2 X10(3) UL (ref 1–4)
LYMPHOCYTES NFR BLD AUTO: 19.5 %
MCH RBC QN AUTO: 27.3 PG (ref 26–34)
MCHC RBC AUTO-ENTMCNC: 33.8 G/DL (ref 31–37)
MCV RBC AUTO: 80.9 FL
MONOCYTES # BLD AUTO: 1.35 X10(3) UL (ref 0.1–1)
MONOCYTES NFR BLD AUTO: 11.9 %
NEUTROPHILS # BLD AUTO: 6.92 X10 (3) UL (ref 1.5–7.7)
NEUTROPHILS # BLD AUTO: 6.92 X10(3) UL (ref 1.5–7.7)
NEUTROPHILS NFR BLD AUTO: 61.2 %
NT-PROBNP SERPL-MCNC: 512 PG/ML (ref ?–125)
OSMOLALITY SERPL CALC.SUM OF ELEC: 294 MOSM/KG (ref 275–295)
PLATELET # BLD AUTO: 393 10(3)UL (ref 150–450)
POTASSIUM SERPL-SCNC: 4.7 MMOL/L (ref 3.5–5.1)
PROT SERPL-MCNC: 7.3 G/DL (ref 6.4–8.2)
RBC # BLD AUTO: 3.62 X10(6)UL
RSV RNA SPEC NAA+PROBE: NEGATIVE
SARS-COV-2 RNA RESP QL NAA+PROBE: DETECTED
SARS-COV-2 RNA RESP QL NAA+PROBE: NOT DETECTED
SODIUM SERPL-SCNC: 140 MMOL/L (ref 136–145)
TROPONIN I HIGH SENSITIVITY: 11 NG/L
WBC # BLD AUTO: 11.3 X10(3) UL (ref 4–11)

## 2023-01-20 PROCEDURE — 99223 1ST HOSP IP/OBS HIGH 75: CPT | Performed by: HOSPITALIST

## 2023-01-20 RX ORDER — METOCLOPRAMIDE HYDROCHLORIDE 5 MG/ML
5 INJECTION INTRAMUSCULAR; INTRAVENOUS EVERY 8 HOURS PRN
Status: DISCONTINUED | OUTPATIENT
Start: 2023-01-20 | End: 2023-01-22

## 2023-01-20 RX ORDER — HYDROMORPHONE HYDROCHLORIDE 2 MG/1
8 TABLET ORAL EVERY 8 HOURS PRN
Status: DISCONTINUED | OUTPATIENT
Start: 2023-01-20 | End: 2023-01-22

## 2023-01-20 RX ORDER — ASPIRIN 81 MG/1
81 TABLET ORAL DAILY
Status: DISCONTINUED | OUTPATIENT
Start: 2023-01-21 | End: 2023-01-22

## 2023-01-20 RX ORDER — BUDESONIDE 0.25 MG/2ML
0.25 INHALANT ORAL 2 TIMES DAILY
Status: DISCONTINUED | OUTPATIENT
Start: 2023-01-20 | End: 2023-01-22

## 2023-01-20 RX ORDER — DEXTROSE MONOHYDRATE 25 G/50ML
50 INJECTION, SOLUTION INTRAVENOUS
Status: DISCONTINUED | OUTPATIENT
Start: 2023-01-20 | End: 2023-01-22

## 2023-01-20 RX ORDER — LORAZEPAM 1 MG/1
1 TABLET ORAL NIGHTLY PRN
Status: DISCONTINUED | OUTPATIENT
Start: 2023-01-20 | End: 2023-01-22

## 2023-01-20 RX ORDER — PANTOPRAZOLE SODIUM 20 MG/1
20 TABLET, DELAYED RELEASE ORAL
Status: DISCONTINUED | OUTPATIENT
Start: 2023-01-21 | End: 2023-01-22

## 2023-01-20 RX ORDER — FOLIC ACID 1 MG/1
1 TABLET ORAL DAILY
Status: DISCONTINUED | OUTPATIENT
Start: 2023-01-21 | End: 2023-01-22

## 2023-01-20 RX ORDER — ONDANSETRON 2 MG/ML
4 INJECTION INTRAMUSCULAR; INTRAVENOUS EVERY 6 HOURS PRN
Status: DISCONTINUED | OUTPATIENT
Start: 2023-01-20 | End: 2023-01-22

## 2023-01-20 RX ORDER — NICOTINE POLACRILEX 4 MG
30 LOZENGE BUCCAL
Status: DISCONTINUED | OUTPATIENT
Start: 2023-01-20 | End: 2023-01-22

## 2023-01-20 RX ORDER — ALBUTEROL SULFATE 90 UG/1
2 AEROSOL, METERED RESPIRATORY (INHALATION) EVERY 6 HOURS PRN
Status: DISCONTINUED | OUTPATIENT
Start: 2023-01-20 | End: 2023-01-22

## 2023-01-20 RX ORDER — ATORVASTATIN CALCIUM 40 MG/1
40 TABLET, FILM COATED ORAL NIGHTLY
Status: DISCONTINUED | OUTPATIENT
Start: 2023-01-20 | End: 2023-01-22

## 2023-01-20 RX ORDER — HEPARIN SODIUM 5000 [USP'U]/ML
5000 INJECTION, SOLUTION INTRAVENOUS; SUBCUTANEOUS EVERY 8 HOURS SCHEDULED
Status: DISCONTINUED | OUTPATIENT
Start: 2023-01-20 | End: 2023-01-22

## 2023-01-20 RX ORDER — FENTANYL 50 UG/H
1 PATCH TRANSDERMAL
Status: DISCONTINUED | OUTPATIENT
Start: 2023-01-21 | End: 2023-01-22

## 2023-01-20 RX ORDER — IPRATROPIUM BROMIDE AND ALBUTEROL SULFATE 2.5; .5 MG/3ML; MG/3ML
3 SOLUTION RESPIRATORY (INHALATION) EVERY 4 HOURS PRN
Status: DISCONTINUED | OUTPATIENT
Start: 2023-01-20 | End: 2023-01-22

## 2023-01-20 RX ORDER — NICOTINE POLACRILEX 4 MG
15 LOZENGE BUCCAL
Status: DISCONTINUED | OUTPATIENT
Start: 2023-01-20 | End: 2023-01-22

## 2023-01-20 RX ORDER — SODIUM CHLORIDE 9 MG/ML
INJECTION, SOLUTION INTRAVENOUS CONTINUOUS
Status: DISCONTINUED | OUTPATIENT
Start: 2023-01-20 | End: 2023-01-21

## 2023-01-20 RX ORDER — ACETAMINOPHEN 500 MG
500 TABLET ORAL EVERY 4 HOURS PRN
Status: DISCONTINUED | OUTPATIENT
Start: 2023-01-20 | End: 2023-01-22

## 2023-01-20 RX ORDER — SODIUM CHLORIDE 9 MG/ML
INJECTION, SOLUTION INTRAVENOUS ONCE
Status: COMPLETED | OUTPATIENT
Start: 2023-01-20 | End: 2023-01-20

## 2023-01-20 RX ORDER — METOPROLOL SUCCINATE 50 MG/1
50 TABLET, EXTENDED RELEASE ORAL DAILY
Status: DISCONTINUED | OUTPATIENT
Start: 2023-01-21 | End: 2023-01-22

## 2023-01-20 RX ORDER — ARFORMOTEROL TARTRATE 15 UG/2ML
15 SOLUTION RESPIRATORY (INHALATION) 2 TIMES DAILY
Status: DISCONTINUED | OUTPATIENT
Start: 2023-01-20 | End: 2023-01-22

## 2023-01-20 NOTE — PROGRESS NOTES
LM for pt to call [facility] for condition update since discharge. [facility] phone number was provided for pt to call back. [facility] contact information was provided for pt to call back as well. TE will be sent to PCP office to FU on HFU appt as pt is a high risk for readmission.

## 2023-01-20 NOTE — ED INITIAL ASSESSMENT (HPI)
Pt seen here yesterday for COPD exacerbation, wife called [phone] today for increased fatigue. Per EMS patient states he hasn't slept since leaving the ER yesterday.

## 2023-01-20 NOTE — ED QUICK NOTES
Orders for admission, patient is aware of plan and ready to go upstairs. Any questions, please call ED RN avelino at extension [phone]. Patient Covid vaccination status: Fully vaccinated and immunocompromised     COVID Test Ordered in ED: SARS-CoV-2/Flu A and B/RSV by PCR ([facility])    COVID Suspicion at Admission: N/A    Running Infusions:  0.9 NS at 100 mL/hr    Mental Status/LOC at time of transport: AxO x4    Other pertinent information: Continent x2.  On 3L NC, usually 2L NC.   CIWA score: N/A   NIH score:  N/A

## 2023-01-20 NOTE — TELEPHONE ENCOUNTER
Spoke with pts spouse Rachel Garcia, scheduled appt, she will cb if he cannot make it.     Future Appointments   Date Time Provider Basilio Caraballo   1/24/2023  2:30 PM Morgan Reyes MD [facility] [place]

## 2023-01-20 NOTE — TELEPHONE ENCOUNTER
Attempted to contact pt for condition update today however no answer. Pt has an appt on 1/26/23 with Dr. Laly Haley however the appt is only for 15 minutes. [facility] was not able to extend the appt to 30 minutes for HFU appt. Please advise. A non- HFU appt recommended by 1/26/23 as pt is a high risk for readmission. Pt is currently in TCM therefore a regular non- TCM HFU appt is recommended. Clinical staff:  Please f/u with pt and try to get them to schedule as pt would greatly benefit from an HFU appt.   Thank brissa

## 2023-01-20 NOTE — PROGRESS NOTES
Attempted to contact pt for condition update however call rang multiple times and then sounded as though it was going to VM but disconnected. Unable to leave a VM at this time. NCM to try again at a later time. Pt needs a longer appt, TE will be sent to PCP office  if pt is not reached on second attempt.

## 2023-01-20 NOTE — TELEPHONE ENCOUNTER
Patient has been in and out of [facility] ER for the past two days.   Oxygen is cranked up to 4, patient is lethargic

## 2023-01-20 NOTE — ED INITIAL ASSESSMENT (HPI)
Patient reports he was admitted two days ago, and  here last night for low O2. Baseline O2 2L prn, now wearing 3L. C/o cough. Denies fever, CP.

## 2023-01-20 NOTE — TELEPHONE ENCOUNTER
Call placed to wife. Pt had colonoscopy on 1-17-23. On 1-18-23, pt to ER for increased BP. O2 levels 83-85%. Was told that if he kept his O2 up, he could go home. Discharged on 12-19. Back to ER last night. Oxygen levels dropping again. They wanted to keep him for observation but pt left. Wife states that Medicare won't pay for observation. Was advised to increase O2 to 4L but still having trouble keeping O2 up.  O2 ranging 88-91%. Also had swelling in his hands and feet and was given diuretic. States that ER told her that pt is having a copd exacerbation. Wife states pt is drowsy and confused. Concerned that his nebulized medications might be an issue. Wants Dr. Prashant Flores to do a direct admission. .  Questioned whether pt would even stay if he were admitted. She stated that he would have to. Does not want to spend hours in the ER again. Reviewed the above with Dr. Prashant Flores. Per Dr. Prashant Flores, he cannot directly admit pt into hospital.  Georgi Hanks that pt needs to go back to ER.

## 2023-01-20 NOTE — RESPIRATORY THERAPY NOTE
Pt stated that he was here on 1/18/2023 had an ABG drawn refused ABG at this time Dr Marquez Nab aware.

## 2023-01-20 NOTE — TELEPHONE ENCOUNTER
[facility] office, it looks like the only 30-minute spot available with Dr. Dunbar Age next week is on Tuesday 1/31/23 at 2:30 PM. ty

## 2023-01-21 PROBLEM — U07.1 COVID: Status: ACTIVE | Noted: 2022-12-20

## 2023-01-21 PROBLEM — U07.1 COVID: Status: ACTIVE | Noted: 2022-01-01

## 2023-01-21 LAB
ANION GAP SERPL CALC-SCNC: 6 MMOL/L (ref 0–18)
BASOPHILS # BLD AUTO: 0.03 X10(3) UL (ref 0–0.2)
BASOPHILS NFR BLD AUTO: 0.3 %
BUN BLD-MCNC: 20 MG/DL (ref 7–18)
CALCIUM BLD-MCNC: 8.8 MG/DL (ref 8.5–10.1)
CHLORIDE SERPL-SCNC: 109 MMOL/L (ref 98–112)
CO2 SERPL-SCNC: 27 MMOL/L (ref 21–32)
CREAT BLD-MCNC: 1.34 MG/DL
EOSINOPHIL # BLD AUTO: 0.47 X10(3) UL (ref 0–0.7)
EOSINOPHIL NFR BLD AUTO: 4.5 %
ERYTHROCYTE [DISTWIDTH] IN BLOOD BY AUTOMATED COUNT: 18.5 %
GFR SERPLBLD BASED ON 1.73 SQ M-ARVRAT: 56 ML/MIN/1.73M2 (ref 60–?)
GLUCOSE BLD-MCNC: 125 MG/DL (ref 70–99)
GLUCOSE BLD-MCNC: 127 MG/DL (ref 70–99)
GLUCOSE BLD-MCNC: 130 MG/DL (ref 70–99)
GLUCOSE BLD-MCNC: 132 MG/DL (ref 70–99)
GLUCOSE BLD-MCNC: 150 MG/DL (ref 70–99)
GLUCOSE BLD-MCNC: 243 MG/DL (ref 70–99)
GLUCOSE BLD-MCNC: 271 MG/DL (ref 70–99)
GLUCOSE BLD-MCNC: 46 MG/DL (ref 70–99)
GLUCOSE BLD-MCNC: 52 MG/DL (ref 70–99)
HCT VFR BLD AUTO: 29.9 %
HGB BLD-MCNC: 9.8 G/DL
IMM GRANULOCYTES # BLD AUTO: 0.06 X10(3) UL (ref 0–1)
IMM GRANULOCYTES NFR BLD: 0.6 %
LYMPHOCYTES # BLD AUTO: 0.82 X10(3) UL (ref 1–4)
LYMPHOCYTES NFR BLD AUTO: 7.8 %
MCH RBC QN AUTO: 26.7 PG (ref 26–34)
MCHC RBC AUTO-ENTMCNC: 32.8 G/DL (ref 31–37)
MCV RBC AUTO: 81.5 FL
MONOCYTES # BLD AUTO: 1.08 X10(3) UL (ref 0.1–1)
MONOCYTES NFR BLD AUTO: 10.3 %
NEUTROPHILS # BLD AUTO: 7.99 X10 (3) UL (ref 1.5–7.7)
NEUTROPHILS # BLD AUTO: 7.99 X10(3) UL (ref 1.5–7.7)
NEUTROPHILS NFR BLD AUTO: 76.5 %
OSMOLALITY SERPL CALC.SUM OF ELEC: 299 MOSM/KG (ref 275–295)
PLATELET # BLD AUTO: 420 10(3)UL (ref 150–450)
POTASSIUM SERPL-SCNC: 3.7 MMOL/L (ref 3.5–5.1)
RBC # BLD AUTO: 3.67 X10(6)UL
SODIUM SERPL-SCNC: 142 MMOL/L (ref 136–145)
WBC # BLD AUTO: 10.5 X10(3) UL (ref 4–11)

## 2023-01-21 PROCEDURE — 99232 SBSQ HOSP IP/OBS MODERATE 35: CPT | Performed by: INTERNAL MEDICINE

## 2023-01-21 RX ORDER — POTASSIUM CHLORIDE 20 MEQ/1
40 TABLET, EXTENDED RELEASE ORAL ONCE
Status: COMPLETED | OUTPATIENT
Start: 2023-01-21 | End: 2023-01-21

## 2023-01-21 RX ORDER — PREDNISONE 20 MG/1
40 TABLET ORAL
Status: DISCONTINUED | OUTPATIENT
Start: 2023-01-21 | End: 2023-01-22

## 2023-01-21 RX ORDER — AZITHROMYCIN 250 MG/1
250 TABLET, FILM COATED ORAL DAILY
Status: DISCONTINUED | OUTPATIENT
Start: 2023-01-21 | End: 2023-01-22

## 2023-01-21 NOTE — BH PROGRESS NOTE
NURSING ADMISSION NOTE      Patient arrived from the E D  via Cart at 1900 and was admitted to room 501 with dx of JONO. He is A&Ox4 and saturating above 90% on O2 at 2L/nc.  + occasional non productive cough.  + IVF infusing at 83cc/hour. Denies discomfort. Accucheck 95. Dinner ordered. Wife at bedside. Oriented to room. Safety precautions initiated. Bed in low position. Call light in reach.

## 2023-01-21 NOTE — PLAN OF CARE
Patient is A&Ox4, lethargic, fatigued. Vital signs wnl, afebrile. Tolerating room air at rest with sats >92%, requires 2L NC with sleep and ambulation. Occasional dry cough. He c/o chronic pancreatitis pain in epigastric region that radiates to the back. Pain patch and prn meds for pain control- See MAR. Hypoglycemic this afternoon with BS 52. Protocol followed, recheck 132. Trending BS. Hospitalist notified. No c/o n/v/d, dizziness. Up ad black. Fall precautions in place. Call light within reach. SCDs refused. Heparin subq for VTE prophylaxis. QID accuchecks and insulin for blood glucose management. Patient updated on plan of care. No further questions at this time. Rounded on frequently. Will continue to monitor closely.     Problem: Diabetes/Glucose Control  Goal: Glucose maintained within prescribed range  Description: INTERVENTIONS:  - Monitor Blood Glucose as ordered  - Assess for signs and symptoms of hyperglycemia and hypoglycemia  - Administer ordered medications to maintain glucose within target range  - Assess barriers to adequate nutritional intake and initiate nutrition consult as needed  - Instruct patient on self management of diabetes  Outcome: Progressing     Problem: Patient/Family Goals  Goal: Patient/Family Long Term Goal  Description: Patient's Long Term Goal:   RESOLUTION OF LETHARGY AND SOB    Interventions:  - SUPPLEMENTAL O2 as NEEDED  - PULMONARY CONSULT  - See additional Care Plan goals for specific interventions  Outcome: Progressing  Goal: Patient/Family Short Term Goal  Description: Patient's Short Term Goal:   01/20/2023 - \"GET SOME DILAUDID SO THAT I CAN GO TO SLEEP\"  1/21 am: stabilize blood sugar    Interventions:   - COMPLETE HOME MED LIST AND PAGE MD FOR ORDER  - See additional Care Plan goals for specific interventions  Outcome: Progressing     Problem: PAIN - ADULT  Goal: Verbalizes/displays adequate comfort level or patient's stated pain goal  Description: INTERVENTIONS:  - Encourage pt to monitor pain and request assistance  - Assess pain using appropriate pain scale  - Administer analgesics based on type and severity of pain and evaluate response  - Implement non-pharmacological measures as appropriate and evaluate response  - Consider cultural and social influences on pain and pain management  - Manage/alleviate anxiety  - Utilize distraction and/or relaxation techniques  - Monitor for opioid side effects  - Notify MD/LIP if interventions unsuccessful or patient reports new pain  - Anticipate increased pain with activity and pre-medicate as appropriate  Outcome: Progressing     Problem: METABOLIC/FLUID AND ELECTROLYTES - ADULT  Goal: Glucose maintained within prescribed range  Description: INTERVENTIONS:  - Monitor Blood Glucose as ordered  - Assess for signs and symptoms of hyperglycemia and hypoglycemia  - Administer ordered medications to maintain glucose within target range  - Assess barriers to adequate nutritional intake and initiate nutrition consult as needed  - Instruct patient on self management of diabetes  Outcome: Progressing

## 2023-01-21 NOTE — PROGRESS NOTES
PATIENT'S ISOLATION STATUS VERIFIED WITH DR Miguel Moser PER PATIENT'S WIFE REQUEST. PATIENT IS TO REMAIN IN ISOLATION. PATIENT AND HIS WIFE WERE INFORMED AND THEY VERBALIZED UNDERSTANDING.

## 2023-01-21 NOTE — PROGRESS NOTES
Date: 2023    To: Jaylan Whitney  : 1950     I hope this letter finds you doing well. I am writing to inform you of the following: The biopsies obtained from your recent colonoscopy indicate that the polyps were adenomas which, although benign (no evidence of cancer), are considered potentially pre-cancerous if they are left alone for many years. They were removed at the time of your colonoscopy. I am advising you to undergo repeat colonoscopy in 3 years. We will send you a reminder card when the time of your procedure is near. Please call the office at [phone] if there are any questions.     Sierra Sage M.D.

## 2023-01-21 NOTE — PROGRESS NOTES
Accucheck this morning at 0619 was 46. He was symptomatic. Reported being \"lightheaded and seeing spots'. Two glucose shots were given per protocol. Recheck was 125 at 8956. Endorsed to Day RN. Page sent to Dr Suzanna Valdivia.

## 2023-01-22 VITALS
DIASTOLIC BLOOD PRESSURE: 88 MMHG | RESPIRATION RATE: 18 BRPM | HEART RATE: 67 BPM | SYSTOLIC BLOOD PRESSURE: 147 MMHG | BODY MASS INDEX: 20 KG/M2 | WEIGHT: 144.38 LBS | OXYGEN SATURATION: 97 % | TEMPERATURE: 98 F

## 2023-01-22 LAB
ANION GAP SERPL CALC-SCNC: 5 MMOL/L (ref 0–18)
ATRIAL RATE: 80 BPM
BUN BLD-MCNC: 17 MG/DL (ref 7–18)
CALCIUM BLD-MCNC: 9.4 MG/DL (ref 8.5–10.1)
CHLORIDE SERPL-SCNC: 112 MMOL/L (ref 98–112)
CO2 SERPL-SCNC: 24 MMOL/L (ref 21–32)
CREAT BLD-MCNC: 1.23 MG/DL
GFR SERPLBLD BASED ON 1.73 SQ M-ARVRAT: 62 ML/MIN/1.73M2 (ref 60–?)
GLUCOSE BLD-MCNC: 124 MG/DL (ref 70–99)
GLUCOSE BLD-MCNC: 168 MG/DL (ref 70–99)
GLUCOSE BLD-MCNC: 216 MG/DL (ref 70–99)
GLUCOSE BLD-MCNC: 226 MG/DL (ref 70–99)
OSMOLALITY SERPL CALC.SUM OF ELEC: 297 MOSM/KG (ref 275–295)
P AXIS: 68 DEGREES
P-R INTERVAL: 140 MS
POTASSIUM SERPL-SCNC: 4.3 MMOL/L (ref 3.5–5.1)
POTASSIUM SERPL-SCNC: 4.3 MMOL/L (ref 3.5–5.1)
Q-T INTERVAL: 366 MS
QRS DURATION: 90 MS
QTC CALCULATION (BEZET): 422 MS
R AXIS: 74 DEGREES
SODIUM SERPL-SCNC: 141 MMOL/L (ref 136–145)
T AXIS: 67 DEGREES
VENTRICULAR RATE: 80 BPM

## 2023-01-22 PROCEDURE — 99239 HOSP IP/OBS DSCHRG MGMT >30: CPT | Performed by: INTERNAL MEDICINE

## 2023-01-22 RX ORDER — PREDNISONE 10 MG/1
TABLET ORAL
Qty: 50 TABLET | Refills: 0 | Status: SHIPPED | OUTPATIENT
Start: 2023-01-22

## 2023-01-22 RX ORDER — AZITHROMYCIN 250 MG/1
250 TABLET, FILM COATED ORAL DAILY
Qty: 30 TABLET | Refills: 0 | Status: SHIPPED | OUTPATIENT
Start: 2023-01-22 | End: 2023-01-24 | Stop reason: ALTCHOICE

## 2023-01-22 NOTE — PROGRESS NOTES
01/22/23 1100   Mobility   Distance (ft) 100   SPO2% on Room Air at Rest 93   SPO2% on Oxygen at Rest 95   SPO2% Ambulation on Room Air 91

## 2023-01-22 NOTE — PLAN OF CARE
NURSING DISCHARGE NOTE    Discharged Home via Wheelchair. Accompanied by RN  Belongings Taken by patient/family. Patient is stable to discharge home. Medically cleared by all consults and hospitalist. Reviewed discharge instructions about medications and post-discharge follow up visits with patient and pt's wife at bedside. Both verbalized understanding. Questions and concerns addressed. IV line dc'ed, pressure and dressing applied. Clean/dry/intact. Discharge navigator completed. Tele box removed, cleaned, and returned to [facility]. All patient's belongings sent with patient.       Problem: Diabetes/Glucose Control  Goal: Glucose maintained within prescribed range  Description: INTERVENTIONS:  - Monitor Blood Glucose as ordered  - Assess for signs and symptoms of hyperglycemia and hypoglycemia  - Administer ordered medications to maintain glucose within target range  - Assess barriers to adequate nutritional intake and initiate nutrition consult as needed  - Instruct patient on self management of diabetes  Outcome: Completed     Problem: Patient/Family Goals  Goal: Patient/Family Long Term Goal  Description: Patient's Long Term Goal:   RESOLUTION OF LETHARGY AND SOB    Interventions:  - SUPPLEMENTAL O2 as NEEDED  - PULMONARY CONSULT  - See additional Care Plan goals for specific interventions  Outcome: Completed  Goal: Patient/Family Short Term Goal  Description: Patient's Short Term Goal:   01/20/2023 - \"GET SOME DILAUDID SO THAT I CAN GO TO SLEEP\"  1/21 am: stabilize blood sugar  01/21/2023 - \"I just want to get a snack\"    Interventions:   - COMPLETE HOME MED LIST AND PAGE MD FOR ORDER  - See additional Care Plan goals for specific interventions  Outcome: Completed     Problem: PAIN - ADULT  Goal: Verbalizes/displays adequate comfort level or patient's stated pain goal  Description: INTERVENTIONS:  - Encourage pt to monitor pain and request assistance  - Assess pain using appropriate pain scale  - Administer analgesics based on type and severity of pain and evaluate response  - Implement non-pharmacological measures as appropriate and evaluate response  - Consider cultural and social influences on pain and pain management  - Manage/alleviate anxiety  - Utilize distraction and/or relaxation techniques  - Monitor for opioid side effects  - Notify MD/LIP if interventions unsuccessful or patient reports new pain  - Anticipate increased pain with activity and pre-medicate as appropriate  Outcome: Completed     Problem: METABOLIC/FLUID AND ELECTROLYTES - ADULT  Goal: Glucose maintained within prescribed range  Description: INTERVENTIONS:  - Monitor Blood Glucose as ordered  - Assess for signs and symptoms of hyperglycemia and hypoglycemia  - Administer ordered medications to maintain glucose within target range  - Assess barriers to adequate nutritional intake and initiate nutrition consult as needed  - Instruct patient on self management of diabetes  Outcome: Completed

## 2023-01-22 NOTE — PLAN OF CARE
Problem: Diabetes/Glucose Control  Goal: Glucose maintained within prescribed range  Description: INTERVENTIONS:  - Monitor Blood Glucose as ordered  - Assess for signs and symptoms of hyperglycemia and hypoglycemia  - Administer ordered medications to maintain glucose within target range  - Assess barriers to adequate nutritional intake and initiate nutrition consult as needed  - Instruct patient on self management of diabetes  Outcome: Progressing     Problem: Patient/Family Goals  Goal: Patient/Family Long Term Goal  Description: Patient's Long Term Goal:   RESOLUTION OF LETHARGY AND SOB    Interventions:  - SUPPLEMENTAL O2 as NEEDED  - PULMONARY CONSULT  - See additional Care Plan goals for specific interventions  Outcome: Progressing  Goal: Patient/Family Short Term Goal  Description: Patient's Short Term Goal:   01/20/2023 - \"GET SOME DILAUDID SO THAT I CAN GO TO SLEEP\"  1/21 am: stabilize blood sugar  01/21/2023 - \"I just want to get a snack\"    Interventions:   - COMPLETE HOME MED LIST AND PAGE MD FOR ORDER  - See additional Care Plan goals for specific interventions  Outcome: Progressing     Problem: PAIN - ADULT  Goal: Verbalizes/displays adequate comfort level or patient's stated pain goal  Description: INTERVENTIONS:  - Encourage pt to monitor pain and request assistance  - Assess pain using appropriate pain scale  - Administer analgesics based on type and severity of pain and evaluate response  - Implement non-pharmacological measures as appropriate and evaluate response  - Consider cultural and social influences on pain and pain management  - Manage/alleviate anxiety  - Utilize distraction and/or relaxation techniques  - Monitor for opioid side effects  - Notify MD/LIP if interventions unsuccessful or patient reports new pain  - Anticipate increased pain with activity and pre-medicate as appropriate  Outcome: Progressing     Problem: METABOLIC/FLUID AND ELECTROLYTES - ADULT  Goal: Glucose maintained within prescribed range  Description: INTERVENTIONS:  - Monitor Blood Glucose as ordered  - Assess for signs and symptoms of hyperglycemia and hypoglycemia  - Administer ordered medications to maintain glucose within target range  - Assess barriers to adequate nutritional intake and initiate nutrition consult as needed  - Instruct patient on self management of diabetes  Outcome: Progressing

## 2023-01-23 ENCOUNTER — OFFICE VISIT (OUTPATIENT)
Facility: CLINIC | Age: 73
End: 2023-01-23
Payer: COMMERCIAL

## 2023-01-23 VITALS
HEART RATE: 104 BPM | DIASTOLIC BLOOD PRESSURE: 70 MMHG | BODY MASS INDEX: 20.72 KG/M2 | RESPIRATION RATE: 16 BRPM | SYSTOLIC BLOOD PRESSURE: 110 MMHG | OXYGEN SATURATION: 93 % | WEIGHT: 148 LBS | HEIGHT: 71 IN

## 2023-01-23 DIAGNOSIS — R59.0 HILAR LYMPHADENOPATHY: ICD-10-CM

## 2023-01-23 DIAGNOSIS — I27.20 PULMONARY HYPERTENSION (HCC): ICD-10-CM

## 2023-01-23 DIAGNOSIS — J47.9 BRONCHIECTASIS WITHOUT COMPLICATION (HCC): ICD-10-CM

## 2023-01-23 DIAGNOSIS — R09.02 HYPOXIA: ICD-10-CM

## 2023-01-23 DIAGNOSIS — J43.2 CENTRILOBULAR EMPHYSEMA (HCC): Primary | ICD-10-CM

## 2023-01-23 NOTE — PATIENT INSTRUCTIONS
Continue the nebuilzers scheduled: yupelri daily and budesonide/arformoterol twice a day. Use the albuterol-ipratropium every 6 hours as needed for your breathing. We talked about the medications to reduce exacerbations of COPD which include roflumilast (brand name daliresp, can cause GI upset, diarrhea and can be expensive) or azithromycin Monday, Wednesday and Friday (can cause hearing issues, heart issues and antibiotic resistance problems).   Finish the prednisone - plan to touch base with me in the next couple weeks  See me in a month

## 2023-01-24 ENCOUNTER — OFFICE VISIT (OUTPATIENT)
Dept: INTERNAL MEDICINE CLINIC | Facility: CLINIC | Age: 73
End: 2023-01-24
Payer: COMMERCIAL

## 2023-01-24 ENCOUNTER — PATIENT OUTREACH (OUTPATIENT)
Dept: CASE MANAGEMENT | Age: 73
End: 2023-01-24

## 2023-01-24 VITALS
HEIGHT: 71 IN | TEMPERATURE: 98 F | OXYGEN SATURATION: 96 % | SYSTOLIC BLOOD PRESSURE: 118 MMHG | BODY MASS INDEX: 20.67 KG/M2 | WEIGHT: 147.63 LBS | HEART RATE: 78 BPM | RESPIRATION RATE: 16 BRPM | DIASTOLIC BLOOD PRESSURE: 50 MMHG

## 2023-01-24 DIAGNOSIS — D64.9 ANEMIA, UNSPECIFIED TYPE: ICD-10-CM

## 2023-01-24 DIAGNOSIS — J41.1 MUCOPURULENT CHRONIC BRONCHITIS (HCC): Primary | ICD-10-CM

## 2023-01-24 PROBLEM — N17.9 ACUTE KIDNEY INJURY (HCC): Status: RESOLVED | Noted: 2022-06-14 | Resolved: 2023-01-24

## 2023-01-24 PROBLEM — I42.9 CARDIOMYOPATHY (HCC): Status: RESOLVED | Noted: 2021-10-12 | Resolved: 2023-01-01

## 2023-01-24 PROBLEM — R09.02 HYPOXIA: Status: RESOLVED | Noted: 2023-01-18 | Resolved: 2023-01-24

## 2023-01-24 PROBLEM — N28.9 ACUTE RENAL INSUFFICIENCY: Status: RESOLVED | Noted: 2023-01-20 | Resolved: 2023-01-24

## 2023-01-24 PROBLEM — E87.3 METABOLIC ALKALOSIS: Status: RESOLVED | Noted: 2023-01-01 | Resolved: 2023-01-01

## 2023-01-24 PROBLEM — N28.9 ACUTE RENAL INSUFFICIENCY: Status: RESOLVED | Noted: 2023-01-01 | Resolved: 2023-01-01

## 2023-01-24 PROBLEM — J44.1 COPD EXACERBATION (HCC): Status: RESOLVED | Noted: 2023-01-01 | Resolved: 2023-01-01

## 2023-01-24 PROBLEM — J44.1 COPD EXACERBATION (HCC): Status: RESOLVED | Noted: 2023-01-18 | Resolved: 2023-01-24

## 2023-01-24 PROBLEM — I42.9 CARDIOMYOPATHY (HCC): Status: RESOLVED | Noted: 2021-10-12 | Resolved: 2023-01-24

## 2023-01-24 PROBLEM — E87.3 METABOLIC ALKALOSIS: Status: RESOLVED | Noted: 2023-01-18 | Resolved: 2023-01-24

## 2023-01-24 PROBLEM — R09.02 HYPOXIA: Status: RESOLVED | Noted: 2023-01-01 | Resolved: 2023-01-01

## 2023-01-24 PROBLEM — N17.9 ACUTE KIDNEY INJURY (HCC): Status: RESOLVED | Noted: 2022-06-14 | Resolved: 2023-01-01

## 2023-01-24 PROCEDURE — 99214 OFFICE O/P EST MOD 30 MIN: CPT | Performed by: INTERNAL MEDICINE

## 2023-01-24 NOTE — PROGRESS NOTES
combionic message sent to the patient for hospital follow up. Pt has HFU today with PCP. NCM will follow up to ensure appt is completed.

## 2023-01-31 RX ORDER — METOPROLOL SUCCINATE 50 MG/1
TABLET, EXTENDED RELEASE ORAL
Qty: 30 TABLET | Refills: 0 | Status: SHIPPED | OUTPATIENT
Start: 2023-01-31

## 2023-02-07 ENCOUNTER — OFFICE VISIT (OUTPATIENT)
Facility: CLINIC | Age: 73
End: 2023-02-07
Payer: COMMERCIAL

## 2023-02-07 VITALS
OXYGEN SATURATION: 94 % | BODY MASS INDEX: 20.58 KG/M2 | DIASTOLIC BLOOD PRESSURE: 60 MMHG | WEIGHT: 147 LBS | HEART RATE: 80 BPM | RESPIRATION RATE: 16 BRPM | SYSTOLIC BLOOD PRESSURE: 116 MMHG | HEIGHT: 71 IN

## 2023-02-07 DIAGNOSIS — R59.0 HILAR LYMPHADENOPATHY: ICD-10-CM

## 2023-02-07 DIAGNOSIS — R09.02 HYPOXIA: ICD-10-CM

## 2023-02-07 DIAGNOSIS — J44.9 CHRONIC OBSTRUCTIVE PULMONARY DISEASE, UNSPECIFIED COPD TYPE (HCC): Primary | ICD-10-CM

## 2023-02-07 DIAGNOSIS — R91.1 LUNG NODULE: ICD-10-CM

## 2023-02-07 DIAGNOSIS — R06.09 DOE (DYSPNEA ON EXERTION): ICD-10-CM

## 2023-02-07 DIAGNOSIS — J47.9 BRONCHIECTASIS WITHOUT COMPLICATION (HCC): ICD-10-CM

## 2023-02-07 DIAGNOSIS — J43.2 CENTRILOBULAR EMPHYSEMA (HCC): ICD-10-CM

## 2023-02-07 PROCEDURE — 1111F DSCHRG MED/CURRENT MED MERGE: CPT | Performed by: INTERNAL MEDICINE

## 2023-02-07 PROCEDURE — 3078F DIAST BP <80 MM HG: CPT | Performed by: INTERNAL MEDICINE

## 2023-02-07 PROCEDURE — 3008F BODY MASS INDEX DOCD: CPT | Performed by: INTERNAL MEDICINE

## 2023-02-07 PROCEDURE — 3074F SYST BP LT 130 MM HG: CPT | Performed by: INTERNAL MEDICINE

## 2023-02-07 PROCEDURE — 99214 OFFICE O/P EST MOD 30 MIN: CPT | Performed by: INTERNAL MEDICINE

## 2023-02-07 RX ORDER — ROFLUMILAST 500 UG/1
1 TABLET ORAL DAILY
Qty: 30 TABLET | Refills: 11 | Status: SHIPPED | OUTPATIENT
Start: 2023-03-10 | End: 2023-04-09

## 2023-02-07 RX ORDER — ROFLUMILAST 250 UG/1
1 TABLET ORAL DAILY
Qty: 30 TABLET | Refills: 0 | Status: SHIPPED | OUTPATIENT
Start: 2023-02-07 | End: 2023-03-09

## 2023-02-07 RX ORDER — PREDNISONE 10 MG/1
TABLET ORAL
Qty: 30 TABLET | Refills: 1 | Status: SHIPPED | OUTPATIENT
Start: 2023-02-07

## 2023-02-07 NOTE — PATIENT INSTRUCTIONS
Once you finish the azithromycin then start roflumilast. Start roflumilast 250 one tablet once a day for a month. After the month, then start roflumilast 500 one tablet once a day. Watch for GI side effects (nausea, upset stomach and diarrhea). Continue your nebulizers  Let me know if your breathing gets worse. For now will just finish the prednisone this week. If things get worse, then we will resume the prednisone at 10mg (1 tablet) daily. Plan on obtaining blood testing, breathing test and CT scan around April 2023. If you get sick then we should delay the testing.  Call me if questions

## 2023-02-09 RX ORDER — ONDANSETRON 4 MG/1
TABLET, FILM COATED ORAL
Qty: 90 TABLET | Refills: 0 | Status: SHIPPED | OUTPATIENT
Start: 2023-02-09

## 2023-02-10 RX ORDER — PANCRELIPASE 36000; 180000; 114000 [USP'U]/1; [USP'U]/1; [USP'U]/1
CAPSULE, DELAYED RELEASE PELLETS ORAL
Qty: 180 CAPSULE | Refills: 0 | Status: SHIPPED | OUTPATIENT
Start: 2023-02-10

## 2023-02-15 ENCOUNTER — TELEPHONE (OUTPATIENT)
Facility: CLINIC | Age: 73
End: 2023-02-15

## 2023-02-15 NOTE — TELEPHONE ENCOUNTER
Per Dr. Leonard Newman:  Please have him start the prednisone 10mg daily and just stay on it until next visit   Pt notified of above. Pt reminded his next appt is on 4-7-23.

## 2023-02-15 NOTE — TELEPHONE ENCOUNTER
Pt called to update us. Finished steroids about 4 days ago. O2 sats 94-96%. Now over the last 2 days c/o heavy breathing and worsening sob with O2 sats ranging from 89-91%. Pt got the refill of Prednisone but reaching out to Dr. Teo Rendon as instructed before starting steroids. Please advise.

## 2023-02-23 ENCOUNTER — LAB ENCOUNTER (OUTPATIENT)
Dept: LAB | Age: 73
End: 2023-02-23
Attending: INTERNAL MEDICINE
Payer: MEDICARE

## 2023-02-23 DIAGNOSIS — D64.9 ANEMIA, UNSPECIFIED TYPE: ICD-10-CM

## 2023-02-23 LAB
ANION GAP SERPL CALC-SCNC: 7 MMOL/L (ref 0–18)
BASOPHILS # BLD AUTO: 0.04 X10(3) UL (ref 0–0.2)
BASOPHILS NFR BLD AUTO: 0.3 %
BUN BLD-MCNC: 20 MG/DL (ref 7–18)
CALCIUM BLD-MCNC: 9.5 MG/DL (ref 8.5–10.1)
CHLORIDE SERPL-SCNC: 106 MMOL/L (ref 98–112)
CO2 SERPL-SCNC: 26 MMOL/L (ref 21–32)
CREAT BLD-MCNC: 1.44 MG/DL
DEPRECATED HBV CORE AB SER IA-ACNC: 13.8 NG/ML
EOSINOPHIL # BLD AUTO: 0.39 X10(3) UL (ref 0–0.7)
EOSINOPHIL NFR BLD AUTO: 3.4 %
ERYTHROCYTE [DISTWIDTH] IN BLOOD BY AUTOMATED COUNT: 19.1 %
FASTING STATUS PATIENT QL REPORTED: NO
FOLATE SERPL-MCNC: 87.8 NG/ML (ref 8.7–?)
GFR SERPLBLD BASED ON 1.73 SQ M-ARVRAT: 51 ML/MIN/1.73M2 (ref 60–?)
GLUCOSE BLD-MCNC: 132 MG/DL (ref 70–99)
HCT VFR BLD AUTO: 36.7 %
HEMOCCULT STL QL: POSITIVE
HGB BLD-MCNC: 12.1 G/DL
IMM GRANULOCYTES # BLD AUTO: 0.03 X10(3) UL (ref 0–1)
IMM GRANULOCYTES NFR BLD: 0.3 %
IRON SATN MFR SERPL: 5 %
IRON SERPL-MCNC: 26 UG/DL
LYMPHOCYTES # BLD AUTO: 2.06 X10(3) UL (ref 1–4)
LYMPHOCYTES NFR BLD AUTO: 17.8 %
MCH RBC QN AUTO: 26.3 PG (ref 26–34)
MCHC RBC AUTO-ENTMCNC: 33 G/DL (ref 31–37)
MCV RBC AUTO: 79.8 FL
MONOCYTES # BLD AUTO: 0.76 X10(3) UL (ref 0.1–1)
MONOCYTES NFR BLD AUTO: 6.6 %
NEUTROPHILS # BLD AUTO: 8.29 X10 (3) UL (ref 1.5–7.7)
NEUTROPHILS # BLD AUTO: 8.29 X10(3) UL (ref 1.5–7.7)
NEUTROPHILS NFR BLD AUTO: 71.6 %
OSMOLALITY SERPL CALC.SUM OF ELEC: 292 MOSM/KG (ref 275–295)
PLATELET # BLD AUTO: 429 10(3)UL (ref 150–450)
POTASSIUM SERPL-SCNC: 4.7 MMOL/L (ref 3.5–5.1)
RBC # BLD AUTO: 4.6 X10(6)UL
SODIUM SERPL-SCNC: 139 MMOL/L (ref 136–145)
TIBC SERPL-MCNC: 574 UG/DL (ref 240–450)
TRANSFERRIN SERPL-MCNC: 385 MG/DL (ref 200–360)
VIT B12 SERPL-MCNC: 341 PG/ML (ref 193–986)
WBC # BLD AUTO: 11.6 X10(3) UL (ref 4–11)

## 2023-02-23 PROCEDURE — 80048 BASIC METABOLIC PNL TOTAL CA: CPT

## 2023-02-23 PROCEDURE — 82274 ASSAY TEST FOR BLOOD FECAL: CPT

## 2023-02-23 PROCEDURE — 36415 COLL VENOUS BLD VENIPUNCTURE: CPT

## 2023-02-23 PROCEDURE — 85025 COMPLETE CBC W/AUTO DIFF WBC: CPT

## 2023-02-23 PROCEDURE — 82746 ASSAY OF FOLIC ACID SERUM: CPT

## 2023-02-23 PROCEDURE — 82607 VITAMIN B-12: CPT

## 2023-02-23 PROCEDURE — 82728 ASSAY OF FERRITIN: CPT

## 2023-02-23 PROCEDURE — 83540 ASSAY OF IRON: CPT

## 2023-02-23 PROCEDURE — 83550 IRON BINDING TEST: CPT

## 2023-02-27 ENCOUNTER — TELEPHONE (OUTPATIENT)
Facility: CLINIC | Age: 73
End: 2023-02-27

## 2023-02-27 RX ORDER — PANCRELIPASE 36000; 180000; 114000 [USP'U]/1; [USP'U]/1; [USP'U]/1
CAPSULE, DELAYED RELEASE PELLETS ORAL
Qty: 180 CAPSULE | Refills: 0 | Status: SHIPPED | OUTPATIENT
Start: 2023-02-27

## 2023-02-27 NOTE — TELEPHONE ENCOUNTER
Pt on Roflumilast 500 mg. Wife states the pharmacy will need prior auth for this. They put through the medication as an emergency fill but when the patient needs a refill she will need a prior auth as well. She just wanted to make sure there was no lapse in coverage for his medication now.

## 2023-02-27 NOTE — TELEPHONE ENCOUNTER
Refills needed     insulin detemir (Levemir) 100 UNIT/ML FlexTouch 30 Units     Insulin Lispro, 1 Unit Dial, (HUMALOG KWIKPEN) 100 UNIT/ML Subcutaneous Solution Pen-injector     AND needles     atorvastatin 40 MG Oral Tab    Store #[id number]

## 2023-02-28 ENCOUNTER — TELEPHONE (OUTPATIENT)
Dept: INTERNAL MEDICINE CLINIC | Facility: CLINIC | Age: 73
End: 2023-02-28

## 2023-02-28 ENCOUNTER — OFFICE VISIT (OUTPATIENT)
Dept: INTERNAL MEDICINE CLINIC | Facility: CLINIC | Age: 73
End: 2023-02-28
Payer: COMMERCIAL

## 2023-02-28 VITALS
SYSTOLIC BLOOD PRESSURE: 150 MMHG | TEMPERATURE: 98 F | WEIGHT: 146 LBS | OXYGEN SATURATION: 92 % | BODY MASS INDEX: 20.44 KG/M2 | HEIGHT: 71 IN | HEART RATE: 91 BPM | DIASTOLIC BLOOD PRESSURE: 60 MMHG | RESPIRATION RATE: 16 BRPM

## 2023-02-28 DIAGNOSIS — D50.8 OTHER IRON DEFICIENCY ANEMIA: ICD-10-CM

## 2023-02-28 DIAGNOSIS — R19.5 OCCULT BLOOD IN STOOLS: Primary | ICD-10-CM

## 2023-02-28 PROCEDURE — 3077F SYST BP >= 140 MM HG: CPT | Performed by: INTERNAL MEDICINE

## 2023-02-28 PROCEDURE — 99213 OFFICE O/P EST LOW 20 MIN: CPT | Performed by: INTERNAL MEDICINE

## 2023-02-28 PROCEDURE — 3008F BODY MASS INDEX DOCD: CPT | Performed by: INTERNAL MEDICINE

## 2023-02-28 PROCEDURE — 3078F DIAST BP <80 MM HG: CPT | Performed by: INTERNAL MEDICINE

## 2023-02-28 RX ORDER — ATORVASTATIN CALCIUM 40 MG/1
40 TABLET, FILM COATED ORAL NIGHTLY
Qty: 90 TABLET | Refills: 1 | Status: SHIPPED | OUTPATIENT
Start: 2023-02-28

## 2023-02-28 RX ORDER — INSULIN LISPRO 100 [IU]/ML
INJECTION, SOLUTION INTRAVENOUS; SUBCUTANEOUS
Qty: 15 ML | Refills: 1 | Status: SHIPPED | OUTPATIENT
Start: 2023-02-28

## 2023-02-28 RX ORDER — PEN NEEDLE, DIABETIC 32GX 5/32"
1 NEEDLE, DISPOSABLE MISCELLANEOUS 4 TIMES DAILY
Qty: 200 EACH | Refills: 1 | Status: SHIPPED | OUTPATIENT
Start: 2023-02-28 | End: 2024-02-28

## 2023-02-28 RX ORDER — INSULIN DETEMIR 100 [IU]/ML
35 INJECTION, SOLUTION SUBCUTANEOUS NIGHTLY
Qty: 15 ML | Refills: 1 | Status: SHIPPED | OUTPATIENT
Start: 2023-02-28

## 2023-02-28 RX ORDER — METHYLDOPA 500 MG
160 TABLET ORAL 2 TIMES DAILY
Qty: 180 TABLET | Refills: 2 | Status: SHIPPED | OUTPATIENT
Start: 2023-02-28 | End: 2023-05-29

## 2023-02-28 NOTE — TELEPHONE ENCOUNTER
Spoke to [facility] the [employer] was approved through 12/31/23  Ref# [id number].   Terri Coffman was notified of this approval.

## 2023-02-28 NOTE — PATIENT INSTRUCTIONS
Patient had a recent colonoscopy those records were reviewed with the patient.   He might be lined up needing a capsule endoscopy or an upper endoscopy we will leave it up to the discretion of the gastroenterologist the meantime we will replace with supplement iron eventually may need heme consult

## 2023-03-01 RX ORDER — INSULIN DETEMIR 100 [IU]/ML
INJECTION, SOLUTION SUBCUTANEOUS NIGHTLY
Refills: 0 | OUTPATIENT
Start: 2023-03-01

## 2023-03-02 NOTE — TELEPHONE ENCOUNTER
Refill needed    METFORMIN HCL 1000 MG Oral Tab    [id number], [place] - [address] [phone], [phone]

## 2023-03-03 NOTE — TELEPHONE ENCOUNTER
Protocol failed     Requesting: metformin 1000mg     LOV: 2/28/23   RTC: 8 weeks   Filled: 10/7/22 # 180 0 refills   Recent Labs: 12/20/22     Upcoming OV: 5/4/23

## 2023-03-07 PROBLEM — R97.20 ELEVATED PROSTATE SPECIFIC ANTIGEN BETWEEN 10 AND 19 NG/ML: Status: ACTIVE | Noted: 2018-03-08

## 2023-03-07 PROBLEM — R35.0 URINARY FREQUENCY: Status: ACTIVE | Noted: 2022-10-17

## 2023-03-07 PROBLEM — R35.1 NOCTURIA: Status: ACTIVE | Noted: 2022-01-01

## 2023-03-07 PROBLEM — R35.1 NOCTURIA: Status: ACTIVE | Noted: 2022-10-17

## 2023-03-07 PROBLEM — R33.9 RETENTION OF URINE: Status: ACTIVE | Noted: 2022-09-29

## 2023-03-07 PROBLEM — R35.0 URINARY FREQUENCY: Status: ACTIVE | Noted: 2022-01-01

## 2023-03-07 PROBLEM — N20.0 RENAL CALCULI: Status: ACTIVE | Noted: 2020-05-07

## 2023-03-07 PROBLEM — R33.9 RETENTION OF URINE: Status: ACTIVE | Noted: 2022-01-01

## 2023-03-10 RX ORDER — BLOOD-GLUCOSE METER
EACH MISCELLANEOUS
Qty: 1 KIT | Refills: 0 | Status: SHIPPED | OUTPATIENT
Start: 2023-03-10

## 2023-03-10 RX ORDER — BLOOD SUGAR DIAGNOSTIC
STRIP MISCELLANEOUS
Qty: 400 STRIP | Refills: 0 | Status: SHIPPED | OUTPATIENT
Start: 2023-03-10

## 2023-03-10 RX ORDER — LANCETS
EACH MISCELLANEOUS
Qty: 410 EACH | Refills: 0 | Status: SHIPPED | OUTPATIENT
Start: 2023-03-10

## 2023-03-10 NOTE — TELEPHONE ENCOUNTER
Pt spouse requesting new diabetic supplies since insurance changed     accucheck guide monitor   accucheck fast click test strips and lancets

## 2023-03-13 RX ORDER — METOPROLOL SUCCINATE 50 MG/1
TABLET, EXTENDED RELEASE ORAL
Qty: 30 TABLET | Refills: 0 | Status: SHIPPED | OUTPATIENT
Start: 2023-03-13

## 2023-03-21 ENCOUNTER — LAB ENCOUNTER (OUTPATIENT)
Dept: LAB | Age: 73
End: 2023-03-21
Attending: INTERNAL MEDICINE
Payer: MEDICARE

## 2023-03-21 DIAGNOSIS — J44.9 CHRONIC OBSTRUCTIVE PULMONARY DISEASE, UNSPECIFIED COPD TYPE (HCC): ICD-10-CM

## 2023-03-21 NOTE — DIETARY NOTE
[facility]  NUTRITION ASSESSMENT    Pt does not meet malnutrition criteria. NUTRITION INTERVENTION:  1. Meal and Snacks - Advance as tolerated to least restrictive diet monitor patient po intake. Encourage adequate po of appropriate diet.   2. Medic chips.    ANTHROPOMETRICS:  Ht: 180.3 cm (5' 11\")  Wt: 52.3 kg (115 lb 3.2 oz). This is 75% of IBW  BMI: Body mass index is 16.07 kg/m². IBW: 78.2 kg    WEIGHT HISTORY:   Patient Weight(s) for the past 336 hrs:   Weight   07/26/21 1502 52.3 kg (115 lb 3. 24-48 hrs if diet is not able to advance- Met, continues  3.  Tolerate alternative nutrition at 100% of goal - Met, continues    MEDICATIONS: noted    LABS: Reviewed    Pt is at High nutrition risk    FOLLOW-UP DATE: 7/30/2021; RD to follow daily for TPN ma Yes

## 2023-03-22 LAB — SARS-COV-2 RNA RESP QL NAA+PROBE: NOT DETECTED

## 2023-03-24 ENCOUNTER — HOSPITAL ENCOUNTER (OUTPATIENT)
Dept: CT IMAGING | Facility: HOSPITAL | Age: 73
Discharge: HOME | End: 2023-03-24
Attending: INTERNAL MEDICINE
Payer: MEDICARE

## 2023-03-24 ENCOUNTER — RT VISIT (OUTPATIENT)
Dept: RESPIRATORY THERAPY | Facility: HOSPITAL | Age: 73
End: 2023-03-24
Attending: INTERNAL MEDICINE
Payer: MEDICARE

## 2023-03-24 DIAGNOSIS — R91.1 LUNG NODULE: ICD-10-CM

## 2023-03-24 PROCEDURE — 94726 PLETHYSMOGRAPHY LUNG VOLUMES: CPT | Performed by: INTERNAL MEDICINE

## 2023-03-24 PROCEDURE — 71250 CT THORAX DX C-: CPT | Performed by: INTERNAL MEDICINE

## 2023-03-24 PROCEDURE — 94729 DIFFUSING CAPACITY: CPT | Performed by: INTERNAL MEDICINE

## 2023-03-24 PROCEDURE — 94060 EVALUATION OF WHEEZING: CPT | Performed by: INTERNAL MEDICINE

## 2023-03-24 NOTE — PROCEDURES
Findings:  Postbronchodilator FEV1 is 1.14L, 42% predicted. Postbronchodilator FVC is 2.76L, 77% predicted. FEV1/ FVC ratio is 0.41. There is a significant bronchodilator response after administration of albuterol. The flow-volume loop demonstrates an obstructive pattern. The TLC is 7.91L, 108% predicted. The residual volume 5.04L, 184% predicted. The diffusion capacity is 38% predicted and 55% predicted when corrected for alveolar volume. Impression:  There is severe airway obstruction on spirometry and visualized on flow-volume loop. There is a significant bronchodilator response. There is evidence of air trapping (residual volume of 5.04L, 184% predicted). Diffusion capacity is severely reduced with DLCO of 38% commensurate with degree of airway obstruction. Given the severity of the reduced diffusion capacity, would recommend evaluation for hypoxia and supplemental oxygenation if not already performed. When compared to previous pulmonary function testing dated 4/02/2022, there has been significant decreases in FVC (previously 3.91L, 107% predicted) and an increase in total lung capacity (previously 6.53L, 89% predicted) likely due to an increase in residual volume (previously 2.93L, 108% predicted). Diffusion capacity is unchanged.

## 2023-03-27 ENCOUNTER — TELEPHONE (OUTPATIENT)
Facility: CLINIC | Age: 73
End: 2023-03-27

## 2023-03-27 NOTE — TELEPHONE ENCOUNTER
Call placed to [facility] and clearance letter went to the wrong provider. Received new letter by fax and completed by Dr. Nelia Vázquez and faxed to [address] [place] [address]. Pt notified via [employer] message.

## 2023-03-27 NOTE — TELEPHONE ENCOUNTER
Pt's surgeon Dr. Jamshid Howard (tel [phone] [facility]) has yet to receive the response from Dr. Nurys Adrian for Pulmonary Clearance, please review/advise or locate?

## 2023-03-27 NOTE — TELEPHONE ENCOUNTER
Per [employer] message from [facility] to pt, they have not received pulmonary clearance. Pt was informed that 2 faxes were sent to our office - unable to locate. Pt has appt scheduled with Dr. Monica Araujo on 4-7-23. Please advise.

## 2023-03-28 RX ORDER — ONDANSETRON 4 MG/1
TABLET, FILM COATED ORAL
Qty: 90 TABLET | Refills: 0 | Status: SHIPPED | OUTPATIENT
Start: 2023-03-28

## 2023-03-30 ENCOUNTER — TELEPHONE (OUTPATIENT)
Dept: INTERNAL MEDICINE CLINIC | Facility: CLINIC | Age: 73
End: 2023-03-30

## 2023-03-30 NOTE — TELEPHONE ENCOUNTER
Incoming fax from [facility],Rosaura 3 with detailed written order to be reviewed and signed   Placed in VM in-basket for completion

## 2023-03-31 ENCOUNTER — TELEPHONE (OUTPATIENT)
Dept: INTERNAL MEDICINE CLINIC | Facility: CLINIC | Age: 73
End: 2023-03-31

## 2023-03-31 DIAGNOSIS — I10 BENIGN ESSENTIAL HTN: Primary | Chronic | ICD-10-CM

## 2023-03-31 DIAGNOSIS — E78.5 DYSLIPIDEMIA: Chronic | ICD-10-CM

## 2023-03-31 DIAGNOSIS — E11.9 DIABETES MELLITUS TYPE 2 IN NONOBESE (HCC): Chronic | ICD-10-CM

## 2023-03-31 NOTE — TELEPHONE ENCOUNTER
Signed order faxed to [facility]   Confirmation received   Sent to scan and copy placed in accordion

## 2023-03-31 NOTE — TELEPHONE ENCOUNTER
Future Appointments   Date Time Provider Basilio Caraballo   4/3/2023 12:00 PM REF BBK REF EMG8 Ref [facility]K 8   4/7/2023 11:00 AM Chrissie Abarca MD E[facility] Pulm [facility] [place]   5/9/2023  3:55 PM PANDOLFI, PROCEDURE SGIEDW None   5/9/2023  4:10 PM PANDOLFI, PROCEDURE SGIEDW None   5/11/2023 12:00 PM Morgan Reyes MD EMG 8 EMG [place]     Pt has upcoming MAS visit. Pt wants to know if there are any other additional labs Dr. Chante Agustin wants him to complete. Pt wants to get them done before coming in for visit. Place orders if needed. Call pt to notify.

## 2023-04-03 ENCOUNTER — LAB ENCOUNTER (OUTPATIENT)
Dept: LAB | Age: 73
End: 2023-04-03
Attending: INTERNAL MEDICINE
Payer: MEDICARE

## 2023-04-03 DIAGNOSIS — J44.9 CHRONIC OBSTRUCTIVE PULMONARY DISEASE, UNSPECIFIED COPD TYPE (HCC): ICD-10-CM

## 2023-04-03 PROCEDURE — 86003 ALLG SPEC IGE CRUDE XTRC EA: CPT

## 2023-04-03 PROCEDURE — 82785 ASSAY OF IGE: CPT

## 2023-04-03 PROCEDURE — 36415 COLL VENOUS BLD VENIPUNCTURE: CPT

## 2023-04-06 LAB
A ALTERNATA IGE QN: 0.19 KUA/L (ref ?–0.1)
A FUMIGATUS IGE QN: 0.78 KUA/L (ref ?–0.1)
AMER SYCAMORE IGE QN: <0.1 KUA/L (ref ?–0.1)
BERMUDA GRASS IGE QN: <0.1 KUA/L (ref ?–0.1)
BOXELDER IGE QN: <0.1 KUA/L (ref ?–0.1)
C HERBARUM IGE QN: 0.14 KUA/L (ref ?–0.1)
CALIF WALNUT IGE QN: <0.1 KUA/L (ref ?–0.1)
CAT DANDER IGE QN: <0.1 KUA/L (ref ?–0.1)
CMN PIGWEED IGE QN: <0.1 KUA/L (ref ?–0.1)
COMMON RAGWEED IGE QN: <0.1 KUA/L (ref ?–0.1)
COTTONWOOD IGE QN: <0.1 KUA/L (ref ?–0.1)
D FARINAE IGE QN: <0.1 KUA/L (ref ?–0.1)
D PTERONYSS IGE QN: <0.1 KUA/L (ref ?–0.1)
DOG DANDER IGE QN: <0.1 KUA/L (ref ?–0.1)
IGE SERPL-ACNC: 30.6 KU/L (ref 2–214)
M RACEMOSUS IGE QN: <0.1 KUA/L (ref ?–0.1)
MARSH ELDER IGE QN: <0.1 KUA/L (ref ?–0.1)
MOUSE EPITH IGE QN: <0.1 KUA/L (ref ?–0.1)
MT JUNIPER IGE QN: <0.1 KUA/L (ref ?–0.1)
P NOTATUM IGE QN: 0.23 KUA/L (ref ?–0.1)
PECAN/HICK TREE IGE QN: <0.1 KUA/L (ref ?–0.1)
ROACH IGE QN: <0.1 KUA/L (ref ?–0.1)
SALTWORT IGE QN: <0.1 KUA/L (ref ?–0.1)
TIMOTHY IGE QN: <0.1 KUA/L (ref ?–0.1)
WHITE ASH IGE QN: <0.1 KUA/L (ref ?–0.1)
WHITE ELM IGE QN: <0.1 KUA/L (ref ?–0.1)
WHITE MULBERRY IGE QN: <0.1 KUA/L (ref ?–0.1)
WHITE OAK IGE QN: <0.1 KUA/L (ref ?–0.1)

## 2023-04-07 ENCOUNTER — OFFICE VISIT (OUTPATIENT)
Facility: CLINIC | Age: 73
End: 2023-04-07
Payer: COMMERCIAL

## 2023-04-07 VITALS
DIASTOLIC BLOOD PRESSURE: 60 MMHG | WEIGHT: 145 LBS | SYSTOLIC BLOOD PRESSURE: 116 MMHG | RESPIRATION RATE: 16 BRPM | HEART RATE: 86 BPM | OXYGEN SATURATION: 95 % | BODY MASS INDEX: 20 KG/M2

## 2023-04-07 DIAGNOSIS — R09.02 HYPOXIA: ICD-10-CM

## 2023-04-07 DIAGNOSIS — R06.09 DOE (DYSPNEA ON EXERTION): ICD-10-CM

## 2023-04-07 DIAGNOSIS — J44.9 CHRONIC OBSTRUCTIVE PULMONARY DISEASE, UNSPECIFIED COPD TYPE (HCC): Primary | ICD-10-CM

## 2023-04-07 DIAGNOSIS — I27.20 PULMONARY HYPERTENSION (HCC): ICD-10-CM

## 2023-04-07 DIAGNOSIS — R91.1 LUNG NODULE: ICD-10-CM

## 2023-04-07 DIAGNOSIS — J47.9 BRONCHIECTASIS WITHOUT COMPLICATION (HCC): ICD-10-CM

## 2023-04-07 DIAGNOSIS — J43.2 CENTRILOBULAR EMPHYSEMA (HCC): ICD-10-CM

## 2023-04-07 PROCEDURE — 3074F SYST BP LT 130 MM HG: CPT | Performed by: INTERNAL MEDICINE

## 2023-04-07 PROCEDURE — 3078F DIAST BP <80 MM HG: CPT | Performed by: INTERNAL MEDICINE

## 2023-04-07 PROCEDURE — 99214 OFFICE O/P EST MOD 30 MIN: CPT | Performed by: INTERNAL MEDICINE

## 2023-04-07 RX ORDER — PREDNISONE 1 MG/1
TABLET ORAL
Qty: 14 TABLET | Refills: 2 | Status: SHIPPED | OUTPATIENT
Start: 2023-04-07

## 2023-04-07 NOTE — PATIENT INSTRUCTIONS
Continue the nebulizers  Complete what you have of the prednisone 10mg, then start 5mg tablets daily for 14 days then stop. Let me know if you feel worse.   We should plan to start prednisone 10mg once a day starting May 7th prior to the GI procedure  Call with questions  Follow up in July 2023

## 2023-04-13 ENCOUNTER — PATIENT MESSAGE (OUTPATIENT)
Facility: CLINIC | Age: 73
End: 2023-04-13

## 2023-04-13 RX ORDER — PREDNISONE 10 MG/1
TABLET ORAL
Qty: 30 TABLET | Refills: 2 | Status: SHIPPED | OUTPATIENT
Start: 2023-04-13

## 2023-04-13 NOTE — TELEPHONE ENCOUNTER
Call placed to pt. Pt decreased Prednsione to 5mg as instructed on 4-8-23 after seeing Dr. Noman Perez on 4-7-23. Now c/o being out of breath quicker and longer and breathing is harder. No cough. Denies fever, headaches and body aches. SPO2 90-92 % on right hand and 94-95% on L hand. While on Prednisone 10mg, pt states that is breathing was better and SPO2 94-95% on both hands. Pt has GI procedure scheduled for 5-9-23 which he has already been cleared for. Per Dr. Noman Perez, pt to restart Prednisone and stay on it until his next appt on 7-13-23. Pt will call if he does not improve on increased dosage.

## 2023-04-19 RX ORDER — METOPROLOL SUCCINATE 50 MG/1
TABLET, EXTENDED RELEASE ORAL
Qty: 30 TABLET | Refills: 0 | Status: SHIPPED | OUTPATIENT
Start: 2023-04-19

## 2023-04-20 ENCOUNTER — TELEPHONE (OUTPATIENT)
Facility: CLINIC | Age: 73
End: 2023-04-20

## 2023-04-20 ENCOUNTER — OFFICE VISIT (OUTPATIENT)
Facility: CLINIC | Age: 73
End: 2023-04-20
Payer: COMMERCIAL

## 2023-04-20 VITALS
SYSTOLIC BLOOD PRESSURE: 110 MMHG | OXYGEN SATURATION: 98 % | DIASTOLIC BLOOD PRESSURE: 64 MMHG | BODY MASS INDEX: 20.44 KG/M2 | HEART RATE: 87 BPM | HEIGHT: 71 IN | WEIGHT: 146 LBS | RESPIRATION RATE: 14 BRPM

## 2023-04-20 DIAGNOSIS — J44.9 CHRONIC OBSTRUCTIVE PULMONARY DISEASE, UNSPECIFIED COPD TYPE (HCC): Primary | ICD-10-CM

## 2023-04-20 DIAGNOSIS — R09.02 HYPOXEMIA: ICD-10-CM

## 2023-04-20 PROCEDURE — 3074F SYST BP LT 130 MM HG: CPT | Performed by: NURSE PRACTITIONER

## 2023-04-20 PROCEDURE — 3078F DIAST BP <80 MM HG: CPT | Performed by: NURSE PRACTITIONER

## 2023-04-20 PROCEDURE — 99214 OFFICE O/P EST MOD 30 MIN: CPT | Performed by: NURSE PRACTITIONER

## 2023-04-20 PROCEDURE — 3008F BODY MASS INDEX DOCD: CPT | Performed by: NURSE PRACTITIONER

## 2023-04-20 RX ORDER — AZITHROMYCIN 250 MG/1
TABLET, FILM COATED ORAL
Qty: 6 TABLET | Refills: 0 | Status: SHIPPED | OUTPATIENT
Start: 2023-04-20

## 2023-04-20 RX ORDER — PREDNISONE 10 MG/1
TABLET ORAL
Qty: 30 TABLET | Refills: 0 | Status: SHIPPED | OUTPATIENT
Start: 2023-04-20

## 2023-04-20 NOTE — PATIENT INSTRUCTIONS
Start antibiotic and prednisone taper today  Please contact office at [phone] with any decline in respiratory status, questions or concerns  Followup 1 week

## 2023-04-20 NOTE — TELEPHONE ENCOUNTER
Pt having increased problems with his breathing. Having labored breathing since cutting back his Prednisone to 5mg and has not really improved since Dr. Sisi Brenner recommended increasing back to 10mg daily. Oxygen sats dipping down to 87-89% but increased back up with O2 use. Wife is concerned that he may be heading into a copd exacerbation and does not want to end up in the ER this weekend and would like someone to see him. Pt added to [facility] Arianne's schedule at 1130am today.

## 2023-04-24 ENCOUNTER — TELEPHONE (OUTPATIENT)
Facility: CLINIC | Age: 73
End: 2023-04-24

## 2023-04-24 DIAGNOSIS — J44.9 CHRONIC OBSTRUCTIVE PULMONARY DISEASE, UNSPECIFIED COPD TYPE (HCC): Primary | ICD-10-CM

## 2023-04-24 RX ORDER — REVEFENACIN 175 UG/3ML
175 SOLUTION RESPIRATORY (INHALATION) DAILY
Qty: 90 ML | Refills: 5 | Status: SHIPPED | OUTPATIENT
Start: 2023-04-24 | End: 2023-10-21

## 2023-04-24 RX ORDER — ARFORMOTEROL TARTRATE 15 UG/2ML
15 SOLUTION RESPIRATORY (INHALATION) 2 TIMES DAILY
Qty: 120 ML | Refills: 5 | Status: SHIPPED | OUTPATIENT
Start: 2023-04-24

## 2023-04-24 RX ORDER — BUDESONIDE 0.25 MG/2ML
0.25 INHALANT ORAL 2 TIMES DAILY
Qty: 120 ML | Refills: 5 | Status: SHIPPED | OUTPATIENT
Start: 2023-04-24

## 2023-04-24 NOTE — TELEPHONE ENCOUNTER
Pt needs refill of Yupelri, Budesonide, Arformoterol. He also needs tubing and face masks for his machine.

## 2023-04-24 NOTE — TELEPHONE ENCOUNTER
Spoke with Gui at [facility]. No order needed for tubing and facemask. They will ship to pt. Rx sent to [facility] in [facility]. Pt notified.

## 2023-04-25 ENCOUNTER — TELEPHONE (OUTPATIENT)
Facility: CLINIC | Age: 73
End: 2023-04-25

## 2023-04-25 DIAGNOSIS — J44.9 CHRONIC OBSTRUCTIVE PULMONARY DISEASE, UNSPECIFIED COPD TYPE (HCC): ICD-10-CM

## 2023-04-25 RX ORDER — REVEFENACIN 175 UG/3ML
175 SOLUTION RESPIRATORY (INHALATION) DAILY
Qty: 90 ML | Refills: 5 | Status: SHIPPED | OUTPATIENT
Start: 2023-04-25 | End: 2023-10-22

## 2023-04-25 RX ORDER — ARFORMOTEROL TARTRATE 15 UG/2ML
15 SOLUTION RESPIRATORY (INHALATION) 2 TIMES DAILY
Qty: 120 ML | Refills: 5 | Status: SHIPPED | OUTPATIENT
Start: 2023-04-25

## 2023-04-25 RX ORDER — BUDESONIDE 0.25 MG/2ML
0.25 INHALANT ORAL 2 TIMES DAILY
Qty: 120 ML | Refills: 5 | Status: SHIPPED | OUTPATIENT
Start: 2023-04-25

## 2023-04-25 NOTE — TELEPHONE ENCOUNTER
Pt left message that he received neb supplies but not medication. Called [facility] in [place] and they state they have not received Rx despite it stating that it was received. They request that the Rx be faxed to :  [phone]. All Rx printed, signed by Dr. Sandi Collazo and faxed as requested.

## 2023-04-26 RX ORDER — BUDESONIDE 0.5 MG/2ML
INHALANT ORAL
Qty: 60 EACH | Refills: 11 | OUTPATIENT
Start: 2023-04-26

## 2023-04-27 ENCOUNTER — OFFICE VISIT (OUTPATIENT)
Facility: CLINIC | Age: 73
End: 2023-04-27
Payer: COMMERCIAL

## 2023-04-27 VITALS
DIASTOLIC BLOOD PRESSURE: 60 MMHG | SYSTOLIC BLOOD PRESSURE: 132 MMHG | WEIGHT: 146.5 LBS | RESPIRATION RATE: 14 BRPM | OXYGEN SATURATION: 94 % | HEART RATE: 92 BPM | BODY MASS INDEX: 20.51 KG/M2 | HEIGHT: 71 IN

## 2023-04-27 DIAGNOSIS — R09.02 HYPOXIA: ICD-10-CM

## 2023-04-27 DIAGNOSIS — J44.9 CHRONIC OBSTRUCTIVE PULMONARY DISEASE, UNSPECIFIED COPD TYPE (HCC): Primary | ICD-10-CM

## 2023-04-27 PROCEDURE — 3008F BODY MASS INDEX DOCD: CPT | Performed by: NURSE PRACTITIONER

## 2023-04-27 PROCEDURE — 3078F DIAST BP <80 MM HG: CPT | Performed by: NURSE PRACTITIONER

## 2023-04-27 PROCEDURE — 99214 OFFICE O/P EST MOD 30 MIN: CPT | Performed by: NURSE PRACTITIONER

## 2023-04-27 PROCEDURE — 3075F SYST BP GE 130 - 139MM HG: CPT | Performed by: NURSE PRACTITIONER

## 2023-04-27 NOTE — TELEPHONE ENCOUNTER
No Protocol     Requesting: sertraline 50mg     LOV: 1/24/23   RTC: 4 weeks   Filled: 1/24/23 # 90 0 refills   Recent Labs: 2/23/23     Upcoming OV: 5/11/23 31-May-2018 00:27

## 2023-05-04 ENCOUNTER — TELEPHONE (OUTPATIENT)
Facility: CLINIC | Age: 73
End: 2023-05-04

## 2023-05-04 RX ORDER — DOXYCYCLINE HYCLATE 100 MG/1
100 CAPSULE ORAL 2 TIMES DAILY
Qty: 14 CAPSULE | Refills: 0 | Status: SHIPPED | OUTPATIENT
Start: 2023-05-04 | End: 2023-05-11

## 2023-05-04 RX ORDER — PREDNISONE 10 MG/1
TABLET ORAL
Qty: 60 TABLET | Refills: 0 | Status: SHIPPED | OUTPATIENT
Start: 2023-05-04

## 2023-05-04 NOTE — TELEPHONE ENCOUNTER
Called/reviewed with patient and wife via telephone. Patient had been well until the last two days. Has had worsening cough with thick green phlegm, feels more dyspneic than usual and noted hypoxia when on RA after exertion - he is prescribed 2L on exertion, but admits he tends to keep it off unless he 'needs' it and feels currently he is slightly worse than usual.     Since being home and resting, he does feel better now. No pain, fevers. No sinus issues    Advised to escalate prednisone from 10mg daily to 20mg daily and continue through the weekend. Will reassess on Monday 5/8.    Advised if worsening sx then start abx (doxycycline) and escalate steroids to 40mg and taper 10mg every 3 days     Ravinder Monte MD

## 2023-05-04 NOTE — TELEPHONE ENCOUNTER
Wife calling regarding pt current symptoms. Wife states that pt was sick and was placed on antibiotics and a steroid taper and when they followed up with [facility] 04/27 his symptoms had cleared up. Today pt has started coughing up yellow/green mucous and he is coughing. She states his saturations have been 89-90% and with activity today they dipped down to 84%. Wife states his HR has been 104, deny fever/chills. wife states \"this isnt his normal\"    Informed wife that with pts current saturations, heart rate and mucous that pt should go to ER. Wife is concerned and wanted dr Venkata Mayberry recommendations. I informed her that if pt respiratory effort changes, or saturations go into 80's again they should go to ER. Wife verbalized understanding and wants to hear back from dr Venkata Mayberry.

## 2023-05-05 ENCOUNTER — LAB ENCOUNTER (OUTPATIENT)
Dept: LAB | Age: 73
End: 2023-05-05
Attending: INTERNAL MEDICINE
Payer: MEDICARE

## 2023-05-05 DIAGNOSIS — E11.9 DIABETES MELLITUS TYPE 2 IN NONOBESE (HCC): ICD-10-CM

## 2023-05-05 DIAGNOSIS — I10 BENIGN ESSENTIAL HTN: Chronic | ICD-10-CM

## 2023-05-05 DIAGNOSIS — E78.5 DYSLIPIDEMIA: Chronic | ICD-10-CM

## 2023-05-05 LAB
ALBUMIN SERPL-MCNC: 3.6 G/DL (ref 3.4–5)
ALBUMIN/GLOB SERPL: 1.1 {RATIO} (ref 1–2)
ALP LIVER SERPL-CCNC: 59 U/L
ALT SERPL-CCNC: 40 U/L
ANION GAP SERPL CALC-SCNC: 8 MMOL/L (ref 0–18)
AST SERPL-CCNC: 28 U/L (ref 15–37)
BILIRUB SERPL-MCNC: 0.5 MG/DL (ref 0.1–2)
BUN BLD-MCNC: 22 MG/DL (ref 7–18)
CALCIUM BLD-MCNC: 10 MG/DL (ref 8.5–10.1)
CHLORIDE SERPL-SCNC: 110 MMOL/L (ref 98–112)
CHOLEST SERPL-MCNC: 91 MG/DL (ref ?–200)
CO2 SERPL-SCNC: 24 MMOL/L (ref 21–32)
CREAT BLD-MCNC: 1.77 MG/DL
EST. AVERAGE GLUCOSE BLD GHB EST-MCNC: 183 MG/DL (ref 68–126)
FASTING PATIENT LIPID ANSWER: YES
FASTING STATUS PATIENT QL REPORTED: YES
GFR SERPLBLD BASED ON 1.73 SQ M-ARVRAT: 40 ML/MIN/1.73M2 (ref 60–?)
GLOBULIN PLAS-MCNC: 3.3 G/DL (ref 2.8–4.4)
GLUCOSE BLD-MCNC: 149 MG/DL (ref 70–99)
HBA1C MFR BLD: 8 % (ref ?–5.7)
HDLC SERPL-MCNC: 49 MG/DL (ref 40–59)
LDLC SERPL CALC-MCNC: 29 MG/DL (ref ?–100)
NONHDLC SERPL-MCNC: 42 MG/DL (ref ?–130)
OSMOLALITY SERPL CALC.SUM OF ELEC: 300 MOSM/KG (ref 275–295)
POTASSIUM SERPL-SCNC: 4.5 MMOL/L (ref 3.5–5.1)
PROT SERPL-MCNC: 6.9 G/DL (ref 6.4–8.2)
SODIUM SERPL-SCNC: 142 MMOL/L (ref 136–145)
TRIGL SERPL-MCNC: 54 MG/DL (ref 30–149)
VLDLC SERPL CALC-MCNC: 7 MG/DL (ref 0–30)

## 2023-05-05 PROCEDURE — 80053 COMPREHEN METABOLIC PANEL: CPT

## 2023-05-05 PROCEDURE — 80061 LIPID PANEL: CPT

## 2023-05-05 PROCEDURE — 3052F HG A1C>EQUAL 8.0%<EQUAL 9.0%: CPT | Performed by: INTERNAL MEDICINE

## 2023-05-05 PROCEDURE — 83036 HEMOGLOBIN GLYCOSYLATED A1C: CPT

## 2023-05-05 PROCEDURE — 36415 COLL VENOUS BLD VENIPUNCTURE: CPT

## 2023-05-08 ENCOUNTER — TELEPHONE (OUTPATIENT)
Facility: CLINIC | Age: 73
End: 2023-05-08

## 2023-05-08 ENCOUNTER — OFFICE VISIT (OUTPATIENT)
Dept: ORTHOPEDICS CLINIC | Facility: CLINIC | Age: 73
End: 2023-05-08
Payer: COMMERCIAL

## 2023-05-08 VITALS — BODY MASS INDEX: 20.44 KG/M2 | WEIGHT: 146 LBS | HEIGHT: 71 IN

## 2023-05-08 DIAGNOSIS — D57.3 SICKLE CELL TRAIT (HCC): ICD-10-CM

## 2023-05-08 DIAGNOSIS — M65.311 TRIGGER FINGER OF RIGHT THUMB: Primary | ICD-10-CM

## 2023-05-08 RX ORDER — BETAMETHASONE SODIUM PHOSPHATE AND BETAMETHASONE ACETATE 3; 3 MG/ML; MG/ML
6 INJECTION, SUSPENSION INTRA-ARTICULAR; INTRALESIONAL; INTRAMUSCULAR; SOFT TISSUE ONCE
Status: COMPLETED | OUTPATIENT
Start: 2023-05-08 | End: 2023-05-08

## 2023-05-08 RX ADMIN — BETAMETHASONE SODIUM PHOSPHATE AND BETAMETHASONE ACETATE 6 MG: 3; 3 INJECTION, SUSPENSION INTRA-ARTICULAR; INTRALESIONAL; INTRAMUSCULAR; SOFT TISSUE at 11:44:00

## 2023-05-08 NOTE — TELEPHONE ENCOUNTER
Dr Blane Mukherjee requested f/u call to see how patient was doing on 05/08. Spoke with patients wife Inge Sales. Wife states pt was \"touch and go\" over the weekend. She states patient started the antibiotic Friday and is taking steroids as prescribed. She states Jameson's cough has improved and no longer is coughing up phlegm. She states at times patient seems SOB but states his saturations are 95-96% while on oxygen and without O2 @ rest his saturation is 93-94%. However, when his o2 is off and he is exerting himself, his saturation goes from 84-91%. I endorsed to Inge Sales that Savage Harvey should wear his oxygen especially with activity. wife mentioned pt is not sleeping well this week as well. Inge Sales also stated that Savage Harvey has had some  \"mental fog\" episodes and he appears confused during that time. She states the episodes are happening 3-4 times a day. She states his \"mood is off, he seems agitated. \" I suggested that this could be from low o2 levels, lack of sleep she reports or it could be something else unrelated. that the ER should be considered and encouraged them to go to ER and she stated she was \"tempted several times this weekend but I didn't\". Routing to dr Dahlia Mathur for review and recommendations. Also went to Dr Blane Mukherjee office and informed him verbally of the above.

## 2023-05-08 NOTE — TELEPHONE ENCOUNTER
Called pt and wife and informed of Dr Ethel Arevalo recommendations of: advise he stay on prednisone 20mg through this week and wean to prednisone 15mg on may 15th and keep at that dose until May 22nd at that time should wean to pred 10mg. In interim, please have him complete the antibiotics and see Lin this week or next. Informed patient and wife of the above and verbalized understanding. Call transferred to [facility] at  to make pt appt with Honorio Rivers. My chart message sent to pt with above information as well. Wife also stated that today while waiting for response from Dr Bob Stout was more alert and his saturation \"all day\" was at 92-93%.

## 2023-05-09 RX ORDER — FOLIC ACID 1 MG/1
TABLET ORAL
Qty: 90 TABLET | Refills: 0 | Status: SHIPPED | OUTPATIENT
Start: 2023-05-09

## 2023-05-09 NOTE — TELEPHONE ENCOUNTER
No Protocol     Requesting: folic acid 1mg     LOV: 2/28/23   RTC: 8 weeks   Filled: 10/17/22 # 90 1 refill   Recent Labs: 5/5/23     Upcoming OV: 5/11/23

## 2023-05-10 ENCOUNTER — APPOINTMENT (OUTPATIENT)
Dept: GENERAL RADIOLOGY | Facility: HOSPITAL | Age: 73
End: 2023-05-10
Attending: HOSPITALIST
Payer: MEDICARE

## 2023-05-10 ENCOUNTER — APPOINTMENT (OUTPATIENT)
Dept: CT IMAGING | Facility: HOSPITAL | Age: 73
End: 2023-05-10
Attending: EMERGENCY MEDICINE
Payer: MEDICARE

## 2023-05-10 ENCOUNTER — HOSPITAL ENCOUNTER (INPATIENT)
Facility: HOSPITAL | Age: 73
LOS: 4 days | Discharge: HOME | End: 2023-05-14
Attending: EMERGENCY MEDICINE | Admitting: STUDENT IN AN ORGANIZED HEALTH CARE EDUCATION/TRAINING PROGRAM
Payer: MEDICARE

## 2023-05-10 ENCOUNTER — APPOINTMENT (OUTPATIENT)
Dept: GENERAL RADIOLOGY | Facility: HOSPITAL | Age: 73
End: 2023-05-10
Attending: EMERGENCY MEDICINE
Payer: MEDICARE

## 2023-05-10 ENCOUNTER — TELEPHONE (OUTPATIENT)
Facility: CLINIC | Age: 73
End: 2023-05-10

## 2023-05-10 DIAGNOSIS — K56.609 SMALL BOWEL OBSTRUCTION (HCC): Primary | ICD-10-CM

## 2023-05-10 DIAGNOSIS — R82.90 ABNORMAL URINALYSIS: ICD-10-CM

## 2023-05-10 LAB
ALBUMIN SERPL-MCNC: 3.8 G/DL (ref 3.4–5)
ALBUMIN/GLOB SERPL: 1 {RATIO} (ref 1–2)
ALP LIVER SERPL-CCNC: 65 U/L
ALT SERPL-CCNC: 65 U/L
ANION GAP SERPL CALC-SCNC: 5 MMOL/L (ref 0–18)
AST SERPL-CCNC: 27 U/L (ref 15–37)
ATRIAL RATE: 92 BPM
BASOPHILS # BLD AUTO: 0.02 X10(3) UL (ref 0–0.2)
BASOPHILS NFR BLD AUTO: 0.1 %
BILIRUB SERPL-MCNC: 0.6 MG/DL (ref 0.1–2)
BILIRUB UR QL STRIP.AUTO: NEGATIVE
BUN BLD-MCNC: 18 MG/DL (ref 7–18)
CALCIUM BLD-MCNC: 10.2 MG/DL (ref 8.5–10.1)
CHLORIDE SERPL-SCNC: 106 MMOL/L (ref 98–112)
CO2 SERPL-SCNC: 28 MMOL/L (ref 21–32)
COLOR UR AUTO: YELLOW
CREAT BLD-MCNC: 1.36 MG/DL
EOSINOPHIL # BLD AUTO: 0.19 X10(3) UL (ref 0–0.7)
EOSINOPHIL NFR BLD AUTO: 1.1 %
ERYTHROCYTE [DISTWIDTH] IN BLOOD BY AUTOMATED COUNT: 22.8 %
GFR SERPLBLD BASED ON 1.73 SQ M-ARVRAT: 55 ML/MIN/1.73M2 (ref 60–?)
GLOBULIN PLAS-MCNC: 3.8 G/DL (ref 2.8–4.4)
GLUCOSE BLD-MCNC: 115 MG/DL (ref 70–99)
GLUCOSE BLD-MCNC: 125 MG/DL (ref 70–99)
GLUCOSE BLD-MCNC: 134 MG/DL (ref 70–99)
GLUCOSE BLD-MCNC: 141 MG/DL (ref 70–99)
GLUCOSE UR STRIP.AUTO-MCNC: NEGATIVE MG/DL
HCT VFR BLD AUTO: 42.9 %
HGB BLD-MCNC: 15.3 G/DL
IMM GRANULOCYTES # BLD AUTO: 0.08 X10(3) UL (ref 0–1)
IMM GRANULOCYTES NFR BLD: 0.5 %
KETONES UR STRIP.AUTO-MCNC: NEGATIVE MG/DL
LEUKOCYTE ESTERASE UR QL STRIP.AUTO: NEGATIVE
LIPASE SERPL-CCNC: 15 U/L (ref 13–75)
LYMPHOCYTES # BLD AUTO: 2.62 X10(3) UL (ref 1–4)
LYMPHOCYTES NFR BLD AUTO: 15.6 %
MCH RBC QN AUTO: 29.8 PG (ref 26–34)
MCHC RBC AUTO-ENTMCNC: 35.7 G/DL (ref 31–37)
MCV RBC AUTO: 83.6 FL
MONOCYTES # BLD AUTO: 1.51 X10(3) UL (ref 0.1–1)
MONOCYTES NFR BLD AUTO: 9 %
MORPHOLOGY: NORMAL
NEUTROPHILS # BLD AUTO: 12.41 X10 (3) UL (ref 1.5–7.7)
NEUTROPHILS # BLD AUTO: 12.41 X10(3) UL (ref 1.5–7.7)
NEUTROPHILS NFR BLD AUTO: 73.7 %
NITRITE UR QL STRIP.AUTO: NEGATIVE
OSMOLALITY SERPL CALC.SUM OF ELEC: 291 MOSM/KG (ref 275–295)
P AXIS: 80 DEGREES
P-R INTERVAL: 124 MS
PH UR STRIP.AUTO: 7 [PH] (ref 5–8)
PLATELET # BLD AUTO: 299 10(3)UL (ref 150–450)
PLATELET MORPHOLOGY: NORMAL
POTASSIUM SERPL-SCNC: 3.5 MMOL/L (ref 3.5–5.1)
PROT SERPL-MCNC: 7.6 G/DL (ref 6.4–8.2)
PROT UR STRIP.AUTO-MCNC: 30 MG/DL
Q-T INTERVAL: 362 MS
QRS DURATION: 90 MS
QTC CALCULATION (BEZET): 447 MS
R AXIS: 90 DEGREES
RBC # BLD AUTO: 5.13 X10(6)UL
RBC #/AREA URNS AUTO: >10 /HPF
SODIUM SERPL-SCNC: 139 MMOL/L (ref 136–145)
SP GR UR STRIP.AUTO: 1.01 (ref 1–1.03)
T AXIS: 78 DEGREES
TROPONIN I HIGH SENSITIVITY: 10 NG/L
UROBILINOGEN UR STRIP.AUTO-MCNC: <2 MG/DL
VENTRICULAR RATE: 92 BPM
WBC # BLD AUTO: 16.8 X10(3) UL (ref 4–11)

## 2023-05-10 PROCEDURE — 74176 CT ABD & PELVIS W/O CONTRAST: CPT | Performed by: EMERGENCY MEDICINE

## 2023-05-10 PROCEDURE — 71045 X-RAY EXAM CHEST 1 VIEW: CPT | Performed by: EMERGENCY MEDICINE

## 2023-05-10 PROCEDURE — 99223 1ST HOSP IP/OBS HIGH 75: CPT | Performed by: STUDENT IN AN ORGANIZED HEALTH CARE EDUCATION/TRAINING PROGRAM

## 2023-05-10 PROCEDURE — 99223 1ST HOSP IP/OBS HIGH 75: CPT | Performed by: HOSPITALIST

## 2023-05-10 PROCEDURE — 0D9670Z DRAINAGE OF STOMACH WITH DRAINAGE DEVICE, VIA NATURAL OR ARTIFICIAL OPENING: ICD-10-PCS | Performed by: INTERNAL MEDICINE

## 2023-05-10 PROCEDURE — 71045 X-RAY EXAM CHEST 1 VIEW: CPT | Performed by: HOSPITALIST

## 2023-05-10 RX ORDER — HYDROMORPHONE HYDROCHLORIDE 1 MG/ML
0.5 INJECTION, SOLUTION INTRAMUSCULAR; INTRAVENOUS; SUBCUTANEOUS EVERY 30 MIN PRN
Status: COMPLETED | OUTPATIENT
Start: 2023-05-10 | End: 2023-05-10

## 2023-05-10 RX ORDER — ARFORMOTEROL TARTRATE 15 UG/2ML
15 SOLUTION RESPIRATORY (INHALATION) 2 TIMES DAILY
Status: DISCONTINUED | OUTPATIENT
Start: 2023-05-10 | End: 2023-05-14

## 2023-05-10 RX ORDER — ONDANSETRON 2 MG/ML
4 INJECTION INTRAMUSCULAR; INTRAVENOUS EVERY 4 HOURS PRN
Status: ACTIVE | OUTPATIENT
Start: 2023-05-10 | End: 2023-05-10

## 2023-05-10 RX ORDER — HYDROMORPHONE HYDROCHLORIDE 1 MG/ML
0.5 INJECTION, SOLUTION INTRAMUSCULAR; INTRAVENOUS; SUBCUTANEOUS EVERY 2 HOUR PRN
Status: DISCONTINUED | OUTPATIENT
Start: 2023-05-10 | End: 2023-05-14

## 2023-05-10 RX ORDER — HYDROMORPHONE HYDROCHLORIDE 4 MG/1
4 TABLET ORAL EVERY 4 HOURS PRN
COMMUNITY

## 2023-05-10 RX ORDER — ONDANSETRON 2 MG/ML
4 INJECTION INTRAMUSCULAR; INTRAVENOUS
Status: DISCONTINUED | OUTPATIENT
Start: 2023-05-10 | End: 2023-05-10

## 2023-05-10 RX ORDER — NICOTINE POLACRILEX 4 MG
30 LOZENGE BUCCAL
Status: DISCONTINUED | OUTPATIENT
Start: 2023-05-10 | End: 2023-05-14

## 2023-05-10 RX ORDER — DEXTROSE MONOHYDRATE 25 G/50ML
50 INJECTION, SOLUTION INTRAVENOUS
Status: DISCONTINUED | OUTPATIENT
Start: 2023-05-10 | End: 2023-05-14

## 2023-05-10 RX ORDER — LIDOCAINE HYDROCHLORIDE 20 MG/ML
10 JELLY TOPICAL ONCE
Status: COMPLETED | OUTPATIENT
Start: 2023-05-10 | End: 2023-05-10

## 2023-05-10 RX ORDER — FENTANYL 50 UG/H
1 PATCH TRANSDERMAL
Status: DISCONTINUED | OUTPATIENT
Start: 2023-05-10 | End: 2023-05-14

## 2023-05-10 RX ORDER — LORAZEPAM 2 MG/ML
1 INJECTION INTRAMUSCULAR ONCE
Status: COMPLETED | OUTPATIENT
Start: 2023-05-10 | End: 2023-05-10

## 2023-05-10 RX ORDER — HYDROMORPHONE HYDROCHLORIDE 1 MG/ML
0.5 INJECTION, SOLUTION INTRAMUSCULAR; INTRAVENOUS; SUBCUTANEOUS EVERY 30 MIN PRN
Status: DISCONTINUED | OUTPATIENT
Start: 2023-05-10 | End: 2023-05-10 | Stop reason: HOSPADM

## 2023-05-10 RX ORDER — IPRATROPIUM BROMIDE AND ALBUTEROL SULFATE 2.5; .5 MG/3ML; MG/3ML
3 SOLUTION RESPIRATORY (INHALATION) EVERY 4 HOURS PRN
Status: DISCONTINUED | OUTPATIENT
Start: 2023-05-10 | End: 2023-05-14

## 2023-05-10 RX ORDER — NICOTINE POLACRILEX 4 MG
15 LOZENGE BUCCAL
Status: DISCONTINUED | OUTPATIENT
Start: 2023-05-10 | End: 2023-05-14

## 2023-05-10 RX ORDER — BUDESONIDE 0.25 MG/2ML
0.25 INHALANT ORAL 2 TIMES DAILY
Status: DISCONTINUED | OUTPATIENT
Start: 2023-05-10 | End: 2023-05-14

## 2023-05-10 RX ORDER — HYDROMORPHONE HYDROCHLORIDE 1 MG/ML
1 INJECTION, SOLUTION INTRAMUSCULAR; INTRAVENOUS; SUBCUTANEOUS EVERY 2 HOUR PRN
Status: DISCONTINUED | OUTPATIENT
Start: 2023-05-10 | End: 2023-05-14

## 2023-05-10 RX ORDER — ONDANSETRON 2 MG/ML
4 INJECTION INTRAMUSCULAR; INTRAVENOUS
Status: DISCONTINUED | OUTPATIENT
Start: 2023-05-10 | End: 2023-05-14

## 2023-05-10 RX ORDER — HYDROMORPHONE HYDROCHLORIDE 1 MG/ML
0.5 INJECTION, SOLUTION INTRAMUSCULAR; INTRAVENOUS; SUBCUTANEOUS EVERY 30 MIN PRN
Status: ACTIVE | OUTPATIENT
Start: 2023-05-10 | End: 2023-05-10

## 2023-05-10 RX ORDER — SODIUM CHLORIDE 9 MG/ML
INJECTION, SOLUTION INTRAVENOUS CONTINUOUS
Status: DISCONTINUED | OUTPATIENT
Start: 2023-05-10 | End: 2023-05-14

## 2023-05-10 RX ORDER — SODIUM CHLORIDE 9 MG/ML
83 INJECTION, SOLUTION INTRAVENOUS CONTINUOUS
Status: DISCONTINUED | OUTPATIENT
Start: 2023-05-10 | End: 2023-05-10

## 2023-05-10 RX ORDER — HYDROMORPHONE HYDROCHLORIDE 1 MG/ML
2 INJECTION, SOLUTION INTRAMUSCULAR; INTRAVENOUS; SUBCUTANEOUS EVERY 2 HOUR PRN
Status: DISCONTINUED | OUTPATIENT
Start: 2023-05-10 | End: 2023-05-14

## 2023-05-10 RX ORDER — ALBUTEROL SULFATE 90 UG/1
2 AEROSOL, METERED RESPIRATORY (INHALATION) EVERY 4 HOURS PRN
Status: DISCONTINUED | OUTPATIENT
Start: 2023-05-10 | End: 2023-05-14

## 2023-05-10 NOTE — ED INITIAL ASSESSMENT (HPI)
Patient here with c/o upper abdominal pain radiating to the back. Pain started this AM, hx of pancreatitis. Patient also has N/V. Denies diarrhea, fever.

## 2023-05-10 NOTE — TELEPHONE ENCOUNTER
Pt would like Dr. Joaquim Aguilar to know that he is currently admitted to the hospital with a small bowel obstruction.

## 2023-05-10 NOTE — PROGRESS NOTES
NURSING ADMISSION NOTE      Patient admitted via Cart  Oriented to room. Safety precautions initiated. Bed in low position. Call light in reach. Pt admitted for SBO. A&Ox4. VSS on RA. NSR on tele. C/o mod pain. NG tube to DEBBY, lt malagon. GS following. RT AC PIV infuaing Damian@[employer]. Safety  All needs met at this time.

## 2023-05-11 LAB
GLUCOSE BLD-MCNC: 198 MG/DL (ref 70–99)
GLUCOSE BLD-MCNC: 65 MG/DL (ref 70–99)
GLUCOSE BLD-MCNC: 82 MG/DL (ref 70–99)
GLUCOSE BLD-MCNC: 82 MG/DL (ref 70–99)
POTASSIUM SERPL-SCNC: 4 MMOL/L (ref 3.5–5.1)

## 2023-05-11 PROCEDURE — 99232 SBSQ HOSP IP/OBS MODERATE 35: CPT | Performed by: HOSPITALIST

## 2023-05-11 PROCEDURE — 99232 SBSQ HOSP IP/OBS MODERATE 35: CPT | Performed by: STUDENT IN AN ORGANIZED HEALTH CARE EDUCATION/TRAINING PROGRAM

## 2023-05-11 NOTE — PLAN OF CARE
Pt is A&O x4. VSS- NSR on tele. RA- lungs diminished. PRN dilaudid given for abd pain per MAR. NG tube to R nare set to LIS- bile output. Pt reports LBM 5/9. Faint bowel sounds auscultated but mostly hypoactive BS. NPO with accuchecks q6hr. CMG to L upper arm. IVF- 0.9@ 100mL/hr infusing to left AC PIV. K replaced per protocol. Fentanyl patch to R arm. Pt up with SBA to BR. Gen surg following case.      Problem: Patient/Family Goals  Goal: Patient/Family Long Term Goal  Description: Patient's Long Term Goal: \"get this resolved so I can go home\"    Interventions:  - NPO  -Gen surg consult  -NG tube to LIS  - See additional Care Plan goals for specific interventions  Outcome: Progressing  Goal: Patient/Family Short Term Goal  Description: Patient's Short Term Goal: pain less than a 4/10    Interventions:   - Bowel rest  -NG tube to LIS  -PRN pain medications  - See additional Care Plan goals for specific interventions  Outcome: Progressing     Problem: GASTROINTESTINAL - ADULT  Goal: Minimal or absence of nausea and vomiting  Description: INTERVENTIONS:  - Maintain adequate hydration with IV or PO as ordered and tolerated  - Nasogastric tube to low intermittent suction as ordered  - Evaluate effectiveness of ordered antiemetic medications  - Provide nonpharmacologic comfort measures as appropriate  - Advance diet as tolerated, if ordered  - Obtain nutritional consult as needed  - Evaluate fluid balance  Outcome: Progressing  Goal: Maintains or returns to baseline bowel function  Description: INTERVENTIONS:  - Assess bowel function  - Maintain adequate hydration with IV or PO as ordered and tolerated  - Evaluate effectiveness of GI medications  - Encourage mobilization and activity  - Obtain nutritional consult as needed  - Establish a toileting routine/schedule  - Consider collaborating with pharmacy to review patient's medication profile  Outcome: Progressing     Problem: METABOLIC/FLUID AND ELECTROLYTES - ADULT  Goal: Glucose maintained within prescribed range  Description: INTERVENTIONS:  - Monitor Blood Glucose as ordered  - Assess for signs and symptoms of hyperglycemia and hypoglycemia  - Administer ordered medications to maintain glucose within target range  - Assess barriers to adequate nutritional intake and initiate nutrition consult as needed  - Instruct patient on self management of diabetes  Outcome: Progressing  Goal: Electrolytes maintained within normal limits  Description: INTERVENTIONS:  - Monitor labs and rhythm and assess patient for signs and symptoms of electrolyte imbalances  - Administer electrolyte replacement as ordered  - Monitor response to electrolyte replacements, including rhythm and repeat lab results as appropriate  - Fluid restriction as ordered  - Instruct patient on fluid and nutrition restrictions as appropriate  Outcome: Progressing     Problem: PAIN - ADULT  Goal: Verbalizes/displays adequate comfort level or patient's stated pain goal  Description: INTERVENTIONS:  - Encourage pt to monitor pain and request assistance  - Assess pain using appropriate pain scale  - Administer analgesics based on type and severity of pain and evaluate response  - Implement non-pharmacological measures as appropriate and evaluate response  - Consider cultural and social influences on pain and pain management  - Manage/alleviate anxiety  - Utilize distraction and/or relaxation techniques  - Monitor for opioid side effects  - Notify MD/LIP if interventions unsuccessful or patient reports new pain  - Anticipate increased pain with activity and pre-medicate as appropriate  Outcome: Progressing

## 2023-05-12 LAB
ANION GAP SERPL CALC-SCNC: 5 MMOL/L (ref 0–18)
BUN BLD-MCNC: 17 MG/DL (ref 7–18)
CALCIUM BLD-MCNC: 9 MG/DL (ref 8.5–10.1)
CHLORIDE SERPL-SCNC: 117 MMOL/L (ref 98–112)
CO2 SERPL-SCNC: 19 MMOL/L (ref 21–32)
CREAT BLD-MCNC: 1.04 MG/DL
ERYTHROCYTE [DISTWIDTH] IN BLOOD BY AUTOMATED COUNT: 23.3 %
GFR SERPLBLD BASED ON 1.73 SQ M-ARVRAT: 76 ML/MIN/1.73M2 (ref 60–?)
GLUCOSE BLD-MCNC: 133 MG/DL (ref 70–99)
GLUCOSE BLD-MCNC: 147 MG/DL (ref 70–99)
GLUCOSE BLD-MCNC: 242 MG/DL (ref 70–99)
GLUCOSE BLD-MCNC: 73 MG/DL (ref 70–99)
GLUCOSE BLD-MCNC: 76 MG/DL (ref 70–99)
GLUCOSE BLD-MCNC: 79 MG/DL (ref 70–99)
HCT VFR BLD AUTO: 40.2 %
HGB BLD-MCNC: 13.5 G/DL
MAGNESIUM SERPL-MCNC: 1.8 MG/DL (ref 1.6–2.6)
MCH RBC QN AUTO: 29.9 PG (ref 26–34)
MCHC RBC AUTO-ENTMCNC: 33.6 G/DL (ref 31–37)
MCV RBC AUTO: 89.1 FL
OSMOLALITY SERPL CALC.SUM OF ELEC: 292 MOSM/KG (ref 275–295)
PLATELET # BLD AUTO: 262 10(3)UL (ref 150–450)
POTASSIUM SERPL-SCNC: 4.1 MMOL/L (ref 3.5–5.1)
RBC # BLD AUTO: 4.51 X10(6)UL
SODIUM SERPL-SCNC: 141 MMOL/L (ref 136–145)
WBC # BLD AUTO: 11.8 X10(3) UL (ref 4–11)

## 2023-05-12 PROCEDURE — 99232 SBSQ HOSP IP/OBS MODERATE 35: CPT | Performed by: INTERNAL MEDICINE

## 2023-05-12 PROCEDURE — 99232 SBSQ HOSP IP/OBS MODERATE 35: CPT | Performed by: STUDENT IN AN ORGANIZED HEALTH CARE EDUCATION/TRAINING PROGRAM

## 2023-05-12 RX ORDER — METOPROLOL SUCCINATE 50 MG/1
50 TABLET, EXTENDED RELEASE ORAL
Status: DISCONTINUED | OUTPATIENT
Start: 2023-05-12 | End: 2023-05-14

## 2023-05-12 RX ORDER — MAGNESIUM SULFATE HEPTAHYDRATE 40 MG/ML
2 INJECTION, SOLUTION INTRAVENOUS ONCE
Status: COMPLETED | OUTPATIENT
Start: 2023-05-12 | End: 2023-05-12

## 2023-05-12 NOTE — PLAN OF CARE
Pt is A&O x4. VSS- NSR on tele. RA- lungs diminished. PRN dilaudid given for back pain per MAR. NG tube clamping trial completed. Less than 50mL output post clamping. Dr. Claudetta Mings notified- instructed to keep NG tube clamped overnight. Bowel sounds slightly more active this shift and pt reports passing little flatus. NPO with accuchecks q6hr. CMG to L upper arm. IVF- 0.9@ 100mL/hr infusing to RFA PIV. Fentanyl patch to L arm. Pt up with SBA to BR. Gen surg following case.      Problem: Patient/Family Goals  Goal: Patient/Family Long Term Goal  Description: Patient's Long Term Goal: \"get this resolved so I can go home\"    Interventions:  - NPO  -Gen surg consult  -NG tube to LIS  - See additional Care Plan goals for specific interventions  Outcome: Progressing  Goal: Patient/Family Short Term Goal  Description: Patient's Short Term Goal: pain less than a 4/10    Interventions:   - Bowel rest  -NG tube to LIS  -PRN pain medications  - See additional Care Plan goals for specific interventions  Outcome: Progressing     Problem: GASTROINTESTINAL - ADULT  Goal: Minimal or absence of nausea and vomiting  Description: INTERVENTIONS:  - Maintain adequate hydration with IV or PO as ordered and tolerated  - Nasogastric tube to low intermittent suction as ordered  - Evaluate effectiveness of ordered antiemetic medications  - Provide nonpharmacologic comfort measures as appropriate  - Advance diet as tolerated, if ordered  - Obtain nutritional consult as needed  - Evaluate fluid balance  Outcome: Progressing  Goal: Maintains or returns to baseline bowel function  Description: INTERVENTIONS:  - Assess bowel function  - Maintain adequate hydration with IV or PO as ordered and tolerated  - Evaluate effectiveness of GI medications  - Encourage mobilization and activity  - Obtain nutritional consult as needed  - Establish a toileting routine/schedule  - Consider collaborating with pharmacy to review patient's medication profile  Outcome: Progressing     Problem: METABOLIC/FLUID AND ELECTROLYTES - ADULT  Goal: Glucose maintained within prescribed range  Description: INTERVENTIONS:  - Monitor Blood Glucose as ordered  - Assess for signs and symptoms of hyperglycemia and hypoglycemia  - Administer ordered medications to maintain glucose within target range  - Assess barriers to adequate nutritional intake and initiate nutrition consult as needed  - Instruct patient on self management of diabetes  Outcome: Progressing  Goal: Electrolytes maintained within normal limits  Description: INTERVENTIONS:  - Monitor labs and rhythm and assess patient for signs and symptoms of electrolyte imbalances  - Administer electrolyte replacement as ordered  - Monitor response to electrolyte replacements, including rhythm and repeat lab results as appropriate  - Fluid restriction as ordered  - Instruct patient on fluid and nutrition restrictions as appropriate  Outcome: Progressing     Problem: PAIN - ADULT  Goal: Verbalizes/displays adequate comfort level or patient's stated pain goal  Description: INTERVENTIONS:  - Encourage pt to monitor pain and request assistance  - Assess pain using appropriate pain scale  - Administer analgesics based on type and severity of pain and evaluate response  - Implement non-pharmacological measures as appropriate and evaluate response  - Consider cultural and social influences on pain and pain management  - Manage/alleviate anxiety  - Utilize distraction and/or relaxation techniques  - Monitor for opioid side effects  - Notify MD/LIP if interventions unsuccessful or patient reports new pain  - Anticipate increased pain with activity and pre-medicate as appropriate  Outcome: Progressing

## 2023-05-12 NOTE — PLAN OF CARE
Assumed pt care at 0730. A&Ox4. VSS. Room air. NSR on tele. NG removed by MD this AM, ok to trial sips of clear liquids. Reports 5/10 L flank pain, PRN dilaudid given per MAR. Denies N/V. Voiding, up SBA to bathroom. L AC PIV SL, R forearm PIV infusing 0.9NS @ 100 mL/hr. CGM to LUE, fentanyl patch to LUE. Pt updated with POC.      Problem: Patient/Family Goals  Goal: Patient/Family Long Term Goal  Description: Patient's Long Term Goal: \"get this resolved so I can go home\"  Interventions:  - NPO  -Gen surg consult  -NG tube to LIS  - See additional Care Plan goals for specific interventions  Outcome: Progressing  Goal: Patient/Family Short Term Goal  Description: Patient's Short Term Goal: pain less than a 4/10  Interventions:   - Bowel rest  -NG tube to LIS  -PRN pain medications  - See additional Care Plan goals for specific interventions  Outcome: Progressing     Problem: GASTROINTESTINAL - ADULT  Goal: Minimal or absence of nausea and vomiting  Description: INTERVENTIONS:  - Maintain adequate hydration with IV or PO as ordered and tolerated  - Nasogastric tube to low intermittent suction as ordered  - Evaluate effectiveness of ordered antiemetic medications  - Provide nonpharmacologic comfort measures as appropriate  - Advance diet as tolerated, if ordered  - Obtain nutritional consult as needed  - Evaluate fluid balance  Outcome: Progressing  Goal: Maintains or returns to baseline bowel function  Description: INTERVENTIONS:  - Assess bowel function  - Maintain adequate hydration with IV or PO as ordered and tolerated  - Evaluate effectiveness of GI medications  - Encourage mobilization and activity  - Obtain nutritional consult as needed  - Establish a toileting routine/schedule  - Consider collaborating with pharmacy to review patient's medication profile  Outcome: Progressing     Problem: METABOLIC/FLUID AND ELECTROLYTES - ADULT  Goal: Glucose maintained within prescribed range  Description: INTERVENTIONS:  - Monitor Blood Glucose as ordered  - Assess for signs and symptoms of hyperglycemia and hypoglycemia  - Administer ordered medications to maintain glucose within target range  - Assess barriers to adequate nutritional intake and initiate nutrition consult as needed  - Instruct patient on self management of diabetes  Outcome: Progressing  Goal: Electrolytes maintained within normal limits  Description: INTERVENTIONS:  - Monitor labs and rhythm and assess patient for signs and symptoms of electrolyte imbalances  - Administer electrolyte replacement as ordered  - Monitor response to electrolyte replacements, including rhythm and repeat lab results as appropriate  - Fluid restriction as ordered  - Instruct patient on fluid and nutrition restrictions as appropriate  Outcome: Progressing     Problem: PAIN - ADULT  Goal: Verbalizes/displays adequate comfort level or patient's stated pain goal  Description: INTERVENTIONS:  - Encourage pt to monitor pain and request assistance  - Assess pain using appropriate pain scale  - Administer analgesics based on type and severity of pain and evaluate response  - Implement non-pharmacological measures as appropriate and evaluate response  - Consider cultural and social influences on pain and pain management  - Manage/alleviate anxiety  - Utilize distraction and/or relaxation techniques  - Monitor for opioid side effects  - Notify MD/LIP if interventions unsuccessful or patient reports new pain  - Anticipate increased pain with activity and pre-medicate as appropriate  Outcome: Progressing

## 2023-05-12 NOTE — PLAN OF CARE
Assumed care at 0730. Pt a/ox4, VSS, on RA, NSR with PVCs on telemetry. Pt with ab pain radiating to back, PRN medications per MAR. Pt rating 5-6/10 and pain decreases for short period of time 10-15 min to 4/10 with 2 mg dilaudid, Dr. Angeli Blair aware. Fetanyl patch to left arm. No c/o n/v/d. Did pass gas per pt this AM. IVF running 0.9NS at 100mL/hr. Afternoon glucose 65, 1 am D50 given, repeat 198. Dr. Angeli Blair notified MD discontinued order for levemir. CGM to left arm. Pt NPO. Ok per sx for ice chips. Strict I&Os. NG to LIS. NG clamped by sx at 1630. Orders given to check residual at 2100 and notify MD of output. Electrolyte protocol. Pt encouraged to ambulate. Pt and family updated on POC. Will continue to monitor.        Problem: GASTROINTESTINAL - ADULT  Goal: Minimal or absence of nausea and vomiting  Description: INTERVENTIONS:  - Maintain adequate hydration with IV or PO as ordered and tolerated  - Nasogastric tube to low intermittent suction as ordered  - Evaluate effectiveness of ordered antiemetic medications  - Provide nonpharmacologic comfort measures as appropriate  - Advance diet as tolerated, if ordered  - Obtain nutritional consult as needed  - Evaluate fluid balance  Outcome: Progressing  Goal: Maintains or returns to baseline bowel function  Description: INTERVENTIONS:  - Assess bowel function  - Maintain adequate hydration with IV or PO as ordered and tolerated  - Evaluate effectiveness of GI medications  - Encourage mobilization and activity  - Obtain nutritional consult as needed  - Establish a toileting routine/schedule  - Consider collaborating with pharmacy to review patient's medication profile  Outcome: Progressing     Problem: METABOLIC/FLUID AND ELECTROLYTES - ADULT  Goal: Glucose maintained within prescribed range  Description: INTERVENTIONS:  - Monitor Blood Glucose as ordered  - Assess for signs and symptoms of hyperglycemia and hypoglycemia  - Administer ordered medications to maintain glucose within target range  - Assess barriers to adequate nutritional intake and initiate nutrition consult as needed  - Instruct patient on self management of diabetes  Outcome: Progressing  Goal: Electrolytes maintained within normal limits  Description: INTERVENTIONS:  - Monitor labs and rhythm and assess patient for signs and symptoms of electrolyte imbalances  - Administer electrolyte replacement as ordered  - Monitor response to electrolyte replacements, including rhythm and repeat lab results as appropriate  - Fluid restriction as ordered  - Instruct patient on fluid and nutrition restrictions as appropriate  Outcome: Progressing     Problem: PAIN - ADULT  Goal: Verbalizes/displays adequate comfort level or patient's stated pain goal  Description: INTERVENTIONS:  - Encourage pt to monitor pain and request assistance  - Assess pain using appropriate pain scale  - Administer analgesics based on type and severity of pain and evaluate response  - Implement non-pharmacological measures as appropriate and evaluate response  - Consider cultural and social influences on pain and pain management  - Manage/alleviate anxiety  - Utilize distraction and/or relaxation techniques  - Monitor for opioid side effects  - Notify MD/LIP if interventions unsuccessful or patient reports new pain  - Anticipate increased pain with activity and pre-medicate as appropriate  Outcome: Progressing

## 2023-05-13 LAB
GLUCOSE BLD-MCNC: 120 MG/DL (ref 70–99)
GLUCOSE BLD-MCNC: 132 MG/DL (ref 70–99)
GLUCOSE BLD-MCNC: 150 MG/DL (ref 70–99)
GLUCOSE BLD-MCNC: 275 MG/DL (ref 70–99)
GLUCOSE BLD-MCNC: 292 MG/DL (ref 70–99)
MAGNESIUM SERPL-MCNC: 1.8 MG/DL (ref 1.6–2.6)

## 2023-05-13 PROCEDURE — 99232 SBSQ HOSP IP/OBS MODERATE 35: CPT | Performed by: INTERNAL MEDICINE

## 2023-05-13 PROCEDURE — 99232 SBSQ HOSP IP/OBS MODERATE 35: CPT | Performed by: STUDENT IN AN ORGANIZED HEALTH CARE EDUCATION/TRAINING PROGRAM

## 2023-05-13 RX ORDER — MAGNESIUM OXIDE 400 MG/1
400 TABLET ORAL ONCE
Status: COMPLETED | OUTPATIENT
Start: 2023-05-13 | End: 2023-05-13

## 2023-05-13 NOTE — PLAN OF CARE
Pt is A&O x4. VSS- NSR on tele. BP trending up- MD notified and resumed home BP med. RA. Pt with active bowel sounds, passing flatus and reports 2 formed BMs this shift. PRN pain medication given for L flank pain. IVF- 0.9NS @ 100mL/hr infusing to right wrist PIV. LAC is SL. Pt tolerating CLD. Up to BR with SBA. Fentanyl patch to L arm. General surg following case.      Problem: Patient/Family Goals  Goal: Patient/Family Long Term Goal  Description: Patient's Long Term Goal: \"get this resolved so I can go home\"    Interventions:  - NPO  -Gen surg consult  -NG tube to LIS  - See additional Care Plan goals for specific interventions  Outcome: Progressing  Goal: Patient/Family Short Term Goal  Description: Patient's Short Term Goal: pain less than a 4/10    Interventions:   - Bowel rest  -NG tube to LIS  -PRN pain medications  - See additional Care Plan goals for specific interventions  Outcome: Progressing     Problem: GASTROINTESTINAL - ADULT  Goal: Minimal or absence of nausea and vomiting  Description: INTERVENTIONS:  - Maintain adequate hydration with IV or PO as ordered and tolerated  - Nasogastric tube to low intermittent suction as ordered  - Evaluate effectiveness of ordered antiemetic medications  - Provide nonpharmacologic comfort measures as appropriate  - Advance diet as tolerated, if ordered  - Obtain nutritional consult as needed  - Evaluate fluid balance  Outcome: Progressing  Goal: Maintains or returns to baseline bowel function  Description: INTERVENTIONS:  - Assess bowel function  - Maintain adequate hydration with IV or PO as ordered and tolerated  - Evaluate effectiveness of GI medications  - Encourage mobilization and activity  - Obtain nutritional consult as needed  - Establish a toileting routine/schedule  - Consider collaborating with pharmacy to review patient's medication profile  Outcome: Progressing     Problem: METABOLIC/FLUID AND ELECTROLYTES - ADULT  Goal: Glucose maintained within prescribed range  Description: INTERVENTIONS:  - Monitor Blood Glucose as ordered  - Assess for signs and symptoms of hyperglycemia and hypoglycemia  - Administer ordered medications to maintain glucose within target range  - Assess barriers to adequate nutritional intake and initiate nutrition consult as needed  - Instruct patient on self management of diabetes  Outcome: Progressing  Goal: Electrolytes maintained within normal limits  Description: INTERVENTIONS:  - Monitor labs and rhythm and assess patient for signs and symptoms of electrolyte imbalances  - Administer electrolyte replacement as ordered  - Monitor response to electrolyte replacements, including rhythm and repeat lab results as appropriate  - Fluid restriction as ordered  - Instruct patient on fluid and nutrition restrictions as appropriate  Outcome: Progressing     Problem: PAIN - ADULT  Goal: Verbalizes/displays adequate comfort level or patient's stated pain goal  Description: INTERVENTIONS:  - Encourage pt to monitor pain and request assistance  - Assess pain using appropriate pain scale  - Administer analgesics based on type and severity of pain and evaluate response  - Implement non-pharmacological measures as appropriate and evaluate response  - Consider cultural and social influences on pain and pain management  - Manage/alleviate anxiety  - Utilize distraction and/or relaxation techniques  - Monitor for opioid side effects  - Notify MD/LIP if interventions unsuccessful or patient reports new pain  - Anticipate increased pain with activity and pre-medicate as appropriate  Outcome: Progressing

## 2023-05-13 NOTE — PLAN OF CARE
Pt A&Ox4  RA;VSS  Tele-NSR  C/o L flank pain.  Dilaudid PRN   Tolerating CLD  Staff will continue to monitor       Problem: Patient/Family Goals  Goal: Patient/Family Long Term Goal  Description: Patient's Long Term Goal: \"get this resolved so I can go home\"    Interventions:  - NPO  -Gen surg consult  -NG tube to LIS  - See additional Care Plan goals for specific interventions  Outcome: Progressing  Goal: Patient/Family Short Term Goal  Description: Patient's Short Term Goal: pain less than a 4/10    Interventions:   - Bowel rest  -NG tube to LIS  -PRN pain medications  - See additional Care Plan goals for specific interventions  Outcome: Progressing     Problem: GASTROINTESTINAL - ADULT  Goal: Minimal or absence of nausea and vomiting  Description: INTERVENTIONS:  - Maintain adequate hydration with IV or PO as ordered and tolerated  - Nasogastric tube to low intermittent suction as ordered  - Evaluate effectiveness of ordered antiemetic medications  - Provide nonpharmacologic comfort measures as appropriate  - Advance diet as tolerated, if ordered  - Obtain nutritional consult as needed  - Evaluate fluid balance  Outcome: Progressing  Goal: Maintains or returns to baseline bowel function  Description: INTERVENTIONS:  - Assess bowel function  - Maintain adequate hydration with IV or PO as ordered and tolerated  - Evaluate effectiveness of GI medications  - Encourage mobilization and activity  - Obtain nutritional consult as needed  - Establish a toileting routine/schedule  - Consider collaborating with pharmacy to review patient's medication profile  Outcome: Progressing     Problem: METABOLIC/FLUID AND ELECTROLYTES - ADULT  Goal: Glucose maintained within prescribed range  Description: INTERVENTIONS:  - Monitor Blood Glucose as ordered  - Assess for signs and symptoms of hyperglycemia and hypoglycemia  - Administer ordered medications to maintain glucose within target range  - Assess barriers to adequate nutritional intake and initiate nutrition consult as needed  - Instruct patient on self management of diabetes  Outcome: Progressing  Goal: Electrolytes maintained within normal limits  Description: INTERVENTIONS:  - Monitor labs and rhythm and assess patient for signs and symptoms of electrolyte imbalances  - Administer electrolyte replacement as ordered  - Monitor response to electrolyte replacements, including rhythm and repeat lab results as appropriate  - Fluid restriction as ordered  - Instruct patient on fluid and nutrition restrictions as appropriate  Outcome: Progressing     Problem: PAIN - ADULT  Goal: Verbalizes/displays adequate comfort level or patient's stated pain goal  Description: INTERVENTIONS:  - Encourage pt to monitor pain and request assistance  - Assess pain using appropriate pain scale  - Administer analgesics based on type and severity of pain and evaluate response  - Implement non-pharmacological measures as appropriate and evaluate response  - Consider cultural and social influences on pain and pain management  - Manage/alleviate anxiety  - Utilize distraction and/or relaxation techniques  - Monitor for opioid side effects  - Notify MD/LIP if interventions unsuccessful or patient reports new pain  - Anticipate increased pain with activity and pre-medicate as appropriate  Outcome: Progressing

## 2023-05-14 VITALS
BODY MASS INDEX: 20.3 KG/M2 | DIASTOLIC BLOOD PRESSURE: 67 MMHG | WEIGHT: 145 LBS | HEIGHT: 71 IN | RESPIRATION RATE: 18 BRPM | HEART RATE: 75 BPM | TEMPERATURE: 98 F | SYSTOLIC BLOOD PRESSURE: 136 MMHG | OXYGEN SATURATION: 96 %

## 2023-05-14 LAB
GLUCOSE BLD-MCNC: 162 MG/DL (ref 70–99)
MAGNESIUM SERPL-MCNC: 1.7 MG/DL (ref 1.6–2.6)

## 2023-05-14 PROCEDURE — 99239 HOSP IP/OBS DSCHRG MGMT >30: CPT | Performed by: INTERNAL MEDICINE

## 2023-05-14 PROCEDURE — 99232 SBSQ HOSP IP/OBS MODERATE 35: CPT | Performed by: STUDENT IN AN ORGANIZED HEALTH CARE EDUCATION/TRAINING PROGRAM

## 2023-05-14 NOTE — PLAN OF CARE
RN to complete Med rec. Patient to be discharged home. All questions to be answered. Patient calling wife to transport him home.

## 2023-05-14 NOTE — PLAN OF CARE
Pt is aox4. States pain in his abd from pancreatitis is resolved, but continues to have left flank pain from a kidney stone. Medicating with 0.5,g IV dilaudid. IV fluids. Medicated per orders. Up ad black. Insulin coverage per sliding scale. Pt is tolerating clear liquid diet.      Problem: Patient/Family Goals  Goal: Patient/Family Long Term Goal  Description: Patient's Long Term Goal: \"get this resolved so I can go home\"    Interventions:  - NPO  -Gen surg consult  -NG tube to LIS  - See additional Care Plan goals for specific interventions  Outcome: Progressing  Goal: Patient/Family Short Term Goal  Description: Patient's Short Term Goal: pain less than a 4/10    Interventions:   - Bowel rest  -NG tube to LIS  -PRN pain medications  - See additional Care Plan goals for specific interventions  Outcome: Progressing     Problem: GASTROINTESTINAL - ADULT  Goal: Minimal or absence of nausea and vomiting  Description: INTERVENTIONS:  - Maintain adequate hydration with IV or PO as ordered and tolerated  - Nasogastric tube to low intermittent suction as ordered  - Evaluate effectiveness of ordered antiemetic medications  - Provide nonpharmacologic comfort measures as appropriate  - Advance diet as tolerated, if ordered  - Obtain nutritional consult as needed  - Evaluate fluid balance  Outcome: Progressing  Goal: Maintains or returns to baseline bowel function  Description: INTERVENTIONS:  - Assess bowel function  - Maintain adequate hydration with IV or PO as ordered and tolerated  - Evaluate effectiveness of GI medications  - Encourage mobilization and activity  - Obtain nutritional consult as needed  - Establish a toileting routine/schedule  - Consider collaborating with pharmacy to review patient's medication profile  Outcome: Progressing     Problem: METABOLIC/FLUID AND ELECTROLYTES - ADULT  Goal: Glucose maintained within prescribed range  Description: INTERVENTIONS:  - Monitor Blood Glucose as ordered  - Assess for signs and symptoms of hyperglycemia and hypoglycemia  - Administer ordered medications to maintain glucose within target range  - Assess barriers to adequate nutritional intake and initiate nutrition consult as needed  - Instruct patient on self management of diabetes  Outcome: Progressing  Goal: Electrolytes maintained within normal limits  Description: INTERVENTIONS:  - Monitor labs and rhythm and assess patient for signs and symptoms of electrolyte imbalances  - Administer electrolyte replacement as ordered  - Monitor response to electrolyte replacements, including rhythm and repeat lab results as appropriate  - Fluid restriction as ordered  - Instruct patient on fluid and nutrition restrictions as appropriate  Outcome: Progressing     Problem: PAIN - ADULT  Goal: Verbalizes/displays adequate comfort level or patient's stated pain goal  Description: INTERVENTIONS:  - Encourage pt to monitor pain and request assistance  - Assess pain using appropriate pain scale  - Administer analgesics based on type and severity of pain and evaluate response  - Implement non-pharmacological measures as appropriate and evaluate response  - Consider cultural and social influences on pain and pain management  - Manage/alleviate anxiety  - Utilize distraction and/or relaxation techniques  - Monitor for opioid side effects  - Notify MD/LIP if interventions unsuccessful or patient reports new pain  - Anticipate increased pain with activity and pre-medicate as appropriate  Outcome: Progressing

## 2023-05-15 ENCOUNTER — PATIENT OUTREACH (OUTPATIENT)
Dept: CASE MANAGEMENT | Age: 73
End: 2023-05-15

## 2023-05-15 ENCOUNTER — IMMUNIZATION (OUTPATIENT)
Dept: LAB | Age: 73
End: 2023-05-15
Attending: EMERGENCY MEDICINE
Payer: MEDICARE

## 2023-05-15 ENCOUNTER — TELEPHONE (OUTPATIENT)
Dept: INTERNAL MEDICINE CLINIC | Facility: CLINIC | Age: 73
End: 2023-05-15

## 2023-05-15 DIAGNOSIS — Z23 NEED FOR VACCINATION: Primary | ICD-10-CM

## 2023-05-15 DIAGNOSIS — Z02.9 ENCOUNTERS FOR UNSPECIFIED ADMINISTRATIVE PURPOSE: ICD-10-CM

## 2023-05-15 PROCEDURE — 0124A SARSCOV2 VAC BVL 30MCG/0.3ML: CPT

## 2023-05-15 PROCEDURE — 1111F DSCHRG MED/CURRENT MED MERGE: CPT

## 2023-05-15 NOTE — TELEPHONE ENCOUNTER
Provider does not do TCM/HFU in 15-minute slot, needs 30-minute slot. Pt has another appt with PCP 6/8/23 for his cpx. Not able to change pt's visit for tomorrow.

## 2023-05-15 NOTE — PROGRESS NOTES
[facility]. [facility] contact information provided as well as [facility] office number, [phone].

## 2023-05-16 ENCOUNTER — OFFICE VISIT (OUTPATIENT)
Dept: INTERNAL MEDICINE CLINIC | Facility: CLINIC | Age: 73
End: 2023-05-16
Payer: COMMERCIAL

## 2023-05-16 VITALS
HEART RATE: 76 BPM | TEMPERATURE: 98 F | BODY MASS INDEX: 20.02 KG/M2 | SYSTOLIC BLOOD PRESSURE: 160 MMHG | WEIGHT: 143 LBS | HEIGHT: 71 IN | OXYGEN SATURATION: 96 % | DIASTOLIC BLOOD PRESSURE: 88 MMHG | RESPIRATION RATE: 16 BRPM

## 2023-05-16 DIAGNOSIS — Z87.19 HISTORY OF SMALL BOWEL OBSTRUCTION: Primary | ICD-10-CM

## 2023-05-16 DIAGNOSIS — E46 PROTEIN-CALORIE MALNUTRITION, UNSPECIFIED SEVERITY (HCC): ICD-10-CM

## 2023-05-16 DIAGNOSIS — F11.20 CHRONIC NARCOTIC DEPENDENCE (HCC): ICD-10-CM

## 2023-05-16 PROBLEM — K56.609 SMALL BOWEL OBSTRUCTION (HCC): Status: RESOLVED | Noted: 2023-01-01 | Resolved: 2023-01-01

## 2023-05-16 PROBLEM — R35.1 NOCTURIA: Status: RESOLVED | Noted: 2022-01-01 | Resolved: 2023-01-01

## 2023-05-16 PROBLEM — R35.0 URINARY FREQUENCY: Status: RESOLVED | Noted: 2022-10-17 | Resolved: 2023-05-16

## 2023-05-16 PROBLEM — R33.9 RETENTION OF URINE: Status: RESOLVED | Noted: 2022-09-29 | Resolved: 2023-05-16

## 2023-05-16 PROBLEM — U07.1 COVID: Status: RESOLVED | Noted: 2022-01-01 | Resolved: 2023-01-01

## 2023-05-16 PROBLEM — R33.9 RETENTION OF URINE: Status: RESOLVED | Noted: 2022-01-01 | Resolved: 2023-01-01

## 2023-05-16 PROBLEM — R35.1 NOCTURIA: Status: RESOLVED | Noted: 2022-10-17 | Resolved: 2023-05-16

## 2023-05-16 PROBLEM — R82.90 ABNORMAL URINALYSIS: Status: RESOLVED | Noted: 2023-01-01 | Resolved: 2023-01-01

## 2023-05-16 PROBLEM — N20.0 RENAL CALCULI: Status: RESOLVED | Noted: 2020-05-07 | Resolved: 2023-01-01

## 2023-05-16 PROBLEM — R82.90 ABNORMAL URINALYSIS: Status: RESOLVED | Noted: 2023-05-10 | Resolved: 2023-05-16

## 2023-05-16 PROBLEM — R35.0 URINARY FREQUENCY: Status: RESOLVED | Noted: 2022-01-01 | Resolved: 2023-01-01

## 2023-05-16 PROBLEM — N20.0 RENAL CALCULI: Status: RESOLVED | Noted: 2020-05-07 | Resolved: 2023-05-16

## 2023-05-16 PROBLEM — K56.609 SMALL BOWEL OBSTRUCTION (HCC): Status: RESOLVED | Noted: 2023-05-10 | Resolved: 2023-05-16

## 2023-05-16 PROBLEM — U07.1 COVID: Status: RESOLVED | Noted: 2022-12-20 | Resolved: 2023-05-16

## 2023-05-16 PROCEDURE — 3079F DIAST BP 80-89 MM HG: CPT | Performed by: INTERNAL MEDICINE

## 2023-05-16 PROCEDURE — 99495 TRANSJ CARE MGMT MOD F2F 14D: CPT | Performed by: INTERNAL MEDICINE

## 2023-05-16 PROCEDURE — 1111F DSCHRG MED/CURRENT MED MERGE: CPT | Performed by: INTERNAL MEDICINE

## 2023-05-16 PROCEDURE — 3008F BODY MASS INDEX DOCD: CPT | Performed by: INTERNAL MEDICINE

## 2023-05-16 PROCEDURE — 3077F SYST BP >= 140 MM HG: CPT | Performed by: INTERNAL MEDICINE

## 2023-05-16 PROCEDURE — 1159F MED LIST DOCD IN RCRD: CPT | Performed by: INTERNAL MEDICINE

## 2023-05-16 RX ORDER — ROFLUMILAST 500 UG/1
1 TABLET ORAL DAILY
COMMUNITY

## 2023-05-16 NOTE — PATIENT INSTRUCTIONS
10 minutes was spent purging the patient's problem list updating it as well as his medicines.   Is scheduled to see me for CPX in a few weeks

## 2023-05-22 ENCOUNTER — TELEPHONE (OUTPATIENT)
Facility: CLINIC | Age: 73
End: 2023-05-22

## 2023-05-22 RX ORDER — ROFLUMILAST 500 UG/1
500 TABLET ORAL DAILY
Qty: 90 TABLET | Refills: 3 | Status: SHIPPED | OUTPATIENT
Start: 2023-05-22 | End: 2023-06-21

## 2023-05-22 NOTE — TELEPHONE ENCOUNTER
Wife called. Pt is out of Roflumilast and is unable to get a refill. [place] told pt that MD cancelled it.   Wife believes this happened after discharge from hospital.  Please advise re: new Rx for Roflumilast.

## 2023-05-25 ENCOUNTER — APPOINTMENT (OUTPATIENT)
Dept: CT IMAGING | Facility: HOSPITAL | Age: 73
End: 2023-05-25
Attending: EMERGENCY MEDICINE
Payer: MEDICARE

## 2023-05-25 ENCOUNTER — HOSPITAL ENCOUNTER (EMERGENCY)
Facility: HOSPITAL | Age: 73
Discharge: HOME | End: 2023-05-26
Attending: EMERGENCY MEDICINE
Payer: MEDICARE

## 2023-05-25 DIAGNOSIS — R10.9 ABDOMINAL PAIN, ACUTE: ICD-10-CM

## 2023-05-25 DIAGNOSIS — K56.0 ADYNAMIC ILEUS (HCC): Primary | ICD-10-CM

## 2023-05-25 LAB
ALBUMIN SERPL-MCNC: 3.7 G/DL (ref 3.4–5)
ALBUMIN/GLOB SERPL: 0.9 {RATIO} (ref 1–2)
ALP LIVER SERPL-CCNC: 70 U/L
ALT SERPL-CCNC: 58 U/L
ANION GAP SERPL CALC-SCNC: 5 MMOL/L (ref 0–18)
AST SERPL-CCNC: 45 U/L (ref 15–37)
BASOPHILS # BLD AUTO: 0.03 X10(3) UL (ref 0–0.2)
BASOPHILS NFR BLD AUTO: 0.2 %
BILIRUB SERPL-MCNC: 1 MG/DL (ref 0.1–2)
BILIRUB UR QL STRIP.AUTO: NEGATIVE
BUN BLD-MCNC: 11 MG/DL (ref 7–18)
CALCIUM BLD-MCNC: 9.8 MG/DL (ref 8.5–10.1)
CHLORIDE SERPL-SCNC: 106 MMOL/L (ref 98–112)
CO2 SERPL-SCNC: 29 MMOL/L (ref 21–32)
COLOR UR AUTO: YELLOW
CREAT BLD-MCNC: 1.29 MG/DL
EOSINOPHIL # BLD AUTO: 0.01 X10(3) UL (ref 0–0.7)
EOSINOPHIL NFR BLD AUTO: 0.1 %
ERYTHROCYTE [DISTWIDTH] IN BLOOD BY AUTOMATED COUNT: 21.1 %
GFR SERPLBLD BASED ON 1.73 SQ M-ARVRAT: 59 ML/MIN/1.73M2 (ref 60–?)
GLOBULIN PLAS-MCNC: 3.9 G/DL (ref 2.8–4.4)
GLUCOSE BLD-MCNC: 151 MG/DL (ref 70–99)
GLUCOSE UR STRIP.AUTO-MCNC: NEGATIVE MG/DL
HCT VFR BLD AUTO: 43 %
HGB BLD-MCNC: 14.8 G/DL
IMM GRANULOCYTES # BLD AUTO: 0.04 X10(3) UL (ref 0–1)
IMM GRANULOCYTES NFR BLD: 0.3 %
LEUKOCYTE ESTERASE UR QL STRIP.AUTO: NEGATIVE
LIPASE SERPL-CCNC: 11 U/L (ref 13–75)
LYMPHOCYTES # BLD AUTO: 1.35 X10(3) UL (ref 1–4)
LYMPHOCYTES NFR BLD AUTO: 10.4 %
MCH RBC QN AUTO: 30 PG (ref 26–34)
MCHC RBC AUTO-ENTMCNC: 34.4 G/DL (ref 31–37)
MCV RBC AUTO: 87 FL
MONOCYTES # BLD AUTO: 0.75 X10(3) UL (ref 0.1–1)
MONOCYTES NFR BLD AUTO: 5.8 %
NEUTROPHILS # BLD AUTO: 10.75 X10 (3) UL (ref 1.5–7.7)
NEUTROPHILS # BLD AUTO: 10.75 X10(3) UL (ref 1.5–7.7)
NEUTROPHILS NFR BLD AUTO: 83.2 %
NITRITE UR QL STRIP.AUTO: NEGATIVE
OSMOLALITY SERPL CALC.SUM OF ELEC: 292 MOSM/KG (ref 275–295)
PH UR STRIP.AUTO: 6 [PH] (ref 5–8)
PLATELET # BLD AUTO: 460 10(3)UL (ref 150–450)
POTASSIUM SERPL-SCNC: 4.9 MMOL/L (ref 3.5–5.1)
PROT SERPL-MCNC: 7.6 G/DL (ref 6.4–8.2)
PROT UR STRIP.AUTO-MCNC: 30 MG/DL
RBC # BLD AUTO: 4.94 X10(6)UL
RBC #/AREA URNS AUTO: >10 /HPF
SODIUM SERPL-SCNC: 140 MMOL/L (ref 136–145)
SP GR UR STRIP.AUTO: 1.01 (ref 1–1.03)
UROBILINOGEN UR STRIP.AUTO-MCNC: 2 MG/DL
WBC # BLD AUTO: 12.9 X10(3) UL (ref 4–11)

## 2023-05-25 PROCEDURE — 85025 COMPLETE CBC W/AUTO DIFF WBC: CPT | Performed by: EMERGENCY MEDICINE

## 2023-05-25 PROCEDURE — 99285 EMERGENCY DEPT VISIT HI MDM: CPT

## 2023-05-25 PROCEDURE — 96376 TX/PRO/DX INJ SAME DRUG ADON: CPT

## 2023-05-25 PROCEDURE — 96374 THER/PROPH/DIAG INJ IV PUSH: CPT

## 2023-05-25 PROCEDURE — 83690 ASSAY OF LIPASE: CPT

## 2023-05-25 PROCEDURE — 80053 COMPREHEN METABOLIC PANEL: CPT | Performed by: EMERGENCY MEDICINE

## 2023-05-25 PROCEDURE — 81001 URINALYSIS AUTO W/SCOPE: CPT | Performed by: EMERGENCY MEDICINE

## 2023-05-25 PROCEDURE — 74177 CT ABD & PELVIS W/CONTRAST: CPT | Performed by: EMERGENCY MEDICINE

## 2023-05-25 PROCEDURE — 85025 COMPLETE CBC W/AUTO DIFF WBC: CPT

## 2023-05-25 PROCEDURE — 96375 TX/PRO/DX INJ NEW DRUG ADDON: CPT

## 2023-05-25 PROCEDURE — 80053 COMPREHEN METABOLIC PANEL: CPT

## 2023-05-25 PROCEDURE — 83690 ASSAY OF LIPASE: CPT | Performed by: EMERGENCY MEDICINE

## 2023-05-25 RX ORDER — HYDROMORPHONE HYDROCHLORIDE 1 MG/ML
1 INJECTION, SOLUTION INTRAMUSCULAR; INTRAVENOUS; SUBCUTANEOUS EVERY 30 MIN PRN
Status: COMPLETED | OUTPATIENT
Start: 2023-05-25 | End: 2023-05-25

## 2023-05-25 RX ORDER — ONDANSETRON 2 MG/ML
4 INJECTION INTRAMUSCULAR; INTRAVENOUS ONCE
Status: COMPLETED | OUTPATIENT
Start: 2023-05-25 | End: 2023-05-25

## 2023-05-25 RX ORDER — KETOROLAC TROMETHAMINE 15 MG/ML
15 INJECTION, SOLUTION INTRAMUSCULAR; INTRAVENOUS ONCE
Status: COMPLETED | OUTPATIENT
Start: 2023-05-25 | End: 2023-05-25

## 2023-05-25 NOTE — ED INITIAL ASSESSMENT (HPI)
Patient to ER w/ complaints of abd pain and nausea. Started this AM. Last BM today.  Recent admission for SBO

## 2023-05-26 VITALS
BODY MASS INDEX: 20.3 KG/M2 | OXYGEN SATURATION: 97 % | HEART RATE: 57 BPM | DIASTOLIC BLOOD PRESSURE: 89 MMHG | TEMPERATURE: 98 F | RESPIRATION RATE: 17 BRPM | SYSTOLIC BLOOD PRESSURE: 143 MMHG | HEIGHT: 71 IN | WEIGHT: 145 LBS

## 2023-05-30 RX ORDER — METOPROLOL SUCCINATE 50 MG/1
TABLET, EXTENDED RELEASE ORAL
Qty: 30 TABLET | Refills: 0 | Status: SHIPPED | OUTPATIENT
Start: 2023-05-30

## 2023-05-30 RX ORDER — INSULIN DETEMIR 100 [IU]/ML
35 INJECTION, SOLUTION SUBCUTANEOUS NIGHTLY
Qty: 15 ML | Refills: 0 | Status: SHIPPED | OUTPATIENT
Start: 2023-05-30

## 2023-05-30 NOTE — TELEPHONE ENCOUNTER
Protocol passed   Metoprolol succinate 50mg filled 4/19/23 # 30 0 refills     No protocol   levemir 200 unit/mL filled 3/7/23 # 15mL 0 refills       LOV: 5/16/23   RTC: no follow ups on file   Recent Labs: 5/25/23     Upcoming OV: 6/8/23

## 2023-05-31 ENCOUNTER — TELEPHONE (OUTPATIENT)
Facility: CLINIC | Age: 73
End: 2023-05-31

## 2023-05-31 NOTE — TELEPHONE ENCOUNTER
Pt O2 levels have been 85-90 without O2;With O2 he has been between 90-93. He stopped the steroids Monday and started having a dry hacking cough and cloudy mucous.  Wife has noticed confusion and agitation that is concerning her

## 2023-05-31 NOTE — TELEPHONE ENCOUNTER
Received message that \" Pt O2 levels have been 85-90 without O2;With O2 he has been between 90-93. He stopped the steroids Monday and started having a dry hacking cough and cloudy mucous. Wife has noticed confusion and agitation that is concerning her\"    I reached out to Mike Bruno wife and she stated all of his symptoms seem to have started Monday when he was sweeping the garage and there was lots of dust.   Since then, pt sats without oxygen is 85-91% and 91-95% with oxygen. sHe states that when he places oxygen on the sat slowly increases. Pt has no fever, sore throat, Headache, chills. Pt does get SOB with slight exertion and has \"cloudy thicker sputum\" with his cough. Wife states that he had ankle swelling to 1 ankle for 1-2 days but they reached out to his cardiologist and has an appt set for that. Routing message to dr Jerry Cardenas to review. Brannon Bledsoe

## 2023-05-31 NOTE — TELEPHONE ENCOUNTER
Called/reviewed with patient and wife - patient with worsening cough, congestion over past 3-4 days - had swept garage and around lots of dust earlier this week. Also has noticed swelling in BLE, seems R > L, unsure of pain. No chest pain/pressure, pleurisy.   No fevers    Advised to take extra dose of pred 10mg this evening (so total dose is 20mg today)  Advised to come in for evaluation tomorrow - 6/1 with Ayan Coffey MD

## 2023-06-01 ENCOUNTER — OFFICE VISIT (OUTPATIENT)
Facility: CLINIC | Age: 73
End: 2023-06-01
Payer: COMMERCIAL

## 2023-06-01 VITALS
OXYGEN SATURATION: 97 % | DIASTOLIC BLOOD PRESSURE: 64 MMHG | RESPIRATION RATE: 16 BRPM | BODY MASS INDEX: 19 KG/M2 | SYSTOLIC BLOOD PRESSURE: 142 MMHG | HEART RATE: 68 BPM | HEIGHT: 71 IN

## 2023-06-01 DIAGNOSIS — J44.1 ACUTE EXACERBATION OF CHRONIC OBSTRUCTIVE PULMONARY DISEASE (COPD) (HCC): Primary | ICD-10-CM

## 2023-06-01 DIAGNOSIS — R09.02 HYPOXIA: ICD-10-CM

## 2023-06-01 PROCEDURE — 99214 OFFICE O/P EST MOD 30 MIN: CPT | Performed by: NURSE PRACTITIONER

## 2023-06-01 PROCEDURE — 1111F DSCHRG MED/CURRENT MED MERGE: CPT | Performed by: NURSE PRACTITIONER

## 2023-06-01 PROCEDURE — 3077F SYST BP >= 140 MM HG: CPT | Performed by: NURSE PRACTITIONER

## 2023-06-01 PROCEDURE — 3078F DIAST BP <80 MM HG: CPT | Performed by: NURSE PRACTITIONER

## 2023-06-01 RX ORDER — PREDNISONE 10 MG/1
TABLET ORAL
Qty: 18 TABLET | Refills: 0 | Status: SHIPPED | OUTPATIENT
Start: 2023-06-01

## 2023-06-01 NOTE — PATIENT INSTRUCTIONS
Start prednisone taper today starting with 30mg daily x 3 days, then 20mg X 3 days then to baseline 10mg   Followup next Wednesday  Call with any change or decline in respiratory status including signs or sx of exacerbation  Please contact office at [phone] with any decline in respiratory status, questions or concerns

## 2023-06-06 ENCOUNTER — LAB ENCOUNTER (OUTPATIENT)
Dept: LAB | Age: 73
End: 2023-06-06
Attending: FAMILY MEDICINE
Payer: MEDICARE

## 2023-06-06 DIAGNOSIS — D50.9 IRON DEFICIENCY ANEMIA, UNSPECIFIED IRON DEFICIENCY ANEMIA TYPE: ICD-10-CM

## 2023-06-06 LAB
BASOPHILS # BLD AUTO: 0.02 X10(3) UL (ref 0–0.2)
BASOPHILS NFR BLD AUTO: 0.2 %
DEPRECATED HBV CORE AB SER IA-ACNC: 78 NG/ML
EOSINOPHIL # BLD AUTO: 0.19 X10(3) UL (ref 0–0.7)
EOSINOPHIL NFR BLD AUTO: 1.8 %
ERYTHROCYTE [DISTWIDTH] IN BLOOD BY AUTOMATED COUNT: 18.9 %
HCT VFR BLD AUTO: 38.2 %
HGB BLD-MCNC: 13.2 G/DL
IMM GRANULOCYTES # BLD AUTO: 0.04 X10(3) UL (ref 0–1)
IMM GRANULOCYTES NFR BLD: 0.4 %
IRON SATN MFR SERPL: 24 %
IRON SERPL-MCNC: 79 UG/DL
LYMPHOCYTES # BLD AUTO: 2.95 X10(3) UL (ref 1–4)
LYMPHOCYTES NFR BLD AUTO: 28.2 %
MCH RBC QN AUTO: 30.9 PG (ref 26–34)
MCHC RBC AUTO-ENTMCNC: 34.6 G/DL (ref 31–37)
MCV RBC AUTO: 89.5 FL
MONOCYTES # BLD AUTO: 1.02 X10(3) UL (ref 0.1–1)
MONOCYTES NFR BLD AUTO: 9.8 %
NEUTROPHILS # BLD AUTO: 6.24 X10 (3) UL (ref 1.5–7.7)
NEUTROPHILS # BLD AUTO: 6.24 X10(3) UL (ref 1.5–7.7)
NEUTROPHILS NFR BLD AUTO: 59.6 %
PLATELET # BLD AUTO: 216 10(3)UL (ref 150–450)
RBC # BLD AUTO: 4.27 X10(6)UL
TIBC SERPL-MCNC: 325 UG/DL (ref 240–450)
TRANSFERRIN SERPL-MCNC: 218 MG/DL (ref 200–360)
WBC # BLD AUTO: 10.5 X10(3) UL (ref 4–11)

## 2023-06-06 PROCEDURE — 83540 ASSAY OF IRON: CPT

## 2023-06-06 PROCEDURE — 36415 COLL VENOUS BLD VENIPUNCTURE: CPT

## 2023-06-06 PROCEDURE — 82728 ASSAY OF FERRITIN: CPT

## 2023-06-06 PROCEDURE — 85025 COMPLETE CBC W/AUTO DIFF WBC: CPT

## 2023-06-06 PROCEDURE — 83550 IRON BINDING TEST: CPT

## 2023-06-06 NOTE — TELEPHONE ENCOUNTER
Protocol failed     Requesting: metformin 1000mg     LOV: 5/16/23   RTC: no follow ups on file   Filled: 3/3/23  # 180 0 refills   Recent Labs: 5/5/23     Upcoming OV: 6/8/23

## 2023-06-07 ENCOUNTER — OFFICE VISIT (OUTPATIENT)
Facility: CLINIC | Age: 73
End: 2023-06-07
Payer: COMMERCIAL

## 2023-06-07 VITALS
RESPIRATION RATE: 16 BRPM | OXYGEN SATURATION: 98 % | SYSTOLIC BLOOD PRESSURE: 154 MMHG | DIASTOLIC BLOOD PRESSURE: 74 MMHG | HEART RATE: 78 BPM

## 2023-06-07 DIAGNOSIS — R09.02 HYPOXIA: ICD-10-CM

## 2023-06-07 DIAGNOSIS — J44.9 CHRONIC OBSTRUCTIVE PULMONARY DISEASE, UNSPECIFIED COPD TYPE (HCC): Primary | ICD-10-CM

## 2023-06-07 DIAGNOSIS — I27.20 PULMONARY HYPERTENSION (HCC): ICD-10-CM

## 2023-06-07 PROCEDURE — 1159F MED LIST DOCD IN RCRD: CPT | Performed by: NURSE PRACTITIONER

## 2023-06-07 PROCEDURE — 1160F RVW MEDS BY RX/DR IN RCRD: CPT | Performed by: NURSE PRACTITIONER

## 2023-06-07 PROCEDURE — 3077F SYST BP >= 140 MM HG: CPT | Performed by: NURSE PRACTITIONER

## 2023-06-07 PROCEDURE — 99214 OFFICE O/P EST MOD 30 MIN: CPT | Performed by: NURSE PRACTITIONER

## 2023-06-07 PROCEDURE — 1111F DSCHRG MED/CURRENT MED MERGE: CPT | Performed by: NURSE PRACTITIONER

## 2023-06-07 PROCEDURE — 3078F DIAST BP <80 MM HG: CPT | Performed by: NURSE PRACTITIONER

## 2023-06-07 RX ORDER — FENTANYL 25 UG/H
1 PATCH TRANSDERMAL
COMMUNITY
Start: 2023-05-29

## 2023-06-08 ENCOUNTER — OFFICE VISIT (OUTPATIENT)
Dept: INTERNAL MEDICINE CLINIC | Facility: CLINIC | Age: 73
End: 2023-06-08
Payer: COMMERCIAL

## 2023-06-08 VITALS
DIASTOLIC BLOOD PRESSURE: 78 MMHG | HEIGHT: 70 IN | BODY MASS INDEX: 19.81 KG/M2 | OXYGEN SATURATION: 98 % | TEMPERATURE: 98 F | WEIGHT: 138.38 LBS | RESPIRATION RATE: 16 BRPM | HEART RATE: 108 BPM | SYSTOLIC BLOOD PRESSURE: 182 MMHG

## 2023-06-08 DIAGNOSIS — J41.0 SIMPLE CHRONIC BRONCHITIS (HCC): Chronic | ICD-10-CM

## 2023-06-08 DIAGNOSIS — I25.10 CORONARY ARTERY DISEASE INVOLVING NATIVE CORONARY ARTERY OF NATIVE HEART WITHOUT ANGINA PECTORIS: Chronic | ICD-10-CM

## 2023-06-08 DIAGNOSIS — K21.9 GASTROESOPHAGEAL REFLUX DISEASE, UNSPECIFIED WHETHER ESOPHAGITIS PRESENT: Chronic | ICD-10-CM

## 2023-06-08 DIAGNOSIS — C61 PROSTATE CANCER (HCC): Chronic | ICD-10-CM

## 2023-06-08 DIAGNOSIS — F11.20 CHRONIC NARCOTIC DEPENDENCE (HCC): Chronic | ICD-10-CM

## 2023-06-08 DIAGNOSIS — Z00.00 ROUTINE GENERAL MEDICAL EXAMINATION AT A HEALTH CARE FACILITY: Primary | ICD-10-CM

## 2023-06-08 DIAGNOSIS — I71.21 ANEURYSM OF ASCENDING AORTA WITHOUT RUPTURE (HCC): Chronic | ICD-10-CM

## 2023-06-08 DIAGNOSIS — I25.2 HISTORY OF MYOCARDIAL INFARCT AT AGE GREATER THAN 60 YEARS: Chronic | ICD-10-CM

## 2023-06-08 DIAGNOSIS — N20.0 BILATERAL KIDNEY STONES: Chronic | ICD-10-CM

## 2023-06-08 DIAGNOSIS — D57.3 SICKLE CELL TRAIT (HCC): Chronic | ICD-10-CM

## 2023-06-08 DIAGNOSIS — E55.9 VITAMIN D DEFICIENCY: Chronic | ICD-10-CM

## 2023-06-08 DIAGNOSIS — K86.1 OTHER CHRONIC PANCREATITIS (HCC): Chronic | ICD-10-CM

## 2023-06-08 DIAGNOSIS — I10 BENIGN ESSENTIAL HTN: Chronic | ICD-10-CM

## 2023-06-08 DIAGNOSIS — E78.5 DYSLIPIDEMIA: Chronic | ICD-10-CM

## 2023-06-08 DIAGNOSIS — Z95.1 HX OF CABG: Chronic | ICD-10-CM

## 2023-06-08 DIAGNOSIS — E11.9 DIABETES MELLITUS TYPE 2 IN NONOBESE (HCC): Chronic | ICD-10-CM

## 2023-06-08 PROBLEM — E46 PROTEIN-CALORIE MALNUTRITION, UNSPECIFIED SEVERITY (HCC): Chronic | Status: RESOLVED | Noted: 2022-05-05 | Resolved: 2023-06-08

## 2023-06-08 PROBLEM — K90.9 STEATORRHEA: Chronic | Status: RESOLVED | Noted: 2019-05-23 | Resolved: 2023-06-08

## 2023-06-08 PROBLEM — D64.9 ANEMIA: Status: RESOLVED | Noted: 2023-01-18 | Resolved: 2023-06-08

## 2023-06-08 PROBLEM — D64.9 ANEMIA: Status: RESOLVED | Noted: 2023-01-01 | Resolved: 2023-01-01

## 2023-06-08 PROBLEM — E46 PROTEIN-CALORIE MALNUTRITION, UNSPECIFIED SEVERITY (HCC): Chronic | Status: RESOLVED | Noted: 2022-05-05 | Resolved: 2023-01-01

## 2023-06-08 PROBLEM — K90.9 STEATORRHEA: Chronic | Status: RESOLVED | Noted: 2019-05-23 | Resolved: 2023-01-01

## 2023-06-08 RX ORDER — ONDANSETRON 4 MG/1
4 TABLET, FILM COATED ORAL EVERY 8 HOURS PRN
Qty: 90 TABLET | Refills: 0 | Status: SHIPPED | OUTPATIENT
Start: 2023-06-08

## 2023-06-09 ENCOUNTER — HOSPITAL ENCOUNTER (OUTPATIENT)
Facility: HOSPITAL | Age: 73
Setting detail: HOSPITAL OUTPATIENT SURGERY
Discharge: HOME | End: 2023-06-09
Attending: INTERNAL MEDICINE | Admitting: INTERNAL MEDICINE
Payer: MEDICARE

## 2023-06-09 ENCOUNTER — ANESTHESIA EVENT (OUTPATIENT)
Dept: ENDOSCOPY | Facility: HOSPITAL | Age: 73
End: 2023-06-09
Payer: MEDICARE

## 2023-06-09 ENCOUNTER — ANESTHESIA (OUTPATIENT)
Dept: ENDOSCOPY | Facility: HOSPITAL | Age: 73
End: 2023-06-09
Payer: MEDICARE

## 2023-06-09 VITALS
TEMPERATURE: 99 F | BODY MASS INDEX: 19.76 KG/M2 | SYSTOLIC BLOOD PRESSURE: 113 MMHG | OXYGEN SATURATION: 98 % | RESPIRATION RATE: 20 BRPM | HEIGHT: 70 IN | WEIGHT: 138 LBS | HEART RATE: 72 BPM | DIASTOLIC BLOOD PRESSURE: 61 MMHG

## 2023-06-09 DIAGNOSIS — Z87.19 HISTORY OF SMALL BOWEL OBSTRUCTION: ICD-10-CM

## 2023-06-09 DIAGNOSIS — D50.9 IRON DEFICIENCY ANEMIA, UNSPECIFIED IRON DEFICIENCY ANEMIA TYPE: ICD-10-CM

## 2023-06-09 DIAGNOSIS — K86.1 CHRONIC PANCREATITIS, UNSPECIFIED PANCREATITIS TYPE (HCC): ICD-10-CM

## 2023-06-09 DIAGNOSIS — K56.600 PARTIAL SMALL BOWEL OBSTRUCTION (HCC): ICD-10-CM

## 2023-06-09 LAB — GLUCOSE BLD-MCNC: 116 MG/DL (ref 70–99)

## 2023-06-09 PROCEDURE — 0DB78ZX EXCISION OF STOMACH, PYLORUS, VIA NATURAL OR ARTIFICIAL OPENING ENDOSCOPIC, DIAGNOSTIC: ICD-10-PCS | Performed by: INTERNAL MEDICINE

## 2023-06-09 PROCEDURE — 88305 TISSUE EXAM BY PATHOLOGIST: CPT | Performed by: INTERNAL MEDICINE

## 2023-06-09 PROCEDURE — 82962 GLUCOSE BLOOD TEST: CPT

## 2023-06-09 PROCEDURE — BF4CZZZ ULTRASONOGRAPHY OF HEPATOBILIARY SYSTEM, ALL: ICD-10-PCS | Performed by: INTERNAL MEDICINE

## 2023-06-09 PROCEDURE — 0DB98ZX EXCISION OF DUODENUM, VIA NATURAL OR ARTIFICIAL OPENING ENDOSCOPIC, DIAGNOSTIC: ICD-10-PCS | Performed by: INTERNAL MEDICINE

## 2023-06-09 PROCEDURE — 0DJ08ZZ INSPECTION OF UPPER INTESTINAL TRACT, VIA NATURAL OR ARTIFICIAL OPENING ENDOSCOPIC: ICD-10-PCS | Performed by: INTERNAL MEDICINE

## 2023-06-09 RX ORDER — NICOTINE POLACRILEX 4 MG
15 LOZENGE BUCCAL
Status: DISCONTINUED | OUTPATIENT
Start: 2023-06-09 | End: 2023-06-09

## 2023-06-09 RX ORDER — SODIUM CHLORIDE, SODIUM LACTATE, POTASSIUM CHLORIDE, CALCIUM CHLORIDE 600; 310; 30; 20 MG/100ML; MG/100ML; MG/100ML; MG/100ML
INJECTION, SOLUTION INTRAVENOUS CONTINUOUS
Status: DISCONTINUED | OUTPATIENT
Start: 2023-06-09 | End: 2023-06-09

## 2023-06-09 RX ORDER — ACETAMINOPHEN 500 MG
1000 TABLET ORAL ONCE
Status: DISCONTINUED | OUTPATIENT
Start: 2023-06-09 | End: 2023-06-09

## 2023-06-09 RX ORDER — NALOXONE HYDROCHLORIDE 0.4 MG/ML
80 INJECTION, SOLUTION INTRAMUSCULAR; INTRAVENOUS; SUBCUTANEOUS AS NEEDED
Status: DISCONTINUED | OUTPATIENT
Start: 2023-06-09 | End: 2023-06-09

## 2023-06-09 RX ORDER — ACETAMINOPHEN 500 MG
TABLET ORAL
Status: DISCONTINUED
Start: 2023-06-09 | End: 2023-06-09

## 2023-06-09 RX ORDER — DEXTROSE MONOHYDRATE 25 G/50ML
50 INJECTION, SOLUTION INTRAVENOUS
Status: DISCONTINUED | OUTPATIENT
Start: 2023-06-09 | End: 2023-06-09

## 2023-06-09 RX ORDER — NICOTINE POLACRILEX 4 MG
30 LOZENGE BUCCAL
Status: DISCONTINUED | OUTPATIENT
Start: 2023-06-09 | End: 2023-06-09

## 2023-06-09 RX ORDER — METOPROLOL SUCCINATE 50 MG/1
TABLET, EXTENDED RELEASE ORAL
Qty: 90 TABLET | Refills: 0 | Status: SHIPPED | OUTPATIENT
Start: 2023-06-09

## 2023-06-09 RX ORDER — LIDOCAINE HYDROCHLORIDE 10 MG/ML
INJECTION, SOLUTION EPIDURAL; INFILTRATION; INTRACAUDAL; PERINEURAL AS NEEDED
Status: DISCONTINUED | OUTPATIENT
Start: 2023-06-09 | End: 2023-06-09 | Stop reason: SURG

## 2023-06-09 RX ADMIN — SODIUM CHLORIDE, SODIUM LACTATE, POTASSIUM CHLORIDE, CALCIUM CHLORIDE: 600; 310; 30; 20 INJECTION, SOLUTION INTRAVENOUS at 15:37:00

## 2023-06-09 RX ADMIN — SODIUM CHLORIDE, SODIUM LACTATE, POTASSIUM CHLORIDE, CALCIUM CHLORIDE: 600; 310; 30; 20 INJECTION, SOLUTION INTRAVENOUS at 15:27:00

## 2023-06-09 RX ADMIN — LIDOCAINE HYDROCHLORIDE 5 ML: 10 INJECTION, SOLUTION EPIDURAL; INFILTRATION; INTRACAUDAL; PERINEURAL at 15:00:00

## 2023-06-09 RX ADMIN — SODIUM CHLORIDE, SODIUM LACTATE, POTASSIUM CHLORIDE, CALCIUM CHLORIDE: 600; 310; 30; 20 INJECTION, SOLUTION INTRAVENOUS at 14:53:00

## 2023-06-09 NOTE — ANESTHESIA POSTPROCEDURE EVALUATION
[address] [facility] Patient Status:  Hospital Outpatient Surgery   Age/Gender 67year old male MRN [id number]   Location [address] [address] Attending Haseeb Brito MD   Hosp Day # 0 PCP Beena Nguyen MD       Anesthesia Post-op Note    ESOPHAGOGASTRODUODENOSCOPY (EGD) with biopsies, ENDOSCOPIC ULTRASOUND (EUS), UPPER SINGLE BALLOON ENTEROSCOPY    Procedure Summary     Date: 06/09/23 Room / Location: [facility] ENDOSCOPY 03 / 1404 [facility] ENDOSCOPY    Anesthesia Start: 8973 Anesthesia Stop: 6194    Procedures:       ESOPHAGOGASTRODUODENOSCOPY (EGD) with biopsies, ENDOSCOPIC ULTRASOUND (EUS), UPPER SINGLE BALLOON ENTEROSCOPY      ENDOSCOPIC ULTRASOUND (EUS)      UPPER SINGLE BALLOON ENTEROSCOPY Diagnosis:       Iron deficiency anemia, unspecified iron deficiency anemia type      Partial small bowel obstruction (HCC)      Chronic pancreatitis, unspecified pancreatitis type ([address]      History of small bowel obstruction      (EGD: surgical changes EUS: chronic pancreatitis ENTEROSCOPY: )    Surgeons: Haseeb Brito MD Anesthesiologist: Lorenzo Wasserman MD    Anesthesia Type: MAC ASA Status: 3          Anesthesia Type: MAC    Vitals Value Taken Time   /65 06/09/23 1542   Temp  06/09/23 1545   Pulse 73 06/09/23 1544   Resp 16 06/09/23 1545   SpO2 97 % 06/09/23 1544   Vitals shown include unvalidated device data. Patient Location: Endoscopy    Anesthesia Type: MAC    Airway Patency: patent    Postop Pain Control: adequate    Mental Status: preanesthetic baseline    Nausea/Vomiting: none    Cardiopulmonary/Hydration status: stable euvolemic    Complications: no apparent anesthesia related complications    Postop vital signs: stable    Dental Exam: Unchanged from Preop    Patient to be discharged home when criteria met.

## 2023-06-13 NOTE — PROGRESS NOTES
Date: 2023    To: Tito Johansen  : 1950     I hope this letter finds you doing well. I am writing to inform you of the following: The biopsies obtained at the time of your recent endoscopic procedure were benign and showed no evidence of infection or malignancy. Please call the office at [phone] if there are any questions.     Brittany Garcia M.D.

## 2023-06-22 ENCOUNTER — OFFICE VISIT (OUTPATIENT)
Dept: INTERNAL MEDICINE CLINIC | Facility: CLINIC | Age: 73
End: 2023-06-22
Payer: COMMERCIAL

## 2023-06-22 VITALS
SYSTOLIC BLOOD PRESSURE: 144 MMHG | OXYGEN SATURATION: 99 % | DIASTOLIC BLOOD PRESSURE: 72 MMHG | TEMPERATURE: 98 F | HEIGHT: 70 IN | RESPIRATION RATE: 16 BRPM | HEART RATE: 107 BPM | BODY MASS INDEX: 18.73 KG/M2 | WEIGHT: 130.81 LBS

## 2023-06-22 DIAGNOSIS — R41.82 ALTERED MENTAL STATUS, UNSPECIFIED ALTERED MENTAL STATUS TYPE: Primary | ICD-10-CM

## 2023-06-22 PROCEDURE — 3077F SYST BP >= 140 MM HG: CPT | Performed by: INTERNAL MEDICINE

## 2023-06-22 PROCEDURE — 99495 TRANSJ CARE MGMT MOD F2F 14D: CPT | Performed by: INTERNAL MEDICINE

## 2023-06-22 PROCEDURE — 3078F DIAST BP <80 MM HG: CPT | Performed by: INTERNAL MEDICINE

## 2023-06-22 PROCEDURE — 3008F BODY MASS INDEX DOCD: CPT | Performed by: INTERNAL MEDICINE

## 2023-06-22 PROCEDURE — 1160F RVW MEDS BY RX/DR IN RCRD: CPT | Performed by: INTERNAL MEDICINE

## 2023-06-22 PROCEDURE — 1111F DSCHRG MED/CURRENT MED MERGE: CPT | Performed by: INTERNAL MEDICINE

## 2023-06-22 PROCEDURE — 1159F MED LIST DOCD IN RCRD: CPT | Performed by: INTERNAL MEDICINE

## 2023-06-22 RX ORDER — METOPROLOL SUCCINATE 25 MG/1
25 TABLET, EXTENDED RELEASE ORAL DAILY
COMMUNITY

## 2023-06-22 RX ORDER — CHOLECALCIFEROL (VITAMIN D3) 10(400)/ML
DROPS ORAL
COMMUNITY
Start: 2023-06-03

## 2023-06-22 RX ORDER — TORSEMIDE 10 MG/1
10 TABLET ORAL DAILY
COMMUNITY

## 2023-06-22 RX ORDER — PANTOPRAZOLE SODIUM 40 MG/1
40 TABLET, DELAYED RELEASE ORAL
COMMUNITY

## 2023-06-22 RX ORDER — ASPIRIN 325 MG
325 TABLET ORAL DAILY
COMMUNITY

## 2023-06-28 ENCOUNTER — OFFICE VISIT (OUTPATIENT)
Facility: CLINIC | Age: 73
End: 2023-06-28
Payer: COMMERCIAL

## 2023-06-28 VITALS
RESPIRATION RATE: 16 BRPM | WEIGHT: 132 LBS | SYSTOLIC BLOOD PRESSURE: 140 MMHG | BODY MASS INDEX: 18.9 KG/M2 | OXYGEN SATURATION: 95 % | HEIGHT: 70 IN | HEART RATE: 91 BPM | DIASTOLIC BLOOD PRESSURE: 60 MMHG

## 2023-06-28 DIAGNOSIS — R09.02 HYPOXIA: ICD-10-CM

## 2023-06-28 DIAGNOSIS — R06.09 DOE (DYSPNEA ON EXERTION): ICD-10-CM

## 2023-06-28 DIAGNOSIS — I27.20 PULMONARY HYPERTENSION (HCC): ICD-10-CM

## 2023-06-28 DIAGNOSIS — J47.9 BRONCHIECTASIS WITHOUT COMPLICATION (HCC): ICD-10-CM

## 2023-06-28 DIAGNOSIS — J44.9 CHRONIC OBSTRUCTIVE PULMONARY DISEASE, UNSPECIFIED COPD TYPE (HCC): Primary | ICD-10-CM

## 2023-06-28 DIAGNOSIS — R91.1 LUNG NODULE: ICD-10-CM

## 2023-06-28 PROCEDURE — 3077F SYST BP >= 140 MM HG: CPT | Performed by: INTERNAL MEDICINE

## 2023-06-28 PROCEDURE — 1159F MED LIST DOCD IN RCRD: CPT | Performed by: INTERNAL MEDICINE

## 2023-06-28 PROCEDURE — 3078F DIAST BP <80 MM HG: CPT | Performed by: INTERNAL MEDICINE

## 2023-06-28 PROCEDURE — 3008F BODY MASS INDEX DOCD: CPT | Performed by: INTERNAL MEDICINE

## 2023-06-28 PROCEDURE — 1160F RVW MEDS BY RX/DR IN RCRD: CPT | Performed by: INTERNAL MEDICINE

## 2023-06-28 PROCEDURE — 99214 OFFICE O/P EST MOD 30 MIN: CPT | Performed by: INTERNAL MEDICINE

## 2023-07-05 RX ORDER — PANCRELIPASE 36000; 180000; 114000 [USP'U]/1; [USP'U]/1; [USP'U]/1
CAPSULE, DELAYED RELEASE PELLETS ORAL
Qty: 180 CAPSULE | Refills: 0 | Status: SHIPPED | OUTPATIENT
Start: 2023-07-05

## 2023-07-05 NOTE — TELEPHONE ENCOUNTER
LOV: 6/22/23   RTC: none   Filled: 2/27/23 # 180 0 refills   Future Appointments   Date Time Provider Basilio Caraballo   7/13/2023  9:00 AM RT [facility] P[facility] [place]   7/24/2023  4:00 PM Eleni Medina MD [facility] [place] [id number]   8/16/2023 12:00 PM Jessica Ness MD  [facility] [place]

## 2023-07-07 VITALS — BODY MASS INDEX: 18.75 KG/M2 | HEIGHT: 70 IN | WEIGHT: 131 LBS

## 2023-07-07 RX ORDER — CHOLECALCIFEROL (VITAMIN D3) 125 MCG
2000 CAPSULE ORAL DAILY
COMMUNITY

## 2023-07-07 RX ORDER — MULTIVITAMIN
1 TABLET ORAL DAILY
COMMUNITY

## 2023-07-07 RX ORDER — PREDNISONE 10 MG/1
10 TABLET ORAL DAILY
COMMUNITY
End: 2023-07-18

## 2023-07-10 ENCOUNTER — TELEPHONE (OUTPATIENT)
Dept: INTERNAL MEDICINE CLINIC | Facility: CLINIC | Age: 73
End: 2023-07-10

## 2023-07-10 NOTE — TELEPHONE ENCOUNTER
Incoming fax from [facility] for [facility]lies [address] [facility] in [facility] for signaute.    Needs medical notes,allergy list and medication list.

## 2023-07-11 NOTE — TELEPHONE ENCOUNTER
Signed order faxed to [facility]   Confirmation received   Sent to scan and copy placed in accordion

## 2023-07-17 DIAGNOSIS — J44.1 ACUTE EXACERBATION OF CHRONIC OBSTRUCTIVE PULMONARY DISEASE (COPD) (HCC): ICD-10-CM

## 2023-07-17 DIAGNOSIS — J44.9 CHRONIC OBSTRUCTIVE PULMONARY DISEASE, UNSPECIFIED COPD TYPE (HCC): Primary | ICD-10-CM

## 2023-07-17 RX ORDER — PREDNISONE 10 MG/1
10 TABLET ORAL DAILY
Qty: 30 TABLET | Refills: 1 | Status: SHIPPED | OUTPATIENT
Start: 2023-07-17

## 2023-07-17 NOTE — TELEPHONE ENCOUNTER
Patient spouse called in a refill for medication. Stated that pt was taking 50mg but [facility] switched it to 25mg. Please advise.      metoprolol succinate ER 25 MG Oral Tablet      [id number] [place], [place] - [address] [phone], [phone] [address] [place] [address] Phone: [phone] Fax: [phone] Hours: Not open 24 hours

## 2023-07-17 NOTE — TELEPHONE ENCOUNTER
Pt needs a refill of Prednisone 10mg sent to [facility] on [address].  He is completely out, he took his last dose yesterday

## 2023-07-19 ENCOUNTER — HOSPITAL ENCOUNTER (OUTPATIENT)
Dept: INTERVENTIONAL RADIOLOGY/VASCULAR | Facility: HOSPITAL | Age: 73
Discharge: HOME | End: 2023-07-19
Attending: INTERNAL MEDICINE
Payer: MEDICARE

## 2023-07-20 RX ORDER — METOPROLOL SUCCINATE 25 MG/1
25 TABLET, EXTENDED RELEASE ORAL DAILY
Qty: 90 TABLET | Refills: 0 | Status: SHIPPED | OUTPATIENT
Start: 2023-07-20

## 2023-07-20 NOTE — TELEPHONE ENCOUNTER
Called patient wife and she stated that patient is taking 25mg daily     Protocol passed     Requesting: metoprolol succinate ER 25mg     LOV: 6/22/23   RTC: none scheduled   Recent Labs: 5/25/23     Upcoming OV: none scheduled

## 2023-07-24 ENCOUNTER — OFFICE VISIT (OUTPATIENT)
Dept: ORTHOPEDICS CLINIC | Facility: CLINIC | Age: 73
End: 2023-07-24
Payer: COMMERCIAL

## 2023-07-24 VITALS — HEIGHT: 70 IN | WEIGHT: 132 LBS | BODY MASS INDEX: 18.9 KG/M2

## 2023-07-24 DIAGNOSIS — M65.311 TRIGGER FINGER OF RIGHT THUMB: Primary | ICD-10-CM

## 2023-07-24 RX ORDER — BETAMETHASONE SODIUM PHOSPHATE AND BETAMETHASONE ACETATE 3; 3 MG/ML; MG/ML
6 INJECTION, SUSPENSION INTRA-ARTICULAR; INTRALESIONAL; INTRAMUSCULAR; SOFT TISSUE ONCE
Status: COMPLETED | OUTPATIENT
Start: 2023-07-24 | End: 2023-07-24

## 2023-07-24 RX ADMIN — BETAMETHASONE SODIUM PHOSPHATE AND BETAMETHASONE ACETATE 6 MG: 3; 3 INJECTION, SUSPENSION INTRA-ARTICULAR; INTRALESIONAL; INTRAMUSCULAR; SOFT TISSUE at 16:30:00

## 2023-07-26 ENCOUNTER — HOSPITAL ENCOUNTER (OUTPATIENT)
Dept: INTERVENTIONAL RADIOLOGY/VASCULAR | Facility: HOSPITAL | Age: 73
Discharge: HOME | End: 2023-07-26
Attending: INTERNAL MEDICINE | Admitting: INTERNAL MEDICINE
Payer: MEDICARE

## 2023-07-26 VITALS
OXYGEN SATURATION: 97 % | HEART RATE: 68 BPM | HEIGHT: 70 IN | TEMPERATURE: 97 F | BODY MASS INDEX: 18.9 KG/M2 | WEIGHT: 132 LBS | RESPIRATION RATE: 15 BRPM | SYSTOLIC BLOOD PRESSURE: 141 MMHG | DIASTOLIC BLOOD PRESSURE: 83 MMHG

## 2023-07-26 DIAGNOSIS — I27.21 PAH (PULMONARY ARTERY HYPERTENSION) (HCC): ICD-10-CM

## 2023-07-26 DIAGNOSIS — R93.1 ABNORMAL ECHOCARDIOGRAM: ICD-10-CM

## 2023-07-26 DIAGNOSIS — R06.09 DOE (DYSPNEA ON EXERTION): ICD-10-CM

## 2023-07-26 LAB — GLUCOSE BLD-MCNC: 108 MG/DL (ref 70–99)

## 2023-07-26 PROCEDURE — 82962 GLUCOSE BLOOD TEST: CPT

## 2023-07-26 PROCEDURE — 99153 MOD SED SAME PHYS/QHP EA: CPT | Performed by: INTERNAL MEDICINE

## 2023-07-26 PROCEDURE — 4A023N6 MEASUREMENT OF CARDIAC SAMPLING AND PRESSURE, RIGHT HEART, PERCUTANEOUS APPROACH: ICD-10-PCS | Performed by: INTERNAL MEDICINE

## 2023-07-26 PROCEDURE — 93451 RIGHT HEART CATH: CPT | Performed by: INTERNAL MEDICINE

## 2023-07-26 PROCEDURE — 99152 MOD SED SAME PHYS/QHP 5/>YRS: CPT | Performed by: INTERNAL MEDICINE

## 2023-07-26 RX ORDER — LIDOCAINE HYDROCHLORIDE 10 MG/ML
INJECTION, SOLUTION EPIDURAL; INFILTRATION; INTRACAUDAL; PERINEURAL
Status: COMPLETED
Start: 2023-07-26 | End: 2023-07-26

## 2023-07-26 RX ORDER — HEPARIN SODIUM 5000 [USP'U]/ML
INJECTION, SOLUTION INTRAVENOUS; SUBCUTANEOUS
Status: COMPLETED
Start: 2023-07-26 | End: 2023-07-26

## 2023-07-26 RX ORDER — MIDAZOLAM HYDROCHLORIDE 1 MG/ML
INJECTION INTRAMUSCULAR; INTRAVENOUS
Status: COMPLETED
Start: 2023-07-26 | End: 2023-07-26

## 2023-07-26 RX ORDER — SODIUM CHLORIDE 9 MG/ML
INJECTION, SOLUTION INTRAVENOUS
Status: DISCONTINUED | OUTPATIENT
Start: 2023-07-27 | End: 2023-07-26 | Stop reason: HOSPADM

## 2023-07-26 RX ORDER — HYDRALAZINE HYDROCHLORIDE 25 MG/1
25 TABLET, FILM COATED ORAL 2 TIMES DAILY
Qty: 30 TABLET | Refills: 6 | Status: SHIPPED | OUTPATIENT
Start: 2023-07-26

## 2023-07-26 NOTE — H&P
[facility] KRYSTAL Mireles  Male, 67year old, 9/25/1950  MRN: [id number]  CSN: [id number]    Please see scanned recent H&P from [facility] office. Associated Documents  Scan on 7/17/2023 8:48 AM by Lula Berg RN    History reviewed and up to date. No changes to H&P. The patient is scheduled for right heart catheterization to assess hemodynamics of the heart with dyspnea on exertion. Patient is history of CABG and COPD. Echo Doppler showed pulm hypertension. Patient was consented. The risks and benefits of the procedure were explained to the patient and wishes to proceed. All was discussed with the patient and his wife and the nursing staff. Kelsie Rabago M.D., F.A.C.C.   Interventional Cardiology  Advanced Heart Failure  [address] [place] [address]    July 26, 2023

## 2023-07-26 NOTE — PROGRESS NOTES
Pt s/p RHC with Dr. Leobardo Litten. Pt recovered in holding. Pt wife at bedside. Right neck dressing cdi, no bleeding or hematoma. HOB elevated. Pt ate and drank. Discharge instructions reviewed with pt and wife, copy given. After recovery, pt out of bed with RN with no complaints. IV removed. Pt dressed. Pt discharged to [facility] via wheelchair by volunteer. Pt left with belongings.  Pt wife drove pt home

## 2023-07-27 NOTE — PROCEDURES
[facility]    PATIENT'S NAME: Almaz Martins   ATTENDING PHYSICIAN: Blank Brown M.D. OPERATING PHYSICIAN: Blank Brown M.D. PATIENT ACCOUNT#:   [de-identified]    LOCATION:  [facility]  [facility]  MEDICAL RECORD #:   [id number]       YOB: 1950  ADMISSION DATE:       07/26/2023      OPERATION DATE:  07/26/2023    CARDIAC PROCEDURE TRANSCRIPTION    CARDIAC CATHETERIZATION     PREOPERATIVE DIAGNOSIS:    1. Dyspnea on exertion. 2.   Pulmonary hypertension, systemic hypertension. 3.   Coronary artery disease with history of CABG. 4.   Chronic obstructive pulmonary disease. POSTOPERATIVE DIAGNOSIS:    1. Dyspnea on exertion. 2.   Pulmonary hypertension, systemic hypertension. 3.   Coronary artery disease with history of CABG. 4.   Chronic obstructive pulmonary disease. PROCEDURE PERFORMED:    1. Right heart catheterization through the right internal jugular vein. 2.   Saturations. 3.   Ultrasound-guided vascular access. REFERRING CARDIOLOGIST:  Keren Cesar MD     SEDATION:  We performed moderate conscious sedation with IV Versed and IV fentanyl. The time of first sedation dose was 8:27 a.m., and the procedure ended at 9:22 a.m. Blood pressure, pulse ox, and heart rate were monitored all the time by the nurse and myself, and the IV line was checked by the nurse and myself. PROCEDURE DESCRIPTION:  After written informed consent was obtained from the patient, he was brought to the cardiac catheterization laboratory. He was prepped and draped in usual sterile fashion. Lidocaine 1% was used to infiltrate his right side of the neck for anesthesia. We used ultrasound to guide access to right internal jugular vein. Micropuncture kit was used to access right internal jugular vein, and micropuncture sheath was removed. Then, we advanced 7-Japanese sheath over a J-wire using modified Seldinger technique.     Right heart catheterization was performed using [place]-Kurtis catheter. We measured right heart pressures and calculated cardiac output using a Manuela equation on room air. We also obtained saturations from the pulmonary artery and right atrium and arterial saturation to calculate for any intracardiac shunt on room air. There was no need for vasodilator challenge. Hemostasis of the right internal jugular vein was achieved with manual hold for 10 to 15 minutes. There was no bleeding or hematoma on the right side of the neck. The patient tolerated the procedure very well, and was transferred to [facility] area for monitoring. There were no immediate postcardiac catheterization complications. RIGHT HEART CATHETERIZATION HEMODYNAMICS:  Right atrial pressure 1 mmHg. Right ventricular pressure 34/9/6 mmHg. Pulmonary artery pressure 32/11/19 mmHg. Pulmonary capillary wedge pressure was 6 mmHg. Transpulmonary gradient was 13 mmHg. Manuela cardiac output was 5.4 L/min with cardiac index of 3.1 L/min per sq m and pulmonary vascular resistance was 2.4 Wood units. Blood pressure was 130/73 mmHg at the time of right heart catheterization measurements, but initially was in 160s/80s but normalized under sedation. Saturations:  68% in the right atrium, 65% in pulmonary artery, and 92% in aorta on room air. Hemoglobin sampling 11.1. IMPRESSION:    1. Only mild pulmonary hypertension when systemic blood pressure is controlled. 2.   Low normal biventricular filling pressures with mild hypovolemia. 3.   Mildly to moderately elevated pulmonary vascular resistance, 2.4 Wood units. 4.   Normal cardiac output with cardiac index of 3.1 L/minute/sq m.  5.   Mildly elevated transpulmonary gradient, speaking also for pulmonary component and patient has chronic obstructive pulmonary disease. 6.   No intracardiac shunt by quick saturation run on room air. PLAN:    1.    Patient does not have PAH but secondary pulmonary hypertension related to uncontrolled hypertension, and his dyspnea on exertion could come from this reason as well as from COPD, and stable mild dyspnea on exertion, class II. Based on this right heart catheterization, patient does not need to be on torsemide if his blood pressure is controlled. His pulmonary hypertension was moderate to severe with uncontrolled hypertension, but with better blood pressure control, we may not need any torsemide at all. Uncontrolled hypertension with secondary pulmonary hypertension and COPD contributed to his dyspnea on exertion. There is no significant diastolic dysfunction by echo. 2.   Recommend to stop torsemide completely. 3.   Since patient has history of face edema/angioedema from ACE inhibitor, he is not a good candidate for ALYSHA inhibitors, and I would consider combination of hydralazine and isosorbide dinitrate, low doses, which is actually a very good combination of medications in  patients, and it may help with patient's symptoms and hemodynamics of the heart. 4.   Patient was instructed to monitor blood pressure at home. 5.   Follow up with Pulmonary as scheduled. 6.   Follow up at [facility] cardiology office in 1 to 2 weeks. All was discussed with the patient and his wife in detail. Dictated By Patsy Miranda M.D.  d: 07/26/2023 09:37:56  t: 07/26/2023 15:10:41  Job [id number]  AU/    cc: Em Stewart M.D.    Sarita Norris M.D.

## 2023-08-07 DIAGNOSIS — D57.3 SICKLE CELL TRAIT (HCC): ICD-10-CM

## 2023-08-07 RX ORDER — ONDANSETRON 4 MG/1
4 TABLET, FILM COATED ORAL EVERY 8 HOURS PRN
Qty: 90 TABLET | Refills: 0 | Status: SHIPPED | OUTPATIENT
Start: 2023-08-07

## 2023-08-07 NOTE — TELEPHONE ENCOUNTER
No Protocol     Requesting: ondansetron 4mg     LOV: 6/22/23   RTC: no follow ups on file   Filled: 6/8/23 # 90 0 refills   Recent Labs: 5/5/23     Upcoming OV : none scheduled

## 2023-08-08 RX ORDER — FOLIC ACID 1 MG/1
1 TABLET ORAL DAILY
Qty: 90 TABLET | Refills: 0 | Status: ON HOLD | OUTPATIENT
Start: 2023-08-08

## 2023-08-08 NOTE — TELEPHONE ENCOUNTER
No Protocol     Requesting: folic acid 1mg     LOV: 6/22/23   RTC: none noted   Filled: 5/9/23 # 90 0 refills   Recent Labs: 6/6/23     Future Appointments   Date Time Provider Basilio Caraballo   8/16/2023 12:00 PM Sammy Zelaya MD  [facility] [place]

## 2023-08-08 NOTE — TELEPHONE ENCOUNTER
Requested Prescriptions     Pending Prescriptions Disp Refills    SERTRALINE 50 MG Oral Tab [Pharmacy Med Name: Sertraline Hcl 50 Mg Tab Nort] 90 tablet 0     Sig: TAKE 1 TABLET BY MOUTH ONE TIME DAILY     No protocol     LOV 6/22/23  RTC none noted  Filled 4/27/23 90 tabs 0 refills   Future Appointments   Date Time Provider Basilio Caraballo   8/16/2023 12:00 PM Skye Gonzalez MD  [facility] [place]

## 2023-08-12 ENCOUNTER — ANESTHESIA (OUTPATIENT)
Dept: SURGERY | Facility: HOSPITAL | Age: 73
End: 2023-08-12
Payer: MEDICARE

## 2023-08-12 ENCOUNTER — ANESTHESIA EVENT (OUTPATIENT)
Dept: SURGERY | Facility: HOSPITAL | Age: 73
End: 2023-08-12
Payer: MEDICARE

## 2023-08-12 PROBLEM — N30.00 ACUTE CYSTITIS WITHOUT HEMATURIA: Status: ACTIVE | Noted: 2023-08-12

## 2023-08-12 PROBLEM — N30.00 ACUTE CYSTITIS WITHOUT HEMATURIA: Status: ACTIVE | Noted: 2023-01-01

## 2023-08-12 PROBLEM — K56.609 SBO (SMALL BOWEL OBSTRUCTION) (HCC): Status: ACTIVE | Noted: 2023-01-01

## 2023-08-12 PROBLEM — K56.609 SBO (SMALL BOWEL OBSTRUCTION) (HCC): Status: ACTIVE | Noted: 2023-08-12

## 2023-08-12 PROCEDURE — 76942 ECHO GUIDE FOR BIOPSY: CPT | Performed by: ANESTHESIOLOGY

## 2023-08-12 RX ORDER — PHENYLEPHRINE HCL 10 MG/ML
VIAL (ML) INJECTION AS NEEDED
Status: DISCONTINUED | OUTPATIENT
Start: 2023-08-12 | End: 2023-08-12 | Stop reason: SURG

## 2023-08-12 RX ORDER — LABETALOL HYDROCHLORIDE 5 MG/ML
INJECTION, SOLUTION INTRAVENOUS AS NEEDED
Status: DISCONTINUED | OUTPATIENT
Start: 2023-08-12 | End: 2023-08-12 | Stop reason: SURG

## 2023-08-12 RX ORDER — DEXAMETHASONE SODIUM PHOSPHATE 4 MG/ML
VIAL (ML) INJECTION AS NEEDED
Status: DISCONTINUED | OUTPATIENT
Start: 2023-08-12 | End: 2023-08-12 | Stop reason: SURG

## 2023-08-12 RX ORDER — LIDOCAINE HYDROCHLORIDE 10 MG/ML
INJECTION, SOLUTION EPIDURAL; INFILTRATION; INTRACAUDAL; PERINEURAL AS NEEDED
Status: DISCONTINUED | OUTPATIENT
Start: 2023-08-12 | End: 2023-08-12 | Stop reason: SURG

## 2023-08-12 RX ORDER — ONDANSETRON 2 MG/ML
INJECTION INTRAMUSCULAR; INTRAVENOUS AS NEEDED
Status: DISCONTINUED | OUTPATIENT
Start: 2023-08-12 | End: 2023-08-12 | Stop reason: SURG

## 2023-08-12 RX ORDER — ROCURONIUM BROMIDE 10 MG/ML
INJECTION, SOLUTION INTRAVENOUS AS NEEDED
Status: DISCONTINUED | OUTPATIENT
Start: 2023-08-12 | End: 2023-08-12 | Stop reason: SURG

## 2023-08-12 RX ORDER — CEFOXITIN 2 G/1
INJECTION, POWDER, FOR SOLUTION INTRAVENOUS AS NEEDED
Status: DISCONTINUED | OUTPATIENT
Start: 2023-08-12 | End: 2023-08-12 | Stop reason: SURG

## 2023-08-12 RX ADMIN — ROCURONIUM BROMIDE 10 MG: 10 INJECTION, SOLUTION INTRAVENOUS at 16:55:00

## 2023-08-12 RX ADMIN — ROCURONIUM BROMIDE 10 MG: 10 INJECTION, SOLUTION INTRAVENOUS at 18:34:00

## 2023-08-12 RX ADMIN — ROCURONIUM BROMIDE 20 MG: 10 INJECTION, SOLUTION INTRAVENOUS at 16:07:00

## 2023-08-12 RX ADMIN — CEFOXITIN 2 G: 2 INJECTION, POWDER, FOR SOLUTION INTRAVENOUS at 15:40:00

## 2023-08-12 RX ADMIN — ROCURONIUM BROMIDE 10 MG: 10 INJECTION, SOLUTION INTRAVENOUS at 16:28:00

## 2023-08-12 RX ADMIN — DEXAMETHASONE SODIUM PHOSPHATE 4 MG: 4 MG/ML VIAL (ML) INJECTION at 15:43:00

## 2023-08-12 RX ADMIN — ROCURONIUM BROMIDE 10 MG: 10 INJECTION, SOLUTION INTRAVENOUS at 17:21:00

## 2023-08-12 RX ADMIN — SODIUM CHLORIDE: 9 INJECTION, SOLUTION INTRAVENOUS at 19:16:00

## 2023-08-12 RX ADMIN — PHENYLEPHRINE HCL 100 MCG: 10 MG/ML VIAL (ML) INJECTION at 17:42:00

## 2023-08-12 RX ADMIN — PHENYLEPHRINE HCL 100 MCG: 10 MG/ML VIAL (ML) INJECTION at 17:47:00

## 2023-08-12 RX ADMIN — PHENYLEPHRINE HCL 100 MCG: 10 MG/ML VIAL (ML) INJECTION at 16:06:00

## 2023-08-12 RX ADMIN — CEFOXITIN 2 G: 2 INJECTION, POWDER, FOR SOLUTION INTRAVENOUS at 17:33:00

## 2023-08-12 RX ADMIN — ROCURONIUM BROMIDE 20 MG: 10 INJECTION, SOLUTION INTRAVENOUS at 15:24:00

## 2023-08-12 RX ADMIN — PHENYLEPHRINE HCL 100 MCG: 10 MG/ML VIAL (ML) INJECTION at 16:07:00

## 2023-08-12 RX ADMIN — LABETALOL HYDROCHLORIDE 5 MG: 5 INJECTION, SOLUTION INTRAVENOUS at 15:43:00

## 2023-08-12 RX ADMIN — LIDOCAINE HYDROCHLORIDE 50 MG: 10 INJECTION, SOLUTION EPIDURAL; INFILTRATION; INTRACAUDAL; PERINEURAL at 15:20:00

## 2023-08-12 RX ADMIN — ONDANSETRON 4 MG: 2 INJECTION INTRAMUSCULAR; INTRAVENOUS at 18:41:00

## 2023-08-12 NOTE — ANESTHESIA PROCEDURE NOTES
Regional Block    Date/Time: 8/12/2023 3:30 AM    Performed by: Tenisha Gilbert MD  Authorized by: Tenisha Gilbert MD      General Information and Staff    Start Time:  8/12/2023 3:30 AM  End Time:  8/12/2023 3:35 AM  Anesthesiologist:  Tenisha Gilbert MD  Performed by: Anesthesiologist  Patient Location:  OR    Block Placement: Post Induction  Site Identification: real time ultrasound guided and image stored and retrievable    Block site/laterality marked before start: site marked  Reason for Block: at surgeon's request and post-op pain management    Preanesthetic Checklist: 2 patient identifers, IV checked, risks and benefits discussed, monitors and equipment checked, pre-op evaluation, timeout performed, anesthesia consent, sterile technique used, no prohibitive neurological deficits and no local skin infection at insertion site      Procedure Details    Patient Position:   Prep: ChloraPrep    Monitoring:  Cardiac monitor, continuous pulse ox and blood pressure cuff  Block Type:  TAP  Laterality:  Bilateral  Injection Technique:  Single-shot    Needle    Needle Type:  Short-bevel and echogenic  Needle Gauge:  22 G  Needle Length:  110 mm  Needle Localization:  Ultrasound guidance  Reason for Ultrasound Use: appropriate spread of the medication was noted in real time and no ultrasound evidence of intravascular and/or intraneural injection            Assessment    Injection Assessment:  Good spread noted, negative resistance, negative aspiration for heme, incremental injection and low pressure  Heart Rate Change: No    - Patient tolerated block procedure well without evidence of immediate block related complications.      Medications  8/12/2023 3:30 AM      Additional Comments    Medication:  Bupivacaine 0.25% 50 ml

## 2023-08-12 NOTE — ANESTHESIA PROCEDURE NOTES
Airway  Date/Time: 8/12/2023 3:27 PM  Urgency: elective    Airway not difficult    General Information and Staff    Patient location during procedure: OR  Anesthesiologist: Gregg Chavira MD  Performed: anesthesiologist   Performed by: Gregg Chavira MD  Authorized by: Gregg Chavira MD      Indications and Patient Condition  Indications for airway management: anesthesia  Sedation level: deep  Preoxygenated: yes  Patient position: sniffing  Mask difficulty assessment: 1 - vent by mask  Planned trial extubation    Final Airway Details  Final airway type: endotracheal airway      Successful airway: ETT  Cuffed: yes   Successful intubation technique: direct laryngoscopy  Endotracheal tube insertion site: oral  Blade: Oli  Blade size: #3  ETT size (mm): 8.0    Cormack-Lehane Classification: grade I - full view of glottis  Placement verified by: capnometry   Measured from: lips  ETT to lips (cm): 19  Number of attempts at approach: 1

## 2023-08-13 PROBLEM — G89.18 ACUTE POST-OPERATIVE PAIN: Status: ACTIVE | Noted: 2023-08-13

## 2023-08-13 PROBLEM — Z98.890 S/P EXPLORATORY LAPAROTOMY: Status: ACTIVE | Noted: 2023-01-01

## 2023-08-13 PROBLEM — G89.18 ACUTE POST-OPERATIVE PAIN: Status: ACTIVE | Noted: 2023-01-01

## 2023-08-13 PROBLEM — Z98.890 S/P EXPLORATORY LAPAROTOMY: Status: ACTIVE | Noted: 2023-08-13

## 2023-08-13 PROBLEM — E87.20 LACTIC ACID ACIDOSIS: Status: ACTIVE | Noted: 2023-08-13

## 2023-08-13 PROBLEM — E87.20 LACTIC ACID ACIDOSIS: Status: ACTIVE | Noted: 2023-01-01

## 2023-08-13 NOTE — PLAN OF CARE
Assumed care of patient following rapid response called on patient in SCU. Patient arrived to unit in clear respiratory distress due to extreme pain that was not controlled. Upon arrival to unit, high dose dilaudid PCA infusing, with multiple additional RN administered dilaudid boluses. After hours, pain appeared to decrease and patient was able to settle into bed. Patient is able to now state that pain is much more tolerable. Patient remains sinus tachy on tele, but much improved. SBP goal <170, one dose of hydralazine given. Patient now resting between cares, call light in reach. Will continue to monitor.

## 2023-08-13 NOTE — PLAN OF CARE
Received pt A&Ox4 on RA. C/o abdominal pain; pt using dilaudid PCA as needed, additional PRN dilaudid boluses given, PRN morphine given and scheduled toradol and IV tylenol given per order with some relief. NG to LIS intact, monitoring output. Kellogg removed per order. Afebrile. VSS. NSR/ST on monitor. See flowsheets. See MAR. Care ongoing.

## 2023-08-13 NOTE — BRIEF OP NOTE
Pre-Operative Diagnosis: Bowel obstruction (HCC) [K56.609]     Post-Operative Diagnosis: Bowel obstruction ([address] [K56.609]      Procedure Performed:   EXPLORATORY LAPAROTOMY , SMALL BOWEL RESECTION AND LYSIS OF ADHESION FOR 3THREE HOURS    Surgeon(s) and Role:     * Saleem Chen MD - Primary     * Kate Keen MD - Assisting Surgeon    Assistant(s):  PA: Zulema Garcia [place]     Surgical Findings: Extensive adhesions;high-grade SBO     Specimen: portion small bowel     Estimated Blood Loss: Blood Output: 20 mL (8/12/2023  7:22 PM)      Dictation Number:  [id number]    Billy Valdez MD  8/12/2023  7:31 PM

## 2023-08-13 NOTE — PHYSICAL THERAPY NOTE
Consult received, chart reviewed. Attempted x 2. In AM, pt c/o sharp pain, stating that he is trying to control the pain and would like to rest.  Agreeable to later in PM, wife at b/s also encouraging patient. Returned in PM, however pt declined. States breathing treatment causes nausea and would like another night to control pain, agreeable to PT tomorrow. Discussed benefits and potentially decrease in pain with positional changes and upright mobility, however pt continued to decline. RN aware. PT to attempt at next available date.   AE PT

## 2023-08-13 NOTE — PROGRESS NOTES
Pt arrived from PACU; /109. P/rn Hydralazine given. Surgeon Bruno notified, no new orders. Msg sent to hospitalist to evaluate pt at [facility]. BP decreased, HR continued to remain elevated, tachypnea- saturating on 3L at 100%. Pt slouched over bedside rail after yelling out, Rapid Respone called. Pt alert, response, difficulty with respirations. Cold sweat. Hospitalist Nuris Gomes at bedside. 40 meq Lasix given, Neb tx, blood gasses obtained. Pt continued to verbalized difficulty breathing, pain intolerable. Team transferred pt to ICU. Family at bedside.

## 2023-08-13 NOTE — OPERATIVE REPORT
[facility]    PATIENT'S NAME: Juan Ramon Mcelroy   ATTENDING PHYSICIAN: Gus Cruz MD   OPERATING PHYSICIAN: Zahra Easton M.D. PATIENT ACCOUNT#:   [de-identified]    LOCATION:  [address] [place] [address]  MEDICAL RECORD #:   [id number]       YOB: 1950  ADMISSION DATE:       08/12/2023      OPERATION DATE:  08/12/2023    OPERATIVE REPORT    PREOPERATIVE DIAGNOSIS:  High-grade small bowel obstruction due to adhesions. POSTOPERATIVE DIAGNOSIS:  High-grade small bowel obstruction due to adhesions. PROCEDURE:    1. Exploratory laparotomy. 2.   Small bowel resection with anastomosis. 3.   Lysis of adhesions (3 hours). ASSISTANT SURGEON:  Carmen Youssef MD.    ASSISTANT:  GERALD Gomez. ANESTHESIA:  General endotracheal anesthesia. ANESTHESIOLOGIST:  Dr. Sol Robbins. BLOOD LOSS:  Approximately 20 mL. COMPLICATIONS:  None apparent. SPECIMENS:  Portion of small bowel. DISPOSITION:  The patient was awakened from anesthesia and brought to the recovery room in stable condition. He tolerated the procedure without apparent complication. Needle, sponge, and instrument counts were correct at the end of the procedure. Preprocedure antibiotic and DVT prophylaxis was administered per protocol. A time-out was performed prior to initiating the procedure, identifying the correct patient, procedure, and surgeon. FINDINGS:  There were extensive adhesions throughout the entirety of the small bowel from the ligament of Treitz to the terminal ileum. Extensive interloop adhesions causing multiple areas of high-grade small bowel obstruction especially in the left upper quadrant. After adhesiolysis, there were several areas of deserosalization that require repair with seromuscular suture. Additionally, 1 portion of the mid small bowel has compromised blood supply due to adhesiolysis, and this area is resected for fear of progression to ischemia.     OPERATIVE TECHNIQUE:  The patient is brought to the operating room after induction of general endotracheal anesthesia, the Anesthesiology performed TAP blocks. Next, the abdomen is prepped and draped in usual sterile fashion. A midline laparotomy incision was created from the xiphoid process to just below the umbilicus using a scalpel to incise the skin. The fascia was then divided after hemostats is achieved in subcutaneous tissues. The fascia was divided to the full cephalad and caudad extent of the incision and adhesiolysis is initiated. There were extensive adhesions of the small bowel to the abdominal wall. Additionally, there were extensive adhesions of small bowel to adjacent organs and structures including adjacent loops of small bowel, the liver, the stomach, large intestine, and pelvic organs respectively. Meticulous and tedious adhesiolysis is initiated and after approximately 3 hours of adhesiolysis, all adhesions had been divided. The small bowel is run from proximal to distal multiple times. The ligament of Treitz is identified and the small bowel is followed to the enteroenterostomy. The Roxanne limb is then also identified and followed to its proximal extent through the transverse colon mesentery. At this point, there is no injury to these areas. The common channel and distal small bowel is then run. There are multiple areas of deserosalization that require repair with 3-0 Vicryl Lembert sutures. Additionally, in the mid transverse colon, there is a deserosalized area that was also repaired using interrupted 3-0 Vicryl sutures. In the distal small bowel, there is an area where the mesenteric vessels were compromised from adhesiolysis and for fear of progression to ischemia, segmental small bowel resection was completed using blue cartridge PATRICIA staplers to divide proximal and distal ends. The mesentery was divided using an EnSeal device.   A functional side-to-side small bowel anastomosis was created with a blue cartridge PATRICIA stapler followed by a blue cartridge TA stapler. The mesentery defect was repaired using 2-0 Vicryl suture. The anastomosis is widely patent, air and watertight and nonischemic. At this point, small bowel is returned to the abdominal cavity. The abdomen is cleansed and irrigated multiple times with sterile saline solution and Irrisept. After Irrisept is aspirated, 2 sheets of Seprafilm are cut to multiple pieces and placed throughout the abdomen and between loops of adjacent small bowel. The operative team now changes gowns and gloves in anticipation of wound closure. After the Seprafilm is placed throughout the abdomen, the small bowel was then returned to the abdominal cavity and after the team changes gowns and gloves for a wound closure, the laparotomy incision is secured. The fascia is reapproximated using running #1 PDS suture utilizing frequent small close-based bites of tissue in standard fashion. The subcutaneous tissue is cleansed and irrigated. The skin is reapproximated using skin clips. Sterile occlusive Aquacel dressing is placed. The patient was then awakened from anesthesia and brought to the recovery room in stable condition. He tolerated the procedure without apparent complication. Needle, sponge, and instrument counts were correct at the end the procedure and operative findings were discussed with the patient's family immediately upon conclusion of surgery.     Dictated By Anton Kenyon M.D.  d: 08/12/2023 19:39:56  t: 08/12/2023 22:00:57  Job [id number]  PP/

## 2023-08-13 NOTE — PROGRESS NOTES
[facility] Pharmacy Note: Renal dose adjustment for Famotidine (Pepcid)  Jerri Remedies has been prescribed Famotidine (Pepcid) 20 mg every 12 hours. Estimated Creatinine Clearance: 35.5 mL/min (A) (based on SCr of 1.6 mg/dL (H)). His calculated creatinine clearance is less than 50 ml/min, therefore, the dose of Famotidine (Pepcid) has been decreased to 20 mg once daily per protocol. Pharmacy Note: Renal dose adjustment for Metoclopramide (Reglan)  Jerri Remedies has been prescribed Metoclopramide (Reglan) 10 mg every 8 hours as needed. Estimated Creatinine Clearance: 35.5 mL/min (A) (based on SCr of 1.6 mg/dL (H)). His calculated creatinine clearance is < 40 mL/min, therefore, the dose of Reglan has been changed to 5 mg every 8 hours as needed per P&T approved protocol. Pharmacy will follow and resume original order if renal function improves.     Thank you,   Jonel Segal, PharmD  8/13/2023,  1:17 PM

## 2023-08-13 NOTE — ANESTHESIA POSTPROCEDURE EVALUATION
[address] Patient Status:  Emergency   Age/Gender 67year old male MRN [id number]   [facility] SURGERY Attending Nika Carr MD   Hosp Day # 0 PCP Keatno Jules MD       Anesthesia Post-op Note    EXPLORATORY LAPAROTOMY , SMALL BOWEL RESECTION AND LYSIS OF ADHESION FOR 3THREE HOURS    Procedure Summary       Date: 08/12/23 Room / Location: [facility] MAIN OR  / [facility] MAIN OR    Anesthesia Start: 5121 Anesthesia Stop: 1937    Procedure: EXPLORATORY LAPAROTOMY , SMALL BOWEL RESECTION AND LYSIS OF ADHESION FOR 3THREE HOURS (Abdomen) Diagnosis:       Bowel obstruction ([address]      (Bowel obstruction ([address] [K56.609])    Surgeons: Letty Mak MD Anesthesiologist: Tenna Mcburney, MD    Anesthesia Type: general ASA Status: 3            Anesthesia Type: general    Vitals Value Taken Time   /100 08/12/23 1938   Temp 97.4 08/12/23 1942   Pulse 100 08/12/23 1942   Resp 28 08/12/23 1942   SpO2 98 % 08/12/23 1942   Vitals shown include unvalidated device data. Patient Location: PACU    Anesthesia Type: general    Airway Patency: extubated    Postop Pain Control: inadequate, being treated    Mental Status: mildly sedated but able to meaningfully participate in the post-anesthesia evaluation    Nausea/Vomiting: none    Cardiopulmonary/Hydration status: stable euvolemic    Complications: no apparent anesthesia related complications    Postop vital signs: stable    Dental Exam: Unchanged from Preop    Patient to be discharged from PACU when criteria met.

## 2023-08-14 ENCOUNTER — TELEPHONE (OUTPATIENT)
Facility: CLINIC | Age: 73
End: 2023-08-14

## 2023-08-14 NOTE — PHYSICAL THERAPY NOTE
PHYSICAL THERAPY EVALUATION - INPATIENT     Room Number: 460/460-A  Evaluation Date: 8/14/2023  Type of Evaluation: Initial  Physician Order: PT Eval and Treat    Presenting Problem: SBO-s/p exp lap, SB resection c anastomosis and lysis of adhesions 8/12/23  Co-Morbidities : COPD, CHF, CAD s/p CABG, prostate CA, chronic pancreatitis, HTN, HL, DM  Reason for Therapy: Mobility Dysfunction and Discharge Planning    History related to current admission: Patient is a 67year old male admitted on 8/12/2023 from home for SBO. Pt diagnosed with SBO-s/p exp lap, SB resection c anastomosis and lysis of adhesions 8/12/23. CT of abdomen/pelvis 8/12/23-  CONCLUSION:  High-grade small bowel obstruction with markedly dilated duodenum and proximal jejunum to the point of a transition at the surgical anastomosis/staple lines in the left upper-mid abdomen laterally. This represents a worsened appearance since the prior CT. Gaseous and fluid distention of stomach is probably secondary to the distal small bowel obstruction. Moderate-large amount of stool in the colon including in the rectum where there is fecal impaction appearance transverse dimension of the stool in the rectum 7.3 cm with mild thickening could reflect early stercoral colitis. No signs of free air or ascites. Other nonacute findings as above. ASSESSMENT   In this PT evaluation, the patient presents with the following impairments posture, endurance, strength, gait and balance. These impairments and comorbidities manifest themselves as functional limitations in independent bed mobility, transfers, and gait, along c stoop and stair negotiation. The patient is below baseline and would benefit from skilled inpatient PT to address the above deficits to assist patient in returning to prior to level of function. Functional outcome measures completed include [facility].   The AM-PAC '6-Clicks' Inpatient Basic Mobility Short Form was completed and this patient is demonstrating a Approx Degree of Impairment: 41.77%  degree of impairment in mobility. Rec DC to home c HHPT pending further mobility and intermittent supervision. D/w pt regarding use of RW if he feels he cont to require some support while amb, however this date he was refusing device. DISCHARGE RECOMMENDATIONS  PT Discharge Recommendations: Home with home health PT; Intermittent Supervision    PLAN  PT Treatment Plan: Bed mobility; Body mechanics; Endurance; Energy conservation;Patient education;Gait training;Strengthening;Transfer training;Balance training;Stoop training;Stair training  Rehab Potential : Good  Frequency (Obs): 3x/week  Number of Visits to Meet Established Goals:  (6)      CURRENT GOALS    Goal #1 Patient is able to demonstrate supine - sit EOB @ level: modified independent     Goal #2 Patient is able to demonstrate transfers EOB to/from Chair/Wheelchair at assistance level: modified independent     Goal #3 Patient is able to ambulate 400 feet with or without the least restrictive assist device:  at assistance level: modified independent     Goal #4 Pt will demo the ability to negotiate 1 flight of stairs c supervision by DC   Goal #5    Goal #6    Goal Comments: Goals established on 8/14/2023    HOME SITUATION  Type of Home: House   Home Layout: Multi-level  Stairs to Enter : 1  Railing: No  Stairs to Bedroom:  (7-7-7 (landings, basement))  Railing: Yes    Lives With: Spouse  Drives: Yes  Patient Owned Equipment: Rolling walker  Patient Regularly Uses: Home O2 (utilizes PRN)    Prior Level of [place]: Pt is indep c all ADLs and him/wife complete IADLs together; amb without device, retired and drives    SUBJECTIVE  \"I have been down this road before and I know I need to get up\"      OBJECTIVE  Precautions: Bed/chair alarm;NG suction (intermittent suction)  Fall Risk: High fall risk    WEIGHT BEARING RESTRICTION  Weight Bearing Restriction: None                PAIN ASSESSMENT  Rating: 8  Location: abdomen and LLQ  Management Techniques: Activity promotion; Body mechanics;Repositioning    COGNITION  Overall Cognitive Status:  WFL - within functional limits  Arousal/Alertness:  appropriate responses to stimuli  Orientation Level:  oriented x4  Following Commands:  follows all commands and directions without difficulty  Safety Judgement:  good awareness of safety precautions    RANGE OF MOTION AND STRENGTH ASSESSMENT  Upper extremity ROM and strength are -see OT eval    Lower extremity ROM is within functional limits     Lower extremity strength is : BLE hip flex 4-/5, quad 4/5, ankle 4+/5      BALANCE  Static Sitting: Fair +  Dynamic Sitting: Fair +  Static Standing: Fair -  Dynamic Standing: Poor +    ADDITIONAL TESTS                                    ACTIVITY TOLERANCE                         O2 WALK       NEUROLOGICAL FINDINGS  Neurological Findings: Sensation           Sensation: BLE symmetrical/intact to light touch         AM-PAC '6-Clicks' INPATIENT SHORT FORM - BASIC MOBILITY  How much difficulty does the patient currently have. .. Patient Difficulty: Turning over in bed (including adjusting bedclothes, sheets and blankets)?: None   Patient Difficulty: Sitting down on and standing up from a chair with arms (e.g., wheelchair, bedside commode, etc.): A Little   Patient Difficulty: Moving from lying on back to sitting on the side of the bed?: A Little   How much help from another person does the patient currently need. ..    Help from Another: Moving to and from a bed to a chair (including a wheelchair)?: A Little   Help from Another: Need to walk in hospital room?: A Little   Help from Another: Climbing 3-5 steps with a railing?: A Little       AM-PAC Score:  Raw Score: 19   Approx Degree of Impairment: 41.77%   Standardized Score (AM-PAC Scale): 45.44   CMS Modifier (G-Code): CK    FUNCTIONAL ABILITY STATUS  Gait Assessment   Functional Mobility/Gait Assessment  Gait Assistance: Contact guard assist  Distance (ft): 200  Assistive Device: Other (Comment) (HHA, holding onto IV)  Pattern: Shuffle;L Foot flat;R Foot flat;Comment (NBOS, short step/stride length)    Skilled Therapy Provided     Bed Mobility:  Rolling: mod indep  Supine to sit: supervision    Sit to supine: NT     Transfer Mobility:  Sit to stand: CGA   Stand to sit: CGA  Gait = CGAx 200'    Therapist's Comments: Pt presents to PT lying supine in bed and eager to participate c mobility this date. Pt able to partially roll to L side c mod indep and t/f to sitting up at the EOB c supervision. Pt is able to scoot and reposition to the EOB c no deficits. Sit/stand c CGA and no device. Pt began attempting to amb and holding onto the IV. This was discouraged and offered a RW, however he declined and took HHAx1 and IV with other hand. Able to amb 200' c above deviations. Pn remained stable at 7-8/10 throughout. Following this, pt t/f to sitting up in the [facility] c CGA. Remained in the chair c PCT and Rn aware of session. Exercise/Education Provided:  Bed mobility  Body mechanics  Functional activity tolerated  Gait training  Posture  Transfer training    Patient End of Session: Up in chair; With [address] staff;Needs met;Call light within reach;RN aware of session/findings; All patient questions and concerns addressed      Patient Evaluation Complexity Level:  History Moderate - 1 or 2 personal factors and/or co-morbidities   Examination of body systems Moderate - addressing a total of 3 or more elements   Clinical Presentation Moderate - Evolving   Clinical Decision Making Moderate - Evolving       PT Session Time: 35 minutes  Gait Training: 10 minutes  Therapeutic Activity: 8 minutes

## 2023-08-14 NOTE — TELEPHONE ENCOUNTER
Wife reports that pt went to ER and had resection for small bowel obstruction. She also reports that his breathing is okay. He has not needed O2. She also wants Dr. Snyder Begun to know that pt had a heart cath by Dr. Asia Harrison and she would like Dr. Snyder Begun to review the report. She reports that pt does not have pulmonary HTN.

## 2023-08-14 NOTE — CDS QUERY
Clarification - Chronic Kidney Disease   Fidencio Archer  Dear Dr. Lady Barroso,  Clinical information (provided below) includes documentation of Chronic kidney disease requiring clarification. ,  PLEASE CHECK THE SPECIFICITY THAT APPLIES. SELECTION BY PROVIDER ONLY  [  ] Chronic Kidney Disease stage 2. [ x ]Chronic Kidney Disease Stage 3   [  ]Other (please specify): ___________________________________________________________         Documentation from the medical record  Risk; HTN, DM2  Clinical indicators; Historical lab-5/25/23- Creatinine -1.29, GFR 59  8/12 Creatinine(CR)-1.15, GFR 67, 8/13-CR-1.79, GFR -40, CR 1.60, GFR 45 8/14-CR 1.39, GFR 54    8/13- Critical Care APN-HPI has hx CKD. A/P  JONO on CKD-IVF -Monitor uop and labs     PULM/CC Consult; HPI-h/o CKD .  A/P-JONO on CKD- due to dehydration- IVFs     Treatment; IVF, monitor labs ,UOP         For questions regarding this query, please contact:Wes Roman, RN, BSN, CCDS,Certified Clinical    UCT:[id number]     THIS FORM IS A PERMANENT PART OF THE MEDICAL RECORD

## 2023-08-14 NOTE — TELEPHONE ENCOUNTER
Call placed to pt's wife and notified her that Dr. Dahlia Mathur will discuss results of cardiac cath at his appt on 9-5-23. Wife verbalizes understanding.

## 2023-08-14 NOTE — PLAN OF CARE
Pt resting in bed on room air. Using urinal at bedside. PCA pump in place. See mar for more info. NG to LIS. Hypoglycemia around noon. APRN notified, protocol followed. Afebrile. Up to chair with 1 assist.     Pt requesting bolus for pain, educated pt on use of pain pump.

## 2023-08-14 NOTE — PLAN OF CARE
Received pt at change of shift lying in bed, alert and oriented x4. C/o pain to lower abdomen 7/10. Has high dose dilaudid PCA pump and getting additional dilaudid bolus approximately every 2 1/2 hours. Also getting scheduled tylenol IV. Fentanyl patch to BRAD. Mostly his pain is around 5/10. C/o nausea and zofran given. On room air. Lungs diminished. Using I/S while awake. VSS. Afeb. NGT to LIWS. Voiding per urinal.  Midline dressing intact. Stable.

## 2023-08-15 PROBLEM — I46.9 CARDIAC ARREST WITH PULSELESS ELECTRICAL ACTIVITY (HCC): Status: ACTIVE | Noted: 2023-01-01

## 2023-08-15 NOTE — PROGRESS NOTES
08/15/23 0452   Clinical Encounter Type   Visited With Health care provider   Referral From Other (Comment)  (code blue)   Referral To    Taxonomy   Interventions Silent prayer     Spiritual Care support can be requested via an Epic consult. For urgent/immediate needs, please contact the On Call  at ext. [phone].

## 2023-08-15 NOTE — PLAN OF CARE
Patient RRT turned code blue - ROSC achieved on floor after 15 min. To ICU w/ ETT in place. Upon moving from floor to ICU bed it was noted patient no longer making respiratory effort while being bagged and was then discovered to be pulseless. CPR initiated. I recorded for code - see flowsheets for further data. At 0530 no heart beat, no respirations, asystole on monitor, code team voiced no objection to termination of resuscitation and death was declared after 37 minute code. Spouse is en route.

## 2023-08-15 NOTE — ANESTHESIA PROCEDURE NOTES
Airway  Date/Time: 8/15/2023 4:39 AM  Urgency: emergent    Airway not difficult    General Information and Staff    Patient location during procedure: floor  Anesthesiologist: Lucila Mcgrath MD  Performed: anesthesiologist   Performed by: Lucila Mcgrath MD  Authorized by: Lucila Mcgrath MD      Consent for Airway (if performed for an anesthetic, see related documentation for consents)  Consent: The procedure was performed in an emergent situation.       Indications and Patient Condition  Indications for airway management: cardio/pulmonary arrest  Sedation level: no sedation      Final Airway Details  Final airway type: endotracheal airway      Successful airway: ETT  Cuffed: yes   Successful intubation technique: Video laryngoscopy  Facilitating devices/methods: intubating stylet  Endotracheal tube insertion site: oral  Blade: GlideScope  Blade size: #3  ETT size (mm): 7.5    Cormack-Lehane Classification: grade I - full view of glottis  Placement verified by: capnometry   Cuff volume (mL): 7  Measured from: gums  ETT to gums (cm): 23  Number of attempts at approach: 1  Number of other approaches attempted: 0

## 2023-08-15 NOTE — PROGRESS NOTES
08/15/23 2113   Clinical Encounter Type   Visited With Family; Health care provider  (wife, sisters in-law,  Em Verma)   Crisis Visit Death   Referral From Nurse   Referral To    Family Spiritual Encounters   Family Coping Sadness   Taxonomy   Intended Effects Journeying with someone in the grief process   Methods Offer support;Collaborate with care team member   Interventions Silent prayer; Active listening; Acknowledge response to difficult experience     Provided a gentle, compassionate presence to bring comfort as able, to Jameson's grieving family. Offered support packet and informed family of the grief group at [facility].  Patient's wife is considering what  home that she wants to use. Also provided # for [place] if they need to call back with information about final arrangements/ home.

## 2023-08-15 NOTE — CDS QUERY
Josefa Merols  Dear Dr. Hillary Mills,  Clinical information (provided below) includes documentation of urinary tract infection (UTI). Based on your clinical judgement and the below clinical indicators   PLEASE clarify the  diagnosis of UTI. [  x ] UTI ruled out  [  ] UTI ruled in   [   ] Other (please specify):      Documentation from the Medical Record:   Risk; CKD    Clinical indicators; ED  MDM- Possible UTI    H&P and 8/13 and 8/14 progress notes  presumed UTI  follow cultures , NGTD    8/12 Critical Care APRN positive UTI-expand coverage from Rocephin to zosyn    8/13-urine culture -no growth 18-24hrs. Treatment; IV anbx        For questions regarding this query, please contact Clinical :   Shayy Mueller RN, BSN, [address]. [email or web address]                                                             THIS FORM IS A PERMANENT PART OF THE MEDICAL RECORD

## 2023-08-15 NOTE — PROGRESS NOTES
Responded to CODE Blue in ICU for PEA arrest on patient just after arrival to ICU. Already coded on GMF for 15-20 minutes, intubated and with brief period of ROSC. Please refer to Hospitalist code blue documentation for details, and code blue sheets. During resuscitation efforts, significant subcutaneous emphysema noted. Patient became more difficult to bag per RT. Rapid CXR was completed with c/f possible left PTX and small right. Left chest was scrubbed and then needle decompression completed with 14 gauge angiocath. Positive air rush noted on insertion and catheter left in place. This was also done on right side with again noting positive air rush on insertion and catheter left in place. RT reported some ease in ambu-bagging but not complete resolution. Resuscitative efforts continued but ultimately failed to gain ROSC and time of death was called at 0530. Critical Care on-call, Dr Bryon Koenig was updated on transfer, arrest, efforts, CXR concerns, and needle decompression, as well as decision to cease efforts at 0530 after patient remained in asystole with poor ETCO2 after additional 35+ minutes of resuscitation efforts--agreed with plan and decisions.     DELPHINE Moffett  Critical Care NP  [place] [phone]: [phone]  Pgr: [phone]

## 2023-08-15 NOTE — PLAN OF CARE
Pt A&Ox4, resting in bed. Resp: RA  Cardiac: NSR-ST low 100s  GI/: voids in urinal; no gas or recent BM  Activity/Safety: up w/asst  Skin: abd midline with staples, JANETT  Pain: Dilaudid PCA  Pt updated on and agreeable to POC; IV fluids, IV abx, monitor glucose and BP, trend WBC. Significant Event - Fall Note    Date/Time of Fall: August 15, 2023 at [address] (time of call from [facility]    Fall huddle completed: Yes    Description of patient fall:     Patient fell from: Bed     Activity when fall occurred: Changing position     Where did fall occur: Patient room     Was the fall assisted: Found on floor/unassisted to floor    Who witnessed the fall: Unwitnessed    Patient narrative of fall: Pt reports he could not breathe and had to sit at edge of bed. Pt reports when at edge of bed he fell to his knees and got up to the chair. Denies any pain. Staff narrative of fall: called into room after pt fell (as far as to his knees), found in chair after yelling for help, allegedly trying to get out bed to get more air. c/o dyspnea. Respiratory therapist called to bedside. Albuterol and DuoNeb given. Expiratory wheezes on auscultation. VS in Flowsheet. c/o chest pain. MD paged, orders given. NGT pulled by pt after pt was returned to bed; edu provided on purpose of tube, but pt did not want it, stating, \"Charli, I can't do it\". After neb, pt states \"I'm feeling better. \"    Name of Provider notified of fall: Dr. Anthony Perez notification: Family notified      Factors contributing to fall:     Physical: Deconditioned     Psychological: Alert and Oriented     Environmental: Equipment     Medications received in the past 8 hours:   Medication(s) Administered in past 8 Hours from 08/14/2023 1956 to 08/15/2023 0356       Date/Time Order Dose Route Action Action by Comments    08/15/2023 0033 CDT acetaminophen (Ofirmev) 10 mg/mL infusion premix 1,000 mg 1,000 mg Intravenous Alphonso Beltrán RN --    08/15/2023 0300 CDT albuterol (Ventolin HFA) 108 (90 Base) MCG/ACT inhaler 2 puff 2 puff Inhalation Given Felton Jade, AUNDREA --    08/14/2023 2020 CDT budesonide (Pulmicort) 0.25 MG/2ML nebulizer suspension 0.25 mg 0.25 mg Nebulization Given Hawa Taylor --    08/15/2023 0100 CDT dextrose in lactated ringers 5% infusion -- Intravenous Jero 37 Tanya Francois, RN --    08/15/2023 2134 CDT ipratropium-albuterol (Duoneb) 0.5-2.5 (3) MG/3ML inhalation solution 3 mL 3 mL Nebulization Given Juanita Aguirre RCP --    08/14/2023 2021 CDT ipratropium-albuterol (Duoneb) 0.5-2.5 (3) MG/3ML inhalation solution 3 mL 3 mL Nebulization Given Hawa Taylor --    08/14/2023 2104 CDT metoprolol (Lopressor) 5 mg/5mL injection 5 mg 5 mg Intravenous Given Oj Morrow RN --    08/15/2023 0221 CDT metoprolol (Lopressor) 5 mg/5mL injection 5 mg 5 mg Intravenous Given Tanya Francois, RN --    08/15/2023 0221 CDT piperacillin-tazobactam (Zosyn) 3.375 g in dextrose 5% 100 mL IVPB-ADDV 3.375 g Intravenous New Luis Vasquez RN --            Was patient identified as high fall risk prior to fall:         Fall Risk Level: High Fall Risk                      What interventions were in place prior to fall: Bed in lowest position, Call light within reach, [facility] bed [facility] [place] footwear, Personal items within reach, Initiated PT/OT therapy, Reality orientation, and Rounding    Interventions post fall: Assistive devices (wheelchair, commode, walker, gait belt), Bed alarm, Bed in lowest position, Call light within reach, Diversion activities, Fall alert wristband, Low bed ([facility] only), [place] footwear, Personal items within reach, Initiated PT/OT therapy, Reality orientation, Rounding, and Signage in place      Code Blue  Approx 10 min after DuoNeb was given, pt c/o SOB. SpO2 98%. 2L NC placed, 98%. RRT called. Pt unable to stay upright for CXR, appeared ashen with shallow breathing.  Fingers cold, could not obtain Accucheck or reliable SpO2 reading; portable pulse ox likewise unable to read, radial pulse not felt. HOB was placed flat, pt was turned and board placed underneath to start CPR. Code Blue called (see Code report for drug admin times). Anesthesia paged, ETT placed. CPR paused only for pulse checks with compressors switched out. Doppler unable to obtain femoral pulse. Thready pulse at carotid only; pt transferred to ICU. Upon arrival, no pulse was felt and a Code Blue was called. Care transferred to ICU team, where surgeon was notified with no orders given. Pt's wife notified and later arrived at bedside.

## 2023-08-17 NOTE — DISCHARGE SUMMARY
[facility] HOSPITALIST  DISCHARGE SUMMARY     Wilfredo Martin Patient Status:  Inpatient    1950 MRN [id number]   [facility] 4SW-A Attending No att. providers found   Hosp Day # 3 PCP Michele Rice MD     Date of Admission: 2023  Date of Discharge:  8/15/2023     Discharge Disposition:     Discharge Diagnosis:  Acute Hypoxic Resp Failure  Presumed PTX  PEA arrest  Small Bowel Obstruction  Ess HTN  Dyslipidemia  DM2      History of Present Illness:   Wilfredo Martin is a 67year old male with PMhx of SBO, prostate cancer, chronic pancreatitis, HTN, DL, DM presents with abd pain and bloating. Pt states symptoms started last night. He had his last BM this morning. He has noticed worsneing abd pain and some N/V. He denies any F/C. No CP or SOB. Pt states this feels similar to his previous SBO. Brief Synopsis: Pt was admitted and given IVF, pain control and kept NPO. He was seen by surgery and underwent colon resection and IVETT. Post op he had resp failure that was likely due to CHF and fluid overload and responded well to diuretics. He was moved out of ICU on PCA pain control as his breathing was doing bettter. While on floor he had CODE BLUE called for resp failure and was found to have PTX and went into PEA arrest. Urgent needle deompression was perofrmed however pt did not survive the code. Pt passed on 8/15/23    Lace+ Score: 82  59-90 High Risk  29-58 Medium Risk  0-28   Low Risk  Patient was referred to the [facility]. TCM Follow-Up Recommendation:  LACE > 58:  High Risk of readmission after discharge from the hospital.      Procedures during hospitalization:   Colon Resection, IVETT  Needle Decompression    Incidental or significant findings and recommendations (brief descriptions):  none    Lab/Test results pending at Discharge:   none    Consultants:  Surgery, [facility]    Discharge Medication List:     Discharge Medications        STOP taking these medications      Accu-Chek FastClix Lancets Misc        Accu-Chek Guide w/Device Kit        BD Pen Needle Eda U/F 32G X 4 MM Misc  Generic drug: Insulin Pen Needle        Dexcom G6  Ashwini        Dexcom G6 Sensor Misc        Dexcom G6 Transmitter Misc               ASK your doctor about these medications        Instructions Prescription details   aspirin 325 MG Tabs      Take 1 tablet (325 mg total) by mouth daily. Refills: 0     cholecalciferol 50 MCG (2000 UT) Tabs      Take 1 tablet (2,000 Units total) by mouth daily. Refills: 0     dilTIAZem  MG Cp24  Commonly known as: Cardizem CD      Take 1 capsule (120 mg total) by mouth daily. Refills: 0     fentaNYL 12 MCG/HR Pt72  Commonly known as: Duragesic      Place 1 patch onto the skin every 3 (three) days. Refills: 0     HYDROmorphone 4 MG Tabs  Commonly known as: Dilaudid      Take 1 tablet (4 mg total) by mouth every 4 (four) hours as needed for Pain. Refills: 0     isosorbide dinitrate 20 MG Tabs  Commonly known as: Isordil      Take 1 tablet (20 mg total) by mouth in the morning and 1 tablet (20 mg total) before bedtime. Refills: 0     Multi-Vitamin Daily Tabs      Take 1 tablet by mouth daily. Refills: 0     pantoprazole 40 MG Tbec  Commonly known as: Protonix      Take 20 mg by mouth every morning before breakfast.   Refills: 0     polyethylene glycol (PEG 3350) 17 g Pack  Commonly known as: Miralax      Take 17 g by mouth daily as needed. Quantity: 1 each  Refills: 0     Roflumilast 500 MCG Tabs  Ask about: Should I take this medication? Take 500 mcg by mouth daily. Stop taking on: July 18, 2023  Quantity: 90 tablet  Refills: 3     Slow Release Iron 160 (50 Fe) MG Tbcr  Generic drug: Ferrous Sulfate Dried ER  Ask about: Should I take this medication? Take 160 mg by mouth in the morning and 160 mg before bedtime.    Quantity: 180 tablet  Refills: 2     Slow Iron 160 (50 Fe) MG Tbcr  Generic drug: Ferrous Sulfate Dried ER      TAKE 1 TABLET BY MOUTH TWICE DAILY (morning and before bedtime)   Refills: 0              [place]    Follow-up appointment:   No follow-up provider specified. Appointments for Next 30 Days 2023 - 2023      None            Vital signs:           -----------------------------------------------------------------------------------------------  PATIENT DISCHARGE INSTRUCTIONS: See electronic chart    Ivan Fitzgerald MD    Total time spent on discharge plannin minutes     The [facility] makes medical notes like these available to patients in the interest of transparency. Please be advised this is a medical document. Medical documents are intended to carry relevant information, facts as evident, and the clinical opinion of the practitioner. The medical note is intended as peer to peer communication and may appear blunt or direct. It is written in medical language and may contain abbreviations or verbiage that are unfamiliar.

## 2023-09-29 NOTE — PROGRESS NOTES
Influenza vaccine exemption letter drafted. Patient notified in a different encounter. Pt is Ax4, vss, afebrile, RUE precautions, bed alarm active, NPO, /Tele, TPN infusing, up w/ asst and walker. EGD w/ G-tube placement scheduled for later today w/ Dr. Chacho Medrano, consent signed & on chart, 0600 Heparin on hold.  NGT continues to put out la

## 2023-11-02 ENCOUNTER — TELEPHONE (OUTPATIENT)
Facility: LOCATION | Age: 73
End: 2023-11-02

## 2023-11-02 NOTE — TELEPHONE ENCOUNTER
I was notified that Autopsy report is available. I reviewed the findings in the chart today.      Radha Castaneda MD

## 2023-12-21 NOTE — PAYOR COMM NOTE
--------------  8/11- 12 CONTINUED STAY REVIEW    Payor: Kleber Butler 673 #:  Stacie Lopez Number: [id number]       8/11;    SURGERY:  Subjective: The patient denies new complaints today. He denies increased abdominal pain.   He den and is worse at night. He states he has increased pain now after ambulating in hallway.    Dilaudid 1mg clinician bolus given x1        Dilaudid PCA  No continuous rate  0.5mg pt bolus  60 min lockout  0.5mg hourly limit  5.5 mg Dilaudid via PCA in past 24h extremity edema noted.             Labs:        Lab Results   Component Value Date      08/12/2021     K 4.7 08/12/2021      08/12/2021     CO2 26.0 08/12/2021     BUN 21 08/12/2021     CREATSERUM 1.26 08/12/2021      08/12/2021     CA 9 (Left Upper Arm) Vince Linsday, RN      Heparin Sodium (Porcine) 5000 UNIT/ML injection 5,000 Units     Date Action Dose Route User    8/12/2021 0619 Given 5,000 Units Subcutaneous (Left Lower Abdomen) Peter Johnson RN    8/11/2021 2149 Given 5,000 Un dextrose 5% 100 mL IVPB-ADDV     Date Action Dose Route User    8/12/2021 0619 New Bag 3.375 g Intravenous Lugenia [facility]    8/11/2021 2149 New Bag 3.375 g Intravenous Lugenia [facility] [Consultation] : a consultation visit

## 2023-12-27 NOTE — PROGRESS NOTES
Office Visit Note       Active Problems      1. Follow-up examination    2. S/P percutaneous endoscopic gastrostomy (PEG) tube placement [facility])          Chief Complaint   Patient presents with:   Follow - Up        History of Present Illness  Eliud Oneal 10/2/2013   • Primary localized osteoarthrosis, lower leg, left [715.16] 9/2/2015   • Pulmonary nodule 3/23/2017   • Renal stones    • [facility]   • Shortness of breath    • Spondylolisthesis of lumbar region    • Spondylolisthesis of lumb 25 mg by mouth daily. torsemide 10 MG Oral Tab, Take 10 mg by mouth daily. SPIRIVA HANDIHALER 18 MCG Inhalation Cap, Inhale 1 capsule into the lungs daily. Insulin Lispro 100 UNIT/ML Subcutaneous Solution, Inject into the skin.  Based on sliding scale  f masses or hernias. There is mild tympany throughout the abdomen. PEG tube is in place and clamped.        Assessment   Follow-up examination  (primary encounter diagnosis)  S/P percutaneous endoscopic gastrostomy (PEG) tube placement [facility])    Plan   Patie Bill For Ultrasound Evaluation (): No Energy (Kv): 70 Treatment Documentation: Physician Orders: Radiotherapy Treatment Plan: Superficial Radiotherapy without Ultrasound\\nPlan: This patient has been treated today with superficial radiation therapy for non-melanoma skin cancer. Written informed consent has been previously obtained from this patient for this treatment. This consent is documented in the patient’s chart. The patient gave verbal consent to continue treatment today. The patient was treated with a specific radiation dose and setup as prescribed by the provider listed on this visit note. A Radiation Therapist performed administration of radiation under the supervision of a provider. The treatment parameters and cumulative dose are indicated above. Prior to administering the radiation, the patient underwent a verification therapeutic radiology simulation-aided field setting defining relevant normal and abnormal target anatomy in addition to data necessary to develop an optimal radiation treatment process for the patient. \\nThe field placement simulation documents any change seen in overall tumor volume documented in the patient’s record, allows the clinician to indicate any needed changes in the treatment plan and/or prescription, provides diagnostic evaluation as the basis for performing the therapeutic procedure, and clearly identifies the information needed to decide to proceed with the therapeutic procedure. This process includes verification of the treatment port(s) and proper treatment positioning. The patient’s lead blocking along with gross tumor volume and margin was confirmed. Considering superficial radiotherapy is clinical in setup, this requires the physician and radiation therapist to clarify the location interest being treated against initial images, pathology, and patient anatomy. Care was taken to ensure roman treated were geometrically accurate and properly positioned using therapeutic radiology simulation-aided field setting verification per fraction. This process is also utilized to determine if any prescription or setup changes are necessary. These steps are therefore medically necessary to ensure safe and effective administration of radiation. Ongoing therapeutic radiology simulation-aided field setting verification is ordered throughout the course of therapy. Per Dr. Ham, continued therapeutic radiology simulation-aided field setting verification per fraction is required for field placement. Fraction Number: 11 Additional Comments (Add Customization Of Note Here): itchy skin erythema and patchy dryness tenderness Bill For Set Radiation Therapy Field (98988)?: Yes Prescription Used: 1 Calculate Total Cumulative Dose Automatically Or Manually: Manually Ultrasound Used Text: High frequency ultrasound depth is 1.2mm, which is .42mm in difference from previous imaging. Total Cumulative Dose (Cgy): 2998.38 Ultrasound Not Used Text: Ultrasound was not performed today due to Daily Dosage (Cgy): 272.58 Additional Comments (Add Customization Of Note Here): erythema and tenderness to site plus itchiness, tenderness Add X Modifier?: WATERS - Unusual Non-Overlapping Service Ultrasound Used Text: High frequency ultrasound depth is 1.56mm, which is .35mm in difference from previous imaging. Total Cumulative Dose (Cgy): 3017.19 Daily Dosage (Cgy): 274.29 Total Cumulative Dose (Cgy): 2984.52 Additional Comments (Add Customization Of Note Here): extreme tenderness to palpation of site itchiness and dry patchy skin Daily Dosage (Cgy): 271.32 Energy (Kv): 100 Ultrasound Used Text: High frequency ultrasound depth is 2.4mm, which is 1.22mm in difference from previous imaging.

## 2024-06-18 NOTE — IMAGING NOTE
S/P amputation  Supportive care   Spoke to Floor Nurse Brissa Billy. Plan to do Thoracentesis tomorrow. Instructed to keep pt on NPO at midnight. Need to draw STAT PT/INR in AM and hold Heparin. RAD RN will call back tomorrow with time of procedure. Floor RN verbalized understanding.

## 2025-07-16 NOTE — PROGRESS NOTES
Labs in 3 months, orders in, thank you   [facility] Pharmacy Note:  Pain Consult    Fredo Ar is a 70year old male started on Dilaudid PCA by Dr. Carlotta Boyer. Pharmacy was consulted to review medication profile and to discontinue previously ordered narcotics and sedatives.     Medication profile was r

## (undated) DEVICE — ADHESIVE MASTISOL 2/3CC VL

## (undated) DEVICE — Device: Brand: DEFENDO AIR/WATER/SUCTION AND BIOPSY VALVE

## (undated) DEVICE — FORCEP RADIAL JAW 4

## (undated) DEVICE — PENCIL TELESCOPE MEGADYNE SE

## (undated) DEVICE — SUTURE STRATAFIX 60CML 36MML

## (undated) DEVICE — STERILE POLYISOPRENE POWDER-FREE SURGICAL GLOVES: Brand: PROTEXIS

## (undated) DEVICE — Device: Brand: VIRTUOSAPH PLUS WITH RADIAL INDICATION

## (undated) DEVICE — LAPAROTOMY CDS: Brand: MEDLINE INDUSTRIES, INC.

## (undated) DEVICE — PROXIMATE SKIN STAPLERS (35 WIDE) CONTAINS 35 STAINLESS STEEL STAPLES (FIXED HEAD): Brand: PROXIMATE

## (undated) DEVICE — 1200CC GUARDIAN II: Brand: GUARDIAN

## (undated) DEVICE — SINGLE-USE DIGITAL FLEXIBLE URETEROSCOPE: Brand: LITHOVUE

## (undated) DEVICE — ENSEAL 20 CM SHAFT, LARGE JAW: Brand: ENSEAL X1

## (undated) DEVICE — ENDOSCOPY PACK UPPER: Brand: MEDLINE INDUSTRIES, INC.

## (undated) DEVICE — CONTAINER CELL SAVER 600ML

## (undated) DEVICE — SOL NACL IRRIG 0.9% 1000ML BTL

## (undated) DEVICE — 3M(TM) MICROPORE TAPE DISPENSER 1535-2: Brand: 3M™ MICROPORE™

## (undated) DEVICE — SEAL Y BIOPSY PORT P6R SCOPE

## (undated) DEVICE — FILTERLINE NASAL ADULT O2/CO2

## (undated) DEVICE — ZIPWIRE GUIDEWIRE .035X150 STR

## (undated) DEVICE — CYSTO PACK: Brand: MEDLINE INDUSTRIES, INC.

## (undated) DEVICE — BLOWER CLEARVIEW KIT

## (undated) DEVICE — APPLICATOR CHLORAPREP 26ML

## (undated) DEVICE — MEDI-VAC SUCTION HANDLE REGULAR CAPACITY: Brand: CARDINAL HEALTH

## (undated) DEVICE — TIGERTAIL 5F FLXTIP 70CM

## (undated) DEVICE — YANKAUER STERILE RIGID POOLE T

## (undated) DEVICE — KENDALL SCD EXPRESS SLEEVES, KNEE LENGTH, MEDIUM: Brand: KENDALL SCD

## (undated) DEVICE — BIOGUARD CLEANING ADAPTER

## (undated) DEVICE — DRAPE THERMAL BASIN

## (undated) DEVICE — SOL H2O 3000ML IRRIG

## (undated) DEVICE — STERILE SYNTHETIC NEOPRENE POWDER-FREE SURGICAL GLOVES WITH NITRILE COATING, SMOOTH FINISH, STRAIGHT FINGER: Brand: PROTEXIS

## (undated) DEVICE — LIGHT HANDLE

## (undated) DEVICE — COVER,MAYO STAND,STERILE: Brand: MEDLINE

## (undated) DEVICE — BALLOON HEMOSTATIC EUS LINEAR

## (undated) DEVICE — SOL  .9 3000ML

## (undated) DEVICE — SOL LACT RINGERS 1000ML

## (undated) DEVICE — Device

## (undated) DEVICE — SUT POLYDEK 2-0 ME-2 69-414

## (undated) DEVICE — SUT PROLENE 8-0 BV130-5 8730H

## (undated) DEVICE — VIOLET BRAIDED (POLYGLACTIN 910), SYNTHETIC ABSORBABLE SUTURE: Brand: COATED VICRYL

## (undated) DEVICE — LITHOTRIPSY

## (undated) DEVICE — SYRINGE 20CC LL TIP

## (undated) DEVICE — PROXIMATE LINEAR CUTTER RELOAD, BLUE, 75MM: Brand: PROXIMATE

## (undated) DEVICE — SYRINGE 30ML LL TIP

## (undated) DEVICE — ENDOSCOPY PACK - LOWER: Brand: MEDLINE INDUSTRIES, INC.

## (undated) DEVICE — MOSES 200 FIBER

## (undated) DEVICE — BLADE ELECTRODE: Brand: EDGE

## (undated) DEVICE — DRAPE EQUIPMENT INTRATEMP THER

## (undated) DEVICE — SPONGE STICK WITH PVP-I: Brand: KENDALL

## (undated) DEVICE — LAPAROTOMY SPONGE - RF AND X-RAY DETECTABLE PRE-WASHED: Brand: SITUATE

## (undated) DEVICE — FORCEP BIOPSY RJ4 LG CAP W/ND

## (undated) DEVICE — SOLUTION  .9 1000ML BTL

## (undated) DEVICE — 3M™ RED DOT™ MONITORING ELECTRODE WITH FOAM TAPE AND STICKY GEL, 50/BAG, 20/CASE, 72/PLT 2570: Brand: RED DOT™

## (undated) DEVICE — SINGLE USE SPLINTING TUBE: Brand: SINGLE USE SPLINTING TUBE

## (undated) DEVICE — PROXIMATE RELOADABLE LINEAR STAPLER, 30MM: Brand: PROXIMATE

## (undated) DEVICE — SOL  .9 1000ML BTL

## (undated) DEVICE — CHLORAPREP 26ML APPLICATOR

## (undated) DEVICE — SUTURE WIRE DOUBLE STERNOTOMY

## (undated) DEVICE — BLOCK BITE MAXI 60FR

## (undated) DEVICE — SUTURE VLOC 180 3-0 9" 0344

## (undated) DEVICE — SCD SLEEVE KNEE HI BLEND

## (undated) DEVICE — KIT PEG GASTRO 20FR

## (undated) DEVICE — ZIPWIRE GUIDEWIRE .038X150 STR

## (undated) DEVICE — GOWN,SIRUS,FAB REINF,RAGLAN,XL,STERILE: Brand: MEDLINE

## (undated) DEVICE — SUT PDS II 1 CTX Z371T

## (undated) DEVICE — CYSTO CDS-LF: Brand: MEDLINE INDUSTRIES, INC.

## (undated) DEVICE — PROXIMATE RELOADABLE LINEAR STAPLER: Brand: PROXIMATE

## (undated) DEVICE — KIT ENDO ORCAPOD 160/180/190

## (undated) DEVICE — CELL SAVER RESERVOIR

## (undated) DEVICE — STERILE SYNTHETIC POLYISOPRENE POWDER-FREE SURGICAL GLOVES WITH HYDROGEL COATING, SMOOTH FINISH, STRAIGHT FINGER: Brand: PROTEXIS

## (undated) DEVICE — SOLUTION  .9 3000ML

## (undated) DEVICE — SUT PROLENE 6-0 C-1 M8718

## (undated) DEVICE — FLEXIVA  PULSE  AND  FLEXIVA  PULSE  TRACTIP  LASER  FIBERS  ARE  HIGH  POWER  SINGLE-USE FIBER: Brand: FLEXIVA PULSE ID

## (undated) DEVICE — SLEEVE KENDALL SCD EXPRESS MED

## (undated) DEVICE — [HIGH FLOW INSUFFLATOR,  DO NOT USE IF PACKAGE IS DAMAGED,  KEEP DRY,  KEEP AWAY FROM SUNLIGHT,  PROTECT FROM HEAT AND RADIOACTIVE SOURCES.]: Brand: PNEUMOSURE

## (undated) DEVICE — PROXIMATE RELOADABLE LINEAR CUTTER WITH SAFETY LOCK-OUT, 75MM: Brand: PROXIMATE

## (undated) DEVICE — NITINOL STONE RETRIEVAL BASKET: Brand: ZERO TIP

## (undated) DEVICE — GOWN,SIRUS,FABRIC-REINFORCED,X-LARGE: Brand: MEDLINE

## (undated) DEVICE — GAMMEX® PI HYBRID SIZE 7.5, STERILE POWDER-FREE SURGICAL GLOVE, POLYISOPRENE AND NEOPRENE BLEND: Brand: GAMMEX

## (undated) DEVICE — SUTURE VICRYL 2-0

## (undated) DEVICE — 10FT COMBINED O2 DELIVERY/CO2 MONITORING. FILTER WITH MICROSTREAM TYPE LUER: Brand: DUAL ADULT NASAL CANNULA

## (undated) DEVICE — MEDI-VAC NON-CONDUCTIVE SUCTION TUBING: Brand: CARDINAL HEALTH

## (undated) DEVICE — 3M™ TEGADERM™ TRANSPARENT FILM DRESSING, 1626W, 4 IN X 4-3/4 IN (10 CM X 12 CM), 50 EACH/CARTON, 4 CARTON/CASE: Brand: 3M™ TEGADERM™

## (undated) DEVICE — HYDROGEL COATED URETERAL DILATOR: Brand: NOTTINGHAM ONE-STEP

## (undated) DEVICE — DUAL LUMEN CATHETER

## (undated) DEVICE — SUT VICRYL 0 J912G

## (undated) DEVICE — OPEN HEART: Brand: MEDLINE INDUSTRIES, INC.

## (undated) DEVICE — SUT SILK 2 TIES SA8H

## (undated) DEVICE — DRESS WOUND AQUACEL 3.5INX12IN

## (undated) DEVICE — DRIVER DRILL ZDRIVE

## (undated) DEVICE — GOWN,PREVENTION PLUS,XLARGE,STERILE: Brand: MEDLINE

## (undated) NOTE — LETTER
08/01/18        Saurabh Briscoe  [address] [place] [id number]      Dear Benton Romberg,    [address] records indicate that you have outstanding lab work and or testing that was ordered for you and has not yet been completed:          Comp Metabolic Panel (1

## (undated) NOTE — Clinical Note
FYI: TCM completed. Patient has upcoming TCM/HFU scheduled on 6/23/22. TE to PCP office re: sooner appointment. Thank you.

## (undated) NOTE — LETTER
INFORMED REFUSAL FOR REMOVING CONTINUOUS GLUCOSE MONITOR    I have been advised that it is necessary for me to remove my continuous glucose monitor (CGM) prior to my surgery, procedure, or test. The reasons for the removal of the CGM are based on 's recommendations and have been explained to me. This includes, but is not limited to, known risks to me personally and to the monitor if I choose to leave it on, benefits (accuracy/quality of visualization related to possible obstructions from the shape/form/shadow of the device) and alternatives (not have the test). I understand that my refusal to remove my CGM may cause harm to me and may damage the device. While I understand the possible consequences, I nevertheless refuse to remove the CGM and am leaving it on against medical advice. I assume responsibility for the consequences of this refusal and release [facility] AND [facility], [facility], [facility], [place] [address], its affiliates and subsidiaries, its employees and agents, and all providers from any and all liability associated with my refusal to follow medical advice and remove the CGM.         ________________________________________________________  (Patient Signature)        Isaias Browning  (Patient Name, Printed)        ________________________________________________________  (Guardian/Surrogate Decision Maker Signature)        ________________________________________________________  (Print Guardian/Surrogate Decision Maker Name)        ___________________________________________  (Date)

## (undated) NOTE — LETTER
10/05/2022    Isaias   9/25/1950    To whom it may concern:    Isaias  may resume cardiac rehabilitation on Monday October 10, 2022. Please call me if you have any additional questions or concerns.     Sincerely,        Miah Stapleton MD

## (undated) NOTE — MR AVS SNAPSHOT
[place]  [address] [facility]  [address] [facility] [address] [phone]               Thank you for choosing us for your health care visit with Arik Chanel MD.  We are glad to serve you and happy to provide you with this garza Pulmonary function test    Complete by:  Feb 28, 2017    Assoc Dx:  Chronic obstructive pulmonary disease, unspecified COPD type ([facility] [J44.9]           EKG w/ Interpretation and Report - IN Office    Complete by:  As directed    Assoc Dx:  Essential hype What changed:  Another medication with the same name was removed. Continue taking this medication, and follow the directions you see here.    Commonly known as:  DURAGESIC-100           HYDROmorphone HCl 4 MG Tabs   Take 1 tablet (4 mg total) by mouth every Shyam.tn

## (undated) NOTE — LETTER
Rina Bhakta [address]  [address] [place] [address], [address] [place] [address]    Consent for Operation  Date: __________________                                Time: _______________    1.  I authorize the performance upon Anders Diggs the following operation:  Procedu revealed by the pictures or by descriptive texts accompanying them. If the procedure has been videotaped, the surgeon will obtain the original videotape. The hospital will not be responsible for storage or maintenance of this tape.   7. For the purpose of a THAT MY DOCTOR PROVIDED ME WITH THE ABOVE EXPLANATIONS, THAT ALL BLANKS OR STATEMENTS REQUIRING INSERTION OR COMPLETION WERE FILLED IN.     Signature of Patient:   ___________________________    When the patient is a minor or mentally incompetent to give co iii. All of the medicines I take (including prescriptions, herbal supplements, and pills I can buy without a prescription (including street drugs/illegal medications).  Failure to inform my anesthesiologist about these medicines may increase my risk of anes _____________________________________________________________________________  Anesthesiologist Signature     Date   Time  I have discussed the procedure and information above with the patient (or patient’s representative) and answered their questions.  The

## (undated) NOTE — LETTER
Rina Bhakta 182  [address] [facility] [address], [address] [facility]  Authorization for Surgical Operation and Procedure     Date:___________                                                                                                         Time:__________ not all, of the potential risks that can occur: fever and allergic reactions, hemolytic reactions, transmission of diseases such as Hepatitis, AIDS and Cytomegalovirus (CMV) and fluid overload.   In the event that I wish to have an autologous transfusion of attending physician will determine when the applicable recovery period ends for purposes of reinstating the DNAR order.   10. Patients having a sterilization procedure: I understand that if the procedure is successful the results will be permanent and it wi anesthesiologist (anesthesia doctor) to give me medicine and do additional procedures as necessary.  Some examples are: Starting or using an “IV” to give me medicine, fluids or blood during my procedure, and having a breathing tube placed to help me breathe “epidural”, & “nerve blocks”): I understand that rare but potential complications include headache, bleeding, infection, seizure, irregular heart rhythms, and nerve injury.     I can change my mind about having anesthesia services at any time before I get

## (undated) NOTE — LETTER
Rina Bhakta 182 [address] [facility] [address], [address] [facility]  Authorization for Surgical Operation and Procedure   Date:___________                                                                                            Time:__________  1.  I hereby aut all, of the potential risks that can occur: fever and allergic reactions, hemolytic reactions, transmission of diseases such as Hepatitis, AIDS and Cytomegalovirus (CMV) and fluid overload.   In the event that I wish to have an autologous transfusion of my physician will determine when the applicable recovery period ends for purposes of reinstating the DNAR order.   10. Patients having a sterilization procedure: I understand that if the procedure is successful the results will be permanent and it will therefo

## (undated) NOTE — LETTER
Marlen Blanton M.D., F.A.C.S. Kenton Celeste M.D., F.A.C.SMohinder Mcmillan M.D., [facility]. SUZANNA Jamison M.D., F.A.C.SMohinder Hernandez M.D., F.A.C.S. YE Morton M. Duwayne Mcgregor A.D. Emerson Coaster, M.D., F.A.C.S. Citlaly Diamond M.D., F.A.C.S. Juan Jose Nation M.D., F.A.C.S. Dina Turner M.D., F.A.C.S. Desmond Rogers M.D. F.A.C.SMohinder Ratliff Deabeverly. Yana Mancilla M.D., F.A.C.S. Gilbert Chavira M.D., F.A.C.S. Birgit Zavaleta M.D., F.A.C.S., F.A.C.CMohinder Foy M.D., F.A.C.S. Can Burgos M.D., F.A.C.S. Pavan Nguyen M.D., F.A.C.SMohinder Perez M.D., F.A.C.S. YE Donaldson M.D., [facility]. C.S  Patricia Denson M.D. Sam Antoine M.D., F.A.C.S. Arias Jain M.D., F.A.C.S. YE Blake M.D.  Sandra Deras M.D. PhD.  Karen Isabella, YE Honeycutt M.D. F A C S. Maggie Ireland. Mary Toledo M.D. Lino Muller M.D. Caren Gonsales M.D. Tracie Souza M.D.   Juma Hyatt D.O., F.A.C.S. Adryan Lanier M.D., F.A.C.S. Richy Messer M.D. Welcome to [facility] As you contemplate possible surgical treatment, it is very important to us that you understand fully what is being discussed, that all of your questions have been answered, and that your options for treatment have been fully explained. To that end, on the following page we will ask you some questions to make certain that you understand everything which has been explained to you. Included in this understanding is that there are both surgical and nonsurgical treatments available for you, that you have options regarding where your care is given, and what doctors are involved in your care. Included in these options would, of course, be the option to elect for no treatment whatsoever.  We especially want to be sure that you have had a chance to have all of your questions and concerns answered. If there are any issues which have not been adequately addressed, we ask you to bring them forward so that we can thoroughly address them. A patient who is fully informed and understands their condition and options for treatment, as well as potential adverse effects of treatment, is going to be a patient who receives the most benefit out of care rendered. Our goal in addition to providing excellent surgical care is to provide the necessary information to you and your family in order to make decisions which are appropriate relative to your own care. Please take the time necessary to read and answer the questions on the next page. Again, if you have any questions, bring them forward and we will certainly address them. Sincerely,    [facility].    _______________________  ____________________________________  Date:                                             Patient Signature  ________________________  Isaias Browning  Witness Consent Form         Revised: 2015  Patient Name: Isaias Browning     : 1950                 Printed: 6/15/13 9:43 AM     Medical Record #: [id number]                Page: 1 of  2        [facility] Supplemental Consent Form    A [facility] surgeon has met with me and explained the matter of my illness, and what treatments might be available to improve my condition. As a result of that conversation, I understand the following:    A [facility] surgeon met with me and explained, in detail, the nature of my condition for which surgery is being contemplated.  The procedure to be performed  Is: CORONARY ARTERY BYPASS GRAFT            Yes _____ No _____    A [facility] surgeon has explained to me that there are alternatives to surgery which might include no surgery, medical therapy, or interventional treatment, among other options and the risks and benefits of the different treatment options:    Yes _____ No _____    A [facility] surgeon as explained to me that if I should so desire, he/she is willing to explain my case and the surgical and non-surgical options to family members: Yes _____ No _____    A [facility] surgeon has answered all of my questions regarding the topics we have discussed. I have been invited to ask more questions:  Yes _____ No _____    A [facility] surgeon has explained to me that if I seek other options or wish treatment at another facility in [place] or [place], or anywhere in the [place], that his/her office will assist me in making such accommodations:   Yes _____ No _____    A [facility] surgeon has explained to me, that death, risk of bleeding, stroke, multi-organ failure, heart attack or other complications are risks for the proposed surgical procedure: Yes _____ No _____    A [facility] surgeon has explained to me that I have the right to cancel or postpone the surgery at any time prior to the start of surgery: Yes _____ No _____    The nature and options for treatment for my condition have been explained to me, in detail, by a [facility] surgeon and all questions have been answered to my satisfaction. I understand that I am not required to undergo surgery, and further, that if I so desire, I could have surgery accomplished by another surgeon or at another institution. I understand and accept that which has been explained to me.  I am able to make my decisions knowingly and willfully based on the data.    ______________________   __________________________________  Date       Patient Signature  ______________________________ Latia Ascencio  Witness Consent Form   Patient Name: Latia Silva     DATB: 9/25/1950                 Medical Record #: [id number]    Page: 2 of 2        Printed: May 31, 2022

## (undated) NOTE — LETTER
[address] [facility] FOR BLOOD OR BLOOD COMPONENTS      In the course of your treatment, it may become necessary to administer a transfusion of blood or blood components.  This form provides basic information concerning this proc alternatives to you if it has not already been done. I,Jameson Abreu, have read/had read to me the above. I understand the matters bearing on the decision whether or not to authorize a transfusion of blood or blood components.  I have no questions which

## (undated) NOTE — Clinical Note
TCM call completed today. The patient is scheduled for a physical with you on 3/3/2020. A TE was sent to triage for TCM-HFU request. Thank you.

## (undated) NOTE — LETTER
Rina Bhakta 182 6 [address]  [place], [address] [place] [address]    Consent for Operation  Date: __________________                                Time: _______________    1.  I authorize the performance upon Milagros Winn the following operation:  Procedu procedure has been videotaped, the surgeon will obtain the original videotape. The hospital will not be responsible for storage or maintenance of this tape.   7. For the purpose of advancing medical education, I consent to the admittance of observers to the STATEMENTS REQUIRING INSERTION OR COMPLETION WERE FILLED IN.     Signature of Patient:   ___________________________    When the patient is a minor or mentally incompetent to give consent:  Signature of person authorized to consent for patient: ____________ supplements, and pills I can buy without a prescription (including street drugs/illegal medications). Failure to inform my anesthesiologist about these medicines may increase my risk of anesthetic complications. iv.  If I am allergic to anything or have ha Anesthesiologist Signature     Date   Time  I have discussed the procedure and information above with the patient (or patient’s representative) and answered their questions. The patient or their representative has agreed to have anesthesia services.     ___

## (undated) NOTE — ED AVS SNAPSHOT
Addis Amrita   MRN: [id number]    Department:  [facility] Emergency Department   Date of Visit:  3/8/2020           Disclosure     Insurance plans vary and the physician(s) referred by the ER may not be covered by your plan.  Please contact yo tell this physician (or your personal doctor if your instructions are to return to your personal doctor) about any new or lasting problems. The primary care or specialist physician will see patients referred from the [facility] Emergency Department.  Carson Salgado

## (undated) NOTE — MR AVS SNAPSHOT
[place]  [address] [facility] [address]  [address] [facility] [address] [phone]               Thank you for choosing us for your health care visit with Alhaji Chang MD.  We are glad to serve you and happy to provide you with this garza No Known Allergies                 Today's Vital Signs     BP Pulse Temp Height Weight BMI    128/72 mmHg 82 98.7 °F (37.1 °C) (Oral) 71\" 153 lb 12 oz 21.45 kg/m2         Current Medications          This list is accurate as of: 3/3/17  1:50 PM.  Always Summaries. If you've been to the Emergency Department or your doctor's office, you can view your past visit information in [employer] by going to Visits < Visit Summaries. HItviews questions? Call [phone] for help.   [employer] is NOT to be used for urge

## (undated) NOTE — Clinical Note
FYI, TCM call made, see notes. MELINA attempted to schedule a TCM HFU patient states he will call Dr. Thais Centeno to schedule an appointment. Message sent to MD's office.

## (undated) NOTE — LETTER
04/10/18        Dom Kidney  [address] [facility] [address]      Dear Lubna Singh,    [address] [facility] records indicate that you have outstanding lab work and or testing that was ordered for you and has not yet been completed:          Urinalysis, Routine

## (undated) NOTE — LETTER
02/06/20        Skye Heathjayce  [address] [place] [id number]      Dear Lilibeth Jennings,    [address] [facility] records indicate that you have outstanding lab work and or testing that was ordered for you and has not yet been completed: Fasting lab work   *fast f

## (undated) NOTE — LETTER
Rina Bhakta 182  [address] [facility] [address], [address] [facility]  Authorization for Surgical Operation and Procedure     Date:___________                                                                                                         Time:__________ some, but not all, of the potential risks that can occur: fever and allergic reactions, hemolytic reactions, transmission of diseases such as Hepatitis, AIDS and Cytomegalovirus (CMV) and fluid overload.   In the event that I wish to have an autologous huggins or my attending physician will determine when the applicable recovery period ends for purposes of reinstating the DNAR order.   10. Patients having a sterilization procedure: I understand that if the procedure is successful the results will be permanent and the anesthesiologist (anesthesia doctor) to give me medicine and do additional procedures as necessary.  Some examples are: Starting or using an “IV” to give me medicine, fluids or blood during my procedure, and having a breathing tube placed to help me nicole “epidural”, & “nerve blocks”): I understand that rare but potential complications include headache, bleeding, infection, seizure, irregular heart rhythms, and nerve injury.     I can change my mind about having anesthesia services at any time before I get

## (undated) NOTE — MR AVS SNAPSHOT
[place]  [address] [facility] [address]  [address] [facility] [address] [phone]               Thank you for choosing us for your health care visit with Jacquelin Oliveros MD.  We are glad to serve you and happy to provide you with this garza TAKE ONE TABLET BY MOUTH TWO TIMES A DAY           fentaNYL 100 MCG/HR Pt72   Place 1 patch onto the skin every third day.    Commonly known as:  DURAGESIC-100           HYDROmorphone HCl 4 MG Tabs   Take 1 tablet (4 mg total) by mouth every 6 (six) hours a Call [phone] for help. [employer] is NOT to be used for urgent needs. For medical emergencies, dial 911.            Visit [facility] online at  [email or web address]

## (undated) NOTE — LETTER
05/06/21    Paola Blunt 9/25/1950    To Whom it May Concern,      The above patient is a patient of mine. I was notified that he will be receiving a tooth extraction. It is my medical opinion that this patient may undergo tooth extraction.          Lam Luciano

## (undated) NOTE — MR AVS SNAPSHOT
[place]  [address] [facility] [address]  [address] [facility] [address] [phone]               Thank you for choosing us for your health care visit with Arik Chanel MD.  We are glad to serve you and happy to provide you with this garza the cilia. Damaged cilia can’t sweep mucus and particles away. Some of the cilia are destroyed. This damage worsens COPD. How did I get COPD? Most people get COPD from smoking. Cigarette smoke damages lungs, which can develop into COPD over many years. TAKE ONE TABLET BY MOUTH TWO TIMES A DAY           fentaNYL 100 MCG/HR Pt72   Place 1 patch onto the skin every third day.    Commonly known as:  DURAGESIC-100           HYDROmorphone HCl 4 MG Tabs   Take 1 tablet (4 mg total) by mouth every 6 (six) hours a office, you can view your past visit information in [employer] by going to Visits < Visit Summaries. [employer] questions? Call [phone] for help. [employer] is NOT to be used for urgent needs. For medical emergencies, dial 911.            Visit [facility]